# Patient Record
Sex: MALE | Race: WHITE | NOT HISPANIC OR LATINO | Employment: OTHER | URBAN - METROPOLITAN AREA
[De-identification: names, ages, dates, MRNs, and addresses within clinical notes are randomized per-mention and may not be internally consistent; named-entity substitution may affect disease eponyms.]

---

## 2018-06-15 ENCOUNTER — HISTORICAL (OUTPATIENT)
Dept: ADMINISTRATIVE | Facility: HOSPITAL | Age: 58
End: 2018-06-15

## 2018-06-15 LAB
ABS NEUT (OLG): 4.68 X10(3)/MCL (ref 2.1–9.2)
APPEARANCE, UA: CLEAR
BASOPHILS # BLD AUTO: 0.1 X10(3)/MCL (ref 0–0.2)
BASOPHILS NFR BLD AUTO: 1 %
BILIRUB UR QL STRIP: NEGATIVE
CHOLEST SERPL-MCNC: 142 MG/DL (ref 0–200)
CHOLEST/HDLC SERPL: 2.6 {RATIO} (ref 0–5)
COLOR UR: YELLOW
CREAT UR-MCNC: 140 MG/DL
DEPRECATED CALCIDIOL+CALCIFEROL SERPL-MC: 10 NG/ML (ref 30–80)
EOSINOPHIL # BLD AUTO: 0.4 X10(3)/MCL (ref 0–0.9)
EOSINOPHIL NFR BLD AUTO: 4 %
ERYTHROCYTE [DISTWIDTH] IN BLOOD BY AUTOMATED COUNT: 12.5 % (ref 11.5–17)
EST. AVERAGE GLUCOSE BLD GHB EST-MCNC: 192 MG/DL
GLUCOSE (UA): ABNORMAL
HAV IGM SERPL QL IA: NEGATIVE
HBA1C MFR BLD: 8.3 % (ref 4.2–6.3)
HBV CORE IGM SERPL QL IA: NEGATIVE
HBV SURFACE AG SERPL QL IA: NEGATIVE
HCT VFR BLD AUTO: 43.8 % (ref 42–52)
HCV AB SERPL QL IA: NEGATIVE
HDLC SERPL-MCNC: 54 MG/DL (ref 35–60)
HEPATITIS PANEL INTERP: NORMAL
HGB BLD-MCNC: 14.8 GM/DL (ref 14–18)
HGB UR QL STRIP: NEGATIVE
KETONES UR QL STRIP: NEGATIVE
LDLC SERPL CALC-MCNC: 70 MG/DL (ref 0–129)
LEUKOCYTE ESTERASE UR QL STRIP: NEGATIVE
LYMPHOCYTES # BLD AUTO: 3 X10(3)/MCL (ref 0.6–4.6)
LYMPHOCYTES NFR BLD AUTO: 34 %
MCH RBC QN AUTO: 31.8 PG (ref 27–31)
MCHC RBC AUTO-ENTMCNC: 33.8 GM/DL (ref 33–36)
MCV RBC AUTO: 94.2 FL (ref 80–94)
MICROALBUMIN UR-MCNC: 0.8 MG/DL
MICROALBUMIN/CREAT RATIO PNL UR: 5.7 MG/GM CR (ref 0–30)
MONOCYTES # BLD AUTO: 0.8 X10(3)/MCL (ref 0.1–1.3)
MONOCYTES NFR BLD AUTO: 9 %
NEUTROPHILS # BLD AUTO: 4.68 X10(3)/MCL (ref 1.4–7.9)
NEUTROPHILS NFR BLD AUTO: 52 %
NITRITE UR QL STRIP: NEGATIVE
PH UR STRIP: 6 [PH] (ref 5–9)
PLATELET # BLD AUTO: 165 X10(3)/MCL (ref 130–400)
PMV BLD AUTO: 10.5 FL (ref 9.4–12.4)
PROT UR QL STRIP: NEGATIVE
RBC # BLD AUTO: 4.65 X10(6)/MCL (ref 4.7–6.1)
SP GR UR STRIP: 1.01 (ref 1–1.03)
TRIGL SERPL-MCNC: 89 MG/DL (ref 30–150)
TSH SERPL-ACNC: 1.88 MIU/L (ref 0.36–3.74)
UROBILINOGEN UR STRIP-ACNC: 0.2
VLDLC SERPL CALC-MCNC: 18 MG/DL
WBC # SPEC AUTO: 9 X10(3)/MCL (ref 4.5–11.5)

## 2018-06-18 ENCOUNTER — HISTORICAL (OUTPATIENT)
Dept: INTERNAL MEDICINE | Facility: CLINIC | Age: 58
End: 2018-06-18

## 2018-07-19 ENCOUNTER — HISTORICAL (OUTPATIENT)
Dept: RADIOLOGY | Facility: HOSPITAL | Age: 58
End: 2018-07-19

## 2018-08-30 ENCOUNTER — HISTORICAL (OUTPATIENT)
Dept: ADMINISTRATIVE | Facility: HOSPITAL | Age: 58
End: 2018-08-30

## 2018-08-31 ENCOUNTER — HISTORICAL (OUTPATIENT)
Dept: RADIOLOGY | Facility: HOSPITAL | Age: 58
End: 2018-08-31

## 2018-09-06 ENCOUNTER — HISTORICAL (OUTPATIENT)
Dept: RADIOLOGY | Facility: HOSPITAL | Age: 58
End: 2018-09-06

## 2018-10-01 ENCOUNTER — HISTORICAL (OUTPATIENT)
Dept: RADIOLOGY | Facility: HOSPITAL | Age: 58
End: 2018-10-01

## 2018-10-01 LAB
BUN SERPL-MCNC: 15 MG/DL (ref 7–18)
CALCIUM SERPL-MCNC: 8.8 MG/DL (ref 8.5–10.1)
CHLORIDE SERPL-SCNC: 104 MMOL/L (ref 98–107)
CHOLEST SERPL-MCNC: 150 MG/DL
CHOLEST/HDLC SERPL: 3.4 {RATIO} (ref 0–5)
CO2 SERPL-SCNC: 35 MMOL/L (ref 21–32)
CREAT SERPL-MCNC: 0.9 MG/DL (ref 0.6–1.3)
CREAT/UREA NIT SERPL: 17
EST. AVERAGE GLUCOSE BLD GHB EST-MCNC: 206 MG/DL
GLUCOSE SERPL-MCNC: 170 MG/DL (ref 74–106)
HBA1C MFR BLD: 8.8 % (ref 4.2–6.3)
HDLC SERPL-MCNC: 44 MG/DL
LDLC SERPL CALC-MCNC: 74 MG/DL (ref 0–130)
POTASSIUM SERPL-SCNC: 4.1 MMOL/L (ref 3.5–5.1)
SODIUM SERPL-SCNC: 140 MMOL/L (ref 136–145)
TRIGL SERPL-MCNC: 160 MG/DL
VLDLC SERPL CALC-MCNC: 32 MG/DL

## 2018-10-23 ENCOUNTER — HISTORICAL (OUTPATIENT)
Dept: ADMINISTRATIVE | Facility: HOSPITAL | Age: 58
End: 2018-10-23

## 2018-10-23 LAB
BUN SERPL-MCNC: 15 MG/DL (ref 7–18)
CALCIUM SERPL-MCNC: 9.8 MG/DL (ref 8.5–10.1)
CHLORIDE SERPL-SCNC: 106 MMOL/L (ref 98–107)
CO2 SERPL-SCNC: 31 MMOL/L (ref 21–32)
CREAT SERPL-MCNC: 0.96 MG/DL (ref 0.7–1.3)
CREAT/UREA NIT SERPL: 15.6
GLUCOSE SERPL-MCNC: 178 MG/DL (ref 74–106)
POTASSIUM SERPL-SCNC: 4.7 MMOL/L (ref 3.5–5.1)
SODIUM SERPL-SCNC: 145 MMOL/L (ref 136–145)

## 2019-01-08 ENCOUNTER — HISTORICAL (OUTPATIENT)
Dept: ADMINISTRATIVE | Facility: HOSPITAL | Age: 59
End: 2019-01-08

## 2019-01-08 LAB
ALBUMIN SERPL-MCNC: 3.9 GM/DL (ref 3.4–5)
ALBUMIN/GLOB SERPL: 1.3 RATIO (ref 1.1–2)
ALP SERPL-CCNC: 113 UNIT/L (ref 50–136)
ALT SERPL-CCNC: 29 UNIT/L (ref 12–78)
AST SERPL-CCNC: 18 UNIT/L (ref 15–37)
BILIRUB SERPL-MCNC: 0.9 MG/DL (ref 0.2–1)
BILIRUBIN DIRECT+TOT PNL SERPL-MCNC: 0.2 MG/DL (ref 0–0.5)
BILIRUBIN DIRECT+TOT PNL SERPL-MCNC: 0.7 MG/DL (ref 0–0.8)
BUN SERPL-MCNC: 15 MG/DL (ref 7–18)
CALCIUM SERPL-MCNC: 8.9 MG/DL (ref 8.5–10.1)
CHLORIDE SERPL-SCNC: 104 MMOL/L (ref 98–107)
CHOLEST SERPL-MCNC: 165 MG/DL (ref 0–200)
CHOLEST/HDLC SERPL: 3.6 {RATIO} (ref 0–5)
CO2 SERPL-SCNC: 32 MMOL/L (ref 21–32)
CREAT SERPL-MCNC: 0.82 MG/DL (ref 0.7–1.3)
GLOBULIN SER-MCNC: 3.1 GM/DL (ref 2.4–3.5)
GLUCOSE SERPL-MCNC: 50 MG/DL (ref 74–106)
HDLC SERPL-MCNC: 46 MG/DL (ref 35–60)
LDLC SERPL CALC-MCNC: 86 MG/DL (ref 0–129)
POTASSIUM SERPL-SCNC: 3.8 MMOL/L (ref 3.5–5.1)
PROT SERPL-MCNC: 7 GM/DL (ref 6.4–8.2)
SODIUM SERPL-SCNC: 143 MMOL/L (ref 136–145)
TRIGL SERPL-MCNC: 164 MG/DL (ref 30–150)
VLDLC SERPL CALC-MCNC: 33 MG/DL

## 2019-04-15 ENCOUNTER — HISTORICAL (OUTPATIENT)
Dept: ADMINISTRATIVE | Facility: HOSPITAL | Age: 59
End: 2019-04-15

## 2019-04-15 LAB
BUN SERPL-MCNC: 13 MG/DL (ref 7–18)
CALCIUM SERPL-MCNC: 9.2 MG/DL (ref 8.5–10.1)
CHLORIDE SERPL-SCNC: 104 MMOL/L (ref 98–107)
CHOLEST SERPL-MCNC: 224 MG/DL (ref 0–200)
CHOLEST/HDLC SERPL: 4.5 {RATIO} (ref 0–5)
CO2 SERPL-SCNC: 32 MMOL/L (ref 21–32)
CREAT SERPL-MCNC: 0.78 MG/DL (ref 0.7–1.3)
CREAT/UREA NIT SERPL: 16.7
EST. AVERAGE GLUCOSE BLD GHB EST-MCNC: 220 MG/DL
GLUCOSE SERPL-MCNC: 72 MG/DL (ref 74–106)
HBA1C MFR BLD: 9.3 % (ref 4.2–6.3)
HDLC SERPL-MCNC: 50 MG/DL (ref 35–60)
LDLC SERPL CALC-MCNC: 138 MG/DL (ref 0–129)
POTASSIUM SERPL-SCNC: 4.4 MMOL/L (ref 3.5–5.1)
SODIUM SERPL-SCNC: 141 MMOL/L (ref 136–145)
TRIGL SERPL-MCNC: 179 MG/DL (ref 30–150)
VLDLC SERPL CALC-MCNC: 36 MG/DL

## 2019-08-20 ENCOUNTER — HISTORICAL (OUTPATIENT)
Dept: ADMINISTRATIVE | Facility: HOSPITAL | Age: 59
End: 2019-08-20

## 2019-08-20 LAB
ABS NEUT (OLG): 8.18 X10(3)/MCL (ref 2.1–9.2)
APPEARANCE, UA: CLEAR
BASOPHILS # BLD AUTO: 0.1 X10(3)/MCL (ref 0–0.2)
BASOPHILS NFR BLD AUTO: 1 %
BILIRUB UR QL STRIP: NEGATIVE
BUN SERPL-MCNC: 16 MG/DL (ref 7–18)
CALCIUM SERPL-MCNC: 9.6 MG/DL (ref 8.5–10.1)
CHLORIDE SERPL-SCNC: 108 MMOL/L (ref 98–107)
CHOLEST SERPL-MCNC: 129 MG/DL (ref 0–200)
CHOLEST/HDLC SERPL: 2.5 {RATIO} (ref 0–5)
CO2 SERPL-SCNC: 28 MMOL/L (ref 21–32)
COLOR UR: YELLOW
CREAT SERPL-MCNC: 0.84 MG/DL (ref 0.7–1.3)
CREAT UR-MCNC: 88 MG/DL
CREAT/UREA NIT SERPL: 19
EOSINOPHIL # BLD AUTO: 0.3 X10(3)/MCL (ref 0–0.9)
EOSINOPHIL NFR BLD AUTO: 3 %
ERYTHROCYTE [DISTWIDTH] IN BLOOD BY AUTOMATED COUNT: 12.1 % (ref 11.5–17)
EST. AVERAGE GLUCOSE BLD GHB EST-MCNC: 212 MG/DL
GLUCOSE (UA): ABNORMAL
GLUCOSE SERPL-MCNC: 67 MG/DL (ref 74–106)
HBA1C MFR BLD: 9 % (ref 4.2–6.3)
HCT VFR BLD AUTO: 46.5 % (ref 42–52)
HDLC SERPL-MCNC: 52 MG/DL (ref 35–60)
HGB BLD-MCNC: 15.5 GM/DL (ref 14–18)
HGB UR QL STRIP: NEGATIVE
KETONES UR QL STRIP: NEGATIVE
LDLC SERPL CALC-MCNC: 49 MG/DL (ref 0–129)
LEUKOCYTE ESTERASE UR QL STRIP: NEGATIVE
LYMPHOCYTES # BLD AUTO: 2.8 X10(3)/MCL (ref 0.6–4.6)
LYMPHOCYTES NFR BLD AUTO: 23 %
MCH RBC QN AUTO: 30.6 PG (ref 27–31)
MCHC RBC AUTO-ENTMCNC: 33.3 GM/DL (ref 33–36)
MCV RBC AUTO: 91.7 FL (ref 80–94)
MICROALBUMIN UR-MCNC: 2.5 MG/DL
MICROALBUMIN/CREAT RATIO PNL UR: 28.4 MG/GM CR (ref 0–30)
MONOCYTES # BLD AUTO: 0.9 X10(3)/MCL (ref 0.1–1.3)
MONOCYTES NFR BLD AUTO: 7 %
NEUTROPHILS # BLD AUTO: 8.18 X10(3)/MCL (ref 2.1–9.2)
NEUTROPHILS NFR BLD AUTO: 66 %
NITRITE UR QL STRIP: NEGATIVE
PH UR STRIP: 7 [PH] (ref 5–9)
PLATELET # BLD AUTO: 202 X10(3)/MCL (ref 130–400)
PMV BLD AUTO: 10.2 FL (ref 9.4–12.4)
POTASSIUM SERPL-SCNC: 4.2 MMOL/L (ref 3.5–5.1)
PROT UR QL STRIP: NEGATIVE
PSA SERPL-MCNC: 0.67 NG/ML (ref 0–4)
RBC # BLD AUTO: 5.07 X10(6)/MCL (ref 4.7–6.1)
SODIUM SERPL-SCNC: 143 MMOL/L (ref 136–145)
SP GR UR STRIP: 1.02 (ref 1–1.03)
TRIGL SERPL-MCNC: 142 MG/DL (ref 30–150)
UROBILINOGEN UR STRIP-ACNC: 0.2
VLDLC SERPL CALC-MCNC: 28 MG/DL
WBC # SPEC AUTO: 12.4 X10(3)/MCL (ref 4.5–11.5)

## 2019-12-18 ENCOUNTER — HISTORICAL (OUTPATIENT)
Dept: INTERNAL MEDICINE | Facility: CLINIC | Age: 59
End: 2019-12-18

## 2019-12-18 LAB
BUN SERPL-MCNC: 12 MG/DL (ref 7–18)
CALCIUM SERPL-MCNC: 9.6 MG/DL (ref 8.5–10.1)
CHLORIDE SERPL-SCNC: 107 MMOL/L (ref 98–107)
CO2 SERPL-SCNC: 32 MMOL/L (ref 21–32)
CREAT SERPL-MCNC: 0.8 MG/DL (ref 0.6–1.3)
CREAT/UREA NIT SERPL: 15
EST. AVERAGE GLUCOSE BLD GHB EST-MCNC: 226 MG/DL
GLUCOSE SERPL-MCNC: 64 MG/DL (ref 74–106)
HBA1C MFR BLD: 9.5 % (ref 4.2–6.3)
POTASSIUM SERPL-SCNC: 3.7 MMOL/L (ref 3.5–5.1)
SODIUM SERPL-SCNC: 144 MMOL/L (ref 136–145)

## 2020-06-22 ENCOUNTER — HISTORICAL (OUTPATIENT)
Dept: ADMINISTRATIVE | Facility: HOSPITAL | Age: 60
End: 2020-06-22

## 2020-06-22 LAB
ABS NEUT (OLG): 2.9 X10(3)/MCL (ref 2.1–9.2)
BASOPHILS # BLD AUTO: 0.1 X10(3)/MCL (ref 0–0.2)
BASOPHILS NFR BLD AUTO: 1 %
BUN SERPL-MCNC: 15.2 MG/DL (ref 8.4–25.7)
CALCIUM SERPL-MCNC: 9.2 MG/DL (ref 8.8–10)
CHLORIDE SERPL-SCNC: 103 MMOL/L (ref 98–107)
CHOLEST SERPL-MCNC: 128 MG/DL
CHOLEST/HDLC SERPL: 3 {RATIO} (ref 0–5)
CO2 SERPL-SCNC: 32 MMOL/L (ref 23–31)
CREAT SERPL-MCNC: 0.83 MG/DL (ref 0.73–1.18)
CREAT UR-MCNC: 46.1 MG/DL (ref 58–161)
CREAT/UREA NIT SERPL: 18
EOSINOPHIL # BLD AUTO: 0.4 X10(3)/MCL (ref 0–0.9)
EOSINOPHIL NFR BLD AUTO: 6 %
ERYTHROCYTE [DISTWIDTH] IN BLOOD BY AUTOMATED COUNT: 11.7 % (ref 11.5–17)
EST. AVERAGE GLUCOSE BLD GHB EST-MCNC: 254.6 MG/DL
GLUCOSE SERPL-MCNC: 161 MG/DL (ref 82–115)
HBA1C MFR BLD: 10.5 %
HCT VFR BLD AUTO: 43.7 % (ref 42–52)
HDLC SERPL-MCNC: 41 MG/DL (ref 35–60)
HGB BLD-MCNC: 14.7 GM/DL (ref 14–18)
LDLC SERPL CALC-MCNC: 62 MG/DL (ref 50–140)
LYMPHOCYTES # BLD AUTO: 3 X10(3)/MCL (ref 0.6–4.6)
LYMPHOCYTES NFR BLD AUTO: 43 %
MCH RBC QN AUTO: 31.3 PG (ref 27–31)
MCHC RBC AUTO-ENTMCNC: 33.6 GM/DL (ref 33–36)
MCV RBC AUTO: 93 FL (ref 80–94)
MICROALBUMIN UR-MCNC: 7.4 UG/ML
MICROALBUMIN/CREAT RATIO PNL UR: 16.1 MG/GM CR (ref 0–30)
MONOCYTES # BLD AUTO: 0.6 X10(3)/MCL (ref 0.1–1.3)
MONOCYTES NFR BLD AUTO: 9 %
NEUTROPHILS # BLD AUTO: 2.9 X10(3)/MCL (ref 2.1–9.2)
NEUTROPHILS NFR BLD AUTO: 41 %
PLATELET # BLD AUTO: 170 X10(3)/MCL (ref 130–400)
PMV BLD AUTO: 10.4 FL (ref 9.4–12.4)
POTASSIUM SERPL-SCNC: 4.8 MMOL/L (ref 3.5–5.1)
RBC # BLD AUTO: 4.7 X10(6)/MCL (ref 4.7–6.1)
SODIUM SERPL-SCNC: 143 MMOL/L (ref 136–145)
TRIGL SERPL-MCNC: 124 MG/DL (ref 34–140)
VLDLC SERPL CALC-MCNC: 25 MG/DL
WBC # SPEC AUTO: 7 X10(3)/MCL (ref 4.5–11.5)

## 2020-08-31 ENCOUNTER — HISTORICAL (OUTPATIENT)
Dept: ADMINISTRATIVE | Facility: HOSPITAL | Age: 60
End: 2020-08-31

## 2020-08-31 LAB
ALBUMIN SERPL-MCNC: 4 GM/DL (ref 3.4–4.8)
ALBUMIN/GLOB SERPL: 1.7 RATIO (ref 1.1–2)
ALP SERPL-CCNC: 116 UNIT/L (ref 40–150)
ALT SERPL-CCNC: 53 UNIT/L (ref 0–55)
APPEARANCE, UA: CLEAR
AST SERPL-CCNC: 40 UNIT/L (ref 5–34)
BACTERIA SPEC CULT: ABNORMAL /HPF
BILIRUB SERPL-MCNC: 1.1 MG/DL
BILIRUB UR QL STRIP: NEGATIVE
BILIRUBIN DIRECT+TOT PNL SERPL-MCNC: 0.4 MG/DL (ref 0–0.5)
BILIRUBIN DIRECT+TOT PNL SERPL-MCNC: 0.7 MG/DL (ref 0–0.8)
BUN SERPL-MCNC: 11.4 MG/DL (ref 8.4–25.7)
CALCIUM SERPL-MCNC: 8.8 MG/DL (ref 8.8–10)
CHLORIDE SERPL-SCNC: 102 MMOL/L (ref 98–107)
CO2 SERPL-SCNC: 34 MMOL/L (ref 23–31)
COLOR UR: YELLOW
CREAT SERPL-MCNC: 0.78 MG/DL (ref 0.73–1.18)
EST. AVERAGE GLUCOSE BLD GHB EST-MCNC: 257.5 MG/DL
GLOBULIN SER-MCNC: 2.4 GM/DL (ref 2.4–3.5)
GLUCOSE (UA): ABNORMAL
GLUCOSE SERPL-MCNC: 65 MG/DL (ref 82–115)
HBA1C MFR BLD: 10.6 %
HGB UR QL STRIP: NEGATIVE
KETONES UR QL STRIP: NEGATIVE
LEUKOCYTE ESTERASE UR QL STRIP: NEGATIVE
NITRITE UR QL STRIP: NEGATIVE
PH UR STRIP: 6 [PH] (ref 5–9)
POTASSIUM SERPL-SCNC: 4.4 MMOL/L (ref 3.5–5.1)
PROT SERPL-MCNC: 6.4 GM/DL (ref 5.8–7.6)
PROT UR QL STRIP: NEGATIVE
PSA SERPL-MCNC: 0.5 NG/ML
RBC #/AREA URNS HPF: ABNORMAL /[HPF]
SODIUM SERPL-SCNC: 142 MMOL/L (ref 136–145)
SP GR UR STRIP: 1.02 (ref 1–1.03)
SQUAMOUS EPITHELIAL, UA: ABNORMAL
UROBILINOGEN UR STRIP-ACNC: 0.2
WBC #/AREA URNS HPF: ABNORMAL /[HPF]

## 2020-11-25 ENCOUNTER — HISTORICAL (OUTPATIENT)
Dept: ADMINISTRATIVE | Facility: HOSPITAL | Age: 60
End: 2020-11-25

## 2020-11-25 LAB
ALBUMIN SERPL-MCNC: 3.9 GM/DL (ref 3.4–4.8)
ALBUMIN/GLOB SERPL: 1.4 RATIO (ref 1.1–2)
ALP SERPL-CCNC: 112 UNIT/L (ref 40–150)
ALT SERPL-CCNC: 40 UNIT/L (ref 0–55)
AST SERPL-CCNC: 25 UNIT/L (ref 5–34)
BILIRUB SERPL-MCNC: 0.9 MG/DL
BILIRUBIN DIRECT+TOT PNL SERPL-MCNC: 0.3 MG/DL (ref 0–0.5)
BILIRUBIN DIRECT+TOT PNL SERPL-MCNC: 0.6 MG/DL (ref 0–0.8)
BUN SERPL-MCNC: 15 MG/DL (ref 8.4–25.7)
CALCIUM SERPL-MCNC: 9.1 MG/DL (ref 8.8–10)
CHLORIDE SERPL-SCNC: 103 MMOL/L (ref 98–107)
CO2 SERPL-SCNC: 30 MMOL/L (ref 23–31)
CREAT SERPL-MCNC: 0.76 MG/DL (ref 0.73–1.18)
EST. AVERAGE GLUCOSE BLD GHB EST-MCNC: 243.2 MG/DL
GLOBULIN SER-MCNC: 2.7 GM/DL (ref 2.4–3.5)
GLUCOSE SERPL-MCNC: 91 MG/DL (ref 82–115)
HBA1C MFR BLD: 10.1 %
POTASSIUM SERPL-SCNC: 4.4 MMOL/L (ref 3.5–5.1)
PROT SERPL-MCNC: 6.6 GM/DL (ref 5.8–7.6)
SODIUM SERPL-SCNC: 142 MMOL/L (ref 136–145)
TSH SERPL-ACNC: 1.6 UIU/ML (ref 0.35–4.94)

## 2021-01-21 ENCOUNTER — HISTORICAL (OUTPATIENT)
Dept: ADMINISTRATIVE | Facility: HOSPITAL | Age: 61
End: 2021-01-21

## 2021-01-21 LAB
CHOLEST SERPL-MCNC: 154 MG/DL
CHOLEST/HDLC SERPL: 4 {RATIO} (ref 0–5)
HDLC SERPL-MCNC: 40 MG/DL (ref 35–60)
LDLC SERPL CALC-MCNC: 75 MG/DL (ref 50–140)
TRIGL SERPL-MCNC: 196 MG/DL (ref 34–140)
VLDLC SERPL CALC-MCNC: 39 MG/DL

## 2021-01-25 ENCOUNTER — HISTORICAL (OUTPATIENT)
Dept: RADIOLOGY | Facility: HOSPITAL | Age: 61
End: 2021-01-25

## 2021-02-26 ENCOUNTER — HISTORICAL (OUTPATIENT)
Dept: ADMINISTRATIVE | Facility: HOSPITAL | Age: 61
End: 2021-02-26

## 2021-02-26 LAB
BUN SERPL-MCNC: 16.3 MG/DL (ref 8.4–25.7)
CALCIUM SERPL-MCNC: 9 MG/DL (ref 8.8–10)
CHLORIDE SERPL-SCNC: 99 MMOL/L (ref 98–107)
CO2 SERPL-SCNC: 28 MMOL/L (ref 23–31)
CREAT SERPL-MCNC: 0.97 MG/DL (ref 0.73–1.18)
CREAT UR-MCNC: 42.4 MG/DL (ref 58–161)
CREAT/UREA NIT SERPL: 17
EST. AVERAGE GLUCOSE BLD GHB EST-MCNC: 246 MG/DL
GLUCOSE SERPL-MCNC: 412 MG/DL (ref 82–115)
HBA1C MFR BLD: 10.2 %
MICROALBUMIN UR-MCNC: 5.2 UG/ML
MICROALBUMIN/CREAT RATIO PNL UR: 12.3 MG/GM CR (ref 0–30)
POTASSIUM SERPL-SCNC: 4.8 MMOL/L (ref 3.5–5.1)
SODIUM SERPL-SCNC: 136 MMOL/L (ref 136–145)

## 2021-03-07 LAB
LEFT EYE DM RETINOPATHY: NORMAL
RIGHT EYE DM RETINOPATHY: POSITIVE

## 2021-03-10 ENCOUNTER — HISTORICAL (OUTPATIENT)
Dept: ADMINISTRATIVE | Facility: HOSPITAL | Age: 61
End: 2021-03-10

## 2021-03-10 LAB
BUN SERPL-MCNC: 13.5 MG/DL (ref 8.4–25.7)
CALCIUM SERPL-MCNC: 9.4 MG/DL (ref 8.8–10)
CHLORIDE SERPL-SCNC: 101 MMOL/L (ref 98–107)
CO2 SERPL-SCNC: 31 MMOL/L (ref 23–31)
CREAT SERPL-MCNC: 0.82 MG/DL (ref 0.73–1.18)
CREAT/UREA NIT SERPL: 16
GLUCOSE SERPL-MCNC: 96 MG/DL (ref 82–115)
POTASSIUM SERPL-SCNC: 4.4 MMOL/L (ref 3.5–5.1)
SODIUM SERPL-SCNC: 140 MMOL/L (ref 136–145)
TSH SERPL-ACNC: 1.84 UIU/ML (ref 0.35–4.94)

## 2021-05-25 ENCOUNTER — HISTORICAL (OUTPATIENT)
Dept: ADMINISTRATIVE | Facility: HOSPITAL | Age: 61
End: 2021-05-25

## 2021-05-25 LAB
ABS NEUT (OLG): 3.26 X10(3)/MCL (ref 2.1–9.2)
ALBUMIN SERPL-MCNC: 4.3 GM/DL (ref 3.4–4.8)
ALBUMIN/GLOB SERPL: 1.7 RATIO (ref 1.1–2)
ALP SERPL-CCNC: 98 UNIT/L (ref 40–150)
ALT SERPL-CCNC: 99 UNIT/L (ref 0–55)
APPEARANCE, UA: CLEAR
AST SERPL-CCNC: 54 UNIT/L (ref 5–34)
BACTERIA SPEC CULT: ABNORMAL /HPF
BASOPHILS # BLD AUTO: 0.1 X10(3)/MCL (ref 0–0.2)
BASOPHILS NFR BLD AUTO: 1 %
BILIRUB SERPL-MCNC: 1.1 MG/DL
BILIRUB UR QL STRIP: NEGATIVE
BILIRUBIN DIRECT+TOT PNL SERPL-MCNC: 0.4 MG/DL (ref 0–0.5)
BILIRUBIN DIRECT+TOT PNL SERPL-MCNC: 0.7 MG/DL (ref 0–0.8)
BUN SERPL-MCNC: 9.6 MG/DL (ref 8.4–25.7)
CALCIUM SERPL-MCNC: 9.7 MG/DL (ref 8.8–10)
CHLORIDE SERPL-SCNC: 103 MMOL/L (ref 98–107)
CO2 SERPL-SCNC: 31 MMOL/L (ref 23–31)
COLOR UR: YELLOW
CREAT SERPL-MCNC: 0.76 MG/DL (ref 0.73–1.18)
DEPRECATED CALCIDIOL+CALCIFEROL SERPL-MC: 37.8 NG/ML (ref 30–80)
EOSINOPHIL # BLD AUTO: 0.6 X10(3)/MCL (ref 0–0.9)
EOSINOPHIL NFR BLD AUTO: 8 %
ERYTHROCYTE [DISTWIDTH] IN BLOOD BY AUTOMATED COUNT: 12.4 % (ref 11.5–17)
EST. AVERAGE GLUCOSE BLD GHB EST-MCNC: 220.2 MG/DL
GLOBULIN SER-MCNC: 2.5 GM/DL (ref 2.4–3.5)
GLUCOSE (UA): ABNORMAL
GLUCOSE SERPL-MCNC: 89 MG/DL (ref 82–115)
HBA1C MFR BLD: 9.3 %
HCT VFR BLD AUTO: 45.3 % (ref 42–52)
HGB BLD-MCNC: 15.1 GM/DL (ref 14–18)
HGB UR QL STRIP: NEGATIVE
IMM GRANULOCYTES # BLD AUTO: 0.04 10*3/UL
IMM GRANULOCYTES NFR BLD AUTO: 0 %
KETONES UR QL STRIP: NEGATIVE
LEUKOCYTE ESTERASE UR QL STRIP: NEGATIVE
LYMPHOCYTES # BLD AUTO: 3.1 X10(3)/MCL (ref 0.6–4.6)
LYMPHOCYTES NFR BLD AUTO: 40 %
MCH RBC QN AUTO: 31.6 PG (ref 27–31)
MCHC RBC AUTO-ENTMCNC: 33.3 GM/DL (ref 33–36)
MCV RBC AUTO: 94.8 FL (ref 80–94)
MONOCYTES # BLD AUTO: 0.6 X10(3)/MCL (ref 0.1–1.3)
MONOCYTES NFR BLD AUTO: 8 %
NEUTROPHILS # BLD AUTO: 3.26 X10(3)/MCL (ref 2.1–9.2)
NEUTROPHILS NFR BLD AUTO: 42 %
NITRITE UR QL STRIP: NEGATIVE
PH UR STRIP: 7 [PH] (ref 5–9)
PLATELET # BLD AUTO: 173 X10(3)/MCL (ref 130–400)
PMV BLD AUTO: 10.6 FL (ref 9.4–12.4)
POTASSIUM SERPL-SCNC: 4.8 MMOL/L (ref 3.5–5.1)
PROT SERPL-MCNC: 6.8 GM/DL (ref 5.8–7.6)
PROT UR QL STRIP: NEGATIVE
RBC # BLD AUTO: 4.78 X10(6)/MCL (ref 4.7–6.1)
RBC #/AREA URNS HPF: ABNORMAL /[HPF]
SODIUM SERPL-SCNC: 147 MMOL/L (ref 136–145)
SP GR UR STRIP: 1.01 (ref 1–1.03)
SQUAMOUS EPITHELIAL, UA: ABNORMAL /HPF (ref 0–4)
UROBILINOGEN UR STRIP-ACNC: 0.2
WBC # SPEC AUTO: 7.7 X10(3)/MCL (ref 4.5–11.5)
WBC #/AREA URNS HPF: ABNORMAL /HPF

## 2021-06-03 ENCOUNTER — HISTORICAL (OUTPATIENT)
Dept: ADMINISTRATIVE | Facility: HOSPITAL | Age: 61
End: 2021-06-03

## 2021-06-03 LAB
ALBUMIN SERPL-MCNC: 4.4 GM/DL (ref 3.4–4.8)
ALBUMIN/GLOB SERPL: 1.4 RATIO (ref 1.1–2)
ALP SERPL-CCNC: 109 UNIT/L (ref 40–150)
ALT SERPL-CCNC: 64 UNIT/L (ref 0–55)
AST SERPL-CCNC: 42 UNIT/L (ref 5–34)
BILIRUB SERPL-MCNC: 1 MG/DL
BILIRUBIN DIRECT+TOT PNL SERPL-MCNC: 0.2 MG/DL (ref 0–0.5)
BILIRUBIN DIRECT+TOT PNL SERPL-MCNC: 0.8 MG/DL (ref 0–0.8)
BUN SERPL-MCNC: 15.3 MG/DL (ref 8.4–25.7)
CALCIUM SERPL-MCNC: 9.9 MG/DL (ref 8.8–10)
CHLORIDE SERPL-SCNC: 102 MMOL/L (ref 98–107)
CO2 SERPL-SCNC: 31 MMOL/L (ref 23–31)
CREAT SERPL-MCNC: 0.8 MG/DL (ref 0.73–1.18)
GLOBULIN SER-MCNC: 3.2 GM/DL (ref 2.4–3.5)
GLUCOSE SERPL-MCNC: 112 MG/DL (ref 82–115)
POTASSIUM SERPL-SCNC: 4.3 MMOL/L (ref 3.5–5.1)
PROT SERPL-MCNC: 7.6 GM/DL (ref 5.8–7.6)
SODIUM SERPL-SCNC: 142 MMOL/L (ref 136–145)

## 2021-09-16 ENCOUNTER — HISTORICAL (OUTPATIENT)
Dept: ADMINISTRATIVE | Facility: HOSPITAL | Age: 61
End: 2021-09-16

## 2021-09-16 LAB
ALBUMIN SERPL-MCNC: 3.9 GM/DL (ref 3.4–4.8)
ALBUMIN/GLOB SERPL: 1.5 RATIO (ref 1.1–2)
ALP SERPL-CCNC: 105 UNIT/L (ref 40–150)
ALT SERPL-CCNC: 59 UNIT/L (ref 0–55)
AST SERPL-CCNC: 40 UNIT/L (ref 5–34)
BILIRUB SERPL-MCNC: 0.8 MG/DL
BILIRUBIN DIRECT+TOT PNL SERPL-MCNC: 0.3 MG/DL (ref 0–0.5)
BILIRUBIN DIRECT+TOT PNL SERPL-MCNC: 0.5 MG/DL (ref 0–0.8)
BUN SERPL-MCNC: 13.2 MG/DL (ref 8.4–25.7)
CALCIUM SERPL-MCNC: 9.7 MG/DL (ref 8.8–10)
CHLORIDE SERPL-SCNC: 104 MMOL/L (ref 98–107)
CHOLEST SERPL-MCNC: 140 MG/DL
CHOLEST/HDLC SERPL: 4 {RATIO} (ref 0–5)
CO2 SERPL-SCNC: 30 MMOL/L (ref 23–31)
CREAT SERPL-MCNC: 0.8 MG/DL (ref 0.73–1.18)
EST. AVERAGE GLUCOSE BLD GHB EST-MCNC: 217.3 MG/DL
GLOBULIN SER-MCNC: 2.6 GM/DL (ref 2.4–3.5)
GLUCOSE SERPL-MCNC: 79 MG/DL (ref 82–115)
HBA1C MFR BLD: 9.2 %
HDLC SERPL-MCNC: 39 MG/DL (ref 35–60)
LDLC SERPL CALC-MCNC: 76 MG/DL (ref 50–140)
POTASSIUM SERPL-SCNC: 4.8 MMOL/L (ref 3.5–5.1)
PROT SERPL-MCNC: 6.5 GM/DL (ref 5.8–7.6)
PSA SERPL-MCNC: 0.52 NG/ML
SODIUM SERPL-SCNC: 143 MMOL/L (ref 136–145)
TRIGL SERPL-MCNC: 124 MG/DL (ref 34–140)
VLDLC SERPL CALC-MCNC: 25 MG/DL

## 2021-12-16 ENCOUNTER — HISTORICAL (OUTPATIENT)
Dept: ADMINISTRATIVE | Facility: HOSPITAL | Age: 61
End: 2021-12-16

## 2021-12-16 LAB
ABS NEUT (OLG): 2.95 X10(3)/MCL (ref 2.1–9.2)
ALBUMIN SERPL-MCNC: 4.2 GM/DL (ref 3.4–4.8)
ALBUMIN/GLOB SERPL: 1.4 RATIO (ref 1.1–2)
ALP SERPL-CCNC: 114 UNIT/L (ref 40–150)
ALT SERPL-CCNC: 68 UNIT/L (ref 0–55)
APPEARANCE, UA: CLEAR
AST SERPL-CCNC: 58 UNIT/L (ref 5–34)
BACTERIA SPEC CULT: ABNORMAL /HPF
BASOPHILS # BLD AUTO: 0.1 X10(3)/MCL (ref 0–0.2)
BASOPHILS NFR BLD AUTO: 1 %
BILIRUB SERPL-MCNC: 1 MG/DL
BILIRUB UR QL STRIP: NEGATIVE
BILIRUBIN DIRECT+TOT PNL SERPL-MCNC: 0.3 MG/DL (ref 0–0.5)
BILIRUBIN DIRECT+TOT PNL SERPL-MCNC: 0.7 MG/DL (ref 0–0.8)
BUN SERPL-MCNC: 12 MG/DL (ref 8.4–25.7)
CALCIUM SERPL-MCNC: 9.5 MG/DL (ref 8.7–10.5)
CHLORIDE SERPL-SCNC: 101 MMOL/L (ref 98–107)
CO2 SERPL-SCNC: 31 MMOL/L (ref 23–31)
COLOR UR: YELLOW
CREAT SERPL-MCNC: 0.92 MG/DL (ref 0.73–1.18)
CREAT UR-MCNC: 25.9 MG/DL (ref 58–161)
EOSINOPHIL # BLD AUTO: 0.3 X10(3)/MCL (ref 0–0.9)
EOSINOPHIL NFR BLD AUTO: 5 %
ERYTHROCYTE [DISTWIDTH] IN BLOOD BY AUTOMATED COUNT: 11.7 % (ref 11.5–17)
EST. AVERAGE GLUCOSE BLD GHB EST-MCNC: 211.6 MG/DL
GLOBULIN SER-MCNC: 3.1 GM/DL (ref 2.4–3.5)
GLUCOSE (UA): ABNORMAL
GLUCOSE SERPL-MCNC: 261 MG/DL (ref 82–115)
HBA1C MFR BLD: 9 %
HCT VFR BLD AUTO: 45.4 % (ref 42–52)
HGB BLD-MCNC: 15.3 GM/DL (ref 14–18)
HGB UR QL STRIP: NEGATIVE
KETONES UR QL STRIP: NEGATIVE
LEUKOCYTE ESTERASE UR QL STRIP: NEGATIVE
LYMPHOCYTES # BLD AUTO: 2.8 X10(3)/MCL (ref 0.6–4.6)
LYMPHOCYTES NFR BLD AUTO: 42 %
MCH RBC QN AUTO: 31.9 PG (ref 27–31)
MCHC RBC AUTO-ENTMCNC: 33.7 GM/DL (ref 33–36)
MCV RBC AUTO: 94.6 FL (ref 80–94)
MICROALBUMIN UR-MCNC: <5 UG/ML
MICROALBUMIN/CREAT RATIO PNL UR: <19.3 MG/GM CR (ref 0–30)
MONOCYTES # BLD AUTO: 0.6 X10(3)/MCL (ref 0.1–1.3)
MONOCYTES NFR BLD AUTO: 9 %
NEUTROPHILS # BLD AUTO: 2.95 X10(3)/MCL (ref 2.1–9.2)
NEUTROPHILS NFR BLD AUTO: 43 %
NITRITE UR QL STRIP: NEGATIVE
PH UR STRIP: 7 [PH] (ref 5–9)
PLATELET # BLD AUTO: 191 X10(3)/MCL (ref 130–400)
PMV BLD AUTO: 10.3 FL (ref 9.4–12.4)
POTASSIUM SERPL-SCNC: 5 MMOL/L (ref 3.5–5.1)
PROT SERPL-MCNC: 7.3 GM/DL (ref 5.8–7.6)
PROT UR QL STRIP: NEGATIVE
RBC # BLD AUTO: 4.8 X10(6)/MCL (ref 4.7–6.1)
RBC #/AREA URNS HPF: ABNORMAL /[HPF]
SODIUM SERPL-SCNC: 141 MMOL/L (ref 136–145)
SP GR UR STRIP: 1.01 (ref 1–1.03)
SQUAMOUS EPITHELIAL, UA: ABNORMAL /HPF (ref 0–4)
UROBILINOGEN UR STRIP-ACNC: 0.2
WBC # SPEC AUTO: 6.8 X10(3)/MCL (ref 4.5–11.5)
WBC #/AREA URNS AUTO: ABNORMAL /HPF (ref 0–3)
WBC #/AREA URNS HPF: ABNORMAL /HPF

## 2021-12-30 ENCOUNTER — HISTORICAL (OUTPATIENT)
Dept: RADIOLOGY | Facility: HOSPITAL | Age: 61
End: 2021-12-30

## 2021-12-30 LAB — HEMOCCULT STL QL IA: NEGATIVE

## 2022-03-15 ENCOUNTER — HISTORICAL (OUTPATIENT)
Dept: ADMINISTRATIVE | Facility: HOSPITAL | Age: 62
End: 2022-03-15

## 2022-03-15 LAB
ABS NEUT (OLG): 4.1 (ref 2.1–9.2)
ALBUMIN SERPL-MCNC: 4 G/DL (ref 3.4–4.8)
ALBUMIN/GLOB SERPL: 1.5 {RATIO} (ref 1.1–2)
ALP SERPL-CCNC: 119 U/L (ref 40–150)
ALT SERPL-CCNC: 49 U/L (ref 0–55)
AST SERPL-CCNC: 34 U/L (ref 5–34)
BASOPHILS # BLD AUTO: 0.1 10*3/UL (ref 0–0.2)
BASOPHILS NFR BLD AUTO: 1 %
BILIRUB SERPL-MCNC: 0.9 MG/DL
BILIRUBIN DIRECT+TOT PNL SERPL-MCNC: 0.3 (ref 0–0.5)
BILIRUBIN DIRECT+TOT PNL SERPL-MCNC: 0.6 (ref 0–0.8)
BUN SERPL-MCNC: 12.7 MG/DL (ref 8.4–25.7)
CALCIUM SERPL-MCNC: 9.4 MG/DL (ref 8.7–10.5)
CHLORIDE SERPL-SCNC: 103 MMOL/L (ref 98–107)
CHOLEST SERPL-MCNC: 143 MG/DL
CHOLEST/HDLC SERPL: 3 {RATIO} (ref 0–5)
CO2 SERPL-SCNC: 27 MMOL/L (ref 23–31)
CREAT SERPL-MCNC: 0.84 MG/DL (ref 0.73–1.18)
CREAT UR-MCNC: 64.1 MG/DL (ref 58–161)
EOSINOPHIL # BLD AUTO: 0.4 10*3/UL (ref 0–0.9)
EOSINOPHIL NFR BLD AUTO: 4 %
ERYTHROCYTE [DISTWIDTH] IN BLOOD BY AUTOMATED COUNT: 11.9 % (ref 11.5–17)
EST. AVERAGE GLUCOSE BLD GHB EST-MCNC: 226 MG/DL
GLOBULIN SER-MCNC: 2.6 G/DL (ref 2.4–3.5)
GLUCOSE SERPL-MCNC: 256 MG/DL (ref 82–115)
HBA1C MFR BLD: 9.5 %
HCT VFR BLD AUTO: 43.9 % (ref 42–52)
HDLC SERPL-MCNC: 43 MG/DL (ref 35–60)
HEMOLYSIS INTERF INDEX SERPL-ACNC: 2
HGB BLD-MCNC: 14.9 G/DL (ref 14–18)
ICTERIC INTERF INDEX SERPL-ACNC: 1
LDLC SERPL CALC-MCNC: 77 MG/DL (ref 50–140)
LIPEMIC INTERF INDEX SERPL-ACNC: 2
LYMPHOCYTES # BLD AUTO: 2.8 10*3/UL (ref 0.6–4.6)
LYMPHOCYTES NFR BLD AUTO: 35 %
MANUAL DIFF? (OHS): NO
MCH RBC QN AUTO: 31.8 PG (ref 27–31)
MCHC RBC AUTO-ENTMCNC: 33.9 G/DL (ref 33–36)
MCV RBC AUTO: 93.8 FL (ref 80–94)
MICROALBUMIN UR-MCNC: 20.4
MICROALBUMIN/CREAT RATIO PNL UR: 31.8 (ref 0–30)
MONOCYTES # BLD AUTO: 0.6 10*3/UL (ref 0.1–1.3)
MONOCYTES NFR BLD AUTO: 8 %
NEUTROPHILS # BLD AUTO: 4.1 10*3/UL (ref 2.1–9.2)
NEUTROPHILS NFR BLD AUTO: 52 %
PLATELET # BLD AUTO: 167 10*3/UL (ref 130–400)
PMV BLD AUTO: 10.3 FL (ref 9.4–12.4)
POTASSIUM SERPL-SCNC: 4.6 MMOL/L (ref 3.5–5.1)
PROT SERPL-MCNC: 6.6 G/DL (ref 5.8–7.6)
RBC # BLD AUTO: 4.68 10*6/UL (ref 4.7–6.1)
SODIUM SERPL-SCNC: 142 MMOL/L (ref 136–145)
TRIGL SERPL-MCNC: 115 MG/DL (ref 34–140)
VLDLC SERPL CALC-MCNC: 23 MG/DL
WBC # SPEC AUTO: 7.9 10*3/UL (ref 4.5–11.5)

## 2022-04-07 ENCOUNTER — HISTORICAL (OUTPATIENT)
Dept: ADMINISTRATIVE | Facility: HOSPITAL | Age: 62
End: 2022-04-07
Payer: MEDICARE

## 2022-04-24 VITALS
SYSTOLIC BLOOD PRESSURE: 133 MMHG | WEIGHT: 132.5 LBS | OXYGEN SATURATION: 97 % | DIASTOLIC BLOOD PRESSURE: 70 MMHG | HEIGHT: 64 IN | BODY MASS INDEX: 22.62 KG/M2

## 2022-07-18 ENCOUNTER — LAB VISIT (OUTPATIENT)
Dept: LAB | Facility: HOSPITAL | Age: 62
End: 2022-07-18
Attending: NURSE PRACTITIONER
Payer: MEDICARE

## 2022-07-18 DIAGNOSIS — E10.9 DIABETES MELLITUS TYPE I: Primary | ICD-10-CM

## 2022-07-18 LAB
ALBUMIN SERPL-MCNC: 4.1 GM/DL (ref 3.4–4.8)
ALBUMIN/GLOB SERPL: 1.8 RATIO (ref 1.1–2)
ALP SERPL-CCNC: 106 UNIT/L (ref 40–150)
ALT SERPL-CCNC: 56 UNIT/L (ref 0–55)
AST SERPL-CCNC: 29 UNIT/L (ref 5–34)
BILIRUBIN DIRECT+TOT PNL SERPL-MCNC: 1.1 MG/DL
BUN SERPL-MCNC: 14.6 MG/DL (ref 8.4–25.7)
CALCIUM SERPL-MCNC: 9.7 MG/DL (ref 8.8–10)
CHLORIDE SERPL-SCNC: 105 MMOL/L (ref 98–107)
CO2 SERPL-SCNC: 28 MMOL/L (ref 23–31)
CREAT SERPL-MCNC: 0.79 MG/DL (ref 0.73–1.18)
EST. AVERAGE GLUCOSE BLD GHB EST-MCNC: 234.6 MG/DL
GLOBULIN SER-MCNC: 2.3 GM/DL (ref 2.4–3.5)
GLUCOSE SERPL-MCNC: 86 MG/DL (ref 82–115)
HBA1C MFR BLD: 9.8 %
POTASSIUM SERPL-SCNC: 4.4 MMOL/L (ref 3.5–5.1)
PROT SERPL-MCNC: 6.4 GM/DL (ref 5.8–7.6)
SODIUM SERPL-SCNC: 142 MMOL/L (ref 136–145)
TSH SERPL-ACNC: 1.73 UIU/ML (ref 0.35–4.94)

## 2022-07-18 PROCEDURE — 84443 ASSAY THYROID STIM HORMONE: CPT

## 2022-07-18 PROCEDURE — 80053 COMPREHEN METABOLIC PANEL: CPT

## 2022-07-18 PROCEDURE — 83036 HEMOGLOBIN GLYCOSYLATED A1C: CPT

## 2022-07-18 PROCEDURE — 36415 COLL VENOUS BLD VENIPUNCTURE: CPT

## 2022-07-22 ENCOUNTER — OFFICE VISIT (OUTPATIENT)
Dept: INTERNAL MEDICINE | Facility: CLINIC | Age: 62
End: 2022-07-22
Payer: MEDICARE

## 2022-07-22 VITALS
SYSTOLIC BLOOD PRESSURE: 118 MMHG | RESPIRATION RATE: 20 BRPM | BODY MASS INDEX: 21.28 KG/M2 | WEIGHT: 124.63 LBS | DIASTOLIC BLOOD PRESSURE: 68 MMHG | TEMPERATURE: 98 F | HEART RATE: 91 BPM | HEIGHT: 64 IN

## 2022-07-22 DIAGNOSIS — I10 HYPERTENSION, UNSPECIFIED TYPE: ICD-10-CM

## 2022-07-22 DIAGNOSIS — H26.9 CATARACT OF BOTH EYES, UNSPECIFIED CATARACT TYPE: ICD-10-CM

## 2022-07-22 DIAGNOSIS — E10.65 TYPE 1 DIABETES MELLITUS WITH HYPERGLYCEMIA: Primary | ICD-10-CM

## 2022-07-22 DIAGNOSIS — E78.5 HYPERLIPIDEMIA, UNSPECIFIED HYPERLIPIDEMIA TYPE: ICD-10-CM

## 2022-07-22 DIAGNOSIS — I73.9 PAD (PERIPHERAL ARTERY DISEASE): ICD-10-CM

## 2022-07-22 DIAGNOSIS — Z12.5 SCREENING FOR PROSTATE CANCER: ICD-10-CM

## 2022-07-22 DIAGNOSIS — H35.9 RETINAL DISORDER: ICD-10-CM

## 2022-07-22 DIAGNOSIS — Z00.00 WELLNESS EXAMINATION: ICD-10-CM

## 2022-07-22 DIAGNOSIS — I25.10 CORONARY ARTERY DISEASE INVOLVING NATIVE CORONARY ARTERY OF NATIVE HEART WITHOUT ANGINA PECTORIS: ICD-10-CM

## 2022-07-22 PROBLEM — E10.9 TYPE 1 DIABETES MELLITUS: Status: ACTIVE | Noted: 2022-07-22

## 2022-07-22 PROBLEM — H91.90 HEARING LOSS: Status: ACTIVE | Noted: 2022-07-22

## 2022-07-22 PROBLEM — E55.9 VITAMIN D DEFICIENCY: Status: ACTIVE | Noted: 2022-07-22

## 2022-07-22 PROBLEM — Z72.0 TOBACCO USER: Status: ACTIVE | Noted: 2022-07-22

## 2022-07-22 PROCEDURE — 99214 OFFICE O/P EST MOD 30 MIN: CPT | Mod: S$PBB,,, | Performed by: NURSE PRACTITIONER

## 2022-07-22 PROCEDURE — 99214 PR OFFICE/OUTPT VISIT, EST, LEVL IV, 30-39 MIN: ICD-10-PCS | Mod: S$PBB,,, | Performed by: NURSE PRACTITIONER

## 2022-07-22 PROCEDURE — 99213 OFFICE O/P EST LOW 20 MIN: CPT | Mod: PBBFAC | Performed by: NURSE PRACTITIONER

## 2022-07-22 RX ORDER — ROSUVASTATIN CALCIUM 40 MG/1
40 TABLET, COATED ORAL
COMMUNITY
Start: 2021-12-20 | End: 2022-07-22 | Stop reason: SDUPTHER

## 2022-07-22 RX ORDER — INSULIN ASPART 100 [IU]/ML
3-5 INJECTION, SOLUTION INTRAVENOUS; SUBCUTANEOUS
Qty: 3 ML | Refills: 11 | Status: SHIPPED | OUTPATIENT
Start: 2022-07-22 | End: 2023-03-22 | Stop reason: SDUPTHER

## 2022-07-22 RX ORDER — ROSUVASTATIN CALCIUM 40 MG/1
40 TABLET, COATED ORAL NIGHTLY
Qty: 90 TABLET | Refills: 3 | Status: SHIPPED | OUTPATIENT
Start: 2022-07-22 | End: 2022-11-22 | Stop reason: SDUPTHER

## 2022-07-22 RX ORDER — INSULIN ASPART 100 [IU]/ML
INJECTION, SOLUTION INTRAVENOUS; SUBCUTANEOUS
COMMUNITY
Start: 2021-12-20 | End: 2022-07-22 | Stop reason: SDUPTHER

## 2022-07-22 RX ORDER — CARVEDILOL 6.25 MG/1
6.25 TABLET ORAL 2 TIMES DAILY
Qty: 180 TABLET | Refills: 3 | Status: SHIPPED | OUTPATIENT
Start: 2022-07-22 | End: 2022-11-22 | Stop reason: SDUPTHER

## 2022-07-22 NOTE — ASSESSMENT & PLAN NOTE
Bp and HR WNL  Continue medication  Educated on aerobic exercise (3-5 days/week) and a low-fat, low-sodium diet  Avoid excess ETOH consumption. Smoking cessation if applicable  ED precautions (s/s of CVA, etc)

## 2022-07-22 NOTE — PROGRESS NOTES
"  ARIN Malagon   [facility]  [address] [facility] [address]      PATIENT NAME: Federico Villarreal  : 1960  DATE: 22  MRN: [id number]      Billing Provider: ARIN Malagon  Level of Service:   Patient PCP Information     Provider PCP Type    ARIN Malagon General          Reason for Visit / Chief Complaint: Follow-up (Lab review)       History of Present Illness / Problem Focused Workflow     Federico Villarreal presents to the clinic with Follow-up (Lab review)     Initial Visit: (1/10/19): 58 y.o.  male presenting to the clinic to re-establish primary care. Previous PCP VANCE Pike NP. PMHx significant for T1DM, HTN, HLD, CAD, Tobacco Use, and Hearing loss. Following Cardio. Last OV 10/2/18. Following ENT. Last OV 18. HgA1c 8.8 (10/2018). Pt is on a Novolog SS. He does not have the SS with him. States that he goes by "what he eats, how he feels, and experience." Overall, he reports that he rarely takes Novolog. He was previously prescribed Levemir 15 units BID. States that he only takes 12-13 units of Levemir per dose d/t hypoglycemic episodes. Reports that blood glucose was dropping to 40s-50s when he was taking full 15 units (especially at night). Pt states that he dealt with severe hypoglycemia ~4 years ago while living in [place]. Blood sugars were dropping in the 20s. Since then, he has not had CBGs that low that he's aware of. CBGs do fluctuate. Reports having readings as high as the 500s over the past 6 months. However, pt reports that this is much better than past control. Denies neuropathy. Was following local endocrinologist (Dr. Elizalde) in the past, however, reports that the doctor does not accept Medicaid. Also reports that he follows Dr. Felix Farmer for eye disease. States that he had a dilated eye exam ~ 1 month ago. Needs HgA1c. Bp at goal. Currently taking Carvedilol 3.125 mg po BID. No " "longer taking ASA s/t bloody otorrhea (managed per ENT). LDL 86 (previously 74). Pt taking Crestor 20 mg po daily. Tolerating well. Reports being placed on Ambien around 2012 after cardiac stent placement. States Ambien "really helped" him rest. He took himself off in the past. Today, he is c/o restless legs and extreme insomnia. Reports that he usually falls asleep around midnight. He then awakens at 2 am, restless. He may fall asleep again and is up by 5 am once his wife is up and getting ready for work. Reports daytime fatigue. He would like to try Ambien again. He smokes ~ 1 cigar a day. States, "I know it's not good for me." However, not ready to quit. Pt states that he is feeling "good." Denies fever, chills, HA, CP, SOB, Abd pain, Swelling, or any other concerns.    4/17/19: Pt presenting for f/u. HgA1c now 9.3% (previously 8.8%). As previously noted, pt self-manages condition. He states that he seldom uses the Novolog and again only takes anywhere from 12-15 units of the Levemir per dose. States that he doses Levemir based on blood sugar readings. He states that he will only take a full 15 units of Levemir at pm dose only if he eats a "large" meal. States, "and it has to be large." Otherwise, he states that he'll wake up hypoglycemic. Denies any recent hypoglycemia. When asked about glucose control while following Dr. Elizalde, he reports that his HgA1c stayed in the 7s and low 8s. He admits that he was more active and eating better at the time. Admits that he hasn't been as active because he filed for disability. He hasn't been eating well s/t finances. He states that he will start "doing better." Oph is Dr. Farmer. Recent eye exam reveals proliferative DR HILTON. Pt reports that he will f/u with Dr. Farmer in June.  (previously 86). Prescribed Crestor 20 mg po daily . However, pt admits that he's been out of the Crestor for a while. Reports losing insurance for a short time and was unable to afford, thus, " "he's not been taking. Bp at goal. Currently taking Carvedilol 3.125 mg po BID. No longer taking Ambien. States that he didn't want to become dependent on it. Instead, he uses OTC Melatonin as needed for insomnia. Reports Melatonin effective. Denies fever, chills, HA, CP, SOB, Abd pain, edema, or any other concerns.    8/22/19: Pt presenting for T1DM f/u. HgA1c now 9.0%, previously 9.3%. As previously noted, pt self-manages condition. He states that he seldom uses the Novolog and again only takes anywhere from 12-15 units of the Levemir per dose.     **Upon entering exam room today, pt immediately stated "I don't feel good." He appeared flushed with cloudy mentation. He states that he felt as thought his blood sugar was "Hi." He has been states that he did eat lunch and took his Levemir prior to OV today. He admits that over the past several days his blood sugars have been up and down. VSS. All symptoms started ~10 minutes after pt was settled in exam room.     Report called to ED for transfer. Report given to William.    (9/18/19): Pt presenting for f/u T1DM/ED f/u (see above). Bp at goal today. Taking Coreg 3.125 mg po BID.     HgA1c now 9.0%, previously 9.3%. As previously noted, pt self-manages condition. He states that he seldom uses the Novolog and takes anywhere from 13-15 units of the Levemir per dose. However, since ED visit, he's noticed that his lunchtime CBGs have been in the 200s, while fasting CBGs are in the 70s on most occasions. He did start taking Novolog before lunch at times, but plans to take at least 3 units before lunch moving forward. Pt is skeptical about taking too much insulin due to past issues with hypoglycemia. He denies any hypoglycemia since last visit. He did state CBG dash to 340s last week. He took 7 units of Novolog and "it came down." He also endorses that he has not been entirely compliant with a diabetic diet nor is he exercising as he previously was.    Denies fever, chills, " "weakness, dizziness, HA, CP, SOB, Abd pain, edema, or any other concerns.    (12/18/19): Pt presenting for f/u T1DM. Seen in Cardio 12/17/19. Arterial stress US to be ordered per Cardio to r/o PAD after pt c/o neuropathy to LE. He states that h has "electric-like" pains to toes on both feet on occasion. He states that it does not happen every day but is painful when it does. States, "I don't really want to be on any medications at this point." He reports that he's been using compression stockings and it helps to relieve pain. Denies any lesions to feet and states, "I stay on top of that."     Bp at goal today. Taking Coreg 3.125 mg po BID.     HgA1c now 9.5%, previously 9.3%. As previously noted, pt self-manages condition. He states that he seldom uses the Novolog and has been taking ~15 units of the Levemir BID. He admits only checking CBGs in the am. States fasting CBGs 100s-140s. Not checking glucose at any other times. States, "I need to start taking about 3-4 units of Novolog before meals." CBG 64 mg/dL this am. State that he had not eaten prior to having labs drawn but ate a banana afterward and is feeling well.    Denies fever, chills, weakness, dizziness, HA, blurred vision, CP, SOB, cough, abd pain, edema, or any other concerns. States, "I'm feeling good."    Telephone Visit: (4/20/2020): Federico is a 59 y.o.  male with a PMHx significant for T1DM, HTN, HLD, CAD, Tobacco Use, and Hearing loss, who was contacted for a telephone visit due to COVID-19 precautions. Patient has consented to telephone visit and was able to verify specific patient identification. I have verified that only myself and pt are on the telephone call and call is taking place in the [facility] btw both parties.     Federico reports taking Coreg 3.125 mg po daily instead of BID 2/2 trying to save medication because "I don't know when I'll be able to go back to [facility]." He also admits splitting his Crestor in half because " ""you said my level was under 50 last time." LDL 49 (8/2019).     HgA1c now 9.5%. As previously noted, pt self-manages condition. He reports that CBGs were running 300s-500s about 2 weeks ago after he'd missed some doses "of my long-acting insulin" due to altered sleep schedule as he was waking up at about 3 am to help his significant other with some tasks. States, "But I was able to get in under control." He has been taking Novolog 5 units before meals (if low carb meal, he will only take 4 units) and 15 units of the Levemir BID. Fasting CBGs 120s over the past few days. Had one hypoglycemic episode ~ 4 days ago where he had a fasting CBG in the 50s. Admitted that he had not had a snack at bedtime as he typically does.     He's scheduled to f/u in [facility] in June 2020. States that he's been staying home and denies any known exposure to COVID-19.    Denies fever, chills, weakness, dizziness, HA, blurred vision, CP, SOB, cough, abd pain, edema, or any other concerns.    Telephone Visit: (6/22/2020): Federico is a 59 y.o.  male with a PMHx significant for T1DM, HTN, HLD, CAD, Tobacco Use, and Hearing loss, who was contacted for a telephone visit for DM f/u due to COVID-19 precautions. Patient has consented to telephone visit and was able to verify specific patient identification. I have verified that only myself and pt are on the telephone call and call is taking place in the [facility] btw both parties.    HgA1c now 10.5%; increased from previous (9.5% in 12/2019). As previously noted, pt self-manages condition. He reports that CBGs were running 300s-500s in April 2020 after he'd missed some doses "of my long-acting insulin" due to altered sleep schedule as he was waking up at about 3 am to help his significant other with some tasks. He previously expressed, "But I was able to get in under control" by taking Novolog 5 units before meals and 15 units of the Levemir BID. Since last telephone visit, " "he states that he's been taking Novolog 3-4 units BID before meals and taking 13-15 units of Levemir at night. He's had one hypoglycemic episode several weeks ago where he had a fasting CBG in the 50s. States, "But I know how to prevent that from happening." Denies any CBGs > the 300s. He's requesting refills on his Levemir. Reports that he stopped smoking several weeks ago because "the puffing made my ears hurt!" No other concerns.    Telephone Visit: (9/1/2020): Federico is a 59 y.o.  male with a PMHx significant for T1DM, HTN, HLD, CAD, Tobacco Use, and Hearing loss, who was contacted for a telephone visit for DM f/u due to COVID-19 precautions. Patient has consented to telephone visit and was able to verify specific patient identification. I have verified that only myself and pt are on the telephone call and call is taking place in the [facility] btw both parties.    HgA1c now 10.6%; mild increase from previous. As previously noted, pt self-manages condition. He reports that CBGs were running 300s-500s in April 2020 after he'd missed some doses "of my long-acting insulin" due to altered sleep schedule as he was waking up at about 3 am to help his significant other with some tasks. He previously expressed, "But I was able to get in under control" by taking NovoLog 5 units before meals and 15 units of the Levemir BID. Since last telephone visit, he states that he's been taking NovoLog 3 units before dinner on some days, but not all days, of the week depending on what he eats. For instance, if he eats rice or noodles, he will take a dose of NovoLog. Taking Levemir 15 units BID. Denies any overt hypoglycemia or hyperglycemia. States, "I'll be honest, I haven't been checking it. I only check it when I feel it's too high or too low." He admits that he was drinking up to 1 liter of diet soda a day but stopped ~2 mths ago. He's requesting refills on his medications. Previously reported that he stopped " "smoking several weeks ago because "the puffing made my ears hurt!" He is smoking again but states, it's only ~ "2 puffs a day." He is not interested in lung cancer screening. Denies cough, wheeze, chest pain, or SOB. Has not returned FIT but plans to soon. PSA 0.50. AST minimally elevated. Cardio appt cancelled in June and never re-scheduled. No acute concerns.    Telephone Visit: (12/1/2020): Federico is a 59 y.o.  male with a PMHx significant for T1DM, HTN, HLD, CAD, Tobacco Use, and Hearing loss, who was contacted for a telephone visit for DM f/u due to COVID-19 precautions. Patient has consented to telephone visit and was able to verify specific patient identification. I have verified that only myself and pt are on the telephone call and call is taking place in the [facility] btw both parties. Pt unable to connect for video conference and requested to proceed via audio means only. He is located at home in [place], currently on his treadmill per report.    HgA1c now 10.1%; mild decrease from previous. As previously noted, pt self-manages condition. He now attributes his small decrease in HgA1c to avoiding "aspartame" which is found in a lot of the beverages that he had been consuming, including diet Dr. Duarte per his report. He reports taking NovoLog 3-4 units before meals and 15-17 units of the Levemir BID "depending on how big my apple is with my peanut butter in the morning." States HgA1c hasn't been < 7% since his 20s. He denies any hypoglycemic episodes.     Pt with a h/o insomnia that was previously managed with Peyton ALEXANDER. States no longer taking d/t "weird dreams." He admits feeling a bit anxious over the last few nights so he took some of his wife's buspirone 10 mg tabs. States medication "helped me relax and go to sleep." He is requesting a prescription for himself.     FIT negative 11/27/2020. CMP and TSH WNL. He has no acute concerns.    (3/1/2021): Federico is a 59 y.o.  " "male with a PMHx significant for T1DM, HTN, HLD, CAD, Tobacco Use, and Hearing loss, presenting for routine f/u.    HgA1c now 10.2% and rising from previous. FBG was >400 on recent labs. Staff attempted to reach him on Friday to advise to visit the ED but he did not answer. Now states that he received the message regarding abnl labs. States took insulin at home and CBG improved. As previously noted, pt self-manages condition. He is prescribed Levemir 15 units BID but only takes 10-11 units because he "subtracts NovoLog dose" from the total Levemir dose. States HgA1c hasn't been < 7% since his 20s. He denies any hypoglycemic episodes. He does not check his CBGs.      He was seen by [facility] 1/2021 for routine f/u CAD. At the visit, he c/o lower ext pain. He underwent arterial US of BLE and was found to have some PAD on right side. He's been referred to Dr. Vargas.      LDL 75, but trig elevated. He's prescribed Crestor 40 mg po daily.      He continues to smoke but states she he is planning on quitting. In fact, he states today is his last day smoking.     He is amenable to receiving the influenza and Prevnar 13 vaccines today. No other problems stated.    (6/3/2021): Federico is a 60 y.o.  male with a PMHx significant for T1DM, HTN, HLD, CAD, Tobacco Use, and Hearing loss, presenting for routine f/u. Previously referred to Endo but cancelled appt due to "cost of diabetic supplies." Pt thought he was referred for a CGM. After being informed of reason for referral (as discussed during last visit), he reported that he would call to schedule Endo appt but never did. Today, he reports that his diet has improved. He's taking NovoLog regularly, at least BID before meals. HgA1c now 9.3%. As previously noted, pt self-manages condition. He is prescribed Levemir 15 units BID but only takes 10-12 units because he "subtracts NovoLog dose" from the total Levemir dose. Taking ~3-5 units of NovoLog before meals.   LFTs and " "sodium elevated on recent labs. States may have had a few alcoholic beverages the night before, though this is rare that he drinks.   Following Ophthalmology. Interested in nicotine patches to help with smoking cessation. Used patches in the past when living [place] and [facility] [place], "They worked well!" Now smoking < 6 cigarettes per day. No CP or SOB. No other concerns.    (9/17/2021): Federico is a 60 y.o.  male with a PMHx significant for T1DM, HTN, HLD, CAD, Tobacco Use, and Hearing loss, presenting for routine f/u. HgA1c now 9.2%. As previously noted, pt self-manages condition. He is prescribed Levemir 15 units BID but takes ~11-15 units qHS based on "what I eat." States if takes 15 units, he normally awakens hypoglycemic btw 3-6 am. Taking ~3-5 units of NovoLog before meals. PSA 0.52. LDL 70s. Compliant with Crestor. Hasn't been seen in [facility] since 1/2021. Requesting refill on Coreg. LFTs consistently trending down. No chest pain, SOB, cough, wheezing, B/B concerns, abd complaints, falls, or any acute concerns.    (12/20/2021): Federico is a 61 y.o.  male with a PMHx significant for T1DM, HTN, HLD, CAD, Tobacco Use, and Hearing loss, presenting for routine f/u. HgA1c now 9.0%. As previously noted, pt self-manages condition. He is prescribed Levemir 15 units BID but takes ~11-15 units qHS based on "what I eat." States if takes 15 units, he normally awakens hypoglycemic btw 3-6 am. Taking ~3-5 units of NovoLog before meals. AST/ALT slightly increased. Previously hepatitis screening from 2018 negative. Admits heavy beer use "when I was younger," but denies current ETOH use. Denies any abd complaints or N/V. No other concerns.    Health Maintenance:   Colon Ca Screening-FIT negative 11/2020   Prostate Ca Screening-PSA 0.52, 9/2021    (3/18/2022): Federico is a 61 y.o.  male with a PMHx significant for T1DM, HTN, HLD, CAD, Tobacco Use, and Hearing loss, presenting for routine f/u. HgA1c now " "9.5%. As previously noted, pt self-manages condition. He is prescribed Levemir 15 units BID but takes ~11-15 units qHS based on "what I eat." States if takes 15 units, he normally awakens hypoglycemic btw 3-6 am. He is prescribed NovoLog. Takes anywhere from ~3-5 units of NovoLog before meals. However, he states he hasn't used in 1-2 weeks due to a hypoglycemic episode; CBG 70s. Admits felt shaky and confused. He ate and symptoms improved. States hasn't been using since that time. He has been referred to Endo for mgmt of chronically uncontrolled T1DM, but patient declines appt. LDL at goal. Compliant with Crestor 40 mg po daily. He also verbalizes hearing loss has worsened. He doesn't wear hearing aides. He is not interested in "doing anything with my ears." He will f/u with [facility] in April. No other concerns.    Colon Ca Screening-FIT negative 12/2021    Today's Visit (7/22/2022): Federico is a 61 y.o.  male with a PMHx significant for T1DM, HTN, HLD, CAD, Tobacco Use, and Hearing loss, presenting for routine f/u. HgA1c now 9.8%, increased from 9.5% at last visit. FBG was 86 on 7/18/22 compared to 200s at last visit. As previously noted, pt self-manages condition and refuses adjustments or Endo appts. He is prescribed Levemir 15 units nightly (was BID but pt was only taking daily), but takes ~11-16 units qHS based on "what I eat." States if takes 15 units, he normally awakens hypoglycemic btw 3-6 am. He is prescribed NovoLog. Takes anywhere from ~3-5 units of NovoLog before meals, "based on what I eat." Will usually take Novolog if glucose > 200. ALT 1 point above a normal high, otherwise, labs unremarkable. States scheduled for an eye exam in October. States one hypoglycemic episode 3 months ago due to "took my insulin wrong;" states glucose was 50s-60s. He was able to improve glucose on his own at home. Fell btw steps about one month ago and scraped left shin. This is now healed. No B/B incontinence. No " other concerns.      Review of Systems     Review of Systems   All other systems reviewed and are negative.      Medical / Social / Family History   History reviewed. No pertinent past medical history.    Past Surgical History:   Procedure Laterality Date    ADENOIDECTOMY      CORONARY STENT PLACEMENT  08/20/2012    INNER EAR SURGERY      REPAIR OF RETINAL DETACHMENT WITH VITRECTOMY      TONSILLECTOMY      VITRECTOMY         Social History    reports that he has been smoking cigarettes. He has been smoking about 0.25 packs per day. He has never used smokeless tobacco. He reports previous alcohol use. He reports previous drug use.    Family History  's family history includes Diabetes in his father and mother.    Medications and Allergies     Medications  Medication List with Changes/Refills   Current Medications    CYANOCOBALAMIN, VITAMIN B-12, (VITAMIN B-12 ORAL)      OTC, 0 Refill(s)    MV-MN/IRON/FOLIC ACID/HERB 190 (VITAMIN D3 COMPLETE ORAL)    Take 50 mcg by mouth.   Changed and/or Refilled Medications    Modified Medication Previous Medication    CARVEDILOL (COREG) 6.25 MG TABLET carvediloL (COREG) 6.25 MG tablet       Take 1 tablet (6.25 mg total) by mouth 2 (two) times daily.    TAKE 1 TABLET BY MOUTH TWICE DAILY    INSULIN ASPART U-100 (NOVOLOG) 100 UNIT/ML (3 ML) INPN PEN insulin aspart U-100 (NOVOLOG) 100 unit/mL (3 mL) InPn pen       Inject 3-5 Units into the skin 3 (three) times daily with meals.      See Instructions, INJECT 3-5 UNITS UNDER THE SKIN TWICE DAILY BEFORE MEALS ** ROTATE INJECTION SITES **, # 15 mL, 3 Refill(s), Pharmacy: [facility], 162, cm, Height/Length Dosing, 03/18/22 10:36:00 CDT, 58.1, kg,...    INSULIN DETEMIR U-100 (LEVEMIR) 100 UNIT/ML INJECTION insulin detemir U-100 (LEVEMIR) 100 unit/mL injection       Inject 15 Units into the skin every evening.      See Instructions, INJECT 15 UNITS UNDER THE SKIN TWICE DAILY WITH MEALS ROTATE  INJECTION SITES, # 15 mL, 3 Refill(s), Pharmacy: [facility], 162, cm, Height/Length Dosing, 03/18/22 10:36:00 CDT, 58.1, kg, Weight Do...    ROSUVASTATIN (CRESTOR) 40 MG TAB rosuvastatin (CRESTOR) 40 MG Tab       Take 1 tablet (40 mg total) by mouth every evening.    Take 40 mg by mouth.       Allergies  Review of patient's allergies indicates:  No Known Allergies    Physical Examination     Vitals:    07/22/22 0907   BP: 118/68   Pulse: 91   Resp: 20   Temp: 98 °F (36.7 °C)     Physical Exam  Constitutional:       Appearance: Normal appearance.   HENT:      Head: Normocephalic and atraumatic.      Right Ear: External ear normal.      Left Ear: External ear normal.      Nose: Nose normal.      Mouth/Throat:      Mouth: Mucous membranes are moist.   Eyes:      Extraocular Movements: Extraocular movements intact.      Conjunctiva/sclera: Conjunctivae normal.   Cardiovascular:      Rate and Rhythm: Normal rate and regular rhythm.      Pulses: Normal pulses.      Heart sounds: Normal heart sounds.   Pulmonary:      Effort: Pulmonary effort is normal.      Breath sounds: Normal breath sounds.   Abdominal:      General: Bowel sounds are normal.      Palpations: Abdomen is soft.   Musculoskeletal:         General: Normal range of motion.      Cervical back: Normal range of motion.   Feet:      Right foot:      Toenail Condition: Right toenails are long.      Left foot:      Toenail Condition: Left toenails are long.   Skin:     General: Skin is warm and dry.   Neurological:      General: No focal deficit present.      Mental Status: He is alert and oriented to person, place, and time.   Psychiatric:         Mood and Affect: Mood normal.         Behavior: Behavior normal.         Thought Content: Thought content normal.         Judgment: Judgment normal.           Results     Lab Results   Component Value Date    WBC 7.9 03/15/2022    RBC 4.68 03/15/2022    HGB 14.9 03/15/2022    HCT  43.9 03/15/2022    MCV 93.8 03/15/2022    MCH 31.8 03/15/2022    MCHC 33.9 03/15/2022    RDW 11.9 03/15/2022     03/15/2022    MPV 10.3 03/15/2022     CMP  Sodium Level   Date Value Ref Range Status   07/18/2022 142 136 - 145 mmol/L Final     Potassium Level   Date Value Ref Range Status   07/18/2022 4.4 3.5 - 5.1 mmol/L Final     Carbon Dioxide   Date Value Ref Range Status   07/18/2022 28 23 - 31 mmol/L Final     Blood Urea Nitrogen   Date Value Ref Range Status   07/18/2022 14.6 8.4 - 25.7 mg/dL Final     Creatinine   Date Value Ref Range Status   07/18/2022 0.79 0.73 - 1.18 mg/dL Final     Calcium Level Total   Date Value Ref Range Status   07/18/2022 9.7 8.8 - 10.0 mg/dL Final     Albumin Level   Date Value Ref Range Status   07/18/2022 4.1 3.4 - 4.8 gm/dL Final     Bilirubin Total   Date Value Ref Range Status   07/18/2022 1.1 <=1.5 mg/dL Final     Alkaline Phosphatase   Date Value Ref Range Status   07/18/2022 106 40 - 150 unit/L Final     Aspartate Aminotransferase   Date Value Ref Range Status   07/18/2022 29 5 - 34 unit/L Final     Alanine Aminotransferase   Date Value Ref Range Status   07/18/2022 56 (H) 0 - 55 unit/L Final     Estimated GFR-Non    Date Value Ref Range Status   07/18/2022 >60 mls/min/1.73/m2 Final     Lab Results   Component Value Date    CHOL 143 03/15/2022     Lab Results   Component Value Date    HDL 43 03/15/2022     No results found for: LDLCALC  Lab Results   Component Value Date    TRIG 115 03/15/2022     No results found for: CHOLHDL  Lab Results   Component Value Date    TSH 1.7313 07/18/2022     Lab Results   Component Value Date    PHUR 7.0 12/16/2021    PROTEINUA Negative 12/16/2021    GLUCUA 2+ (A) 12/16/2021    KETONESU Negative 12/16/2021    OCCULTUA Negative 12/16/2021    NITRITE Negative 12/16/2021    LEUKOCYTESUR Negative 12/16/2021           Assessment and Plan (including Health Maintenance)     Plan:         Health Maintenance Due   Topic Date  Due    Shingles Vaccine (1 of 2) Never done    COVID-19 Vaccine (3 - Booster for Pfizer series) 03/08/2022       Problem List Items Addressed This Visit        Ophtho    Cataract of both eyes    Current Assessment & Plan     Following Dr. Farmer             Retinal disorder    Overview     Following Dr. Farmer                Cardiac/Vascular    Hyperlipidemia    Overview     Crestor             Current Assessment & Plan     LDL 77 3/2022  Continue regimen  Educated on a low-fat, low-cholesterol diet and aerobic exercise (20-30 mins/day x 5 days a week). Encouraged healthy fruits and veggie intake. Increase water intake. Avoid excess ETOH intake and smoking             Relevant Medications    rosuvastatin (CRESTOR) 40 MG Tab    Hypertension    Overview     Coreg 6.25 mg po BID             Current Assessment & Plan     Bp and HR WNL  Continue medication  Educated on aerobic exercise (3-5 days/week) and a low-fat, low-sodium diet  Avoid excess ETOH consumption. Smoking cessation if applicable  ED precautions (s/s of CVA, etc)               Relevant Medications    carvediloL (COREG) 6.25 MG tablet    Coronary artery disease involving native coronary artery of native heart without angina pectoris    Overview     Follows Cards, last visit 4/26/22           PAD (peripheral artery disease)    Current Assessment & Plan     Previously referred to Dr. Vargas per [facility] at [facility]              Endocrine    Type 1 diabetes mellitus - Primary    Overview     Current medications: See HPI  A1c level: 9.8% Goal 8-8.9% d/t hypoglycemic concerns  CBG trends: Checking infrequently. See HPI  Educated on diabetic diet: Low-fat, Low-carb, Low-cholesterol. Avoid sugary/dark drinks/sodas and white foods (i.e., bread, pasta, potatoes, rice)  Aerobic exercise: 20-30 min/day x 5 days/week  Diabetic Eye Exam: 3/1/21, follows [facility] has an exam at [facility] 10/2022  Diabetic Foot Exam: 3/2022, Decreased sensation to feet bilaterally. Skin  "normal color for ethnicity. No swelling or lesions.  Microalbumin-U: WNL (3/2022)  Kidney Protection: N/A; declines new meds  Statin Therapy: Crestor   Educated on Hypoglycemic s/s and interventions. Call office with any questions or concerns  Strict glucose monitoring. Bring blood glucose logs/meter to each OV             Current Assessment & Plan     Medication Adjustments: Pt has been self managing his DM and is resistant to increases in insulin 2/2 past issues with hypoglycemia. Previously offered to refer to Endo d/t HgA1c persistently above acceptable range but he declined mx times in the past. Eventually agreed to referral but has since declined to be seen. Reports last time HgA1c < 7% was in his 20s when he was "playing a bunch of sports."  Continues to decline Endo referral, insulin pump etc           Relevant Medications    insulin detemir U-100 (LEVEMIR) 100 unit/mL injection    insulin aspart U-100 (NOVOLOG) 100 unit/mL (3 mL) InPn pen    Other Relevant Orders    CBC Auto Differential    Comprehensive Metabolic Panel    Hemoglobin A1C    Urinalysis, Reflex to Urine Culture Urine, Clean Catch       Other    Wellness examination    Overview     Health Maintenance:  Prostate Ca Screening-PSA 0.52, 9/2021  Colon Ca Screening-FIT negative 12/2021             Other Visit Diagnoses     Screening for prostate cancer        Relevant Orders    PSA, Screening          Health Maintenance Topics with due status: Not Due       Topic Last Completion Date    TETANUS VACCINE 06/01/2018    Pneumococcal Vaccines (Age 0-64) 03/01/2021    Eye Exam 10/01/2021    Diabetes Urine Screening 03/15/2022    Lipid Panel 03/15/2022    Influenza Vaccine 03/18/2022    Foot Exam 03/18/2022    Hemoglobin A1c 07/18/2022    High Dose Statin 07/22/2022    Colorectal Cancer Screening Not Due       No future appointments.         Signature:  ARIN Malagon  [facility]  [id number] [facility] [id number]    Date of encounter: 7/22/22

## 2022-08-30 ENCOUNTER — DOCUMENTATION ONLY (OUTPATIENT)
Dept: ADMINISTRATIVE | Facility: HOSPITAL | Age: 62
End: 2022-08-30
Payer: MEDICARE

## 2022-10-24 PROBLEM — Z00.00 WELLNESS EXAMINATION: Status: RESOLVED | Noted: 2022-07-22 | Resolved: 2022-10-24

## 2022-11-04 ENCOUNTER — DOCUMENTATION ONLY (OUTPATIENT)
Dept: INTERNAL MEDICINE | Facility: CLINIC | Age: 62
End: 2022-11-04
Payer: MEDICARE

## 2022-11-14 ENCOUNTER — LAB VISIT (OUTPATIENT)
Dept: LAB | Facility: HOSPITAL | Age: 62
End: 2022-11-14
Attending: NURSE PRACTITIONER
Payer: MEDICARE

## 2022-11-14 DIAGNOSIS — Z12.5 SCREENING FOR PROSTATE CANCER: ICD-10-CM

## 2022-11-14 DIAGNOSIS — E10.65 TYPE 1 DIABETES MELLITUS WITH HYPERGLYCEMIA: ICD-10-CM

## 2022-11-14 LAB
ALBUMIN SERPL-MCNC: 4.1 GM/DL (ref 3.4–4.8)
ALBUMIN/GLOB SERPL: 1.6 RATIO (ref 1.1–2)
ALP SERPL-CCNC: 103 UNIT/L (ref 40–150)
ALT SERPL-CCNC: 21 UNIT/L (ref 0–55)
APPEARANCE UR: CLEAR
AST SERPL-CCNC: 19 UNIT/L (ref 5–34)
BACTERIA #/AREA URNS AUTO: NORMAL /HPF
BASOPHILS # BLD AUTO: 0.09 X10(3)/MCL (ref 0–0.2)
BASOPHILS NFR BLD AUTO: 1.3 %
BILIRUB UR QL STRIP.AUTO: NEGATIVE MG/DL
BILIRUBIN DIRECT+TOT PNL SERPL-MCNC: 1 MG/DL
BUN SERPL-MCNC: 11.6 MG/DL (ref 8.4–25.7)
CALCIUM SERPL-MCNC: 9.7 MG/DL (ref 8.8–10)
CHLORIDE SERPL-SCNC: 103 MMOL/L (ref 98–107)
CO2 SERPL-SCNC: 32 MMOL/L (ref 23–31)
COLOR UR AUTO: YELLOW
CREAT SERPL-MCNC: 0.74 MG/DL (ref 0.73–1.18)
EOSINOPHIL # BLD AUTO: 0.28 X10(3)/MCL (ref 0–0.9)
EOSINOPHIL NFR BLD AUTO: 4 %
ERYTHROCYTE [DISTWIDTH] IN BLOOD BY AUTOMATED COUNT: 12.3 % (ref 11.5–17)
EST. AVERAGE GLUCOSE BLD GHB EST-MCNC: 231.7 MG/DL
GFR SERPLBLD CREATININE-BSD FMLA CKD-EPI: >60 MLS/MIN/1.73/M2
GLOBULIN SER-MCNC: 2.5 GM/DL (ref 2.4–3.5)
GLUCOSE SERPL-MCNC: 89 MG/DL (ref 82–115)
GLUCOSE UR QL STRIP.AUTO: ABNORMAL MG/DL
HBA1C MFR BLD: 9.7 %
HCT VFR BLD AUTO: 46.1 % (ref 42–52)
HGB BLD-MCNC: 15.8 GM/DL (ref 14–18)
IMM GRANULOCYTES # BLD AUTO: 0.03 X10(3)/MCL (ref 0–0.04)
IMM GRANULOCYTES NFR BLD AUTO: 0.4 %
KETONES UR QL STRIP.AUTO: NEGATIVE MG/DL
LEUKOCYTE ESTERASE UR QL STRIP.AUTO: NEGATIVE UNIT/L
LYMPHOCYTES # BLD AUTO: 2.63 X10(3)/MCL (ref 0.6–4.6)
LYMPHOCYTES NFR BLD AUTO: 37.6 %
MCH RBC QN AUTO: 32.3 PG (ref 27–31)
MCHC RBC AUTO-ENTMCNC: 34.3 MG/DL (ref 33–36)
MCV RBC AUTO: 94.3 FL (ref 80–94)
MONOCYTES # BLD AUTO: 0.68 X10(3)/MCL (ref 0.1–1.3)
MONOCYTES NFR BLD AUTO: 9.7 %
NEUTROPHILS # BLD AUTO: 3.3 X10(3)/MCL (ref 2.1–9.2)
NEUTROPHILS NFR BLD AUTO: 47 %
NITRITE UR QL STRIP.AUTO: NEGATIVE
NRBC BLD AUTO-RTO: 0 %
PH UR STRIP.AUTO: 7 [PH]
PLATELET # BLD AUTO: 174 X10(3)/MCL (ref 130–400)
PMV BLD AUTO: 10.9 FL (ref 7.4–10.4)
POTASSIUM SERPL-SCNC: 4.3 MMOL/L (ref 3.5–5.1)
PROT SERPL-MCNC: 6.6 GM/DL (ref 5.8–7.6)
PROT UR QL STRIP.AUTO: ABNORMAL MG/DL
PSA SERPL-MCNC: 0.79 NG/ML
RBC # BLD AUTO: 4.89 X10(6)/MCL (ref 4.7–6.1)
RBC #/AREA URNS AUTO: <5 /HPF
RBC UR QL AUTO: NEGATIVE UNIT/L
SODIUM SERPL-SCNC: 142 MMOL/L (ref 136–145)
SP GR UR STRIP.AUTO: 1.02 (ref 1–1.03)
SQUAMOUS #/AREA URNS AUTO: <5 /HPF
UROBILINOGEN UR STRIP-ACNC: 0.2 MG/DL
WBC # SPEC AUTO: 7 X10(3)/MCL (ref 4.5–11.5)
WBC #/AREA URNS AUTO: <5 /HPF

## 2022-11-14 PROCEDURE — 83036 HEMOGLOBIN GLYCOSYLATED A1C: CPT

## 2022-11-14 PROCEDURE — 84153 ASSAY OF PSA TOTAL: CPT

## 2022-11-14 PROCEDURE — 80053 COMPREHEN METABOLIC PANEL: CPT

## 2022-11-14 PROCEDURE — 36415 COLL VENOUS BLD VENIPUNCTURE: CPT

## 2022-11-14 PROCEDURE — 81001 URINALYSIS AUTO W/SCOPE: CPT

## 2022-11-14 PROCEDURE — 85025 COMPLETE CBC W/AUTO DIFF WBC: CPT

## 2022-11-22 ENCOUNTER — OFFICE VISIT (OUTPATIENT)
Dept: INTERNAL MEDICINE | Facility: CLINIC | Age: 62
End: 2022-11-22
Payer: MEDICARE

## 2022-11-22 DIAGNOSIS — E10.9 TYPE 1 DIABETES MELLITUS WITHOUT COMPLICATIONS: ICD-10-CM

## 2022-11-22 DIAGNOSIS — Z72.0 TOBACCO USER: ICD-10-CM

## 2022-11-22 DIAGNOSIS — E78.5 HYPERLIPIDEMIA, UNSPECIFIED HYPERLIPIDEMIA TYPE: ICD-10-CM

## 2022-11-22 DIAGNOSIS — I10 HYPERTENSION, UNSPECIFIED TYPE: ICD-10-CM

## 2022-11-22 DIAGNOSIS — Z12.11 SCREENING FOR COLON CANCER: Primary | ICD-10-CM

## 2022-11-22 DIAGNOSIS — Z00.00 WELLNESS EXAMINATION: ICD-10-CM

## 2022-11-22 DIAGNOSIS — E10.65 TYPE 1 DIABETES MELLITUS WITH HYPERGLYCEMIA: ICD-10-CM

## 2022-11-22 PROCEDURE — 99441 PR PHYSICIAN TELEPHONE EVALUATION 5-10 MIN: CPT | Mod: 95,,, | Performed by: NURSE PRACTITIONER

## 2022-11-22 PROCEDURE — 99441 PR PHYSICIAN TELEPHONE EVALUATION 5-10 MIN: ICD-10-PCS | Mod: 95,,, | Performed by: NURSE PRACTITIONER

## 2022-11-22 RX ORDER — CARVEDILOL 6.25 MG/1
6.25 TABLET ORAL 2 TIMES DAILY
Qty: 180 TABLET | Refills: 3 | Status: SHIPPED | OUTPATIENT
Start: 2022-11-22 | End: 2023-02-13 | Stop reason: SDUPTHER

## 2022-11-22 RX ORDER — INSULIN DETEMIR 100 [IU]/ML
15 INJECTION, SOLUTION SUBCUTANEOUS 2 TIMES DAILY
Qty: 15 ML | Refills: 3 | Status: SHIPPED | OUTPATIENT
Start: 2022-11-22 | End: 2023-03-22 | Stop reason: SDUPTHER

## 2022-11-22 RX ORDER — ROSUVASTATIN CALCIUM 40 MG/1
40 TABLET, COATED ORAL NIGHTLY
Qty: 90 TABLET | Refills: 3 | Status: SHIPPED | OUTPATIENT
Start: 2022-11-22 | End: 2023-03-22 | Stop reason: SDUPTHER

## 2022-11-22 NOTE — ASSESSMENT & PLAN NOTE
Refilled Crestor  FLP prior to f/u  Educated on a low-fat, low-cholesterol diet and aerobic exercise (20-30 mins/day x 5 days a week). Encouraged healthy fruits and veggie intake. Increase water intake. Avoid excess ETOH intake and smoking

## 2022-11-22 NOTE — PROGRESS NOTES
"  ARIN Malagon   [facility]  [address] [facility] [id number]      PATIENT NAME: Federico Villarreal  : 1960  DATE: 22  MRN: [id number]      Billing Provider: ARIN Malagon  Level of Service:   Patient PCP Information       Provider PCP Type    ARIN Malagon General            Reason for Visit / Chief Complaint: Follow-up (Lab review) and Diabetes       History of Present Illness / Problem Focused Workflow     Federico Villarreal presents to the clinic with Follow-up (Lab review) and Diabetes     Initial Visit: (1/10/19): 58 y.o.  male presenting to the clinic to re-establish primary care. Previous PCP VANCE Pike NP. PMHx significant for T1DM, HTN, HLD, CAD, Tobacco Use, and Hearing loss. Following Cardio. Last OV 10/2/18. Following ENT. Last OV 18. HgA1c 8.8 (10/2018). Pt is on a Novolog SS. He does not have the SS with him. States that he goes by "what he eats, how he feels, and experience." Overall, he reports that he rarely takes Novolog. He was previously prescribed Levemir 15 units BID. States that he only takes 12-13 units of Levemir per dose d/t hypoglycemic episodes. Reports that blood glucose was dropping to 40s-50s when he was taking full 15 units (especially at night). Pt states that he dealt with severe hypoglycemia ~4 years ago while living in [place]. Blood sugars were dropping in the 20s. Since then, he has not had CBGs that low that he's aware of. CBGs do fluctuate. Reports having readings as high as the 500s over the past 6 months. However, pt reports that this is much better than past control. Denies neuropathy. Was following local endocrinologist (Dr. Elizalde) in the past, however, reports that the doctor does not accept Medicaid. Also reports that he follows Dr. Felix Farmer for eye disease. States that he had a dilated eye exam ~ 1 month ago. Needs HgA1c. Bp at goal. Currently taking " "Carvedilol 3.125 mg po BID. No longer taking ASA s/t bloody otorrhea (managed per ENT). LDL 86 (previously 74). Pt taking Crestor 20 mg po daily. Tolerating well. Reports being placed on Ambien around 2012 after cardiac stent placement. States Ambien "really helped" him rest. He took himself off in the past. Today, he is c/o restless legs and extreme insomnia. Reports that he usually falls asleep around midnight. He then awakens at 2 am, restless. He may fall asleep again and is up by 5 am once his wife is up and getting ready for work. Reports daytime fatigue. He would like to try Ambien again. He smokes ~ 1 cigar a day. States, "I know it's not good for me." However, not ready to quit. Pt states that he is feeling "good." Denies fever, chills, HA, CP, SOB, Abd pain, Swelling, or any other concerns.    4/17/19: Pt presenting for f/u. HgA1c now 9.3% (previously 8.8%). As previously noted, pt self-manages condition. He states that he seldom uses the Novolog and again only takes anywhere from 12-15 units of the Levemir per dose. States that he doses Levemir based on blood sugar readings. He states that he will only take a full 15 units of Levemir at pm dose only if he eats a "large" meal. States, "and it has to be large." Otherwise, he states that he'll wake up hypoglycemic. Denies any recent hypoglycemia. When asked about glucose control while following Dr. Elizalde, he reports that his HgA1c stayed in the 7s and low 8s. He admits that he was more active and eating better at the time. Admits that he hasn't been as active because he filed for disability. He hasn't been eating well s/t finances. He states that he will start "doing better." Oph is Dr. Farmer. Recent eye exam reveals proliferative DR HILTON. Pt reports that he will f/u with Dr. Farmer in June.  (previously 86). Prescribed Crestor 20 mg po daily . However, pt admits that he's been out of the Crestor for a while. Reports losing insurance for a short time " "and was unable to afford, thus, he's not been taking. Bp at goal. Currently taking Carvedilol 3.125 mg po BID. No longer taking Ambien. States that he didn't want to become dependent on it. Instead, he uses OTC Melatonin as needed for insomnia. Reports Melatonin effective. Denies fever, chills, HA, CP, SOB, Abd pain, edema, or any other concerns.    8/22/19: Pt presenting for T1DM f/u. HgA1c now 9.0%, previously 9.3%. As previously noted, pt self-manages condition. He states that he seldom uses the Novolog and again only takes anywhere from 12-15 units of the Levemir per dose.     **Upon entering exam room today, pt immediately stated "I don't feel good." He appeared flushed with cloudy mentation. He states that he felt as thought his blood sugar was "Hi." He has been states that he did eat lunch and took his Levemir prior to OV today. He admits that over the past several days his blood sugars have been up and down. VSS. All symptoms started ~10 minutes after pt was settled in exam room.     Report called to ED for transfer. Report given to William.    (9/18/19): Pt presenting for f/u T1DM/ED f/u (see above). Bp at goal today. Taking Coreg 3.125 mg po BID.     HgA1c now 9.0%, previously 9.3%. As previously noted, pt self-manages condition. He states that he seldom uses the Novolog and takes anywhere from 13-15 units of the Levemir per dose. However, since ED visit, he's noticed that his lunchtime CBGs have been in the 200s, while fasting CBGs are in the 70s on most occasions. He did start taking Novolog before lunch at times, but plans to take at least 3 units before lunch moving forward. Pt is skeptical about taking too much insulin due to past issues with hypoglycemia. He denies any hypoglycemia since last visit. He did state CBG dash to 340s last week. He took 7 units of Novolog and "it came down." He also endorses that he has not been entirely compliant with a diabetic diet nor is he exercising as he previously " "was.    Denies fever, chills, weakness, dizziness, HA, CP, SOB, Abd pain, edema, or any other concerns.    (12/18/19): Pt presenting for f/u T1DM. Seen in Cardio 12/17/19. Arterial stress US to be ordered per Cardio to r/o PAD after pt c/o neuropathy to LE. He states that h has "electric-like" pains to toes on both feet on occasion. He states that it does not happen every day but is painful when it does. States, "I don't really want to be on any medications at this point." He reports that he's been using compression stockings and it helps to relieve pain. Denies any lesions to feet and states, "I stay on top of that."     Bp at goal today. Taking Coreg 3.125 mg po BID.     HgA1c now 9.5%, previously 9.3%. As previously noted, pt self-manages condition. He states that he seldom uses the Novolog and has been taking ~15 units of the Levemir BID. He admits only checking CBGs in the am. States fasting CBGs 100s-140s. Not checking glucose at any other times. States, "I need to start taking about 3-4 units of Novolog before meals." CBG 64 mg/dL this am. State that he had not eaten prior to having labs drawn but ate a banana afterward and is feeling well.    Denies fever, chills, weakness, dizziness, HA, blurred vision, CP, SOB, cough, abd pain, edema, or any other concerns. States, "I'm feeling good."    Telephone Visit: (4/20/2020): Federico is a 59 y.o.  male with a PMHx significant for T1DM, HTN, HLD, CAD, Tobacco Use, and Hearing loss, who was contacted for a telephone visit due to COVID-19 precautions. Patient has consented to telephone visit and was able to verify specific patient identification. I have verified that only myself and pt are on the telephone call and call is taking place in the [facility] btw both parties.     Federico reports taking Coreg 3.125 mg po daily instead of BID 2/2 trying to save medication because "I don't know when I'll be able to go back to [facility]." He also admits splitting " "his Crestor in half because "you said my level was under 50 last time." LDL 49 (8/2019).     HgA1c now 9.5%. As previously noted, pt self-manages condition. He reports that CBGs were running 300s-500s about 2 weeks ago after he'd missed some doses "of my long-acting insulin" due to altered sleep schedule as he was waking up at about 3 am to help his significant other with some tasks. States, "But I was able to get in under control." He has been taking Novolog 5 units before meals (if low carb meal, he will only take 4 units) and 15 units of the Levemir BID. Fasting CBGs 120s over the past few days. Had one hypoglycemic episode ~ 4 days ago where he had a fasting CBG in the 50s. Admitted that he had not had a snack at bedtime as he typically does.     He's scheduled to f/u in [facility] in June 2020. States that he's been staying home and denies any known exposure to COVID-19.    Denies fever, chills, weakness, dizziness, HA, blurred vision, CP, SOB, cough, abd pain, edema, or any other concerns.    Telephone Visit: (6/22/2020): Federico is a 59 y.o.  male with a PMHx significant for T1DM, HTN, HLD, CAD, Tobacco Use, and Hearing loss, who was contacted for a telephone visit for DM f/u due to COVID-19 precautions. Patient has consented to telephone visit and was able to verify specific patient identification. I have verified that only myself and pt are on the telephone call and call is taking place in the [facility] btw both parties.    HgA1c now 10.5%; increased from previous (9.5% in 12/2019). As previously noted, pt self-manages condition. He reports that CBGs were running 300s-500s in April 2020 after he'd missed some doses "of my long-acting insulin" due to altered sleep schedule as he was waking up at about 3 am to help his significant other with some tasks. He previously expressed, "But I was able to get in under control" by taking Novolog 5 units before meals and 15 units of the Levemir BID. " "Since last telephone visit, he states that he's been taking Novolog 3-4 units BID before meals and taking 13-15 units of Levemir at night. He's had one hypoglycemic episode several weeks ago where he had a fasting CBG in the 50s. States, "But I know how to prevent that from happening." Denies any CBGs > the 300s. He's requesting refills on his Levemir. Reports that he stopped smoking several weeks ago because "the puffing made my ears hurt!" No other concerns.    Telephone Visit: (9/1/2020): Federico is a 59 y.o.  male with a PMHx significant for T1DM, HTN, HLD, CAD, Tobacco Use, and Hearing loss, who was contacted for a telephone visit for DM f/u due to COVID-19 precautions. Patient has consented to telephone visit and was able to verify specific patient identification. I have verified that only myself and pt are on the telephone call and call is taking place in the [facility] btw both parties.    HgA1c now 10.6%; mild increase from previous. As previously noted, pt self-manages condition. He reports that CBGs were running 300s-500s in April 2020 after he'd missed some doses "of my long-acting insulin" due to altered sleep schedule as he was waking up at about 3 am to help his significant other with some tasks. He previously expressed, "But I was able to get in under control" by taking NovoLog 5 units before meals and 15 units of the Levemir BID. Since last telephone visit, he states that he's been taking NovoLog 3 units before dinner on some days, but not all days, of the week depending on what he eats. For instance, if he eats rice or noodles, he will take a dose of NovoLog. Taking Levemir 15 units BID. Denies any overt hypoglycemia or hyperglycemia. States, "I'll be honest, I haven't been checking it. I only check it when I feel it's too high or too low." He admits that he was drinking up to 1 liter of diet soda a day but stopped ~2 mths ago. He's requesting refills on his medications. " "Previously reported that he stopped smoking several weeks ago because "the puffing made my ears hurt!" He is smoking again but states, it's only ~ "2 puffs a day." He is not interested in lung cancer screening. Denies cough, wheeze, chest pain, or SOB. Has not returned FIT but plans to soon. PSA 0.50. AST minimally elevated. Cardio appt cancelled in June and never re-scheduled. No acute concerns.    Telephone Visit: (12/1/2020): Federico is a 59 y.o.  male with a PMHx significant for T1DM, HTN, HLD, CAD, Tobacco Use, and Hearing loss, who was contacted for a telephone visit for DM f/u due to COVID-19 precautions. Patient has consented to telephone visit and was able to verify specific patient identification. I have verified that only myself and pt are on the telephone call and call is taking place in the [facility] btw both parties. Pt unable to connect for video conference and requested to proceed via audio means only. He is located at home in [place], currently on his treadmill per report.    HgA1c now 10.1%; mild decrease from previous. As previously noted, pt self-manages condition. He now attributes his small decrease in HgA1c to avoiding "aspartame" which is found in a lot of the beverages that he had been consuming, including diet Dr. Duarte per his report. He reports taking NovoLog 3-4 units before meals and 15-17 units of the Levemir BID "depending on how big my apple is with my peanut butter in the morning." States HgA1c hasn't been < 7% since his 20s. He denies any hypoglycemic episodes.     Pt with a h/o insomnia that was previously managed with Peyton ALEXANDER. States no longer taking d/t "weird dreams." He admits feeling a bit anxious over the last few nights so he took some of his wife's buspirone 10 mg tabs. States medication "helped me relax and go to sleep." He is requesting a prescription for himself.     FIT negative 11/27/2020. CMP and TSH WNL. He has no acute " "concerns.    (3/1/2021): Federico is a 59 y.o.  male with a PMHx significant for T1DM, HTN, HLD, CAD, Tobacco Use, and Hearing loss, presenting for routine f/u.    HgA1c now 10.2% and rising from previous. FBG was >400 on recent labs. Staff attempted to reach him on Friday to advise to visit the ED but he did not answer. Now states that he received the message regarding abnl labs. States took insulin at home and CBG improved. As previously noted, pt self-manages condition. He is prescribed Levemir 15 units BID but only takes 10-11 units because he "subtracts NovoLog dose" from the total Levemir dose. States HgA1c hasn't been < 7% since his 20s. He denies any hypoglycemic episodes. He does not check his CBGs.      He was seen by [facility] 1/2021 for routine f/u CAD. At the visit, he c/o lower ext pain. He underwent arterial US of BLE and was found to have some PAD on right side. He's been referred to Dr. Vargas.      LDL 75, but trig elevated. He's prescribed Crestor 40 mg po daily.      He continues to smoke but states she he is planning on quitting. In fact, he states today is his last day smoking.     He is amenable to receiving the influenza and Prevnar 13 vaccines today. No other problems stated.    (6/3/2021): Federico is a 60 y.o.  male with a PMHx significant for T1DM, HTN, HLD, CAD, Tobacco Use, and Hearing loss, presenting for routine f/u. Previously referred to Endo but cancelled appt due to "cost of diabetic supplies." Pt thought he was referred for a CGM. After being informed of reason for referral (as discussed during last visit), he reported that he would call to schedule Endo appt but never did. Today, he reports that his diet has improved. He's taking NovoLog regularly, at least BID before meals. HgA1c now 9.3%. As previously noted, pt self-manages condition. He is prescribed Levemir 15 units BID but only takes 10-12 units because he "subtracts NovoLog dose" from the total Levemir dose. " "Taking ~3-5 units of NovoLog before meals.   LFTs and sodium elevated on recent labs. States may have had a few alcoholic beverages the night before, though this is rare that he drinks.   Following Ophthalmology. Interested in nicotine patches to help with smoking cessation. Used patches in the past when living in [place] and [facility], "They worked well!" Now smoking < 6 cigarettes per day. No CP or SOB. No other concerns.    (9/17/2021): Federico is a 60 y.o.  male with a PMHx significant for T1DM, HTN, HLD, CAD, Tobacco Use, and Hearing loss, presenting for routine f/u. HgA1c now 9.2%. As previously noted, pt self-manages condition. He is prescribed Levemir 15 units BID but takes ~11-15 units qHS based on "what I eat." States if takes 15 units, he normally awakens hypoglycemic btw 3-6 am. Taking ~3-5 units of NovoLog before meals. PSA 0.52. LDL 70s. Compliant with Crestor. Hasn't been seen in [facility] since 1/2021. Requesting refill on Coreg. LFTs consistently trending down. No chest pain, SOB, cough, wheezing, B/B concerns, abd complaints, falls, or any acute concerns.    (12/20/2021): Federico is a 61 y.o.  male with a PMHx significant for T1DM, HTN, HLD, CAD, Tobacco Use, and Hearing loss, presenting for routine f/u. HgA1c now 9.0%. As previously noted, pt self-manages condition. He is prescribed Levemir 15 units BID but takes ~11-15 units qHS based on "what I eat." States if takes 15 units, he normally awakens hypoglycemic btw 3-6 am. Taking ~3-5 units of NovoLog before meals. AST/ALT slightly increased. Previously hepatitis screening from 2018 negative. Admits heavy beer use "when I was younger," but denies current ETOH use. Denies any abd complaints or N/V. No other concerns.    Health Maintenance:   Colon Ca Screening-FIT negative 11/2020   Prostate Ca Screening-PSA 0.52, 9/2021    (3/18/2022): Federico is a 61 y.o.  male with a PMHx significant for T1DM, HTN, HLD, CAD, Tobacco Use, " "and Hearing loss, presenting for routine f/u. HgA1c now 9.5%. As previously noted, pt self-manages condition. He is prescribed Levemir 15 units BID but takes ~11-15 units qHS based on "what I eat." States if takes 15 units, he normally awakens hypoglycemic btw 3-6 am. He is prescribed NovoLog. Takes anywhere from ~3-5 units of NovoLog before meals. However, he states he hasn't used in 1-2 weeks due to a hypoglycemic episode; CBG 70s. Admits felt shaky and confused. He ate and symptoms improved. States hasn't been using since that time. He has been referred to Endo for mgmt of chronically uncontrolled T1DM, but patient declines appt. LDL at goal. Compliant with Crestor 40 mg po daily. He also verbalizes hearing loss has worsened. He doesn't wear hearing aides. He is not interested in "doing anything with my ears." He will f/u with [facility] in April. No other concerns.    Colon Ca Screening-FIT negative 12/2021    (7/22/2022): Federico is a 61 y.o.  male with a PMHx significant for T1DM, HTN, HLD, CAD, Tobacco Use, and Hearing loss, presenting for routine f/u. HgA1c now 9.8%, increased from 9.5% at last visit. FBG was 86 on 7/18/22 compared to 200s at last visit. As previously noted, pt self-manages condition and refuses adjustments or Endo appts. He is prescribed Levemir 15 units nightly (was BID but pt was only taking daily), but takes ~11-16 units qHS based on "what I eat." States if takes 15 units, he normally awakens hypoglycemic btw 3-6 am. He is prescribed NovoLog. Takes anywhere from ~3-5 units of NovoLog before meals, "based on what I eat." Will usually take Novolog if glucose > 200. ALT 1 point above a normal high, otherwise, labs unremarkable. States scheduled for an eye exam in October. States one hypoglycemic episode 3 months ago due to "took my insulin wrong;" states glucose was 50s-60s. He was able to improve glucose on his own at home. Fell btw steps about one month ago and scraped left shin. " "This is now healed. No B/B incontinence. No other concerns.    Telephone Visit (11/22/2022): Federico is a 62 y.o.  male with a PMHx significant for T1DM, HTN, HLD, CAD, Tobacco Use, and Hearing loss, presenting for routine f/u. HgA1c now 9.7%, previously 9.8%. FBG was 80s. As previously noted, pt self-manages condition and refuses adjustments or Endo appts. He is prescribed Levemir 15 units BID. but takes ~11-16 units qHS based on "what I eat." States if takes 15 units, he normally awakens hypoglycemic btw 3-6 am. He is prescribed NovoLog. Takes anywhere from ~3-5 units of NovoLog before meals, "based on what I eat." However, he now tells me hasn't used Novolog in close to one year prior to recent dispense of medication. States since getting "back on it," his numbers have improved. He only had one hypoglycemic episode (glucose < 70) since last visit r/t "not eating much." Spouse states that patient's goal is not to each a lot so he "doesn't have to take a lot of insulin." Other labs reviewed and unremarkable. No other concerns.    Diabetes  He presents for his follow-up diabetic visit. He has type 1 diabetes mellitus. His disease course has been stable. There are no hypoglycemic associated symptoms. Associated symptoms include blurred vision and foot paresthesias. There are no hypoglycemic complications. Symptoms are stable. Diabetic complications include heart disease and peripheral neuropathy. Risk factors for coronary artery disease include diabetes mellitus, dyslipidemia, hypertension, male sex and tobacco exposure. Current diabetic treatment includes insulin injections. He is compliant with treatment most of the time. He is following a generally unhealthy diet. An ACE inhibitor/angiotensin II receptor blocker is not being taken. He does not see a podiatrist.Eye exam is not current.     Review of Systems     Review of Systems   Eyes:  Positive for blurred vision.   All other systems reviewed and are " negative.    Medical / Social / Family History   History reviewed. No pertinent past medical history.    Past Surgical History:   Procedure Laterality Date    ADENOIDECTOMY      CORONARY STENT PLACEMENT  08/20/2012    INNER EAR SURGERY      REPAIR OF RETINAL DETACHMENT WITH VITRECTOMY      TONSILLECTOMY      VITRECTOMY         Social History    reports that he has been smoking cigarettes. He has been smoking an average of .25 packs per day. He has never used smokeless tobacco. He reports that he does not currently use alcohol. He reports that he does not currently use drugs.    Family History  's family history includes Diabetes in his father and mother.    Medications and Allergies     Medications  Medication List with Changes/Refills   Current Medications    CYANOCOBALAMIN, VITAMIN B-12, (VITAMIN B-12 ORAL)      OTC, 0 Refill(s)    INSULIN ASPART U-100 (NOVOLOG) 100 UNIT/ML (3 ML) INPN PEN    Inject 3-5 Units into the skin 3 (three) times daily with meals.    MV-MN/IRON/FOLIC ACID/HERB 190 (VITAMIN D3 COMPLETE ORAL)    Take 50 mcg by mouth Daily.   Changed and/or Refilled Medications    Modified Medication Previous Medication    CARVEDILOL (COREG) 6.25 MG TABLET carvediloL (COREG) 6.25 MG tablet       Take 1 tablet (6.25 mg total) by mouth 2 (two) times daily.    Take 1 tablet (6.25 mg total) by mouth 2 (two) times daily.    INSULIN DETEMIR U-100 (LEVEMIR FLEXTOUCH U-100 INSULN) 100 UNIT/ML (3 ML) INPN PEN insulin detemir U-100 (LEVEMIR FLEXTOUCH U-100 INSULN) 100 unit/mL (3 mL) InPn pen       Inject 15 Units into the skin 2 (two) times daily.    Inject 15 Units into the skin 2 (two) times daily.    ROSUVASTATIN (CRESTOR) 40 MG TAB rosuvastatin (CRESTOR) 40 MG Tab       Take 1 tablet (40 mg total) by mouth every evening.    Take 1 tablet (40 mg total) by mouth every evening.       Allergies  Review of patient's allergies indicates:  No Known Allergies    Physical Examination     There were no vitals filed  for this visit.    Physical Exam  Neurological:      Mental Status: He is alert and oriented to person, place, and time.   Psychiatric:         Mood and Affect: Mood normal.         Behavior: Behavior normal.         Thought Content: Thought content normal.         Judgment: Judgment normal.         Results     Lab Results   Component Value Date    WBC 7.0 11/14/2022    RBC 4.89 11/14/2022    HGB 15.8 11/14/2022    HCT 46.1 11/14/2022    MCV 94.3 (H) 11/14/2022    MCH 32.3 (H) 11/14/2022    MCHC 34.3 11/14/2022    RDW 12.3 11/14/2022     11/14/2022    MPV 10.9 (H) 11/14/2022     CMP  Sodium Level   Date Value Ref Range Status   11/14/2022 142 136 - 145 mmol/L Final     Potassium Level   Date Value Ref Range Status   11/14/2022 4.3 3.5 - 5.1 mmol/L Final     Carbon Dioxide   Date Value Ref Range Status   11/14/2022 32 (H) 23 - 31 mmol/L Final     Blood Urea Nitrogen   Date Value Ref Range Status   11/14/2022 11.6 8.4 - 25.7 mg/dL Final     Creatinine   Date Value Ref Range Status   11/14/2022 0.74 0.73 - 1.18 mg/dL Final     Calcium Level Total   Date Value Ref Range Status   11/14/2022 9.7 8.8 - 10.0 mg/dL Final     Albumin Level   Date Value Ref Range Status   11/14/2022 4.1 3.4 - 4.8 gm/dL Final     Bilirubin Total   Date Value Ref Range Status   11/14/2022 1.0 <=1.5 mg/dL Final     Alkaline Phosphatase   Date Value Ref Range Status   11/14/2022 103 40 - 150 unit/L Final     Aspartate Aminotransferase   Date Value Ref Range Status   11/14/2022 19 5 - 34 unit/L Final     Alanine Aminotransferase   Date Value Ref Range Status   11/14/2022 21 0 - 55 unit/L Final     Estimated GFR-Non    Date Value Ref Range Status   07/18/2022 >60 mls/min/1.73/m2 Final     Lab Results   Component Value Date    CHOL 143 03/15/2022     Lab Results   Component Value Date    HDL 43 03/15/2022     No results found for: LDLCALC  Lab Results   Component Value Date    TRIG 115 03/15/2022     No results found for:  CHOLHDL  Lab Results   Component Value Date    TSH 1.7313 07/18/2022     Lab Results   Component Value Date    PHUR 7.0 12/16/2021    PROTEINUA Trace (A) 11/14/2022    GLUCUA 2+ (A) 12/16/2021    KETONESU Negative 12/16/2021    OCCULTUA Negative 12/16/2021    NITRITE Negative 12/16/2021    LEUKOCYTESUR Negative 11/14/2022           Assessment and Plan (including Health Maintenance)     Plan:         Health Maintenance Due   Topic Date Due    Shingles Vaccine (1 of 2) Never done    COVID-19 Vaccine (3 - Booster for Pfizer series) 12/03/2021    Eye Exam  10/01/2022       Problem List Items Addressed This Visit          Cardiac/Vascular    Hyperlipidemia    Overview     Crestor           Current Assessment & Plan     Refilled Crestor  FLP prior to f/u  Educated on a low-fat, low-cholesterol diet and aerobic exercise (20-30 mins/day x 5 days a week). Encouraged healthy fruits and veggie intake. Increase water intake. Avoid excess ETOH intake and smoking           Relevant Medications    rosuvastatin (CRESTOR) 40 MG Tab    Other Relevant Orders    Lipid Panel    Hypertension    Overview     Coreg 6.25 mg po BID           Current Assessment & Plan     Refilled Coreg   DASH         Relevant Medications    carvediloL (COREG) 6.25 MG tablet       Endocrine    Type 1 diabetes mellitus    Overview     Current medications: See HPI  A1c level: 9.7% Goal 8-8.9% d/t hypoglycemic concerns  CBG trends: Checking infrequently. See HPI  Educated on diabetic diet: Low-fat, Low-carb, Low-cholesterol. Avoid sugary/dark drinks/sodas and white foods (i.e., bread, pasta, potatoes, rice)  Aerobic exercise: 20-30 min/day x 5 days/week  Diabetic Eye Exam: 3/1/21, follows [facility] had an exam at [facility] scheduled 10/2022, but had to cancel. Planning to reschedule 1/2023  Diabetic Foot Exam: 3/2022, Decreased sensation to feet bilaterally. Skin normal color for ethnicity. No swelling or lesions.  Microalbumin-U: WNL (3/2022)  Kidney  "Protection: N/A; declines new meds  Statin Therapy: Crestor            Current Assessment & Plan     Medication Adjustments: Pt has been self managing his DM for years and is resistant to increases in insulin 2/2 past issues with hypoglycemia. Previously offered to refer to Endo d/t HgA1c persistently above acceptable range but he declined mx times in the past. Eventually agreed to referral but has since declined to be seen. Reports last time HgA1c < 7% was in his 20s when he was "playing a bunch of sports."  He does consent to being referred to diabetes education today  Continues to decline Endo referral, insulin pump etc  Educated on Hypoglycemic s/s and interventions. Call office with any questions or concerns  Strict glucose monitoring. Bring blood glucose logs/meter to each OV           Relevant Medications    insulin detemir U-100 (LEVEMIR FLEXTOUCH U-100 INSULN) 100 unit/mL (3 mL) InPn pen    Other Relevant Orders    Ambulatory referral/consult to Diabetes Education    CBC Auto Differential    Comprehensive Metabolic Panel    Hemoglobin A1C    Microalbumin/Creatinine Ratio, Urine    Lipid Panel    TSH       Other    Wellness examination    Overview     Health Maintenance:   Colon Ca Screening-FIT negative 12/30/2021   Prostate Ca Screening-PSA 0.79, 11/2022         Tobacco user    Overview                 Other Visit Diagnoses       Screening for colon cancer    -  Primary    Relevant Orders    Cologuard Screening (Multitarget Stool DNA)    Type 1 diabetes mellitus without complications        Relevant Medications    insulin detemir U-100 (LEVEMIR FLEXTOUCH U-100 INSULN) 100 unit/mL (3 mL) InPn pen            Health Maintenance Topics with due status: Not Due       Topic Last Completion Date    TETANUS VACCINE 06/01/2018    Pneumococcal Vaccines (Age 0-64) 03/01/2021    Colorectal Cancer Screening 12/30/2021    Diabetes Urine Screening 03/15/2022    Lipid Panel 03/15/2022    Foot Exam 03/18/2022    Hemoglobin " A1c 11/14/2022    High Dose Statin 11/22/2022       Future Appointments   Date Time Provider Department Center   3/22/2023  8:15 AM ARIN Malagon [facility]         I spent a total of 30 minutes on the day of the visit.  This includes face to face time and non-face to face time preparing to see the patient (eg, review of tests), obtaining and/or reviewing separately obtained history, documenting clinical information in the electronic or other health record, independently interpreting results and communicating results to the patient/family/caregiver, or care coordinator.      Signature:  ARIN Malagon  [facility]  [address] [id number]    Date of encounter: 11/22/22  Established Patient - Audio Only Telehealth Visit     The patient location is: home  The chief complaint leading to consultation is: DM f/u  Visit type: Virtual visit with audio only (telephone)  Total time spent with patient: 10 minutes       The reason for the audio only service rather than synchronous audio and video virtual visit was related to technical difficulties or patient preference/necessity.     Each patient to whom I provide medical services by telemedicine is:  (1) informed of the relationship between the physician and patient and the respective role of any other health care provider with respect to management of the patient; and (2) notified that they may decline to receive medical services by telemedicine and may withdraw from such care at any time. Patient verbally consented to receive this service via voice-only telephone call.      This service was not originating from a related E/M service provided within the previous 7 days nor will  to an E/M service or procedure within the next 24 hours or my soonest available appointment.  Prevailing standard of care was able to be met in this audio-only visit.

## 2022-11-22 NOTE — ASSESSMENT & PLAN NOTE
"Medication Adjustments: Pt has been self managing his DM for years and is resistant to increases in insulin 2/2 past issues with hypoglycemia. Previously offered to refer to Endo d/t HgA1c persistently above acceptable range but he declined mx times in the past. Eventually agreed to referral but has since declined to be seen. Reports last time HgA1c < 7% was in his 20s when he was "playing a bunch of sports."  He does consent to being referred to diabetes education today  Continues to decline Endo referral, insulin pump etc  Educated on Hypoglycemic s/s and interventions. Call office with any questions or concerns  Strict glucose monitoring. Bring blood glucose logs/meter to each OV    "

## 2022-11-30 ENCOUNTER — TELEPHONE (OUTPATIENT)
Dept: DIABETES | Facility: CLINIC | Age: 62
End: 2022-11-30
Payer: MEDICARE

## 2022-11-30 NOTE — TELEPHONE ENCOUNTER
Phyllis,    Thank you for the referral for diabetes education.  Unfortunately, Mr. Caballero is not able to make an appointment at this time.  He stated that he would not have a ride for an in person appointment since his wife works and he would not know how to do a virtual appointment without his wife's help.  I did provide him with my contact information and encouraged him to call with any questions or if his situation changes.    Thanks,    Francesca Carnes RD, LDN  Diabetes Care Specialist

## 2022-12-03 ENCOUNTER — DOCUMENTATION ONLY (OUTPATIENT)
Dept: INTERNAL MEDICINE | Facility: CLINIC | Age: 62
End: 2022-12-03
Payer: MEDICARE

## 2022-12-13 ENCOUNTER — PATIENT MESSAGE (OUTPATIENT)
Dept: RESEARCH | Facility: HOSPITAL | Age: 62
End: 2022-12-13
Payer: MEDICARE

## 2023-02-08 ENCOUNTER — PATIENT MESSAGE (OUTPATIENT)
Dept: ADMINISTRATIVE | Facility: HOSPITAL | Age: 63
End: 2023-02-08
Payer: MEDICARE

## 2023-02-10 ENCOUNTER — LAB VISIT (OUTPATIENT)
Dept: LAB | Facility: HOSPITAL | Age: 63
End: 2023-02-10
Attending: NURSE PRACTITIONER
Payer: MEDICARE

## 2023-02-10 DIAGNOSIS — E10.65 TYPE 1 DIABETES MELLITUS WITH HYPERGLYCEMIA: ICD-10-CM

## 2023-02-10 DIAGNOSIS — E78.5 HYPERLIPIDEMIA, UNSPECIFIED HYPERLIPIDEMIA TYPE: ICD-10-CM

## 2023-02-10 LAB
ALBUMIN SERPL-MCNC: 4 G/DL (ref 3.4–4.8)
ALBUMIN/GLOB SERPL: 1.6 RATIO (ref 1.1–2)
ALP SERPL-CCNC: 106 UNIT/L (ref 40–150)
ALT SERPL-CCNC: 26 UNIT/L (ref 0–55)
AST SERPL-CCNC: 16 UNIT/L (ref 5–34)
BASOPHILS # BLD AUTO: 0.08 X10(3)/MCL (ref 0–0.2)
BASOPHILS NFR BLD AUTO: 0.8 %
BILIRUBIN DIRECT+TOT PNL SERPL-MCNC: 1.2 MG/DL
BUN SERPL-MCNC: 13.2 MG/DL (ref 8.4–25.7)
CALCIUM SERPL-MCNC: 9.8 MG/DL (ref 8.8–10)
CHLORIDE SERPL-SCNC: 108 MMOL/L (ref 98–107)
CHOLEST SERPL-MCNC: 110 MG/DL
CHOLEST/HDLC SERPL: 3 {RATIO} (ref 0–5)
CO2 SERPL-SCNC: 29 MMOL/L (ref 23–31)
CREAT SERPL-MCNC: 0.85 MG/DL (ref 0.73–1.18)
CREAT UR-MCNC: 48.4 MG/DL (ref 63–166)
EOSINOPHIL # BLD AUTO: 0.5 X10(3)/MCL (ref 0–0.9)
EOSINOPHIL NFR BLD AUTO: 5.2 %
ERYTHROCYTE [DISTWIDTH] IN BLOOD BY AUTOMATED COUNT: 12 % (ref 11.5–17)
EST. AVERAGE GLUCOSE BLD GHB EST-MCNC: 226 MG/DL
GFR SERPLBLD CREATININE-BSD FMLA CKD-EPI: >60 MLS/MIN/1.73/M2
GLOBULIN SER-MCNC: 2.5 GM/DL (ref 2.4–3.5)
GLUCOSE SERPL-MCNC: 68 MG/DL (ref 82–115)
HBA1C MFR BLD: 9.5 %
HCT VFR BLD AUTO: 47.1 % (ref 42–52)
HDLC SERPL-MCNC: 34 MG/DL (ref 35–60)
HGB BLD-MCNC: 15.8 GM/DL (ref 14–18)
IMM GRANULOCYTES # BLD AUTO: 0.04 X10(3)/MCL (ref 0–0.04)
IMM GRANULOCYTES NFR BLD AUTO: 0.4 %
LDLC SERPL CALC-MCNC: 55 MG/DL (ref 50–140)
LYMPHOCYTES # BLD AUTO: 3.33 X10(3)/MCL (ref 0.6–4.6)
LYMPHOCYTES NFR BLD AUTO: 34.3 %
MCH RBC QN AUTO: 31.2 PG
MCHC RBC AUTO-ENTMCNC: 33.5 MG/DL (ref 33–36)
MCV RBC AUTO: 93.1 FL (ref 80–94)
MICROALBUMIN UR-MCNC: 102.6 UG/ML
MICROALBUMIN/CREAT RATIO PNL UR: 212 MG/GM CR (ref 0–30)
MONOCYTES # BLD AUTO: 0.87 X10(3)/MCL (ref 0.1–1.3)
MONOCYTES NFR BLD AUTO: 9 %
NEUTROPHILS # BLD AUTO: 4.88 X10(3)/MCL (ref 2.1–9.2)
NEUTROPHILS NFR BLD AUTO: 50.3 %
NRBC BLD AUTO-RTO: 0 %
PLATELET # BLD AUTO: 176 X10(3)/MCL (ref 130–400)
PMV BLD AUTO: 10.7 FL (ref 7.4–10.4)
POTASSIUM SERPL-SCNC: 4.2 MMOL/L (ref 3.5–5.1)
PROT SERPL-MCNC: 6.5 GM/DL (ref 5.8–7.6)
RBC # BLD AUTO: 5.06 X10(6)/MCL (ref 4.7–6.1)
SODIUM SERPL-SCNC: 146 MMOL/L (ref 136–145)
TRIGL SERPL-MCNC: 106 MG/DL (ref 34–140)
TSH SERPL-ACNC: 2.65 UIU/ML (ref 0.35–4.94)
VLDLC SERPL CALC-MCNC: 21 MG/DL
WBC # SPEC AUTO: 9.7 X10(3)/MCL (ref 4.5–11.5)

## 2023-02-10 PROCEDURE — 80053 COMPREHEN METABOLIC PANEL: CPT

## 2023-02-10 PROCEDURE — 84443 ASSAY THYROID STIM HORMONE: CPT

## 2023-02-10 PROCEDURE — 36415 COLL VENOUS BLD VENIPUNCTURE: CPT

## 2023-02-10 PROCEDURE — 82043 UR ALBUMIN QUANTITATIVE: CPT

## 2023-02-10 PROCEDURE — 80061 LIPID PANEL: CPT

## 2023-02-10 PROCEDURE — 83036 HEMOGLOBIN GLYCOSYLATED A1C: CPT

## 2023-02-10 PROCEDURE — 85025 COMPLETE CBC W/AUTO DIFF WBC: CPT

## 2023-02-27 PROBLEM — Z00.00 WELLNESS EXAMINATION: Status: RESOLVED | Noted: 2022-07-22 | Resolved: 2023-02-27

## 2023-03-22 ENCOUNTER — OFFICE VISIT (OUTPATIENT)
Dept: INTERNAL MEDICINE | Facility: CLINIC | Age: 63
End: 2023-03-22
Payer: MEDICARE

## 2023-03-22 VITALS
BODY MASS INDEX: 20.77 KG/M2 | DIASTOLIC BLOOD PRESSURE: 68 MMHG | WEIGHT: 117.19 LBS | HEIGHT: 63 IN | TEMPERATURE: 98 F | HEART RATE: 80 BPM | RESPIRATION RATE: 20 BRPM | SYSTOLIC BLOOD PRESSURE: 134 MMHG

## 2023-03-22 DIAGNOSIS — H35.9 RETINAL DISORDER: Primary | ICD-10-CM

## 2023-03-22 DIAGNOSIS — I10 HYPERTENSION, UNSPECIFIED TYPE: ICD-10-CM

## 2023-03-22 DIAGNOSIS — E10.65 TYPE 1 DIABETES MELLITUS WITH HYPERGLYCEMIA: ICD-10-CM

## 2023-03-22 DIAGNOSIS — Z00.00 WELLNESS EXAMINATION: ICD-10-CM

## 2023-03-22 DIAGNOSIS — H91.93 BILATERAL HEARING LOSS, UNSPECIFIED HEARING LOSS TYPE: ICD-10-CM

## 2023-03-22 DIAGNOSIS — E10.9 TYPE 1 DIABETES MELLITUS WITHOUT COMPLICATIONS: ICD-10-CM

## 2023-03-22 DIAGNOSIS — E78.5 HYPERLIPIDEMIA, UNSPECIFIED HYPERLIPIDEMIA TYPE: ICD-10-CM

## 2023-03-22 DIAGNOSIS — I10 PRIMARY HYPERTENSION: ICD-10-CM

## 2023-03-22 DIAGNOSIS — I25.10 CORONARY ARTERY DISEASE INVOLVING NATIVE CORONARY ARTERY OF NATIVE HEART WITHOUT ANGINA PECTORIS: ICD-10-CM

## 2023-03-22 PROCEDURE — 99214 OFFICE O/P EST MOD 30 MIN: CPT | Mod: S$PBB,,, | Performed by: NURSE PRACTITIONER

## 2023-03-22 PROCEDURE — 99214 PR OFFICE/OUTPT VISIT, EST, LEVL IV, 30-39 MIN: ICD-10-PCS | Mod: S$PBB,,, | Performed by: NURSE PRACTITIONER

## 2023-03-22 PROCEDURE — 99214 OFFICE O/P EST MOD 30 MIN: CPT | Mod: PBBFAC | Performed by: NURSE PRACTITIONER

## 2023-03-22 RX ORDER — INSULIN ASPART 100 [IU]/ML
3-5 INJECTION, SOLUTION INTRAVENOUS; SUBCUTANEOUS
Qty: 3 ML | Refills: 11 | Status: SHIPPED | OUTPATIENT
Start: 2023-03-22 | End: 2023-09-13 | Stop reason: SDUPTHER

## 2023-03-22 RX ORDER — INSULIN DETEMIR 100 [IU]/ML
15 INJECTION, SOLUTION SUBCUTANEOUS 2 TIMES DAILY
Qty: 15 ML | Refills: 3 | Status: SHIPPED | OUTPATIENT
Start: 2023-03-22 | End: 2023-09-13 | Stop reason: SDUPTHER

## 2023-03-22 RX ORDER — ROSUVASTATIN CALCIUM 40 MG/1
40 TABLET, COATED ORAL NIGHTLY
Qty: 90 TABLET | Refills: 3 | Status: SHIPPED | OUTPATIENT
Start: 2023-03-22 | End: 2024-03-21

## 2023-03-22 RX ORDER — ASPIRIN 81 MG/1
81 TABLET ORAL DAILY
Status: ON HOLD | COMMUNITY
End: 2024-01-15 | Stop reason: HOSPADM

## 2023-03-22 RX ORDER — CARVEDILOL 6.25 MG/1
6.25 TABLET ORAL 2 TIMES DAILY
Qty: 180 TABLET | Refills: 3 | Status: SHIPPED | OUTPATIENT
Start: 2023-03-22 | End: 2023-12-13 | Stop reason: SDUPTHER

## 2023-03-22 NOTE — ASSESSMENT & PLAN NOTE
LDL at goal  Continue Crestor. Rx refilled  Educated on a low-fat, low-cholesterol diet and aerobic exercise (20-30 mins/day x 5 days a week). Encouraged healthy fruits and veggie intake. Increase water intake. Avoid excess ETOH intake and smoking

## 2023-03-22 NOTE — ASSESSMENT & PLAN NOTE
"Medication Adjustments: Pt has been self managing his DM for years and is resistant to increases in insulin 2/2 past issues with hypoglycemia. Previously offered to refer to Endo d/t HgA1c persistently above acceptable range but he declined mx times in the past. Eventually agreed to referral but has since declined to be seen. Reports last time HgA1c < 7% was in his 20s when he was "playing a bunch of sports."  He consented to being referred to diabetes education at last visit, but told the educator he was not available when an appt was offered  Continues to decline Endo referral, insulin pump etc  Educated on Hypoglycemic s/s and interventions. Call office with any questions or concerns  Strict glucose monitoring. Bring blood glucose logs/meter to each OV  "

## 2023-03-22 NOTE — ASSESSMENT & PLAN NOTE
At goal  Continue current medications  Educated on aerobic exercise (3-5 days/week) and a low-fat, low-sodium diet  Avoid excess ETOH consumption. Smoking cessation if applicable  ED precautions (s/s of CVA, etc)

## 2023-03-22 NOTE — ASSESSMENT & PLAN NOTE
Encouraged to return Cologuard ASAP. He confirms receiving  Plans to schedule shingles vaccine with pharmacy ASAP

## 2023-03-22 NOTE — PROGRESS NOTES
"  ARIN Malagon   [facility]  [address] [facility] [id number]      PATIENT NAME: Federico Villarreal  : 1960  DATE: 3/22/23  MRN: [id number]      Billing Provider: ARIN Malagon  Level of Service:   Patient PCP Information       Provider PCP Type    ARIN Malagon General            Reason for Visit / Chief Complaint: Follow-up (Lab review)       History of Present Illness / Problem Focused Workflow     Federico Villarreal presents to the clinic with Follow-up (Lab review)       Initial Visit: (1/10/19): 58 y.o.  male presenting to the clinic to re-establish primary care. Previous PCP VANCE Pike NP. PMHx significant for T1DM, HTN, HLD, CAD, Tobacco Use, and Hearing loss. Following Cardio. Last OV 10/2/18. Following ENT. Last OV 18. HgA1c 8.8 (10/2018). Pt is on a Novolog SS. He does not have the SS with him. States that he goes by "what he eats, how he feels, and experience." Overall, he reports that he rarely takes Novolog. He was previously prescribed Levemir 15 units BID. States that he only takes 12-13 units of Levemir per dose d/t hypoglycemic episodes. Reports that blood glucose was dropping to 40s-50s when he was taking full 15 units (especially at night). Pt states that he dealt with severe hypoglycemia ~4 years ago while living in [place]. Blood sugars were dropping in the 20s. Since then, he has not had CBGs that low that he's aware of. CBGs do fluctuate. Reports having readings as high as the 500s over the past 6 months. However, pt reports that this is much better than past control. Denies neuropathy. Was following local endocrinologist (Dr. Elizalde) in the past, however, reports that the doctor does not accept Medicaid. Also reports that he follows Dr. Felix Farmer for eye disease. States that he had a dilated eye exam ~ 1 month ago. Needs HgA1c. Bp at goal. Currently taking Carvedilol 3.125 mg po BID. " "No longer taking ASA s/t bloody otorrhea (managed per ENT). LDL 86 (previously 74). Pt taking Crestor 20 mg po daily. Tolerating well. Reports being placed on Ambien around 2012 after cardiac stent placement. States Ambien "really helped" him rest. He took himself off in the past. Today, he is c/o restless legs and extreme insomnia. Reports that he usually falls asleep around midnight. He then awakens at 2 am, restless. He may fall asleep again and is up by 5 am once his wife is up and getting ready for work. Reports daytime fatigue. He would like to try Ambien again. He smokes ~ 1 cigar a day. States, "I know it's not good for me." However, not ready to quit. Pt states that he is feeling "good." Denies fever, chills, HA, CP, SOB, Abd pain, Swelling, or any other concerns.    4/17/19: Pt presenting for f/u. HgA1c now 9.3% (previously 8.8%). As previously noted, pt self-manages condition. He states that he seldom uses the Novolog and again only takes anywhere from 12-15 units of the Levemir per dose. States that he doses Levemir based on blood sugar readings. He states that he will only take a full 15 units of Levemir at pm dose only if he eats a "large" meal. States, "and it has to be large." Otherwise, he states that he'll wake up hypoglycemic. Denies any recent hypoglycemia. When asked about glucose control while following Dr. Elizalde, he reports that his HgA1c stayed in the 7s and low 8s. He admits that he was more active and eating better at the time. Admits that he hasn't been as active because he filed for disability. He hasn't been eating well s/t finances. He states that he will start "doing better." Oph is Dr. Farmer. Recent eye exam reveals proliferative DR HILTON. Pt reports that he will f/u with Dr. Farmer in June.  (previously 86). Prescribed Crestor 20 mg po daily . However, pt admits that he's been out of the Crestor for a while. Reports losing insurance for a short time and was unable to afford, " "thus, he's not been taking. Bp at goal. Currently taking Carvedilol 3.125 mg po BID. No longer taking Ambien. States that he didn't want to become dependent on it. Instead, he uses OTC Melatonin as needed for insomnia. Reports Melatonin effective. Denies fever, chills, HA, CP, SOB, Abd pain, edema, or any other concerns.    8/22/19: Pt presenting for T1DM f/u. HgA1c now 9.0%, previously 9.3%. As previously noted, pt self-manages condition. He states that he seldom uses the Novolog and again only takes anywhere from 12-15 units of the Levemir per dose.     **Upon entering exam room today, pt immediately stated "I don't feel good." He appeared flushed with cloudy mentation. He states that he felt as thought his blood sugar was "Hi." He has been states that he did eat lunch and took his Levemir prior to OV today. He admits that over the past several days his blood sugars have been up and down. VSS. All symptoms started ~10 minutes after pt was settled in exam room.     Report called to ED for transfer. Report given to William.    (9/18/19): Pt presenting for f/u T1DM/ED f/u (see above). Bp at goal today. Taking Coreg 3.125 mg po BID.     HgA1c now 9.0%, previously 9.3%. As previously noted, pt self-manages condition. He states that he seldom uses the Novolog and takes anywhere from 13-15 units of the Levemir per dose. However, since ED visit, he's noticed that his lunchtime CBGs have been in the 200s, while fasting CBGs are in the 70s on most occasions. He did start taking Novolog before lunch at times, but plans to take at least 3 units before lunch moving forward. Pt is skeptical about taking too much insulin due to past issues with hypoglycemia. He denies any hypoglycemia since last visit. He did state CBG dash to 340s last week. He took 7 units of Novolog and "it came down." He also endorses that he has not been entirely compliant with a diabetic diet nor is he exercising as he previously was.    Denies fever, " "chills, weakness, dizziness, HA, CP, SOB, Abd pain, edema, or any other concerns.    (12/18/19): Pt presenting for f/u T1DM. Seen in Cardio 12/17/19. Arterial stress US to be ordered per Cardio to r/o PAD after pt c/o neuropathy to LE. He states that h has "electric-like" pains to toes on both feet on occasion. He states that it does not happen every day but is painful when it does. States, "I don't really want to be on any medications at this point." He reports that he's been using compression stockings and it helps to relieve pain. Denies any lesions to feet and states, "I stay on top of that."     Bp at goal today. Taking Coreg 3.125 mg po BID.     HgA1c now 9.5%, previously 9.3%. As previously noted, pt self-manages condition. He states that he seldom uses the Novolog and has been taking ~15 units of the Levemir BID. He admits only checking CBGs in the am. States fasting CBGs 100s-140s. Not checking glucose at any other times. States, "I need to start taking about 3-4 units of Novolog before meals." CBG 64 mg/dL this am. State that he had not eaten prior to having labs drawn but ate a banana afterward and is feeling well.    Denies fever, chills, weakness, dizziness, HA, blurred vision, CP, SOB, cough, abd pain, edema, or any other concerns. States, "I'm feeling good."    Telephone Visit: (4/20/2020): Federico is a 59 y.o.  male with a PMHx significant for T1DM, HTN, HLD, CAD, Tobacco Use, and Hearing loss, who was contacted for a telephone visit due to COVID-19 precautions. Patient has consented to telephone visit and was able to verify specific patient identification. I have verified that only myself and pt are on the telephone call and call is taking place in the [facility] btw both parties.     Federico reports taking Coreg 3.125 mg po daily instead of BID 2/2 trying to save medication because "I don't know when I'll be able to go back to [facility]." He also admits splitting his Crestor in half " "because "you said my level was under 50 last time." LDL 49 (8/2019).     HgA1c now 9.5%. As previously noted, pt self-manages condition. He reports that CBGs were running 300s-500s about 2 weeks ago after he'd missed some doses "of my long-acting insulin" due to altered sleep schedule as he was waking up at about 3 am to help his significant other with some tasks. States, "But I was able to get in under control." He has been taking Novolog 5 units before meals (if low carb meal, he will only take 4 units) and 15 units of the Levemir BID. Fasting CBGs 120s over the past few days. Had one hypoglycemic episode ~ 4 days ago where he had a fasting CBG in the 50s. Admitted that he had not had a snack at bedtime as he typically does.     He's scheduled to f/u in [facility] in June 2020. States that he's been staying home and denies any known exposure to COVID-19.    Denies fever, chills, weakness, dizziness, HA, blurred vision, CP, SOB, cough, abd pain, edema, or any other concerns.    Telephone Visit: (6/22/2020): Federico is a 59 y.o.  male with a PMHx significant for T1DM, HTN, HLD, CAD, Tobacco Use, and Hearing loss, who was contacted for a telephone visit for DM f/u due to COVID-19 precautions. Patient has consented to telephone visit and was able to verify specific patient identification. I have verified that only myself and pt are on the telephone call and call is taking place in the [facility] btw both parties.    HgA1c now 10.5%; increased from previous (9.5% in 12/2019). As previously noted, pt self-manages condition. He reports that CBGs were running 300s-500s in April 2020 after he'd missed some doses "of my long-acting insulin" due to altered sleep schedule as he was waking up at about 3 am to help his significant other with some tasks. He previously expressed, "But I was able to get in under control" by taking Novolog 5 units before meals and 15 units of the Levemir BID. Since last " "telephone visit, he states that he's been taking Novolog 3-4 units BID before meals and taking 13-15 units of Levemir at night. He's had one hypoglycemic episode several weeks ago where he had a fasting CBG in the 50s. States, "But I know how to prevent that from happening." Denies any CBGs > the 300s. He's requesting refills on his Levemir. Reports that he stopped smoking several weeks ago because "the puffing made my ears hurt!" No other concerns.    Telephone Visit: (9/1/2020): Federico is a 59 y.o.  male with a PMHx significant for T1DM, HTN, HLD, CAD, Tobacco Use, and Hearing loss, who was contacted for a telephone visit for DM f/u due to COVID-19 precautions. Patient has consented to telephone visit and was able to verify specific patient identification. I have verified that only myself and pt are on the telephone call and call is taking place in the [facility] btw both parties.    HgA1c now 10.6%; mild increase from previous. As previously noted, pt self-manages condition. He reports that CBGs were running 300s-500s in April 2020 after he'd missed some doses "of my long-acting insulin" due to altered sleep schedule as he was waking up at about 3 am to help his significant other with some tasks. He previously expressed, "But I was able to get in under control" by taking NovoLog 5 units before meals and 15 units of the Levemir BID. Since last telephone visit, he states that he's been taking NovoLog 3 units before dinner on some days, but not all days, of the week depending on what he eats. For instance, if he eats rice or noodles, he will take a dose of NovoLog. Taking Levemir 15 units BID. Denies any overt hypoglycemia or hyperglycemia. States, "I'll be honest, I haven't been checking it. I only check it when I feel it's too high or too low." He admits that he was drinking up to 1 liter of diet soda a day but stopped ~2 mths ago. He's requesting refills on his medications. Previously reported " "that he stopped smoking several weeks ago because "the puffing made my ears hurt!" He is smoking again but states, it's only ~ "2 puffs a day." He is not interested in lung cancer screening. Denies cough, wheeze, chest pain, or SOB. Has not returned FIT but plans to soon. PSA 0.50. AST minimally elevated. Cardio appt cancelled in June and never re-scheduled. No acute concerns.    Telephone Visit: (12/1/2020): Federico is a 59 y.o.  male with a PMHx significant for T1DM, HTN, HLD, CAD, Tobacco Use, and Hearing loss, who was contacted for a telephone visit for DM f/u due to COVID-19 precautions. Patient has consented to telephone visit and was able to verify specific patient identification. I have verified that only myself and pt are on the telephone call and call is taking place in the [facility] btw both parties. Pt unable to connect for video conference and requested to proceed via audio means only. He is located at home in [place], currently on his treadmill per report.    HgA1c now 10.1%; mild decrease from previous. As previously noted, pt self-manages condition. He now attributes his small decrease in HgA1c to avoiding "aspartame" which is found in a lot of the beverages that he had been consuming, including diet Dr. Duarte per his report. He reports taking NovoLog 3-4 units before meals and 15-17 units of the Levemir BID "depending on how big my apple is with my peanut butter in the morning." States HgA1c hasn't been < 7% since his 20s. He denies any hypoglycemic episodes.     Pt with a h/o insomnia that was previously managed with Bettyien PRN. States no longer taking d/t "weird dreams." He admits feeling a bit anxious over the last few nights so he took some of his wife's buspirone 10 mg tabs. States medication "helped me relax and go to sleep." He is requesting a prescription for himself.     FIT negative 11/27/2020. CMP and TSH WNL. He has no acute concerns.    (3/1/2021): Federico is a 59 " "y.o.  male with a PMHx significant for T1DM, HTN, HLD, CAD, Tobacco Use, and Hearing loss, presenting for routine f/u.    HgA1c now 10.2% and rising from previous. FBG was >400 on recent labs. Staff attempted to reach him on Friday to advise to visit the ED but he did not answer. Now states that he received the message regarding abnl labs. States took insulin at home and CBG improved. As previously noted, pt self-manages condition. He is prescribed Levemir 15 units BID but only takes 10-11 units because he "subtracts NovoLog dose" from the total Levemir dose. States HgA1c hasn't been < 7% since his 20s. He denies any hypoglycemic episodes. He does not check his CBGs.      He was seen by [facility] 1/2021 for routine f/u CAD. At the visit, he c/o lower ext pain. He underwent arterial US of BLE and was found to have some PAD on right side. He's been referred to Dr. Vargas.      LDL 75, but trig elevated. He's prescribed Crestor 40 mg po daily.      He continues to smoke but states she he is planning on quitting. In fact, he states today is his last day smoking.     He is amenable to receiving the influenza and Prevnar 13 vaccines today. No other problems stated.    (6/3/2021): Federico is a 60 y.o.  male with a PMHx significant for T1DM, HTN, HLD, CAD, Tobacco Use, and Hearing loss, presenting for routine f/u. Previously referred to Endo but cancelled appt due to "cost of diabetic supplies." Pt thought he was referred for a CGM. After being informed of reason for referral (as discussed during last visit), he reported that he would call to schedule Endo appt but never did. Today, he reports that his diet has improved. He's taking NovoLog regularly, at least BID before meals. HgA1c now 9.3%. As previously noted, pt self-manages condition. He is prescribed Levemir 15 units BID but only takes 10-12 units because he "subtracts NovoLog dose" from the total Levemir dose. Taking ~3-5 units of NovoLog before " "meals.   LFTs and sodium elevated on recent labs. States may have had a few alcoholic beverages the night before, though this is rare that he drinks.   Following Ophthalmology. Interested in nicotine patches to help with smoking cessation. Used patches in the past when living [place] and [facility] [place], "They worked well!" Now smoking < 6 cigarettes per day. No CP or SOB. No other concerns.    (9/17/2021): Federico is a 60 y.o.  male with a PMHx significant for T1DM, HTN, HLD, CAD, Tobacco Use, and Hearing loss, presenting for routine f/u. HgA1c now 9.2%. As previously noted, pt self-manages condition. He is prescribed Levemir 15 units BID but takes ~11-15 units qHS based on "what I eat." States if takes 15 units, he normally awakens hypoglycemic btw 3-6 am. Taking ~3-5 units of NovoLog before meals. PSA 0.52. LDL 70s. Compliant with Crestor. Hasn't been seen in [facility] since 1/2021. Requesting refill on Coreg. LFTs consistently trending down. No chest pain, SOB, cough, wheezing, B/B concerns, abd complaints, falls, or any acute concerns.    (12/20/2021): Federico is a 61 y.o.  male with a PMHx significant for T1DM, HTN, HLD, CAD, Tobacco Use, and Hearing loss, presenting for routine f/u. HgA1c now 9.0%. As previously noted, pt self-manages condition. He is prescribed Levemir 15 units BID but takes ~11-15 units qHS based on "what I eat." States if takes 15 units, he normally awakens hypoglycemic btw 3-6 am. Taking ~3-5 units of NovoLog before meals. AST/ALT slightly increased. Previously hepatitis screening from 2018 negative. Admits heavy beer use "when I was younger," but denies current ETOH use. Denies any abd complaints or N/V. No other concerns.    Health Maintenance:   Colon Ca Screening-FIT negative 11/2020   Prostate Ca Screening-PSA 0.52, 9/2021    (3/18/2022): Federico is a 61 y.o.  male with a PMHx significant for T1DM, HTN, HLD, CAD, Tobacco Use, and Hearing loss, presenting for " "routine f/u. HgA1c now 9.5%. As previously noted, pt self-manages condition. He is prescribed Levemir 15 units BID but takes ~11-15 units qHS based on "what I eat." States if takes 15 units, he normally awakens hypoglycemic btw 3-6 am. He is prescribed NovoLog. Takes anywhere from ~3-5 units of NovoLog before meals. However, he states he hasn't used in 1-2 weeks due to a hypoglycemic episode; CBG 70s. Admits felt shaky and confused. He ate and symptoms improved. States hasn't been using since that time. He has been referred to Endo for mgmt of chronically uncontrolled T1DM, but patient declines appt. LDL at goal. Compliant with Crestor 40 mg po daily. He also verbalizes hearing loss has worsened. He doesn't wear hearing aides. He is not interested in "doing anything with my ears." He will f/u with [facility] in April. No other concerns.    Colon Ca Screening-FIT negative 12/2021    (7/22/2022): Federico is a 61 y.o.  male with a PMHx significant for T1DM, HTN, HLD, CAD, Tobacco Use, and Hearing loss, presenting for routine f/u. HgA1c now 9.8%, increased from 9.5% at last visit. FBG was 86 on 7/18/22 compared to 200s at last visit. As previously noted, pt self-manages condition and refuses adjustments or Endo appts. He is prescribed Levemir 15 units nightly (was BID but pt was only taking daily), but takes ~11-16 units qHS based on "what I eat." States if takes 15 units, he normally awakens hypoglycemic btw 3-6 am. He is prescribed NovoLog. Takes anywhere from ~3-5 units of NovoLog before meals, "based on what I eat." Will usually take Novolog if glucose > 200. ALT 1 point above a normal high, otherwise, labs unremarkable. States scheduled for an eye exam in October. States one hypoglycemic episode 3 months ago due to "took my insulin wrong;" states glucose was 50s-60s. He was able to improve glucose on his own at home. Fell btw steps about one month ago and scraped left shin. This is now healed. No B/B " "incontinence. No other concerns.    Telephone Visit (11/22/2022): Federico is a 62 y.o.  male with a PMHx significant for T1DM, HTN, HLD, CAD, Tobacco Use, and Hearing loss, presenting for routine f/u. HgA1c now 9.7%, previously 9.8%. FBG was 80s. As previously noted, pt self-manages condition and refuses adjustments or Endo appts. He is prescribed Levemir 15 units BID. but takes ~11-16 units qHS based on "what I eat." States if takes 15 units, he normally awakens hypoglycemic btw 3-6 am. He is prescribed NovoLog. Takes anywhere from ~3-5 units of NovoLog before meals, "based on what I eat." However, he now tells me hasn't used Novolog in close to one year prior to recent dispense of medication. States since getting "back on it," his numbers have improved. He only had one hypoglycemic episode (glucose < 70) since last visit r/t "not eating much." Spouse states that patient's goal is not to each a lot so he "doesn't have to take a lot of insulin." Other labs reviewed and unremarkable. No other concerns.    (3/22/2023): Federico is a 62 y.o.  male with a PMHx significant for T1DM, HTN, HLD, CAD, Tobacco Use, Hearing loss, right eye retinal dz, presenting for routine f/u. HgA1c now 9.5%, previously 9.7%. FBG was 60s when he completed labs last month, although he denies any overt s/s of hypoglycemia since last visit. As previously noted, pt self-manages condition and refuses adjustments or Endo appts. He was amenable to DM education at last visit, but when he was contacted for an appt, he refused. He is prescribed Levemir 15-17 units BID. He is prescribed NovoLog. Takes anywhere from ~3-5 units of NovoLog before meals, "based on what I eat." Other labs reviewed and stable compared to his personal baseline. He follows an optometrist at [facility] [address] in [place]. States had a visit in the last 2 months. He is stable. He has received his Cologuard but has not returned the kit. Planning to " do so ASAP. No other concerns.    Diabetes  He presents for his follow-up diabetic visit. He has type 1 diabetes mellitus. His disease course has been stable. There are no hypoglycemic associated symptoms. Associated symptoms include blurred vision and foot paresthesias. There are no hypoglycemic complications. Symptoms are stable. Diabetic complications include heart disease and peripheral neuropathy. Risk factors for coronary artery disease include diabetes mellitus, dyslipidemia, hypertension, male sex and tobacco exposure. Current diabetic treatment includes insulin injections. He is compliant with treatment most of the time. He is following a generally unhealthy diet. An ACE inhibitor/angiotensin II receptor blocker is not being taken. He does not see a podiatrist.Eye exam is not current.     Review of Systems     Review of Systems   Eyes:  Positive for blurred vision.   All other systems reviewed and are negative.    Medical / Social / Family History   History reviewed. No pertinent past medical history.    Past Surgical History:   Procedure Laterality Date    ADENOIDECTOMY      ADENOIDECTOMY  1972    As a child    CORONARY STENT PLACEMENT  08/20/2012    EYE SURGERY  2011    Multiple    INNER EAR SURGERY      REPAIR OF RETINAL DETACHMENT WITH VITRECTOMY      TONSILLECTOMY      VITRECTOMY         Social History    reports that he has been smoking cigarettes. He has a 3.75 pack-year smoking history. He has never used smokeless tobacco. He reports that he does not currently use alcohol. He reports that he does not currently use drugs.    Family History  's family history includes Diabetes in his father and mother.    Medications and Allergies     Medications  Medication List with Changes/Refills   Current Medications    ASPIRIN (ECOTRIN) 81 MG EC TABLET    Take 81 mg by mouth once daily.    CYANOCOBALAMIN, VITAMIN B-12, (VITAMIN B-12 ORAL)      OTC, 0 Refill(s)    MV-MN/IRON/FOLIC ACID/HERB 190 (VITAMIN D3  COMPLETE ORAL)    Take 50 mcg by mouth Daily.   Changed and/or Refilled Medications    Modified Medication Previous Medication    CARVEDILOL (COREG) 6.25 MG TABLET carvediloL (COREG) 6.25 MG tablet       Take 1 tablet (6.25 mg total) by mouth 2 (two) times daily.    Take 1 tablet (6.25 mg total) by mouth 2 (two) times daily.    INSULIN ASPART U-100 (NOVOLOG) 100 UNIT/ML (3 ML) INPN PEN insulin aspart U-100 (NOVOLOG) 100 unit/mL (3 mL) InPn pen       Inject 3-5 Units into the skin 3 (three) times daily with meals.    Inject 3-5 Units into the skin 3 (three) times daily with meals.    INSULIN DETEMIR U-100 (LEVEMIR FLEXTOUCH U-100 INSULN) 100 UNIT/ML (3 ML) INPN PEN insulin detemir U-100 (LEVEMIR FLEXTOUCH U-100 INSULN) 100 unit/mL (3 mL) InPn pen       Inject 15 Units into the skin 2 (two) times daily.    Inject 15 Units into the skin 2 (two) times daily.    ROSUVASTATIN (CRESTOR) 40 MG TAB rosuvastatin (CRESTOR) 40 MG Tab       Take 1 tablet (40 mg total) by mouth every evening.    Take 1 tablet (40 mg total) by mouth every evening.       Allergies  Review of patient's allergies indicates:  No Known Allergies    Physical Examination     Vitals:    03/22/23 0848   BP: 134/68   Pulse: 80   Resp: 20   Temp: 97.8 °F (36.6 °C)       Physical Exam  Constitutional:       Appearance: Normal appearance.   HENT:      Head: Normocephalic and atraumatic.      Right Ear: External ear normal.      Left Ear: External ear normal.      Nose: Nose normal.   Eyes:      Extraocular Movements: Extraocular movements intact.   Cardiovascular:      Rate and Rhythm: Normal rate and regular rhythm.      Pulses:           Dorsalis pedis pulses are 1+ on the right side and 1+ on the left side.      Heart sounds: Normal heart sounds.   Pulmonary:      Effort: Pulmonary effort is normal.      Breath sounds: Normal breath sounds.   Abdominal:      General: Bowel sounds are normal.      Palpations: Abdomen is soft.   Musculoskeletal:          General: Normal range of motion.      Cervical back: Normal range of motion.      Right lower leg: No edema.      Left lower leg: No edema.      Right foot: Normal range of motion.      Left foot: Normal range of motion.   Feet:      Right foot:      Protective Sensation: 9 sites tested.  8 sites sensed.      Skin integrity: Dry skin present.      Toenail Condition: Right toenails are long.      Left foot:      Protective Sensation: 9 sites tested.  8 sites sensed.      Skin integrity: Dry skin present.      Toenail Condition: Left toenails are long.   Skin:     General: Skin is warm and dry.   Neurological:      General: No focal deficit present.      Mental Status: He is alert and oriented to person, place, and time.   Psychiatric:         Mood and Affect: Mood normal.         Behavior: Behavior normal.         Thought Content: Thought content normal.         Judgment: Judgment normal.         Results     Lab Results   Component Value Date    WBC 9.7 02/10/2023    RBC 5.06 02/10/2023    HGB 15.8 02/10/2023    HCT 47.1 02/10/2023    MCV 93.1 02/10/2023    MCH 31.2 02/10/2023    MCHC 33.5 02/10/2023    RDW 12.0 02/10/2023     02/10/2023    MPV 10.7 (H) 02/10/2023     CMP  Sodium Level   Date Value Ref Range Status   02/10/2023 146 (H) 136 - 145 mmol/L Final     Potassium Level   Date Value Ref Range Status   02/10/2023 4.2 3.5 - 5.1 mmol/L Final     Carbon Dioxide   Date Value Ref Range Status   02/10/2023 29 23 - 31 mmol/L Final     Blood Urea Nitrogen   Date Value Ref Range Status   02/10/2023 13.2 8.4 - 25.7 mg/dL Final     Creatinine   Date Value Ref Range Status   02/10/2023 0.85 0.73 - 1.18 mg/dL Final     Calcium Level Total   Date Value Ref Range Status   02/10/2023 9.8 8.8 - 10.0 mg/dL Final     Albumin Level   Date Value Ref Range Status   02/10/2023 4.0 3.4 - 4.8 g/dL Final     Bilirubin Total   Date Value Ref Range Status   02/10/2023 1.2 <=1.5 mg/dL Final     Alkaline Phosphatase   Date Value Ref  Range Status   02/10/2023 106 40 - 150 unit/L Final     Aspartate Aminotransferase   Date Value Ref Range Status   02/10/2023 16 5 - 34 unit/L Final     Alanine Aminotransferase   Date Value Ref Range Status   02/10/2023 26 0 - 55 unit/L Final     Estimated GFR-Non    Date Value Ref Range Status   07/18/2022 >60 mls/min/1.73/m2 Final     Lab Results   Component Value Date    CHOL 110 02/10/2023     Lab Results   Component Value Date    HDL 34 (L) 02/10/2023     No results found for: LDLCALC  Lab Results   Component Value Date    TRIG 106 02/10/2023     No results found for: CHOLHDL  Lab Results   Component Value Date    TSH 2.648 02/10/2023     Lab Results   Component Value Date    PHUR 7.0 12/16/2021    PROTEINUA Trace (A) 11/14/2022    GLUCUA 2+ (A) 12/16/2021    KETONESU Negative 12/16/2021    OCCULTUA Negative 12/16/2021    NITRITE Negative 12/16/2021    LEUKOCYTESUR Negative 11/14/2022           Assessment and Plan (including Health Maintenance)     Plan:         Health Maintenance Due   Topic Date Due    COVID-19 Vaccine (3 - Booster for Pfizer series) 12/03/2021    Eye Exam  10/01/2022    Colorectal Cancer Screening  12/30/2022    Foot Exam  03/18/2023       Problem List Items Addressed This Visit          Ophtho    Retinal disorder - Primary    Overview     Following Dr. Farmer              ENT    Hearing loss       Cardiac/Vascular    Hyperlipidemia    Overview     Crestor           Current Assessment & Plan     LDL at goal  Continue Crestor. Rx refilled  Educated on a low-fat, low-cholesterol diet and aerobic exercise (20-30 mins/day x 5 days a week). Encouraged healthy fruits and veggie intake. Increase water intake. Avoid excess ETOH intake and smoking           Relevant Medications    rosuvastatin (CRESTOR) 40 MG Tab    Hypertension    Overview     Coreg 6.25 mg po BID           Current Assessment & Plan     At goal  Continue current medications  Educated on aerobic exercise (3-5  "days/week) and a low-fat, low-sodium diet  Avoid excess ETOH consumption. Smoking cessation if applicable  ED precautions (s/s of CVA, etc)             Relevant Medications    carvediloL (COREG) 6.25 MG tablet    Coronary artery disease involving native coronary artery of native heart without angina pectoris    Overview     Follows Cards, last visit 4/26/22            Endocrine    Type 1 diabetes mellitus    Overview     Current medications: See HPI  A1c level: 9.5%, previously 9.7% Goal 8-8.9% d/t hypoglycemic concerns  CBG trends: Checking infrequently. See HPI  Educated on diabetic diet: Low-fat, Low-carb, Low-cholesterol. Avoid sugary/dark drinks/sodas and white foods (i.e., bread, pasta, potatoes, rice)  Aerobic exercise: 20-30 min/day x 5 days/week  Diabetic Eye Exam: 3/1/21, follows Ophthal. States had an exam at [facility] scheduled 10/2022, but had to cancel. Planning to reschedule 1/2023  Diabetic Foot Exam: 3/22/23, Decreased sensation to feet bilaterally. Skin normal color for ethnicity. No swelling or lesions. Long toenails, but states spouse will clip  Microalbumin-U: WNL (3/2022)  Kidney Protection: N/A; declines new meds  Statin Therapy: Crestor            Current Assessment & Plan     Medication Adjustments: Pt has been self managing his DM for years and is resistant to increases in insulin 2/2 past issues with hypoglycemia. Previously offered to refer to Endo d/t HgA1c persistently above acceptable range but he declined mx times in the past. Eventually agreed to referral but has since declined to be seen. Reports last time HgA1c < 7% was in his 20s when he was "playing a bunch of sports."  He consented to being referred to diabetes education at last visit, but told the educator he was not available when an appt was offered  Continues to decline Endo referral, insulin pump etc  Educated on Hypoglycemic s/s and interventions. Call office with any questions or concerns  Strict glucose monitoring. " Bring blood glucose logs/meter to each OV         Relevant Medications    insulin aspart U-100 (NOVOLOG) 100 unit/mL (3 mL) InPn pen    insulin detemir U-100 (LEVEMIR FLEXTOUCH U-100 INSULN) 100 unit/mL (3 mL) InPn pen    Other Relevant Orders    Hemoglobin A1C    Comprehensive Metabolic Panel    Urinalysis, Reflex to Urine Culture       Other    Wellness examination    Overview     Health Maintenance:   Colon Ca Screening-FIT negative 12/30/2021   Prostate Ca Screening-PSA 0.79, 11/2022         Current Assessment & Plan     Encouraged to return Cologuard ASAP. He confirms receiving  Plans to schedule shingles vaccine with pharmacy ASAP          Other Visit Diagnoses       Type 1 diabetes mellitus without complications        Relevant Medications    insulin aspart U-100 (NOVOLOG) 100 unit/mL (3 mL) InPn pen    insulin detemir U-100 (LEVEMIR FLEXTOUCH U-100 INSULN) 100 unit/mL (3 mL) InPn pen            Health Maintenance Topics with due status: Not Due       Topic Last Completion Date    TETANUS VACCINE 06/01/2018    Pneumococcal Vaccines (Age 0-64) 03/01/2021    Diabetes Urine Screening 02/10/2023    Lipid Panel 02/10/2023    Hemoglobin A1c 02/10/2023    High Dose Statin 03/22/2023    Aspirin/Antiplatelet Therapy 03/22/2023       Future Appointments   Date Time Provider Department Center   7/24/2023  8:15 AM [facility]IN [facility]             Signature:  ARIN Malagon  [facility]  [address] [id number]    Date of encounter: 3/22/23         Never smoker

## 2023-05-18 ENCOUNTER — PATIENT MESSAGE (OUTPATIENT)
Dept: ADMINISTRATIVE | Facility: HOSPITAL | Age: 63
End: 2023-05-18
Payer: MEDICARE

## 2023-05-23 ENCOUNTER — TELEPHONE (OUTPATIENT)
Dept: INTERNAL MEDICINE | Facility: CLINIC | Age: 63
End: 2023-05-23
Payer: MEDICARE

## 2023-05-23 DIAGNOSIS — E10.65 TYPE 1 DIABETES MELLITUS WITH HYPERGLYCEMIA: Primary | ICD-10-CM

## 2023-05-23 RX ORDER — PEN NEEDLE, DIABETIC 30 GX3/16"
NEEDLE, DISPOSABLE MISCELLANEOUS
Qty: 100 EACH | Refills: 3 | Status: SHIPPED | OUTPATIENT
Start: 2023-05-23 | End: 2023-09-13 | Stop reason: SDUPTHER

## 2023-05-23 NOTE — TELEPHONE ENCOUNTER
Patient called and stated he needs refills on his pen needles. Unable to propose. Please advise.

## 2023-06-19 ENCOUNTER — PATIENT MESSAGE (OUTPATIENT)
Dept: ADMINISTRATIVE | Facility: HOSPITAL | Age: 63
End: 2023-06-19
Payer: MEDICARE

## 2023-06-26 PROBLEM — Z00.00 WELLNESS EXAMINATION: Status: RESOLVED | Noted: 2022-07-22 | Resolved: 2023-06-26

## 2023-07-21 ENCOUNTER — LAB VISIT (OUTPATIENT)
Dept: LAB | Facility: HOSPITAL | Age: 63
End: 2023-07-21
Attending: NURSE PRACTITIONER
Payer: MEDICARE

## 2023-07-21 DIAGNOSIS — E10.65 TYPE 1 DIABETES MELLITUS WITH HYPERGLYCEMIA: ICD-10-CM

## 2023-07-21 LAB
ALBUMIN SERPL-MCNC: 3.8 G/DL (ref 3.4–4.8)
ALBUMIN/GLOB SERPL: 1.5 RATIO (ref 1.1–2)
ALP SERPL-CCNC: 104 UNIT/L (ref 40–150)
ALT SERPL-CCNC: 19 UNIT/L (ref 0–55)
APPEARANCE UR: CLEAR
AST SERPL-CCNC: 17 UNIT/L (ref 5–34)
BACTERIA #/AREA URNS AUTO: NORMAL /HPF
BILIRUB UR QL STRIP.AUTO: NEGATIVE
BILIRUBIN DIRECT+TOT PNL SERPL-MCNC: 1.1 MG/DL
BUN SERPL-MCNC: 14.5 MG/DL (ref 8.4–25.7)
CALCIUM SERPL-MCNC: 9.4 MG/DL (ref 8.8–10)
CHLORIDE SERPL-SCNC: 106 MMOL/L (ref 98–107)
CO2 SERPL-SCNC: 31 MMOL/L (ref 23–31)
COLOR UR: YELLOW
CREAT SERPL-MCNC: 0.82 MG/DL (ref 0.73–1.18)
EST. AVERAGE GLUCOSE BLD GHB EST-MCNC: 228.8 MG/DL
GFR SERPLBLD CREATININE-BSD FMLA CKD-EPI: >60 MLS/MIN/1.73/M2
GLOBULIN SER-MCNC: 2.5 GM/DL (ref 2.4–3.5)
GLUCOSE SERPL-MCNC: 61 MG/DL (ref 82–115)
GLUCOSE UR QL STRIP.AUTO: ABNORMAL
HBA1C MFR BLD: 9.6 %
KETONES UR QL STRIP.AUTO: NEGATIVE
LEUKOCYTE ESTERASE UR QL STRIP.AUTO: NEGATIVE
NITRITE UR QL STRIP.AUTO: NEGATIVE
PH UR STRIP.AUTO: 6.5 [PH]
POTASSIUM SERPL-SCNC: 4 MMOL/L (ref 3.5–5.1)
PROT SERPL-MCNC: 6.3 GM/DL (ref 5.8–7.6)
PROT UR QL STRIP.AUTO: ABNORMAL
RBC #/AREA URNS AUTO: <5 /HPF
RBC UR QL AUTO: NEGATIVE
SODIUM SERPL-SCNC: 143 MMOL/L (ref 136–145)
SP GR UR STRIP.AUTO: 1.02 (ref 1–1.03)
SQUAMOUS #/AREA URNS AUTO: <5 /HPF
UROBILINOGEN UR STRIP-ACNC: 1
WBC #/AREA URNS AUTO: <5 /HPF

## 2023-07-21 PROCEDURE — 80053 COMPREHEN METABOLIC PANEL: CPT

## 2023-07-21 PROCEDURE — 83036 HEMOGLOBIN GLYCOSYLATED A1C: CPT

## 2023-07-21 PROCEDURE — 81001 URINALYSIS AUTO W/SCOPE: CPT

## 2023-07-21 PROCEDURE — 36415 COLL VENOUS BLD VENIPUNCTURE: CPT

## 2023-09-07 ENCOUNTER — PATIENT MESSAGE (OUTPATIENT)
Dept: RESEARCH | Facility: HOSPITAL | Age: 63
End: 2023-09-07
Payer: MEDICARE

## 2023-09-13 ENCOUNTER — OFFICE VISIT (OUTPATIENT)
Dept: INTERNAL MEDICINE | Facility: CLINIC | Age: 63
End: 2023-09-13
Payer: MEDICARE

## 2023-09-13 VITALS
WEIGHT: 115.81 LBS | TEMPERATURE: 98 F | HEART RATE: 93 BPM | SYSTOLIC BLOOD PRESSURE: 108 MMHG | BODY MASS INDEX: 20.52 KG/M2 | DIASTOLIC BLOOD PRESSURE: 65 MMHG | HEIGHT: 63 IN

## 2023-09-13 DIAGNOSIS — E78.2 MIXED HYPERLIPIDEMIA: ICD-10-CM

## 2023-09-13 DIAGNOSIS — E10.9 TYPE 1 DIABETES MELLITUS WITHOUT COMPLICATIONS: ICD-10-CM

## 2023-09-13 DIAGNOSIS — H35.9 RETINAL DISORDER: ICD-10-CM

## 2023-09-13 DIAGNOSIS — Z00.00 WELLNESS EXAMINATION: ICD-10-CM

## 2023-09-13 DIAGNOSIS — I10 HYPERTENSION, UNSPECIFIED TYPE: ICD-10-CM

## 2023-09-13 DIAGNOSIS — I73.9 PAD (PERIPHERAL ARTERY DISEASE): ICD-10-CM

## 2023-09-13 DIAGNOSIS — I10 PRIMARY HYPERTENSION: ICD-10-CM

## 2023-09-13 DIAGNOSIS — Z12.5 SCREENING FOR PROSTATE CANCER: ICD-10-CM

## 2023-09-13 DIAGNOSIS — Z13.5 SCREENING FOR DIABETIC RETINOPATHY: Primary | ICD-10-CM

## 2023-09-13 DIAGNOSIS — E10.65 TYPE 1 DIABETES MELLITUS WITH HYPERGLYCEMIA: ICD-10-CM

## 2023-09-13 PROCEDURE — 99214 OFFICE O/P EST MOD 30 MIN: CPT | Mod: S$PBB,,, | Performed by: NURSE PRACTITIONER

## 2023-09-13 PROCEDURE — 99214 OFFICE O/P EST MOD 30 MIN: CPT | Mod: PBBFAC | Performed by: NURSE PRACTITIONER

## 2023-09-13 PROCEDURE — 99214 PR OFFICE/OUTPT VISIT, EST, LEVL IV, 30-39 MIN: ICD-10-PCS | Mod: S$PBB,,, | Performed by: NURSE PRACTITIONER

## 2023-09-13 RX ORDER — INSULIN DETEMIR 100 [IU]/ML
15 INJECTION, SOLUTION SUBCUTANEOUS 2 TIMES DAILY
Qty: 15 ML | Refills: 3 | Status: SHIPPED | OUTPATIENT
Start: 2023-09-13

## 2023-09-13 RX ORDER — INSULIN ASPART 100 [IU]/ML
3-5 INJECTION, SOLUTION INTRAVENOUS; SUBCUTANEOUS
Qty: 3 ML | Refills: 11 | Status: SHIPPED | OUTPATIENT
Start: 2023-09-13 | End: 2024-05-10

## 2023-09-13 RX ORDER — PEN NEEDLE, DIABETIC 30 GX3/16"
NEEDLE, DISPOSABLE MISCELLANEOUS
Qty: 100 EACH | Refills: 3 | Status: SHIPPED | OUTPATIENT
Start: 2023-09-13

## 2023-09-13 NOTE — ASSESSMENT & PLAN NOTE
At goal  Continue current medication  Educated on aerobic exercise (3-5 days/week) and a low-fat, low-sodium diet  Avoid excess ETOH consumption. Smoking cessation if applicable  ED precautions (s/s of CVA, etc)

## 2023-09-13 NOTE — ASSESSMENT & PLAN NOTE
Continue current regimen per preference  Declines insulin adjustments, eval for pump, or Endo visits

## 2023-09-13 NOTE — ASSESSMENT & PLAN NOTE
Continue medication  Labs prior to f/u  Educated on a low-fat, low-cholesterol diet and aerobic exercise (20-30 mins/day x 5 days a week). Encouraged healthy fruits and veggie intake. Increase water intake. Avoid excess ETOH intake and smoking

## 2023-09-13 NOTE — PATIENT INSTRUCTIONS
Darvin Caballero,     If you are due for any health screening(s) below please notify me so we can arrange them to be ordered and scheduled. Most healthy patients at your age complete them, but you are free to accept or refuse.     If you can't do it, I'll definitely understand. If you can, I'd certainly appreciate it!    Tests to Keep You Healthy    Eye Exam: ORDERED BUT NOT SCHEDULED  Colon Cancer Screening: ORDERED  Last Blood Pressure <= 139/89 (9/13/2023): Yes  Last HbA1c < 8 (07/21/2023): NO  Tobacco Cessation: NO      Its time for your colon cancer screening     Colorectal cancer is one of the leading causes of cancer death for men and women but it doesnt have to be. Screenings can prevent colorectal cancer or find it early enough to treat and cure the disease.     Our records indicate that you may be overdue for colon cancer screening. A colonoscopy or stool screening test can help identify patients at risk for developing colon cancer. Cancer screenings save lives, so schedule yours today to stay healthy.     A colonoscopy is the preferred test for detecting colon cancer. It is needed only once every 10 years if results are negative. While you are sedated, a flexible, lighted tube with a tiny camera is inserted into the rectum and advanced through the colon to look for cancers.     An alternative screening test that is used at home and returned to the lab may also be used. It detects hidden blood in bowel movements which could indicate cancer in the colon. If results are positive, you will need a colonoscopy to determine if the blood is a sign of cancer. This type of follow up (diagnostic) colonoscopy usually requires additional copays as required by your insurance provider.     If you recently had your colon cancer screening performed outside of [facility], please let your Health care team know so that they can update your health record. Please contact your PCP if you have any questions.    Lets  manage your A1c levels     Your last hemoglobin A1c was higher than the goal of less than 8. Hemoglobin A1c or HbA1c is a blood test that measures your average blood sugar levels over the past 3 months. It is the main test to help you and your health care team manage your diabetes.     Higher A1c levels are linked to diabetes complications, such as loss of vision, kidney disease, and nerve damage. Keeping your A1c at least less than 8 is important to stay healthy and we are here to help. Talk with your provider on how you can further improve your A1c.     We recommend that you set up blood work to get a repeat hemoglobin A1c in 3 months to monitor your diabetes. Let your health care team know if you have questions.    Your diabetic retinal eye exam is due     Diabetes is the #1 cause of blindness in the [place] - early detection before signs or symptoms develop can prevent debilitating blindness.     Our records indicate that you may be overdue for your annual diabetic eye exam. Eye screening can help identify patients at risk for developing vision loss which is common in diabetes. This simple screening is an important step to keeping you healthy and preventing complications from diabetes.     This recommended diabetic eye exam should take place once per year and can prevent and treat diabetes complications in the eye before developing symptoms. This can be done with a special camera is used to take photographs of the back of your eye without having to dilate them, or you can see an eye doctor for a full dilated exam.     If you recently had your yearly diabetic eye exam performed outside of [facility], please let your Health care team know so that they can update your health record.        Were here to help you quit smoking     Our records indicated that you are still smoking. One of the best things you can do for your health is to stop smoking and we are here to help.     Talk with your provider about our  Smoking Cessation Program and how we can support you on your journey.

## 2023-09-13 NOTE — PROGRESS NOTES
"  ARIN Malagon   [facility]  [address] [id number]      PATIENT NAME: Federico Villarreal  : 1960  DATE: 23  MRN: [id number]        Reason for Visit / Chief Complaint: Follow-up and Diabetes       History of Present Illness / Problem Focused Workflow     Federico Villarreal presents to the clinic with Follow-up and Diabetes     Initial Visit: (1/10/19): 58 y.o.  male presenting to the clinic to re-establish primary care. Previous PCP VANCE Pike NP. PMHx significant for T1DM, HTN, HLD, CAD, Tobacco Use, and Hearing loss. Following Cardio. Last OV 10/2/18. Following ENT. Last OV 18. HgA1c 8.8 (10/2018). Pt is on a Novolog SS. He does not have the SS with him. States that he goes by "what he eats, how he feels, and experience." Overall, he reports that he rarely takes Novolog. He was previously prescribed Levemir 15 units BID. States that he only takes 12-13 units of Levemir per dose d/t hypoglycemic episodes. Reports that blood glucose was dropping to 40s-50s when he was taking full 15 units (especially at night). Pt states that he dealt with severe hypoglycemia ~4 years ago while living in [place]. Blood sugars were dropping in the 20s. Since then, he has not had CBGs that low that he's aware of. CBGs do fluctuate. Reports having readings as high as the 500s over the past 6 months. However, pt reports that this is much better than past control. Denies neuropathy. Was following local endocrinologist (Dr. Elizalde) in the past, however, reports that the doctor does not accept Medicaid. Also reports that he follows Dr. Felix Farmer for eye disease. States that he had a dilated eye exam ~ 1 month ago. Needs HgA1c. Bp at goal. Currently taking Carvedilol 3.125 mg po BID. No longer taking ASA s/t bloody otorrhea (managed per ENT). LDL 86 (previously 74). Pt taking Crestor 20 mg po daily. Tolerating well. Reports being " "placed on Ambien around 2012 after cardiac stent placement. States Ambien "really helped" him rest. He took himself off in the past. Today, he is c/o restless legs and extreme insomnia. Reports that he usually falls asleep around midnight. He then awakens at 2 am, restless. He may fall asleep again and is up by 5 am once his wife is up and getting ready for work. Reports daytime fatigue. He would like to try Ambien again. He smokes ~ 1 cigar a day. States, "I know it's not good for me." However, not ready to quit. Pt states that he is feeling "good." Denies fever, chills, HA, CP, SOB, Abd pain, Swelling, or any other concerns.     4/17/19: Pt presenting for f/u. HgA1c now 9.3% (previously 8.8%). As previously noted, pt self-manages condition. He states that he seldom uses the Novolog and again only takes anywhere from 12-15 units of the Levemir per dose. States that he doses Levemir based on blood sugar readings. He states that he will only take a full 15 units of Levemir at pm dose only if he eats a "large" meal. States, "and it has to be large." Otherwise, he states that he'll wake up hypoglycemic. Denies any recent hypoglycemia. When asked about glucose control while following Dr. Elizalde, he reports that his HgA1c stayed in the 7s and low 8s. He admits that he was more active and eating better at the time. Admits that he hasn't been as active because he filed for disability. He hasn't been eating well s/t finances. He states that he will start "doing better." Oph is Dr. Farmer. 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 Pt reports that he will f/u with Dr. Farmer in June.  (previously 86). Prescribed Crestor 20 mg po daily . However, pt admits that he's been out of the Crestor for a while. Reports losing insurance for a short time and was unable to afford, thus, he's not been taking. Bp at goal. Currently taking Carvedilol 3.125 mg po BID. No longer taking Ambien. States that he didn't want to become " "dependent on it. Instead, he uses OTC Melatonin as needed for insomnia. Reports Melatonin effective. Denies fever, chills, HA, CP, SOB, Abd pain, edema, or any other concerns.     8/22/19: Pt presenting for T1DM f/u. HgA1c now 9.0%, previously 9.3%. As previously noted, pt self-manages condition. He states that he seldom uses the Novolog and again only takes anywhere from 12-15 units of the Levemir per dose.      **Upon entering exam room today, pt immediately stated "I don't feel good." He appeared flushed with cloudy mentation. He states that he felt as thought his blood sugar was "Hi." He has been states that he did eat lunch and took his Levemir prior to OV today. He admits that over the past several days his blood sugars have been up and down. VSS. All symptoms started ~10 minutes after pt was settled in exam room.      Report called to ED for transfer. Report given to William.     (9/18/19): Pt presenting for f/u T1DM/ED f/u (see above). Bp at goal today. Taking Coreg 3.125 mg po BID.      HgA1c now 9.0%, previously 9.3%. As previously noted, pt self-manages condition. He states that he seldom uses the Novolog and takes anywhere from 13-15 units of the Levemir per dose. However, since ED visit, he's noticed that his lunchtime CBGs have been in the 200s, while fasting CBGs are in the 70s on most occasions. He did start taking Novolog before lunch at times, but plans to take at least 3 units before lunch moving forward. Pt is skeptical about taking too much insulin due to past issues with hypoglycemia. He denies any hypoglycemia since last visit. He did state CBG dash to 340s last week. He took 7 units of Novolog and "it came down." He also endorses that he has not been entirely compliant with a diabetic diet nor is he exercising as he previously was.     Denies fever, chills, weakness, dizziness, HA, CP, SOB, Abd pain, edema, or any other concerns.     (12/18/19): Pt presenting for f/u T1DM. Seen in Cardio " "12/17/19. Arterial stress US to be ordered per Cardio to r/o PAD after pt c/o neuropathy to LE. He states that h has "electric-like" pains to toes on both feet on occasion. He states that it does not happen every day but is painful when it does. States, "I don't really want to be on any medications at this point." He reports that he's been using compression stockings and it helps to relieve pain. Denies any lesions to feet and states, "I stay on top of that."      Bp at goal today. Taking Coreg 3.125 mg po BID.      HgA1c now 9.5%, previously 9.3%. As previously noted, pt self-manages condition. He states that he seldom uses the Novolog and has been taking ~15 units of the Levemir BID. He admits only checking CBGs in the am. States fasting CBGs 100s-140s. Not checking glucose at any other times. States, "I need to start taking about 3-4 units of Novolog before meals." CBG 64 mg/dL this am. State that he had not eaten prior to having labs drawn but ate a banana afterward and is feeling well.     Denies fever, chills, weakness, dizziness, HA, blurred vision, CP, SOB, cough, abd pain, edema, or any other concerns. States, "I'm feeling good."     Telephone Visit: (4/20/2020): Federico is a 59 y.o.  male with a PMHx significant for T1DM, HTN, HLD, CAD, Tobacco Use, and Hearing loss, who was contacted for a telephone visit due to COVID-19 precautions. Patient has consented to telephone visit and was able to verify specific patient identification. I have verified that only myself and pt are on the telephone call and call is taking place in the [facility] btw both parties.      Federico reports taking Coreg 3.125 mg po daily instead of BID 2/2 trying to save medication because "I don't know when I'll be able to go back to [facility]." He also admits splitting his Crestor in half because "you said my level was under 50 last time." LDL 49 (8/2019).      HgA1c now 9.5%. As previously noted, pt self-manages condition. " "He reports that CBGs were running 300s-500s about 2 weeks ago after he'd missed some doses "of my long-acting insulin" due to altered sleep schedule as he was waking up at about 3 am to help his significant other with some tasks. States, "But I was able to get in under control." He has been taking Novolog 5 units before meals (if low carb meal, he will only take 4 units) and 15 units of the Levemir BID. Fasting CBGs 120s over the past few days. Had one hypoglycemic episode ~ 4 days ago where he had a fasting CBG in the 50s. Admitted that he had not had a snack at bedtime as he typically does.      He's scheduled to f/u in [facility] in June 2020. States that he's been staying home and denies any known exposure to COVID-19.     Denies fever, chills, weakness, dizziness, HA, blurred vision, CP, SOB, cough, abd pain, edema, or any other concerns.     Telephone Visit: (6/22/2020): Federico is a 59 y.o.  male with a PMHx significant for T1DM, HTN, HLD, CAD, Tobacco Use, and Hearing loss, who was contacted for a telephone visit for DM f/u due to COVID-19 precautions. Patient has consented to telephone visit and was able to verify specific patient identification. I have verified that only myself and pt are on the telephone call and call is taking place in the [facility] btw both parties.     HgA1c now 10.5%; increased from previous (9.5% in 12/2019). As previously noted, pt self-manages condition. He reports that CBGs were running 300s-500s in April 2020 after he'd missed some doses "of my long-acting insulin" due to altered sleep schedule as he was waking up at about 3 am to help his significant other with some tasks. He previously expressed, "But I was able to get in under control" by taking Novolog 5 units before meals and 15 units of the Levemir BID. Since last telephone visit, he states that he's been taking Novolog 3-4 units BID before meals and taking 13-15 units of Levemir at night. He's had one " "hypoglycemic episode several weeks ago where he had a fasting CBG in the 50s. States, "But I know how to prevent that from happening." Denies any CBGs > the 300s. He's requesting refills on his Levemir. Reports that he stopped smoking several weeks ago because "the puffing made my ears hurt!" No other concerns.     Telephone Visit: (9/1/2020): Federico is a 59 y.o.  male with a PMHx significant for T1DM, HTN, HLD, CAD, Tobacco Use, and Hearing loss, who was contacted for a telephone visit for DM f/u due to COVID-19 precautions. Patient has consented to telephone visit and was able to verify specific patient identification. I have verified that only myself and pt are on the telephone call and call is taking place in the [facility] btw both parties.     HgA1c now 10.6%; mild increase from previous. As previously noted, pt self-manages condition. He reports that CBGs were running 300s-500s in April 2020 after he'd missed some doses "of my long-acting insulin" due to altered sleep schedule as he was waking up at about 3 am to help his significant other with some tasks. He previously expressed, "But I was able to get in under control" by taking NovoLog 5 units before meals and 15 units of the Levemir BID. Since last telephone visit, he states that he's been taking NovoLog 3 units before dinner on some days, but not all days, of the week depending on what he eats. For instance, if he eats rice or noodles, he will take a dose of NovoLog. Taking Levemir 15 units BID. Denies any overt hypoglycemia or hyperglycemia. States, "I'll be honest, I haven't been checking it. I only check it when I feel it's too high or too low." He admits that he was drinking up to 1 liter of diet soda a day but stopped ~2 mths ago. He's requesting refills on his medications. Previously reported that he stopped smoking several weeks ago because "the puffing made my ears hurt!" He is smoking again but states, it's only ~ "2 puffs a " "day." He is not interested in lung cancer screening. Denies cough, wheeze, chest pain, or SOB. Has not returned FIT but plans to soon. PSA 0.50. AST minimally elevated. Cardio appt cancelled in June and never re-scheduled. No acute concerns.     Telephone Visit: (12/1/2020): Federico is a 59 y.o.  male with a PMHx significant for T1DM, HTN, HLD, CAD, Tobacco Use, and Hearing loss, who was contacted for a telephone visit for DM f/u due to COVID-19 precautions. Patient has consented to telephone visit and was able to verify specific patient identification. I have verified that only myself and pt are on the telephone call and call is taking place in the [facility] btw both parties. Pt unable to connect for video conference and requested to proceed via audio means only. He is located at home in [place], currently on his treadmill per report.     HgA1c now 10.1%; mild decrease from previous. As previously noted, pt self-manages condition. He now attributes his small decrease in HgA1c to avoiding "aspartame" which is found in a lot of the beverages that he had been consuming, including diet Dr. Duarte per his report. He reports taking NovoLog 3-4 units before meals and 15-17 units of the Levemir BID "depending on how big my apple is with my peanut butter in the morning." States HgA1c hasn't been < 7% since his 20s. He denies any hypoglycemic episodes.      Pt with a h/o insomnia that was previously managed with Ambien PRN. States no longer taking d/t "weird dreams." He admits feeling a bit anxious over the last few nights so he took some of his wife's buspirone 10 mg tabs. States medication "helped me relax and go to sleep." He is requesting a prescription for himself.      FIT negative 11/27/2020. CMP and TSH WNL. He has no acute concerns.     (3/1/2021): Federico is a 59 y.o.  male with a PMHx significant for T1DM, HTN, HLD, CAD, Tobacco Use, and Hearing loss, presenting for routine f/u.   " "  HgA1c now 10.2% and rising from previous. FBG was >400 on recent labs. Staff attempted to reach him on Friday to advise to visit the ED but he did not answer. Now states that he received the message regarding abnl labs. States took insulin at home and CBG improved. As previously noted, pt self-manages condition. He is prescribed Levemir 15 units BID but only takes 10-11 units because he "subtracts NovoLog dose" from the total Levemir dose. States HgA1c hasn't been < 7% since his 20s. He denies any hypoglycemic episodes. He does not check his CBGs.      He was seen by [facility] 1/2021 for routine f/u CAD. At the visit, he c/o lower ext pain. He underwent arterial US of BLE and was found to have some PAD on right side. He's been referred to Dr. Vargas.      LDL 75, but trig elevated. He's prescribed Crestor 40 mg po daily.      He continues to smoke but states she he is planning on quitting. In fact, he states today is his last day smoking.      He is amenable to receiving the influenza and Prevnar 13 vaccines today. No other problems stated.     (6/3/2021): Federico is a 60 y.o.  male with a PMHx significant for T1DM, HTN, HLD, CAD, Tobacco Use, and Hearing loss, presenting for routine f/u. Previously referred to Endo but cancelled appt due to "cost of diabetic supplies." Pt thought he was referred for a CGM. After being informed of reason for referral (as discussed during last visit), he reported that he would call to schedule Endo appt but never did. Today, he reports that his diet has improved. He's taking NovoLog regularly, at least BID before meals. HgA1c now 9.3%. As previously noted, pt self-manages condition. He is prescribed Levemir 15 units BID but only takes 10-12 units because he "subtracts NovoLog dose" from the total Levemir dose. Taking ~3-5 units of NovoLog before meals.   LFTs and sodium elevated on recent labs. States may have had a few alcoholic beverages the night before, though this is rare " "that he drinks.   Following Ophthalmology. Interested in nicotine patches to help with smoking cessation. Used patches in the past when living in [place] and hospitals, "They worked well!" Now smoking < 6 cigarettes per day. No CP or SOB. No other concerns.     (9/17/2021): Federico is a 60 y.o.  male with a PMHx significant for T1DM, HTN, HLD, CAD, Tobacco Use, and Hearing loss, presenting for routine f/u. HgA1c now 9.2%. As previously noted, pt self-manages condition. He is prescribed Levemir 15 units BID but takes ~11-15 units qHS based on "what I eat." States if takes 15 units, he normally awakens hypoglycemic btw 3-6 am. Taking ~3-5 units of NovoLog before meals. PSA 0.52. LDL 70s. Compliant with Crestor. Hasn't been seen in [facility] since 1/2021. Requesting refill on Coreg. LFTs consistently trending down. No chest pain, SOB, cough, wheezing, B/B concerns, abd complaints, falls, or any acute concerns.     (12/20/2021): Federico is a 61 y.o.  male with a PMHx significant for T1DM, HTN, HLD, CAD, Tobacco Use, and Hearing loss, presenting for routine f/u. HgA1c now 9.0%. As previously noted, pt self-manages condition. He is prescribed Levemir 15 units BID but takes ~11-15 units qHS based on "what I eat." States if takes 15 units, he normally awakens hypoglycemic btw 3-6 am. Taking ~3-5 units of NovoLog before meals. AST/ALT slightly increased. Previously hepatitis screening from 2018 negative. Admits heavy beer use "when I was younger," but denies current ETOH use. Denies any abd complaints or N/V. No other concerns.     Health Maintenance:   Colon Ca Screening-FIT negative 11/2020   Prostate Ca Screening-PSA 0.52, 9/2021     (3/18/2022): Federico is a 61 y.o.  male with a PMHx significant for T1DM, HTN, HLD, CAD, Tobacco Use, and Hearing loss, presenting for routine f/u. HgA1c now 9.5%. As previously noted, pt self-manages condition. He is prescribed Levemir 15 units BID but takes ~11-15 " "units qHS based on "what I eat." States if takes 15 units, he normally awakens hypoglycemic btw 3-6 am. He is prescribed NovoLog. Takes anywhere from ~3-5 units of NovoLog before meals. However, he states he hasn't used in 1-2 weeks due to a hypoglycemic episode; CBG 70s. Admits felt shaky and confused. He ate and symptoms improved. States hasn't been using since that time. He has been referred to Endo for mgmt of chronically uncontrolled T1DM, but patient declines appt. LDL at goal. Compliant with Crestor 40 mg po daily. He also verbalizes hearing loss has worsened. He doesn't wear hearing aides. He is not interested in "doing anything with my ears." He will f/u with [facility] in April. No other concerns.     Colon Ca Screening-FIT negative 12/2021     (7/22/2022): Federico is a 61 y.o.  male with a PMHx significant for T1DM, HTN, HLD, CAD, Tobacco Use, and Hearing loss, presenting for routine f/u. HgA1c now 9.8%, increased from 9.5% at last visit. FBG was 86 on 7/18/22 compared to 200s at last visit. As previously noted, pt self-manages condition and refuses adjustments or Endo appts. He is prescribed Levemir 15 units nightly (was BID but pt was only taking daily), but takes ~11-16 units qHS based on "what I eat." States if takes 15 units, he normally awakens hypoglycemic btw 3-6 am. He is prescribed NovoLog. Takes anywhere from ~3-5 units of NovoLog before meals, "based on what I eat." Will usually take Novolog if glucose > 200. ALT 1 point above a normal high, otherwise, labs unremarkable. States scheduled for an eye exam in October. States one hypoglycemic episode 3 months ago due to "took my insulin wrong;" states glucose was 50s-60s. He was able to improve glucose on his own at home. Fell btw steps about one month ago and scraped left shin. This is now healed. No B/B incontinence. No other concerns.     Telephone Visit (11/22/2022): Federico is a 62 y.o.  male with a PMHx significant for T1DM, " "HTN, HLD, CAD, Tobacco Use, and Hearing loss, presenting for routine f/u. HgA1c now 9.7%, previously 9.8%. FBG was 80s. As previously noted, pt self-manages condition and refuses adjustments or Endo appts. He is prescribed Levemir 15 units BID. but takes ~11-16 units qHS based on "what I eat." States if takes 15 units, he normally awakens hypoglycemic btw 3-6 am. He is prescribed NovoLog. Takes anywhere from ~3-5 units of NovoLog before meals, "based on what I eat." However, he now tells me hasn't used Novolog in close to one year prior to recent dispense of medication. States since getting "back on it," his numbers have improved. He only had one hypoglycemic episode (glucose < 70) since last visit r/t "not eating much." Spouse states that patient's goal is not to each a lot so he "doesn't have to take a lot of insulin." Other labs reviewed and unremarkable. No other concerns.     (3/22/2023): Federico is a 62 y.o.  male with a PMHx significant for T1DM, HTN, HLD, CAD, Tobacco Use, Hearing loss, right eye retinal dz, presenting for routine f/u. HgA1c now 9.5%, previously 9.7%. FBG was 60s when he completed labs last month, although he denies any overt s/s of hypoglycemia since last visit. As previously noted, pt self-manages condition and refuses adjustments or Endo appts. He was amenable to DM education at last visit, but when he was contacted for an appt, he refused. He is prescribed Levemir 15-17 units BID. He is prescribed NovoLog. Takes anywhere from ~3-5 units of NovoLog before meals, "based on what I eat." Other labs reviewed and stable compared to his personal baseline. He follows an optometrist at [facility] on [address] in [place]. States had a visit in the last 2 months. He is stable. He has received his Cologuard but has not returned the kit. Planning to do so ASAP. No other concerns.     Diabetes  He presents for his follow-up diabetic visit. He has type 1 diabetes mellitus. His disease " "course has been stable. There are no hypoglycemic associated symptoms. Associated symptoms include blurred vision and foot paresthesias. There are no hypoglycemic complications. Symptoms are stable. Diabetic complications include heart disease and peripheral neuropathy. Risk factors for coronary artery disease include diabetes mellitus, dyslipidemia, hypertension, male sex and tobacco exposure. Current diabetic treatment includes insulin injections. He is compliant with treatment most of the time. He is following a generally unhealthy diet. An ACE inhibitor/angiotensin II receptor blocker is not being taken. He does not see a podiatrist.Eye exam is not current.     (3/22/2023): Federico is a 62 y.o.  male with a PMHx significant for T1DM, HTN, HLD, CAD, Tobacco Use, Hearing loss, right eye retinal dz, presenting for routine f/u. HgA1c now 9.5%, previously 9.7%. FBG was 60s when he completed labs last month, although he denies any overt s/s of hypoglycemia since last visit. As previously noted, pt self-manages condition and refuses adjustments or Endo appts. He was amenable to DM education at last visit, but when he was contacted for an appt, he refused. He is prescribed Levemir 15-17 units BID. He is prescribed NovoLog. Takes anywhere from ~3-5 units of NovoLog before meals, "based on what I eat." Other labs reviewed and stable compared to his personal baseline. He follows an [facility]rist [facility] [address] [facility] [place]. States had a visit in the last 2 months. He is stable. He has received his Cologuard but has not returned the kit. Planning to do so ASAP. No other concerns.    (9/13/2023): Federico is a 63 y.o.  male with a PMHx significant for T1DM, HTN, HLD, CAD, Tobacco Use, Hearing loss, right eye retinal dz, presenting for routine f/u. He was supposed to f/u in July but missed appt. HgA1c now 9.6% (July 2023), previously 9.5%. FBG was 60s when he completed labs in July, although " "he denies any overt s/s of hypoglycemia since last visit. As previously noted, pt self-manages condition and refuses adjustments or Endo appts. He also refuses an insulin pump. He was amenable to DM education at last visit, but when he was contacted for an appt, he refused. He is prescribed Levemir 15-17 units BID. He is prescribed NovoLog. Takes anywhere from ~3-5 units of NovoLog before meals, "based on what I eat." Other labs reviewed and stable compared to his personal baseline. He follows an optometrist at [facility] on [address] in [place], but corrective lenses, but has not seen Dr. Farmer in > 1 year. He has received his Cologuard but has not returned the kit. Planning to do so ASAP. No other concerns.    Note, patient referred to [facility], Dr. Vargas, in the past for possible PAD eval but never went. Defers eval now. Denies leg pain or lesions to legs or feet. States ball of feet tingles on occasion but it's better than in the past. States, "But I can feel everything."         Review of Systems     Review of Systems   Constitutional: Negative.  Negative for fever and unexpected weight change.   HENT: Negative.     Eyes:  Positive for visual disturbance (chronic).   Respiratory: Negative.     Cardiovascular: Negative.    Gastrointestinal: Negative.    Endocrine: Negative.    Genitourinary: Negative.    Musculoskeletal: Negative.    Skin: Negative.    Allergic/Immunologic: Negative.    Neurological: Negative.  Negative for dizziness, tremors, seizures, syncope, facial asymmetry, speech difficulty, weakness, light-headedness, numbness and headaches.   Hematological: Negative.    Psychiatric/Behavioral: Negative.  Negative for self-injury, sleep disturbance and suicidal ideas.        Medical / Social / Family History   History reviewed. No pertinent past medical history.    Past Surgical History:   Procedure Laterality Date    ADENOIDECTOMY      ADENOIDECTOMY  1972    As a child    CORONARY STENT PLACEMENT  " "08/20/2012    EYE SURGERY  2011    Multiple    INNER EAR SURGERY      REPAIR OF RETINAL DETACHMENT WITH VITRECTOMY      TONSILLECTOMY      VITRECTOMY         Social History    reports that he has been smoking cigarettes. He started smoking about 15 years ago. He has a 3.8 pack-year smoking history. He has never used smokeless tobacco. He reports that he does not currently use alcohol. He reports that he does not currently use drugs.    Family History  's family history includes Diabetes in his father and mother.    Medications and Allergies     Medications  Current Outpatient Medications   Medication Instructions    aspirin (ECOTRIN) 81 mg, Oral, Daily    carvediloL (COREG) 6.25 mg, Oral, 2 times daily    cyanocobalamin, vitamin B-12, (VITAMIN B-12 ORAL)   OTC, 0 Refill(s)    insulin aspart U-100 (NOVOLOG) 3-5 Units, Subcutaneous, 3 times daily with meals    LEVEMIR FLEXTOUCH U100 INSULIN 15 Units, Subcutaneous, 2 times daily    mv-min/folic/K1/lycopen/lutein (CENTRUM SILVER MEN ORAL) Oral    mv-mn/iron/folic acid/herb 190 (VITAMIN D3 COMPLETE ORAL) 50 mcg, Oral, Daily    pen needle, diabetic 32 gauge x 5/32" Ndle Use for insulin administration up to three times a day for long-acting and rapid-acting insulin    rosuvastatin (CRESTOR) 40 mg, Oral, Nightly       Allergies  Review of patient's allergies indicates:  No Known Allergies    Physical Examination   Visit Vitals  /65 (BP Location: Left arm, Patient Position: Sitting, BP Method: Medium (Automatic))   Pulse 93   Temp 97.9 °F (36.6 °C) (Oral)   Ht 5' 3" (1.6 m)   Wt 52.5 kg (115 lb 12.8 oz)   BMI 20.51 kg/m²     Physical Exam  Constitutional:       Appearance: Normal appearance.   HENT:      Head: Normocephalic and atraumatic.      Right Ear: External ear normal.      Left Ear: External ear normal.      Nose: Nose normal.      Mouth/Throat:      Mouth: Mucous membranes are moist.      Pharynx: Oropharynx is clear.   Eyes:      Extraocular Movements: " Extraocular movements intact.   Cardiovascular:      Rate and Rhythm: Normal rate and regular rhythm.      Pulses:           Dorsalis pedis pulses are 1+ on the right side and 1+ on the left side.      Heart sounds: Normal heart sounds.   Pulmonary:      Effort: Pulmonary effort is normal.      Breath sounds: Normal breath sounds.   Abdominal:      General: Bowel sounds are normal.      Palpations: Abdomen is soft.   Musculoskeletal:         General: Normal range of motion.      Right lower leg: No edema.      Left lower leg: No edema.   Feet:      Right foot:      Skin integrity: Skin integrity normal.      Toenail Condition: Right toenails are normal.      Left foot:      Skin integrity: Skin integrity normal.      Toenail Condition: Left toenails are normal.   Skin:     General: Skin is warm and dry.   Neurological:      General: No focal deficit present.      Mental Status: He is alert and oriented to person, place, and time.   Psychiatric:         Mood and Affect: Mood normal.         Behavior: Behavior normal.         Thought Content: Thought content normal.         Judgment: Judgment normal.           Results     Chemistry:  Lab Results   Component Value Date     07/21/2023    K 4.0 07/21/2023    CHLORIDE 106 07/21/2023    BUN 14.5 07/21/2023    CREATININE 0.82 07/21/2023    EGFRNORACEVR >60 07/21/2023    GLUCOSE 61 (L) 07/21/2023    CALCIUM 9.4 07/21/2023    ALKPHOS 104 07/21/2023    LABPROT 6.3 07/21/2023    ALBUMIN 3.8 07/21/2023    BILIDIR 0.3 03/15/2022    IBILI 0.60 03/15/2022    AST 17 07/21/2023    ALT 19 07/21/2023    MG 2.6 08/22/2019    HAKAEVDH88XT 37.8 05/25/2021        Lab Results   Component Value Date    HGBA1C 9.6 (H) 07/21/2023        Hematology:  Lab Results   Component Value Date    WBC 9.7 02/10/2023    HGB 15.8 02/10/2023    HCT 47.1 02/10/2023     02/10/2023       Lipid Panel:  Lab Results   Component Value Date    CHOL 110 02/10/2023    HDL 34 (L) 02/10/2023    LDL 55.00  02/10/2023    TRIG 106 02/10/2023    TOTALCHOLEST 3 02/10/2023        Urine:  Lab Results   Component Value Date    COLORUA Yellow 07/21/2023    APPEARANCEUA Clear 07/21/2023    SGUA 1.016 07/21/2023    PHUA 6.5 07/21/2023    PROTEINUA Trace (A) 07/21/2023    GLUCOSEUA 3+ (A) 07/21/2023    KETONESUA Negative 07/21/2023    BLOODUA Negative 07/21/2023    NITRITESUA Negative 07/21/2023    LEUKOCYTESUR Negative 07/21/2023    RBCUA <5 07/21/2023    WBCUA <5 07/21/2023    BACTERIA None Seen 07/21/2023    SQEPUA 2-20 08/22/2019    HYALINECASTS 0-2 (A) 08/22/2019    CREATRANDUR 48.4 (L) 02/10/2023          Assessment        ICD-10-CM ICD-9-CM   1. Screening for diabetic retinopathy  Z13.5 V80.2   2. Type 1 diabetes mellitus with hyperglycemia  E10.65 250.01   3. Type 1 diabetes mellitus without complications  E10.9 250.01   4. Hypertension, unspecified type  I10 401.9   5. Retinal disorder  H35.9 362.9   6. PAD (peripheral artery disease)  I73.9 443.9   7. Primary hypertension  I10 401.9   8. Mixed hyperlipidemia  E78.2 272.2   9. Screening for prostate cancer  Z12.5 V76.44   10. Wellness examination  Z00.00 V70.0        Plan (including Health Maintenance)     Problem List Items Addressed This Visit          Ophtho    Retinal disorder    Overview     Following Dr. Farmer              Cardiac/Vascular    Hyperlipidemia    Overview     Crestor           Current Assessment & Plan     Continue medication  Labs prior to f/u  Educated on a low-fat, low-cholesterol diet and aerobic exercise (20-30 mins/day x 5 days a week). Encouraged healthy fruits and veggie intake. Increase water intake. Avoid excess ETOH intake and smoking           Relevant Orders    Lipid Panel    Hypertension    Overview     Coreg 6.25 mg po BID           Current Assessment & Plan     At goal  Continue current medication  Educated on aerobic exercise (3-5 days/week) and a low-fat, low-sodium diet  Avoid excess ETOH consumption. Smoking cessation if  "applicable  ED precautions (s/s of CVA, etc)            PAD (peripheral artery disease)    Current Assessment & Plan     Defers further eval  Asymptomatic at present            Endocrine    Type 1 diabetes mellitus    Overview     Current medications: See HPI  A1c level: 9.5%, previously 9.7% Goal 8-8.9% d/t hypoglycemic concerns  CBG trends: Checking infrequently. See HPI  Educated on diabetic diet: Low-fat, Low-carb, Low-cholesterol. Avoid sugary/dark drinks/sodas and white foods (i.e., bread, pasta, potatoes, rice)  Aerobic exercise: 20-30 min/day x 5 days/week  Diabetic Eye Exam: 3/1/21, follows Ophthal. States had an exam at [facility] scheduled 10/2022, but had to cancel. Planning to reschedule 1/2023  Diabetic Foot Exam: 3/22/23, Decreased sensation to feet bilaterally. Skin normal color for ethnicity. No swelling or lesions. Long toenails, but states spouse will clip  Microalbumin-U: WNL (3/2022)  Kidney Protection: N/A; declines new meds  Statin Therapy: Crestor            Current Assessment & Plan     Continue current regimen per preference  Declines insulin adjustments, eval for pump, or Endo visits         Relevant Medications    pen needle, diabetic 32 gauge x 5/32" Ndle    insulin detemir U-100, Levemir, (LEVEMIR FLEXTOUCH U100 INSULIN) 100 unit/mL (3 mL) InPn pen    insulin aspart U-100 (NOVOLOG) 100 unit/mL (3 mL) InPn pen    Other Relevant Orders    Hemoglobin A1C    Comprehensive Metabolic Panel    CBC Auto Differential    Lipid Panel    Microalbumin/Creatinine Ratio, Urine       Other    Wellness examination    Overview     Health Maintenance:   Colon Ca Screening-FIT negative 12/30/2021   Prostate Ca Screening-PSA 0.79, 11/2022         Current Assessment & Plan     Strongly urged to return FIT ASAP          Other Visit Diagnoses       Screening for diabetic retinopathy    -  Primary    Relevant Orders    Ambulatory referral/consult to Ophthalmology    Type 1 diabetes mellitus without " complications        Relevant Medications    insulin detemir U-100, Levemir, (LEVEMIR FLEXTOUCH U100 INSULIN) 100 unit/mL (3 mL) InPn pen    insulin aspart U-100 (NOVOLOG) 100 unit/mL (3 mL) InPn pen    Screening for prostate cancer        Relevant Orders    PSA, Screening              Health Maintenance Due   Topic Date Due    COVID-19 Vaccine (3 - Pfizer series) 12/03/2021    Eye Exam  10/01/2022    Colorectal Cancer Screening  12/30/2022       Future Appointments   Date Time Provider Department Center   12/13/2023  8:15 AM Phyllis Hart FNP [facility]      For any new or worsening symptoms that are urgent, or for any s/s of MI, CVA, or any other emergent concerns, please visit the ER for further eval. Otherwise, call clinic with questions or concerns.    Follow up in about 3 months (around 12/13/2023).    Signature:  ARIN Malagon  [facility]  [address] [id number]    Date of encounter: 9/13/23

## 2023-12-13 ENCOUNTER — OFFICE VISIT (OUTPATIENT)
Dept: INTERNAL MEDICINE | Facility: CLINIC | Age: 63
End: 2023-12-13
Payer: MEDICARE

## 2023-12-13 VITALS
HEART RATE: 98 BPM | HEIGHT: 63 IN | WEIGHT: 121.81 LBS | SYSTOLIC BLOOD PRESSURE: 113 MMHG | DIASTOLIC BLOOD PRESSURE: 68 MMHG | TEMPERATURE: 98 F | BODY MASS INDEX: 21.58 KG/M2 | RESPIRATION RATE: 20 BRPM

## 2023-12-13 DIAGNOSIS — I25.10 CORONARY ARTERY DISEASE INVOLVING NATIVE CORONARY ARTERY OF NATIVE HEART WITHOUT ANGINA PECTORIS: ICD-10-CM

## 2023-12-13 DIAGNOSIS — I10 HYPERTENSION, UNSPECIFIED TYPE: ICD-10-CM

## 2023-12-13 DIAGNOSIS — Z12.11 SCREENING FOR COLON CANCER: ICD-10-CM

## 2023-12-13 DIAGNOSIS — E10.65 TYPE 1 DIABETES MELLITUS WITH HYPERGLYCEMIA: Primary | ICD-10-CM

## 2023-12-13 PROCEDURE — 99215 OFFICE O/P EST HI 40 MIN: CPT | Mod: PBBFAC | Performed by: NURSE PRACTITIONER

## 2023-12-13 PROCEDURE — 99214 OFFICE O/P EST MOD 30 MIN: CPT | Mod: S$PBB,,, | Performed by: NURSE PRACTITIONER

## 2023-12-13 PROCEDURE — 99214 PR OFFICE/OUTPT VISIT, EST, LEVL IV, 30-39 MIN: ICD-10-PCS | Mod: S$PBB,,, | Performed by: NURSE PRACTITIONER

## 2023-12-13 RX ORDER — NITROGLYCERIN 0.4 MG/1
0.4 TABLET SUBLINGUAL EVERY 5 MIN PRN
Qty: 30 TABLET | Refills: 1 | Status: SHIPPED | OUTPATIENT
Start: 2023-12-13 | End: 2024-12-12

## 2023-12-13 RX ORDER — CARVEDILOL 6.25 MG/1
6.25 TABLET ORAL 2 TIMES DAILY
Qty: 180 TABLET | Refills: 3 | Status: SHIPPED | OUTPATIENT
Start: 2023-12-13 | End: 2024-12-12

## 2023-12-13 NOTE — PROGRESS NOTES
"  ARIN Malagon   [facility]  [address] [facility] [id number]      PATIENT NAME: Federico Villarreal  : 1960  DATE: 23  MRN: [id number]        Reason for Visit / Chief Complaint: Medication Refill       History of Present Illness / Problem Focused Workflow     Federico Villarreal presents to the clinic with Medication Refill     Initial Visit: (1/10/19): 58 y.o.  male presenting to the clinic to re-establish primary care. Previous PCP VANCE Pike NP. PMHx significant for T1DM, HTN, HLD, CAD, Tobacco Use, and Hearing loss. Following Cardio. Last OV 10/2/18. Following ENT. Last OV 18. HgA1c 8.8 (10/2018). Pt is on a Novolog SS. He does not have the SS with him. States that he goes by "what he eats, how he feels, and experience." Overall, he reports that he rarely takes Novolog. He was previously prescribed Levemir 15 units BID. States that he only takes 12-13 units of Levemir per dose d/t hypoglycemic episodes. Reports that blood glucose was dropping to 40s-50s when he was taking full 15 units (especially at night). Pt states that he dealt with severe hypoglycemia ~4 years ago while living in [place]. Blood sugars were dropping in the 20s. Since then, he has not had CBGs that low that he's aware of. CBGs do fluctuate. Reports having readings as high as the 500s over the past 6 months. However, pt reports that this is much better than past control. Denies neuropathy. Was following local endocrinologist (Dr. Elizalde) in the past, however, reports that the doctor does not accept Medicaid. Also reports that he follows Dr. Felix Farmer for eye disease. States that he had a dilated eye exam ~ 1 month ago. Needs HgA1c. Bp at goal. Currently taking Carvedilol 3.125 mg po BID. No longer taking ASA s/t bloody otorrhea (managed per ENT). LDL 86 (previously 74). Pt taking Crestor 20 mg po daily. Tolerating well. Reports being placed on " "Ambien around 2012 after cardiac stent placement. States Ambien "really helped" him rest. He took himself off in the past. Today, he is c/o restless legs and extreme insomnia. Reports that he usually falls asleep around midnight. He then awakens at 2 am, restless. He may fall asleep again and is up by 5 am once his wife is up and getting ready for work. Reports daytime fatigue. He would like to try Ambien again. He smokes ~ 1 cigar a day. States, "I know it's not good for me." However, not ready to quit. Pt states that he is feeling "good." Denies fever, chills, HA, CP, SOB, Abd pain, Swelling, or any other concerns.     4/17/19: Pt presenting for f/u. HgA1c now 9.3% (previously 8.8%). As previously noted, pt self-manages condition. He states that he seldom uses the Novolog and again only takes anywhere from 12-15 units of the Levemir per dose. States that he doses Levemir based on blood sugar readings. He states that he will only take a full 15 units of Levemir at pm dose only if he eats a "large" meal. States, "and it has to be large." Otherwise, he states that he'll wake up hypoglycemic. Denies any recent hypoglycemia. When asked about glucose control while following Dr. Elizalde, he reports that his HgA1c stayed in the 7s and low 8s. He admits that he was more active and eating better at the time. Admits that he hasn't been as active because he filed for disability. He hasn't been eating well s/t finances. He states that he will start "doing better." Oph is Dr. Farmer. Recent eye exam reveals proliferative DR HILTON. Pt reports that he will f/u with Dr. Farmer in June.  (previously 86). Prescribed Crestor 20 mg po daily . However, pt admits that he's been out of the Crestor for a while. Reports losing insurance for a short time and was unable to afford, thus, he's not been taking. Bp at goal. Currently taking Carvedilol 3.125 mg po BID. No longer taking Ambien. States that he didn't want to become dependent on " "it. Instead, he uses OTC Melatonin as needed for insomnia. Reports Melatonin effective. Denies fever, chills, HA, CP, SOB, Abd pain, edema, or any other concerns.     8/22/19: Pt presenting for T1DM f/u. HgA1c now 9.0%, previously 9.3%. As previously noted, pt self-manages condition. He states that he seldom uses the Novolog and again only takes anywhere from 12-15 units of the Levemir per dose.      **Upon entering exam room today, pt immediately stated "I don't feel good." He appeared flushed with cloudy mentation. He states that he felt as thought his blood sugar was "Hi." He has been states that he did eat lunch and took his Levemir prior to OV today. He admits that over the past several days his blood sugars have been up and down. VSS. All symptoms started ~10 minutes after pt was settled in exam room.      Report called to ED for transfer. Report given to William.     (9/18/19): Pt presenting for f/u T1DM/ED f/u (see above). Bp at goal today. Taking Coreg 3.125 mg po BID.      HgA1c now 9.0%, previously 9.3%. As previously noted, pt self-manages condition. He states that he seldom uses the Novolog and takes anywhere from 13-15 units of the Levemir per dose. However, since ED visit, he's noticed that his lunchtime CBGs have been in the 200s, while fasting CBGs are in the 70s on most occasions. He did start taking Novolog before lunch at times, but plans to take at least 3 units before lunch moving forward. Pt is skeptical about taking too much insulin due to past issues with hypoglycemia. He denies any hypoglycemia since last visit. He did state CBG dash to 340s last week. He took 7 units of Novolog and "it came down." He also endorses that he has not been entirely compliant with a diabetic diet nor is he exercising as he previously was.     Denies fever, chills, weakness, dizziness, HA, CP, SOB, Abd pain, edema, or any other concerns.     (12/18/19): Pt presenting for f/u T1DM. Seen in Cardio 12/17/19. Arterial " "stress US to be ordered per Cardio to r/o PAD after pt c/o neuropathy to LE. He states that h has "electric-like" pains to toes on both feet on occasion. He states that it does not happen every day but is painful when it does. States, "I don't really want to be on any medications at this point." He reports that he's been using compression stockings and it helps to relieve pain. Denies any lesions to feet and states, "I stay on top of that."      Bp at goal today. Taking Coreg 3.125 mg po BID.      HgA1c now 9.5%, previously 9.3%. As previously noted, pt self-manages condition. He states that he seldom uses the Novolog and has been taking ~15 units of the Levemir BID. He admits only checking CBGs in the am. States fasting CBGs 100s-140s. Not checking glucose at any other times. States, "I need to start taking about 3-4 units of Novolog before meals." CBG 64 mg/dL this am. State that he had not eaten prior to having labs drawn but ate a banana afterward and is feeling well.     Denies fever, chills, weakness, dizziness, HA, blurred vision, CP, SOB, cough, abd pain, edema, or any other concerns. States, "I'm feeling good."     Telephone Visit: (4/20/2020): Federico is a 59 y.o.  male with a PMHx significant for T1DM, HTN, HLD, CAD, Tobacco Use, and Hearing loss, who was contacted for a telephone visit due to COVID-19 precautions. Patient has consented to telephone visit and was able to verify specific patient identification. I have verified that only myself and pt are on the telephone call and call is taking place in the [facility] btw both parties.      Federico reports taking Coreg 3.125 mg po daily instead of BID 2/2 trying to save medication because "I don't know when I'll be able to go back to [facility]." He also admits splitting his Crestor in half because "you said my level was under 50 last time." LDL 49 (8/2019).      HgA1c now 9.5%. As previously noted, pt self-manages condition. He reports that " "CBGs were running 300s-500s about 2 weeks ago after he'd missed some doses "of my long-acting insulin" due to altered sleep schedule as he was waking up at about 3 am to help his significant other with some tasks. States, "But I was able to get in under control." He has been taking Novolog 5 units before meals (if low carb meal, he will only take 4 units) and 15 units of the Levemir BID. Fasting CBGs 120s over the past few days. Had one hypoglycemic episode ~ 4 days ago where he had a fasting CBG in the 50s. Admitted that he had not had a snack at bedtime as he typically does.      He's scheduled to f/u in [facility] in June 2020. States that he's been staying home and denies any known exposure to COVID-19.     Denies fever, chills, weakness, dizziness, HA, blurred vision, CP, SOB, cough, abd pain, edema, or any other concerns.     Telephone Visit: (6/22/2020): Federico is a 59 y.o.  male with a PMHx significant for T1DM, HTN, HLD, CAD, Tobacco Use, and Hearing loss, who was contacted for a telephone visit for DM f/u due to COVID-19 precautions. Patient has consented to telephone visit and was able to verify specific patient identification. I have verified that only myself and pt are on the telephone call and call is taking place in the [facility] btw both parties.     HgA1c now 10.5%; increased from previous (9.5% in 12/2019). As previously noted, pt self-manages condition. He reports that CBGs were running 300s-500s in April 2020 after he'd missed some doses "of my long-acting insulin" due to altered sleep schedule as he was waking up at about 3 am to help his significant other with some tasks. He previously expressed, "But I was able to get in under control" by taking Novolog 5 units before meals and 15 units of the Levemir BID. Since last telephone visit, he states that he's been taking Novolog 3-4 units BID before meals and taking 13-15 units of Levemir at night. He's had one hypoglycemic " "episode several weeks ago where he had a fasting CBG in the 50s. States, "But I know how to prevent that from happening." Denies any CBGs > the 300s. He's requesting refills on his Levemir. Reports that he stopped smoking several weeks ago because "the puffing made my ears hurt!" No other concerns.     Telephone Visit: (9/1/2020): Federico is a 59 y.o.  male with a PMHx significant for T1DM, HTN, HLD, CAD, Tobacco Use, and Hearing loss, who was contacted for a telephone visit for DM f/u due to COVID-19 precautions. Patient has consented to telephone visit and was able to verify specific patient identification. I have verified that only myself and pt are on the telephone call and call is taking place in the [facility] btw both parties.     HgA1c now 10.6%; mild increase from previous. As previously noted, pt self-manages condition. He reports that CBGs were running 300s-500s in April 2020 after he'd missed some doses "of my long-acting insulin" due to altered sleep schedule as he was waking up at about 3 am to help his significant other with some tasks. He previously expressed, "But I was able to get in under control" by taking NovoLog 5 units before meals and 15 units of the Levemir BID. Since last telephone visit, he states that he's been taking NovoLog 3 units before dinner on some days, but not all days, of the week depending on what he eats. For instance, if he eats rice or noodles, he will take a dose of NovoLog. Taking Levemir 15 units BID. Denies any overt hypoglycemia or hyperglycemia. States, "I'll be honest, I haven't been checking it. I only check it when I feel it's too high or too low." He admits that he was drinking up to 1 liter of diet soda a day but stopped ~2 mths ago. He's requesting refills on his medications. Previously reported that he stopped smoking several weeks ago because "the puffing made my ears hurt!" He is smoking again but states, it's only ~ "2 puffs a day." He is " "not interested in lung cancer screening. Denies cough, wheeze, chest pain, or SOB. Has not returned FIT but plans to soon. PSA 0.50. AST minimally elevated. Cardio appt cancelled in June and never re-scheduled. No acute concerns.     Telephone Visit: (12/1/2020): Federico is a 59 y.o.  male with a PMHx significant for T1DM, HTN, HLD, CAD, Tobacco Use, and Hearing loss, who was contacted for a telephone visit for DM f/u due to COVID-19 precautions. Patient has consented to telephone visit and was able to verify specific patient identification. I have verified that only myself and pt are on the telephone call and call is taking place in the state [facility] btw both parties. Pt unable to connect for video conference and requested to proceed via audio means only. He is located at home in [place], currently on his treadmill per report.     HgA1c now 10.1%; mild decrease from previous. As previously noted, pt self-manages condition. He now attributes his small decrease in HgA1c to avoiding "aspartame" which is found in a lot of the beverages that he had been consuming, including diet Dr. Duarte per his report. He reports taking NovoLog 3-4 units before meals and 15-17 units of the Levemir BID "depending on how big my apple is with my peanut butter in the morning." States HgA1c hasn't been < 7% since his 20s. He denies any hypoglycemic episodes.      Pt with a h/o insomnia that was previously managed with Ambien PRN. States no longer taking d/t "weird dreams." He admits feeling a bit anxious over the last few nights so he took some of his wife's buspirone 10 mg tabs. States medication "helped me relax and go to sleep." He is requesting a prescription for himself.      FIT negative 11/27/2020. CMP and TSH WNL. He has no acute concerns.     (3/1/2021): Federico is a 59 y.o.  male with a PMHx significant for T1DM, HTN, HLD, CAD, Tobacco Use, and Hearing loss, presenting for routine f/u.     HgA1c now " "10.2% and rising from previous. FBG was >400 on recent labs. Staff attempted to reach him on Friday to advise to visit the ED but he did not answer. Now states that he received the message regarding abnl labs. States took insulin at home and CBG improved. As previously noted, pt self-manages condition. He is prescribed Levemir 15 units BID but only takes 10-11 units because he "subtracts NovoLog dose" from the total Levemir dose. States HgA1c hasn't been < 7% since his 20s. He denies any hypoglycemic episodes. He does not check his CBGs.      He was seen by [facility] 1/2021 for routine f/u CAD. At the visit, he c/o lower ext pain. He underwent arterial US of BLE and was found to have some PAD on right side. He's been referred to Dr. Vargas.      LDL 75, but trig elevated. He's prescribed Crestor 40 mg po daily.      He continues to smoke but states she he is planning on quitting. In fact, he states today is his last day smoking.      He is amenable to receiving the influenza and Prevnar 13 vaccines today. No other problems stated.     (6/3/2021): Federico is a 60 y.o.  male with a PMHx significant for T1DM, HTN, HLD, CAD, Tobacco Use, and Hearing loss, presenting for routine f/u. Previously referred to Endo but cancelled appt due to "cost of diabetic supplies." Pt thought he was referred for a CGM. After being informed of reason for referral (as discussed during last visit), he reported that he would call to schedule Endo appt but never did. Today, he reports that his diet has improved. He's taking NovoLog regularly, at least BID before meals. HgA1c now 9.3%. As previously noted, pt self-manages condition. He is prescribed Levemir 15 units BID but only takes 10-12 units because he "subtracts NovoLog dose" from the total Levemir dose. Taking ~3-5 units of NovoLog before meals.   LFTs and sodium elevated on recent labs. States may have had a few alcoholic beverages the night before, though this is rare that he " "drinks.   Following Ophthalmology. Interested in nicotine patches to help with smoking cessation. Used patches in the past when living in [place] and [facility], "They worked well!" Now smoking < 6 cigarettes per day. No CP or SOB. No other concerns.     (9/17/2021): Federico is a 60 y.o.  male with a PMHx significant for T1DM, HTN, HLD, CAD, Tobacco Use, and Hearing loss, presenting for routine f/u. HgA1c now 9.2%. As previously noted, pt self-manages condition. He is prescribed Levemir 15 units BID but takes ~11-15 units qHS based on "what I eat." States if takes 15 units, he normally awakens hypoglycemic btw 3-6 am. Taking ~3-5 units of NovoLog before meals. PSA 0.52. LDL 70s. Compliant with Crestor. Hasn't been seen in [facility] since 1/2021. Requesting refill on Coreg. LFTs consistently trending down. No chest pain, SOB, cough, wheezing, B/B concerns, abd complaints, falls, or any acute concerns.     (12/20/2021): Federico is a 61 y.o.  male with a PMHx significant for T1DM, HTN, HLD, CAD, Tobacco Use, and Hearing loss, presenting for routine f/u. HgA1c now 9.0%. As previously noted, pt self-manages condition. He is prescribed Levemir 15 units BID but takes ~11-15 units qHS based on "what I eat." States if takes 15 units, he normally awakens hypoglycemic btw 3-6 am. Taking ~3-5 units of NovoLog before meals. AST/ALT slightly increased. Previously hepatitis screening from 2018 negative. Admits heavy beer use "when I was younger," but denies current ETOH use. Denies any abd complaints or N/V. No other concerns.     Health Maintenance:   Colon Ca Screening-FIT negative 11/2020   Prostate Ca Screening-PSA 0.52, 9/2021     (3/18/2022): Federico is a 61 y.o.  male with a PMHx significant for T1DM, HTN, HLD, CAD, Tobacco Use, and Hearing loss, presenting for routine f/u. HgA1c now 9.5%. As previously noted, pt self-manages condition. He is prescribed Levemir 15 units BID but takes ~11-15 units qHS " "based on "what I eat." States if takes 15 units, he normally awakens hypoglycemic btw 3-6 am. He is prescribed NovoLog. Takes anywhere from ~3-5 units of NovoLog before meals. However, he states he hasn't used in 1-2 weeks due to a hypoglycemic episode; CBG 70s. Admits felt shaky and confused. He ate and symptoms improved. States hasn't been using since that time. He has been referred to Endo for mgmt of chronically uncontrolled T1DM, but patient declines appt. LDL at goal. Compliant with Crestor 40 mg po daily. He also verbalizes hearing loss has worsened. He doesn't wear hearing aides. He is not interested in "doing anything with my ears." He will f/u with [facility] in April. No other concerns.     Colon Ca Screening-FIT negative 12/2021     (7/22/2022): Federico is a 61 y.o.  male with a PMHx significant for T1DM, HTN, HLD, CAD, Tobacco Use, and Hearing loss, presenting for routine f/u. HgA1c now 9.8%, increased from 9.5% at last visit. FBG was 86 on 7/18/22 compared to 200s at last visit. As previously noted, pt self-manages condition and refuses adjustments or Endo appts. He is prescribed Levemir 15 units nightly (was BID but pt was only taking daily), but takes ~11-16 units qHS based on "what I eat." States if takes 15 units, he normally awakens hypoglycemic btw 3-6 am. He is prescribed NovoLog. Takes anywhere from ~3-5 units of NovoLog before meals, "based on what I eat." Will usually take Novolog if glucose > 200. ALT 1 point above a normal high, otherwise, labs unremarkable. States scheduled for an eye exam in October. States one hypoglycemic episode 3 months ago due to "took my insulin wrong;" states glucose was 50s-60s. He was able to improve glucose on his own at home. Fell btw steps about one month ago and scraped left shin. This is now healed. No B/B incontinence. No other concerns.     Telephone Visit (11/22/2022): Federico is a 62 y.o.  male with a PMHx significant for T1DM, HTN, HLD, " "CAD, Tobacco Use, and Hearing loss, presenting for routine f/u. HgA1c now 9.7%, previously 9.8%. FBG was 80s. As previously noted, pt self-manages condition and refuses adjustments or Endo appts. He is prescribed Levemir 15 units BID. but takes ~11-16 units qHS based on "what I eat." States if takes 15 units, he normally awakens hypoglycemic btw 3-6 am. He is prescribed NovoLog. Takes anywhere from ~3-5 units of NovoLog before meals, "based on what I eat." However, he now tells me hasn't used Novolog in close to one year prior to recent dispense of medication. States since getting "back on it," his numbers have improved. He only had one hypoglycemic episode (glucose < 70) since last visit r/t "not eating much." Spouse states that patient's goal is not to each a lot so he "doesn't have to take a lot of insulin." Other labs reviewed and unremarkable. No other concerns.     (3/22/2023): Federico is a 62 y.o.  male with a PMHx significant for T1DM, HTN, HLD, CAD, Tobacco Use, Hearing loss, right eye retinal dz, presenting for routine f/u. HgA1c now 9.5%, previously 9.7%. FBG was 60s when he completed labs last month, although he denies any overt s/s of hypoglycemia since last visit. As previously noted, pt self-manages condition and refuses adjustments or Endo appts. He was amenable to DM education at last visit, but when he was contacted for an appt, he refused. He is prescribed Levemir 15-17 units BID. He is prescribed NovoLog. Takes anywhere from ~3-5 units of NovoLog before meals, "based on what I eat." Other labs reviewed and stable compared to his personal baseline. He follows an opt[facility]rist at [facility] [address] in [place]. [facility] had a visit in the last 2 months. He is stable. He has received his Cologuard but has not returned the kit. Planning to do so ASAP. No other concerns.     Diabetes  He presents for his follow-up diabetic visit. He has type 1 diabetes mellitus. His disease course has " "been stable. There are no hypoglycemic associated symptoms. Associated symptoms include blurred vision and foot paresthesias. There are no hypoglycemic complications. Symptoms are stable. Diabetic complications include heart disease and peripheral neuropathy. Risk factors for coronary artery disease include diabetes mellitus, dyslipidemia, hypertension, male sex and tobacco exposure. Current diabetic treatment includes insulin injections. He is compliant with treatment most of the time. He is following a generally unhealthy diet. An ACE inhibitor/angiotensin II receptor blocker is not being taken. He does not see a podiatrist.Eye exam is not current.     (3/22/2023): Federico is a 62 y.o.  male with a PMHx significant for T1DM, HTN, HLD, CAD, Tobacco Use, Hearing loss, right eye retinal dz, presenting for routine f/u. HgA1c now 9.5%, previously 9.7%. FBG was 60s when he completed labs last month, although he denies any overt s/s of hypoglycemia since last visit. As previously noted, pt self-manages condition and refuses adjustments or Endo appts. He was amenable to DM education at last visit, but when he was contacted for an appt, he refused. He is prescribed Levemir 15-17 units BID. He is prescribed NovoLog. Takes anywhere from ~3-5 units of NovoLog before meals, "based on what I eat." Other labs reviewed and stable compared to his personal baseline. He follows an optometrist at [facility] on [address] in [place]. States had a visit in the last 2 months. He is stable. He has received his Cologuard but has not returned the kit. Planning to do so ASAP. No other concerns.    (9/13/2023): Federico is a 63 y.o.  male with a PMHx significant for T1DM, HTN, HLD, CAD, Tobacco Use, Hearing loss, right eye retinal dz, presenting for routine f/u. He was supposed to f/u in July but missed appt. HgA1c now 9.6% (July 2023), previously 9.5%. FBG was 60s when he completed labs in July, although he denies " "any overt s/s of hypoglycemia since last visit. As previously noted, pt self-manages condition and refuses adjustments or Endo appts. He also refuses an insulin pump. He was amenable to DM education at last visit, but when he was contacted for an appt, he refused. He is prescribed Levemir 15-17 units BID. He is prescribed NovoLog. Takes anywhere from ~3-5 units of NovoLog before meals, "based on what I eat." Other labs reviewed and stable compared to his personal baseline. He follows an optometrist at [facility] [address] in [place], but corrective lenses, but has not seen Dr. Farmer in > 1 year. He has received his Cologuard but has not returned the kit. Planning to do so ASAP. No other concerns.    Note, patient referred to [facility], Dr. Vargas, in the past for possible PAD eval but never went. Defers eval now. Denies leg pain or lesions to legs or feet. States ball of feet tingles on occasion but it's better than in the past. States, "But I can feel everything."     (12/13/2023): Juan is a 63 y.o.  male with a PMHx significant for T1DM, HTN, HLD, CAD, Tobacco Use, Hearing loss, right eye retinal dz, presenting for routine f/u. He did not complete labs for scheduled appt. As previously noted, pt self-manages his T1DM and refuses adjustments or Endo appts. He has refused an insulin pump in the past. He was amenable to DM education at last visit, but when he was contacted for an appt, he refused. He is prescribed Levemir 15-17 units BID and NovoLog. Takes anywhere from ~3-5 units of NovoLog before meals, "based on what I eat." States recently his CBG went as high as 700s and is staying around the 300s. He cannot tell exactly how much insulin he's been taking due to being forgetful. Also admits that he's likely skipped dosing of insulin and other medications such as carvedilol.     He has not returned his stool kit, followed up with ophthalmology, or had any recent visits with cards (previously following " for CAD. Has admitted intermittent mid-chest pain, but none in recent weeks).    No other concerns.        Review of Systems     Review of Systems   Constitutional: Negative.  Negative for unexpected weight change.   HENT: Negative.     Eyes:  Positive for visual disturbance (chronic).   Respiratory: Negative.     Cardiovascular:  Positive for chest pain (has admitted intermittent discomfort; denies recent pain or radiation of pain).   Gastrointestinal: Negative.    Endocrine: Negative.    Genitourinary: Negative.    Musculoskeletal: Negative.    Skin: Negative.    Allergic/Immunologic: Negative.    Neurological: Negative.  Negative for dizziness, tremors, seizures, syncope, facial asymmetry, speech difficulty and light-headedness.   Hematological: Negative.    Psychiatric/Behavioral: Negative.  Negative for self-injury, sleep disturbance and suicidal ideas.        Medical / Social / Family History   History reviewed. No pertinent past medical history.    Past Surgical History:   Procedure Laterality Date    ADENOIDECTOMY      ADENOIDECTOMY  1972    As a child    CORONARY STENT PLACEMENT  08/20/2012    EYE SURGERY  2011    Multiple    INNER EAR SURGERY      REPAIR OF RETINAL DETACHMENT WITH VITRECTOMY      TONSILLECTOMY      VITRECTOMY         Social History    reports that he has been smoking cigarettes. He started smoking about 15 years ago. He has a 3.8 pack-year smoking history. He has never used smokeless tobacco. He reports that he does not currently use alcohol. He reports that he does not currently use drugs.    Family History  's family history includes Diabetes in his father and mother.    Medications and Allergies     Medications  Current Outpatient Medications   Medication Instructions    aspirin (ECOTRIN) 81 mg, Oral, Daily    carvediloL (COREG) 6.25 mg, Oral, 2 times daily    cyanocobalamin, vitamin B-12, (VITAMIN B-12 ORAL)   OTC, 0 Refill(s)    insulin aspart U-100 (NOVOLOG) 3-5 Units,  "Subcutaneous, 3 times daily with meals    LEVEMIR FLEXTOUCH U100 INSULIN 15 Units, Subcutaneous, 2 times daily    mv-min/folic/K1/lycopen/lutein (CENTRUM SILVER MEN ORAL) Oral    mv-mn/iron/folic acid/herb 190 (VITAMIN D3 COMPLETE ORAL) 50 mcg, Oral, Daily    nitroGLYCERIN (NITROSTAT) 0.4 mg, Sublingual, Every 5 min PRN, Up to 3 doses, then call 911    pen needle, diabetic 32 gauge x 5/32" Ndle Use for insulin administration up to three times a day for long-acting and rapid-acting insulin    rosuvastatin (CRESTOR) 40 mg, Oral, Nightly       Allergies  Review of patient's allergies indicates:  No Known Allergies    Physical Examination   Visit Vitals  /68 (BP Location: Left arm, Patient Position: Sitting, BP Method: Medium (Automatic))   Pulse 98   Temp 97.6 °F (36.4 °C) (Oral)   Resp 20   Ht 5' 3" (1.6 m)   Wt 55.2 kg (121 lb 12.8 oz)   BMI 21.58 kg/m²     Physical Exam  Constitutional:       Appearance: Normal appearance.   HENT:      Head: Normocephalic and atraumatic.      Right Ear: External ear normal.      Left Ear: External ear normal.      Ears:      Comments: Hard of hearing     Nose: Nose normal.      Mouth/Throat:      Mouth: Mucous membranes are moist.   Eyes:      Extraocular Movements: Extraocular movements intact.   Cardiovascular:      Rate and Rhythm: Normal rate and regular rhythm.      Heart sounds: Normal heart sounds.   Pulmonary:      Effort: Pulmonary effort is normal.      Breath sounds: Normal breath sounds.   Abdominal:      General: Bowel sounds are normal.      Palpations: Abdomen is soft.   Musculoskeletal:         General: Normal range of motion.      Right lower leg: No edema.      Left lower leg: No edema.   Skin:     General: Skin is warm and dry.   Neurological:      General: No focal deficit present.      Mental Status: He is alert and oriented to person, place, and time.   Psychiatric:         Mood and Affect: Mood normal.         Behavior: Behavior normal.         Thought " Content: Thought content normal.         Judgment: Judgment normal.           Results     Chemistry:  Lab Results   Component Value Date     07/21/2023    K 4.0 07/21/2023    CHLORIDE 106 07/21/2023    BUN 14.5 07/21/2023    CREATININE 0.82 07/21/2023    EGFRNORACEVR >60 07/21/2023    GLUCOSE 61 (L) 07/21/2023    CALCIUM 9.4 07/21/2023    ALKPHOS 104 07/21/2023    LABPROT 6.3 07/21/2023    ALBUMIN 3.8 07/21/2023    BILIDIR 0.3 03/15/2022    IBILI 0.60 03/15/2022    AST 17 07/21/2023    ALT 19 07/21/2023    MG 2.6 08/22/2019    RAURRWYP99BB 37.8 05/25/2021        Lab Results   Component Value Date    HGBA1C 9.6 (H) 07/21/2023        Hematology:  Lab Results   Component Value Date    WBC 9.7 02/10/2023    HGB 15.8 02/10/2023    HCT 47.1 02/10/2023     02/10/2023       Lipid Panel:  Lab Results   Component Value Date    CHOL 110 02/10/2023    HDL 34 (L) 02/10/2023    LDL 55.00 02/10/2023    TRIG 106 02/10/2023    TOTALCHOLEST 3 02/10/2023        Urine:  Lab Results   Component Value Date    COLORUA Yellow 07/21/2023    APPEARANCEUA Clear 07/21/2023    SGUA 1.016 07/21/2023    PHUA 6.5 07/21/2023    PROTEINUA Trace (A) 07/21/2023    GLUCOSEUA 3+ (A) 07/21/2023    KETONESUA Negative 07/21/2023    BLOODUA Negative 07/21/2023    NITRITESUA Negative 07/21/2023    LEUKOCYTESUR Negative 07/21/2023    RBCUA <5 07/21/2023    WBCUA <5 07/21/2023    BACTERIA None Seen 07/21/2023    SQEPUA 2-20 08/22/2019    HYALINECASTS 0-2 (A) 08/22/2019    CREATRANDUR 48.4 (L) 02/10/2023          Assessment        ICD-10-CM ICD-9-CM   1. Type 1 diabetes mellitus with hyperglycemia  E10.65 250.01   2. Hypertension, unspecified type  I10 401.9   3. Screening for colon cancer  Z12.11 V76.51   4. Coronary artery disease involving native coronary artery of native heart without angina pectoris  I25.10 414.01          Plan (including Health Maintenance)     Problem List Items Addressed This Visit          Cardiac/Vascular    Hypertension     Overview     Coreg 6.25 mg po BID           Relevant Medications    carvediloL (COREG) 6.25 MG tablet    Coronary artery disease involving native coronary artery of native heart without angina pectoris    Overview     Follows Cards, last visit 4/26/22         Current Assessment & Plan     Coronary artery disease is a type of heart disease that occurs when a substance called plaque builds up in the arteries that supply blood to the heart.     Heart disease is the leading cause of death in the [place]. The term heart disease refers to several types of heart conditions, with the most common being coronary artery disease. Other kinds of heart disease may involve the valves in the heart, or the heart may not pump well and cause heart failure. Some people are born with heart disease.      Are you at risk?   Anyone, including children, can develop heart disease.  It occurs when a substance called plaque builds up in your arteries. When this happens, your arteries can narrow over time, reducing blood flow to the heart.  Smoking, eating an unhealthy diet, and not getting enough exercise all increase your risk for having heart disease.   Having high cholesterol, high blood pressure, or diabetes also can increase your risk for heart disease     What are the signs and symptoms?      For many people, chest discomfort or a heart attack is the first sign.     Someone having a heart attack may experience several symptoms, including:      Chest pain or discomfort that doesnt go away after a few minutes.   Pain or discomfort in the jaw, neck, or back.  Weakness, light-headedness, nausea, or a cold sweat.  Pain or discomfort in the arms or shoulder.  Shortness of breath.     If you think that you or someone you know is having a heart attack, call 9-1-1 immediately.    Rx for PRN nitro sent  Re-referring to Cards         Relevant Medications    nitroGLYCERIN (NITROSTAT) 0.4 MG SL tablet    Other Relevant Orders     Ambulatory referral/consult to Cardiology       Endocrine    Type 1 diabetes mellitus - Primary    Overview     Current medications: See HPI  A1c level: 9.5%, previously 9.7% Goal 8-8.9% d/t hypoglycemic concerns  CBG trends: Checking infrequently. See HPI  Educated on diabetic diet: Low-fat, Low-carb, Low-cholesterol. Avoid sugary/dark drinks/sodas and white foods (i.e., bread, pasta, potatoes, rice)  Aerobic exercise: 20-30 min/day x 5 days/week  Diabetic Eye Exam: 3/1/21, follows Ophthal. States had an exam at [facility] scheduled 10/2022, but had to cancel. Planning to reschedule 1/2023  Diabetic Foot Exam: 3/22/23, Decreased sensation to feet bilaterally. Skin normal color for ethnicity. No swelling or lesions. Long toenails, but states spouse will clip  Microalbumin-U: UTD  Kidney Protection: N/A; declines new meds  Statin Therapy: Crestor            Current Assessment & Plan     No recent labs, but admits elevated glucose  After much discussion, he is now agreeable to seeing Endo to consider getting an insulin pump. Advised to resume insulin as prescribed  I also discussed CGM again; he is now amenable. Will fax order to [employer] company   Re-refer to ophthal         Relevant Orders    Ambulatory referral/consult to Endocrinology    Ambulatory referral/consult to Ophthalmology    Continuous Glucose Monitoring for Home Use (HME vendor/non-pharmacy)     Other Visit Diagnoses       Screening for colon cancer        Relevant Orders    Cologuard Screening (Multitarget Stool DNA)                Health Maintenance Due   Topic Date Due    RSV Vaccine (Age 60+ and Pregnant patients) (1 - 1-dose 60+ series) Never done    Eye Exam  10/01/2022    Colorectal Cancer Screening  12/30/2022    COVID-19 Vaccine (3 - 2023-24 season) 09/01/2023    Hemoglobin A1c  10/21/2023       Future Appointments   Date Time Provider Department Center   3/13/2024  8:30 AM [facility]         For any new or  worsening symptoms that are urgent, or for any s/s of MI, CVA, or any other emergent concerns, please visit the ER for further eval. Otherwise, call clinic with questions or concerns.    Follow up in about 3 months (around 3/13/2024) for DM F/u.    Signature:  ARIN Malagon  [facility]  [address] [facility] [id number]    Date of encounter: 12/13/23

## 2023-12-13 NOTE — ASSESSMENT & PLAN NOTE
Coronary artery disease is a type of heart disease that occurs when a substance called plaque builds up in the arteries that supply blood to the heart.     Heart disease is the leading cause of death in the [place]. The term heart disease refers to several types of heart conditions, with the most common being coronary artery disease. Other kinds of heart disease may involve the valves in the heart, or the heart may not pump well and cause heart failure. Some people are born with heart disease.      Are you at risk?   Anyone, including children, can develop heart disease.  It occurs when a substance called plaque builds up in your arteries. When this happens, your arteries can narrow over time, reducing blood flow to the heart.  Smoking, eating an unhealthy diet, and not getting enough exercise all increase your risk for having heart disease.   Having high cholesterol, high blood pressure, or diabetes also can increase your risk for heart disease     What are the signs and symptoms?      For many people, chest discomfort or a heart attack is the first sign.     Someone having a heart attack may experience several symptoms, including:      Chest pain or discomfort that doesnt go away after a few minutes.   Pain or discomfort in the jaw, neck, or back.  Weakness, light-headedness, nausea, or a cold sweat.  Pain or discomfort in the arms or shoulder.  Shortness of breath.     If you think that you or someone you know is having a heart attack, call 9-1-1 immediately.    Rx for PRN nitro sent  Re-referring to Cards

## 2023-12-13 NOTE — ASSESSMENT & PLAN NOTE
No recent labs, but admits elevated glucose  After much discussion, he is now agreeable to seeing Endo to consider getting an insulin pump. Advised to resume insulin as prescribed  I also discussed CGM again; he is now amenable. Will fax order to DME company   Re-refer to ophthal

## 2023-12-18 PROBLEM — Z00.00 WELLNESS EXAMINATION: Status: RESOLVED | Noted: 2022-07-22 | Resolved: 2023-12-18

## 2024-01-11 ENCOUNTER — HOSPITAL ENCOUNTER (INPATIENT)
Facility: HOSPITAL | Age: 64
LOS: 4 days | Discharge: HOME | DRG: 069 | End: 2024-01-15
Attending: EMERGENCY MEDICINE | Admitting: INTERNAL MEDICINE
Payer: MEDICARE

## 2024-01-11 DIAGNOSIS — R47.01 EXPRESSIVE APHASIA: ICD-10-CM

## 2024-01-11 DIAGNOSIS — R29.818 ACUTE FOCAL NEUROLOGICAL DEFICIT: ICD-10-CM

## 2024-01-11 DIAGNOSIS — G45.9 TIA (TRANSIENT ISCHEMIC ATTACK): Primary | ICD-10-CM

## 2024-01-11 LAB
ALBUMIN SERPL-MCNC: 3.9 G/DL (ref 3.4–4.8)
ALBUMIN/GLOB SERPL: 1.3 RATIO (ref 1.1–2)
ALP SERPL-CCNC: 79 UNIT/L (ref 40–150)
ALT SERPL-CCNC: 16 UNIT/L (ref 0–55)
AST SERPL-CCNC: 16 UNIT/L (ref 5–34)
BASOPHILS # BLD AUTO: 0.07 X10(3)/MCL
BASOPHILS NFR BLD AUTO: 1 %
BILIRUB SERPL-MCNC: 1 MG/DL
BUN SERPL-MCNC: 12.9 MG/DL (ref 8.4–25.7)
CALCIUM SERPL-MCNC: 9.7 MG/DL (ref 8.8–10)
CHLORIDE SERPL-SCNC: 103 MMOL/L (ref 98–107)
CHOLEST SERPL-MCNC: 243 MG/DL
CHOLEST/HDLC SERPL: 4 {RATIO} (ref 0–5)
CO2 SERPL-SCNC: 28 MMOL/L (ref 23–31)
CREAT SERPL-MCNC: 0.94 MG/DL (ref 0.73–1.18)
EOSINOPHIL # BLD AUTO: 0.14 X10(3)/MCL (ref 0–0.9)
EOSINOPHIL NFR BLD AUTO: 2 %
ERYTHROCYTE [DISTWIDTH] IN BLOOD BY AUTOMATED COUNT: 12.6 % (ref 11.5–17)
EST. AVERAGE GLUCOSE BLD GHB EST-MCNC: 217.3 MG/DL
GFR SERPLBLD CREATININE-BSD FMLA CKD-EPI: >60 MLS/MIN/1.73/M2
GLOBULIN SER-MCNC: 3 GM/DL (ref 2.4–3.5)
GLUCOSE SERPL-MCNC: 199 MG/DL (ref 82–115)
HBA1C MFR BLD: 9.2 %
HCT VFR BLD AUTO: 41.6 % (ref 42–52)
HDLC SERPL-MCNC: 64 MG/DL (ref 35–60)
HGB BLD-MCNC: 14.3 G/DL (ref 14–18)
IMM GRANULOCYTES # BLD AUTO: 0.03 X10(3)/MCL (ref 0–0.04)
IMM GRANULOCYTES NFR BLD AUTO: 0.4 %
INR PPP: 1
LDLC SERPL CALC-MCNC: 138 MG/DL (ref 50–140)
LYMPHOCYTES # BLD AUTO: 2.48 X10(3)/MCL (ref 0.6–4.6)
LYMPHOCYTES NFR BLD AUTO: 34.7 %
MCH RBC QN AUTO: 32 PG (ref 27–31)
MCHC RBC AUTO-ENTMCNC: 34.4 G/DL (ref 33–36)
MCV RBC AUTO: 93.1 FL (ref 80–94)
MONOCYTES # BLD AUTO: 0.58 X10(3)/MCL (ref 0.1–1.3)
MONOCYTES NFR BLD AUTO: 8.1 %
NEUTROPHILS # BLD AUTO: 3.84 X10(3)/MCL (ref 2.1–9.2)
NEUTROPHILS NFR BLD AUTO: 53.8 %
NRBC BLD AUTO-RTO: 0 %
PLATELET # BLD AUTO: 204 X10(3)/MCL (ref 130–400)
PMV BLD AUTO: 10.4 FL (ref 7.4–10.4)
POCT GLUCOSE: 255 MG/DL (ref 70–110)
POTASSIUM SERPL-SCNC: 4.4 MMOL/L (ref 3.5–5.1)
PROT SERPL-MCNC: 6.9 GM/DL (ref 5.8–7.6)
PROTHROMBIN TIME: 12.7 SECONDS (ref 12.5–14.5)
RBC # BLD AUTO: 4.47 X10(6)/MCL (ref 4.7–6.1)
SODIUM SERPL-SCNC: 140 MMOL/L (ref 136–145)
TRIGL SERPL-MCNC: 206 MG/DL (ref 34–140)
TSH SERPL-ACNC: 1.64 UIU/ML (ref 0.35–4.94)
VLDLC SERPL CALC-MCNC: 41 MG/DL
WBC # SPEC AUTO: 7.14 X10(3)/MCL (ref 4.5–11.5)

## 2024-01-11 PROCEDURE — 85025 COMPLETE CBC W/AUTO DIFF WBC: CPT | Performed by: EMERGENCY MEDICINE

## 2024-01-11 PROCEDURE — 80061 LIPID PANEL: CPT | Performed by: EMERGENCY MEDICINE

## 2024-01-11 PROCEDURE — 25000003 PHARM REV CODE 250: Performed by: INTERNAL MEDICINE

## 2024-01-11 PROCEDURE — 83036 HEMOGLOBIN GLYCOSYLATED A1C: CPT | Performed by: INTERNAL MEDICINE

## 2024-01-11 PROCEDURE — 25000003 PHARM REV CODE 250: Performed by: EMERGENCY MEDICINE

## 2024-01-11 PROCEDURE — 11000001 HC ACUTE MED/SURG PRIVATE ROOM

## 2024-01-11 PROCEDURE — 80053 COMPREHEN METABOLIC PANEL: CPT | Performed by: EMERGENCY MEDICINE

## 2024-01-11 PROCEDURE — 82962 GLUCOSE BLOOD TEST: CPT

## 2024-01-11 PROCEDURE — 99285 EMERGENCY DEPT VISIT HI MDM: CPT | Mod: 25

## 2024-01-11 PROCEDURE — 84443 ASSAY THYROID STIM HORMONE: CPT | Performed by: EMERGENCY MEDICINE

## 2024-01-11 PROCEDURE — 63600175 PHARM REV CODE 636 W HCPCS: Performed by: INTERNAL MEDICINE

## 2024-01-11 PROCEDURE — 85610 PROTHROMBIN TIME: CPT | Performed by: EMERGENCY MEDICINE

## 2024-01-11 RX ORDER — ATORVASTATIN CALCIUM 40 MG/1
80 TABLET, FILM COATED ORAL DAILY
Status: DISCONTINUED | OUTPATIENT
Start: 2024-01-12 | End: 2024-01-15 | Stop reason: HOSPADM

## 2024-01-11 RX ORDER — SODIUM CHLORIDE 9 MG/ML
INJECTION, SOLUTION INTRAVENOUS CONTINUOUS
Status: DISCONTINUED | OUTPATIENT
Start: 2024-01-11 | End: 2024-01-12

## 2024-01-11 RX ORDER — SODIUM CHLORIDE 0.9 % (FLUSH) 0.9 %
10 SYRINGE (ML) INJECTION
Status: DISCONTINUED | OUTPATIENT
Start: 2024-01-11 | End: 2024-01-15 | Stop reason: HOSPADM

## 2024-01-11 RX ORDER — INSULIN ASPART 100 [IU]/ML
0-5 INJECTION, SOLUTION INTRAVENOUS; SUBCUTANEOUS EVERY 6 HOURS PRN
Status: DISCONTINUED | OUTPATIENT
Start: 2024-01-12 | End: 2024-01-12

## 2024-01-11 RX ORDER — LABETALOL HYDROCHLORIDE 5 MG/ML
10 INJECTION, SOLUTION INTRAVENOUS
Status: DISCONTINUED | OUTPATIENT
Start: 2024-01-11 | End: 2024-01-15 | Stop reason: HOSPADM

## 2024-01-11 RX ORDER — ASPIRIN 325 MG
325 TABLET, DELAYED RELEASE (ENTERIC COATED) ORAL DAILY
Status: DISCONTINUED | OUTPATIENT
Start: 2024-01-12 | End: 2024-01-15 | Stop reason: HOSPADM

## 2024-01-11 RX ORDER — ENOXAPARIN SODIUM 100 MG/ML
40 INJECTION SUBCUTANEOUS EVERY 24 HOURS
Status: DISCONTINUED | OUTPATIENT
Start: 2024-01-11 | End: 2024-01-15 | Stop reason: HOSPADM

## 2024-01-11 RX ORDER — NAPROXEN SODIUM 220 MG/1
324 TABLET, FILM COATED ORAL
Status: COMPLETED | OUTPATIENT
Start: 2024-01-11 | End: 2024-01-11

## 2024-01-11 RX ORDER — GLUCAGON 1 MG
1 KIT INJECTION
Status: DISCONTINUED | OUTPATIENT
Start: 2024-01-12 | End: 2024-01-12

## 2024-01-11 RX ADMIN — ENOXAPARIN SODIUM 40 MG: 100 INJECTION SUBCUTANEOUS at 11:01

## 2024-01-11 RX ADMIN — SODIUM CHLORIDE: 9 INJECTION, SOLUTION INTRAVENOUS at 11:01

## 2024-01-11 RX ADMIN — INSULIN DETEMIR 10 UNITS: 100 INJECTION, SOLUTION SUBCUTANEOUS at 11:01

## 2024-01-11 RX ADMIN — ASPIRIN 81 MG CHEWABLE TABLET 324 MG: 81 TABLET CHEWABLE at 10:01

## 2024-01-12 LAB
ALBUMIN SERPL-MCNC: 3.7 G/DL (ref 3.4–4.8)
ALBUMIN/GLOB SERPL: 1.5 RATIO (ref 1.1–2)
ALP SERPL-CCNC: 78 UNIT/L (ref 40–150)
ALT SERPL-CCNC: 12 UNIT/L (ref 0–55)
ANION GAP SERPL CALC-SCNC: 16 MMOL/L (ref 8–16)
APPEARANCE UR: CLEAR
APTT PPP: 34.8 SECONDS (ref 23.2–33.7)
AST SERPL-CCNC: 14 UNIT/L (ref 5–34)
AV INDEX (PROSTH): 0.7
AV MEAN GRADIENT: 3 MMHG
AV PEAK GRADIENT: 6 MMHG
AV VELOCITY RATIO: 0.69
BACTERIA #/AREA URNS AUTO: ABNORMAL /HPF
BASOPHILS # BLD AUTO: 0.07 X10(3)/MCL
BASOPHILS NFR BLD AUTO: 0.8 %
BILIRUB SERPL-MCNC: 0.9 MG/DL
BILIRUB UR QL STRIP.AUTO: NEGATIVE
BSA FOR ECHO PROCEDURE: 1.59 M2
BUN SERPL-MCNC: 13 MG/DL (ref 6–30)
BUN SERPL-MCNC: 14.7 MG/DL (ref 8.4–25.7)
CALCIUM SERPL-MCNC: 9.1 MG/DL (ref 8.8–10)
CHLORIDE SERPL-SCNC: 104 MMOL/L (ref 98–107)
CHLORIDE SERPL-SCNC: 99 MMOL/L (ref 95–110)
CK MB SERPL-MCNC: 1.1 NG/ML
CO2 SERPL-SCNC: 27 MMOL/L (ref 23–31)
COLOR UR AUTO: COLORLESS
CREAT SERPL-MCNC: 0.81 MG/DL (ref 0.73–1.18)
CREAT SERPL-MCNC: 0.9 MG/DL (ref 0.5–1.4)
CRP SERPL-MCNC: <1 MG/L
CV ECHO LV RWT: 0.57 CM
DOP CALC AO PEAK VEL: 1.24 M/S
DOP CALC AO VTI: 26.5 CM
DOP CALC LVOT PEAK VEL: 0.86 M/S
DOP CALCLVOT PEAK VEL VTI: 18.6 CM
E WAVE DECELERATION TIME: 209 MSEC
E/A RATIO: 1.1
E/E' RATIO: 10.32 M/S
ECHO LV POSTERIOR WALL: 1 CM (ref 0.6–1.1)
EOSINOPHIL # BLD AUTO: 0.14 X10(3)/MCL (ref 0–0.9)
EOSINOPHIL NFR BLD AUTO: 1.5 %
ERYTHROCYTE [DISTWIDTH] IN BLOOD BY AUTOMATED COUNT: 12.5 % (ref 11.5–17)
ERYTHROCYTE [SEDIMENTATION RATE] IN BLOOD: 9 MM/HR (ref 0–15)
FRACTIONAL SHORTENING: 37 % (ref 28–44)
GFR SERPLBLD CREATININE-BSD FMLA CKD-EPI: >60 MLS/MIN/1.73/M2
GLOBULIN SER-MCNC: 2.4 GM/DL (ref 2.4–3.5)
GLUCOSE SERPL-MCNC: 197 MG/DL (ref 82–115)
GLUCOSE SERPL-MCNC: 200 MG/DL (ref 70–110)
GLUCOSE UR QL STRIP.AUTO: ABNORMAL
HCT VFR BLD AUTO: 40.6 % (ref 42–52)
HCT VFR BLD CALC: 41 %PCV (ref 36–54)
HGB BLD-MCNC: 13.2 G/DL (ref 14–18)
HGB BLD-MCNC: 14 G/DL
IMM GRANULOCYTES # BLD AUTO: 0.04 X10(3)/MCL (ref 0–0.04)
IMM GRANULOCYTES NFR BLD AUTO: 0.4 %
INR PPP: 1
INTERVENTRICULAR SEPTUM: 1 CM (ref 0.6–1.1)
KETONES UR QL STRIP.AUTO: ABNORMAL
LEFT ATRIUM SIZE: 3.4 CM
LEFT ATRIUM VOLUME INDEX MOD: 21 ML/M2
LEFT ATRIUM VOLUME MOD: 33.2 CM3
LEFT CCA DIST DIAS: 11 CM/S
LEFT CCA DIST SYS: 111 CM/S
LEFT CCA PROX DIAS: 9 CM/S
LEFT CCA PROX SYS: 116 CM/S
LEFT ECA DIAS: 0 CM/S
LEFT ECA SYS: 165 CM/S
LEFT ICA DIST DIAS: 12 CM/S
LEFT ICA DIST SYS: 80 CM/S
LEFT ICA MID DIAS: 13 CM/S
LEFT ICA MID SYS: 115 CM/S
LEFT ICA PROX DIAS: 20 CM/S
LEFT ICA PROX SYS: 149 CM/S
LEFT INTERNAL DIMENSION IN SYSTOLE: 2.2 CM (ref 2.1–4)
LEFT VENTRICLE DIASTOLIC VOLUME INDEX: 32.22 ML/M2
LEFT VENTRICLE DIASTOLIC VOLUME: 50.9 ML
LEFT VENTRICLE MASS INDEX: 65 G/M2
LEFT VENTRICLE SYSTOLIC VOLUME INDEX: 10.3 ML/M2
LEFT VENTRICLE SYSTOLIC VOLUME: 16.2 ML
LEFT VENTRICULAR INTERNAL DIMENSION IN DIASTOLE: 3.5 CM (ref 3.5–6)
LEFT VENTRICULAR MASS: 103.35 G
LEFT VERTEBRAL DIAS: 5 CM/S
LEFT VERTEBRAL SYS: 119 CM/S
LEUKOCYTE ESTERASE UR QL STRIP.AUTO: NEGATIVE
LV LATERAL E/E' RATIO: 9.8 M/S
LV SEPTAL E/E' RATIO: 10.89 M/S
LVOT MG: 1 MMHG
LVOT MV: 0.55 CM/S
LYMPHOCYTES # BLD AUTO: 3.34 X10(3)/MCL (ref 0.6–4.6)
LYMPHOCYTES NFR BLD AUTO: 36.1 %
MAGNESIUM SERPL-MCNC: 2 MG/DL (ref 1.6–2.6)
MCH RBC QN AUTO: 31 PG (ref 27–31)
MCHC RBC AUTO-ENTMCNC: 32.5 G/DL (ref 33–36)
MCV RBC AUTO: 95.3 FL (ref 80–94)
MONOCYTES # BLD AUTO: 0.79 X10(3)/MCL (ref 0.1–1.3)
MONOCYTES NFR BLD AUTO: 8.5 %
MV PEAK A VEL: 0.89 M/S
MV PEAK E VEL: 0.98 M/S
NEUTROPHILS # BLD AUTO: 4.88 X10(3)/MCL (ref 2.1–9.2)
NEUTROPHILS NFR BLD AUTO: 52.7 %
NITRITE UR QL STRIP.AUTO: NEGATIVE
NRBC BLD AUTO-RTO: 0 %
OHS CV CAROTID RIGHT ICA EDV HIGHEST: 10
OHS CV CAROTID ULTRASOUND LEFT ICA/CCA RATIO: 1.34
OHS CV CAROTID ULTRASOUND RIGHT ICA/CCA RATIO: 1.12
OHS CV PV CAROTID LEFT HIGHEST CCA: 116
OHS CV PV CAROTID LEFT HIGHEST ICA: 149
OHS CV PV CAROTID RIGHT HIGHEST CCA: 99
OHS CV PV CAROTID RIGHT HIGHEST ICA: 110
OHS CV US CAROTID LEFT HIGHEST EDV: 20
OHS LV EJECTION FRACTION SIMPSONS BIPLANE MOD: 55 %
PH UR STRIP.AUTO: 6.5 [PH]
PHOSPHATE SERPL-MCNC: 3.2 MG/DL (ref 2.3–4.7)
PLATELET # BLD AUTO: 189 X10(3)/MCL (ref 130–400)
PMV BLD AUTO: 9.7 FL (ref 7.4–10.4)
POC IONIZED CALCIUM: 1.18 MMOL/L (ref 1.06–1.42)
POC TCO2 (MEASURED): 28 MMOL/L (ref 23–29)
POCT GLUCOSE: 210 MG/DL (ref 70–110)
POCT GLUCOSE: 244 MG/DL (ref 70–110)
POCT GLUCOSE: 317 MG/DL (ref 70–110)
POCT GLUCOSE: 68 MG/DL (ref 70–110)
POTASSIUM BLD-SCNC: 4.4 MMOL/L (ref 3.5–5.1)
POTASSIUM SERPL-SCNC: 4 MMOL/L (ref 3.5–5.1)
PROT SERPL-MCNC: 6.1 GM/DL (ref 5.8–7.6)
PROT UR QL STRIP.AUTO: NEGATIVE
PROTHROMBIN TIME: 13.3 SECONDS (ref 12.5–14.5)
PV PEAK GRADIENT: 4 MMHG
PV PEAK VELOCITY: 0.96 M/S
RA PRESSURE ESTIMATED: 3 MMHG
RBC # BLD AUTO: 4.26 X10(6)/MCL (ref 4.7–6.1)
RBC #/AREA URNS AUTO: ABNORMAL /HPF
RBC UR QL AUTO: NEGATIVE
RIGHT CCA DIST DIAS: 4 CM/S
RIGHT CCA DIST SYS: 98 CM/S
RIGHT CCA PROX DIAS: 7 CM/S
RIGHT CCA PROX SYS: 99 CM/S
RIGHT ECA DIAS: 0 CM/S
RIGHT ECA SYS: 130 CM/S
RIGHT ICA DIST DIAS: 10 CM/S
RIGHT ICA DIST SYS: 80 CM/S
RIGHT ICA MID DIAS: 9 CM/S
RIGHT ICA MID SYS: 69 CM/S
RIGHT ICA PROX DIAS: 8 CM/S
RIGHT ICA PROX SYS: 110 CM/S
RIGHT VERTEBRAL DIAS: 7 CM/S
RIGHT VERTEBRAL SYS: 85 CM/S
SAMPLE: ABNORMAL
SODIUM BLD-SCNC: 138 MMOL/L (ref 136–145)
SODIUM SERPL-SCNC: 139 MMOL/L (ref 136–145)
SP GR UR STRIP.AUTO: 1.02 (ref 1–1.03)
SQUAMOUS #/AREA URNS LPF: ABNORMAL /HPF
TDI LATERAL: 0.1 M/S
TDI SEPTAL: 0.09 M/S
TDI: 0.1 M/S
TRICUSPID ANNULAR PLANE SYSTOLIC EXCURSION: 2.14 CM
TROPONIN I SERPL-MCNC: <0.01 NG/ML (ref 0–0.04)
UROBILINOGEN UR STRIP-ACNC: NORMAL
VIT B12 SERPL-MCNC: 837 PG/ML (ref 213–816)
WBC # SPEC AUTO: 9.26 X10(3)/MCL (ref 4.5–11.5)
WBC #/AREA URNS AUTO: ABNORMAL /HPF
Z-SCORE OF LEFT VENTRICULAR DIMENSION IN END DIASTOLE: -2.57
Z-SCORE OF LEFT VENTRICULAR DIMENSION IN END SYSTOLE: -1.93

## 2024-01-12 PROCEDURE — 25000003 PHARM REV CODE 250: Performed by: INTERNAL MEDICINE

## 2024-01-12 PROCEDURE — 99223 1ST HOSP IP/OBS HIGH 75: CPT | Mod: ,,, | Performed by: PSYCHIATRY & NEUROLOGY

## 2024-01-12 PROCEDURE — 82553 CREATINE MB FRACTION: CPT | Performed by: INTERNAL MEDICINE

## 2024-01-12 PROCEDURE — 63600175 PHARM REV CODE 636 W HCPCS

## 2024-01-12 PROCEDURE — 81001 URINALYSIS AUTO W/SCOPE: CPT | Performed by: INTERNAL MEDICINE

## 2024-01-12 PROCEDURE — 92523 SPEECH SOUND LANG COMPREHEN: CPT

## 2024-01-12 PROCEDURE — 21400001 HC TELEMETRY ROOM

## 2024-01-12 PROCEDURE — 63600175 PHARM REV CODE 636 W HCPCS: Performed by: INTERNAL MEDICINE

## 2024-01-12 PROCEDURE — 93010 ELECTROCARDIOGRAM REPORT: CPT | Mod: ,,, | Performed by: INTERNAL MEDICINE

## 2024-01-12 PROCEDURE — 85730 THROMBOPLASTIN TIME PARTIAL: CPT | Performed by: INTERNAL MEDICINE

## 2024-01-12 PROCEDURE — 85025 COMPLETE CBC W/AUTO DIFF WBC: CPT | Performed by: INTERNAL MEDICINE

## 2024-01-12 PROCEDURE — 82607 VITAMIN B-12: CPT | Performed by: INTERNAL MEDICINE

## 2024-01-12 PROCEDURE — 85652 RBC SED RATE AUTOMATED: CPT

## 2024-01-12 PROCEDURE — 80053 COMPREHEN METABOLIC PANEL: CPT | Performed by: INTERNAL MEDICINE

## 2024-01-12 PROCEDURE — 97161 PT EVAL LOW COMPLEX 20 MIN: CPT

## 2024-01-12 PROCEDURE — 11000001 HC ACUTE MED/SURG PRIVATE ROOM

## 2024-01-12 PROCEDURE — 93005 ELECTROCARDIOGRAM TRACING: CPT

## 2024-01-12 PROCEDURE — 84100 ASSAY OF PHOSPHORUS: CPT | Performed by: INTERNAL MEDICINE

## 2024-01-12 PROCEDURE — 83735 ASSAY OF MAGNESIUM: CPT | Performed by: INTERNAL MEDICINE

## 2024-01-12 PROCEDURE — 86140 C-REACTIVE PROTEIN: CPT

## 2024-01-12 PROCEDURE — 25000003 PHARM REV CODE 250

## 2024-01-12 PROCEDURE — 84484 ASSAY OF TROPONIN QUANT: CPT | Performed by: INTERNAL MEDICINE

## 2024-01-12 PROCEDURE — 85610 PROTHROMBIN TIME: CPT | Performed by: INTERNAL MEDICINE

## 2024-01-12 RX ORDER — IBUPROFEN 200 MG
16 TABLET ORAL
Status: DISCONTINUED | OUTPATIENT
Start: 2024-01-12 | End: 2024-01-15 | Stop reason: HOSPADM

## 2024-01-12 RX ORDER — CARVEDILOL 3.12 MG/1
6.25 TABLET ORAL 2 TIMES DAILY
Status: DISCONTINUED | OUTPATIENT
Start: 2024-01-12 | End: 2024-01-15 | Stop reason: HOSPADM

## 2024-01-12 RX ORDER — IBUPROFEN 200 MG
24 TABLET ORAL
Status: DISCONTINUED | OUTPATIENT
Start: 2024-01-12 | End: 2024-01-15 | Stop reason: HOSPADM

## 2024-01-12 RX ORDER — INSULIN ASPART 100 [IU]/ML
0-10 INJECTION, SOLUTION INTRAVENOUS; SUBCUTANEOUS
Status: DISCONTINUED | OUTPATIENT
Start: 2024-01-12 | End: 2024-01-15 | Stop reason: HOSPADM

## 2024-01-12 RX ORDER — SODIUM CHLORIDE 9 MG/ML
INJECTION, SOLUTION INTRAVENOUS CONTINUOUS
Status: DISCONTINUED | OUTPATIENT
Start: 2024-01-12 | End: 2024-01-12

## 2024-01-12 RX ORDER — SODIUM CHLORIDE 9 MG/ML
INJECTION, SOLUTION INTRAVENOUS CONTINUOUS
Status: ACTIVE | OUTPATIENT
Start: 2024-01-12 | End: 2024-01-13

## 2024-01-12 RX ORDER — GLUCAGON 1 MG
1 KIT INJECTION
Status: DISCONTINUED | OUTPATIENT
Start: 2024-01-12 | End: 2024-01-15 | Stop reason: HOSPADM

## 2024-01-12 RX ADMIN — INSULIN DETEMIR 10 UNITS: 100 INJECTION, SOLUTION SUBCUTANEOUS at 09:01

## 2024-01-12 RX ADMIN — INSULIN ASPART 8 UNITS: 100 INJECTION, SOLUTION INTRAVENOUS; SUBCUTANEOUS at 05:01

## 2024-01-12 RX ADMIN — SODIUM CHLORIDE: 9 INJECTION, SOLUTION INTRAVENOUS at 01:01

## 2024-01-12 RX ADMIN — SODIUM CHLORIDE: 9 INJECTION, SOLUTION INTRAVENOUS at 10:01

## 2024-01-12 RX ADMIN — CARVEDILOL 6.25 MG: 3.12 TABLET, FILM COATED ORAL at 10:01

## 2024-01-12 RX ADMIN — ENOXAPARIN SODIUM 40 MG: 100 INJECTION SUBCUTANEOUS at 05:01

## 2024-01-12 RX ADMIN — CARVEDILOL 6.25 MG: 3.12 TABLET, FILM COATED ORAL at 09:01

## 2024-01-12 RX ADMIN — ATORVASTATIN CALCIUM 80 MG: 40 TABLET, FILM COATED ORAL at 09:01

## 2024-01-12 RX ADMIN — INSULIN ASPART 2 UNITS: 100 INJECTION, SOLUTION INTRAVENOUS; SUBCUTANEOUS at 11:01

## 2024-01-12 RX ADMIN — ASPIRIN 325 MG: 325 TABLET, COATED ORAL at 09:01

## 2024-01-12 NOTE — PT/OT/SLP PROGRESS
Attempted OT eval at 1325. While obtaining pt history, pt unable to state current month and year and unable to recall wife's name. Wife present and stating this is similar to confusion that began last night, although pt never had trouble recalling her name before and he knew the year earlier today. RN made aware and notified neurology. Stat CT ordered and OT evaluation terminated. Will follow up as appropriate.

## 2024-01-12 NOTE — H&P
[facility] Hospital Medicine History & Physical Examination       Patient Name: Federico Villarreal  MRN: [id number]  Patient Class: IP- Inpatient   Admission Date: 1/11/2024  9:16 PM  Length of Stay: 0  Admitting Service: Hospital Medicine   Attending Physician: Shaw Holt MD   Primary Care Provider: Phyllis Hart FNP  History source: EMR, patient and/or patient's family    CHIEF COMPLAINT   Aphasia    HISTORY OF PRESENT ILLNESS:   Patient is a 63-year-old male with a history of type 1 diabetes mellitus, CAD, HTN, HLD who was brought to the ER when he became aphasic and ataxic abruptly getting outpatient laboratory work done.  He was urgently brought to the ER where shortly thereafter all of his symptoms did resolve.  He arrived afebrile hemodynamically stable maintaining normal sats on room air.  Laboratory work was unremarkable.  CTA of the head and neck showed no evidence of high-grade stenosis or occlusion, proximal left ICA was 60% and both carotid siphons with 34%.  CT head showed no acute process.  Neurology was consulted in the ER and patient is being admitted under stroke protocol.    PAST MEDICAL HISTORY:   Type 1 diabetes mellitus   Coronary artery disease, stents 2012  Essential hypertension  Hyperlipidemia  Hard of hearing  Tobacco use    PAST SURGICAL HISTORY:     Past Surgical History:   Procedure Laterality Date    ADENOIDECTOMY      ADENOIDECTOMY  1972    As a child    CORONARY STENT PLACEMENT  08/20/2012    EYE SURGERY  2011    Multiple    INNER EAR SURGERY      REPAIR OF RETINAL DETACHMENT WITH VITRECTOMY      TONSILLECTOMY      VITRECTOMY         ALLERGIES:   Patient has no known allergies.    FAMILY HISTORY:   Reviewed and non-contributory     SOCIAL HISTORY:     Social History     Tobacco Use    Smoking status: Every Day     Average packs/day: 0.2 packs/day for 16.0 years (3.8 ttl pk-yrs)     Types: Cigarettes     Start date: 2008    Smokeless tobacco:  "Never   Substance Use Topics    Alcohol use: Not Currently        HOME MEDICATIONS:     Prior to Admission medications    Medication Sig Start Date End Date Taking? Authorizing Provider   aspirin (ECOTRIN) 81 MG EC tablet Take 81 mg by mouth once daily.    Provider, Historical   carvediloL (COREG) 6.25 MG tablet Take 1 tablet (6.25 mg total) by mouth 2 (two) times daily. 12/13/23 12/12/24  Phyllis Hart FNP   cyanocobalamin, vitamin B-12, (VITAMIN B-12 ORAL)   OTC, 0 Refill(s) 9/17/21   Provider, Historical   insulin aspart U-100 (NOVOLOG) 100 unit/mL (3 mL) InPn pen Inject 3-5 Units into the skin 3 (three) times daily with meals. 9/13/23 5/10/24  Phyllis Hart FNP   insulin detemir U-100, Levemir, (LEVEMIR FLEXTOUCH U100 INSULIN) 100 unit/mL (3 mL) InPn pen Inject 15 Units into the skin 2 (two) times daily. 9/13/23   Phyllis Hart FNP   mv-min/folic/K1/lycopen/lutein (CENTRUM SILVER MEN ORAL) Take by mouth.    Provider, Historical   mv-mn/iron/folic acid/herb 190 (VITAMIN D3 COMPLETE ORAL) Take 50 mcg by mouth Daily. 4/26/22   Provider, Historical   nitroGLYCERIN (NITROSTAT) 0.4 MG SL tablet Place 1 tablet (0.4 mg total) under the tongue every 5 (five) minutes as needed for Chest pain. Up to 3 doses, then call 911 12/13/23 12/12/24  Phyllis Hart FNP   pen needle, diabetic 32 gauge x 5/32" Ndle Use for insulin administration up to three times a day for long-acting and rapid-acting insulin 9/13/23   Phyllis Hart FNP   rosuvastatin (CRESTOR) 40 MG Tab Take 1 tablet (40 mg total) by mouth every evening. 3/22/23 3/21/24  Phyllis Hart FNP       REVIEW OF SYSTEMS:   Except as documented, all other systems reviewed and negative     PHYSICAL EXAM:   T 98.4 °F (36.9 °C)   BP (!) 164/81   P 91   RR 14   O2 97 %  GENERAL: awake, alert, oriented and in no acute distress, non-toxic appearing   HEENT: normocephalic atraumatic   NECK: supple   LUNGS: Clear bilaterally, no wheezing or rales, no accessory muscle " "use   CVS: Regular rate and rhythm, normal peripheral perfusion  ABD: Soft, non-tender, non-distended, bowel sounds present  EXTREMITIES: no clubbing or cyanosis  SKIN: Warm, dry.   NEURO: alert and oriented, grossly without focal deficits, occasional word-finding difficulty  PSYCHIATRIC: Cooperative    LABS AND IMAGING:     Recent Labs     01/11/24 2155   WBC 7.14   RBC 4.47*   HGB 14.3   HCT 41.6*   MCV 93.1   MCH 32.0*   MCHC 34.4   RDW 12.6        No results for input(s): "LACTIC" in the last 72 hours.  Recent Labs     01/11/24  2225   INR 1.0     Recent Labs     01/11/24 2155   CHOL 243*   TRIG 206*   .00   VLDL 41   HDL 64*      Recent Labs     01/11/24 2155      K 4.4   CHLORIDE 103   CO2 28   BUN 12.9   CREATININE 0.94   GLUCOSE 199*   CALCIUM 9.7   ALBUMIN 3.9   GLOBULIN 3.0   ALKPHOS 79   ALT 16   AST 16   BILITOT 1.0   TSH 1.637     No results for input(s): "BNP", "CPK", "TROPONINI" in the last 72 hours.       CTA Head and Neck (xpd)  Narrative: EXAMINATION:  CTA HEAD AND NECK (XPD)    CTA of the head and neck    CLINICAL HISTORY:  Neuro deficit, acute, stroke suspected;    TECHNIQUE:  Noncontrast CT images of the head were obtained.  CT angiogram was performed from the level of the reg to the top of the head following the IV administration of contrast  .   Sagittal and coronal reconstructions and maximum intensity projection reconstructions were performed. Arterial stenosis percentages are based on NASCET measurement criteria.  3D MIP images are obtained    Total DLP: 1383 mGy.cm    Automatic exposure control was utilized to reduce the patient's dose    COMPARISON:  10/01/2018    FINDINGS:  CT of the head report dictated separately no abnormal.  Enhanced tendon of the brain parenchyma after contrast administration.    Three-vessel aortic arch is identified.  The origin of the great vessels of the neck appear normal.  The origin of the vertebral arteries demonstrate normal " appearance.  Both vertebral arteries demonstrate normal cephalad course.  Codominant vertebral arteries identified.  There are moderate atherosclerotic calcifications of the intracranial vertebral arteries resulting in 50% stenosis of the left vertebral artery and 20% stenosis of the right vertebral artery.  Both vertebral arteries form a patent basilar artery.  The basilar artery demonstrates minimal stable calcifications without stenosis.  Posterior circulation is widely patent.    Both common carotid arteries demonstrate normal cephalad course.  There are moderate severe atherosclerotic calcifications at the left carotid bifurcation resulting in 60% luminal stenosis of the left proximal ICA.  The right carotid bifurcation appears unremarkable.  Both ICAs demonstrate normal cephalad course.  There are moderate atherosclerotic calcifications of both carotid siphons resulting in areas of 30-40% stenosis.  The anterior circulation is patent.  There is no high-grade stenosis or occlusion evident.  There is no aneurysm or vascular malformation seen.  Impression: Approximately 60% stenosis of the proximal left ICA    34% stenosis of both carotid siphons.    No high-grade stenosis or occlusion.    Electronically signed by: Harris Pemberton MD  Date:    01/11/2024  Time:    21:41  CT Head Without Contrast  Narrative: EXAMINATION:  CT of the head without contrast    CLINICAL HISTORY:  Slurred speech, altered mental status    COMPARISON:  10/01/2018    TECHNIQUE:  Routine CT of the head was performed without contrast    Total DLP: 1007 mGy.cm    Automatic exposure control was utilized to reduce the patient's dose    FINDINGS:  No acute intra-cranial hemorrhage, midline shift, mass effect or extra-axial collection.    The ventricles and sulci are globally and proportionately prominent, compatible with moderate involutional changes of the brain.    There are mild  areas of decreased attenuation in the periventricular and  subcortical white matter, while nonspecific, most commonly sequelae of chronic microvascular ischemia.    Remote bilateral basal ganglia and thalamic lacunar infarcts noted.    No sulcal effacement.  Normal grey-white matter differentiation.    There is hyperdense material noted within left globe possibly related to; material in treatment of retinal detachment.    Visualized osseous structures are unremarkable.  Visualized paranasal sinuses and mastoid air cells are clear.  there is evidence for prior right cataract surgery.  Impression: No acute intracranial abnormality.  Chronic changes as above.    Electronically signed by: Harris Pemberton MD  Date:    01/11/2024  Time:    21:34      ASSESSMENT & PLAN:   Aphasia/ataxia, resolved, likely TIA    HX:  DM1, CAD, HTN, HLD, tobacco use     - stroke protocol, q.4 hours neuro checks and telemetry monitoring  - MRI brain, carotid ultrasound an echocardiogram  - permissive hypertension with parameters  - aspirin, statin at discharge  - neurology consultation    DVT prophylaxis: lovenox  Code status: full     If patient was admitted under observational status it is with my approval/permission.     At least 55 min was spent on this history and physical.  Time seen: 11PM 1/11/24  Shaw Holt MD

## 2024-01-12 NOTE — ASSESSMENT & PLAN NOTE
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  Will review imaging with Dr. Rodriguez during rounds.  Further recommendations to follow.

## 2024-01-12 NOTE — NURSING
Nurses Note -- 4 Eyes      1/12/2024   2:46 PM      Skin assessed during: Admit      [x] No Altered Skin Integrity Present    []Prevention Measures Documented      [] Yes- Altered Skin Integrity Present or Discovered   [] LDA Added if Not in Epic (Describe Wound)   [] New Altered Skin Integrity was Present on Admit and Documented in LDA   [] Wound Image Taken    Wound Care Consulted? No    Attending Nurse:  Anthony Tobias RN/Staff Member:  Anayeli

## 2024-01-12 NOTE — ED PROVIDER NOTES
Encounter Date: 1/11/2024    SCRIBE #1 NOTE: I, Richard Parker, am scribing for, and in the presence of,  Clarke Portillo MD. I have scribed the entire note.       History     Chief Complaint   Patient presents with    Aphasia     Pt arrives via [facility], EMS reports last known well at 1920, family told EMS the pt was aphasic, unsteady, altered. On arrival, pt presents with mild aphasia, no unilateral weakness, no facial asymmetry, pt is able to answer most questions appropriately. Left sided Headache, EMS reports no blood thinners, however pt does have cardiac stents.      63 year old male with a hx of CAD, DM, HTN, HLD, and tobacco use presents to the ED via EMS for aphasia. Pt was last seen normal at 1920. Family told EMS that the pt had slurred speech, an unsteady gait, and aphasia. Upon EMS arrival, pt was no longer experiencing slurred speech. Pt typically ambulates with a steady gait at baseline. Pt is having difficulty responding to questions upon arrival.     The history is provided by the EMS personnel. No  was used.     Review of patient's allergies indicates:  No Known Allergies  No past medical history on file.  Past Surgical History:   Procedure Laterality Date    ADENOIDECTOMY      ADENOIDECTOMY  1972    As a child    CORONARY STENT PLACEMENT  08/20/2012    EYE SURGERY  2011    Multiple    INNER EAR SURGERY      REPAIR OF RETINAL DETACHMENT WITH VITRECTOMY      TONSILLECTOMY      VITRECTOMY       Family History   Problem Relation Age of Onset    Diabetes Mother     Diabetes Father      Social History     Tobacco Use    Smoking status: Every Day     Average packs/day: 0.2 packs/day for 16.0 years (3.8 ttl pk-yrs)     Types: Cigarettes     Start date: 2008    Smokeless tobacco: Never   Substance Use Topics    Alcohol use: Not Currently    Drug use: Not Currently     Review of Systems   Constitutional:  Negative for chills and fever.   Respiratory:  Negative for cough and shortness of  breath.    Cardiovascular:  Negative for chest pain.   Gastrointestinal:  Negative for abdominal pain, nausea and vomiting.   Musculoskeletal:  Negative for myalgias.   Neurological:  Positive for speech difficulty. Negative for syncope.        Unsteady gait. aphasia   All other systems reviewed and are negative.      Physical Exam     Initial Vitals [01/11/24 2100]   BP Pulse Resp Temp SpO2   (!) 186/90 93 18 98.4 °F (36.9 °C) 96 %      MAP       --         Physical Exam    Constitutional: He appears well-developed and well-nourished. No distress.   HENT:   Head: Normocephalic and atraumatic.   Cardiovascular:  Normal rate.           Pulmonary/Chest: No respiratory distress. He has no wheezes. He has no rhonchi. He exhibits no tenderness.   Abdominal: Abdomen is soft. He exhibits no distension. There is no abdominal tenderness. There is no rebound and no guarding.   Musculoskeletal:         General: Normal range of motion.     Neurological: He is alert. He has normal strength.   Expressive aphasia. No pronator drift. Normal finger to nose ataxia.    Skin: Skin is warm and dry.   Psychiatric: He has a normal mood and affect.         ED Course   Procedures  Labs Reviewed   COMPREHENSIVE METABOLIC PANEL - Abnormal; Notable for the following components:       Result Value    Glucose Level 199 (*)     All other components within normal limits   LIPID PANEL - Abnormal; Notable for the following components:    Cholesterol Total 243 (*)     HDL Cholesterol 64 (*)     Triglyceride 206 (*)     All other components within normal limits   CBC WITH DIFFERENTIAL - Abnormal; Notable for the following components:    RBC 4.47 (*)     Hct 41.6 (*)     MCH 32.0 (*)     All other components within normal limits   CBC W/ AUTO DIFFERENTIAL    Narrative:     The following orders were created for panel order CBC W/ AUTO DIFFERENTIAL.  Procedure                               Abnormality         Status                     ---------                                -----------         ------                     CBC with Differential[[id number]]       Abnormal            Final result                 Please view results for these tests on the individual orders.   PROTIME-INR   TSH   POCT GLUCOSE, HAND-HELD DEVICE          Imaging Results              CTA Head and Neck (xpd) (Final result)  Result time 01/11/24 21:41:11   Procedure changed from CTA STROKE MULTI-PHASE     Final result by Harris Pemberton MD (01/11/24 21:41:11)                   Impression:      Approximately 60% stenosis of the proximal left ICA    34% stenosis of both carotid siphons.    No high-grade stenosis or occlusion.      Electronically signed by: Harris Pemberton MD  Date:    01/11/2024  Time:    21:41               Narrative:    EXAMINATION:  CTA HEAD AND NECK (XPD)    CTA of the head and neck    CLINICAL HISTORY:  Neuro deficit, acute, stroke suspected;    TECHNIQUE:  Noncontrast CT images of the head were obtained.  CT angiogram was performed from the level of the rge to the top of the head following the IV administration of contrast  .   Sagittal and coronal reconstructions and maximum intensity projection reconstructions were performed. Arterial stenosis percentages are based on NASCET measurement criteria.  3D MIP images are obtained    Total DLP: 1383 mGy.cm    Automatic exposure control was utilized to reduce the patient's dose    COMPARISON:  10/01/2018    FINDINGS:  CT of the head report dictated separately no abnormal.  Enhanced tendon of the brain parenchyma after contrast administration.    Three-vessel aortic arch is identified.  The origin of the great vessels of the neck appear normal.  The origin of the vertebral arteries demonstrate normal appearance.  Both vertebral arteries demonstrate normal cephalad course.  Codominant vertebral arteries identified.  There are moderate atherosclerotic calcifications of the intracranial vertebral arteries resulting in 50%  stenosis of the left vertebral artery and 20% stenosis of the right vertebral artery.  Both vertebral arteries form a patent basilar artery.  The basilar artery demonstrates minimal stable calcifications without stenosis.  Posterior circulation is widely patent.    Both common carotid arteries demonstrate normal cephalad course.  There are moderate severe atherosclerotic calcifications at the left carotid bifurcation resulting in 60% luminal stenosis of the left proximal ICA.  The right carotid bifurcation appears unremarkable.  Both ICAs demonstrate normal cephalad course.  There are moderate atherosclerotic calcifications of both carotid siphons resulting in areas of 30-40% stenosis.  The anterior circulation is patent.  There is no high-grade stenosis or occlusion evident.  There is no aneurysm or vascular malformation seen.                                       CT Head Without Contrast (Final result)  Result time 01/11/24 21:34:13   Procedure changed from CT HEAD FOR STROKE     Final result by Harris Pemberton MD (01/11/24 21:34:13)                   Impression:      No acute intracranial abnormality.  Chronic changes as above.      Electronically signed by: Harris Pemberton MD  Date:    01/11/2024  Time:    21:34               Narrative:    EXAMINATION:  CT of the head without contrast    CLINICAL HISTORY:  Slurred speech, altered mental status    COMPARISON:  10/01/2018    TECHNIQUE:  Routine CT of the head was performed without contrast    Total DLP: 1007 mGy.cm    Automatic exposure control was utilized to reduce the patient's dose    FINDINGS:  No acute intra-cranial hemorrhage, midline shift, mass effect or extra-axial collection.    The ventricles and sulci are globally and proportionately prominent, compatible with moderate involutional changes of the brain.    There are mild  areas of decreased attenuation in the periventricular and subcortical white matter, while nonspecific, most commonly sequelae of  chronic microvascular ischemia.    Remote bilateral basal ganglia and thalamic lacunar infarcts noted.    No sulcal effacement.  Normal grey-white matter differentiation.    There is hyperdense material noted within left globe possibly related to; material in treatment of retinal detachment.    Visualized osseous structures are unremarkable.  Visualized paranasal sinuses and mastoid air cells are clear.  there is evidence for prior right cataract surgery.                                       Medications   aspirin chewable tablet 324 mg (has no administration in time range)     Medical Decision Making  The differential diagnosis includes, but is not limited to, CVA, TIA, seizure, or metabolic disturbance.       Amount and/or Complexity of Data Reviewed  Labs: ordered.  Radiology: ordered.    Risk  OTC drugs.  Decision regarding hospitalization.            Scribe Attestation:   Scribe #1: I performed the above scribed service and the documentation accurately describes the services I performed. I attest to the accuracy of the note.    Attending Attestation:           Physician Attestation for Scribe:  Physician Attestation Statement for Scribe #1: I, Clarke Portillo MD, reviewed documentation, as scribed by Richard Parker in my presence, and it is both accurate and complete.             ED Course as of 01/11/24 2235   Thu Jan 11, 2024 2105 I was asked to see the patient EMS [place].  EMS reports he had ataxia expressive aphasia but that the symptoms were improving.  When I initially saw him he was able to tell me his name and where we were but did have some word-finding difficulties no arm drift no leg drift no ataxia on finger-to-nose exam.  I re-evaluated him just a few minutes later he is now speaking in full sentences and states he feels better. [LF]   2107 At this point symptoms have resolved they are consistent with a TIA. [LF]   2200 Consulted Dr. Rodriguez  [RT]   2200 Consulted hospitalist  [RT]   2221 I  discussed with Dr. Rodriguez he is aware the patient.  Agrees with plan for admission and further workup [LF]      ED Course User Index  [LF] Clarke Portillo MD  [RT] Richard Parker                             Clinical Impression:  Final diagnoses:  [R29.818] Acute focal neurological deficit  [G45.9] TIA (transient ischemic attack) (Primary)  [R47.01] Expressive aphasia          ED Disposition Condition    Admit Stable                Clarke Portillo MD  01/11/24 4401

## 2024-01-12 NOTE — SUBJECTIVE & OBJECTIVE
"No past medical history on file.    Past Surgical History:   Procedure Laterality Date    ADENOIDECTOMY      ADENOIDECTOMY  1972    As a child    CORONARY STENT PLACEMENT  08/20/2012    EYE SURGERY  2011    Multiple    INNER EAR SURGERY      REPAIR OF RETINAL DETACHMENT WITH VITRECTOMY      TONSILLECTOMY      VITRECTOMY         Review of patient's allergies indicates:  No Known Allergies    Current Neurological Medications:     No current facility-administered medications on file prior to encounter.     Current Outpatient Medications on File Prior to Encounter   Medication Sig    aspirin (ECOTRIN) 81 MG EC tablet Take 81 mg by mouth once daily.    carvediloL (COREG) 6.25 MG tablet Take 1 tablet (6.25 mg total) by mouth 2 (two) times daily.    insulin aspart U-100 (NOVOLOG) 100 unit/mL (3 mL) InPn pen Inject 3-5 Units into the skin 3 (three) times daily with meals.    insulin detemir U-100, Levemir, (LEVEMIR FLEXTOUCH U100 INSULIN) 100 unit/mL (3 mL) InPn pen Inject 15 Units into the skin 2 (two) times daily.    mv-min/folic/K1/lycopen/lutein (CENTRUM SILVER MEN ORAL) Take by mouth.    pen needle, diabetic 32 gauge x 5/32" Ndle Use for insulin administration up to three times a day for long-acting and rapid-acting insulin    rosuvastatin (CRESTOR) 40 MG Tab Take 1 tablet (40 mg total) by mouth every evening.    nitroGLYCERIN (NITROSTAT) 0.4 MG SL tablet Place 1 tablet (0.4 mg total) under the tongue every 5 (five) minutes as needed for Chest pain. Up to 3 doses, then call 911    [DISCONTINUED] cyanocobalamin, vitamin B-12, (VITAMIN B-12 ORAL)   OTC, 0 Refill(s)    [DISCONTINUED] mv-mn/iron/folic acid/herb 190 (VITAMIN D3 COMPLETE ORAL) Take 50 mcg by mouth Daily.     Family History       Problem Relation (Age of Onset)    Diabetes Mother, Father          Tobacco Use    Smoking status: Every Day     Average packs/day: 0.2 packs/day for 16.0 years (3.8 ttl pk-yrs)     Types: Cigarettes     Start date: 2008    Smokeless " tobacco: Never   Substance and Sexual Activity    Alcohol use: Not Currently    Drug use: Not Currently    Sexual activity: Yes     Partners: Female     Birth control/protection: None     Review of Systems   All other systems reviewed and are negative.    Objective:     Vital Signs (Most Recent):  Temp: 98.4 °F (36.9 °C) (01/11/24 2100)  Pulse: 78 (01/12/24 0700)  Resp: 12 (01/12/24 0700)  BP: (!) 142/88 (01/12/24 0700)  SpO2: 96 % (01/12/24 0700) Vital Signs (24h Range):  Temp:  [98.4 °F (36.9 °C)] 98.4 °F (36.9 °C)  Pulse:  [] 78  Resp:  [11-20] 12  SpO2:  [95 %-98 %] 96 %  BP: (110-186)/() 142/88     Weight: 56.7 kg (125 lb)  Body mass index is 22.14 kg/m².     Physical Exam  Vitals reviewed.   Constitutional:       Appearance: Normal appearance.   HENT:      Head: Normocephalic and atraumatic.      Ears:      Comments: Severe hearing loss       Nose: Nose normal.      Mouth/Throat:      Mouth: Mucous membranes are moist.   Eyes:      Pupils: Pupils are equal, round, and reactive to light.      Comments: Complete blindness of left eye (chronic)   Pulmonary:      Effort: Pulmonary effort is normal.   Abdominal:      Palpations: Abdomen is soft.   Musculoskeletal:         General: Normal range of motion.      Cervical back: Normal range of motion and neck supple.   Skin:     General: Skin is warm and dry.      Capillary Refill: Capillary refill takes less than 2 seconds.   Neurological:      General: No focal deficit present.      Mental Status: He is alert and oriented to person, place, and time.   Psychiatric:         Mood and Affect: Mood normal.         Behavior: Behavior normal.         Thought Content: Thought content normal.          NEUROLOGICAL EXAMINATION:     MENTAL STATUS   Oriented to person, place, and time.     CRANIAL NERVES     CN III, IV, VI   Pupils are equal, round, and reactive to light.      Significant Labs: CBC:   Recent Labs   Lab 01/11/24  2155 01/12/24  0346   WBC 7.14 9.26    HGB 14.3 13.2*   HCT 41.6* 40.6*    189     CMP:   Recent Labs   Lab 01/11/24  2155 01/12/24  0346    139   K 4.4 4.0   CO2 28 27   BUN 12.9 14.7   CREATININE 0.94 0.81   CALCIUM 9.7 9.1   MG  --  2.00   ALBUMIN 3.9 3.7   BILITOT 1.0 0.9   ALKPHOS 79 78   AST 16 14   ALT 16 12       Significant Imaging: I have reviewed all pertinent imaging results/findings within the past 24 hours.

## 2024-01-12 NOTE — CONSULTS
[facility] - Emergency Dept  Neurology  Consult Note    Patient Name: Federico Villarreal  MRN: [id number]  Admission Date: 1/11/2024  Hospital Length of Stay: 1 days  Code Status: Full Code   Attending Provider: Cecily Pereira MD   Consulting Provider: Joanie Coles NP  Primary Care Physician: Phyllis Hart FNP  Principal Problem:TIA (transient ischemic attack)    Inpatient consult to Vascular (Stroke) Neurology  Consult performed by: Joanie Coles NP  Consult ordered by: Clarke Portillo MD      Inpatient consult to Vascular (Stroke) Neurology  Consult performed by: Joanie Coles NP  Consult ordered by: Shaw Holt MD         Subjective:     Chief Complaint:    Chief Complaint   Patient presents with    Aphasia     Pt arrives via [facility], EMS reports last known well at 1920, family told EMS the pt was aphasic, unsteady, altered. On arrival, pt presents with mild aphasia, no unilateral weakness, no facial asymmetry, pt is able to answer most questions appropriately. Left sided Headache, EMS reports no blood thinners, however pt does have cardiac stents.           HPI:   Federico Villarreal is a 63 year-old male with past medical history of DM type 1, CAD, HTN, HLD who presented to [facility] on 1/11/2024 with complaints of expressive aphasia and left sided weakness after dinner last night. Symptoms resolved shortly after arrival. CT negative, CTA head and neck showed no evidence of high-grade stenosis or occlusion, proximal left ICA was 60 % stenosis and both carotid siphons with 34%. Labs unremarkable and hemodynamically stable. Patient admitted to Hospitalist's and Neurology consulted for further evaluation.    Upon assessment, patient is lying in bed. He is very hard of hearing. Wife present at bedside. He tells me that he is back to his baseline. His wife states that they do not check his blood pressure at home but does regularly follow with PCP.  He admits that he does forget  "to take his medications at times but is compliant a majority of the time. Aspirin noted on his home medication list, but he says he is not taking.     MRI unrevealing for acute intracranial hemorrhage but did show multiple tiny foci of blooming are seen scattered in the cerebrum, which may reflect chronic petechial hemorrhage, and a  single tiny focus of blooming in the right cerebellum, which may reflect chronic petechial hemorrhage            No past medical history on file.    Past Surgical History:   Procedure Laterality Date    ADENOIDECTOMY      ADENOIDECTOMY  1972    As a child    CORONARY STENT PLACEMENT  08/20/2012    EYE SURGERY  2011    Multiple    INNER EAR SURGERY      REPAIR OF RETINAL DETACHMENT WITH VITRECTOMY      TONSILLECTOMY      VITRECTOMY         Review of patient's allergies indicates:  No Known Allergies    Current Neurological Medications:     No current facility-administered medications on file prior to encounter.     Current Outpatient Medications on File Prior to Encounter   Medication Sig    aspirin (ECOTRIN) 81 MG EC tablet Take 81 mg by mouth once daily.    carvediloL (COREG) 6.25 MG tablet Take 1 tablet (6.25 mg total) by mouth 2 (two) times daily.    insulin aspart U-100 (NOVOLOG) 100 unit/mL (3 mL) InPn pen Inject 3-5 Units into the skin 3 (three) times daily with meals.    insulin detemir U-100, Levemir, (LEVEMIR FLEXTOUCH U100 INSULIN) 100 unit/mL (3 mL) InPn pen Inject 15 Units into the skin 2 (two) times daily.    mv-min/folic/K1/lycopen/lutein (CENTRUM SILVER MEN ORAL) Take by mouth.    pen needle, diabetic 32 gauge x 5/32" Ndle Use for insulin administration up to three times a day for long-acting and rapid-acting insulin    rosuvastatin (CRESTOR) 40 MG Tab Take 1 tablet (40 mg total) by mouth every evening.    nitroGLYCERIN (NITROSTAT) 0.4 MG SL tablet Place 1 tablet (0.4 mg total) under the tongue every 5 (five) minutes as needed for Chest pain. Up to 3 doses, then call 911 "    [DISCONTINUED] cyanocobalamin, vitamin B-12, (VITAMIN B-12 ORAL)   OTC, 0 Refill(s)    [DISCONTINUED] mv-mn/iron/folic acid/herb 190 (VITAMIN D3 COMPLETE ORAL) Take 50 mcg by mouth Daily.     Family History       Problem Relation (Age of Onset)    Diabetes Mother, Father          Tobacco Use    Smoking status: Every Day     Average packs/day: 0.2 packs/day for 16.0 years (3.8 ttl pk-yrs)     Types: Cigarettes     Start date: 2008    Smokeless tobacco: Never   Substance and Sexual Activity    Alcohol use: Not Currently    Drug use: Not Currently    Sexual activity: Yes     Partners: Female     Birth control/protection: None     Review of Systems   All other systems reviewed and are negative.    Objective:     Vital Signs (Most Recent):  Temp: 98.4 °F (36.9 °C) (01/11/24 2100)  Pulse: 78 (01/12/24 0700)  Resp: 12 (01/12/24 0700)  BP: (!) 142/88 (01/12/24 0700)  SpO2: 96 % (01/12/24 0700) Vital Signs (24h Range):  Temp:  [98.4 °F (36.9 °C)] 98.4 °F (36.9 °C)  Pulse:  [] 78  Resp:  [11-20] 12  SpO2:  [95 %-98 %] 96 %  BP: (110-186)/() 142/88     Weight: 56.7 kg (125 lb)  Body mass index is 22.14 kg/m².     Physical Exam  Vitals reviewed.   Constitutional:       Appearance: Normal appearance.   HENT:      Head: Normocephalic and atraumatic.      Ears:      Comments: Severe hearing loss       Nose: Nose normal.      Mouth/Throat:      Mouth: Mucous membranes are moist.   Eyes:      Pupils: Pupils are equal, round, and reactive to light.      Comments: Complete blindness of left eye (chronic)   Pulmonary:      Effort: Pulmonary effort is normal.   Abdominal:      Palpations: Abdomen is soft.   Musculoskeletal:         General: Normal range of motion.      Cervical back: Normal range of motion and neck supple.   Skin:     General: Skin is warm and dry.      Capillary Refill: Capillary refill takes less than 2 seconds.   Neurological:      General: No focal deficit present.      Mental Status: He is alert and  oriented to person, place, and time.   Psychiatric:         Mood and Affect: Mood normal.         Behavior: Behavior normal.         Thought Content: Thought content normal.          NEUROLOGICAL EXAMINATION:     MENTAL STATUS   Oriented to person, place, and time.     CRANIAL NERVES     CN III, IV, VI   Pupils are equal, round, and reactive to light.      Significant Labs: CBC:   Recent Labs   Lab 01/11/24 2155 01/12/24  0346   WBC 7.14 9.26   HGB 14.3 13.2*   HCT 41.6* 40.6*    189     CMP:   Recent Labs   Lab 01/11/24 2155 01/12/24  0346    139   K 4.4 4.0   CO2 28 27   BUN 12.9 14.7   CREATININE 0.94 0.81   CALCIUM 9.7 9.1   MG  --  2.00   ALBUMIN 3.9 3.7   BILITOT 1.0 0.9   ALKPHOS 79 78   AST 16 14   ALT 16 12       Significant Imaging: I have reviewed all pertinent imaging results/findings within the past 24 hours.  Assessment and Plan:     * TIA (transient ischemic attack)  -presented with expressive aphasia and left sided weakness with resolution of symp[toms  RF: Hypertension, CAD  Etiology: TIA 2/2 large vessel atherosclerosis    Plan:    MRI negative for acute infarct but did reveal cerebral and cerebellar micro hemorrhages, concerning for hypertensive bleed verus CAA.  Will review imaging with Dr. Rodriguez during rounds.  Further recommendations to follow.     POC update:    Imaging reviewed. Plan for DSA with Dr. Rodriguez on Monday.   NPO after midnight on Sunday 1/14.    RN called an reported some confusion and inability to recall wife's name. Repeat CT head obtained, which was negative.  Orders to give a liter bolus. Have patient lay flat and avoid hypotension.              VTE Risk Mitigation (From admission, onward)           Ordered     enoxaparin injection 40 mg  Daily         01/11/24 2242     IP VTE LOW RISK PATIENT  Once         01/11/24 2242     Place sequential compression device  Until discontinued         01/11/24 2242     Place sequential compression device  Until  discontinued         01/11/24 2003                    Thank you for your consult.  Will continue to follow.    Joanie Coles NP  Neurology  [facility] - Emergency Dept

## 2024-01-12 NOTE — ASSESSMENT & PLAN NOTE
- presented with expressive aphasia and left sided weakness with resolution of symptoms  - stroke RF: DM type I, CAD, HTN, HLD, smoker  - etiology:  TIA 2/2 large vessel atherosclerosis    Stroke workup:  -CTh:  No acute intracranial abnormality.  Chronic changes as above.  -CTA h/n:  1.  Approximately 60% stenosis of the proximal left ICA  2.  34% stenosis of both carotid siphons.  3.  No high-grade stenosis or occlusion.  -MRI brain:  1. No acute intracranial hemorrhage is identified. Multiple tiny foci of blooming are seen scattered in the cerebrum, which may reflect chronic petechial hemorrhage. There is also a single tiny focus of blooming in the right cerebellum, which may reflect chronic petechial hemorrhage.  2. No abnormal signal is identified on the diffusion images to suggest acute infarct.  3. Details and findings as above.  -repeat CTh (1/12/24 1453):  No acute intracranial findings or significant interval change compared to yesterday.  -ECHO: EF 55-60%, bubble study negative, normal LA  -CUS: right ICA no stenosis, left ICA 50-69% stenosis  -LDL: 138  -A1c: 9.2  -TSH: 1.637  -home medication list include ASA 81mg daily (not taking) and Rosuvastatin 40mg daily      Plan:  -diagnostic cerebral angiogram on Monday (1/15/24)  -NPO after MN (1/15/24)  -continue ASA 325mg daily  -continue Atorvastatin 80mg daily    Further recommendations to follow by MD.

## 2024-01-12 NOTE — HPI
Federico iVllarreal is a 63 year-old male with past medical history of DM type 1, CAD, HTN, HLD who presented to [facility] on 1/11/2024 with complaints of expressive aphasia and left sided weakness after dinner last night. Symptoms resolved shortly after arrival. CT negative, CTA head and neck showed no evidence of high-grade stenosis or occlusion, proximal left ICA was 60 % stenosis and both carotid siphons with 34%. Labs unremarkable and hemodynamically stable. Patient admitted to Hospitalist's and Neurology consulted for further evaluation.    Upon assessment, patient is lying in bed. He is very hard of hearing. Wife present at bedside. He tells me that he is back to his baseline. His wife states that they do not check his blood pressure at home but does regularly follow with PCP.  He admits that he does forget to take his medications at times but is compliant a majority of the time. Aspirin noted on his home medication list, but he says he is not taking.     MRI unrevealing for acute intracranial hemorrhage but did show multiple tiny foci of blooming are seen scattered in the cerebrum, which may reflect chronic petechial hemorrhage, and a  single tiny focus of blooming in the right cerebellum, which may reflect chronic petechial hemorrhage

## 2024-01-12 NOTE — PT/OT/SLP EVAL
Physical Therapy Evaluation and Discharge Note    Patient Name:  Federico Villarreal   MRN:  [id number]    Recommendations:     Discharge therapy intensity: No Therapy Indicated   Discharge Equipment Recommendations: none   Barriers to discharge: None    Assessment:     Federico Villarreal is a 63 y.o. male admitted with a medical diagnosis of TIA (transient ischemic attack), MRI brain negative for acute changes. MRI with possible chronic petechial hemorrhage.  At this time, patient is functioning at their prior level of function and does not require further acute PT services. Pt reports that sometimes he has trouble speaking when his blood sugar is too low  as well. Pt with hx of DM I. Pt given orange juice prior to PT eval and snack tray was provided by RN. Pt was independent with mobility with no focal weaknesses identified. No PT need identified.     Recent Surgery: * No surgery found *      Plan:     During this hospitalization, patient does not require further acute PT services.  Please re-consult if situation changes.      Subjective     Chief Complaint: blood sugar feeling low.   Patient/Family Comments/goals: to feel better.   Pain/Comfort:  Pain Rating 1: 0/10    Patients cultural, spiritual, Faith conflicts given the current situation: no    Living Environment:  Lives with wife who works at [employer].   Prior to admission, patients level of function was independent.  Equipment used at home: none.  DME owned (not currently used): none.  Upon discharge, patient will have assistance from wife.    Objective:     Communicated with nurse prior to session.  Patient found ambulatory in room/harrison with telemetry, pulse ox (continuous), peripheral IV upon PT entry to room.    General Precautions: Standard, hearing impaired    Orthopedic Precautions:    Braces: N/A  Respiratory Status: Room air  Blood Pressure:     Exams:  Sensation:    -       Impaired  light/touch chronic B foot numbness  RLE ROM: WNL  RLE Strength:  WNL  LLE ROM: WNL  LLE Strength: WNL    Functional Mobility:  Bed Mobility:     Sit to Supine: independence  Transfers:     Sit to Stand:  independence with no AD  Gait: 120ft with no AD, independently, slight wavering but no LOB.   Balance: Pt able to stand statically and dynamically with no LOB.    AM-PAC 6 CLICK MOBILITY  Total Score:24       Treatment and Education:    Patient provided with verbal education education regarding PT role/goals/POC.  Understanding was verbalized.     Patient left supine with all lines intact and call button in reach.    GOALS:   Multidisciplinary Problems       Physical Therapy Goals          Problem: Physical Therapy    Goal Priority Disciplines Outcome Goal Variances Interventions   Physical Therapy Goal     PT, PT/OT                          History:     No past medical history on file.    Past Surgical History:   Procedure Laterality Date    ADENOIDECTOMY      ADENOIDECTOMY  1972    As a child    CORONARY STENT PLACEMENT  08/20/2012    EYE SURGERY  2011    Multiple    INNER EAR SURGERY      REPAIR OF RETINAL DETACHMENT WITH VITRECTOMY      TONSILLECTOMY      VITRECTOMY         Time Tracking:     PT Received On: 01/12/24  PT Start Time: 1008     PT Stop Time: 1021  PT Total Time (min): 13 min     Billable Minutes: Evaluation 13      01/12/2024

## 2024-01-12 NOTE — PT/OT/SLP EVAL
[facility]  Speech Language Pathology Department  Cognitive-Communication Evaluation    Patient Name:  Federico Villarreal   MRN:  [id number]    Recommendations:     General recommendations:  SLP intervention not indicated  Communication strategies:  go to room if call light pushed and speak loudly (pt hard of hearing, no hearing aids present)    Discharge therapy intensity: No Therapy Indicated  Barriers to safe discharge: acuity of illness    History:     Federico Villarreal is a/n 63 y.o. male admitted for CVA workup.  Passed Lange swallow screen.  Pt endorses difficulty speaking however reports symptoms have since resolved.    No past medical history on file.  Past Surgical History:   Procedure Laterality Date    ADENOIDECTOMY      ADENOIDECTOMY  1972    As a child    CORONARY STENT PLACEMENT  08/20/2012    EYE SURGERY  2011    Multiple    INNER EAR SURGERY      REPAIR OF RETINAL DETACHMENT WITH VITRECTOMY      TONSILLECTOMY      VITRECTOMY         Previous level of Function  Education:high school  Occupation: unemployed  Lives: with spouse  Hearing Aids: no  Home Responsibilities:  wife manages finances    Imaging   Results for orders placed during the hospital encounter of 01/11/24    MRI Brain Without Contrast    Narrative  Technique:Multiplanar, multisequence magnetic resonance imaging of the brain was performed without intravenous contrast.    Comparison:Correlation is with CT study dated 2024-01-11 21:10:40.    Clinical history:Ams,cva.    Findings:    Hemorrhage:No acute intracranial hemorrhage is identified. Multiple tiny foci of blooming are seen scattered in the cerebrum, which may reflect chronic petechial hemorrhage. There is also a single tiny focus of blooming in the right cerebellum, which may reflect chronic petechial hemorrhage.    Stroke:No abnormal signal is identified on the diffusion images to suggest acute infarct.    Extra axial spaces:The ventricles and sulci and  basal cisterns all appear mildly prominent consistent with global cerebral atrophy.    Cerebral, cerebellar, and brainstem parenchyma:Mild periventricular white matter signal abnormalities are seen. The main consideration is small vessel ischemic changes in a patient of this age.  Old lacunar infarcts involve the basal ganglia and bilateral thalami.    There is left globe chronic altered signal.    Impression  Impression:    1. No acute intracranial hemorrhage is identified. Multiple tiny foci of blooming are seen scattered in the cerebrum, which may reflect chronic petechial hemorrhage. There is also a single tiny focus of blooming in the right cerebellum, which may reflect chronic petechial hemorrhage.    2. No abnormal signal is identified on the diffusion images to suggest acute infarct.    3. Details and findings as above.    No significant discrepancy with overnight report.      Electronically signed by: Devyn Trinidad  Date:    01/12/2024  Time:    07:56    Subjective     Patient awake, alert, calm, and cooperative.    Patient goals: to keep getting better     Spiritual/Cultural/Alevism Beliefs/Practices that affect care: no  Pain/Comfort: Pain Rating 1: 0/10    Objective:     ORAL MUSCULATURE  Facial Movement: WFL  Buccal Strength & Mobility: WFL  Mandibular Strength & Mobility: WFL  Oral Labial Strength & Mobility: WFL  Lingual Strength & Mobility: WFL    SPEECH PRODUCTION  Phoneme Production: adequate  Voice Quality: adequate  Voice Production: adequate  Speech Rate: appropriate  Loudness: acceptable  Respiration: WFL for speech  Resonance: adequate  Prosody: adequate  Speech Intelligibility  Known Context: Greater that 90%  Unknown Context: Greater that 90%    AUDITORY COMPREHENSION  Identification:  Objects: Within Functional Limits  Following Directions:  1-Step: Within Functional Limits  2-Step: Within Functional Limits  Yes/No Questions:  Biographical: Within Functional Limits  Environmental: Within  Functional Limits  Simple: Within Functional Limits  Complex: Within Functional Limits    VERBAL EXPRESSION  Automatic Speech:  Days of the week: Within Functional Limits  Counting: Within Functional Limits  Confrontation Naming  Objects: Within Functional Limits  Wh- Questions:  Object name: Within Functional Limits  Object function: Within Functional Limits    COGNITION  Orientation:  Person: yes  Place: yes  Time: yes  Situation: yes   Attention:  Focused: Within Functional Limits  Sustained: Within Functional Limits  Pragmatics:  Eye contact: Within Functional Limits  Facial expression: Within Functional Limits  Communicative Intent: Within Functional Limits  Memory:  Immediate: Within Functional Limits  Short Term: Impaired (mild, 7/12 on Four Word Memory task)  Long Term: Within Functional Limits  Problem Solving  Functional simple: Within Functional Limits  Functional complex: Within Functional Limits  Organization:  Convergent thinking: Within Functional Limits  Divergent thinking: Within Functional Limits  Executive Function:  Attention to detail: Within Functional Limits  Awareness: Within Functional Limits  Cognitive endurance: Within Functional Limits  Information processing: Within Functional Limits    Assessment:     Pt presents with speech, language, and cognitive abilities WFL.  Skilled SLP services not warranted, please reconsult as needed.    Goals:     Multidisciplinary Problems       SLP Goals       Not on file                  Patient Education:     Patient provided with verbal education regarding results.  Understanding was verbalized.    Plan:     SLP Follow-Up:  No   Patient to be seen:      Plan of Care expires:     Plan of Care reviewed with:         Time Tracking:     SLP Treatment Date:   01/12/24  Speech Start Time:  1110  Speech Stop Time:  1125     Speech Total Time (min):  15 min    Billable minutes:  Evaluation of Speech Sound Production with Comprehension and Expression, 15 minutes      01/12/2024

## 2024-01-13 LAB
ALBUMIN SERPL-MCNC: 3.5 G/DL (ref 3.4–4.8)
ALBUMIN/GLOB SERPL: 1.5 RATIO (ref 1.1–2)
ALP SERPL-CCNC: 70 UNIT/L (ref 40–150)
ALT SERPL-CCNC: 11 UNIT/L (ref 0–55)
AST SERPL-CCNC: 15 UNIT/L (ref 5–34)
BASOPHILS # BLD AUTO: 0.08 X10(3)/MCL
BASOPHILS NFR BLD AUTO: 1.3 %
BILIRUB SERPL-MCNC: 1.2 MG/DL
BUN SERPL-MCNC: 12.2 MG/DL (ref 8.4–25.7)
CALCIUM SERPL-MCNC: 9.1 MG/DL (ref 8.8–10)
CHLORIDE SERPL-SCNC: 106 MMOL/L (ref 98–107)
CO2 SERPL-SCNC: 28 MMOL/L (ref 23–31)
CREAT SERPL-MCNC: 0.78 MG/DL (ref 0.73–1.18)
EOSINOPHIL # BLD AUTO: 0.13 X10(3)/MCL (ref 0–0.9)
EOSINOPHIL NFR BLD AUTO: 2.1 %
ERYTHROCYTE [DISTWIDTH] IN BLOOD BY AUTOMATED COUNT: 12.5 % (ref 11.5–17)
FOLATE SERPL-MCNC: 8.3 NG/ML (ref 7–31.4)
GFR SERPLBLD CREATININE-BSD FMLA CKD-EPI: >60 MLS/MIN/1.73/M2
GLOBULIN SER-MCNC: 2.3 GM/DL (ref 2.4–3.5)
GLUCOSE SERPL-MCNC: 183 MG/DL (ref 82–115)
HCT VFR BLD AUTO: 40.1 % (ref 42–52)
HGB BLD-MCNC: 13.4 G/DL (ref 14–18)
IMM GRANULOCYTES # BLD AUTO: 0.02 X10(3)/MCL (ref 0–0.04)
IMM GRANULOCYTES NFR BLD AUTO: 0.3 %
LYMPHOCYTES # BLD AUTO: 3.06 X10(3)/MCL (ref 0.6–4.6)
LYMPHOCYTES NFR BLD AUTO: 48.6 %
MAGNESIUM SERPL-MCNC: 2 MG/DL (ref 1.6–2.6)
MCH RBC QN AUTO: 31.2 PG (ref 27–31)
MCHC RBC AUTO-ENTMCNC: 33.4 G/DL (ref 33–36)
MCV RBC AUTO: 93.5 FL (ref 80–94)
MONOCYTES # BLD AUTO: 0.56 X10(3)/MCL (ref 0.1–1.3)
MONOCYTES NFR BLD AUTO: 8.9 %
NEUTROPHILS # BLD AUTO: 2.45 X10(3)/MCL (ref 2.1–9.2)
NEUTROPHILS NFR BLD AUTO: 38.8 %
NRBC BLD AUTO-RTO: 0 %
PHOSPHATE SERPL-MCNC: 3.6 MG/DL (ref 2.3–4.7)
PLATELET # BLD AUTO: 163 X10(3)/MCL (ref 130–400)
PMV BLD AUTO: 10.1 FL (ref 7.4–10.4)
POCT GLUCOSE: 110 MG/DL (ref 70–110)
POCT GLUCOSE: 161 MG/DL (ref 70–110)
POCT GLUCOSE: 345 MG/DL (ref 70–110)
POCT GLUCOSE: >500 MG/DL (ref 70–110)
POTASSIUM SERPL-SCNC: 4.6 MMOL/L (ref 3.5–5.1)
PROT SERPL-MCNC: 5.8 GM/DL (ref 5.8–7.6)
RBC # BLD AUTO: 4.29 X10(6)/MCL (ref 4.7–6.1)
SODIUM SERPL-SCNC: 142 MMOL/L (ref 136–145)
WBC # SPEC AUTO: 6.3 X10(3)/MCL (ref 4.5–11.5)

## 2024-01-13 PROCEDURE — 97530 THERAPEUTIC ACTIVITIES: CPT

## 2024-01-13 PROCEDURE — 84100 ASSAY OF PHOSPHORUS: CPT | Performed by: INTERNAL MEDICINE

## 2024-01-13 PROCEDURE — 63600175 PHARM REV CODE 636 W HCPCS: Performed by: INTERNAL MEDICINE

## 2024-01-13 PROCEDURE — 94761 N-INVAS EAR/PLS OXIMETRY MLT: CPT

## 2024-01-13 PROCEDURE — 25000003 PHARM REV CODE 250: Performed by: INTERNAL MEDICINE

## 2024-01-13 PROCEDURE — 97165 OT EVAL LOW COMPLEX 30 MIN: CPT

## 2024-01-13 PROCEDURE — 63600175 PHARM REV CODE 636 W HCPCS

## 2024-01-13 PROCEDURE — 85025 COMPLETE CBC W/AUTO DIFF WBC: CPT | Performed by: INTERNAL MEDICINE

## 2024-01-13 PROCEDURE — 21400001 HC TELEMETRY ROOM

## 2024-01-13 PROCEDURE — 83735 ASSAY OF MAGNESIUM: CPT | Performed by: INTERNAL MEDICINE

## 2024-01-13 PROCEDURE — 80053 COMPREHEN METABOLIC PANEL: CPT | Performed by: INTERNAL MEDICINE

## 2024-01-13 PROCEDURE — 82746 ASSAY OF FOLIC ACID SERUM: CPT | Performed by: INTERNAL MEDICINE

## 2024-01-13 RX ADMIN — INSULIN ASPART 8 UNITS: 100 INJECTION, SOLUTION INTRAVENOUS; SUBCUTANEOUS at 05:01

## 2024-01-13 RX ADMIN — INSULIN ASPART 10 UNITS: 100 INJECTION, SOLUTION INTRAVENOUS; SUBCUTANEOUS at 02:01

## 2024-01-13 RX ADMIN — INSULIN DETEMIR 15 UNITS: 100 INJECTION, SOLUTION SUBCUTANEOUS at 08:01

## 2024-01-13 RX ADMIN — INSULIN DETEMIR 15 UNITS: 100 INJECTION, SOLUTION SUBCUTANEOUS at 09:01

## 2024-01-13 RX ADMIN — CARVEDILOL 6.25 MG: 3.12 TABLET, FILM COATED ORAL at 08:01

## 2024-01-13 RX ADMIN — CARVEDILOL 6.25 MG: 3.12 TABLET, FILM COATED ORAL at 09:01

## 2024-01-13 RX ADMIN — ASPIRIN 325 MG: 325 TABLET, COATED ORAL at 09:01

## 2024-01-13 RX ADMIN — ENOXAPARIN SODIUM 40 MG: 100 INJECTION SUBCUTANEOUS at 05:01

## 2024-01-13 RX ADMIN — ATORVASTATIN CALCIUM 80 MG: 40 TABLET, FILM COATED ORAL at 09:01

## 2024-01-13 NOTE — PT/OT/SLP EVAL
"Occupational Therapy   Evaluation and Discharge Note    Name: Federico Villarreal  MRN: [id number]  Admitting Diagnosis: TIA  Recent Surgery: * No surgery found *      Recommendations:     Discharge therapy intensity: No Therapy Indicated   Discharge Equipment Recommendations: none  Barriers to discharge:  None    Assessment:     Federico Villarreal is a 63 y.o. male with a medical diagnosis of TIA. On eval, patient presents with excellent effort and motivation, able to ambulated independently on unit x > 255 ft without AD, able to perform ADL's independently. Skilled OT services are not warranted at this time.    Plan:     OT to sign off as acute OT services are not warranted at this time.  Please re-consult if situation changes during this hospitalization.    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      Patient was found supine with telemetry upon OT entry to room.    General Precautions: Standard, hearing impaired  Orthopedic Precautions:    Braces:      Vital Signs:     Bed Mobility:    independent    Functional Mobility/Transfers:    Functional Mobility: ambulated without AD on unit x 255 ft independently    Activities of Daily Living:  Ambulating to BR independent  Donned shoes independentlyh    AMPAC 6 Click ADL:  AMPAC Total Score:      Functional Cognition:  intact    Visual Perceptual Skills:  intact    Upper Extremity Function:  Right Upper " Extremity:   wfl    Left Upper Extremity:  wfl    Balance:   Good sitting and standing    TAdditional Treatment:  As above    Patient Education:  Patient and spouse were provided with verbal education education regarding OT role/goals/POC, fall prevention, safety awareness, and Discharge/DME recommendations.  Understanding was verbalized.     Patient left HOB elevated with all lines intact, call button in reach, nsg notified, and wife present    History:     No past medical history on file.      Past Surgical History:   Procedure Laterality Date    ADENOIDECTOMY      ADENOIDECTOMY  1972    As a child    CORONARY STENT PLACEMENT  08/20/2012    EYE SURGERY  2011    Multiple    INNER EAR SURGERY      REPAIR OF RETINAL DETACHMENT WITH VITRECTOMY      TONSILLECTOMY      VITRECTOMY         Time Tracking:     OT Date of Treatment: 01/13/24  OT Start Time: 1028  OT Stop Time: 1048  OT Total Time (min): 20 min    Billable Minutes:Evaluation 12min  Therapeutic Activity 8 min    1/13/2024

## 2024-01-13 NOTE — HOSPITAL COURSE
1/11/24:  Presented with expressive aphasia and left sided weakness.  Symptoms resolved shortly after arriving to ED.  Admitted for stroke workup.  1/12/24:  MRI brain showed no acute infarct; multiple tiny foci of blooming seen scattered in the cerebrum (may reflect chronic petechial hemorrhage).

## 2024-01-13 NOTE — PROGRESS NOTES
[facility] Hospital Medicine Progress Note        Chief Complaint: Inpatient Follow-up for aphasia     HPI:   The pt  is a 63-year-old male with a history of type 1 diabetes mellitus, CAD s/p coronary stent, HTN, HLD, Impaired hearing  who was brought to the ER when he suddenly  developed expressive aphasia , confusion and unable to ambulate with left sided weakness after dinner last night. Symptoms resolved shortly after arrival to the ED.  He arrived afebrile hemodynamically stable,  maintaining normal sats on room air. Laboratory work was unremarkable.   CT head showed no acute process.  CTA of the head and neck showed no evidence of high-grade stenosis or occlusion, proximal left ICA was 60% and both carotid siphons with 34% stenosis. Neurology was consulted in the ER and patient is being admitted under  service for stroke workup. Pt underwent MRI brain WO contrast showed no acute intracranial process, however noted multiple tiny foci scattered in the cerebrum reflect chronic petechial hemorrhage. Echo revealed LVEF 55 to 60% and bubble study negative for shunt. Pt started on  mg and Statin 80 mg daily.       Interval Hx:   Pt had an  episode of confusion where he was unable to recall his wife's name shortly after transferred to the Stroke unit from the  ED. Neurology was notified and CT head was repeated showed no acute process.     Wife reports lately she had noticed  cognitive decline and impaired short term memory. Symptoms waxes  and wanes .     Vitals are stable , afebrile , on RA  BP intermittently mod elevated . Will monitor BP trend.     Labs showed   with HgA1c 9.2, Troponin < 0.01, TSH normal, CRP < 1.0, LDL chol 138      Case was discussed with patient's nurse and  on the floor.    Objective/physical exam:  General: In no acute distress, afebrile  Chest: Clear to auscultation bilaterally  Heart: RRR, +S1, S2, no appreciable murmur  Abdomen:  Soft, nontender, BS +  MSK: Warm, no lower extremity edema, no clubbing or cyanosis  Neurologic: Alert and oriented to self and person, Cranial nerve II-XII intact, Strength 5/5 in all 4 extremities    VITAL SIGNS: 24 HRS MIN & MAX LAST   Temp  Min: 97.5 °F (36.4 °C)  Max: 98.4 °F (36.9 °C) 97.5 °F (36.4 °C)   BP  Min: 110/48  Max: 179/72 (!) 157/75   Pulse  Min: 68  Max: 101  76   Resp  Min: 11  Max: 20 17   SpO2  Min: 95 %  Max: 98 % 95 %     I have reviewed the following labs:  Recent Labs   Lab 01/11/24 2112 01/11/24 2155 01/12/24  0346   WBC  --  7.14 9.26   RBC  --  4.47* 4.26*   HGB  --  14.3 13.2*   HCT 41 41.6* 40.6*   MCV  --  93.1 95.3*   MCH  --  32.0* 31.0   MCHC  --  34.4 32.5*   RDW  --  12.6 12.5   PLT  --  204 189   MPV  --  10.4 9.7     Recent Labs   Lab 01/11/24 2155 01/12/24  0346    139   K 4.4 4.0   CO2 28 27   BUN 12.9 14.7   CREATININE 0.94 0.81   CALCIUM 9.7 9.1   MG  --  2.00   ALBUMIN 3.9 3.7   ALKPHOS 79 78   ALT 16 12   AST 16 14   BILITOT 1.0 0.9     Microbiology Results (last 7 days)       ** No results found for the last 168 hours. **             See below for Radiology    Scheduled Med:   aspirin  325 mg Oral Daily    atorvastatin  80 mg Oral Daily    carvediloL  6.25 mg Oral BID    enoxparin  40 mg Subcutaneous Daily    insulin detemir U-100  10 Units Subcutaneous BID      Continuous Infusions:   sodium chloride 0.9% 500 mL/hr at 01/12/24 1345      PRN Meds:  dextrose 10%, dextrose 10%, dextrose 10%, dextrose 10%, glucagon (human recombinant), glucose, glucose, insulin aspart U-100, labetalol, sodium chloride 0.9%     Assessment/Plan:  Transient Ischemic attack   Cognitive impairment / Impaired short term memory   Abnormal MRI brain   Diabetes Mellitis , type 1- uncontrolled due to hyperglycemia , HgA1c 9.2  Essential HTN - poor control   Hyperlipidemia     Plan-   Continue neuro check q4h.  SLP cleared pt for regular consistency diet   Neurology recommended to avoid  hypotension. Start gentle hydration   PT/OT consulted and no therapy suggested   Cerebral angiogram with DSA planned for Monday per Dr. Rodriguez  Monitor course   Blood glucose is currently uncontrolled. Levemir increased to 15 units bid . Follow ISS.     VTE prophylaxis: Lovenox    Patient condition:  Fair    Anticipated discharge and Disposition:     Home with family     All diagnosis and differential diagnosis have been reviewed; assessment and plan has been documented; I have personally reviewed the labs and test results that are presently available; I have reviewed the patients medication list; I have reviewed the consulting providers response and recommendations. I have reviewed or attempted to review medical records based upon their availability    All of the patient's questions have been  addressed and answered. Patient's is agreeable to the above stated plan. I will continue to monitor closely and make adjustments to medical management as needed.    Cecily Pereira MD   01/12/2024

## 2024-01-14 LAB
POCT GLUCOSE: 127 MG/DL (ref 70–110)
POCT GLUCOSE: 149 MG/DL (ref 70–110)
POCT GLUCOSE: 167 MG/DL (ref 70–110)
POCT GLUCOSE: 189 MG/DL (ref 70–110)
POCT GLUCOSE: 402 MG/DL (ref 70–110)
POCT GLUCOSE: 41 MG/DL (ref 70–110)
POCT GLUCOSE: 53 MG/DL (ref 70–110)

## 2024-01-14 PROCEDURE — 63600175 PHARM REV CODE 636 W HCPCS: Performed by: INTERNAL MEDICINE

## 2024-01-14 PROCEDURE — 25000003 PHARM REV CODE 250: Performed by: INTERNAL MEDICINE

## 2024-01-14 PROCEDURE — 21400001 HC TELEMETRY ROOM

## 2024-01-14 PROCEDURE — 63600175 PHARM REV CODE 636 W HCPCS

## 2024-01-14 PROCEDURE — 94761 N-INVAS EAR/PLS OXIMETRY MLT: CPT

## 2024-01-14 RX ADMIN — CARVEDILOL 6.25 MG: 3.12 TABLET, FILM COATED ORAL at 09:01

## 2024-01-14 RX ADMIN — INSULIN ASPART 10 UNITS: 100 INJECTION, SOLUTION INTRAVENOUS; SUBCUTANEOUS at 05:01

## 2024-01-14 RX ADMIN — ENOXAPARIN SODIUM 40 MG: 100 INJECTION SUBCUTANEOUS at 04:01

## 2024-01-14 RX ADMIN — INSULIN ASPART 2 UNITS: 100 INJECTION, SOLUTION INTRAVENOUS; SUBCUTANEOUS at 09:01

## 2024-01-14 RX ADMIN — ATORVASTATIN CALCIUM 80 MG: 40 TABLET, FILM COATED ORAL at 09:01

## 2024-01-14 RX ADMIN — ASPIRIN 325 MG: 325 TABLET, COATED ORAL at 09:01

## 2024-01-14 RX ADMIN — INSULIN ASPART 3 UNITS: 100 INJECTION, SOLUTION INTRAVENOUS; SUBCUTANEOUS at 10:01

## 2024-01-14 NOTE — PROGRESS NOTES
[facility] Hospital Medicine Progress Note        Chief Complaint: Inpatient Follow-up for TIA    HPI:   The pt  is a 63-year-old male with a history of type 1 diabetes mellitus, CAD s/p coronary stent, HTN, HLD, Impaired hearing  who was brought to the ER when he suddenly  developed expressive aphasia , confusion and unable to ambulate with left sided weakness after dinner last night. Symptoms resolved shortly after arrival to the ED.  He arrived afebrile hemodynamically stable,  maintaining normal sats on room air. Laboratory work was unremarkable.   CT head showed no acute process.  CTA of the head and neck showed no evidence of high-grade stenosis or occlusion, proximal left ICA was 60% and both carotid siphons with 34% stenosis. Neurology was consulted in the ER and patient is being admitted under  service for stroke workup. Pt underwent MRI brain WO contrast showed no acute intracranial process, however noted multiple tiny foci scattered in the cerebrum reflect chronic petechial hemorrhage. Echo revealed LVEF 55 to 60% and bubble study negative for shunt. Pt started on  mg and Statin 80 mg daily.      Patient was seen by neuro team and recommended a cerebral angiogram.   Interval Hx:   Patient today awake and comfortable. Denies any weakness or extremities. Headache, blurry vision, fever or chills. Eating well and his speech is clear.     Case was discussed with patient's nurse and  on the floor.    Objective/physical exam:  General: In no acute distress  Chest: Clear to auscultation bilaterally  Heart: RRR, +S1, S2, no appreciable murmur  Abdomen: Soft, nontender, BS +  MSK: Warm, no lower extremity edema, no clubbing or cyanosis  Neurologic: Alert and oriented x4, Cranial nerve II-XII intact, Strength 5/5 in all 4 extremities    VITAL SIGNS: 24 HRS MIN & MAX LAST   Temp  Min: 97.5 °F (36.4 °C)  Max: 98.6 °F (37 °C) 98.6 °F (37 °C)   BP  Min: 126/75  Max:  168/83 (!) 168/83   Pulse  Min: 63  Max: 90  90   Resp  Min: 16  Max: 20 16   SpO2  Min: 96 %  Max: 98 % 96 %     I have reviewed the following labs:  Recent Labs   Lab 01/11/24 2155 01/12/24 0346 01/13/24 0432   WBC 7.14 9.26 6.30   RBC 4.47* 4.26* 4.29*   HGB 14.3 13.2* 13.4*   HCT 41.6* 40.6* 40.1*   MCV 93.1 95.3* 93.5   MCH 32.0* 31.0 31.2*   MCHC 34.4 32.5* 33.4   RDW 12.6 12.5 12.5    189 163   MPV 10.4 9.7 10.1     Recent Labs   Lab 01/11/24 2155 01/12/24 0346 01/13/24 0432    139 142   K 4.4 4.0 4.6   CO2 28 27 28   BUN 12.9 14.7 12.2   CREATININE 0.94 0.81 0.78   CALCIUM 9.7 9.1 9.1   MG  --  2.00 2.00   ALBUMIN 3.9 3.7 3.5   ALKPHOS 79 78 70   ALT 16 12 11   AST 16 14 15   BILITOT 1.0 0.9 1.2     Microbiology Results (last 7 days)       ** No results found for the last 168 hours. **             See below for Radiology    Scheduled Med:   aspirin  325 mg Oral Daily    atorvastatin  80 mg Oral Daily    carvediloL  6.25 mg Oral BID    enoxparin  40 mg Subcutaneous Daily    insulin detemir U-100  15 Units Subcutaneous BID      Continuous Infusions:     PRN Meds:  dextrose 10%, dextrose 10%, dextrose 10%, dextrose 10%, glucagon (human recombinant), glucose, glucose, insulin aspart U-100, labetalol, sodium chloride 0.9%     Assessment/Plan:  Transient Ischemic attack   Cognitive impairment / Impaired short term memory   Abnormal MRI brain   Diabetes Mellitis , type 1- uncontrolled due to hyperglycemia , HgA1c 9.2  Essential HTN - poor control   Hyperlipidemia      Plan:  Patient now doing well and has no new issues  No neurological deficits seen   Will cont ASA and statin   Keep on tele   Continue strict aspiration, fall and decubitus precautions      Cerebral angiogram per neuro team     VTE prophylaxis: Lovenox     Patient condition:  Fair    Anticipated discharge and Disposition:   Home       All diagnosis and differential diagnosis have been reviewed; assessment and plan has been  documented; I have personally reviewed the labs and test results that are presently available; I have reviewed the patients medication list; I have reviewed the consulting providers response and recommendations. I have reviewed or attempted to review medical records based upon their availability    All of the patient's questions have been  addressed and answered. Patient's is agreeable to the above stated plan. I will continue to monitor closely and make adjustments to medical management as needed.  _____________________________________________________________________    Nutrition Status:    Radiology:  I have personally reviewed the following imaging and agree with the radiologist.     CV Ultrasound Bilateral Doppler Carotid  The right internal carotid artery demonstrated no hemodynamically   significant stenosis.  The left internal carotid artery demonstrated 50-69% stenosis.    Bilateral vertebral arteries were patent with antegrade flow.   Echo Saline Bubble? Yes  Addendum:   Left Ventricle: The left ventricle is normal in size. Normal wall  thickness. Normal wall motion. There is normal systolic function with a  visually estimated ejection fraction of 55 - 60%. There is normal  diastolic function.     Right Ventricle: Normal right ventricular cavity size. Systolic  function is normal. TAPSE is 2.14 cm.     Mitral Valve: There is trace regurgitation.     Tricuspid Valve: There is trace regurgitation.     IVC/SVC: Normal venous pressure at 3 mmHg.     There was 6 mL of intravenous agitated saline (bubble) used. Study is  negative for shunt.  Narrative:   Left Ventricle: The left ventricle is normal in size. Normal wall   thickness. Normal wall motion. There is normal systolic function with a   visually estimated ejection fraction of 55 - 60%. There is normal   diastolic function.    Right Ventricle: Normal right ventricular cavity size. Systolic   function is normal. TAPSE is 2.14 cm.    Mitral Valve: There is  trace regurgitation.    Tricuspid Valve: There is trace regurgitation.    IVC/SVC: Normal venous pressure at 3 mmHg.  CT Head Without Contrast  Narrative: EXAMINATION:  CT HEAD WITHOUT CONTRAST    CLINICAL HISTORY:  Neuro deficit, acute, stroke suspected;stroke alert;;    TECHNIQUE:  CT imaging of the head performed from the skull base to the vertex without intravenous contrast. DLP 1112 mGycm. Automatic exposure control, adjustment of mA/kV or iterative reconstruction technique was used to reduce radiation.    COMPARISON:  11 January 2024    FINDINGS:  There is no acute cortical infarct, hemorrhage or mass lesion.  No new parenchymal attenuation abnormality.  Ventricular size is stable.  There are vascular calcifications.    Visualized paranasal sinuses are clear.  There is bilateral mastoid air cell fluid.  Stable hyperdense appearance of the left globe.  Impression: No acute intracranial findings or significant interval change compared to yesterday.    Electronically signed by: Wilbert Monroy  Date:    01/12/2024  Time:    15:00  MRI Brain Without Contrast  Narrative: Technique:Multiplanar, multisequence magnetic resonance imaging of the brain was performed without intravenous contrast.    Comparison:Correlation is with CT study dated 2024-01-11 21:10:40.    Clinical history:Ams,cva.    Findings:    Hemorrhage:No acute intracranial hemorrhage is identified. Multiple tiny foci of blooming are seen scattered in the cerebrum, which may reflect chronic petechial hemorrhage. There is also a single tiny focus of blooming in the right cerebellum, which may reflect chronic petechial hemorrhage.    Stroke:No abnormal signal is identified on the diffusion images to suggest acute infarct.    Extra axial spaces:The ventricles and sulci and basal cisterns all appear mildly prominent consistent with global cerebral atrophy.    Cerebral, cerebellar, and brainstem parenchyma:Mild periventricular white matter signal abnormalities  are seen. The main consideration is small vessel ischemic changes in a patient of this age.  Old lacunar infarcts involve the basal ganglia and bilateral thalami.    There is left globe chronic altered signal.  Impression: Impression:    1. No acute intracranial hemorrhage is identified. Multiple tiny foci of blooming are seen scattered in the cerebrum, which may reflect chronic petechial hemorrhage. There is also a single tiny focus of blooming in the right cerebellum, which may reflect chronic petechial hemorrhage.    2. No abnormal signal is identified on the diffusion images to suggest acute infarct.    3. Details and findings as above.    No significant discrepancy with overnight report.    Electronically signed by: Devyn Trinidad  Date:    01/12/2024  Time:    07:56      Cj Diaz MD   01/13/2024

## 2024-01-14 NOTE — PROGRESS NOTES
[facility] Hospital Medicine Progress Note        Chief Complaint: Inpatient Follow-up for TIA    HPI:   The pt  is a 63-year-old male with a history of type 1 diabetes mellitus, CAD s/p coronary stent, HTN, HLD, Impaired hearing  who was brought to the ER when he suddenly  developed expressive aphasia , confusion and unable to ambulate with left sided weakness after dinner last night. Symptoms resolved shortly after arrival to the ED.  He arrived afebrile hemodynamically stable,  maintaining normal sats on room air. Laboratory work was unremarkable.   CT head showed no acute process.  CTA of the head and neck showed no evidence of high-grade stenosis or occlusion, proximal left ICA was 60% and both carotid siphons with 34% stenosis. Neurology was consulted in the ER and patient is being admitted under  service for stroke workup. Pt underwent MRI brain WO contrast showed no acute intracranial process, however noted multiple tiny foci scattered in the cerebrum reflect chronic petechial hemorrhage. Echo revealed LVEF 55 to 60% and bubble study negative for shunt. Pt started on  mg and Statin 80 mg daily.      Patient was seen by neuro team and recommended a cerebral angiogram.   Interval Hx:   Patient today awake and comfortable. Ambulating well and has no new issues. Eating well No weakness of extremities. Speech clear.    Case was discussed with patient's nurse and  on the floor.    Objective/physical exam:  General: In no acute distress  Chest: Clear to auscultation bilaterally  Heart: RRR, +S1, S2, no appreciable murmur  Abdomen: Soft, nontender, BS +  MSK: Warm, no lower extremity edema, no clubbing or cyanosis  Neurologic: Alert and oriented x4, Cranial nerve II-XII intact, Strength 5/5 in all 4 extremities    VITAL SIGNS: 24 HRS MIN & MAX LAST   Temp  Min: 97.4 °F (36.3 °C)  Max: 98.1 °F (36.7 °C) 97.8 °F (36.6 °C)   BP  Min: 138/70  Max: 155/82 (!) 155/82    Pulse  Min: 67  Max: 81  81   Resp  Min: 16  Max: 18 18   SpO2  Min: 97 %  Max: 100 % 97 %     I have reviewed the following labs:  Recent Labs   Lab 01/11/24 2155 01/12/24 0346 01/13/24 0432   WBC 7.14 9.26 6.30   RBC 4.47* 4.26* 4.29*   HGB 14.3 13.2* 13.4*   HCT 41.6* 40.6* 40.1*   MCV 93.1 95.3* 93.5   MCH 32.0* 31.0 31.2*   MCHC 34.4 32.5* 33.4   RDW 12.6 12.5 12.5    189 163   MPV 10.4 9.7 10.1     Recent Labs   Lab 01/11/24 2155 01/12/24 0346 01/13/24 0432    139 142   K 4.4 4.0 4.6   CO2 28 27 28   BUN 12.9 14.7 12.2   CREATININE 0.94 0.81 0.78   CALCIUM 9.7 9.1 9.1   MG  --  2.00 2.00   ALBUMIN 3.9 3.7 3.5   ALKPHOS 79 78 70   ALT 16 12 11   AST 16 14 15   BILITOT 1.0 0.9 1.2     Microbiology Results (last 7 days)       ** No results found for the last 168 hours. **             See below for Radiology    Scheduled Med:   aspirin  325 mg Oral Daily    atorvastatin  80 mg Oral Daily    carvediloL  6.25 mg Oral BID    enoxparin  40 mg Subcutaneous Daily    insulin detemir U-100  15 Units Subcutaneous BID      Continuous Infusions:     PRN Meds:  dextrose 10%, dextrose 10%, dextrose 10%, dextrose 10%, glucagon (human recombinant), glucose, glucose, insulin aspart U-100, labetalol, sodium chloride 0.9%     Assessment/Plan:  Transient Ischemic attack   Cognitive impairment / Impaired short term memory   Abnormal MRI brain   Diabetes Mellitis , type 1- uncontrolled due to hyperglycemia , HgA1c 9.2  Essential HTN - poor control   Hyperlipidemia      Plan:  Patient looks comfortable Ambulating well.   No neurological deficits seen   Will cont ASA and statin   Keep on tele   Continue strict aspiration, fall and decubitus precautions      Cerebral angiogram per neuro team     VTE prophylaxis: Lovenox     Patient condition:  Fair    Anticipated discharge and Disposition:   Home when cl;eared by neuro       All diagnosis and differential diagnosis have been reviewed; assessment and plan has been  documented; I have personally reviewed the labs and test results that are presently available; I have reviewed the patients medication list; I have reviewed the consulting providers response and recommendations. I have reviewed or attempted to review medical records based upon their availability    All of the patient's questions have been  addressed and answered. Patient's is agreeable to the above stated plan. I will continue to monitor closely and make adjustments to medical management as needed.  _____________________________________________________________________    Nutrition Status:    Radiology:  I have personally reviewed the following imaging and agree with the radiologist.     CV Ultrasound Bilateral Doppler Carotid  The right internal carotid artery demonstrated no hemodynamically   significant stenosis.  The left internal carotid artery demonstrated 50-69% stenosis.    Bilateral vertebral arteries were patent with antegrade flow.   Echo Saline Bubble? Yes  Addendum:   Left Ventricle: The left ventricle is normal in size. Normal wall  thickness. Normal wall motion. There is normal systolic function with a  visually estimated ejection fraction of 55 - 60%. There is normal  diastolic function.     Right Ventricle: Normal right ventricular cavity size. Systolic  function is normal. TAPSE is 2.14 cm.     Mitral Valve: There is trace regurgitation.     Tricuspid Valve: There is trace regurgitation.     IVC/SVC: Normal venous pressure at 3 mmHg.     There was 6 mL of intravenous agitated saline (bubble) used. Study is  negative for shunt.  Narrative:   Left Ventricle: The left ventricle is normal in size. Normal wall   thickness. Normal wall motion. There is normal systolic function with a   visually estimated ejection fraction of 55 - 60%. There is normal   diastolic function.    Right Ventricle: Normal right ventricular cavity size. Systolic   function is normal. TAPSE is 2.14 cm.    Mitral Valve: There is  trace regurgitation.    Tricuspid Valve: There is trace regurgitation.    IVC/SVC: Normal venous pressure at 3 mmHg.  CT Head Without Contrast  Narrative: EXAMINATION:  CT HEAD WITHOUT CONTRAST    CLINICAL HISTORY:  Neuro deficit, acute, stroke suspected;stroke alert;;    TECHNIQUE:  CT imaging of the head performed from the skull base to the vertex without intravenous contrast. DLP 1112 mGycm. Automatic exposure control, adjustment of mA/kV or iterative reconstruction technique was used to reduce radiation.    COMPARISON:  11 January 2024    FINDINGS:  There is no acute cortical infarct, hemorrhage or mass lesion.  No new parenchymal attenuation abnormality.  Ventricular size is stable.  There are vascular calcifications.    Visualized paranasal sinuses are clear.  There is bilateral mastoid air cell fluid.  Stable hyperdense appearance of the left globe.  Impression: No acute intracranial findings or significant interval change compared to yesterday.    Electronically signed by: Wilbert Monroy  Date:    01/12/2024  Time:    15:00  MRI Brain Without Contrast  Narrative: Technique:Multiplanar, multisequence magnetic resonance imaging of the brain was performed without intravenous contrast.    Comparison:Correlation is with CT study dated 2024-01-11 21:10:40.    Clinical history:Ams,cva.    Findings:    Hemorrhage:No acute intracranial hemorrhage is identified. Multiple tiny foci of blooming are seen scattered in the cerebrum, which may reflect chronic petechial hemorrhage. There is also a single tiny focus of blooming in the right cerebellum, which may reflect chronic petechial hemorrhage.    Stroke:No abnormal signal is identified on the diffusion images to suggest acute infarct.    Extra axial spaces:The ventricles and sulci and basal cisterns all appear mildly prominent consistent with global cerebral atrophy.    Cerebral, cerebellar, and brainstem parenchyma:Mild periventricular white matter signal abnormalities  are seen. The main consideration is small vessel ischemic changes in a patient of this age.  Old lacunar infarcts involve the basal ganglia and bilateral thalami.    There is left globe chronic altered signal.  Impression: Impression:    1. No acute intracranial hemorrhage is identified. Multiple tiny foci of blooming are seen scattered in the cerebrum, which may reflect chronic petechial hemorrhage. There is also a single tiny focus of blooming in the right cerebellum, which may reflect chronic petechial hemorrhage.    2. No abnormal signal is identified on the diffusion images to suggest acute infarct.    3. Details and findings as above.    No significant discrepancy with overnight report.    Electronically signed by: Devyn Trinidad  Date:    01/12/2024  Time:    07:56      Cj Diaz MD   01/14/2024

## 2024-01-15 VITALS
TEMPERATURE: 98 F | WEIGHT: 125 LBS | DIASTOLIC BLOOD PRESSURE: 80 MMHG | BODY MASS INDEX: 22.15 KG/M2 | OXYGEN SATURATION: 96 % | HEIGHT: 63 IN | HEART RATE: 81 BPM | RESPIRATION RATE: 18 BRPM | SYSTOLIC BLOOD PRESSURE: 134 MMHG

## 2024-01-15 LAB — POCT GLUCOSE: 353 MG/DL (ref 70–110)

## 2024-01-15 PROCEDURE — 25000003 PHARM REV CODE 250: Performed by: INTERNAL MEDICINE

## 2024-01-15 PROCEDURE — 94761 N-INVAS EAR/PLS OXIMETRY MLT: CPT

## 2024-01-15 PROCEDURE — 63600175 PHARM REV CODE 636 W HCPCS: Performed by: INTERNAL MEDICINE

## 2024-01-15 RX ORDER — ASPIRIN 325 MG
325 TABLET, DELAYED RELEASE (ENTERIC COATED) ORAL DAILY
Refills: 0
Start: 2024-01-16 | End: 2025-01-15

## 2024-01-15 RX ADMIN — INSULIN DETEMIR 15 UNITS: 100 INJECTION, SOLUTION SUBCUTANEOUS at 09:01

## 2024-01-15 RX ADMIN — CARVEDILOL 6.25 MG: 3.12 TABLET, FILM COATED ORAL at 09:01

## 2024-01-15 RX ADMIN — ASPIRIN 325 MG: 325 TABLET, COATED ORAL at 09:01

## 2024-01-15 NOTE — PROGRESS NOTES
[facility] Hospital Medicine Progress Note        Chief Complaint: Inpatient Follow-up for TIA    HPI:   The pt  is a 63-year-old male with a history of type 1 diabetes mellitus, CAD s/p coronary stent, HTN, HLD, Impaired hearing  who was brought to the ER when he suddenly  developed expressive aphasia , confusion and unable to ambulate with left sided weakness after dinner last night. Symptoms resolved shortly after arrival to the ED.  He arrived afebrile hemodynamically stable,  maintaining normal sats on room air. Laboratory work was unremarkable.   CT head showed no acute process.  CTA of the head and neck showed no evidence of high-grade stenosis or occlusion, proximal left ICA was 60% and both carotid siphons with 34% stenosis. Neurology was consulted in the ER and patient is being admitted under  service for stroke workup. Pt underwent MRI brain WO contrast showed no acute intracranial process, however noted multiple tiny foci scattered in the cerebrum reflect chronic petechial hemorrhage. Echo revealed LVEF 55 to 60% and bubble study negative for shunt. Pt started on  mg and Statin 80 mg daily.      Patient was seen by neuro team and recommended a cerebral angiogram.  Patient has been stable and asymptomatic. Ambulating well with no acute issues.     Interval Hx:   Patient today awake and comfortable. No acute issues overnight. He is eating well.     Family at bedside, explained in detail about the patients condition, diagnosis, vitals, labs and treatment plan. They understand and agree with the plan. All their questions were answered.      Case was discussed with patient's nurse and  on the floor.    Objective/physical exam:  General: In no acute distress  Chest: Clear to auscultation bilaterally  Heart: RRR, +S1, S2, no appreciable murmur  Abdomen: Soft, nontender, BS +  MSK: Warm, no lower extremity edema, no clubbing or cyanosis  Neurologic: Alert and  oriented x4, Cranial nerve II-XII intact, Strength 5/5 in all 4 extremities    VITAL SIGNS: 24 HRS MIN & MAX LAST   Temp  Min: 97.3 °F (36.3 °C)  Max: 97.8 °F (36.6 °C) 97.7 °F (36.5 °C)   BP  Min: 113/59  Max: 155/82 134/80   Pulse  Min: 71  Max: 84  81   Resp  Min: 18  Max: 18 18   SpO2  Min: 96 %  Max: 99 % 96 %     I have reviewed the following labs:  Recent Labs   Lab 01/11/24 2155 01/12/24 0346 01/13/24 0432   WBC 7.14 9.26 6.30   RBC 4.47* 4.26* 4.29*   HGB 14.3 13.2* 13.4*   HCT 41.6* 40.6* 40.1*   MCV 93.1 95.3* 93.5   MCH 32.0* 31.0 31.2*   MCHC 34.4 32.5* 33.4   RDW 12.6 12.5 12.5    189 163   MPV 10.4 9.7 10.1     Recent Labs   Lab 01/11/24 2155 01/12/24 0346 01/13/24  0432    139 142   K 4.4 4.0 4.6   CO2 28 27 28   BUN 12.9 14.7 12.2   CREATININE 0.94 0.81 0.78   CALCIUM 9.7 9.1 9.1   MG  --  2.00 2.00   ALBUMIN 3.9 3.7 3.5   ALKPHOS 79 78 70   ALT 16 12 11   AST 16 14 15   BILITOT 1.0 0.9 1.2     Microbiology Results (last 7 days)       ** No results found for the last 168 hours. **             See below for Radiology    Scheduled Med:   aspirin  325 mg Oral Daily    atorvastatin  80 mg Oral Daily    carvediloL  6.25 mg Oral BID    enoxparin  40 mg Subcutaneous Daily    insulin detemir U-100  15 Units Subcutaneous BID      Continuous Infusions:     PRN Meds:  dextrose 10%, dextrose 10%, dextrose 10%, dextrose 10%, glucagon (human recombinant), glucose, glucose, insulin aspart U-100, labetalol, sodium chloride 0.9%     Assessment/Plan:  Transient Ischemic attack   Cognitive impairment / Impaired short term memory   Abnormal MRI brain   Diabetes Mellitis , type 1- uncontrolled due to hyperglycemia , HgA1c 9.2  Essential HTN - poor control   Hyperlipidemia      Plan:  Patient doing well with no acute issues   No neurological deficits seen   Will cont ASA and statin     Continue strict aspiration, fall and decubitus precautions      Cerebral angiogram per neuro team     VTE prophylaxis:  Lovenox     Patient condition:  Fair    Anticipated discharge and Disposition:   Home when cleared by neuro       All diagnosis and differential diagnosis have been reviewed; assessment and plan has been documented; I have personally reviewed the labs and test results that are presently available; I have reviewed the patients medication list; I have reviewed the consulting providers response and recommendations. I have reviewed or attempted to review medical records based upon their availability    All of the patient's questions have been  addressed and answered. Patient's is agreeable to the above stated plan. I will continue to monitor closely and make adjustments to medical management as needed.  _____________________________________________________________________    Nutrition Status:    Radiology:  I have personally reviewed the following imaging and agree with the radiologist.     CV Ultrasound Bilateral Doppler Carotid  The right internal carotid artery demonstrated no hemodynamically   significant stenosis.  The left internal carotid artery demonstrated 50-69% stenosis.    Bilateral vertebral arteries were patent with antegrade flow.   Echo Saline Bubble? Yes  Addendum:   Left Ventricle: The left ventricle is normal in size. Normal wall  thickness. Normal wall motion. There is normal systolic function with a  visually estimated ejection fraction of 55 - 60%. There is normal  diastolic function.     Right Ventricle: Normal right ventricular cavity size. Systolic  function is normal. TAPSE is 2.14 cm.     Mitral Valve: There is trace regurgitation.     Tricuspid Valve: There is trace regurgitation.     IVC/SVC: Normal venous pressure at 3 mmHg.     There was 6 mL of intravenous agitated saline (bubble) used. Study is  negative for shunt.  Narrative:   Left Ventricle: The left ventricle is normal in size. Normal wall   thickness. Normal wall motion. There is normal systolic function with a   visually estimated  ejection fraction of 55 - 60%. There is normal   diastolic function.    Right Ventricle: Normal right ventricular cavity size. Systolic   function is normal. TAPSE is 2.14 cm.    Mitral Valve: There is trace regurgitation.    Tricuspid Valve: There is trace regurgitation.    IVC/SVC: Normal venous pressure at 3 mmHg.  CT Head Without Contrast  Narrative: EXAMINATION:  CT HEAD WITHOUT CONTRAST    CLINICAL HISTORY:  Neuro deficit, acute, stroke suspected;stroke alert;;    TECHNIQUE:  CT imaging of the head performed from the skull base to the vertex without intravenous contrast. DLP 1112 mGycm. Automatic exposure control, adjustment of mA/kV or iterative reconstruction technique was used to reduce radiation.    COMPARISON:  11 January 2024    FINDINGS:  There is no acute cortical infarct, hemorrhage or mass lesion.  No new parenchymal attenuation abnormality.  Ventricular size is stable.  There are vascular calcifications.    Visualized paranasal sinuses are clear.  There is bilateral mastoid air cell fluid.  Stable hyperdense appearance of the left globe.  Impression: No acute intracranial findings or significant interval change compared to yesterday.    Electronically signed by: Wilbert Monroy  Date:    01/12/2024  Time:    15:00  MRI Brain Without Contrast  Narrative: Technique:Multiplanar, multisequence magnetic resonance imaging of the brain was performed without intravenous contrast.    Comparison:Correlation is with CT study dated 2024-01-11 21:10:40.    Clinical history:Ams,cva.    Findings:    Hemorrhage:No acute intracranial hemorrhage is identified. Multiple tiny foci of blooming are seen scattered in the cerebrum, which may reflect chronic petechial hemorrhage. There is also a single tiny focus of blooming in the right cerebellum, which may reflect chronic petechial hemorrhage.    Stroke:No abnormal signal is identified on the diffusion images to suggest acute infarct.    Extra axial spaces:The ventricles  and sulci and basal cisterns all appear mildly prominent consistent with global cerebral atrophy.    Cerebral, cerebellar, and brainstem parenchyma:Mild periventricular white matter signal abnormalities are seen. The main consideration is small vessel ischemic changes in a patient of this age.  Old lacunar infarcts involve the basal ganglia and bilateral thalami.    There is left globe chronic altered signal.  Impression: Impression:    1. No acute intracranial hemorrhage is identified. Multiple tiny foci of blooming are seen scattered in the cerebrum, which may reflect chronic petechial hemorrhage. There is also a single tiny focus of blooming in the right cerebellum, which may reflect chronic petechial hemorrhage.    2. No abnormal signal is identified on the diffusion images to suggest acute infarct.    3. Details and findings as above.    No significant discrepancy with overnight report.    Electronically signed by: Devyn Trinidad  Date:    01/12/2024  Time:    07:56      Cj Diaz MD   01/15/2024

## 2024-01-15 NOTE — DISCHARGE SUMMARY
HPI:   The pt  is a 63-year-old male with a history of type 1 diabetes mellitus, CAD s/p coronary stent, HTN, HLD, Impaired hearing  who was brought to the ER when he suddenly  developed expressive aphasia , confusion and unable to ambulate with left sided weakness after dinner last night. Symptoms resolved shortly after arrival to the ED.  He arrived afebrile hemodynamically stable,  maintaining normal sats on room air. Laboratory work was unremarkable.   CT head showed no acute process.  CTA of the head and neck showed no evidence of high-grade stenosis or occlusion, proximal left ICA was 60% and both carotid siphons with 34% stenosis. Neurology was consulted in the ER and patient is being admitted under  service for stroke workup. Pt underwent MRI brain WO contrast showed no acute intracranial process, however noted multiple tiny foci scattered in the cerebrum reflect chronic petechial hemorrhage. Echo revealed LVEF 55 to 60% and bubble study negative for shunt. Pt started on  mg and Statin 80 mg daily.       Patient was seen by neuro team and recommended a cerebral angiogram.  Patient has been stable and asymptomatic. Ambulating well with no acute issues.      Interval Hx:   Patient today awake and comfortable. No acute issues overnight. He is eating well.      Family at bedside, explained in detail about the patients condition, diagnosis, vitals, labs and treatment plan. They understand and agree with the plan. All their questions were answered.       Case was discussed with patient's nurse and  on the floor.     Objective/physical exam:  General: In no acute distress  Chest: Clear to auscultation bilaterally  Heart: RRR, +S1, S2, no appreciable murmur  Abdomen: Soft, nontender, BS +  MSK: Warm, no lower extremity edema, no clubbing or cyanosis  Neurologic: Alert and oriented x4, Cranial nerve II-XII intact, Strength 5/5 in all 4 extremities     VITAL SIGNS: 24 HRS MIN & MAX LAST   Temp   Min: 97.3 °F (36.3 °C)  Max: 97.8 °F (36.6 °C) 97.7 °F (36.5 °C)   BP  Min: 113/59  Max: 155/82 134/80   Pulse  Min: 71  Max: 84  81   Resp  Min: 18  Max: 18 18   SpO2  Min: 96 %  Max: 99 % 96 %      I have reviewed the following labs:        Recent Labs   Lab 01/11/24 2155 01/12/24 0346 01/13/24 0432   WBC 7.14 9.26 6.30   RBC 4.47* 4.26* 4.29*   HGB 14.3 13.2* 13.4*   HCT 41.6* 40.6* 40.1*   MCV 93.1 95.3* 93.5   MCH 32.0* 31.0 31.2*   MCHC 34.4 32.5* 33.4   RDW 12.6 12.5 12.5    189 163   MPV 10.4 9.7 10.1            Recent Labs   Lab 01/11/24 2155 01/12/24 0346 01/13/24 0432    139 142   K 4.4 4.0 4.6   CO2 28 27 28   BUN 12.9 14.7 12.2   CREATININE 0.94 0.81 0.78   CALCIUM 9.7 9.1 9.1   MG  --  2.00 2.00   ALBUMIN 3.9 3.7 3.5   ALKPHOS 79 78 70   ALT 16 12 11   AST 16 14 15   BILITOT 1.0 0.9 1.2      Microbiology Results (last 7 days)         ** No results found for the last 168 hours. **                See below for Radiology     Scheduled Med:   aspirin  325 mg Oral Daily    atorvastatin  80 mg Oral Daily    carvediloL  6.25 mg Oral BID    enoxparin  40 mg Subcutaneous Daily    insulin detemir U-100  15 Units Subcutaneous BID      Continuous Infusions:     PRN Meds:  dextrose 10%, dextrose 10%, dextrose 10%, dextrose 10%, glucagon (human recombinant), glucose, glucose, insulin aspart U-100, labetalol, sodium chloride 0.9%      Assessment/Plan:  Transient Ischemic attack   Cognitive impairment / Impaired short term memory   Abnormal MRI brain   Diabetes Mellitis , type 1- uncontrolled due to hyperglycemia , HgA1c 9.2  Essential HTN - poor control   Hyperlipidemia        Plan:  Patient doing well with no acute issues   No neurological deficits seen   Will cont ASA and statin      Continue strict aspiration, fall and decubitus precautions       Cerebral angiogram per neuro team      VTE prophylaxis: Lovenox      Patient condition:  Fair     Anticipated discharge and Disposition:   Home when  cleared by neuro         All diagnosis and differential diagnosis have been reviewed; assessment and plan has been documented; I have personally reviewed the labs and test results that are presently available; I have reviewed the patients medication list; I have reviewed the consulting providers response and recommendations. I have reviewed or attempted to review medical records based upon their availability     All of the patient's questions have been  addressed and answered. Patient's is agreeable to the above stated plan. I will continue to monitor closely and make adjustments to medical management as needed.  _____________________________________________________________________     Nutrition Status:     Radiology:  I have personally reviewed the following imaging and agree with the radiologist.      CV Ultrasound Bilateral Doppler Carotid  The right internal carotid artery demonstrated no hemodynamically   significant stenosis.  The left internal carotid artery demonstrated 50-69% stenosis.    Bilateral vertebral arteries were patent with antegrade flow.   Echo Saline Bubble? Yes  Addendum:   Left Ventricle: The left ventricle is normal in size. Normal wall  thickness. Normal wall motion. There is normal systolic function with a  visually estimated ejection fraction of 55 - 60%. There is normal  diastolic function.     Right Ventricle: Normal right ventricular cavity size. Systolic  function is normal. TAPSE is 2.14 cm.     Mitral Valve: There is trace regurgitation.     Tricuspid Valve: There is trace regurgitation.     IVC/SVC: Normal venous pressure at 3 mmHg.     There was 6 mL of intravenous agitated saline (bubble) used. Study is  negative for shunt.  Narrative:   Left Ventricle: The left ventricle is normal in size. Normal wall   thickness. Normal wall motion. There is normal systolic function with a   visually estimated ejection fraction of 55 - 60%. There is normal   diastolic function.    Right  Ventricle: Normal right ventricular cavity size. Systolic   function is normal. TAPSE is 2.14 cm.    Mitral Valve: There is trace regurgitation.    Tricuspid Valve: There is trace regurgitation.    IVC/SVC: Normal venous pressure at 3 mmHg.  CT Head Without Contrast  Narrative: EXAMINATION:  CT HEAD WITHOUT CONTRAST     CLINICAL HISTORY:  Neuro deficit, acute, stroke suspected;stroke alert;;     TECHNIQUE:  CT imaging of the head performed from the skull base to the vertex without intravenous contrast. DLP 1112 mGycm. Automatic exposure control, adjustment of mA/kV or iterative reconstruction technique was used to reduce radiation.     COMPARISON:  11 January 2024     FINDINGS:  There is no acute cortical infarct, hemorrhage or mass lesion.  No new parenchymal attenuation abnormality.  Ventricular size is stable.  There are vascular calcifications.     Visualized paranasal sinuses are clear.  There is bilateral mastoid air cell fluid.  Stable hyperdense appearance of the left globe.  Impression: No acute intracranial findings or significant interval change compared to yesterday.     Electronically signed by: Wilbert Monroy  Date:                                                  01/12/2024  Time:                                                  15:00  MRI Brain Without Contrast  Narrative: Technique:Multiplanar, multisequence magnetic resonance imaging of the brain was performed without intravenous contrast.     Comparison:Correlation is with CT study dated 2024-01-11 21:10:40.     Clinical history:Ams,cva.     Findings:     Hemorrhage:No acute intracranial hemorrhage is identified. Multiple tiny foci of blooming are seen scattered in the cerebrum, which may reflect chronic petechial hemorrhage. There is also a single tiny focus of blooming in the right cerebellum, which may reflect chronic petechial hemorrhage.     Stroke:No abnormal signal is identified on the diffusion images to suggest acute infarct.     Extra  axial spaces:The ventricles and sulci and basal cisterns all appear mildly prominent consistent with global cerebral atrophy.     Cerebral, cerebellar, and brainstem parenchyma:Mild periventricular white matter signal abnormalities are seen. The main consideration is small vessel ischemic changes in a patient of this age.  Old lacunar infarcts involve the basal ganglia and bilateral thalami.     There is left globe chronic altered signal.  Impression: Impression:     1. No acute intracranial hemorrhage is identified. Multiple tiny foci of blooming are seen scattered in the cerebrum, which may reflect chronic petechial hemorrhage. There is also a single tiny focus of blooming in the right cerebellum, which may reflect chronic petechial hemorrhage.     2. No abnormal signal is identified on the diffusion images to suggest acute infarct.     3. Details and findings as above.     No significant discrepancy with overnight report.     Electronically signed by: Devyn Trinidad  Date:                                                  01/12/2024  Time:                                                  07:56        Cj Diaz MD   01/15/2024                         Cleared by neuro team for discharge     Will have out patient f/u     Dc 31 minutes

## 2024-01-17 ENCOUNTER — TELEPHONE (OUTPATIENT)
Dept: ADMINISTRATIVE | Facility: CLINIC | Age: 64
End: 2024-01-17
Payer: MEDICARE

## 2024-01-17 ENCOUNTER — PATIENT MESSAGE (OUTPATIENT)
Dept: ADMINISTRATIVE | Facility: OTHER | Age: 64
End: 2024-01-17
Payer: MEDICARE

## 2024-01-17 NOTE — PROGRESS NOTES
C3 nurse spoke with Federico Villarreal and spouse by phone. Patient states he is out of atorvastatin, has reduced the levemir d/t CBG readings too low and taking (3) 81mg ASA instead of the 325mg that was prescribed. Advised patient to take the 325mg instead, keep log of CBG readings and report to PCP and contact pharmacy for refill of atorvastatin.  Spouse stated they were also told to take BP often but cannot afford cuff. Advised to contact PCP for prescription if insurance would cover. OOC number provided. Patient voiced understanding and had no further questions.

## 2024-01-18 ENCOUNTER — PATIENT MESSAGE (OUTPATIENT)
Dept: ADMINISTRATIVE | Facility: OTHER | Age: 64
End: 2024-01-18
Payer: MEDICARE

## 2024-01-20 ENCOUNTER — PATIENT MESSAGE (OUTPATIENT)
Dept: INTERNAL MEDICINE | Facility: CLINIC | Age: 64
End: 2024-01-20
Payer: MEDICARE

## 2024-01-22 ENCOUNTER — TELEPHONE (OUTPATIENT)
Dept: NEUROLOGY | Facility: CLINIC | Age: 64
End: 2024-01-22
Payer: MEDICARE

## 2024-01-22 NOTE — TELEPHONE ENCOUNTER
Advise patient's wife if there is a concern for patient's safety due to his memory issues she should stay with him until at least his appointment with us for further eval. So 2/6/24 patient's wife might feel more comfortable with returning to work.

## 2024-01-22 NOTE — TELEPHONE ENCOUNTER
Pt's spouse Juliana called to inquire on if we have any recommendations on how long she is to stay out of work to care for pt.  She reports she has been out of work since 1/11/2024, she reports she is unable to leave pt alone d/t him forgetting things since having a stroke.   She reports her employer is requesting a specific time frame on when she will be able to return.  Pt is scheduled a f/u in clinic on 2/5/24.    # [phone]

## 2024-01-22 NOTE — TELEPHONE ENCOUNTER
S/w pt's wife Juliana & advised her of NP's recommendation.  She is inquiring on if a letter can be composed with an estimated return date (2/6/24)   She reports her supervisor is needing documentation.  Mrs. Andrews is employed @ [employer].    # [phone]

## 2024-01-22 NOTE — TELEPHONE ENCOUNTER
S/w Mrs. Andrews to advise her that letter had been generated.  She requested that I fax letter to [employer] for leave of absence.  Advised her that letter has been faxed.  She verbalized understanding & appreciation.

## 2024-01-24 ENCOUNTER — PATIENT MESSAGE (OUTPATIENT)
Dept: ADMINISTRATIVE | Facility: OTHER | Age: 64
End: 2024-01-24
Payer: MEDICARE

## 2024-01-29 ENCOUNTER — OFFICE VISIT (OUTPATIENT)
Dept: NEUROLOGY | Facility: CLINIC | Age: 64
End: 2024-01-29
Payer: MEDICARE

## 2024-01-29 VITALS
HEIGHT: 63 IN | SYSTOLIC BLOOD PRESSURE: 99 MMHG | HEART RATE: 75 BPM | BODY MASS INDEX: 22.15 KG/M2 | DIASTOLIC BLOOD PRESSURE: 66 MMHG | WEIGHT: 125 LBS

## 2024-01-29 DIAGNOSIS — E10.65 TYPE 1 DIABETES MELLITUS WITH HYPERGLYCEMIA: ICD-10-CM

## 2024-01-29 DIAGNOSIS — R41.3 MEMORY LOSS: ICD-10-CM

## 2024-01-29 DIAGNOSIS — G45.9 TIA (TRANSIENT ISCHEMIC ATTACK): Primary | ICD-10-CM

## 2024-01-29 PROCEDURE — 99214 OFFICE O/P EST MOD 30 MIN: CPT | Mod: S$PBB,,, | Performed by: NURSE PRACTITIONER

## 2024-01-29 PROCEDURE — 99213 OFFICE O/P EST LOW 20 MIN: CPT | Mod: PBBFAC | Performed by: NURSE PRACTITIONER

## 2024-01-29 PROCEDURE — 99999 PR PBB SHADOW E&M-EST. PATIENT-LVL III: CPT | Mod: PBBFAC,,, | Performed by: NURSE PRACTITIONER

## 2024-01-29 NOTE — PROGRESS NOTES
Subjective:      Patient ID: Federico Villarreal is a 63 y.o. male.    Chief Complaint:  Hospital Follow Up (Dx: TIA. Patient wife states slurred speech, blurred vision and dizziness. Patient wife states concern for patients memory. )      History of Present Illness  Patient with past medical history of DM type 1, CAD, HTN, HLD who presented to [facility] on 1/11/2024 with complaints of expressive aphasia and left sided weakness. Symptoms resolved shortly after arrival. CT negative, CTA head and neck showed no evidence of high-grade stenosis or occlusion, proximal left ICA was 60 % stenosis and both carotid siphons with 34%. MRI unrevealing for acute intracranial hemorrhage but did show multiple tiny foci of blooming are seen scattered in the cerebrum, which may reflect chronic petechial hemorrhage, and a  single tiny focus of blooming in the right cerebellum, which may reflect chronic petechial hemorrhage. Blood sugar was greater than 500 on admit.  Today patient's reports he is still with slurred speech, dizziness. Has blurred vision due to diabetic retinopathy. Patient's wife also has concerns of patient's memory. His MMSE today was 25/30. Patient admitted he was an alcoholic for many years.         History reviewed. No pertinent past medical history.    Past Surgical History:   Procedure Laterality Date    ADENOIDECTOMY      ADENOIDECTOMY  1972    As a child    CORONARY STENT PLACEMENT  08/20/2012    EYE SURGERY  2011    Multiple    INNER EAR SURGERY      REPAIR OF RETINAL DETACHMENT WITH VITRECTOMY      TONSILLECTOMY      VITRECTOMY         Family History   Problem Relation Age of Onset    Diabetes Mother     Diabetes Father        Social History     Socioeconomic History    Marital status:    Tobacco Use    Smoking status: Every Day     Average packs/day: 0.2 packs/day for 16.1 years (3.8 ttl pk-yrs)     Types: Cigarettes     Start date: 2008    Smokeless tobacco: Never   Substance and Sexual Activity     Alcohol use: Not Currently    Drug use: Not Currently    Sexual activity: Yes     Partners: Female     Birth control/protection: None     Social Determinants of Health     Financial Resource Strain: Low Risk  (3/22/2023)    Overall Financial Resource Strain (CARDIA)     Difficulty of Paying Living Expenses: Not hard at all   Food Insecurity: No Food Insecurity (3/22/2023)    Hunger Vital Sign     Worried About Running Out of Food in the Last Year: Never true     Ran Out of Food in the Last Year: Never true   Transportation Needs: No Transportation Needs (3/22/2023)    PRAPARE - Transportation     Lack of Transportation (Medical): No     Lack of Transportation (Non-Medical): No   Physical Activity: Inactive (3/22/2023)    Exercise Vital Sign     Days of Exercise per Week: 0 days     Minutes of Exercise per Session: 0 min   Stress: No Stress Concern Present (3/22/2023)    [employer] - Occupational Stress Questionnaire     Feeling of Stress : Not at all   Social Connections: Moderately Isolated (3/22/2023)    Social Connection and Isolation Panel [NHANES]     Frequency of Communication with Friends and Family: More than three times a week     Frequency of Social Gatherings with Friends and Family: Never     Attends Gnosticism Services: Never     Active Member of Clubs or Organizations: No     Attends Club or Organization Meetings: Never     Marital Status:    Housing Stability: Low Risk  (3/22/2023)    Housing Stability Vital Sign     Unable to Pay for Housing in the Last Year: No     Number of Places Lived in the Last Year: 1     Unstable Housing in the Last Year: No       Current Outpatient Medications   Medication Sig Dispense Refill    aspirin (ECOTRIN) 325 MG EC tablet Take 1 tablet (325 mg total) by mouth once daily.  0    carvediloL (COREG) 6.25 MG tablet Take 1 tablet (6.25 mg total) by mouth 2 (two) times daily. 180 tablet 3    insulin aspart U-100 (NOVOLOG) 100 unit/mL (3  "mL) InPn pen Inject 3-5 Units into the skin 3 (three) times daily with meals. 3 mL 11    insulin detemir U-100, Levemir, (LEVEMIR FLEXTOUCH U100 INSULIN) 100 unit/mL (3 mL) InPn pen Inject 15 Units into the skin 2 (two) times daily. 15 mL 3    mv-min/folic/K1/lycopen/lutein (CENTRUM SILVER MEN ORAL) Take by mouth.      nitroGLYCERIN (NITROSTAT) 0.4 MG SL tablet Place 1 tablet (0.4 mg total) under the tongue every 5 (five) minutes as needed for Chest pain. Up to 3 doses, then call 911 30 tablet 1    pen needle, diabetic 32 gauge x 5/32" Ndle Use for insulin administration up to three times a day for long-acting and rapid-acting insulin 100 each 3    rosuvastatin (CRESTOR) 40 MG Tab Take 1 tablet (40 mg total) by mouth every evening. 90 tablet 3     No current facility-administered medications for this visit.       Review of patient's allergies indicates:  No Known Allergies     Vitals:    01/29/24 1148   BP: 99/66   BP Location: Left arm   Patient Position: Sitting   Pulse: 75   Weight: 56.7 kg (125 lb)   Height: 5' 3" (1.6 m)          Review of Systems  12 point ROS performed and negative unless otherwise documented in HPI  Objective:     Neurologic Exam     Mental Status   Oriented to person.   Disoriented to year.   Speech: speech is normal   Level of consciousness: alert    Cranial Nerves   Cranial nerves II through XII intact.     Motor Exam   Muscle bulk: normal  Right arm tone: normal  Left arm tone: normal  Right leg tone: normal  Left leg tone: normal    Strength   Strength 5/5 throughout.     Sensory Exam   Light touch normal.     Gait, Coordination, and Reflexes     Gait  Gait: normal      Physical Exam  Vitals reviewed.   Cardiovascular:      Rate and Rhythm: Normal rate and regular rhythm.   Pulmonary:      Effort: Pulmonary effort is normal.   Neurological:      Cranial Nerves: Cranial nerves 2-12 are intact.      Motor: Motor strength is normal.     Gait: Gait is intact.   Psychiatric:         Speech: " Speech normal.          Assessment:     1. TIA (transient ischemic attack)    2. Memory loss    3. Type 1 diabetes mellitus with hyperglycemia      TIA vs hyperglycemia  Plan:   Patient's MMSE 25/30  Continue  mg daily  Discussed proper management of DM; A1C consistently over 9.0 for last 2 years per chart. Last A1C 9.2. ? If this could be affecting patient's STM.  BP low today; advised to continue monitoring BP/encouraged adequate hydration  LDL was 138; continue statin. Follow up lab has been ordered by PCP  Discussed use of chronic use of alcohol on the brain/memory

## 2024-01-30 ENCOUNTER — PATIENT MESSAGE (OUTPATIENT)
Dept: ADMINISTRATIVE | Facility: OTHER | Age: 64
End: 2024-01-30
Payer: MEDICARE

## 2024-02-03 PROBLEM — R80.9 TYPE 1 DIABETES MELLITUS WITH MICROALBUMINURIA: Status: ACTIVE | Noted: 2024-01-29

## 2024-02-03 PROBLEM — E10.29 TYPE 1 DIABETES MELLITUS WITH MICROALBUMINURIA: Status: ACTIVE | Noted: 2024-01-29

## 2024-02-03 NOTE — PROGRESS NOTES
Family Medicine    Patient ID: [id number]   274}  Chief Complaint: Hospital Follow Up         HPI:     Federico Villarreal is a 63 y.o. male here today for a follow up. No other complaints today.     Here for hospital f/u.   Patient with past medical history of DM type 1, CAD, HTN, HLD who presented to [facility] on 1/11/2024 with complaints of expressive aphasia and left sided weakness. Symptoms resolved shortly after arrival. CT negative, CTA head and neck showed no evidence of high-grade stenosis or occlusion, proximal left ICA was 60 % stenosis and both carotid siphons with 34%. MRI unrevealing for acute intracranial hemorrhage but did show multiple tiny foci of blooming are seen scattered in the cerebrum, which may reflect chronic petechial hemorrhage, and a  single tiny focus of blooming in the right cerebellum, which may reflect chronic petechial hemorrhage. Blood sugar was greater than 500 on admit.  Today patient's reports he is still with slurred speech, dizziness. Has blurred vision due to diabetic retinopathy. Patient's wife also has concerns of patient's memory. His MMSE today was 25/30. Patient admitted he was an alcoholic for many years.   He has been followed by neurology since discharge. It is recommended that he continue his aspirin and statin and work to get his HbA1c better controlled.     He is here with his wife today. She has his sugar logs.   The morning sugars are mostly uncontrolled.   See log scanned into chart.   He is using an ISS for meals.   He wears a continuous monitor and reports occasional lows. This am, while in bed, was alerted of a low of 70. He was asymptomatic. Sugar came up to 109 after he got out of bed, before breakfast.     He states he feels well and has no complaints. He needs no refills.   His wife returns to work tomorrow and she is worried about him being home alone.       Hemoglobin A1c   Date Value Ref Range Status   01/11/2024 9.2 (H) <=7.0 % Final   07/21/2023 9.6 (H)  <=7.0 % Final   02/10/2023 9.5 (H) <=7.0 % Final       Screening or Prevention Patient's value Goal Complete/Controlled?   HgA1C Testing and Control   Lab Results   Component Value Date    HGBA1C 9.2 (H) 01/11/2024      Annually/Less than 8% No   Lipid profile : 01/11/2024 Annually    LDL control Lab Results   Component Value Date    .00 01/11/2024     Annually/Less than 100 mg/dl  No   Nephropathy screening Lab Results   Component Value Date    MICALBCREAT 212.0 (H) 02/10/2023       Lab Results   Component Value Date    PROTEINUA Negative 01/12/2024     Lab Results   Component Value Date    CREATRANDUR 48.4 (L) 02/10/2023       Annually No   Blood pressure BP Readings from Last 1 Encounters:   02/05/24 136/78    Less than 140/90 Yes   Dilated retinal exam : 10/01/2021 Annually No   Foot exam   : 03/22/2023 Annually Yes   Statin Therapy Med: Crestor 40mg Any dose statin Yes   Ace-inh/ARB tx Med: NONE Ace-inh or ARB No        274}  History reviewed. No pertinent past medical history.      Past Surgical History:   Procedure Laterality Date    ADENOIDECTOMY      ADENOIDECTOMY  1972    As a child    CORONARY STENT PLACEMENT  08/20/2012    EYE SURGERY  2011    Multiple    INNER EAR SURGERY      REPAIR OF RETINAL DETACHMENT WITH VITRECTOMY      TONSILLECTOMY      VITRECTOMY          Social History     Tobacco Use    Smoking status: Every Day     Average packs/day: 0.2 packs/day for 16.1 years (3.8 ttl pk-yrs)     Types: Cigarettes     Start date: 2008    Smokeless tobacco: Never   Substance and Sexual Activity    Alcohol use: Not Currently    Drug use: Not Currently    Sexual activity: Yes     Partners: Female     Birth control/protection: None        Current Outpatient Medications   Medication Instructions    aspirin (ECOTRIN) 325 mg, Oral, Daily    carvediloL (COREG) 6.25 mg, Oral, 2 times daily    insulin aspart U-100 (NOVOLOG) 3-5 Units, Subcutaneous, 3 times daily with meals    LEVEMIR FLEXTOUCH U100 INSULIN  "15 Units, Subcutaneous, 2 times daily    mv-min/folic/K1/lycopen/lutein (CENTRUM SILVER MEN ORAL) Oral    nitroGLYCERIN (NITROSTAT) 0.4 mg, Sublingual, Every 5 min PRN, Up to 3 doses, then call 911    pen needle, diabetic 32 gauge x 5/32" Ndle Use for insulin administration up to three times a day for long-acting and rapid-acting insulin    rosuvastatin (CRESTOR) 40 mg, Oral, Nightly     274}  Review of patient's allergies indicates:  No Known Allergies    Patient Care Team:  Phyllis Hart FNP as PCP - General (Family Medicine)     Subjective:     Review of Systems    12 point review of systems conducted, negative except as stated in the history of present illness. See HPI for details.    Objective:   274}  Visit Vitals  /78 (BP Location: Left arm, Patient Position: Sitting, BP Method: Large (Automatic))   Pulse 77   Temp 97.6 °F (36.4 °C) (Oral)   Resp 18   Ht 5' 3" (1.6 m)   Wt 55.8 kg (123 lb)   SpO2 98%   BMI 21.79 kg/m²         Physical Exam    Labs Reviewed:    274}  Chemistry:  Lab Results   Component Value Date     01/13/2024    K 4.6 01/13/2024    CHLORIDE 106 01/13/2024    BUN 12.2 01/13/2024    CREATININE 0.78 01/13/2024    EGFRNORACEVR >60 01/13/2024    GLUCOSE 183 (H) 01/13/2024    CALCIUM 9.1 01/13/2024    ALKPHOS 70 01/13/2024    LABPROT 5.8 01/13/2024    ALBUMIN 3.5 01/13/2024    BILIDIR 0.3 03/15/2022    IBILI 0.60 03/15/2022    AST 15 01/13/2024    ALT 11 01/13/2024    MG 2.00 01/13/2024    PHOS 3.6 01/13/2024    [id number] 37.8 05/25/2021    TSH 1.637 01/11/2024    PSA 0.79 11/14/2022        Lab Results   Component Value Date    HGBA1C 9.2 (H) 01/11/2024        Hematology:  Lab Results   Component Value Date    WBC 6.30 01/13/2024    HGB 13.4 (L) 01/13/2024    HCT 40.1 (L) 01/13/2024     01/13/2024       Lipid Panel:  Lab Results   Component Value Date    CHOL 243 (H) 01/11/2024    HDL 64 (H) 01/11/2024    .00 01/11/2024    TRIG 206 (H) 01/11/2024    TOTALCHOLEST 4 " 01/11/2024        Urine:  Lab Results   Component Value Date    COLORUA Colorless 01/12/2024    APPEARANCEUA Clear 01/12/2024    SGUA 1.016 01/12/2024    PHUA 6.5 01/12/2024    PROTEINUA Negative 01/12/2024    GLUCOSEUA 3+ (A) 01/12/2024    KETONESUA 1+ (A) 01/12/2024    BLOODUA Negative 01/12/2024    NITRITESUA Negative 01/12/2024    LEUKOCYTESUR Negative 01/12/2024    RBCUA 0-5 01/12/2024    WBCUA 0-5 01/12/2024    BACTERIA None Seen 01/12/2024    SQEPUA None Seen 01/12/2024    HYALINECASTS 0-2 (A) 08/22/2019    CREATRANDUR 48.4 (L) 02/10/2023        Assessment:    274}    ICD-10-CM ICD-9-CM   1. TIA (transient ischemic attack)  G45.9 435.9   2. PAD (peripheral artery disease)  I73.9 443.9   3. Type 1 diabetes mellitus with microalbuminuria  E10.29 250.41    R80.9 791.0   4. Tobacco user  Z72.0 305.1   5. Mixed hyperlipidemia  E78.2 272.2   6. Coronary artery disease involving native coronary artery of native heart without angina pectoris  I25.10 414.01   7. Primary hypertension  I10 401.9        Plan:     1. TIA (transient ischemic attack)  Assessment & Plan:  See neurology as planned.   Stop smoking.   Cont ASA and statin.     Orders:  -     Ambulatory referral/consult to Endocrinology; Future; Expected date: 02/12/2024    2. PAD (peripheral artery disease)    3. Type 1 diabetes mellitus with microalbuminuria  Assessment & Plan:  Increase morning levemir to 17 u for now. Start over weekend.   Keep track of sugars.   Cont ISS for now.   Refer to endocrinology for tighter diabetes control.     Consider econsult if not able to get into clinic here.   Pt ok with this plan.     Orders:  -     Ambulatory referral/consult to Endocrinology; Future; Expected date: 02/12/2024    4. Tobacco user  Overview:          5. Mixed hyperlipidemia  Overview:  Crestor        6. Coronary artery disease involving native coronary artery of native heart without angina pectoris  Overview:  Follows Cards, last visit 4/26/22    Orders:  -      Ambulatory referral/consult to Endocrinology; Future; Expected date: 02/12/2024    7. Primary hypertension  Overview:  Coreg 6.25 mg po BID             Follow up Keep next scheduled appt. In addition to their scheduled follow up, the patient has also been instructed to follow up on as needed basis.     Future Appointments   Date Time Provider Department Center   3/13/2024  8:30 AM [facility]    7/29/2024 10:30 AM Loretta Barrett FNP [facility] [address] [place] MD

## 2024-02-05 ENCOUNTER — PATIENT MESSAGE (OUTPATIENT)
Dept: INTERNAL MEDICINE | Facility: CLINIC | Age: 64
End: 2024-02-05

## 2024-02-05 ENCOUNTER — OFFICE VISIT (OUTPATIENT)
Dept: INTERNAL MEDICINE | Facility: CLINIC | Age: 64
End: 2024-02-05
Payer: MEDICARE

## 2024-02-05 VITALS
OXYGEN SATURATION: 98 % | HEART RATE: 77 BPM | WEIGHT: 123 LBS | SYSTOLIC BLOOD PRESSURE: 136 MMHG | DIASTOLIC BLOOD PRESSURE: 78 MMHG | RESPIRATION RATE: 18 BRPM | BODY MASS INDEX: 21.79 KG/M2 | HEIGHT: 63 IN | TEMPERATURE: 98 F

## 2024-02-05 DIAGNOSIS — E78.2 MIXED HYPERLIPIDEMIA: ICD-10-CM

## 2024-02-05 DIAGNOSIS — I10 PRIMARY HYPERTENSION: ICD-10-CM

## 2024-02-05 DIAGNOSIS — G45.9 TIA (TRANSIENT ISCHEMIC ATTACK): Primary | ICD-10-CM

## 2024-02-05 DIAGNOSIS — E10.29 TYPE 1 DIABETES MELLITUS WITH MICROALBUMINURIA: ICD-10-CM

## 2024-02-05 DIAGNOSIS — I73.9 PAD (PERIPHERAL ARTERY DISEASE): ICD-10-CM

## 2024-02-05 DIAGNOSIS — I25.10 CORONARY ARTERY DISEASE INVOLVING NATIVE CORONARY ARTERY OF NATIVE HEART WITHOUT ANGINA PECTORIS: ICD-10-CM

## 2024-02-05 DIAGNOSIS — R80.9 TYPE 1 DIABETES MELLITUS WITH MICROALBUMINURIA: ICD-10-CM

## 2024-02-05 DIAGNOSIS — Z72.0 TOBACCO USER: ICD-10-CM

## 2024-02-05 PROCEDURE — 99215 OFFICE O/P EST HI 40 MIN: CPT | Mod: PBBFAC | Performed by: FAMILY MEDICINE

## 2024-02-05 PROCEDURE — 99214 OFFICE O/P EST MOD 30 MIN: CPT | Mod: S$PBB,,, | Performed by: FAMILY MEDICINE

## 2024-02-05 NOTE — ASSESSMENT & PLAN NOTE
Increase morning levemir to 17 u for now. Start over weekend.   Keep track of sugars.   Cont ISS for now.   Refer to endocrinology for tighter diabetes control.     Consider econsult if not able to get into clinic here.   Pt ok with this plan.

## 2024-02-06 ENCOUNTER — PATIENT MESSAGE (OUTPATIENT)
Dept: ADMINISTRATIVE | Facility: OTHER | Age: 64
End: 2024-02-06
Payer: MEDICARE

## 2024-02-13 ENCOUNTER — PATIENT MESSAGE (OUTPATIENT)
Dept: ADMINISTRATIVE | Facility: OTHER | Age: 64
End: 2024-02-13
Payer: MEDICARE

## 2024-03-12 ENCOUNTER — PATIENT MESSAGE (OUTPATIENT)
Dept: INTERNAL MEDICINE | Facility: CLINIC | Age: 64
End: 2024-03-12
Payer: MEDICARE

## 2024-04-15 PROBLEM — G45.9 TIA (TRANSIENT ISCHEMIC ATTACK): Status: RESOLVED | Noted: 2024-01-11 | Resolved: 2024-04-15

## 2024-04-16 DIAGNOSIS — E78.5 HYPERLIPIDEMIA, UNSPECIFIED HYPERLIPIDEMIA TYPE: ICD-10-CM

## 2024-04-24 DIAGNOSIS — E78.5 HYPERLIPIDEMIA, UNSPECIFIED HYPERLIPIDEMIA TYPE: ICD-10-CM

## 2024-04-24 RX ORDER — ROSUVASTATIN CALCIUM 40 MG/1
40 TABLET, COATED ORAL NIGHTLY
Qty: 90 TABLET | Refills: 0 | Status: SHIPPED | OUTPATIENT
Start: 2024-04-24 | End: 2025-04-24

## 2024-04-24 RX ORDER — ROSUVASTATIN CALCIUM 40 MG/1
40 TABLET, COATED ORAL NIGHTLY
Qty: 90 TABLET | Refills: 3 | OUTPATIENT
Start: 2024-04-24

## 2024-05-28 ENCOUNTER — PATIENT MESSAGE (OUTPATIENT)
Dept: INTERNAL MEDICINE | Facility: CLINIC | Age: 64
End: 2024-05-28

## 2024-05-28 ENCOUNTER — OFFICE VISIT (OUTPATIENT)
Dept: INTERNAL MEDICINE | Facility: CLINIC | Age: 64
End: 2024-05-28
Payer: MEDICARE

## 2024-05-28 VITALS
RESPIRATION RATE: 18 BRPM | BODY MASS INDEX: 23.79 KG/M2 | HEART RATE: 88 BPM | WEIGHT: 134.25 LBS | HEIGHT: 63 IN | TEMPERATURE: 99 F | DIASTOLIC BLOOD PRESSURE: 65 MMHG | SYSTOLIC BLOOD PRESSURE: 111 MMHG

## 2024-05-28 DIAGNOSIS — I10 PRIMARY HYPERTENSION: ICD-10-CM

## 2024-05-28 DIAGNOSIS — E78.2 MIXED HYPERLIPIDEMIA: ICD-10-CM

## 2024-05-28 DIAGNOSIS — I77.9 CAROTID ARTERY DISEASE, UNSPECIFIED LATERALITY, UNSPECIFIED TYPE: ICD-10-CM

## 2024-05-28 DIAGNOSIS — I25.10 CORONARY ARTERY DISEASE INVOLVING NATIVE CORONARY ARTERY OF NATIVE HEART WITHOUT ANGINA PECTORIS: ICD-10-CM

## 2024-05-28 DIAGNOSIS — Z12.5 SCREENING FOR MALIGNANT NEOPLASM OF PROSTATE: ICD-10-CM

## 2024-05-28 DIAGNOSIS — E10.9 TYPE 1 DIABETES MELLITUS WITHOUT COMPLICATIONS: ICD-10-CM

## 2024-05-28 DIAGNOSIS — E10.65 TYPE 1 DIABETES MELLITUS WITH HYPERGLYCEMIA: Primary | ICD-10-CM

## 2024-05-28 PROCEDURE — 99215 OFFICE O/P EST HI 40 MIN: CPT | Mod: PBBFAC | Performed by: NURSE PRACTITIONER

## 2024-05-28 PROCEDURE — 99214 OFFICE O/P EST MOD 30 MIN: CPT | Mod: S$PBB,,, | Performed by: NURSE PRACTITIONER

## 2024-05-28 RX ORDER — INSULIN DETEMIR 100 [IU]/ML
15 INJECTION, SOLUTION SUBCUTANEOUS 2 TIMES DAILY
Qty: 15 ML | Refills: 3 | Status: SHIPPED | OUTPATIENT
Start: 2024-05-28

## 2024-05-28 RX ORDER — INSULIN ASPART 100 [IU]/ML
3-5 INJECTION, SOLUTION INTRAVENOUS; SUBCUTANEOUS
Qty: 3 ML | Refills: 11 | Status: SHIPPED | OUTPATIENT
Start: 2024-05-28 | End: 2025-01-23

## 2024-05-28 NOTE — ASSESSMENT & PLAN NOTE
He denies any recent chest pain or shortness a breath symptoms   Medically managed and follows with Cardiology

## 2024-05-28 NOTE — PROGRESS NOTES
[facility]  Scott Lai DNP     Patient ID: [id number]     Chief Complaint: Diabetes (Needs refills, refused vaccine, not fasting)      HPI:     Federico Villarreal is a 63 y.o. male here today for follow up with PCP.     PMH: DM type 1, CAD, HTN, HLD, CVA with left sided weakness (2024).     CTA head and neck showed no evidence of high-grade stenosis or occlusion, proximal left ICA was 60 % stenosis and both carotid siphons with 34%. MRI unrevealing for acute intracranial hemorrhage but did show multiple tiny foci of blooming are seen scattered in the cerebrum, which may reflect chronic petechial hemorrhage, and a  single tiny focus of blooming in the right cerebellum, which may reflect chronic petechial hemorrhage.      Health Maintenance         Date Due Completion Date    Eye Exam 10/01/2022 10/1/2021 (Done)    Override on 10/1/2021: Done    Colorectal Cancer Screening 12/30/2022 12/30/2021    PROSTATE-SPECIFIC ANTIGEN 11/14/2023 11/14/2022    Diabetes Urine Screening 02/10/2024 2/10/2023    Foot Exam 03/22/2024 3/22/2023    Override on 3/18/2022: Done    Hemoglobin A1c 04/11/2024 1/11/2024    Shingles Vaccine (1 of 2) 05/28/2025 (Originally 9/4/2009) ---    HIV Screening 07/22/2028 (Originally 9/4/1975) ---    Lipid Panel 01/11/2025 1/11/2024    High Dose Statin 05/28/2025 5/28/2024    TETANUS VACCINE 09/03/2033 9/3/2023            History reviewed. No pertinent past medical history.     Past Surgical History:   Procedure Laterality Date    ADENOIDECTOMY      ADENOIDECTOMY  1972    As a child    CORONARY STENT PLACEMENT  08/20/2012    EYE SURGERY  2011    Multiple    INNER EAR SURGERY      REPAIR OF RETINAL DETACHMENT WITH VITRECTOMY      TONSILLECTOMY      VITRECTOMY          Social History     Tobacco Use    Smoking status: Former     Current packs/day: 0.00     Average packs/day: 0.3 packs/day for 15.0 years (3.8 ttl pk-yrs)     Types: Cigarettes     Start date: 2008     Quit date: 2023      "Years since quittin.4    Smokeless tobacco: Never    Tobacco comments:     States quit 3 months ago   Substance and Sexual Activity    Alcohol use: Not Currently    Drug use: Not Currently    Sexual activity: Yes     Partners: Female     Birth control/protection: None        Current Outpatient Medications   Medication Instructions    aspirin (ECOTRIN) 325 mg, Oral, Daily    carvediloL (COREG) 6.25 mg, Oral, 2 times daily    insulin aspart U-100 (NOVOLOG) 3-5 Units, Subcutaneous, 3 times daily with meals    LEVEMIR FLEXTOUCH U100 INSULIN 15 Units, Subcutaneous, 2 times daily    mv-min/folic/K1/lycopen/lutein (CENTRUM SILVER MEN ORAL) Oral    nitroGLYCERIN (NITROSTAT) 0.4 mg, Sublingual, Every 5 min PRN, Up to 3 doses, then call 911    pen needle, diabetic 32 gauge x " Ndle Use for insulin administration up to three times a day for long-acting and rapid-acting insulin    rosuvastatin (CRESTOR) 40 mg, Oral, Nightly       Review of patient's allergies indicates:  No Known Allergies     Patient Care Team:  Scott Lai FNP as PCP - General (Family Medicine)     Subjective:     Review of Systems   Constitutional:  Positive for activity change. Negative for appetite change, chills, diaphoresis, fever and unexpected weight change.   HENT:  Positive for hearing loss. Negative for ear pain, rhinorrhea, sinus pain, sore throat and trouble swallowing.    Eyes:  Negative for pain, discharge and visual disturbance.   Respiratory:  Negative for cough, chest tightness, shortness of breath and wheezing.    Cardiovascular:  Negative for chest pain, palpitations and leg swelling.   Gastrointestinal:  Negative for abdominal pain, blood in stool, constipation, diarrhea, nausea and vomiting.   Endocrine: Negative for cold intolerance, polydipsia and polyuria.   Genitourinary:  Negative for difficulty urinating, dysuria, frequency, hematuria and urgency.   Musculoskeletal:  Negative for arthralgias, joint swelling, myalgias " "and neck pain.   Skin:  Negative for color change and rash.   Allergic/Immunologic: Negative.    Neurological:  Negative for dizziness, syncope, weakness, light-headedness, numbness and headaches.   Hematological: Negative.    Psychiatric/Behavioral:  Negative for confusion, dysphoric mood and suicidal ideas. The patient is not nervous/anxious.    All other systems reviewed and are negative.      12 point review of systems conducted, negative except as stated in the history of present illness. See HPI for details.    Objective:     Visit Vitals  /65 (BP Location: Right arm, Patient Position: Sitting, BP Method: Medium (Automatic))   Pulse 88   Temp 98.5 °F (36.9 °C) (Oral)   Resp 18   Ht 5' 2.99" (1.6 m)   Wt 60.9 kg (134 lb 4.2 oz)   BMI 23.79 kg/m²       Physical Exam  Vitals and nursing note reviewed.   Constitutional:       General: He is not in acute distress.     Appearance: He is not ill-appearing.   HENT:      Head: Normocephalic and atraumatic.      Mouth/Throat:      Mouth: Mucous membranes are moist.      Pharynx: Oropharynx is clear.   Eyes:      General: No scleral icterus.     Extraocular Movements: Extraocular movements intact.      Conjunctiva/sclera: Conjunctivae normal.      Pupils: Pupils are equal, round, and reactive to light.   Neck:      Vascular: No carotid bruit.   Cardiovascular:      Rate and Rhythm: Normal rate and regular rhythm.      Pulses:           Dorsalis pedis pulses are 2+ on the right side and 2+ on the left side.        Posterior tibial pulses are 2+ on the right side and 2+ on the left side.      Heart sounds: No murmur heard.     No friction rub. No gallop.   Pulmonary:      Effort: Pulmonary effort is normal. No respiratory distress.      Breath sounds: Normal breath sounds. No wheezing, rhonchi or rales.   Abdominal:      General: Abdomen is flat. Bowel sounds are normal. There is no distension.      Palpations: Abdomen is soft. There is no mass.      Tenderness: " There is no abdominal tenderness.   Musculoskeletal:         General: Normal range of motion.      Cervical back: Normal range of motion and neck supple.      Right foot: No deformity.      Left foot: No deformity.   Feet:      Right foot:      Protective Sensation: 5 sites tested.  5 sites sensed.      Skin integrity: Skin integrity normal. No ulcer, blister, skin breakdown, erythema, warmth, callus, dry skin or fissure.      Toenail Condition: Right toenails are normal.      Left foot:      Protective Sensation: 5 sites tested.  5 sites sensed.      Skin integrity: Skin integrity normal. No ulcer, blister, skin breakdown, erythema, warmth, callus, dry skin or fissure.      Toenail Condition: Left toenails are normal.   Skin:     General: Skin is warm and dry.   Neurological:      General: No focal deficit present.      Mental Status: He is alert.   Psychiatric:         Mood and Affect: Mood normal.         Labs Reviewed:     Chemistry:  Lab Results   Component Value Date     01/13/2024    K 4.6 01/13/2024    CHLORIDE 106 01/13/2024    BUN 12.2 01/13/2024    CREATININE 0.78 01/13/2024    EGFRNORACEVR >60 01/13/2024    GLUCOSE 183 (H) 01/13/2024    CALCIUM 9.1 01/13/2024    ALKPHOS 70 01/13/2024    LABPROT 5.8 01/13/2024    ALBUMIN 3.5 01/13/2024    BILIDIR 0.3 03/15/2022    IBILI 0.60 03/15/2022    AST 15 01/13/2024    ALT 11 01/13/2024    MG 2.00 01/13/2024    PHOS 3.6 01/13/2024    CSZTLZJA53QQ 37.8 05/25/2021    TSH 1.637 01/11/2024    PSA 0.79 11/14/2022        Lab Results   Component Value Date    HGBA1C 9.2 (H) 01/11/2024        Hematology:  Lab Results   Component Value Date    WBC 6.30 01/13/2024    HGB 13.4 (L) 01/13/2024    HCT 40.1 (L) 01/13/2024     01/13/2024       Lipid Panel:  Lab Results   Component Value Date    CHOL 243 (H) 01/11/2024    HDL 64 (H) 01/11/2024    .00 01/11/2024    TRIG 206 (H) 01/11/2024    TOTALCHOLEST 4 01/11/2024        Urine:  Lab Results   Component Value  Date    COLORUA Colorless 01/12/2024    APPEARANCEUA Clear 01/12/2024    SGUA 1.016 01/12/2024    PHUA 6.5 01/12/2024    PROTEINUA Negative 01/12/2024    GLUCOSEUA 3+ (A) 01/12/2024    KETONESUA 1+ (A) 01/12/2024    BLOODUA Negative 01/12/2024    NITRITESUA Negative 01/12/2024    LEUKOCYTESUR Negative 01/12/2024    RBCUA 0-5 01/12/2024    WBCUA 0-5 01/12/2024    BACTERIA None Seen 01/12/2024    SQEPUA None Seen 01/12/2024    HYALINECASTS 0-2 (A) 08/22/2019    CREATRANDUR 48.4 (L) 02/10/2023        Assessment:       ICD-10-CM ICD-9-CM   1. Type 1 diabetes mellitus with hyperglycemia  E10.65 250.01   2. Type 1 diabetes mellitus without complications  E10.9 250.01   3. Carotid artery disease, unspecified laterality, unspecified type  I77.9 447.9   4. Mixed hyperlipidemia  E78.2 272.2   5. Primary hypertension  I10 401.9   6. Coronary artery disease involving native coronary artery of native heart without angina pectoris  I25.10 414.01   7. Screening for malignant neoplasm of prostate  Z12.5 V76.44        Plan:     1. Type 1 diabetes mellitus with hyperglycemia  Assessment & Plan:  Lab Results   Component Value Date    HGBA1C 9.2 (H) 01/11/2024    HGBA1C 9.6 (H) 07/21/2023    .00 01/11/2024    CREATININE 0.78 01/13/2024      Diabetes Medications               insulin aspart U-100 (NOVOLOG) 100 unit/mL (3 mL) InPn pen Inject 3-5 Units into the skin 3 (three) times daily with meals.    insulin detemir U-100, Levemir, (LEVEMIR FLEXTOUCH U100 INSULIN) 100 unit/mL (3 mL) InPn pen Inject 15 Units into the skin 2 (two) times daily.          On Statin according to ADA guidelines.    Increase Levemir as follows:  Week 1  -take 17 units subQ in the morning, continue 15 units subQ in the evening  Week 2   Take 17 units subQ in the morning, take 17 units subQ in the evening   Week 3  -take 19 units subQ in the morning, continue 17 units subQ in the evening   Week for   Take 19 units subQ in the morning, take 19 units subQ in  the evening  Goal fasting sugar is less than 150   Hypoglycemic precautions advised  Follow ADA Diet. Avoid soda, simple sweets, and limit rice/pasta/breads/starches (no more than 45-50 grams per meal).  Maintain healthy weight with goal BMI <30.  Exercise 5 times per week for 30 minutes per day.  Stressed importance of daily foot exams and to wear approved DM shoes.   Stressed importance of annual dilated eye exam.          Orders:  -     insulin aspart U-100 (NOVOLOG) 100 unit/mL (3 mL) InPn pen; Inject 3-5 Units into the skin 3 (three) times daily with meals.  Dispense: 3 mL; Refill: 11    2. Type 1 diabetes mellitus without complications  -     insulin detemir U-100, Levemir, (LEVEMIR FLEXTOUCH U100 INSULIN) 100 unit/mL (3 mL) InPn pen; Inject 15 Units into the skin 2 (two) times daily.  Dispense: 15 mL; Refill: 3  -     CBC Auto Differential; Future; Expected date: 05/28/2024  -     Comprehensive Metabolic Panel; Future; Expected date: 05/28/2024  -     Lipid Panel; Future; Expected date: 05/28/2024  -     TSH; Future; Expected date: 05/28/2024  -     Hemoglobin A1C; Future; Expected date: 05/28/2024  -     Urinalysis; Future; Expected date: 05/28/2024  -     T4, Free; Future; Expected date: 05/28/2024  -     Microalbumin/Creatinine Ratio, Urine; Future; Expected date: 05/28/2024  -     Ambulatory referral/consult to Endocrinology; Future; Expected date: 06/04/2024    3. Carotid artery disease, unspecified laterality, unspecified type  -     Ambulatory referral/consult to Vascular Surgery; Future; Expected date: 06/04/2024    4. Mixed hyperlipidemia  Overview:  Rosuvastatin 40 mg once daily    Assessment & Plan:  Counseled for low-sodium, low carb, low-cholesterol, good fat, Dash diet.  Recommend increasing fiber in the diet to lower LDL, increasing fish oil and fish such as salmon may help increase HDL good cholesterol.  Increasing aerobic activity as tolerated may also help lower LDL.  Healthy weight  maintenance is advised, portion control, calorie counting, and discussed difference between complex carbs and simple carbs.    Continue current statin therapy.        5. Primary hypertension  Overview:  Coreg 6.25 mg po BID      Assessment & Plan:  Goal BP < 140/90, best goal is BP <130/80 consistently   Reduce the amount of salt in your diet, follow a 2 gm sodium, DASH diet daily            Lose weight if you are overweight or have obesity  Avoid drinking too much alcohol  Stop smoking  Exercise at least 30 minutes per day most days of the week        6. Coronary artery disease involving native coronary artery of native heart without angina pectoris  Overview:  Aspirin 325 mg once daily   Carvedilol 6.25 mg once daily   Rosuvastatin 40 mg once daily   Nitroglycerin 0.4 mg as needed has not taken in a few months    Assessment & Plan:  He denies any recent chest pain or shortness a breath symptoms   Medically managed and follows with Cardiology      7. Screening for malignant neoplasm of prostate  -     PSA, Screening; Future; Expected date: 05/28/2024         Follow up in about 5 weeks (around 7/2/2024) for follow up, with lab work prior to visit. In addition to their scheduled follow up, the patient has also been instructed to follow up on as needed basis.     Future Appointments   Date Time Provider Department Center   7/2/2024  9:20 AM Scott Lai FNP [facility] [place]    7/29/2024 10:30 AM Loretta Barrett FNP [facility] [address] [place]

## 2024-05-28 NOTE — ASSESSMENT & PLAN NOTE
Lab Results   Component Value Date    HGBA1C 9.2 (H) 01/11/2024    HGBA1C 9.6 (H) 07/21/2023    .00 01/11/2024    CREATININE 0.78 01/13/2024      Diabetes Medications               insulin aspart U-100 (NOVOLOG) 100 unit/mL (3 mL) InPn pen Inject 3-5 Units into the skin 3 (three) times daily with meals.    insulin detemir U-100, Levemir, (LEVEMIR FLEXTOUCH U100 INSULIN) 100 unit/mL (3 mL) InPn pen Inject 15 Units into the skin 2 (two) times daily.          On Statin according to ADA guidelines.    Increase Levemir as follows:  Week 1  -take 17 units subQ in the morning, continue 15 units subQ in the evening  Week 2   Take 17 units subQ in the morning, take 17 units subQ in the evening   Week 3  -take 19 units subQ in the morning, continue 17 units subQ in the evening   Week for   Take 19 units subQ in the morning, take 19 units subQ in the evening  Goal fasting sugar is less than 150   Hypoglycemic precautions advised  Follow ADA Diet. Avoid soda, simple sweets, and limit rice/pasta/breads/starches (no more than 45-50 grams per meal).  Maintain healthy weight with goal BMI <30.  Exercise 5 times per week for 30 minutes per day.  Stressed importance of daily foot exams and to wear approved DM shoes.   Stressed importance of annual dilated eye exam.

## 2024-07-01 ENCOUNTER — LAB VISIT (OUTPATIENT)
Dept: LAB | Facility: HOSPITAL | Age: 64
End: 2024-07-01
Attending: NURSE PRACTITIONER
Payer: MEDICARE

## 2024-07-01 DIAGNOSIS — Z12.5 SCREENING FOR MALIGNANT NEOPLASM OF PROSTATE: ICD-10-CM

## 2024-07-01 DIAGNOSIS — E10.9 TYPE 1 DIABETES MELLITUS WITHOUT COMPLICATIONS: ICD-10-CM

## 2024-07-01 LAB
ALBUMIN SERPL-MCNC: 3.7 G/DL (ref 3.4–4.8)
ALBUMIN/GLOB SERPL: 1.4 RATIO (ref 1.1–2)
ALP SERPL-CCNC: 105 UNIT/L (ref 40–150)
ALT SERPL-CCNC: 30 UNIT/L (ref 0–55)
ANION GAP SERPL CALC-SCNC: 8 MEQ/L
AST SERPL-CCNC: 21 UNIT/L (ref 5–34)
BASOPHILS # BLD AUTO: 0.07 X10(3)/MCL
BASOPHILS NFR BLD AUTO: 0.5 %
BILIRUB SERPL-MCNC: 1 MG/DL
BUN SERPL-MCNC: 12.2 MG/DL (ref 8.4–25.7)
CALCIUM SERPL-MCNC: 9.3 MG/DL (ref 8.8–10)
CHLORIDE SERPL-SCNC: 100 MMOL/L (ref 98–107)
CHOLEST SERPL-MCNC: 132 MG/DL
CHOLEST/HDLC SERPL: 3 {RATIO} (ref 0–5)
CO2 SERPL-SCNC: 29 MMOL/L (ref 23–31)
CREAT SERPL-MCNC: 0.97 MG/DL (ref 0.73–1.18)
CREAT UR-MCNC: 87.1 MG/DL (ref 63–166)
CREAT/UREA NIT SERPL: 13
EOSINOPHIL # BLD AUTO: 0.25 X10(3)/MCL (ref 0–0.9)
EOSINOPHIL NFR BLD AUTO: 1.9 %
ERYTHROCYTE [DISTWIDTH] IN BLOOD BY AUTOMATED COUNT: 12 % (ref 11.5–17)
EST. AVERAGE GLUCOSE BLD GHB EST-MCNC: 200.1 MG/DL
GFR SERPLBLD CREATININE-BSD FMLA CKD-EPI: >60 ML/MIN/1.73/M2
GLOBULIN SER-MCNC: 2.7 GM/DL (ref 2.4–3.5)
GLUCOSE SERPL-MCNC: 213 MG/DL (ref 82–115)
HBA1C MFR BLD: 8.6 %
HCT VFR BLD AUTO: 40.9 % (ref 42–52)
HDLC SERPL-MCNC: 46 MG/DL (ref 35–60)
HGB BLD-MCNC: 13.5 G/DL (ref 14–18)
IMM GRANULOCYTES # BLD AUTO: 0.04 X10(3)/MCL (ref 0–0.04)
IMM GRANULOCYTES NFR BLD AUTO: 0.3 %
LDLC SERPL CALC-MCNC: 61 MG/DL (ref 50–140)
LYMPHOCYTES # BLD AUTO: 2.62 X10(3)/MCL (ref 0.6–4.6)
LYMPHOCYTES NFR BLD AUTO: 19.8 %
MCH RBC QN AUTO: 31.2 PG (ref 27–31)
MCHC RBC AUTO-ENTMCNC: 33 G/DL (ref 33–36)
MCV RBC AUTO: 94.5 FL (ref 80–94)
MICROALBUMIN UR-MCNC: 5.4 UG/ML
MICROALBUMIN/CREAT RATIO PNL UR: 6.2 MG/GM CR (ref 0–30)
MONOCYTES # BLD AUTO: 1.57 X10(3)/MCL (ref 0.1–1.3)
MONOCYTES NFR BLD AUTO: 11.9 %
NEUTROPHILS # BLD AUTO: 8.67 X10(3)/MCL (ref 2.1–9.2)
NEUTROPHILS NFR BLD AUTO: 65.6 %
NRBC BLD AUTO-RTO: 0 %
PLATELET # BLD AUTO: 162 X10(3)/MCL (ref 130–400)
PMV BLD AUTO: 10.1 FL (ref 7.4–10.4)
POTASSIUM SERPL-SCNC: 4.8 MMOL/L (ref 3.5–5.1)
PROT SERPL-MCNC: 6.4 GM/DL (ref 5.8–7.6)
PSA SERPL-MCNC: 0.46 NG/ML
RBC # BLD AUTO: 4.33 X10(6)/MCL (ref 4.7–6.1)
SODIUM SERPL-SCNC: 137 MMOL/L (ref 136–145)
T4 FREE SERPL-MCNC: 0.95 NG/DL (ref 0.7–1.48)
TRIGL SERPL-MCNC: 125 MG/DL (ref 34–140)
TSH SERPL-ACNC: 2.08 UIU/ML (ref 0.35–4.94)
VLDLC SERPL CALC-MCNC: 25 MG/DL
WBC # BLD AUTO: 13.22 X10(3)/MCL (ref 4.5–11.5)

## 2024-07-01 PROCEDURE — 36415 COLL VENOUS BLD VENIPUNCTURE: CPT

## 2024-07-01 PROCEDURE — 85025 COMPLETE CBC W/AUTO DIFF WBC: CPT

## 2024-07-01 PROCEDURE — 80061 LIPID PANEL: CPT

## 2024-07-01 PROCEDURE — 83036 HEMOGLOBIN GLYCOSYLATED A1C: CPT

## 2024-07-01 PROCEDURE — 80053 COMPREHEN METABOLIC PANEL: CPT

## 2024-07-01 PROCEDURE — 84439 ASSAY OF FREE THYROXINE: CPT

## 2024-07-01 PROCEDURE — 84443 ASSAY THYROID STIM HORMONE: CPT

## 2024-07-01 PROCEDURE — 84153 ASSAY OF PSA TOTAL: CPT

## 2024-07-01 PROCEDURE — 82570 ASSAY OF URINE CREATININE: CPT

## 2024-07-02 ENCOUNTER — OFFICE VISIT (OUTPATIENT)
Dept: INTERNAL MEDICINE | Facility: CLINIC | Age: 64
End: 2024-07-02
Payer: MEDICARE

## 2024-07-02 VITALS
TEMPERATURE: 98 F | BODY MASS INDEX: 24.66 KG/M2 | WEIGHT: 134 LBS | HEART RATE: 90 BPM | SYSTOLIC BLOOD PRESSURE: 131 MMHG | RESPIRATION RATE: 16 BRPM | DIASTOLIC BLOOD PRESSURE: 78 MMHG | HEIGHT: 62 IN

## 2024-07-02 DIAGNOSIS — D72.829 LEUKOCYTOSIS, UNSPECIFIED TYPE: ICD-10-CM

## 2024-07-02 DIAGNOSIS — I10 PRIMARY HYPERTENSION: ICD-10-CM

## 2024-07-02 DIAGNOSIS — E10.42 DIABETIC POLYNEUROPATHY ASSOCIATED WITH TYPE 1 DIABETES MELLITUS: ICD-10-CM

## 2024-07-02 DIAGNOSIS — E78.2 MIXED HYPERLIPIDEMIA: ICD-10-CM

## 2024-07-02 DIAGNOSIS — R80.9 TYPE 1 DIABETES MELLITUS WITH MICROALBUMINURIA: Primary | ICD-10-CM

## 2024-07-02 DIAGNOSIS — E10.29 TYPE 1 DIABETES MELLITUS WITH MICROALBUMINURIA: Primary | ICD-10-CM

## 2024-07-02 PROCEDURE — 99215 OFFICE O/P EST HI 40 MIN: CPT | Mod: PBBFAC | Performed by: NURSE PRACTITIONER

## 2024-07-02 RX ORDER — CYANOCOBALAMIN (VITAMIN B-12) 1000MCG/15
LIQUID (ML) ORAL DAILY
COMMUNITY

## 2024-07-02 RX ORDER — PERPHENAZINE 16 MG
TABLET ORAL DAILY
COMMUNITY

## 2024-07-02 RX ORDER — GABAPENTIN 100 MG/1
100 CAPSULE ORAL NIGHTLY
Qty: 90 CAPSULE | Refills: 1 | Status: SHIPPED | OUTPATIENT
Start: 2024-07-02 | End: 2024-12-29

## 2024-07-02 RX ORDER — UBIQUINOL 100 MG
CAPSULE ORAL
COMMUNITY

## 2024-07-02 RX ORDER — CHOLECALCIFEROL (VITAMIN D3) 25 MCG
1000 TABLET ORAL DAILY
COMMUNITY

## 2024-07-02 NOTE — ASSESSMENT & PLAN NOTE
Lab Results   Component Value Date    HGBA1C 8.6 (H) 07/01/2024    HGBA1C 9.2 (H) 01/11/2024    LDL 61.00 07/01/2024    CREATININE 0.97 07/01/2024      Diabetes Medications               insulin aspart U-100 (NOVOLOG) 100 unit/mL (3 mL) InPn pen Inject 3-5 Units into the skin 3 (three) times daily with meals.    insulin detemir U-100, Levemir, (LEVEMIR FLEXTOUCH U100 INSULIN) 100 unit/mL (3 mL) InPn pen Inject 15 Units into the skin 2 (two) times daily.          Need to consider ACE inhibitor or Arb, we will discuss at next appointment.  He has been hesitant to starting new medications.    Last visit he was instructed to increase insulin by 2 units every week for goal fasting sugar less than 140.  He has not increase insulin and he is still taking Levemir 15 units subQ twice daily and NovoLog 3-5 units with meals 3 times daily.  He has been lowering his carbohydrate intake and his fastings have been improving, A1c is improved as well but still not at goal.      He reports he was diagnosed as a type 1 diabetic and his teenage years.  He has never had an insulin pump and he would like to be evaluated for 1, I agree we will refer to endocrinology.  I am still encouraging him to increase his Levemir by 2 units more every 2-3 days, if he is nervous then increase by 1 unit every 2-3 days for improved fasting blood sugar, goal less than 140 and hypoglycemic precautions are advised.    Follow ADA Diet. Avoid soda, simple sweets, and limit rice/pasta/breads/starches (no more than 45-50 grams per meal).  Maintain healthy weight with goal BMI <30.  Exercise 5 times per week for 30 minutes per day.  Stressed importance of daily foot exams and to wear approved DM shoes.   Stressed importance of annual dilated eye exam.

## 2024-07-02 NOTE — ASSESSMENT & PLAN NOTE
This has been improving since his blood sugar has been better controlled but he is still experiencing symptoms in his bilateral lower feet and more so at nighttime.  We will have a trial of gabapentin 100 mg nightly, precautions are advised.  Continue to improve A1c and better glycemic control.

## 2024-07-02 NOTE — ASSESSMENT & PLAN NOTE
Repeat labs in 3 months, no recent infections known, no recent known steroid use, no significant clinical findings.

## 2024-07-02 NOTE — PROGRESS NOTES
[facility]  Scott Lai DNP     Patient ID: [id number]     Chief Complaint: Follow-up      HPI:     Federico Villarreal is a 63 y.o. male here today for follow up with PCP.     Medical diagnosis: DM type 1, CAD, HTN, HLD, CVA with left sided weakness ().     He is here today for follow-up after increasing Levemir, schedule given to gradually increase for fasting goal blood sugar less than 140.      Health Maintenance         Date Due Completion Date    Eye Exam 10/01/2022 10/1/2021 (Done)    Override on 10/1/2021: Done    Colorectal Cancer Screening 2022    Shingles Vaccine (1 of 2) 2025 (Originally 2009) ---    HIV Screening 2028 (Originally 1974) ---    Influenza Vaccine (1) 2024 (Declined)    Override on 2023: Declined (Patient refused)    Hemoglobin A1c 10/01/2024 2024    High Dose Statin 2025    Foot Exam 2025    Override on 3/18/2022: Done    PROSTATE-SPECIFIC ANTIGEN 2025    Diabetes Urine Screening 2025    Lipid Panel 2025    TETANUS VACCINE 2033 9/3/2023            History reviewed. No pertinent past medical history.     Past Surgical History:   Procedure Laterality Date    ADENOIDECTOMY      ADENOIDECTOMY  1972    As a child    CORONARY STENT PLACEMENT  2012    EYE SURGERY      Multiple    INNER EAR SURGERY      REPAIR OF RETINAL DETACHMENT WITH VITRECTOMY      TONSILLECTOMY      VITRECTOMY          Social History     Tobacco Use    Smoking status: Former     Current packs/day: 0.00     Average packs/day: 0.3 packs/day for 15.0 years (3.8 ttl pk-yrs)     Types: Cigarettes     Start date:      Quit date:      Years since quittin.5    Smokeless tobacco: Never    Tobacco comments:     States quit 3 months ago   Substance and Sexual Activity    Alcohol use: Not Currently    Drug use: Not Currently    Sexual activity: Yes      "Partners: Female     Birth control/protection: None        Current Outpatient Medications   Medication Instructions    alpha lipoic acid 600 mg Cap Oral, Daily    aspirin (ECOTRIN) 325 mg, Oral, Daily    carvediloL (COREG) 6.25 mg, Oral, 2 times daily    coQ10, ubiquinol, 100 mg Cap Oral    cyanocobalamin, vitamin B-12, (LIQUID B-12) 1,000 mcg/15 mL Liqd Oral, Daily    gabapentin (NEURONTIN) 100 mg, Oral, Nightly    insulin aspart U-100 (NOVOLOG) 3-5 Units, Subcutaneous, 3 times daily with meals    LEVEMIR FLEXTOUCH U100 INSULIN 15 Units, Subcutaneous, 2 times daily    mv-min/folic/K1/lycopen/lutein (CENTRUM SILVER MEN ORAL) Oral    nitroGLYCERIN (NITROSTAT) 0.4 mg, Sublingual, Every 5 min PRN, Up to 3 doses, then call 911    pen needle, diabetic 32 gauge x 5/32" Ndle Use for insulin administration up to three times a day for long-acting and rapid-acting insulin    rosuvastatin (CRESTOR) 40 mg, Oral, Nightly    vitamin D (VITAMIN D3) 1,000 Units, Oral, Daily       Review of patient's allergies indicates:   Allergen Reactions    Hay fever and allergy relief Other (See Comments)        Patient Care Team:  Scott Lai FNP as PCP - General (Family Medicine)     Subjective:     Review of Systems   Constitutional:  Negative for appetite change, chills, diaphoresis and fever.   HENT:  Negative for ear pain, sinus pain and sore throat.    Eyes:  Negative for pain and visual disturbance.   Respiratory:  Negative for cough, shortness of breath and wheezing.    Cardiovascular:  Negative for chest pain, palpitations and leg swelling.   Gastrointestinal:  Negative for abdominal pain, blood in stool, diarrhea, nausea and vomiting.   Endocrine: Negative for cold intolerance.   Genitourinary:  Negative for difficulty urinating, dysuria, frequency and hematuria.   Musculoskeletal:  Negative for arthralgias, joint swelling and myalgias.   Skin:  Negative for color change and rash.   Allergic/Immunologic: Negative.  " "  Neurological: Negative.  Negative for dizziness, syncope, light-headedness and numbness.   Hematological: Negative.    Psychiatric/Behavioral: Negative.  Negative for dysphoric mood and suicidal ideas. The patient is not nervous/anxious.    All other systems reviewed and are negative.      12 point review of systems conducted, negative except as stated in the history of present illness. See HPI for details.    Objective:     Visit Vitals  /78 (BP Location: Left arm, Patient Position: Sitting, BP Method: Medium (Automatic))   Pulse 90   Temp 97.6 °F (36.4 °C) (Oral)   Resp 16   Ht 5' 2" (1.575 m)   Wt 60.8 kg (134 lb)   BMI 24.51 kg/m²       Physical Exam  Vitals and nursing note reviewed.   Constitutional:       General: He is not in acute distress.     Appearance: He is not ill-appearing.   HENT:      Head: Normocephalic and atraumatic.      Mouth/Throat:      Mouth: Mucous membranes are moist.      Pharynx: Oropharynx is clear.   Eyes:      General: No scleral icterus.     Extraocular Movements: Extraocular movements intact.      Conjunctiva/sclera: Conjunctivae normal.      Pupils: Pupils are equal, round, and reactive to light.   Neck:      Vascular: No carotid bruit.   Cardiovascular:      Rate and Rhythm: Normal rate and regular rhythm.      Heart sounds: No murmur heard.     No friction rub. No gallop.   Pulmonary:      Effort: Pulmonary effort is normal. No respiratory distress.      Breath sounds: Normal breath sounds. No wheezing, rhonchi or rales.   Abdominal:      General: Abdomen is flat. Bowel sounds are normal. There is no distension.      Palpations: Abdomen is soft. There is no mass.      Tenderness: There is no abdominal tenderness.   Musculoskeletal:         General: Normal range of motion.      Cervical back: Normal range of motion and neck supple.   Skin:     General: Skin is warm and dry.   Neurological:      General: No focal deficit present.      Mental Status: He is alert. "   Psychiatric:         Mood and Affect: Mood normal.         Labs Reviewed:     Chemistry:  Lab Results   Component Value Date     07/01/2024    K 4.8 07/01/2024    BUN 12.2 07/01/2024    CREATININE 0.97 07/01/2024    EGFRNORACEVR >60 07/01/2024    GLUCOSE 213 (H) 07/01/2024    CALCIUM 9.3 07/01/2024    ALKPHOS 105 07/01/2024    LABPROT 6.4 07/01/2024    ALBUMIN 3.7 07/01/2024    BILIDIR 0.3 03/15/2022    IBILI 0.60 03/15/2022    AST 21 07/01/2024    ALT 30 07/01/2024    MG 2.00 01/13/2024    PHOS 3.6 01/13/2024    MYBLVOUV11VF 37.8 05/25/2021    TSH 2.085 07/01/2024    DXTVJY4WSAB 0.95 07/01/2024    PSA 0.46 07/01/2024        Lab Results   Component Value Date    HGBA1C 8.6 (H) 07/01/2024        Hematology:  Lab Results   Component Value Date    WBC 13.22 (H) 07/01/2024    HGB 13.5 (L) 07/01/2024    HCT 40.9 (L) 07/01/2024     07/01/2024       Lipid Panel:  Lab Results   Component Value Date    CHOL 132 07/01/2024    HDL 46 07/01/2024    LDL 61.00 07/01/2024    TRIG 125 07/01/2024    TOTALCHOLEST 3 07/01/2024        Urine:  Lab Results   Component Value Date    APPEARANCEUA Clear 01/12/2024    SGUA 1.016 01/12/2024    PROTEINUA Negative 01/12/2024    KETONESUA 1+ (A) 01/12/2024    LEUKOCYTESUR Negative 01/12/2024    RBCUA 0-5 01/12/2024    WBCUA 0-5 01/12/2024    BACTERIA None Seen 01/12/2024    SQEPUA None Seen 01/12/2024    HYALINECASTS 0-2 (A) 08/22/2019    CREATRANDUR 87.1 07/01/2024        Assessment:       ICD-10-CM ICD-9-CM   1. Type 1 diabetes mellitus with microalbuminuria  E10.29 250.41    R80.9 791.0   2. Diabetic polyneuropathy associated with type 1 diabetes mellitus  E10.42 250.61     357.2   3. Leukocytosis, unspecified type  D72.829 288.60   4. Mixed hyperlipidemia  E78.2 272.2   5. Primary hypertension  I10 401.9        Plan:     1. Type 1 diabetes mellitus with microalbuminuria  Assessment & Plan:  Lab Results   Component Value Date    HGBA1C 8.6 (H) 07/01/2024    HGBA1C 9.2 (H)  01/11/2024    LDL 61.00 07/01/2024    CREATININE 0.97 07/01/2024      Diabetes Medications               insulin aspart U-100 (NOVOLOG) 100 unit/mL (3 mL) InPn pen Inject 3-5 Units into the skin 3 (three) times daily with meals.    insulin detemir U-100, Levemir, (LEVEMIR FLEXTOUCH U100 INSULIN) 100 unit/mL (3 mL) InPn pen Inject 15 Units into the skin 2 (two) times daily.          Need to consider ACE inhibitor or Arb, we will discuss at next appointment.  He has been hesitant to starting new medications.    Last visit he was instructed to increase insulin by 2 units every week for goal fasting sugar less than 140.  He has not increase insulin and he is still taking Levemir 15 units subQ twice daily and NovoLog 3-5 units with meals 3 times daily.  He has been lowering his carbohydrate intake and his fastings have been improving, A1c is improved as well but still not at goal.      He reports he was diagnosed as a type 1 diabetic and his teenage years.  He has never had an insulin pump and he would like to be evaluated for 1, I agree we will refer to endocrinology.  I am still encouraging him to increase his Levemir by 2 units more every 2-3 days, if he is nervous then increase by 1 unit every 2-3 days for improved fasting blood sugar, goal less than 140 and hypoglycemic precautions are advised.    Follow ADA Diet. Avoid soda, simple sweets, and limit rice/pasta/breads/starches (no more than 45-50 grams per meal).  Maintain healthy weight with goal BMI <30.  Exercise 5 times per week for 30 minutes per day.  Stressed importance of daily foot exams and to wear approved DM shoes.   Stressed importance of annual dilated eye exam.          Orders:  -     Ambulatory referral/consult to Endocrinology; Future; Expected date: 07/09/2024    2. Diabetic polyneuropathy associated with type 1 diabetes mellitus  Assessment & Plan:  This has been improving since his blood sugar has been better controlled but he is still  experiencing symptoms in his bilateral lower feet and more so at nighttime.  We will have a trial of gabapentin 100 mg nightly, precautions are advised.  Continue to improve A1c and better glycemic control.    Orders:  -     gabapentin (NEURONTIN) 100 MG capsule; Take 1 capsule (100 mg total) by mouth every evening.  Dispense: 90 capsule; Refill: 1  -     Hemoglobin A1C; Future; Expected date: 10/02/2024  -     Comprehensive Metabolic Panel; Future; Expected date: 10/02/2024  -     Microalbumin/Creatinine Ratio, Urine; Future; Expected date: 10/02/2024  -     CBC Auto Differential; Future; Expected date: 10/02/2024    3. Leukocytosis, unspecified type  Assessment & Plan:  Repeat labs in 3 months, no recent infections known, no recent known steroid use, no significant clinical findings.    Orders:  -     CBC Auto Differential; Future; Expected date: 10/02/2024    4. Mixed hyperlipidemia  Overview:  Rosuvastatin 40 mg once daily    Assessment & Plan:  Counseled for low-sodium, low carb, low-cholesterol, good fat, Dash diet.  Recommend increasing fiber in the diet to lower LDL, increasing fish oil and fish such as salmon may help increase HDL good cholesterol.  Increasing aerobic activity as tolerated may also help lower LDL.  Healthy weight maintenance is advised, portion control, calorie counting, and discussed difference between complex carbs and simple carbs.    Continue current statin therapy.        5. Primary hypertension  Overview:  Coreg 6.25 mg po BID      Assessment & Plan:  Goal BP < 140/90, best goal is BP <130/80 consistently   Reduce the amount of salt in your diet, follow a 2 gm sodium, DASH diet daily            Lose weight if you are overweight or have obesity  Avoid drinking too much alcohol  Stop smoking  Exercise at least 30 minutes per day most days of the week             Follow up in about 3 months (around 10/2/2024) for follow up, with lab work prior to visit. In addition to their scheduled  follow up, the patient has also been instructed to follow up on as needed basis.     Future Appointments   Date Time Provider Department Center   7/29/2024 10:30 AM Loretta Barrett FNP [facility] [address] [place] [address]   10/2/2024  8:20 AM Scott Lai FNP [facility] [place]        [address] [place]

## 2024-07-29 ENCOUNTER — OFFICE VISIT (OUTPATIENT)
Dept: NEUROLOGY | Facility: CLINIC | Age: 64
End: 2024-07-29
Payer: MEDICARE

## 2024-07-29 VITALS
DIASTOLIC BLOOD PRESSURE: 75 MMHG | BODY MASS INDEX: 24.67 KG/M2 | HEART RATE: 77 BPM | WEIGHT: 134.06 LBS | SYSTOLIC BLOOD PRESSURE: 132 MMHG | HEIGHT: 62 IN

## 2024-07-29 DIAGNOSIS — G45.9 TIA (TRANSIENT ISCHEMIC ATTACK): Primary | ICD-10-CM

## 2024-07-29 DIAGNOSIS — I10 PRIMARY HYPERTENSION: ICD-10-CM

## 2024-07-29 DIAGNOSIS — E78.2 MIXED HYPERLIPIDEMIA: ICD-10-CM

## 2024-07-29 DIAGNOSIS — E10.42 DIABETIC POLYNEUROPATHY ASSOCIATED WITH TYPE 1 DIABETES MELLITUS: ICD-10-CM

## 2024-07-29 PROCEDURE — 99999 PR PBB SHADOW E&M-EST. PATIENT-LVL IV: CPT | Mod: PBBFAC,,, | Performed by: NURSE PRACTITIONER

## 2024-07-29 PROCEDURE — 99214 OFFICE O/P EST MOD 30 MIN: CPT | Mod: PBBFAC | Performed by: NURSE PRACTITIONER

## 2024-07-29 PROCEDURE — 99214 OFFICE O/P EST MOD 30 MIN: CPT | Mod: S$PBB,,, | Performed by: NURSE PRACTITIONER

## 2024-07-29 NOTE — PROGRESS NOTES
Subjective:      Patient ID: Federico Villarreal is a 63 y.o. male.    Chief Complaint:  Transient Ischemic Attack (6 month follow up DX:TIA. Patient denies stroke like symptoms. No sense of time, but patient is aware of surroundings./)      History of Present Illness  Patient with past medical history of DM type 1, CAD, HTN, HLD who presented to [facility] on [facility]24 with complaints of expressive aphasia and left sided weakness. Symptoms resolved shortly after arrival. CT negative, CTA head and neck showed no evidence of high-grade stenosis or occlusion, proximal left ICA was 60 % stenosis and both carotid siphons with 34%. MRI unrevealing for acute intracranial hemorrhage but did show multiple tiny foci of blooming are seen scattered in the cerebrum, which may reflect chronic petechial hemorrhage, and a  single tiny focus of blooming in the right cerebellum, which may reflect chronic petechial hemorrhage. Blood sugar was greater than 500 on admit.  Today patient denies any new onset of numbness, tingling or weakness. Labs reviewed in patient's chart. Showed improvements in LDL, Triglycerides and A1C.             No past medical history on file.    Past Surgical History:   Procedure Laterality Date    ADENOIDECTOMY      ADENOIDECTOMY  1972    As a child    CORONARY STENT PLACEMENT  2012    EYE SURGERY      Multiple    INNER EAR SURGERY      REPAIR OF RETINAL DETACHMENT WITH VITRECTOMY      TONSILLECTOMY      VITRECTOMY         Family History   Problem Relation Name Age of Onset    Diabetes Mother Mamta Villarreal     Diabetes Father Mayito Villarreal        Social History     Socioeconomic History    Marital status:    Tobacco Use    Smoking status: Former     Current packs/day: 0.00     Average packs/day: 0.3 packs/day for 15.0 years (3.8 ttl pk-yrs)     Types: Cigarettes     Start date:      Quit date:      Years since quittin.5    Smokeless tobacco: Never    Tobacco comments:     States quit 3  months ago   Substance and Sexual Activity    Alcohol use: Not Currently    Drug use: Not Currently    Sexual activity: Yes     Partners: Female     Birth control/protection: None     Social Determinants of Health     Financial Resource Strain: Low Risk  (2/5/2024)    Overall Financial Resource Strain (CARDIA)     Difficulty of Paying Living Expenses: Not very hard   Food Insecurity: Food Insecurity Present (2/5/2024)    Hunger Vital Sign     Worried About Running Out of Food in the Last Year: Sometimes true     Ran Out of Food in the Last Year: Sometimes true   Transportation Needs: Unmet Transportation Needs (2/5/2024)    PRAPARE - Transportation     Lack of Transportation (Medical): Yes     Lack of Transportation (Non-Medical): No   Physical Activity: Inactive (2/5/2024)    Exercise Vital Sign     Days of Exercise per Week: 0 days     Minutes of Exercise per Session: 0 min   Stress: No Stress Concern Present (2/5/2024)    [employer] [place] [place] [employer] - Occupational Stress Questionnaire     Feeling of Stress : Not at all   Housing Stability: High Risk (2/5/2024)    Housing Stability Vital Sign     Unable to Pay for Housing in the Last Year: Yes     Number of Places Lived in the Last Year: 1     Unstable Housing in the Last Year: No       Current Outpatient Medications   Medication Sig Dispense Refill    alpha lipoic acid 600 mg Cap Take by mouth once daily.      aspirin (ECOTRIN) 325 MG EC tablet Take 1 tablet (325 mg total) by mouth once daily.  0    carvediloL (COREG) 6.25 MG tablet Take 1 tablet (6.25 mg total) by mouth 2 (two) times daily. 180 tablet 3    coQ10, ubiquinol, 100 mg Cap Take by mouth.      cyanocobalamin, vitamin B-12, (LIQUID B-12) 1,000 mcg/15 mL Liqd Take by mouth once daily.      gabapentin (NEURONTIN) 100 MG capsule Take 1 capsule (100 mg total) by mouth every evening. 90 capsule 1    insulin aspart U-100 (NOVOLOG) 100 unit/mL (3 mL) InPn pen Inject 3-5 Units into the skin 3  "(three) times daily with meals. 3 mL 11    insulin detemir U-100, Levemir, (LEVEMIR FLEXTOUCH U100 INSULIN) 100 unit/mL (3 mL) InPn pen Inject 15 Units into the skin 2 (two) times daily. 15 mL 3    mv-min/folic/K1/lycopen/lutein (CENTRUM SILVER MEN ORAL) Take by mouth.      nitroGLYCERIN (NITROSTAT) 0.4 MG SL tablet Place 1 tablet (0.4 mg total) under the tongue every 5 (five) minutes as needed for Chest pain. Up to 3 doses, then call 911 30 tablet 1    pen needle, diabetic 32 gauge x 5/32" Ndle Use for insulin administration up to three times a day for long-acting and rapid-acting insulin 100 each 3    rosuvastatin (CRESTOR) 40 MG Tab Take 1 tablet (40 mg total) by mouth every evening. 90 tablet 0    vitamin D (VITAMIN D3) 1000 units Tab Take 1,000 Units by mouth once daily.       No current facility-administered medications for this visit.       Review of patient's allergies indicates:   Allergen Reactions    Hay fever and allergy relief Other (See Comments)      Vitals:    07/29/24 1036 07/29/24 1039   BP: (!) 142/80 132/75   BP Location: Right arm Left arm   Patient Position: Sitting Sitting   Pulse: 77 77   Weight: 60.8 kg (134 lb 0.6 oz)    Height: 5' 2" (1.575 m)       Review of Systems  12 point ROS performed and negative unless otherwise documented in HPI  Objective:     Neurologic Exam      Mental Status   Oriented to person.   Disoriented to year.   Speech: speech is normal   Level of consciousness: alert     Cranial Nerves   Cranial nerves II through XII intact.      Motor Exam   Muscle bulk: normal  Right arm tone: normal  Left arm tone: normal  Right leg tone: normal  Left leg tone: normal     Strength   Strength 5/5 throughout.      Sensory Exam   Light touch normal.      Gait, Coordination, and Reflexes      Gait  Gait: normal        Physical Exam  Vitals reviewed.   Cardiovascular:      Rate and Rhythm: Normal rate and regular rhythm.   Pulmonary:      Effort: Pulmonary effort is normal. "   Neurological:      Cranial Nerves: Cranial nerves 2-12 are intact.      Motor: Motor strength is normal.     Gait: Gait is intact.   Psychiatric:         Speech: Speech normal.        Assessment:     1. TIA (transient ischemic attack)    2. Diabetic polyneuropathy associated with type 1 diabetes mellitus    3. Mixed hyperlipidemia    4. Primary hypertension        Plan:     Continue  mg daily  A1C slightly improved to 8.2; goal A1C <7.0  BP normotensive today  LDL has improved to 61, triglycerides have also improved  Secondary stroke prevention measures discussed. Education provided on signs and symptoms of stroke; advised to call 9-1-1 with any new onset of numbness, tingling or weakness, difficulty with speech or facial droop.    Encouraged continued exercise  Patient to follow up prn

## 2024-09-05 ENCOUNTER — OFFICE VISIT (OUTPATIENT)
Dept: CARDIOLOGY | Facility: CLINIC | Age: 64
End: 2024-09-05
Payer: MEDICARE

## 2024-09-05 VITALS
DIASTOLIC BLOOD PRESSURE: 73 MMHG | BODY MASS INDEX: 25.11 KG/M2 | WEIGHT: 136.44 LBS | TEMPERATURE: 98 F | RESPIRATION RATE: 20 BRPM | OXYGEN SATURATION: 97 % | HEART RATE: 79 BPM | SYSTOLIC BLOOD PRESSURE: 134 MMHG | HEIGHT: 62 IN

## 2024-09-05 DIAGNOSIS — I63.9 ISCHEMIC STROKE: ICD-10-CM

## 2024-09-05 DIAGNOSIS — I10 PRIMARY HYPERTENSION: ICD-10-CM

## 2024-09-05 DIAGNOSIS — I77.9 CAROTID ARTERY DISEASE, UNSPECIFIED LATERALITY, UNSPECIFIED TYPE: ICD-10-CM

## 2024-09-05 DIAGNOSIS — I25.10 CORONARY ARTERY DISEASE INVOLVING NATIVE CORONARY ARTERY OF NATIVE HEART WITHOUT ANGINA PECTORIS: Primary | ICD-10-CM

## 2024-09-05 DIAGNOSIS — E78.5 HYPERLIPIDEMIA, UNSPECIFIED HYPERLIPIDEMIA TYPE: ICD-10-CM

## 2024-09-05 LAB
OHS QRS DURATION: 122 MS
OHS QTC CALCULATION: 424 MS

## 2024-09-05 PROCEDURE — 99215 OFFICE O/P EST HI 40 MIN: CPT | Mod: PBBFAC | Performed by: INTERNAL MEDICINE

## 2024-09-05 PROCEDURE — 93005 ELECTROCARDIOGRAM TRACING: CPT

## 2024-09-05 RX ORDER — ROSUVASTATIN CALCIUM 40 MG/1
40 TABLET, COATED ORAL NIGHTLY
Qty: 90 TABLET | Refills: 2 | Status: SHIPPED | OUTPATIENT
Start: 2024-09-05 | End: 2025-09-05

## 2024-09-05 RX ORDER — MAGNESIUM GLUCONATE 27 MG(500)
1 TABLET ORAL DAILY
COMMUNITY

## 2024-09-05 NOTE — PATIENT INSTRUCTIONS
Reminder:  You will need to schedule and complete a cardiac stress test and wear a 48 hours heart monitor prior to your next visit with cardiology.  Contact sheet given with phone numbers to schedule if needed.

## 2024-09-05 NOTE — PROGRESS NOTES
Cardiology Attending    I evaluated Federico Villarreal and discussed the patient's symptoms, findings, and management plan with the resident.  Please see the Cardiology note for details.

## 2024-09-05 NOTE — PROGRESS NOTES
Cardiology Clinic Note    CHIEF COMPLAINT:   Chief Complaint   Patient presents with    initial visit denies chest pain or sob no questions                    Review of patient's allergies indicates:   Allergen Reactions    Hay fever and allergy relief Other (See Comments)                                          HPI:  Federico Villarreal 64 y.o. male with PMH of HTN, HLD, CAD s/p PCI 2013 to LAD, Type 1 DM, CVA came to the clinic to establish care. Patient was referred by his PCP to follow up with cardiology for history of Stroke and CAD. Patient reports that he has had PCI to LAD in 2013 and has not been following with cardiology since then. He had an episode of TIA in Jan 2024, was found to have multiple old ischemic changes on imaging. Patient also reports of a chest pain before 2 months, substernal which resolved on 2 NTG. Describes that pain as fluttering. He also endorses a fluttering sensation a month ago, where he felt woozy but resolved spontaneously. Today, He denies any shortness of breath, chest pain, palpitations, orthopnea, leg swelling, leg pain. He says that he works out at homes and denies any chest pain during exertion. He smokes occasionally, and stopped drinking since a couple of years.                                                                                                                                                                                                                                                                                                                                                                                                                                                                                      CARDIAC TESTING:      Echo Saline Bubble? Yes (1/12/24)    Interpretation Summary    Left Ventricle: The left ventricle is normal in size. Normal wall thickness. Normal wall motion. There is normal systolic function with a visually estimated ejection  fraction of 55 - 60%. There is normal diastolic function.    Right Ventricle: Normal right ventricular cavity size. Systolic function is normal. TAPSE is 2.14 cm.    Mitral Valve: There is trace regurgitation.    Tricuspid Valve: There is trace regurgitation.    IVC/SVC: Normal venous pressure at 3 mmHg.    There was 6 mL of intravenous agitated saline (bubble) used. Study is negative for shunt.           Patient Active Problem List   Diagnosis    Cataract of both eyes    Hearing loss    Hyperlipidemia    Hypertension    Retinal disorder    Tobacco user    Type 1 diabetes mellitus    Vitamin D deficiency    Coronary artery disease involving native coronary artery of native heart without angina pectoris    PAD (peripheral artery disease)    Type 1 diabetes mellitus with microalbuminuria    Carotid artery disease    Leukocytosis    Diabetic polyneuropathy associated with type 1 diabetes mellitus     Past Surgical History:   Procedure Laterality Date    ADENOIDECTOMY      ADENOIDECTOMY  1972    As a child    CORONARY STENT PLACEMENT  2012    EYE SURGERY      Multiple    INNER EAR SURGERY      REPAIR OF RETINAL DETACHMENT WITH VITRECTOMY      TONSILLECTOMY      VITRECTOMY       Social History     Socioeconomic History    Marital status:    Tobacco Use    Smoking status: Former     Current packs/day: 0.00     Average packs/day: 0.3 packs/day for 15.0 years (3.8 ttl pk-yrs)     Types: Cigarettes     Start date:      Quit date:      Years since quittin.6    Smokeless tobacco: Never    Tobacco comments:     States quit 3 months ago   Substance and Sexual Activity    Alcohol use: Not Currently    Drug use: Not Currently    Sexual activity: Yes     Partners: Female     Birth control/protection: None     Social Determinants of Health     Financial Resource Strain: Low Risk  (2024)    Overall Financial Resource Strain (CARDIA)     Difficulty of Paying Living Expenses: Not very hard   Food  Insecurity: Food Insecurity Present (2/5/2024)    Hunger Vital Sign     Worried About Running Out of Food in the Last Year: Sometimes true     Ran Out of Food in the Last Year: Sometimes true   Transportation Needs: Unmet Transportation Needs (2/5/2024)    PRAPARE - Transportation     Lack of Transportation (Medical): Yes     Lack of Transportation (Non-Medical): No   Physical Activity: Inactive (2/5/2024)    Exercise Vital Sign     Days of Exercise per Week: 0 days     Minutes of Exercise per Session: 0 min   Stress: No Stress Concern Present (2/5/2024)    [employer] - Occupational Stress Questionnaire     Feeling of Stress : Not at all   Housing Stability: High Risk (2/5/2024)    Housing Stability Vital Sign     Unable to Pay for Housing in the Last Year: Yes     Number of Places Lived in the Last Year: 1     Unstable Housing in the Last Year: No        Family History   Problem Relation Name Age of Onset    Diabetes Mother Mamta Villarreal     Diabetes Father Mayito Villarreal          Current Outpatient Medications:     alpha lipoic acid 600 mg Cap, Take by mouth once daily., Disp: , Rfl:     aspirin (ECOTRIN) 325 MG EC tablet, Take 1 tablet (325 mg total) by mouth once daily., Disp: , Rfl: 0    carvediloL (COREG) 6.25 MG tablet, Take 1 tablet (6.25 mg total) by mouth 2 (two) times daily., Disp: 180 tablet, Rfl: 3    coQ10, ubiquinol, 100 mg Cap, Take by mouth., Disp: , Rfl:     cyanocobalamin, vitamin B-12, (LIQUID B-12) 1,000 mcg/15 mL Liqd, Take by mouth once daily., Disp: , Rfl:     gabapentin (NEURONTIN) 100 MG capsule, Take 1 capsule (100 mg total) by mouth every evening., Disp: 90 capsule, Rfl: 1    insulin aspart U-100 (NOVOLOG) 100 unit/mL (3 mL) InPn pen, Inject 3-5 Units into the skin 3 (three) times daily with meals., Disp: 3 mL, Rfl: 11    insulin detemir U-100, Levemir, (LEVEMIR FLEXTOUCH U100 INSULIN) 100 unit/mL (3 mL) InPn pen, Inject 15 Units into the skin 2 (two) times  "daily., Disp: 15 mL, Rfl: 3    mv-min/folic/K1/lycopen/lutein (CENTRUM SILVER MEN ORAL), Take by mouth., Disp: , Rfl:     nitroGLYCERIN (NITROSTAT) 0.4 MG SL tablet, Place 1 tablet (0.4 mg total) under the tongue every 5 (five) minutes as needed for Chest pain. Up to 3 doses, then call 911, Disp: 30 tablet, Rfl: 1    potassium gluconate 600 mg (99 mg) Tab, Take 1 tablet by mouth Daily., Disp: , Rfl:     rosuvastatin (CRESTOR) 40 MG Tab, Take 1 tablet (40 mg total) by mouth every evening., Disp: 90 tablet, Rfl: 0    vitamin D (VITAMIN D3) 1000 units Tab, Take 1,000 Units by mouth once daily., Disp: , Rfl:     pen needle, diabetic 32 gauge x 5/32" Ndle, Use for insulin administration up to three times a day for long-acting and rapid-acting insulin, Disp: 100 each, Rfl: 3     ROS:                                                                                                                                                                             Review of Systems   Constitutional:  Negative for chills, fever, malaise/fatigue and weight loss.   HENT:  Positive for hearing loss. Negative for ear pain, sore throat and tinnitus.    Eyes:  Negative for blurred vision, double vision and photophobia.   Respiratory:  Negative for cough, hemoptysis, sputum production, shortness of breath, wheezing and stridor.    Cardiovascular:  Positive for chest pain and palpitations. Negative for orthopnea, claudication, leg swelling and PND.   Gastrointestinal:  Negative for abdominal pain, diarrhea, heartburn, nausea and vomiting.   Genitourinary:  Negative for dysuria, frequency and urgency.   Neurological:  Negative for dizziness, tingling, tremors, sensory change and headaches.        Blood pressure 134/73, pulse 79, temperature 98.1 °F (36.7 °C), temperature source Oral, resp. rate 20, height 5' 2" (1.575 m), weight 61.9 kg (136 lb 7.4 oz), SpO2 97%.     PE:  Physical Exam  Constitutional:       Appearance: Normal appearance. He " is normal weight.   HENT:      Head: Normocephalic and atraumatic.      Ears:      Comments: Hearing loss since childhood       Nose: Nose normal.      Mouth/Throat:      Mouth: Mucous membranes are moist.      Pharynx: Oropharynx is clear.   Eyes:      Extraocular Movements: Extraocular movements intact.      Conjunctiva/sclera: Conjunctivae normal.      Pupils: Pupils are equal, round, and reactive to light.   Cardiovascular:      Rate and Rhythm: Normal rate and regular rhythm.      Pulses: Normal pulses.      Heart sounds: Normal heart sounds. No murmur heard.     No friction rub. No gallop.   Pulmonary:      Effort: Pulmonary effort is normal. No respiratory distress.      Breath sounds: Normal breath sounds. No stridor. No wheezing or rales.   Abdominal:      General: Abdomen is flat. Bowel sounds are normal.      Palpations: Abdomen is soft.   Musculoskeletal:         General: No swelling, tenderness or deformity.   Skin:     General: Skin is warm and dry.   Neurological:      General: No focal deficit present.      Mental Status: He is alert. Mental status is at baseline.          ASSESSMENT/PLAN:    Coronary artery disease   s/p PCI in 2013 to LAD   - On aspirin 325mg, coreg 6.25 Bid, Rosuvastatin 40 mg   - Has sublingual NTG 0.4mg PRN  - EKG done today showed NSR with Right bundle branch block- no change from previous EKG in jan 2024  - Previous Echo showed no systolic or diastolic dysfunction with EF 55-60%  - Plan to exercise stress test     Multiple Ischemic Strokes  Carotid stenosis  - Recent TIA in Jan 2024  - Echo showed no PFO  - Ordered Holter monitor for 48 to rule out any arrhythmia.  - Plan for a 30day monitor incase of negative holter.    Primary Hypertension  - At goal today at 134/72  - Continue Coreg 6.25 BID    Hyperlipidemia   - LDL 61 (7/1/24), Total Chol 132, HDL 46  - On Rosuvastatin 40mg OD. Refilled today.  - CTM    Type 1 DM   - Follow with PCP      Nati Martins MD  Internal  Medicine - PGY-1

## 2024-10-02 ENCOUNTER — OFFICE VISIT (OUTPATIENT)
Dept: FAMILY MEDICINE | Facility: CLINIC | Age: 64
End: 2024-10-02
Payer: MEDICARE

## 2024-10-02 VITALS
WEIGHT: 136.63 LBS | HEIGHT: 62 IN | RESPIRATION RATE: 18 BRPM | SYSTOLIC BLOOD PRESSURE: 138 MMHG | TEMPERATURE: 98 F | HEART RATE: 77 BPM | DIASTOLIC BLOOD PRESSURE: 75 MMHG | BODY MASS INDEX: 25.14 KG/M2

## 2024-10-02 DIAGNOSIS — D72.829 LEUKOCYTOSIS, UNSPECIFIED TYPE: ICD-10-CM

## 2024-10-02 DIAGNOSIS — Z23 NEED FOR VACCINATION: ICD-10-CM

## 2024-10-02 DIAGNOSIS — E78.2 MIXED HYPERLIPIDEMIA: ICD-10-CM

## 2024-10-02 DIAGNOSIS — E10.65 TYPE 1 DIABETES MELLITUS WITH HYPERGLYCEMIA: Primary | ICD-10-CM

## 2024-10-02 DIAGNOSIS — I25.10 CORONARY ARTERY DISEASE INVOLVING NATIVE CORONARY ARTERY OF NATIVE HEART WITHOUT ANGINA PECTORIS: ICD-10-CM

## 2024-10-02 DIAGNOSIS — I10 PRIMARY HYPERTENSION: ICD-10-CM

## 2024-10-02 PROCEDURE — 90653 IIV ADJUVANT VACCINE IM: CPT | Mod: PBBFAC,PN

## 2024-10-02 PROCEDURE — 99214 OFFICE O/P EST MOD 30 MIN: CPT | Mod: PBBFAC,PN | Performed by: NURSE PRACTITIONER

## 2024-10-02 PROCEDURE — 99214 OFFICE O/P EST MOD 30 MIN: CPT | Mod: S$PBB,,, | Performed by: NURSE PRACTITIONER

## 2024-10-02 PROCEDURE — G0008 ADMIN INFLUENZA VIRUS VAC: HCPCS | Mod: PBBFAC,PN

## 2024-10-02 RX ORDER — LOSARTAN POTASSIUM 25 MG/1
25 TABLET ORAL DAILY
Qty: 90 TABLET | Refills: 3 | Status: SHIPPED | OUTPATIENT
Start: 2024-10-02 | End: 2025-10-02

## 2024-10-02 RX ADMIN — INFLUENZA A VIRUS A/VICTORIA/4897/2022 IVR-238 (H1N1) ANTIGEN (FORMALDEHYDE INACTIVATED), INFLUENZA A VIRUS A/THAILAND/8/2022 IVR-237 (H3N2) ANTIGEN (FORMALDEHYDE INACTIVATED), INFLUENZA B VIRUS B/AUSTRIA/1359417/2021 BVR-26 ANTIGEN (FORMALDEHYDE INACTIVATED) 0.5 ML: 15; 15; 15 INJECTION, SUSPENSION INTRAMUSCULAR at 09:10

## 2024-10-02 NOTE — PROGRESS NOTES
Internal Medicine Clinic  Scott Lai DNP     Patient ID: [id number]     Chief Complaint: Diabetes (Not fasting, labs not done, wants flu vaccine, wants insulin pump)      HPI:     Federico Villarreal is a 64 y.o. male here today for follow up with PCP.  He did not do his lab work as ordered prior to follow-up today.     Medical diagnosis: DM type 1, CAD, HTN, HLD, CVA with left sided weakness ().     Health Maintenance         Date Due Completion Date    Eye Exam 10/01/2022 10/1/2021 (Done)    Override on 10/1/2021: Done    Colorectal Cancer Screening 2022    Influenza Vaccine (1) 2024 (Declined)    Override on 2023: Declined (Patient refused)    Hemoglobin A1c 10/01/2024 2024    Shingles Vaccine (1 of 2) 2025 (Originally 2009) ---    HIV Screening 2028 (Originally 1975) ---    Pneumococcal Vaccines (Age 65+) (3 of 3 - PPSV23 or PCV20) 2024 3/1/2021    Foot Exam 2025    Override on 3/18/2022: Done    PROSTATE-SPECIFIC ANTIGEN 2025    Diabetes Urine Screening 2025    Lipid Panel 2025    High Dose Statin 10/02/2025 10/2/2024    TETANUS VACCINE 2033 9/3/2023            Past Medical History:   Diagnosis Date    Coronary artery disease     Diabetes mellitus     Hypertension     Stroke         Past Surgical History:   Procedure Laterality Date    ADENOIDECTOMY      ADENOIDECTOMY  1972    As a child    CORONARY STENT PLACEMENT  2012    EYE SURGERY      Multiple    INNER EAR SURGERY      REPAIR OF RETINAL DETACHMENT WITH VITRECTOMY      TONSILLECTOMY      VITRECTOMY          Social History     Tobacco Use    Smoking status: Every Day     Current packs/day: 0.00     Average packs/day: 0.3 packs/day for 15.0 years (3.8 ttl pk-yrs)     Types: Cigarettes, Cigars     Start date:      Last attempt to quit:      Years since quittin.7    Smokeless tobacco: Never     "Tobacco comments:     States quit 3 months ago, started smoking 4 cigarellas a day   Substance and Sexual Activity    Alcohol use: Not Currently    Drug use: Not Currently    Sexual activity: Yes     Partners: Female     Birth control/protection: None        Current Outpatient Medications   Medication Instructions    alpha lipoic acid 600 mg Cap Daily    aspirin (ECOTRIN) 325 mg, Oral, Daily    carvediloL (COREG) 6.25 mg, Oral, 2 times daily    coQ10, ubiquinol, 100 mg Cap Take by mouth.    cyanocobalamin, vitamin B-12, (LIQUID B-12) 1,000 mcg/15 mL Liqd Daily    gabapentin (NEURONTIN) 100 mg, Oral, Nightly    insulin aspart U-100 (NOVOLOG) 3-5 Units, Subcutaneous, 3 times daily with meals    LEVEMIR FLEXTOUCH U100 INSULIN 15 Units, Subcutaneous, 2 times daily    losartan (COZAAR) 25 mg, Oral, Daily    mv-min/folic/K1/lycopen/lutein (CENTRUM SILVER MEN ORAL) Take by mouth.    nitroGLYCERIN (NITROSTAT) 0.4 mg, Sublingual, Every 5 min PRN, Up to 3 doses, then call 911    pen needle, diabetic 32 gauge x 5/32" Ndle Use for insulin administration up to three times a day for long-acting and rapid-acting insulin    potassium gluconate 600 mg (99 mg) Tab 1 tablet, Daily    rosuvastatin (CRESTOR) 40 mg, Oral, Nightly    vitamin D (VITAMIN D3) 1,000 Units, Daily       Review of patient's allergies indicates:  No Active Allergies       Patient Care Team:  Scott Lai FNP as PCP - General (Family Medicine)     Subjective:     Review of Systems   Constitutional:  Negative for appetite change, chills, diaphoresis and fever.   HENT:  Negative for ear pain, sinus pain and sore throat.    Eyes:  Negative for pain and visual disturbance.   Respiratory:  Negative for cough, shortness of breath and wheezing.    Cardiovascular:  Negative for chest pain, palpitations and leg swelling.   Gastrointestinal:  Negative for abdominal pain, blood in stool, diarrhea, nausea and vomiting.   Endocrine: Negative for cold intolerance. " "  Genitourinary:  Negative for difficulty urinating, dysuria, frequency and hematuria.   Musculoskeletal:  Negative for arthralgias, joint swelling and myalgias.   Skin:  Negative for color change and rash.   Allergic/Immunologic: Negative.    Neurological: Negative.  Negative for dizziness, syncope, light-headedness and numbness.   Hematological: Negative.    Psychiatric/Behavioral: Negative.  Negative for dysphoric mood and suicidal ideas. The patient is not nervous/anxious.    All other systems reviewed and are negative.      12 point review of systems conducted, negative except as stated in the history of present illness. See HPI for details.    Objective:     Visit Vitals  /75 (BP Location: Right arm, Patient Position: Sitting)   Pulse 77   Temp 98.2 °F (36.8 °C) (Oral)   Resp 18   Ht 5' 2.01" (1.575 m)   Wt 62 kg (136 lb 9.6 oz)   BMI 24.98 kg/m²       Physical Exam  Vitals and nursing note reviewed.   Constitutional:       General: He is not in acute distress.     Appearance: He is not ill-appearing.   HENT:      Head: Normocephalic and atraumatic.      Mouth/Throat:      Mouth: Mucous membranes are moist.      Pharynx: Oropharynx is clear.   Eyes:      General: No scleral icterus.     Extraocular Movements: Extraocular movements intact.      Conjunctiva/sclera: Conjunctivae normal.      Pupils: Pupils are equal, round, and reactive to light.   Neck:      Vascular: No carotid bruit.   Cardiovascular:      Rate and Rhythm: Normal rate and regular rhythm.      Heart sounds: No murmur heard.     No friction rub. No gallop.   Pulmonary:      Effort: Pulmonary effort is normal. No respiratory distress.      Breath sounds: Normal breath sounds. No wheezing, rhonchi or rales.   Abdominal:      General: Abdomen is flat. Bowel sounds are normal. There is no distension.      Palpations: Abdomen is soft. There is no mass.      Tenderness: There is no abdominal tenderness.   Musculoskeletal:         General: Normal " range of motion.      Cervical back: Normal range of motion and neck supple.   Skin:     General: Skin is warm and dry.   Neurological:      General: No focal deficit present.      Mental Status: He is alert.   Psychiatric:         Mood and Affect: Mood normal.         Labs Reviewed:     Chemistry:  Lab Results   Component Value Date     07/01/2024    K 4.8 07/01/2024    BUN 12.2 07/01/2024    CREATININE 0.97 07/01/2024    EGFRNORACEVR >60 07/01/2024    GLUCOSE 213 (H) 07/01/2024    CALCIUM 9.3 07/01/2024    ALKPHOS 105 07/01/2024    LABPROT 6.4 07/01/2024    ALBUMIN 3.7 07/01/2024    BILIDIR 0.3 03/15/2022    IBILI 0.60 03/15/2022    AST 21 07/01/2024    ALT 30 07/01/2024    MG 2.00 01/13/2024    PHOS 3.6 01/13/2024    RBWENTRY36NL 37.8 05/25/2021    TSH 2.085 07/01/2024    MGZDLI8MCEI 0.95 07/01/2024    PSA 0.46 07/01/2024        Lab Results   Component Value Date    HGBA1C 8.6 (H) 07/01/2024        Hematology:  Lab Results   Component Value Date    WBC 13.22 (H) 07/01/2024    HGB 13.5 (L) 07/01/2024    HCT 40.9 (L) 07/01/2024     07/01/2024       Lipid Panel:  Lab Results   Component Value Date    CHOL 132 07/01/2024    HDL 46 07/01/2024    LDL 61.00 07/01/2024    TRIG 125 07/01/2024    TOTALCHOLEST 3 07/01/2024        Urine:  Lab Results   Component Value Date    APPEARANCEUA Clear 01/12/2024    SGUA 1.016 01/12/2024    PROTEINUA Negative 01/12/2024    KETONESUA 1+ (A) 01/12/2024    LEUKOCYTESUR Negative 01/12/2024    RBCUA 0-5 01/12/2024    WBCUA 0-5 01/12/2024    BACTERIA None Seen 01/12/2024    SQEPUA None Seen 01/12/2024    HYALINECASTS 0-2 (A) 08/22/2019    JACOB 87.1 07/01/2024        Assessment:       ICD-10-CM ICD-9-CM   1. Type 1 diabetes mellitus with hyperglycemia  E10.65 250.01   2. Need for vaccination  Z23 V05.9   3. Leukocytosis, unspecified type  D72.829 288.60   4. Primary hypertension  I10 401.9   5. Mixed hyperlipidemia  E78.2 272.2   6. Coronary artery disease involving  native coronary artery of native heart without angina pectoris  I25.10 414.01        Plan:     1. Type 1 diabetes mellitus with hyperglycemia  Assessment & Plan:      Lab Results   Component Value Date    HGBA1C 8.6 (H) 07/01/2024    HGBA1C 9.2 (H) 01/11/2024    LDL 61.00 07/01/2024    CREATININE 0.97 07/01/2024      Diabetes Medications               insulin aspart U-100 (NOVOLOG) 100 unit/mL (3 mL) InPn pen Inject 3-5 Units into the skin 3 (three) times daily with meals.    insulin detemir U-100, Levemir, (LEVEMIR FLEXTOUCH U100 INSULIN) 100 unit/mL (3 mL) InPn pen Inject 15 Units into the skin 2 (two) times daily.          He reports his home A1c is 7.9, improving   He is still awaiting appointment with endocrinology, referred recently as he would like to establish and be evaluated for an insulin pump.  Continue hypoglycemic and hyperglycemic precautions.  Start ARB  On Statin according to ADA guidelines.  Follow ADA Diet. Avoid soda, simple sweets, and limit rice/pasta/breads/starches (no more than 45-50 grams per meal).  Maintain healthy weight with goal BMI <30.  Exercise 5 times per week for 30 minutes per day.  Stressed importance of daily foot exams and to wear approved DM shoes.   Stressed importance of annual dilated eye exam.                Orders:  -     Comprehensive Metabolic Panel; Future; Expected date: 10/02/2024  -     Hemoglobin A1C; Future; Expected date: 10/02/2024  -     Hemoglobin A1C; Future; Expected date: 01/02/2025    2. Need for vaccination  -     influenza (adjuvanted) (Fluad) 45 mcg/0.5 mL IM vaccine (> or = 66 yo) 0.5 mL  -     losartan (COZAAR) 25 MG tablet; Take 1 tablet (25 mg total) by mouth once daily.  Dispense: 90 tablet; Refill: 3    3. Leukocytosis, unspecified type  -     CBC Auto Differential; Future; Expected date: 10/02/2024    4. Primary hypertension  Overview:  Coreg 6.25 mg po BID  Add losartan 25 mg once daily in the setting of diabetes.      Assessment & Plan:  Goal  BP < 140/90, best goal is BP <130/80 consistently   Reduce the amount of salt in your diet, follow a 2 gm sodium, DASH diet daily            Lose weight if you are overweight or have obesity  Avoid drinking too much alcohol  Stop smoking  Exercise at least 30 minutes per day most days of the week      Orders:  -     CBC Auto Differential; Future; Expected date: 01/02/2025  -     Comprehensive Metabolic Panel; Future; Expected date: 01/02/2025  -     Lipid Panel; Future; Expected date: 01/02/2025  -     TSH; Future; Expected date: 01/02/2025  -     Urinalysis; Future; Expected date: 01/02/2025  -     Microalbumin/Creatinine Ratio, Urine; Future; Expected date: 01/02/2025  -     T4, Free; Future; Expected date: 01/02/2025  -     losartan (COZAAR) 25 MG tablet; Take 1 tablet (25 mg total) by mouth once daily.  Dispense: 90 tablet; Refill: 3    5. Mixed hyperlipidemia  Overview:  Rosuvastatin 40 mg once daily    Assessment & Plan:  Counseled for low-sodium, low carb, low-cholesterol, good fat, Dash diet.  Recommend increasing fiber in the diet to lower LDL, increasing fish oil and fish such as salmon may help increase HDL good cholesterol.  Increasing aerobic activity as tolerated may also help lower LDL.  Healthy weight maintenance is advised, portion control, calorie counting, and discussed difference between complex carbs and simple carbs.    Continue current statin therapy.        6. Coronary artery disease involving native coronary artery of native heart without angina pectoris  Overview:  Aspirin 325 mg once daily   Carvedilol 6.25 mg once daily   Rosuvastatin 40 mg once daily   Nitroglycerin 0.4 mg as needed has not taken in a few months    Assessment & Plan:  He has an upcoming follow-up and stress test scheduled with Cardiology.           Follow up in about 3 months (around 1/2/2025) for follow up, with lab work prior to visit. In addition to their scheduled follow up, the patient has also been instructed to  follow up on as needed basis.     Future Appointments   Date Time Provider Department Center   10/28/2024  8:00 AM [facility]    10/28/2024 11:00 AM [facility] EXERCISE STRESS 01 [facility] JOSE Hendricks    11/5/2024  1:00 PM Aaron Arevalo MD [facility] CARD [place]         ARIN Ingram

## 2024-10-28 ENCOUNTER — HOSPITAL ENCOUNTER (OUTPATIENT)
Dept: CARDIOLOGY | Facility: HOSPITAL | Age: 64
Discharge: HOME | End: 2024-10-28
Payer: MEDICARE

## 2024-10-28 DIAGNOSIS — I25.10 CORONARY ARTERY DISEASE INVOLVING NATIVE CORONARY ARTERY OF NATIVE HEART WITHOUT ANGINA PECTORIS: ICD-10-CM

## 2024-10-28 DIAGNOSIS — I63.9 ISCHEMIC STROKE: ICD-10-CM

## 2024-10-28 PROCEDURE — 93225 XTRNL ECG REC<48 HRS REC: CPT

## 2024-10-30 LAB
OHS CV EVENT MONITOR DAY: 0
OHS CV HOLTER LENGTH DECIMAL HOURS: 48
OHS CV HOLTER LENGTH HOURS: 48
OHS CV HOLTER LENGTH MINUTES: 0
OHS CV HOLTER SINUS AVERAGE HR: 80
OHS CV HOLTER SINUS MAX HR: 89
OHS CV HOLTER SINUS MIN HR: 68

## 2024-11-01 DIAGNOSIS — E10.65 TYPE 1 DIABETES MELLITUS WITH HYPERGLYCEMIA: Primary | ICD-10-CM

## 2024-11-01 RX ORDER — INSULIN GLARGINE 100 [IU]/ML
15 INJECTION, SOLUTION SUBCUTANEOUS 2 TIMES DAILY
Qty: 3 ML | Refills: 11 | Status: SHIPPED | OUTPATIENT
Start: 2024-11-01 | End: 2025-11-01

## 2024-11-05 ENCOUNTER — HOSPITAL ENCOUNTER (OUTPATIENT)
Dept: CARDIOLOGY | Facility: HOSPITAL | Age: 64
Discharge: HOME | End: 2024-11-05
Payer: MEDICARE

## 2024-11-05 VITALS
RESPIRATION RATE: 18 BRPM | HEART RATE: 96 BPM | DIASTOLIC BLOOD PRESSURE: 64 MMHG | SYSTOLIC BLOOD PRESSURE: 149 MMHG | BODY MASS INDEX: 25.15 KG/M2 | HEIGHT: 62 IN | WEIGHT: 136.69 LBS

## 2024-11-05 LAB
OHS QRS DURATION: 120 MS
OHS QTC CALCULATION: 439 MS

## 2024-11-05 PROCEDURE — 93017 CV STRESS TEST TRACING ONLY: CPT

## 2024-12-03 ENCOUNTER — OFFICE VISIT (OUTPATIENT)
Dept: CARDIOLOGY | Facility: CLINIC | Age: 64
End: 2024-12-03
Payer: MEDICARE

## 2024-12-03 VITALS
DIASTOLIC BLOOD PRESSURE: 74 MMHG | TEMPERATURE: 98 F | BODY MASS INDEX: 26.25 KG/M2 | OXYGEN SATURATION: 99 % | SYSTOLIC BLOOD PRESSURE: 128 MMHG | WEIGHT: 142.63 LBS | RESPIRATION RATE: 20 BRPM | HEART RATE: 69 BPM | HEIGHT: 62 IN

## 2024-12-03 DIAGNOSIS — E10.69 TYPE 1 DIABETES MELLITUS WITH OTHER SPECIFIED COMPLICATION: Primary | ICD-10-CM

## 2024-12-03 PROCEDURE — 99215 OFFICE O/P EST HI 40 MIN: CPT | Mod: PBBFAC | Performed by: INTERNAL MEDICINE

## 2024-12-03 RX ORDER — DIPHENHYDRAMINE HCL/ZINC ACET 2 %-0.1 %
30 AEROSOL, SPRAY (GRAM) TOPICAL DAILY
COMMUNITY

## 2024-12-03 NOTE — PROGRESS NOTES
12/03/2024 4:26 PM    Subjective:     CHIEF COMPLAINT:   Chief Complaint   Patient presents with    f/u denies chest pain  or sob since LV no questions                            HPI:    Mr. Federico Villarreal is a 64 y.o. male with CAD s/p PCI to LAD in 2013, HTN, HLD, T1DM (A1c 8.6), and prior CVA who presents for follow up.     At the patient's prior office visit he reported chest pain.  This prompted the ordering of exercise stress test.  The patient Prkaash treadmill score was 3 Deeming it intermediate risk.  The patient had no ST changes or anginal symptoms while on the treadmill.  He exercised for 3 minutes and 41 seconds.  He reports he stopped exercising because the tech told him he had achieved his target heart rate and could discontinue the test but states that he could have continued exercising if not for that reason.  The patient has had 1 more episode of chest pain since we saw him in the last 3-4 months.  His pain was relieved with nitroglycerin.  He has not had recurrence of chest pain since that 1 isolated episode.  He exercises daily utilizing dumbbell weights and has a rowing machine.  With exercise he does not experience anginal equivalents.  Additionally at his previous evaluation he underwent a 48 hour Holter monitor which did not demonstrate significant findings.  He reports his blood pressure is well controlled in the 1 teens to 130s at home on his current regimen.  His A1c has improved control but is not at goal.    Past Medical History    Patient Active Problem List   Diagnosis    Cataract of both eyes    Hearing loss    Hyperlipidemia    Hypertension    Retinal disorder    Tobacco user    Type 1 diabetes mellitus    Vitamin D deficiency    Coronary artery disease involving native coronary artery of native heart without angina pectoris    PAD (peripheral artery disease)    Type 1 diabetes mellitus with microalbuminuria    Carotid artery disease    Leukocytosis    Diabetic polyneuropathy  associated with type 1 diabetes mellitus    Ischemic stroke    Need for vaccination       Surgical History    Past Surgical History:   Procedure Laterality Date    ADENOIDECTOMY      ADENOIDECTOMY  1972    As a child    CORONARY STENT PLACEMENT  2012    EYE SURGERY      Multiple    INNER EAR SURGERY      REPAIR OF RETINAL DETACHMENT WITH VITRECTOMY      TONSILLECTOMY      VITRECTOMY         Social History     Socioeconomic History    Marital status:    Tobacco Use    Smoking status: Every Day     Current packs/day: 0.00     Average packs/day: 0.3 packs/day for 15.0 years (3.8 ttl pk-yrs)     Types: Cigarettes, Cigars     Start date:      Last attempt to quit:      Years since quittin.9    Smokeless tobacco: Never    Tobacco comments:     States quit 3 months ago, started smoking 4 cigarellas a day   Substance and Sexual Activity    Alcohol use: Not Currently    Drug use: Not Currently    Sexual activity: Yes     Partners: Female     Birth control/protection: None     Social Drivers of Health     Financial Resource Strain: Low Risk  (2024)    Overall Financial Resource Strain (CARDIA)     Difficulty of Paying Living Expenses: Not very hard   Food Insecurity: Food Insecurity Present (2024)    Hunger Vital Sign     Worried About Running Out of Food in the Last Year: Sometimes true     Ran Out of Food in the Last Year: Sometimes true   Transportation Needs: Unmet Transportation Needs (2024)    PRAPARE - Transportation     Lack of Transportation (Medical): Yes     Lack of Transportation (Non-Medical): No   Physical Activity: Inactive (2024)    Exercise Vital Sign     Days of Exercise per Week: 0 days     Minutes of Exercise per Session: 0 min   Stress: No Stress Concern Present (2024)    [employer] - Occupational Stress Questionnaire     Feeling of Stress : Not at all   Housing Stability: High Risk (2024)    Housing Stability Vital Sign     " Unable to Pay for Housing in the Last Year: Yes     Number of Places Lived in the Last Year: 1     Unstable Housing in the Last Year: No       Family History   Problem Relation Name Age of Onset    Diabetes Mother Mamta Villarreal     Diabetes Father Mayito Villarreal      Review of patient's allergies indicates:  No Known Allergies    Current Medications    Current Outpatient Medications   Medication Instructions    alpha lipoic acid 600 mg Cap Daily    aspirin (ECOTRIN) 325 mg, Oral, Daily    carvediloL (COREG) 6.25 mg, Oral, 2 times daily    coQ10, ubiquinol, 100 mg Cap Take by mouth.    cyanocobalamin, vitamin B-12, (LIQUID B-12) 1,000 mcg/15 mL Liqd Daily    gabapentin (NEURONTIN) 100 mg, Oral, Nightly    insulin aspart U-100 (NOVOLOG) 3-5 Units, Subcutaneous, 3 times daily with meals    L.acidoph,rhamn-B.breve,longum (PROBIOTIC) 20 billion cell CpSP 30 Billion Cells, Daily    LANTUS SOLOSTAR U-100 INSULIN 15 Units, Subcutaneous, 2 times daily    losartan (COZAAR) 25 mg, Oral, Daily    mv-min/folic/K1/lycopen/lutein (CENTRUM SILVER MEN ORAL) Take by mouth.    nitroGLYCERIN (NITROSTAT) 0.4 mg, Sublingual, Every 5 min PRN, Up to 3 doses, then call 911    pen needle, diabetic 32 gauge x 5/32" Ndle Use for insulin administration up to three times a day for long-acting and rapid-acting insulin    potassium gluconate 600 mg (99 mg) Tab 1 tablet, Daily    rosuvastatin (CRESTOR) 40 mg, Oral, Nightly    vitamin D (VITAMIN D3) 1,000 Units, Daily         ROS:   The patient denies headaches, changes in vision, nausea, emesis, fevers, chills, chest pain, palpitations, dyspnea, abdominal pain, or changes in urinary or bowel habits.       Objective:     BP Readings from Last 3 Encounters:   12/03/24 128/74   11/05/24 (!) 149/64   10/02/24 138/75        Pulse Readings from Last 3 Encounters:   12/03/24 69   11/05/24 96   10/02/24 77        Temp Readings from Last 3 Encounters:   12/03/24 97.7 °F (36.5 °C) (Oral)   10/02/24 98.2 °F " "(36.8 °C) (Oral)   09/05/24 98.1 °F (36.7 °C) (Oral)       Wt Readings from Last 3 Encounters:   12/03/24 64.7 kg (142 lb 9.6 oz)   11/05/24 62 kg (136 lb 11 oz)   10/02/24 62 kg (136 lb 9.6 oz)         PE:  Blood pressure 128/74, pulse 69, temperature 97.7 °F (36.5 °C), temperature source Oral, resp. rate 20, height 5' 2" (1.575 m), weight 64.7 kg (142 lb 9.6 oz), SpO2 99%.   Physical Exam  Vitals reviewed.   Constitutional:       General: He is not in acute distress.     Appearance: Normal appearance. He is normal weight. He is not ill-appearing.   HENT:      Head: Normocephalic and atraumatic.      Right Ear: External ear normal.      Left Ear: External ear normal.      Nose: Nose normal.      Mouth/Throat:      Mouth: Mucous membranes are moist.   Eyes:      Conjunctiva/sclera: Conjunctivae normal.   Cardiovascular:      Rate and Rhythm: Normal rate and regular rhythm.      Pulses: Normal pulses.      Heart sounds: Normal heart sounds. No murmur heard.  Pulmonary:      Effort: Pulmonary effort is normal. No respiratory distress.      Breath sounds: Normal breath sounds. No stridor. No wheezing, rhonchi or rales.   Chest:      Chest wall: No tenderness.   Abdominal:      General: Abdomen is flat. Bowel sounds are normal. There is no distension.      Palpations: Abdomen is soft.   Musculoskeletal:      Cervical back: Neck supple.      Right lower leg: No edema.      Left lower leg: No edema.   Skin:     General: Skin is warm and dry.      Capillary Refill: Capillary refill takes less than 2 seconds.   Neurological:      Mental Status: He is alert and oriented to person, place, and time.   Psychiatric:         Mood and Affect: Mood normal.         Behavior: Behavior normal.                                                                                                                                                                                                                                                       "                                                                                                                                                                                                                          CARDIAC TESTING:  Echocardiogram  Results for orders placed during the hospital encounter of 01/11/24    Echo Saline Bubble? Yes    Interpretation Summary    Left Ventricle: The left ventricle is normal in size. Normal wall thickness. Normal wall motion. There is normal systolic function with a visually estimated ejection fraction of 55 - 60%. There is normal diastolic function.    Right Ventricle: Normal right ventricular cavity size. Systolic function is normal. TAPSE is 2.14 cm.    Mitral Valve: There is trace regurgitation.    Tricuspid Valve: There is trace regurgitation.    IVC/SVC: Normal venous pressure at 3 mmHg.    There was 6 mL of intravenous agitated saline (bubble) used. Study is negative for shunt.      Stress Test  Results for orders placed during the hospital encounter of 10/28/24    Exercise Stress - EKG    Interpretation Summary    The patient reported no chest pain during the stress test.    There were no arrhythmias during stress.    The patient exercised for 3 minutes 41 seconds on a Leonel protocol, corresponding to a functional capacity of 6METS, achieving a peak heart rate of 151 bpm, which is 97% of the age predicted maximum heart rate.    Patient reached target heart rate  Minutes exercised:  3 min 41 sec  Adjusted ST deviation:  0 mm  Angina:  no angina (0)  [facility] treadmill score (Min - ST - Index):  3.  Patient's Score:  less than 5 but greater than -10    The patient's risk for cardiovascular events  is INTERMEDIATE (based on inability to complete 5 min)    If clinically indicated consider further evaluation     Coronary Angiogram  No results found for this or any previous visit.     Holter Monitor  Holter monitor - 48 hour    Result Date: 10/30/2024    The predominant rhythm  "is sinus.    Please note, the physician interpreting the study is unable to modify the   computer generated report (which may be inaccurate due to artifacts).      PHYSICIAN INTERPRETATION   Underlying rhythm:   Sinus  Average heart rate is 80   Pauses:  No significant pause noted     Symptoms:  No symptoms reported.   Events:  No patient activated events   Premature beats:  No PAC's.  No PVC's.  Arrhythmia:  No significant arrhythmia noted          Last BMP BMP  Lab Results   Component Value Date     07/01/2024    K 4.8 07/01/2024     07/01/2024    CO2 29 07/01/2024    BUN 12.2 07/01/2024    CREATININE 0.97 07/01/2024    CALCIUM 9.3 07/01/2024    EGFRNORACEVR >60 07/01/2024      Creatinine    Lab Results   Component Value Date    CREATININE 0.97 07/01/2024    CREATININE 0.78 01/13/2024    CREATININE 0.81 01/12/2024     Magnesium No components found for: "MAG"  Last CBC     Lab Results   Component Value Date    WBC 13.22 (H) 07/01/2024    HGB 13.5 (L) 07/01/2024    HCT 40.9 (L) 07/01/2024    MCV 94.5 (H) 07/01/2024     07/01/2024       BNP  No results found for: "BNP"  Last lipids    Lab Results   Component Value Date    CHOL 132 07/01/2024    CHOL 243 (H) 01/11/2024    CHOL 110 02/10/2023    HDL 46 07/01/2024    HDL 64 (H) 01/11/2024    HDL 34 (L) 02/10/2023    LDL 61.00 07/01/2024    .00 01/11/2024    LDL 55.00 02/10/2023    TRIG 125 07/01/2024    TRIG 206 (H) 01/11/2024    TRIG 106 02/10/2023    TOTALCHOLEST 3 07/01/2024    TOTALCHOLEST 4 01/11/2024    TOTALCHOLEST 3 02/10/2023      LFT     Lab Results   Component Value Date    ALT 30 07/01/2024    ALT 11 01/13/2024    ALT 12 01/12/2024    AST 21 07/01/2024    AST 15 01/13/2024    AST 14 01/12/2024     Troponin    Lab Results   Component Value Date    TROPONINI <0.010 01/12/2024       Assessment:     Mr. Federico Villarreal is a 64 y.o. male with CAD s/p PCI to LAD in 2013, HTN, HLD, T1DM (A1c 8.6), and prior CVA who presents for follow " up.     Plan:     CAD s/p PCI to LAD in 2013  -Continue ASA. Can use 81 mg but is using 325 mg. Discussed with the patient.   -Continue Crestor 40 mg daily.   -Improve A1c control.   -Has not had recurrence of symptoms except one time. ETT is intermediate risk due to time of exercise which was premature stopped by tech as patient reported he could have continued to exercise and was not experiencing angina or equivalents.   -If chest pain recurs consider nuclear stress testing and optimize the patient's anti-anginal regimen further such as increasing BB or DHP-CCB or long acting nitrate as hemodynamics and HR allow.     HTN  -Continue Losartan 25 mg daily. Reports controlled blood pressures at home. Target BP < 130/80.     HLD  -Continue Crestor 40 mg daily. LDL at goal.     T1DM   -Per primary care. Referral to endocrinology for better A1c control.     History of CVA  -Antiplatelet therapy.   -Continue Crestor 40 mg daily.     Gerry Pace MD  Cardiology Fellow

## 2025-01-17 NOTE — ASSESSMENT & PLAN NOTE
"Medication Adjustments: Pt has been self managing his DM and is resistant to increases in insulin 2/2 past issues with hypoglycemia. Previously offered to refer to Endo d/t HgA1c persistently above acceptable range but he declined mx times in the past. Eventually agreed to referral but has since declined to be seen. Reports last time HgA1c < 7% was in his 20s when he was "playing a bunch of sports."  Continues to decline Endo referral, insulin pump etc  " OB ED History & Physical    Name: Autl Villanueva MRN: [id number]  SSN: [id number]    YOB: 2003  Age: 21 y.o.  Sex: female      Subjective:     Reason for Triage visit:  38w2d and contractions    History of Present Illness: Ms. Villanueva is a 21 y.o.  female with an estimated gestational age of 38w2d with Estimated Date of Delivery: 25. Patient states that active fetal movement, no vaginal bleeding, contractions, no los of fluid.  Pregnancy has been  complicated by hypothyroid. Patient denies fever and headache .    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  probable hemorrhage noted adjacent to cx 3.7 x 2.4 cm   Plan recheck US 2wk. Pelvic rest.      10/03/24 [facility]: ANTIONE measuring 5.0 x 2.0 x 2.0 cm. Denies vaginal bleeding since 14 weeks.           Past Surgical History:   Procedure Laterality Date    COLPOSCOPY      LAPAROSCOPY N/A 10/24/2023    LAPAROSCOPY DIAGNOSTIC, CHROMOTUBATION, RIGHT CYSTECTOMY,  LYSIS OF ADHESIONS performed by [place]phones [place]mezcua [place] [facility] [place] [facility] [place]    WISDOM TOOTH EXTRACTION       Social History     Occupational History    Not on file   Tobacco Use    Smoking status: Never    Smokeless tobacco: Never   Vaping Use    Vaping status: Former   Substance and Sexual Activity    Alcohol use: Not Currently     Comment: occ    Drug use: Never    Sexual activity: Yes     Partners: Male

## 2025-01-27 ENCOUNTER — OFFICE VISIT (OUTPATIENT)
Dept: INTERNAL MEDICINE | Facility: CLINIC | Age: 65
End: 2025-01-27
Payer: MEDICARE

## 2025-01-27 VITALS
DIASTOLIC BLOOD PRESSURE: 71 MMHG | WEIGHT: 139 LBS | HEART RATE: 78 BPM | HEIGHT: 62 IN | TEMPERATURE: 98 F | BODY MASS INDEX: 25.58 KG/M2 | RESPIRATION RATE: 18 BRPM | SYSTOLIC BLOOD PRESSURE: 136 MMHG

## 2025-01-27 DIAGNOSIS — I77.9 CAROTID ARTERY DISEASE, UNSPECIFIED LATERALITY, UNSPECIFIED TYPE: ICD-10-CM

## 2025-01-27 DIAGNOSIS — E78.2 MIXED HYPERLIPIDEMIA: ICD-10-CM

## 2025-01-27 DIAGNOSIS — I10 HYPERTENSION, UNSPECIFIED TYPE: ICD-10-CM

## 2025-01-27 DIAGNOSIS — E10.42 DIABETIC POLYNEUROPATHY ASSOCIATED WITH TYPE 1 DIABETES MELLITUS: Primary | ICD-10-CM

## 2025-01-27 DIAGNOSIS — I10 PRIMARY HYPERTENSION: ICD-10-CM

## 2025-01-27 DIAGNOSIS — E10.65 TYPE 1 DIABETES MELLITUS WITH HYPERGLYCEMIA: ICD-10-CM

## 2025-01-27 DIAGNOSIS — I63.9 ISCHEMIC STROKE: ICD-10-CM

## 2025-01-27 DIAGNOSIS — H91.93 BILATERAL HEARING LOSS, UNSPECIFIED HEARING LOSS TYPE: ICD-10-CM

## 2025-01-27 DIAGNOSIS — Z12.11 COLON CANCER SCREENING: ICD-10-CM

## 2025-01-27 LAB — HBA1C MFR BLD: 8.6 %

## 2025-01-27 PROCEDURE — 83036 HEMOGLOBIN GLYCOSYLATED A1C: CPT | Mod: PBBFAC | Performed by: NURSE PRACTITIONER

## 2025-01-27 PROCEDURE — 99215 OFFICE O/P EST HI 40 MIN: CPT | Mod: PBBFAC | Performed by: NURSE PRACTITIONER

## 2025-01-27 PROCEDURE — 99214 OFFICE O/P EST MOD 30 MIN: CPT | Mod: S$PBB,,, | Performed by: NURSE PRACTITIONER

## 2025-01-27 RX ORDER — PEN NEEDLE, DIABETIC 30 GX3/16"
NEEDLE, DISPOSABLE MISCELLANEOUS
Qty: 100 EACH | Refills: 3 | Status: SHIPPED | OUTPATIENT
Start: 2025-01-27

## 2025-01-27 RX ORDER — GABAPENTIN 100 MG/1
100 CAPSULE ORAL NIGHTLY
Qty: 90 CAPSULE | Refills: 1 | Status: SHIPPED | OUTPATIENT
Start: 2025-01-27 | End: 2025-07-26

## 2025-01-27 RX ORDER — CARVEDILOL 6.25 MG/1
6.25 TABLET ORAL 2 TIMES DAILY
Qty: 180 TABLET | Refills: 3 | Status: SHIPPED | OUTPATIENT
Start: 2025-01-27 | End: 2026-01-27

## 2025-01-27 NOTE — PROGRESS NOTES
"   Patient ID: [id number]     Chief Complaint: Establish Care (Request insulin pump)        HPI:     HPI      Federico Villarreal is a 64 y.o. male here today to establish care. Pt previously followed by Scott Kauffman NP. Pt has hx DM1, CAD, HTN, HLD, CVA with left sided weakness. Pt requesting insulin pump. Pt was referred to Endo cl 12-3-24- pending approval. Pt states he has had DM type 1 since his teenage years. Pt has bilat deafness and requesting referral to ENT for eval and mgmt. Pt states he has hearing loss due to chronic ear infections since age 2. Pt states Dr Foster is cardiologist that implanted coronary stents. Pt states he has a " maker" coronary artery issue.         Immunizations:   Immunization History   Administered Date(s) Administered    COVID-19, MRNA, LN-S, PF (Pfizer) (Purple Cap) 09/17/2021, 10/08/2021    Influenza 10/13/2022    Influenza - Quadrivalent 03/01/2021, 03/18/2022    Influenza - Quadrivalent - PF (6-35 months) 01/10/2019    Influenza - Quadrivalent - PF *Preferred* (6 months and older) 10/19/2019    Influenza - Trivalent - Fluad - Adjuvanted - PF (65 years and older 10/02/2024    Pneumococcal Conjugate - 13 Valent 03/01/2021    Pneumococcal Polysaccharide - 23 Valent 12/18/2019    Tdap 06/01/2018, 09/03/2023        -------------------------------------    Coronary artery disease    Diabetes mellitus    Hypertension    Stroke        Past Surgical History:   Procedure Laterality Date    ADENOIDECTOMY      ADENOIDECTOMY  1972    As a child    CORONARY STENT PLACEMENT  08/20/2012    EYE SURGERY  2011    Multiple    INNER EAR SURGERY      REPAIR OF RETINAL DETACHMENT WITH VITRECTOMY      TONSILLECTOMY      VITRECTOMY         Review of patient's allergies indicates:  No Known Allergies    Current Outpatient Medications   Medication Instructions    alpha lipoic acid 600 mg Cap Daily    aspirin (ECOTRIN) 325 mg, Oral, Daily    carvediloL (COREG) 6.25 mg, Oral, 2 times daily    coQ10, " "ubiquinol, 100 mg Cap Take by mouth.    cyanocobalamin, vitamin B-12, (LIQUID B-12) 1,000 mcg/15 mL Liqd Daily    gabapentin (NEURONTIN) 100 mg, Oral, Nightly    insulin aspart U-100 (NOVOLOG) 3-5 Units, Subcutaneous, 3 times daily with meals    L.acidoph,rhamn-B.breve,longum (PROBIOTIC) 20 billion cell CpSP 30 Billion Cells, Daily    LANTUS SOLOSTAR U-100 INSULIN 15 Units, Subcutaneous, 2 times daily    losartan (COZAAR) 25 mg, Oral, Daily    mv-min/folic/K1/lycopen/lutein (CENTRUM SILVER MEN ORAL) Take by mouth.    nitroGLYCERIN (NITROSTAT) 0.4 mg, Sublingual, Every 5 min PRN, Up to 3 doses, then call 911    pen needle, diabetic 32 gauge x " Ndle Use for insulin administration up to three times a day for long-acting and rapid-acting insulin    potassium gluconate 600 mg (99 mg) Tab 1 tablet, Daily    rosuvastatin (CRESTOR) 40 mg, Oral, Nightly    vitamin D (VITAMIN D3) 1,000 Units, Daily       Social History     Socioeconomic History    Marital status:    Tobacco Use    Smoking status: Former     Current packs/day: 0.00     Average packs/day: 0.3 packs/day for 15.0 years (3.8 ttl pk-yrs)     Types: Cigarettes, Cigars     Start date:      Quit date:      Years since quittin.0    Smokeless tobacco: Never    Tobacco comments:     States quit 3 months ago, started smoking 4 cigarellas a day   Substance and Sexual Activity    Alcohol use: Not Currently    Drug use: Not Currently    Sexual activity: Yes     Partners: Female     Birth control/protection: None     Social Drivers of Health     Financial Resource Strain: Low Risk  (2024)    Overall Financial Resource Strain (CARDIA)     Difficulty of Paying Living Expenses: Not very hard   Food Insecurity: Food Insecurity Present (2024)    Hunger Vital Sign     Worried About Running Out of Food in the Last Year: Sometimes true     Ran Out of Food in the Last Year: Sometimes true   Transportation Needs: Unmet Transportation Needs (2024)    " PRAPARE - Transportation     Lack of Transportation (Medical): Yes     Lack of Transportation (Non-Medical): No   Physical Activity: Inactive (2/5/2024)    Exercise Vital Sign     Days of Exercise per Week: 0 days     Minutes of Exercise per Session: 0 min   Stress: No Stress Concern Present (2/5/2024)    [employer] - Occupational Stress Questionnaire     Feeling of Stress : Not at all   Housing Stability: High Risk (2/5/2024)    Housing Stability Vital Sign     Unable to Pay for Housing in the Last Year: Yes     Number of Places Lived in the Last Year: 1     Unstable Housing in the Last Year: No        Family History   Problem Relation Name Age of Onset    Diabetes Mother Mamta Villarreal     Diabetes Father Mayito Villarreal     Dementia Father Mayito Villarreal         Patient Care Team:  Nicole Blanchard FNP as PCP - General (Family Medicine)     Subjective:     Review of Systems     See HPI for details    Constitutional: Denies Change in appetite. Denies Chills. Denies Fever. Denies Night sweats.  Eye: Denies Blurred vision. Denies Discharge. Denies Eye pain.  ENT: Denies Decreased hearing. Denies Sore throat. Denies Swollen glands.  Respiratory: Denies Cough. Denies Shortness of breath. Denies Shortness of breath with exertion. Denies Wheezing.  Cardiovascular: DeniesChest pain at rest. Denies Chest pain with exertion. Denies Irregular heartbeat. Denies Palpitations. Denies Edema.  Gastrointestinal: Denies Abdominal pain. DeniesDiarrhea. Denies Nausea. Denies Vomiting. Denies Hematemesis or Hematochezia.  Genitourinary: Denies Dysuria. Denies Urinary frequency. Denies Urinary urgency. Denies Blood in urine.  Endocrine: Denies Cold intolerance. Denies Excessive thirst. Denies Heat intolerance. Denies Weight loss. Denies Weight gain.  Musculoskeletal: Denies Painful joints. Denies Weakness.  Integumentary: Denies Rash. Denies Itching. Denies Dry skin.  Neurologic: Denies Dizziness. Denies  "Fainting. Denies Headache.  Psychiatric: Denies Depression. Denies Anxiety. Denies Suicidal/Homicidal ideations.    All Other ROS: Negative except as stated in HPI.       Objective:     Visit Vitals  /71 (BP Location: Right arm, Patient Position: Sitting)   Pulse 78   Temp 98.1 °F (36.7 °C) (Oral)   Resp 18   Ht 5' 2" (1.575 m)   Wt 63 kg (139 lb)   BMI 25.42 kg/m²       Physical Exam    General: Alert and oriented, No acute distress.  Head: Normocephalic, Atraumatic.  Eye: Pupils are equal, round and reactive to light, Extraocular movements are intact, Sclera non-icteric.  Ears/Nose/Throat: Normal, Mucosa moist,Clear.  Neck/Thyroid: Supple, Non-tender, No carotid bruit, No lymphadenopathy, No JVD, Full range of motion.  Respiratory: Clear to auscultation bilaterally; No wheezes, rales or rhonchi,Non-labored respirations, Symmetrical chest wall expansion.  Cardiovascular: Regular rate and rhythm, S1/S2 normal, No murmurs, rubs or gallops.  Gastrointestinal: Soft, Non-tender, Non-distended, Normal bowel sounds, No palpable organomegaly.  Musculoskeletal: Normal range of motion.  Integumentary: Warm, Dry, Intact, No suspicious lesions or rashes.  Extremities: No clubbing, cyanosis or edema  Neurologic: No focal deficits, Cranial Nerves II-XII are grossly intact, Motor strength normal upper and lower extremities, Sensory exam intact.  Psychiatric: Normal interaction, Coherent speech, Euthymic mood, Appropriate affect       Labs Reviewed:     Chemistry:  Lab Results   Component Value Date     07/01/2024    K 4.8 07/01/2024    BUN 12.2 07/01/2024    CREATININE 0.97 07/01/2024    EGFRNORACEVR >60 07/01/2024    GLUCOSE 213 (H) 07/01/2024    CALCIUM 9.3 07/01/2024    ALKPHOS 105 07/01/2024    LABPROT 6.4 07/01/2024    ALBUMIN 3.7 07/01/2024    BILIDIR 0.3 03/15/2022    IBILI 0.60 03/15/2022    AST 21 07/01/2024    ALT 30 07/01/2024    MG 2.00 01/13/2024    PHOS 3.6 01/13/2024    QXWKKEYE65QT 37.8 05/25/2021    "     Lab Results   Component Value Date    HGBA1C 8.6 (H) 07/01/2024        Hematology:  Lab Results   Component Value Date    WBC 13.22 (H) 07/01/2024    HGB 13.5 (L) 07/01/2024    HCT 40.9 (L) 07/01/2024     07/01/2024       Lipid Panel:  Lab Results   Component Value Date    CHOL 132 07/01/2024    HDL 46 07/01/2024    LDL 61.00 07/01/2024    TRIG 125 07/01/2024    TOTALCHOLEST 3 07/01/2024        Urine:  Lab Results   Component Value Date    APPEARANCEUA Clear 01/12/2024    SGUA 1.016 01/12/2024    PROTEINUA Negative 01/12/2024    KETONESUA 1+ (A) 01/12/2024    LEUKOCYTESUR Negative 01/12/2024    RBCUA 0-5 01/12/2024    WBCUA 0-5 01/12/2024    BACTERIA None Seen 01/12/2024    SQEPUA None Seen 01/12/2024    HYALINECASTS 0-2 (A) 08/22/2019    CREATRANDUR 87.1 07/01/2024        Assessment:       ICD-10-CM ICD-9-CM   1. Diabetic polyneuropathy associated with type 1 diabetes mellitus  E10.42 250.61     357.2   2. Type 1 diabetes mellitus with hyperglycemia  E10.65 250.01   3. Carotid artery disease, unspecified laterality, unspecified type  I77.9 447.9   4. Primary hypertension  I10 401.9   5. Mixed hyperlipidemia  E78.2 272.2   6. Ischemic stroke  I63.9 434.91   7. Bilateral hearing loss, unspecified hearing loss type  H91.93 389.9        Plan:     1. Diabetic polyneuropathy associated with type 1 diabetes mellitus (Primary)  ADA diet and exercise. Instructed on importance of blood sugar control to prevent worsening of symptoms. Cont Gabapentin as prescribed.     2. Type 1 diabetes mellitus with hyperglycemia  POC A1c today- 8.6. ADA diet and exercise. Pt self adjusts his lantus and novolog according to blood sugar levels. Pt currently has Dexcom showing . Pt has been referred to Endo cl numerous times but never received an appt. Will have staff call and check on status of referrals. Urine microalbumin 7-1-24. DM foot 5-28-24. DM eye  exam done 10-1-21 with Dr Farmer- pt requesting to be referred back to  Dr Felix Farmer Ophthalmologist.   - POCT HEMOGLOBIN A1C    3. Carotid artery disease, unspecified laterality, unspecified type  Encouraged low fat diet and exercise.  Pt followed in Cardio cl. Cont Crestor as prescribed per cardio cl.     4. Primary hypertension  Low fat diet and exercise. Cont Carvedilol, Losartan as prescribed.     5. Mixed hyperlipidemia  Low fat diet and exercise. Cont Crestor as prescribed per cardio cl. Keep appts.     6. Ischemic stroke  Instructed on importance of blood sugar and BP control to prevent recurrence of CVA. Pt followed in Cardio cl. Keep appts.      7. Bilat hearing loss  Pt request referral to ENT for hearing loss. Refer to audiology for hearing eval and follow up in ENT. Denies hearing aids.     8. Well adult  Labs in 3 months. UTD PSA. Cologuard in 3 months.     Follow up in about 3 months (around 4/27/2025) for with labs 1 week prior to appt. . In addition to their scheduled follow up, the patient has also been instructed to follow up on as needed basis.     Future Appointments   Date Time Provider Department Center   6/3/2025 10:45 AM Mariella Baig FNP [facility]

## 2025-02-26 LAB
LEFT EYE DM RETINOPATHY: POSITIVE
RIGHT EYE DM RETINOPATHY: POSITIVE

## 2025-02-27 ENCOUNTER — PATIENT OUTREACH (OUTPATIENT)
Facility: CLINIC | Age: 65
End: 2025-02-27
Payer: MEDICARE

## 2025-02-27 NOTE — PROGRESS NOTES
Health Maintenance Topic(s) Outreach Outcomes & Actions Taken:    Colorectal Cancer Screening - Outreach Outcomes & Actions Taken  : Reminded Patient to Complete Already Received Home Test and left VM. Portal message sent       Additional Notes:  Attempt made to contact patient. No answer. Message left with name and phone number for callback. Portal msg sent.    Next PCP F/U: 5/26/2025  SDOH Incomplete - update due- food, financial, transportation  Health Maintenance Topics Overdue:  VBHM Score: 2     Colon Cancer Screening- cologuard ordered per pcp 1/27/2025  Eye Exam- referral per pcp to Dr Felix Farmer 1/27/2025. Appt scheduled?       HTN: currently at goal   DM: uncontrolled. Med compliance per dispense history.    Statin Therapy for prevention of CVD: Crestor. NON- compliance per dispense history.           Care Management, Digital Medicine, and/or Education Referrals      Next Steps - Referral Actions: Digital Medicine Outcomes and Actions Taken: HTN Digital Medince - Outcomes & Actions Taken  : Needs review

## 2025-03-06 ENCOUNTER — CLINICAL SUPPORT (OUTPATIENT)
Dept: AUDIOLOGY | Facility: HOSPITAL | Age: 65
End: 2025-03-06
Payer: MEDICARE

## 2025-03-06 DIAGNOSIS — H91.90 HEARING DISORDER, UNSPECIFIED LATERALITY: Primary | ICD-10-CM

## 2025-03-06 DIAGNOSIS — H91.93 BILATERAL HEARING LOSS, UNSPECIFIED HEARING LOSS TYPE: ICD-10-CM

## 2025-03-06 NOTE — PROGRESS NOTES
Mr. Villarreal has cerumen impaction in the right ear that could not be removed in clinic today.  Instructed cerumenolytics and scheduled with ENT in May for removal. Gave information on LCD for binaural amplification but will obtain comprehensive audiological evaluation once both ears are cleared.

## 2025-04-04 NOTE — ASSESSMENT & PLAN NOTE
LDL 77 3/2022  Continue regimen  Educated on a low-fat, low-cholesterol diet and aerobic exercise (20-30 mins/day x 5 days a week). Encouraged healthy fruits and veggie intake. Increase water intake. Avoid excess ETOH intake and smoking     The patient is a 17y Female complaining of syncope.

## 2025-04-17 ENCOUNTER — PATIENT OUTREACH (OUTPATIENT)
Facility: CLINIC | Age: 65
End: 2025-04-17
Payer: MEDICARE

## 2025-04-17 NOTE — PROGRESS NOTES
Health Maintenance Topic(s) Outreach Outcomes & Actions Taken:    Eye Exam - Outreach Outcomes & Actions Taken  : External Records Requested & Care Team Updated if Applicable and Dr Felix Farmer    Colorectal Cancer Screening - Outreach Outcomes & Actions Taken  : Reminded Patient to Complete Already Received Home Test     Additional Notes:  Spoke with pt wife, Juliana.   Confirmed pt has seen Dr Farmer since referred. Record request sent.     Reminded to complete cologuard kit. States pt is forgetful due to stroke, but she will see that it is completed.        Next PCP F/U: 5/26/2025  Health Maintenance Topics Overdue:  VB Score: 2   Colon Cancer Screening  Eye Exam            Care Management, Digital Medicine, and/or Education Referrals      Next Steps - Referral Actions: Digital Medicine Outcomes and Actions Taken: needs review

## 2025-05-16 ENCOUNTER — OFFICE VISIT (OUTPATIENT)
Dept: OTOLARYNGOLOGY | Facility: CLINIC | Age: 65
End: 2025-05-16
Payer: MEDICARE

## 2025-05-16 VITALS — SYSTOLIC BLOOD PRESSURE: 134 MMHG | TEMPERATURE: 98 F | HEART RATE: 76 BPM | DIASTOLIC BLOOD PRESSURE: 77 MMHG

## 2025-05-16 DIAGNOSIS — H91.90 HEARING DISORDER, UNSPECIFIED LATERALITY: Primary | ICD-10-CM

## 2025-05-16 DIAGNOSIS — H61.23 BILATERAL IMPACTED CERUMEN: ICD-10-CM

## 2025-05-16 DIAGNOSIS — Z90.89 HISTORY OF RIGHT MASTOIDECTOMY: ICD-10-CM

## 2025-05-16 DIAGNOSIS — H71.93 CHOLESTEATOMA OF BOTH EARS: ICD-10-CM

## 2025-05-16 PROCEDURE — 99214 OFFICE O/P EST MOD 30 MIN: CPT | Mod: PBBFAC | Performed by: NURSE PRACTITIONER

## 2025-05-16 NOTE — PROGRESS NOTES
[facility]  Otolaryngology Clinic Note    Federico Villarreal  YOB: 1960    Chief Complaint:   Chief Complaint   Patient presents with    referral: Cerumen Impaction         HPI:      05/16/2025: 64 y.o. male returns with c/o HL. Lost to f/u. Reports difficulty hearing beginning at age 2 associated with recurrent ear infections. He does not recall ever having PE tubes or otologic surgery. States his parents did not have money to take him to the doctor. Needs cerumen removal prior to audiogram. Denies otalgia or otorrhea. Endorses infrequent tinnitus.      ROS:   10-point review of systems negative except per HPI      Review of patient's allergies indicates:  No Known Allergies    Past Medical History:   Diagnosis Date    Acne     Cataract     Coronary artery disease     Diabetes mellitus     Hearing loss     Hypertension     Seasonal allergies     Stroke        Past Surgical History:   Procedure Laterality Date    ADENOIDECTOMY      ADENOIDECTOMY  1972    As a child    CORONARY STENT PLACEMENT  08/20/2012    EYE SURGERY  2011    Multiple    INNER EAR SURGERY      REPAIR OF RETINAL DETACHMENT WITH VITRECTOMY      TONSILLECTOMY      VITRECTOMY         Social History[1]    Family History   Problem Relation Name Age of Onset    Diabetes Mother Mamta Villarreal     Diabetes Father Mayito Villarreal     Dementia Father Mayito Villarreal        Encounter Medications[2]    Physical Exam:  Vitals:    05/16/25 1021   BP: 134/77   BP Location: Right arm   Patient Position: Sitting   Pulse: 76   Temp: 98.1 °F (36.7 °C)   TempSrc: Oral       Physical Exam   General: NAD, voice normal  Neuro: AAO, CN II - XII grossly intact  Head/ Face: NCAT, symmetric, sensations intact bilaterally  Eyes: EOMI, PERRL  Ears: externally normal. Very hard of hearing   AD: EAC with cerumen impaction- atraumatic (partial) removal under microscopy with suction & curette- d/c 2/2 pt tolerance  AS: EAC with cerumen impaction-  atraumatic removal under microscopy with suction- TM intact and opaque, severely retracted  Nose: bilateral nares patent, midline septum, no rhinorrhea, no external deformity, no turbinate hypertrophy  OC/OP: MMM, no intraoral lesions, no trismus, edentulous, no uvular deviation, bilaterally symmetric soft palate elevation, palatoglossus and palatopharyngeal fold wnl; tonsils are symmetric and 1+  Indirect laryngoscopy: deferred due to patient intolerance  Neck: soft, supple, no LAD, normal ROM, no thyromegaly  Respiratory: nonlabored, no wheezing, bilateral chest rise  Cardiovascular: RRR  Gastrointestinal: S NT ND  Skin: warm, no lesions  Musculoskeletal:  strength  Psych: Appropriate affect/mood     Pertinent Data:  ? LABS:  ? AUDIO:           ? PATH:      Imaging:   I personally reviewed the following images:        Assessment/Plan:  64 y.o. male with HL and cerumen impaction, unable to fully remove on right today. Previous CT IAC 2018 with concern for b/l cholesteatoma with tegmen dehiscence & possible CSF effusion. Also with evidence of right canal wall up mastoidectomy.    - Debrox to right ear BID  - CT IAC  - RTC 2mo on Res day. Will plan to obtain audiogram after cerumen removal.    Helena Noguera NP             [1]   Social History  Socioeconomic History    Marital status:    Tobacco Use    Smoking status: Former     Current packs/day: 0.00     Average packs/day: 0.3 packs/day for 17.3 years (4.5 ttl pk-yrs)     Types: Cigarettes, Cigars     Start date: 2008     Quit date:      Years since quittin.3    Smokeless tobacco: Never   Substance and Sexual Activity    Alcohol use: Not Currently    Drug use: Not Currently    Sexual activity: Yes     Partners: Female     Birth control/protection: None     Social Drivers of Health     Financial Resource Strain: Low Risk  (2024)    Overall Financial Resource Strain (CARDIA)     Difficulty of Paying Living Expenses: Not very hard   Food  Insecurity: Food Insecurity Present (2/5/2024)    Hunger Vital Sign     Worried About Running Out of Food in the Last Year: Sometimes true     Ran Out of Food in the Last Year: Sometimes true   Transportation Needs: Unmet Transportation Needs (2/5/2024)    PRAPARE - Transportation     Lack of Transportation (Medical): Yes     Lack of Transportation (Non-Medical): No   Physical Activity: Inactive (2/5/2024)    Exercise Vital Sign     Days of Exercise per Week: 0 days     Minutes of Exercise per Session: 0 min   Stress: No Stress Concern Present (2/5/2024)    [employer] - Occupational Stress Questionnaire     Feeling of Stress : Not at all   Housing Stability: High Risk (2/5/2024)    Housing Stability Vital Sign     Unable to Pay for Housing in the Last Year: Yes     Number of Places Lived in the Last Year: 1     Unstable Housing in the Last Year: No   [2]   Outpatient Encounter Medications as of 5/16/2025   Medication Sig Dispense Refill    alpha lipoic acid 600 mg Cap Take by mouth once daily.      carvediloL (COREG) 6.25 MG tablet Take 1 tablet (6.25 mg total) by mouth 2 (two) times daily. 180 tablet 3    coQ10, ubiquinol, 100 mg Cap Take by mouth.      cyanocobalamin, vitamin B-12, (LIQUID B-12) 1,000 mcg/15 mL Liqd Take by mouth once daily.      gabapentin (NEURONTIN) 100 MG capsule Take 1 capsule (100 mg total) by mouth every evening. 90 capsule 1    insulin aspart U-100 (NOVOLOG) 100 unit/mL (3 mL) InPn pen Inject 3-5 Units into the skin 3 (three) times daily with meals. 3 mL 11    insulin glargine U-100, Lantus, (LANTUS SOLOSTAR U-100 INSULIN) 100 unit/mL (3 mL) InPn pen Inject 15 Units into the skin 2 (two) times a day. 3 mL 11    losartan (COZAAR) 25 MG tablet Take 1 tablet (25 mg total) by mouth once daily. 90 tablet 3    mv-min/folic/K1/lycopen/lutein (CENTRUM SILVER MEN ORAL) Take by mouth.      nitroGLYCERIN (NITROSTAT) 0.4 MG SL tablet Place 1 tablet (0.4 mg total) under  "the tongue every 5 (five) minutes as needed for Chest pain. Up to 3 doses, then call 911 30 tablet 1    pen needle, diabetic 32 gauge x 5/32" Ndle Use for insulin administration up to three times a day for long-acting and rapid-acting insulin 100 each 3    potassium gluconate 600 mg (99 mg) Tab Take 1 tablet by mouth Daily.      rosuvastatin (CRESTOR) 40 MG Tab Take 1 tablet (40 mg total) by mouth every evening. 90 tablet 2    vitamin D (VITAMIN D3) 1000 units Tab Take 1,000 Units by mouth once daily.      aspirin (ECOTRIN) 325 MG EC tablet Take 1 tablet (325 mg total) by mouth once daily. (Patient not taking: Reported on 5/16/2025)  0    L.acidoph,rhamn-B.breve,longum (PROBIOTIC) 20 billion cell CpSP Take 30 Billion Cells by mouth Daily. (Patient not taking: Reported on 5/16/2025)       No facility-administered encounter medications on file as of 5/16/2025.     "

## 2025-05-20 ENCOUNTER — TELEPHONE (OUTPATIENT)
Dept: INTERNAL MEDICINE | Facility: CLINIC | Age: 65
End: 2025-05-20
Payer: MEDICARE

## 2025-05-20 NOTE — TELEPHONE ENCOUNTER
Copied from CRM #[id number]. Topic: Medications - Medication Refill  >> May 20, 2025  1:34 PM Kulwinder Bush wrote:  Who Called: Juliana    Refill or New Rx:Refill  RX Name and Strength: dexcom  How is the patient currently taking it? (ex. 1XDay): 1 last 10days  Is this a 30 day or 90 day RX:  Local or Mail Order:  List of preferred pharmacies on file (remove unneeded): [unfilled]  If different Pharmacy is requested, enter Pharmacy information here including location and phone number:    Ordering Provider:      Preferred Method of Contact: Phone Call  Patient's Preferred Phone Number on File: [phone]   Best Call Back Number, if different:  Additional Information: pts wife stated it comes in the mail from [facility]. Pt needs plenty refills if possible. Please advise

## 2025-05-26 ENCOUNTER — LAB VISIT (OUTPATIENT)
Dept: LAB | Facility: HOSPITAL | Age: 65
End: 2025-05-26
Attending: NURSE PRACTITIONER
Payer: MEDICARE

## 2025-05-26 DIAGNOSIS — I10 PRIMARY HYPERTENSION: ICD-10-CM

## 2025-05-26 LAB
ALBUMIN SERPL-MCNC: 4 G/DL (ref 3.4–4.8)
ALBUMIN/GLOB SERPL: 1.4 RATIO (ref 1.1–2)
ALP SERPL-CCNC: 100 UNIT/L (ref 40–150)
ALT SERPL-CCNC: 22 UNIT/L (ref 0–55)
ANION GAP SERPL CALC-SCNC: 5 MEQ/L
AST SERPL-CCNC: 24 UNIT/L (ref 11–45)
BILIRUB SERPL-MCNC: 1 MG/DL
BUN SERPL-MCNC: 11.3 MG/DL (ref 8.4–25.7)
CALCIUM SERPL-MCNC: 9.5 MG/DL (ref 8.8–10)
CHLORIDE SERPL-SCNC: 105 MMOL/L (ref 98–107)
CO2 SERPL-SCNC: 31 MMOL/L (ref 23–31)
CREAT SERPL-MCNC: 0.88 MG/DL (ref 0.72–1.25)
CREAT/UREA NIT SERPL: 13
GFR SERPLBLD CREATININE-BSD FMLA CKD-EPI: >60 ML/MIN/1.73/M2
GLOBULIN SER-MCNC: 2.9 GM/DL (ref 2.4–3.5)
GLUCOSE SERPL-MCNC: 149 MG/DL (ref 82–115)
POTASSIUM SERPL-SCNC: 4.6 MMOL/L (ref 3.5–5.1)
PROT SERPL-MCNC: 6.9 GM/DL (ref 5.8–7.6)
SODIUM SERPL-SCNC: 141 MMOL/L (ref 136–145)

## 2025-05-26 PROCEDURE — 80053 COMPREHEN METABOLIC PANEL: CPT

## 2025-05-26 PROCEDURE — 36415 COLL VENOUS BLD VENIPUNCTURE: CPT

## 2025-05-29 DIAGNOSIS — E10.65 TYPE 1 DIABETES MELLITUS WITH HYPERGLYCEMIA: ICD-10-CM

## 2025-05-29 NOTE — TELEPHONE ENCOUNTER
Refill request for insulin aspart U-100 (NOVOLOG) 100 unit/mL (3 mL) InPn pen in chart. Next appt 06/13/2025.

## 2025-05-30 RX ORDER — INSULIN ASPART 100 [IU]/ML
3-5 INJECTION, SOLUTION INTRAVENOUS; SUBCUTANEOUS
Qty: 3 ML | Refills: 11 | Status: SHIPPED | OUTPATIENT
Start: 2025-05-30 | End: 2026-01-25

## 2025-05-30 NOTE — TELEPHONE ENCOUNTER
Copied from CRM #[id number]. Topic: Medications - Medication Refill  >> May 29, 2025  1:37 PM Josie wrote:  Who Called: Federico Villarreal    Refill or New Rx:Refill  RX Name and Strength: insulin aspart U-100 (NOVOLOG) 100 unit/mL (3 mL) InPn pen  How is the patient currently taking it? (ex. 1XDay):   Is this a 30 day or 90 day RX:   Local or Mail Order:   List of preferred pharmacies on file (remove unneeded): [unfilled]  If different Pharmacy is requested, enter Pharmacy information here including location and phone number:   [address]  Ordering Provider:       Preferred Method of Contact: Phone Call  Patient's Preferred Phone Number on File: [phone]   Best Call Back Number, if different:  Additional Information:  refill

## 2025-06-09 ENCOUNTER — HOSPITAL ENCOUNTER (INPATIENT)
Facility: HOSPITAL | Age: 65
LOS: 37 days | Discharge: NURSING, REHAB OR LONG-TERM CARE | DRG: 064 | End: 2025-07-16
Attending: STUDENT IN AN ORGANIZED HEALTH CARE EDUCATION/TRAINING PROGRAM | Admitting: INTERNAL MEDICINE
Payer: MEDICARE

## 2025-06-09 DIAGNOSIS — I61.9 INTRAPARENCHYMAL HEMORRHAGE OF BRAIN: Primary | ICD-10-CM

## 2025-06-09 DIAGNOSIS — I49.9 ABNORMAL HEART RHYTHM: ICD-10-CM

## 2025-06-09 DIAGNOSIS — R41.82 AMS (ALTERED MENTAL STATUS): ICD-10-CM

## 2025-06-09 DIAGNOSIS — I63.9 ISCHEMIC STROKE: ICD-10-CM

## 2025-06-09 DIAGNOSIS — I10 HYPERTENSION, UNSPECIFIED TYPE: ICD-10-CM

## 2025-06-09 DIAGNOSIS — M79.89 SWELLING OF RIGHT UPPER EXTREMITY: ICD-10-CM

## 2025-06-09 DIAGNOSIS — R06.02 SOB (SHORTNESS OF BREATH): ICD-10-CM

## 2025-06-09 LAB
ALBUMIN SERPL-MCNC: 3.8 G/DL (ref 3.4–4.8)
ALBUMIN/GLOB SERPL: 1.1 RATIO (ref 1.1–2)
ALP SERPL-CCNC: 102 UNIT/L (ref 40–150)
ALT SERPL-CCNC: 30 UNIT/L (ref 0–55)
ANION GAP SERPL CALC-SCNC: 11 MEQ/L
APTT PPP: 28.9 SECONDS (ref 23.2–33.7)
AST SERPL-CCNC: 31 UNIT/L (ref 11–45)
BACTERIA #/AREA URNS AUTO: ABNORMAL /HPF
BASOPHILS # BLD AUTO: 0.08 X10(3)/MCL
BASOPHILS NFR BLD AUTO: 0.7 %
BILIRUB SERPL-MCNC: 1.1 MG/DL
BILIRUB UR QL STRIP.AUTO: NEGATIVE
BUN SERPL-MCNC: 12.4 MG/DL (ref 8.4–25.7)
CALCIUM SERPL-MCNC: 9.3 MG/DL (ref 8.8–10)
CHLORIDE SERPL-SCNC: 101 MMOL/L (ref 98–107)
CHOLEST SERPL-MCNC: 138 MG/DL
CHOLEST/HDLC SERPL: 3 {RATIO} (ref 0–5)
CLARITY UR: CLEAR
CO2 SERPL-SCNC: 27 MMOL/L (ref 23–31)
COLOR UR AUTO: COLORLESS
CREAT SERPL-MCNC: 1.02 MG/DL (ref 0.72–1.25)
CREAT/UREA NIT SERPL: 12
EOSINOPHIL # BLD AUTO: 0.35 X10(3)/MCL (ref 0–0.9)
EOSINOPHIL NFR BLD AUTO: 3.1 %
ERYTHROCYTE [DISTWIDTH] IN BLOOD BY AUTOMATED COUNT: 12 % (ref 11.5–17)
EST. AVERAGE GLUCOSE BLD GHB EST-MCNC: 157.1 MG/DL
GFR SERPLBLD CREATININE-BSD FMLA CKD-EPI: >60 ML/MIN/1.73/M2
GLOBULIN SER-MCNC: 3.5 GM/DL (ref 2.4–3.5)
GLUCOSE SERPL-MCNC: 228 MG/DL (ref 82–115)
GLUCOSE UR QL STRIP: ABNORMAL
HBA1C MFR BLD: 7.1 %
HCT VFR BLD AUTO: 41.7 % (ref 42–52)
HDLC SERPL-MCNC: 49 MG/DL (ref 35–60)
HGB BLD-MCNC: 14 G/DL (ref 14–18)
HGB UR QL STRIP: NEGATIVE
IMM GRANULOCYTES # BLD AUTO: 0.04 X10(3)/MCL (ref 0–0.04)
IMM GRANULOCYTES NFR BLD AUTO: 0.4 %
INR PPP: 1
KETONES UR QL STRIP: NEGATIVE
LDLC SERPL CALC-MCNC: 70 MG/DL (ref 50–140)
LEUKOCYTE ESTERASE UR QL STRIP: NEGATIVE
LYMPHOCYTES # BLD AUTO: 2.64 X10(3)/MCL (ref 0.6–4.6)
LYMPHOCYTES NFR BLD AUTO: 23.7 %
MAGNESIUM SERPL-MCNC: 1.9 MG/DL (ref 1.6–2.6)
MCH RBC QN AUTO: 31.2 PG (ref 27–31)
MCHC RBC AUTO-ENTMCNC: 33.6 G/DL (ref 33–36)
MCV RBC AUTO: 92.9 FL (ref 80–94)
MONOCYTES # BLD AUTO: 1.09 X10(3)/MCL (ref 0.1–1.3)
MONOCYTES NFR BLD AUTO: 9.8 %
NEUTROPHILS # BLD AUTO: 6.94 X10(3)/MCL (ref 2.1–9.2)
NEUTROPHILS NFR BLD AUTO: 62.3 %
NITRITE UR QL STRIP: NEGATIVE
NRBC BLD AUTO-RTO: 0 %
OHS QRS DURATION: 122 MS
OHS QTC CALCULATION: 432 MS
PH UR STRIP: 8 [PH]
PLATELET # BLD AUTO: 167 X10(3)/MCL (ref 130–400)
PMV BLD AUTO: 10.3 FL (ref 7.4–10.4)
POCT GLUCOSE: 335 MG/DL (ref 70–110)
POCT GLUCOSE: 417 MG/DL (ref 70–110)
POTASSIUM SERPL-SCNC: 4.9 MMOL/L (ref 3.5–5.1)
PROT SERPL-MCNC: 7.3 GM/DL (ref 5.8–7.6)
PROT UR QL STRIP: NEGATIVE
PROTHROMBIN TIME: 13 SECONDS (ref 12.5–14.5)
RBC # BLD AUTO: 4.49 X10(6)/MCL (ref 4.7–6.1)
RBC #/AREA URNS AUTO: ABNORMAL /HPF
SODIUM SERPL-SCNC: 139 MMOL/L (ref 136–145)
SP GR UR STRIP.AUTO: 1.03 (ref 1–1.03)
SQUAMOUS #/AREA URNS LPF: ABNORMAL /HPF
TRIGL SERPL-MCNC: 97 MG/DL (ref 34–140)
TROPONIN I SERPL-MCNC: <0.01 NG/ML (ref 0–0.04)
TSH SERPL-ACNC: 1.88 UIU/ML (ref 0.35–4.94)
UROBILINOGEN UR STRIP-ACNC: NORMAL
VLDLC SERPL CALC-MCNC: 19 MG/DL
WBC # BLD AUTO: 11.14 X10(3)/MCL (ref 4.5–11.5)
WBC #/AREA URNS AUTO: ABNORMAL /HPF

## 2025-06-09 PROCEDURE — 51798 US URINE CAPACITY MEASURE: CPT

## 2025-06-09 PROCEDURE — 80053 COMPREHEN METABOLIC PANEL: CPT | Performed by: STUDENT IN AN ORGANIZED HEALTH CARE EDUCATION/TRAINING PROGRAM

## 2025-06-09 PROCEDURE — 99223 1ST HOSP IP/OBS HIGH 75: CPT | Mod: ,,, | Performed by: SPECIALIST

## 2025-06-09 PROCEDURE — 99291 CRITICAL CARE FIRST HOUR: CPT

## 2025-06-09 PROCEDURE — 20000000 HC ICU ROOM

## 2025-06-09 PROCEDURE — 93005 ELECTROCARDIOGRAM TRACING: CPT

## 2025-06-09 PROCEDURE — 25000003 PHARM REV CODE 250: Performed by: INTERNAL MEDICINE

## 2025-06-09 PROCEDURE — 25000003 PHARM REV CODE 250

## 2025-06-09 PROCEDURE — 84484 ASSAY OF TROPONIN QUANT: CPT | Performed by: STUDENT IN AN ORGANIZED HEALTH CARE EDUCATION/TRAINING PROGRAM

## 2025-06-09 PROCEDURE — 25500020 PHARM REV CODE 255: Performed by: STUDENT IN AN ORGANIZED HEALTH CARE EDUCATION/TRAINING PROGRAM

## 2025-06-09 PROCEDURE — 25500020 PHARM REV CODE 255: Performed by: INTERNAL MEDICINE

## 2025-06-09 PROCEDURE — 63600175 PHARM REV CODE 636 W HCPCS: Performed by: STUDENT IN AN ORGANIZED HEALTH CARE EDUCATION/TRAINING PROGRAM

## 2025-06-09 PROCEDURE — 99223 1ST HOSP IP/OBS HIGH 75: CPT | Mod: FS,,, | Performed by: STUDENT IN AN ORGANIZED HEALTH CARE EDUCATION/TRAINING PROGRAM

## 2025-06-09 PROCEDURE — 83036 HEMOGLOBIN GLYCOSYLATED A1C: CPT

## 2025-06-09 PROCEDURE — A9577 INJ MULTIHANCE: HCPCS | Performed by: INTERNAL MEDICINE

## 2025-06-09 PROCEDURE — 63600175 PHARM REV CODE 636 W HCPCS

## 2025-06-09 PROCEDURE — 80061 LIPID PANEL: CPT

## 2025-06-09 PROCEDURE — 85610 PROTHROMBIN TIME: CPT | Performed by: STUDENT IN AN ORGANIZED HEALTH CARE EDUCATION/TRAINING PROGRAM

## 2025-06-09 PROCEDURE — 81001 URINALYSIS AUTO W/SCOPE: CPT | Performed by: STUDENT IN AN ORGANIZED HEALTH CARE EDUCATION/TRAINING PROGRAM

## 2025-06-09 PROCEDURE — 93010 ELECTROCARDIOGRAM REPORT: CPT | Mod: ,,, | Performed by: STUDENT IN AN ORGANIZED HEALTH CARE EDUCATION/TRAINING PROGRAM

## 2025-06-09 PROCEDURE — 85025 COMPLETE CBC W/AUTO DIFF WBC: CPT | Performed by: STUDENT IN AN ORGANIZED HEALTH CARE EDUCATION/TRAINING PROGRAM

## 2025-06-09 PROCEDURE — 96365 THER/PROPH/DIAG IV INF INIT: CPT

## 2025-06-09 PROCEDURE — 83735 ASSAY OF MAGNESIUM: CPT | Performed by: STUDENT IN AN ORGANIZED HEALTH CARE EDUCATION/TRAINING PROGRAM

## 2025-06-09 PROCEDURE — 85730 THROMBOPLASTIN TIME PARTIAL: CPT | Performed by: STUDENT IN AN ORGANIZED HEALTH CARE EDUCATION/TRAINING PROGRAM

## 2025-06-09 PROCEDURE — 84443 ASSAY THYROID STIM HORMONE: CPT | Performed by: STUDENT IN AN ORGANIZED HEALTH CARE EDUCATION/TRAINING PROGRAM

## 2025-06-09 RX ORDER — BUTALBITAL, ACETAMINOPHEN AND CAFFEINE 50; 325; 40 MG/1; MG/1; MG/1
1 TABLET ORAL EVERY 4 HOURS PRN
Status: DISCONTINUED | OUTPATIENT
Start: 2025-06-09 | End: 2025-06-10

## 2025-06-09 RX ORDER — ONDANSETRON HYDROCHLORIDE 2 MG/ML
4 INJECTION, SOLUTION INTRAVENOUS EVERY 8 HOURS PRN
Status: DISCONTINUED | OUTPATIENT
Start: 2025-06-09 | End: 2025-07-16 | Stop reason: HOSPADM

## 2025-06-09 RX ORDER — LEVETIRACETAM 10 MG/ML
1000 INJECTION INTRAVASCULAR EVERY 12 HOURS
Status: DISCONTINUED | OUTPATIENT
Start: 2025-06-09 | End: 2025-06-14

## 2025-06-09 RX ORDER — NICARDIPINE HYDROCHLORIDE 0.2 MG/ML
0-15 INJECTION INTRAVENOUS CONTINUOUS
Status: DISCONTINUED | OUTPATIENT
Start: 2025-06-09 | End: 2025-06-12 | Stop reason: HOSPADM

## 2025-06-09 RX ORDER — NICARDIPINE HYDROCHLORIDE 0.2 MG/ML
INJECTION INTRAVENOUS
Status: COMPLETED
Start: 2025-06-09 | End: 2025-06-09

## 2025-06-09 RX ORDER — MUPIROCIN 20 MG/G
OINTMENT TOPICAL 2 TIMES DAILY
Status: COMPLETED | OUTPATIENT
Start: 2025-06-09 | End: 2025-06-13

## 2025-06-09 RX ORDER — INSULIN ASPART 100 [IU]/ML
0-5 INJECTION, SOLUTION INTRAVENOUS; SUBCUTANEOUS EVERY 6 HOURS PRN
Status: DISCONTINUED | OUTPATIENT
Start: 2025-06-09 | End: 2025-06-15

## 2025-06-09 RX ORDER — LIDOCAINE HYDROCHLORIDE 20 MG/ML
JELLY TOPICAL ONCE
Status: DISCONTINUED | OUTPATIENT
Start: 2025-06-09 | End: 2025-06-25

## 2025-06-09 RX ORDER — DEXMEDETOMIDINE HYDROCHLORIDE 4 UG/ML
INJECTION, SOLUTION INTRAVENOUS
Status: COMPLETED
Start: 2025-06-09 | End: 2025-06-09

## 2025-06-09 RX ORDER — ACETAMINOPHEN 325 MG/1
650 TABLET ORAL EVERY 6 HOURS PRN
Status: DISCONTINUED | OUTPATIENT
Start: 2025-06-09 | End: 2025-06-10

## 2025-06-09 RX ORDER — NICARDIPINE HYDROCHLORIDE 0.2 MG/ML
0-15 INJECTION INTRAVENOUS CONTINUOUS
Status: DISCONTINUED | OUTPATIENT
Start: 2025-06-09 | End: 2025-06-09

## 2025-06-09 RX ORDER — NICARDIPINE HYDROCHLORIDE 0.2 MG/ML
0-15 INJECTION INTRAVENOUS CONTINUOUS
Status: CANCELLED | OUTPATIENT
Start: 2025-06-09

## 2025-06-09 RX ORDER — GLUCAGON 1 MG
1 KIT INJECTION
Status: DISCONTINUED | OUTPATIENT
Start: 2025-06-09 | End: 2025-06-15

## 2025-06-09 RX ORDER — LABETALOL HYDROCHLORIDE 5 MG/ML
10 INJECTION, SOLUTION INTRAVENOUS
Status: DISCONTINUED | OUTPATIENT
Start: 2025-06-09 | End: 2025-07-16 | Stop reason: HOSPADM

## 2025-06-09 RX ORDER — ONDANSETRON HYDROCHLORIDE 2 MG/ML
4 INJECTION, SOLUTION INTRAVENOUS
Status: COMPLETED | OUTPATIENT
Start: 2025-06-09 | End: 2025-06-09

## 2025-06-09 RX ORDER — BISACODYL 10 MG/1
10 SUPPOSITORY RECTAL DAILY PRN
Status: DISCONTINUED | OUTPATIENT
Start: 2025-06-09 | End: 2025-07-16 | Stop reason: HOSPADM

## 2025-06-09 RX ORDER — DEXMEDETOMIDINE HYDROCHLORIDE 4 UG/ML
0-1.4 INJECTION, SOLUTION INTRAVENOUS CONTINUOUS
Status: DISCONTINUED | OUTPATIENT
Start: 2025-06-09 | End: 2025-06-12 | Stop reason: HOSPADM

## 2025-06-09 RX ORDER — INSULIN GLARGINE 100 [IU]/ML
10 INJECTION, SOLUTION SUBCUTANEOUS 2 TIMES DAILY
Status: DISCONTINUED | OUTPATIENT
Start: 2025-06-09 | End: 2025-06-14

## 2025-06-09 RX ORDER — METOPROLOL TARTRATE 1 MG/ML
5 INJECTION, SOLUTION INTRAVENOUS ONCE
Status: COMPLETED | OUTPATIENT
Start: 2025-06-09 | End: 2025-06-09

## 2025-06-09 RX ORDER — SODIUM CHLORIDE 0.9 % (FLUSH) 0.9 %
10 SYRINGE (ML) INJECTION
Status: DISCONTINUED | OUTPATIENT
Start: 2025-06-09 | End: 2025-07-16 | Stop reason: HOSPADM

## 2025-06-09 RX ADMIN — LEVETIRACETAM INJECTION 1000 MG: 10 INJECTION INTRAVENOUS at 01:06

## 2025-06-09 RX ADMIN — DEXMEDETOMIDINE HYDROCHLORIDE 0.2 MCG/KG/HR: 400 INJECTION INTRAVENOUS at 05:06

## 2025-06-09 RX ADMIN — INSULIN GLARGINE 10 UNITS: 100 INJECTION, SOLUTION SUBCUTANEOUS at 01:06

## 2025-06-09 RX ADMIN — ACETAMINOPHEN 650 MG: 325 TABLET ORAL at 02:06

## 2025-06-09 RX ADMIN — METOPROLOL TARTRATE 5 MG: 1 INJECTION, SOLUTION INTRAVENOUS at 04:06

## 2025-06-09 RX ADMIN — INSULIN ASPART 5 UNITS: 100 INJECTION, SOLUTION INTRAVENOUS; SUBCUTANEOUS at 06:06

## 2025-06-09 RX ADMIN — MUPIROCIN: 20 OINTMENT TOPICAL at 08:06

## 2025-06-09 RX ADMIN — MUPIROCIN: 20 OINTMENT TOPICAL at 01:06

## 2025-06-09 RX ADMIN — LEVETIRACETAM INJECTION 1000 MG: 10 INJECTION INTRAVENOUS at 08:06

## 2025-06-09 RX ADMIN — ONDANSETRON 4 MG: 2 INJECTION INTRAMUSCULAR; INTRAVENOUS at 12:06

## 2025-06-09 RX ADMIN — GADOBENATE DIMEGLUMINE 10 ML: 529 INJECTION, SOLUTION INTRAVENOUS at 12:06

## 2025-06-09 RX ADMIN — ONDANSETRON 4 MG: 2 INJECTION INTRAMUSCULAR; INTRAVENOUS at 10:06

## 2025-06-09 RX ADMIN — NICARDIPINE HYDROCHLORIDE 5 MG: 0.2 INJECTION INTRAVENOUS at 09:06

## 2025-06-09 RX ADMIN — IOHEXOL 100 ML: 350 INJECTION, SOLUTION INTRAVENOUS at 09:06

## 2025-06-09 RX ADMIN — INSULIN GLARGINE 10 UNITS: 100 INJECTION, SOLUTION SUBCUTANEOUS at 08:06

## 2025-06-09 RX ADMIN — NICARDIPINE HYDROCHLORIDE 2.5 MG/HR: 0.2 INJECTION, SOLUTION INTRAVENOUS at 12:06

## 2025-06-09 RX ADMIN — NICARDIPINE HYDROCHLORIDE 5 MG: 0.2 INJECTION, SOLUTION INTRAVENOUS at 09:06

## 2025-06-09 RX ADMIN — NICARDIPINE HYDROCHLORIDE 7.5 MG/HR: 0.2 INJECTION, SOLUTION INTRAVENOUS at 09:06

## 2025-06-09 NOTE — NURSING
Attempted to enter 18 FR coude catheter per Verónica, NP request. Unable to insert catheter. Verónica made aware.

## 2025-06-09 NOTE — CONSULTS
[facility] - 7th Floor ICU  Neurology  Consult Note    Patient Name: Federico Villarreal  MRN: [id number]  Admission Date: 6/9/2025  Hospital Length of Stay: 0 days  Code Status: Full Code   Attending Provider: Malinda Hogue MD   Consulting Provider: ARIN Lloyd  Primary Care Physician: Nicole Blanchard FNP  Principal Problem:<principal problem not specified>    Inpatient consult to Neurology Services (Vascular Neurology)  Consult performed by: Meera Connolly FNP  Consult ordered by: Malinda Hogue MD         Subjective:     Chief Complaint:    Chief Complaint   Patient presents with    Headache     Hx of CVA. Wife reports pt woke up @ 0115 this morning c/o headache. This morning around 0545, wife noticed pt confused and having slurred speech. LSN 0115. . On arrival, pt confused. No slurred speech, facial droop, or unilateral weakness noted. GCS 14 on arrival.      HPI:   Federico Villarreal is a 64 y.o. male with past medical history of T1DM, HTN, HLD, CAD, CVA, hearing loss and left eye vision loss who presented to ED this morning with reports of headache, confusion, and altered mental status. Spouse reported in ED that the patient woke up during the night, took Tylenol for HA relief, and went back to sleep. Woke up around 5:45 this morning and was increasingly confused with some slurred speech as well. Patient was previously on aspirin 325 mg daily at home but was stopped by MD. Denies use of any current blood thinners. /95 in ED    CT head revealed left occipital lobe parenchymal hemorrhage with trace adjacent subarachnoid hemorrhage.   CTA head/neck was also completed in ED, which revealed no large vessel occlusion, flow-limiting stenosis, aneurysm or evidence of a vascular malformation   MRI brain report:  1. Left occipital lobe hematoma with adjacent subarachnoid hemorrhage.  2. Innumerable chronic microhemorrhages with a subcortical location, may indicate underlying cerebral  amyloid angiopathy.  3. Mild chronic microvascular ischemic changes.    Stroke neurology consulted per [facility] protocol.      Past Medical History:   Diagnosis Date    Acne     Cataract     Coronary artery disease     Diabetes mellitus     Hearing loss     Hypertension     Seasonal allergies     Stroke        Past Surgical History:   Procedure Laterality Date    ADENOIDECTOMY      ADENOIDECTOMY  1972    As a child    CORONARY STENT PLACEMENT  08/20/2012    EYE SURGERY  2011    Multiple    INNER EAR SURGERY      REPAIR OF RETINAL DETACHMENT WITH VITRECTOMY      TONSILLECTOMY      VITRECTOMY         Review of patient's allergies indicates:  No Known Allergies    Current Neurological Medications:      No current facility-administered medications on file prior to encounter.     Current Outpatient Medications on File Prior to Encounter   Medication Sig    alpha lipoic acid 600 mg Cap Take 250 mg by mouth once daily.    carvediloL (COREG) 6.25 MG tablet Take 1 tablet (6.25 mg total) by mouth 2 (two) times daily.    gabapentin (NEURONTIN) 100 MG capsule Take 1 capsule (100 mg total) by mouth every evening.    insulin aspart U-100 (NOVOLOG) 100 unit/mL (3 mL) InPn pen Inject 3-5 Units into the skin 3 (three) times daily with meals.    insulin glargine U-100, Lantus, (LANTUS SOLOSTAR U-100 INSULIN) 100 unit/mL (3 mL) InPn pen Inject 15 Units into the skin 2 (two) times a day. (Patient taking differently: Inject 15 Units into the skin 2 (two) times a day. Pt takes 12-14 units at night due to hypoglycemia over night)    losartan (COZAAR) 25 MG tablet Take 1 tablet (25 mg total) by mouth once daily.    mv-min/folic/K1/lycopen/lutein (CENTRUM SILVER MEN ORAL) Take by mouth.    potassium gluconate 600 mg (99 mg) Tab Take 1 tablet by mouth Daily.    rosuvastatin (CRESTOR) 40 MG Tab Take 1 tablet (40 mg total) by mouth every evening.    vitamin D (VITAMIN D3) 1000 units Tab Take 1,000 Units by mouth once daily.    aspirin (ECOTRIN) 325  "MG EC tablet Take 1 tablet (325 mg total) by mouth once daily. (Patient not taking: Reported on 2025)    carbamide peroxide (DEBROX) 6.5 % otic solution Place 5 drops into the right ear 2 (two) times daily.    coQ10, ubiquinol, 100 mg Cap Take by mouth.    cyanocobalamin, vitamin B-12, (LIQUID B-12) 1,000 mcg/15 mL Liqd Take by mouth once daily.    L.acidoph,rhamn-B.breve,longum (PROBIOTIC) 20 billion cell CpSP Take 30 Billion Cells by mouth Daily. (Patient not taking: Reported on 2025)    nitroGLYCERIN (NITROSTAT) 0.4 MG SL tablet Place 1 tablet (0.4 mg total) under the tongue every 5 (five) minutes as needed for Chest pain. Up to 3 doses, then call 911    pen needle, diabetic 32 gauge x 5/32" Ndle Use for insulin administration up to three times a day for long-acting and rapid-acting insulin     Family History       Problem Relation (Age of Onset)    Dementia Father    Diabetes Mother, Father          Tobacco Use    Smoking status: Former     Current packs/day: 0.00     Average packs/day: 0.3 packs/day for 17.3 years (4.5 ttl pk-yrs)     Types: Cigarettes, Cigars     Start date: 2008     Quit date:      Years since quittin.4    Smokeless tobacco: Never   Substance and Sexual Activity    Alcohol use: Not Currently    Drug use: Not Currently    Sexual activity: Yes     Partners: Female     Birth control/protection: None     Review of Systems   Unable to perform ROS: Acuity of condition     Objective:     Vital Signs (Most Recent):  Temp: 98.5 °F (36.9 °C) (25 1300)  Pulse: (!) 111 (25 1400)  Resp: 17 (25 1400)  BP: 139/62 (25 1400)  SpO2: 96 % (25 1400) Vital Signs (24h Range):  Temp:  [98.4 °F (36.9 °C)-98.5 °F (36.9 °C)] 98.5 °F (36.9 °C)  Pulse:  [] 111  Resp:  [13-23] 17  SpO2:  [93 %-98 %] 96 %  BP: (105-187)/(47-99) 139/62     Weight: 61.2 kg (135 lb)  Body mass index is 24.69 kg/m².     Physical Exam  Vitals and nursing note reviewed.   Constitutional: "       Appearance: He is ill-appearing.   HENT:      Head: Atraumatic.      Mouth/Throat:      Mouth: Mucous membranes are moist.   Eyes:      General: Visual field deficit present.      Pupils: Pupils are equal, round, and reactive to light.   Pulmonary:      Effort: Pulmonary effort is normal. No respiratory distress.   Musculoskeletal:         General: Normal range of motion.      Right lower leg: No edema.      Left lower leg: No edema.   Skin:     General: Skin is warm and dry.      Capillary Refill: Capillary refill takes less than 2 seconds.      Findings: No lesion.   Neurological:      Mental Status: He is lethargic.      Cranial Nerves: Dysarthria present.      Motor: No tremor, abnormal muscle tone or seizure activity.      Comments:     Very difficult exam 2/2 patient condition and baseline [place]  Opens eyes to voice  PERRL 3-2  Cannot tell me his name - garbled speech  Seems aphasic (expressive and receptive?)  Will not follow commands for me but SOLO antigravity/anti-resistance     Apparently patient has reported chronic left eye vision loss  Appears to also have new onset right visual loss/neglect  Very difficult to determine acute vs chronic deficits           NEUROLOGICAL EXAMINATION:     CRANIAL NERVES     CN III, IV, VI   Pupils are equal, round, and reactive to light.      Significant Labs: CBC:   Recent Labs   Lab 06/09/25  0820   WBC 11.14   HGB 14.0   HCT 41.7*        CMP:   Recent Labs   Lab 06/09/25  0820   *      K 4.9      CO2 27   BUN 12.4   CREATININE 1.02   CALCIUM 9.3   MG 1.90   PROT 7.3   ALBUMIN 3.8   BILITOT 1.1   ALKPHOS 102   AST 31   ALT 30     All pertinent lab results from the past 24 hours have been reviewed.    Significant Imaging: I have reviewed all pertinent imaging results/findings within the past 24 hours.  Assessment and Plan:     Hemorrhagic stroke  - presented with HA, AMS/confusion  - Etiology: CAA  -home medications include: no AP/AC      Stroke workup:  -CTh: Left occipital lobe parenchymal hemorrhage with trace adjacent subarachnoid hemorrhage.    -CTA h/n: No large vessel occlusion, flow-limiting stenosis, aneurysm or evidence of a vascular malformation    -MRI brain: 1. Left occipital lobe hematoma with adjacent subarachnoid hemorrhage.  2. Innumerable chronic microhemorrhages with a subcortical location, may indicate underlying cerebral amyloid angiopathy.  3. Mild chronic microvascular ischemic changes.     NIHSS:  1a  Level of consciousness: 1=not alert but arousable by minor stimulation to obey, answer or respond   1b. LOC questions:  2=Answers neither task correctly   1c. LOC commands: 2=Answers neither task correctly   2.  Best Gaze: 1=partial gaze palsy   3.  Visual: 2=Complete hemianopia   4. Facial Palsy: 0=Normal symmetric movement   5a.  Motor left arm: 0=No drift, limb holds 90 (or 45) degrees for full 10 seconds   5b.  Motor right arm: 0=No drift, limb holds 90 (or 45) degrees for full 10 seconds   6a. motor left le=No drift, limb holds 90 (or 45) degrees for full 10 seconds   6b  Motor right le=No drift, limb holds 90 (or 45) degrees for full 10 seconds   7. Limb Ataxia: 0=Absent   8.  Sensory: 0=Normal; no sensory loss   9. Best Language:  2=Severe aphasia; all communication is through fragmentary expression; great need for inference, questioning, and guessing by the listener. Range of information that can be exchanged is limited; listener carries burden of communication. Examiner cannot identify materials provided from patient response.    10. Dysarthria: 1=Mild to moderate, patient slurs at least some words and at worst, can be understood with some difficulty   11. Extinction and Inattention: 0=No abnormality    NIH total: 11     Plan:  IPH - left occipital lobe     -ICU admission  -repeat CTH in the morning   -hourly neuro checks ... notify neurology of any neuro change  -no plans for surgical intervention per  NSGY  -strict blood pressure control ... -150 for the first 24 hours, then SBP less than 140  -Cardene infusion as needed for BP control   -avoid antiplatelet or anticoagulation at this time  -seizure precautions ... notify neurology of any seizure-like activity  -therapy evaluations   -speech consult for diet clearance/ recommendations  -SCDs for DVT prophylaxis  -HOB 30 degrees     Further recommendations to follow from MD            VTE Risk Mitigation (From admission, onward)           Ordered     Reason for No Pharmacological VTE Prophylaxis  Once        Question:  Reasons:  Answer:  Risk of Bleeding    06/09/25 1055     IP VTE HIGH RISK PATIENT  Once         06/09/25 1055     Place sequential compression device  Until discontinued         06/09/25 1055                      ARIN Lloyd  Neurology  [facility] - 7th Floor ICU

## 2025-06-09 NOTE — ASSESSMENT & PLAN NOTE
- presented with HA, AMS/confusion  - Etiology: CAA  -home medications include: no AP/AC     Stroke workup:  -CTh: Left occipital lobe parenchymal hemorrhage with trace adjacent subarachnoid hemorrhage.    -CTA h/n: No large vessel occlusion, flow-limiting stenosis, aneurysm or evidence of a vascular malformation    -MRI brain: 1. Left occipital lobe hematoma with adjacent subarachnoid hemorrhage.  2. Innumerable chronic microhemorrhages with a subcortical location, may indicate underlying cerebral amyloid angiopathy.  3. Mild chronic microvascular ischemic changes.     NIHSS:  1a  Level of consciousness: 1=not alert but arousable by minor stimulation to obey, answer or respond   1b. LOC questions:  2=Answers neither task correctly   1c. LOC commands: 2=Answers neither task correctly   2.  Best Gaze: 1=partial gaze palsy   3.  Visual: 2=Complete hemianopia   4. Facial Palsy: 0=Normal symmetric movement   5a.  Motor left arm: 0=No drift, limb holds 90 (or 45) degrees for full 10 seconds   5b.  Motor right arm: 0=No drift, limb holds 90 (or 45) degrees for full 10 seconds   6a. motor left le=No drift, limb holds 90 (or 45) degrees for full 10 seconds   6b  Motor right le=No drift, limb holds 90 (or 45) degrees for full 10 seconds   7. Limb Ataxia: 0=Absent   8.  Sensory: 0=Normal; no sensory loss   9. Best Language:  2=Severe aphasia; all communication is through fragmentary expression; great need for inference, questioning, and guessing by the listener. Range of information that can be exchanged is limited; listener carries burden of communication. Examiner cannot identify materials provided from patient response.    10. Dysarthria: 1=Mild to moderate, patient slurs at least some words and at worst, can be understood with some difficulty   11. Extinction and Inattention: 0=No abnormality    NIH total: 11     Plan:  IPH - left occipital lobe     -hourly neuro checks ... notify neurology of any neuro  change  -no plans for surgical intervention per NSGY  -SBP less than 140  -Cardene infusion as needed for BP control   -avoid antiplatelet or anticoagulation at this time  -seizure precautions ... notify neurology of any seizure-like activity  -therapy evaluations   -speech consult for diet clearance/ recommendations  -SCDs for DVT prophylaxis  -HOB 30 degrees     Further recommendations to follow from MD

## 2025-06-09 NOTE — SUBJECTIVE & OBJECTIVE
Past Medical History:   Diagnosis Date    Acne     Cataract     Coronary artery disease     Diabetes mellitus     Hearing loss     Hypertension     Seasonal allergies     Stroke        Past Surgical History:   Procedure Laterality Date    ADENOIDECTOMY      ADENOIDECTOMY  1972    As a child    CORONARY STENT PLACEMENT  08/20/2012    EYE SURGERY  2011    Multiple    INNER EAR SURGERY      REPAIR OF RETINAL DETACHMENT WITH VITRECTOMY      TONSILLECTOMY      VITRECTOMY         Review of patient's allergies indicates:  No Known Allergies    Current Neurological Medications:      No current facility-administered medications on file prior to encounter.     Current Outpatient Medications on File Prior to Encounter   Medication Sig    alpha lipoic acid 600 mg Cap Take 250 mg by mouth once daily.    carvediloL (COREG) 6.25 MG tablet Take 1 tablet (6.25 mg total) by mouth 2 (two) times daily.    gabapentin (NEURONTIN) 100 MG capsule Take 1 capsule (100 mg total) by mouth every evening.    insulin aspart U-100 (NOVOLOG) 100 unit/mL (3 mL) InPn pen Inject 3-5 Units into the skin 3 (three) times daily with meals.    insulin glargine U-100, Lantus, (LANTUS SOLOSTAR U-100 INSULIN) 100 unit/mL (3 mL) InPn pen Inject 15 Units into the skin 2 (two) times a day. (Patient taking differently: Inject 15 Units into the skin 2 (two) times a day. Pt takes 12-14 units at night due to hypoglycemia over night)    losartan (COZAAR) 25 MG tablet Take 1 tablet (25 mg total) by mouth once daily.    mv-min/folic/K1/lycopen/lutein (CENTRUM SILVER MEN ORAL) Take by mouth.    potassium gluconate 600 mg (99 mg) Tab Take 1 tablet by mouth Daily.    rosuvastatin (CRESTOR) 40 MG Tab Take 1 tablet (40 mg total) by mouth every evening.    vitamin D (VITAMIN D3) 1000 units Tab Take 1,000 Units by mouth once daily.    aspirin (ECOTRIN) 325 MG EC tablet Take 1 tablet (325 mg total) by mouth once daily. (Patient not taking: Reported on 5/16/2025)    carbamide  "peroxide (DEBROX) 6.5 % otic solution Place 5 drops into the right ear 2 (two) times daily.    coQ10, ubiquinol, 100 mg Cap Take by mouth.    cyanocobalamin, vitamin B-12, (LIQUID B-12) 1,000 mcg/15 mL Liqd Take by mouth once daily.    L.acidoph,rhamn-B.breve,longum (PROBIOTIC) 20 billion cell CpSP Take 30 Billion Cells by mouth Daily. (Patient not taking: Reported on 2025)    nitroGLYCERIN (NITROSTAT) 0.4 MG SL tablet Place 1 tablet (0.4 mg total) under the tongue every 5 (five) minutes as needed for Chest pain. Up to 3 doses, then call 911    pen needle, diabetic 32 gauge x " Ndle Use for insulin administration up to three times a day for long-acting and rapid-acting insulin     Family History       Problem Relation (Age of Onset)    Dementia Father    Diabetes Mother, Father          Tobacco Use    Smoking status: Former     Current packs/day: 0.00     Average packs/day: 0.3 packs/day for 17.3 years (4.5 ttl pk-yrs)     Types: Cigarettes, Cigars     Start date: 2008     Quit date:      Years since quittin.4    Smokeless tobacco: Never   Substance and Sexual Activity    Alcohol use: Not Currently    Drug use: Not Currently    Sexual activity: Yes     Partners: Female     Birth control/protection: None     Review of Systems   Unable to perform ROS: Acuity of condition     Objective:     Vital Signs (Most Recent):  Temp: 98.5 °F (36.9 °C) (25 1300)  Pulse: (!) 111 (25 1400)  Resp: 17 (25 1400)  BP: 139/62 (25 1400)  SpO2: 96 % (25 1400) Vital Signs (24h Range):  Temp:  [98.4 °F (36.9 °C)-98.5 °F (36.9 °C)] 98.5 °F (36.9 °C)  Pulse:  [] 111  Resp:  [13-23] 17  SpO2:  [93 %-98 %] 96 %  BP: (105-187)/(47-99) 139/62     Weight: 61.2 kg (135 lb)  Body mass index is 24.69 kg/m².     Physical Exam  Vitals and nursing note reviewed.   Constitutional:       Appearance: He is ill-appearing.   HENT:      Head: Atraumatic.      Mouth/Throat:      Mouth: Mucous membranes " are moist.   Eyes:      General: Visual field deficit present.      Pupils: Pupils are equal, round, and reactive to light.   Pulmonary:      Effort: Pulmonary effort is normal. No respiratory distress.   Musculoskeletal:         General: Normal range of motion.      Right lower leg: No edema.      Left lower leg: No edema.   Skin:     General: Skin is warm and dry.      Capillary Refill: Capillary refill takes less than 2 seconds.      Findings: No lesion.   Neurological:      Mental Status: He is lethargic.      Cranial Nerves: Dysarthria present.      Motor: No tremor, abnormal muscle tone or seizure activity.      Comments:     Very difficult exam 2/2 patient condition and baseline [place]  Opens eyes to voice  PERRL 3-2  Cannot tell me his name - garbled speech  Seems aphasic (expressive and receptive?)  Will not follow commands for me but SOLO antigravity/anti-resistance     Apparently patient has reported chronic left eye vision loss  Appears to also have new onset right visual loss/neglect  Very difficult to determine acute vs chronic deficits           NEUROLOGICAL EXAMINATION:     CRANIAL NERVES     CN III, IV, VI   Pupils are equal, round, and reactive to light.      Significant Labs: CBC:   Recent Labs   Lab 06/09/25  0820   WBC 11.14   HGB 14.0   HCT 41.7*        CMP:   Recent Labs   Lab 06/09/25  0820   *      K 4.9      CO2 27   BUN 12.4   CREATININE 1.02   CALCIUM 9.3   MG 1.90   PROT 7.3   ALBUMIN 3.8   BILITOT 1.1   ALKPHOS 102   AST 31   ALT 30     All pertinent lab results from the past 24 hours have been reviewed.    Significant Imaging: I have reviewed all pertinent imaging results/findings within the past 24 hours.

## 2025-06-09 NOTE — ASSESSMENT & PLAN NOTE
- presented with HA, AMS/confusion  - Stroke RF: HTN (/95 in ED)  - Etiology: likely hypertensive bleed vs baseline CAA  -home medications include: no AP/AC     Stroke workup:  -CTh: Left occipital lobe parenchymal hemorrhage with trace adjacent subarachnoid hemorrhage.    -CTA h/n: No large vessel occlusion, flow-limiting stenosis, aneurysm or evidence of a vascular malformation    -MRI brain: 1. Left occipital lobe hematoma with adjacent subarachnoid hemorrhage.  2. Innumerable chronic microhemorrhages with a subcortical location, may indicate underlying cerebral amyloid angiopathy.  3. Mild chronic microvascular ischemic changes.     NIHSS:  1a  Level of consciousness: 1=not alert but arousable by minor stimulation to obey, answer or respond   1b. LOC questions:  2=Answers neither task correctly   1c. LOC commands: 2=Answers neither task correctly   2.  Best Gaze: 1=partial gaze palsy   3.  Visual: 2=Complete hemianopia   4. Facial Palsy: 0=Normal symmetric movement   5a.  Motor left arm: 0=No drift, limb holds 90 (or 45) degrees for full 10 seconds   5b.  Motor right arm: 0=No drift, limb holds 90 (or 45) degrees for full 10 seconds   6a. motor left le=No drift, limb holds 90 (or 45) degrees for full 10 seconds   6b  Motor right le=No drift, limb holds 90 (or 45) degrees for full 10 seconds   7. Limb Ataxia: 0=Absent   8.  Sensory: 0=Normal; no sensory loss   9. Best Language:  2=Severe aphasia; all communication is through fragmentary expression; great need for inference, questioning, and guessing by the listener. Range of information that can be exchanged is limited; listener carries burden of communication. Examiner cannot identify materials provided from patient response.    10. Dysarthria: 1=Mild to moderate, patient slurs at least some words and at worst, can be understood with some difficulty   11. Extinction and Inattention: 0=No abnormality    NIH total: 11     Plan:  IPH - left occipital  lobe     -ICU admission  -repeat CTH in the morning   -hourly neuro checks ... notify neurology of any neuro change  -no plans for surgical intervention per NSGY  -strict blood pressure control ... -150 for the first 24 hours, then SBP less than 140  -Cardene infusion as needed for BP control   -avoid antiplatelet or anticoagulation at this time  -seizure precautions ... notify neurology of any seizure-like activity  -therapy evaluations   -speech consult for diet clearance/ recommendations  -SCDs for DVT prophylaxis  -HOB 30 degrees     Further recommendations to follow from MD

## 2025-06-09 NOTE — PROGRESS NOTES
Attempted to meet with patient for initial cm dc planning assessment but pt nauseated and asked that I come back later.

## 2025-06-09 NOTE — ED PROVIDER NOTES
Encounter Date: 6/9/2025    SCRIBE #1 NOTE: I, Josi Jones, am scribing for, and in the presence of,  Jakob Claros MD. I have scribed the following portions of the note - Other sections scribed: HPI, ROS, PE.       History     Chief Complaint   Patient presents with    Headache     Hx of CVA. Wife reports pt woke up @ 0115 this morning c/o headache. This morning around 0545, wife noticed pt confused and having slurred speech. LSN 0115. . On arrival, pt confused. No slurred speech, facial droop, or unilateral weakness noted. GCS 14 on arrival.      Patient is a 64 year old male with a history of CAD, DM, HTN, hearing loss, and stroke presenting to the ED with a slight headache. Patient's wife reports the patient woke up at 1:15 am with a headache and took some tylenol and went back to sleep. Patient's wife states the patient then woke up at 5:45 confused with a headache and slurred speech. Patient denies vision changes.     The history is provided by medical records, the spouse and the patient.     Review of patient's allergies indicates:  No Known Allergies  Past Medical History:   Diagnosis Date    Acne     Cataract     Coronary artery disease     Diabetes mellitus     Hearing loss     Hypertension     Seasonal allergies     Stroke      Past Surgical History:   Procedure Laterality Date    ADENOIDECTOMY      ADENOIDECTOMY  1972    As a child    CORONARY STENT PLACEMENT  08/20/2012    EYE SURGERY  2011    Multiple    INNER EAR SURGERY      REPAIR OF RETINAL DETACHMENT WITH VITRECTOMY      TONSILLECTOMY      VITRECTOMY       Family History   Problem Relation Name Age of Onset    Diabetes Mother Mamta Villarreal     Diabetes Father Mayito Villarreal     Dementia Father Mayito Villarreal      Social History[1]  Review of Systems   Eyes:  Negative for visual disturbance.   Neurological:  Positive for headaches.       Physical Exam     Initial Vitals [06/09/25 0805]   BP Pulse Resp Temp SpO2   (!) 171/77 79 20 98.4 °F  (36.9 °C) 96 %      MAP       --         Physical Exam    Constitutional: He appears well-developed and well-nourished. He is not diaphoretic. No distress.   Pleasant.    HENT:   Head: Normocephalic and atraumatic.   Right Ear: External ear normal.   Left Ear: External ear normal.   Nose: Nose normal.   Eyes: EOM are normal. Pupils are equal, round, and reactive to light. Right eye exhibits no discharge. Left eye exhibits no discharge.   Cardiovascular:  Normal rate, regular rhythm and normal heart sounds.     Exam reveals no gallop and no friction rub.       No murmur heard.  Pulmonary/Chest: Effort normal and breath sounds normal. No respiratory distress. He has no wheezes. He has no rhonchi. He has no rales. He exhibits no tenderness.   Abdominal: Abdomen is soft. Bowel sounds are normal. He exhibits no distension and no mass. There is no abdominal tenderness. There is no rebound and no guarding.   Musculoskeletal:         General: No edema. Normal range of motion.     Neurological: He is alert. No cranial nerve deficit or sensory deficit.   Not oriented to name, place, time, and .  No focal nerve deficit.   No slurred speech.    Skin: Skin is warm and dry. Capillary refill takes less than 2 seconds.         ED Course   Critical Care    Date/Time: 2025 9:47 AM    Performed by: Jakob Claros MD  Authorized by: Jakob Claros MD  Direct patient critical care time: 11 minutes  Additional history critical care time: 9 minutes  Ordering / reviewing critical care time: 10 minutes  Documentation critical care time: 8 minutes  Consulting other physicians critical care time: 7 minutes  Total critical care time (exclusive of procedural time) : 45 minutes  Critical care time was exclusive of separately billable procedures and treating other patients.  Critical care was time spent personally by me on the following activities: development of treatment plan with patient or surrogate, discussions with  consultants, discussions with primary provider, evaluation of patient's response to treatment, interpretation of cardiac output measurements, examination of patient, obtaining history from patient or surrogate, pulse oximetry, re-evaluation of patient's condition, ordering and review of laboratory studies, ordering and performing treatments and interventions and ordering and review of radiographic studies.  Comments: Intraparenchymal hemorrhage, hypertension        Labs Reviewed   COMPREHENSIVE METABOLIC PANEL - Abnormal       Result Value    Sodium 139      Potassium 4.9      Chloride 101      CO2 27      Glucose 228 (*)     Blood Urea Nitrogen 12.4      Creatinine 1.02      Calcium 9.3      Protein Total 7.3      Albumin 3.8      Globulin 3.5      Albumin/Globulin Ratio 1.1      Bilirubin Total 1.1            ALT 30      AST 31      eGFR >60      Anion Gap 11.0      BUN/Creatinine Ratio 12     URINALYSIS, REFLEX TO URINE CULTURE - Abnormal    Color, UA Colorless      Appearance, UA Clear      Specific Gravity, UA 1.035 (*)     pH, UA 8.0      Protein, UA Negative      Glucose, UA 1+ (*)     Ketones, UA Negative      Blood, UA Negative      Bilirubin, UA Negative      Urobilinogen, UA Normal      Nitrites, UA Negative      Leukocyte Esterase, UA Negative      RBC, UA 0-5      WBC, UA 0-5      Bacteria, UA None Seen      Squamous Epithelial Cells, UA None Seen     CBC WITH DIFFERENTIAL - Abnormal    WBC 11.14      RBC 4.49 (*)     Hgb 14.0      Hct 41.7 (*)     MCV 92.9      MCH 31.2 (*)     MCHC 33.6      RDW 12.0      Platelet 167      MPV 10.3      Neut % 62.3      Lymph % 23.7      Mono % 9.8      Eos % 3.1      Basophil % 0.7      Imm Grans % 0.4      Neut # 6.94      Lymph # 2.64      Mono # 1.09      Eos # 0.35      Baso # 0.08      Imm Gran # 0.04      NRBC% 0.0     APTT - Normal    PTT 28.9     PROTIME-INR - Normal    PT 13.0      INR 1.0      Narrative:     Protimes are used to monitor anticoagulant  agents such as warfarin. PT INR values are based on the current patient normal mean and the MANISH value for the specific instrument reagent used.  **Routine theraputic target values for the INR are 2.0-3.0**   MAGNESIUM - Normal    Magnesium Level 1.90     TROPONIN I - Normal    Troponin-I <0.010     TSH - Normal    TSH 1.878     CBC W/ AUTO DIFFERENTIAL    Narrative:     The following orders were created for panel order CBC auto differential.  Procedure                               Abnormality         Status                     ---------                               -----------         ------                     CBC with Differential[[id number]]       Abnormal            Final result                 Please view results for these tests on the individual orders.        ECG Results              EKG 12-lead (Final result)        Collection Time Result Time QRS Duration OHS QTC Calculation    06/09/25 08:41:10 06/09/25 12:21:12 122 432                     Final result by Interface, Lab [facility] (06/09/25 12:21:21)                   Narrative:    Test Reason : R41.82,    Vent. Rate :  82 BPM     Atrial Rate :  82 BPM     P-R Int : 134 ms          QRS Dur : 122 ms      QT Int : 370 ms       P-R-T Axes :  80  95  63 degrees    QTcB Int : 432 ms    Sinus rhythm with Premature supraventricular complexes  Right bundle branch block  Abnormal ECG  Confirmed by Aaron Pino ([id number]) on 6/9/2025 12:21:10 PM    Referred By:            Confirmed By: Aaron Pino                                  Imaging Results              MRI Brain W WO Contrast (Final result)  Result time 06/09/25 13:09:59      Final result by Leela Mercado MD (06/09/25 13:09:59)                   Impression:      1. Left occipital lobe hematoma with adjacent subarachnoid hemorrhage.  2. Innumerable chronic microhemorrhages with a subcortical location, may indicate underlying cerebral amyloid angiopathy.  3. Mild chronic microvascular ischemic  changes.      Electronically signed by: Leela Mercado  Date:    06/09/2025  Time:    13:09               Narrative:    EXAMINATION:  MRI BRAIN W WO CONTRAST    CLINICAL HISTORY:  Intraparenchymal hemorrhage;    TECHNIQUE:  Multiplanar, multisequence MR images of the brain were obtained with and without administration of intravenous contrast.    COMPARISON:  CT head from the same day    FINDINGS:  There is no acute infarct identified.  Redemonstrated is a left occipital lobe parenchymal hemorrhage measuring approximately 3 x 3.2 cm in size with surrounding edema.  There is a small amount of adjacent subarachnoid hemorrhage.  There are innumerable chronic microhemorrhages predominantly in the right cerebral hemisphere, with a subcortical location.  Mild patchy T2/FLAIR hyperintensities in the subcortical and periventricular white matter, basal ganglia, thalami and cerebellum likely represent chronic microvascular ischemic changes.  There is no significant abnormal parenchymal or leptomeningeal enhancement.    There is local mass effect with partial effacement the left lateral ventricle.  There is no midline shift or herniation.  The basal cisterns are patent.  There is diffuse parenchymal volume loss.  The major intracranial flow voids are patent.  The paranasal sinuses are clear.                                       CTA Head and Neck (xpd) (Final result)  Result time 06/09/25 09:39:47      Final result by Leela Mercado MD (06/09/25 09:39:47)                   Impression:      No large vessel occlusion, flow-limiting stenosis, aneurysm or evidence of a vascular malformation      Electronically signed by: Leela Mercado  Date:    06/09/2025  Time:    09:39               Narrative:    EXAMINATION:  CTA HEAD AND NECK (XPD)    CLINICAL HISTORY:  Stroke, hemorrhagic;    TECHNIQUE:  Axial images obtained through the cervical region and [place] before and after the administration of intravenous  contrast.    Coronal, sagittal, MIP and 3D reconstructions were obtained from the axial data.    Automatic exposure control was utilized to limit radiation dose.    Radiation Dose:    Total DLP: 1674 mGy*cm    COMPARISON:  CT head from the same day    FINDINGS:  Head CT with contrast:    No interval changes when compared to the previous CT.    No enhancing abnormalities.    If present, stenosis of the carotid bulbs is measured based on NASCET criteria,    i.e. area of maximal stenosis compared to the cervical ICA distal to the bulb.    Cervical CTA:    The origins of the great vessels are patent with mild scattered calcifications.    The common carotid arteries are patent.  There are calcifications at the left carotid bulb with 20-30% stenosis.  The right carotid bulb and bilateral internal carotid arteries are patent.    The vertebral arteries are patent.    Intracranial CTA:    There are calcifications in the course carotid siphons with mild narrowing bilaterally.  The middle cerebral arteries and anterior cerebral arteries are patent    The vertebral arteries are patent with mild narrowing bilaterally.  The basilar artery and posterior cerebral arteries are patent.    The dural venous sinuses are patent.                                       CT Head Without Contrast (Final result)  Result time 06/09/25 09:34:11      Final result by Leela Mercado MD (06/09/25 09:34:11)                   Impression:      Left occipital lobe parenchymal hemorrhage with trace adjacent subarachnoid hemorrhage.    Findings given to Dr. Claros at the time of dictation.      Electronically signed by: Leela Mercado  Date:    06/09/2025  Time:    09:34               Narrative:    EXAMINATION:  CT HEAD WITHOUT CONTRAST    CLINICAL HISTORY:  Mental status change, unknown cause;    TECHNIQUE:  Axial scans were obtained from skull base to the vertex.    Coronal and sagittal reconstructions obtained from the axial data.    Automatic  exposure control was utilized to limit radiation dose.    Contrast: None    Radiation Dose:    Total DLP: 957 mGy*cm    COMPARISON:  None    FINDINGS:  Left occipital lobe parenchymal hemorrhage measures 2.9 x 3.5 cm in size with surrounding edema.  There is trace adjacent subarachnoid hemorrhage. Patchy hypodensities in the subcortical and periventricular white matter, basal ganglia and thalami likely represent chronic microvascular ischemic changes.    There is local mass effect with partial effacement of the left lateral ventricle.  There is no midline shift or herniation.  The basal cisterns are patent. the calvarium and skull base are intact.  There are bilateral mastoid effusions.                                       Medications   niCARdipine 40 mg/200 mL (0.2 mg/mL) infusion (2.5 mg/hr Intravenous New Bag 6/9/25 1249)   sodium chloride 0.9% flush 10 mL (has no administration in time range)   labetaloL injection 10 mg (has no administration in time range)   bisacodyL suppository 10 mg (has no administration in time range)   ondansetron injection 4 mg (4 mg Intravenous Given 6/9/25 1212)   levETIRAcetam in NaCl (iso-os) IVPB 1,000 mg (1,000 mg Intravenous New Bag 6/9/25 1312)   dextrose 50% injection 12.5 g (has no administration in time range)   glucagon (human recombinant) injection 1 mg (has no administration in time range)   insulin aspart U-100 injection 0-5 Units (has no administration in time range)   insulin glargine U-100 (Lantus) injection 10 Units (10 Units Subcutaneous Given 6/9/25 1306)   mupirocin 2 % ointment ( Nasal Given 6/9/25 1307)   acetaminophen tablet 650 mg (650 mg Oral Given 6/9/25 1431)   iohexoL (OMNIPAQUE 350) injection 100 mL (100 mLs Intravenous Given 6/9/25 0918)   ondansetron injection 4 mg (4 mg Intravenous Given 6/9/25 1052)   gadobenate dimeglumine (MULTIHANCE) injection 10 mL (10 mLs Intravenous Given 6/9/25 1249)     Medical Decision Making  The differential diagnosis  includes, but is not limited to,Metabolic abnormality, intoxication, toxic ingestion, CVA, infection, structural (SAH, ICH, trauma, neoplastic), seizure/postictal, polypharmacy     See ED course for MDM      Amount and/or Complexity of Data Reviewed  External Data Reviewed: notes.     Details: See ED course  Labs: ordered. Decision-making details documented in ED Course.  Radiology: ordered. Decision-making details documented in ED Course.    Risk  Prescription drug management.  Decision regarding hospitalization.            Scribe Attestation:   Scribe #1: I performed the above scribed service and the documentation accurately describes the services I performed. I attest to the accuracy of the note.    Attending Attestation:           Physician Attestation for Scribe:  Physician Attestation Statement for Scribe #1: I, Jakob Claros MD, reviewed documentation, as scribed by Josi Jones in my presence, and it is both accurate and complete.             ED Course as of 06/09/25 1452   Mon Jun 09, 2025 0819 Chart review reveals history of diabetes, hypertension, CAD, CVA with residual left-sided weakness. [MM]   0835 Patient is very pleasant, no acute distress however he is extremely confused not oriented to person place nor time. [MM]   0835 WBC: 11.14 [MM]   0835 Hemoglobin: 14.0 [MM]   0847 EKG done at 8:41 a.m. shows sinus rhythm rate of 82 .  Right bundle-branch block morphology.  Normal axis.  No obvious ST elevation or depression. [MM]   0912 TSH: 1.878 [MM]   0912 INR: 1.0 [MM]   0912 PTT: 28.9 [MM]   0912 Troponin I: <0.010 [MM]   0912 Magnesium : 1.90 [MM]   0914 CT Head Without Contrast  Appears to be a fairly sizable bleed left occiput [MM]   0920 Paged neuro surgery [AF]   0941 Discussed with Dr. Lindsey, on-call for neurosurgery recommends MRI with and without contrast, systolic blood pressure less than 140, ICU admit, acute 1 neuro checks. [MM]   0949 Discussed with medical ICU resident we  will see and evaluate patient plan for admission [MM]   1024 Spoke to patient's wife, at bedside, reports that he woke up this morning at proximally 12 30 or 1 with a headache.  That has given medicine, acetaminophen did not improve that has then given ibuprofen had improvement of his headache.  Went back to bed.  Woke up with a proximally 530 she noted to slurred speech and that patient was not oriented to person place or time she became concerned and had him brought in the emergency department further evaluation per reports he is closer that has baseline now that has still remains confused.  I did discuss CT findings, elevated blood pressure plan for [facility] ICU admission MRIs.  She is comfortable with the plan.  Patient remains in no distress but profoundly confused.  Will admit. [MM]      ED Course User Index  [AF] Josi Jones  [MM] Jakob Claros MD                           Clinical Impression:  Final diagnoses:  [R41.82] AMS (altered mental status)  [I61.9] Intraparenchymal hemorrhage of brain (Primary)  [I10] Hypertension, unspecified type          ED Disposition Condition    Admit                       [1]   Social History  Tobacco Use    Smoking status: Former     Current packs/day: 0.00     Average packs/day: 0.3 packs/day for 17.3 years (4.5 ttl pk-yrs)     Types: Cigarettes, Cigars     Start date: 2008     Quit date:      Years since quittin.4    Smokeless tobacco: Never   Substance Use Topics    Alcohol use: Not Currently    Drug use: Not Currently        Jakob Claros MD  [phone]

## 2025-06-09 NOTE — HPI
Federico Villarreal is a 64 y.o. male with past medical history of T1DM, HTN, HLD, CAD, CVA, hearing loss and left eye vision loss who presented to ED this morning with reports of headache, confusion, and altered mental status. Spouse reported in ED that the patient woke up during the night, took Tylenol for HA relief, and went back to sleep. Woke up around 5:45 this morning and was increasingly confused with some slurred speech as well. Patient was previously on aspirin 325 mg daily at home but was stopped by MD. Denies use of any current blood thinners. /95 in ED    CT head revealed left occipital lobe parenchymal hemorrhage with trace adjacent subarachnoid hemorrhage.   CTA head/neck was also completed in ED, which revealed no large vessel occlusion, flow-limiting stenosis, aneurysm or evidence of a vascular malformation   MRI brain report:  1. Left occipital lobe hematoma with adjacent subarachnoid hemorrhage.  2. Innumerable chronic microhemorrhages with a subcortical location, may indicate underlying cerebral amyloid angiopathy.  3. Mild chronic microvascular ischemic changes.    Stroke neurology consulted per ICH protocol.

## 2025-06-09 NOTE — H&P
[facility] - ICU  Pulmonary Critical Care Note    Patient Name: Federico Villarreal  MRN: [id number]  Admission Date: 6/9/2025  Hospital Length of Stay: 0 days  Code Status: Full Code  Attending Provider: Malinda Hogue MD  Primary Care Provider: Nicole Blanchard FNP     Subjective:     HPI:  This is a 64-year-old male who has a history of T1 DM (8.6% A1c), coronary artery disease status post stenting x2, hypertension, hyperlipidemia, history of ischemic stroke with previous left-sided weakness, who presented to [facility] ED with aphasia and confusion.    History obtained from wife, Juliana at bedside.  Patient was noted to have headache last night at around 1 a.m. and was given to aspirin with no relief.  Headache persisted and to ibuprofen liquid gel was given with relief.  Patient was able to sleep comfortably however upon awaking in the morning was having confusion and aphasia, and prompted his wife to call EMS.    In the ED patient's initial vital signs were remarkable for hypertension with SBP highest at 187, DBP 99.  A CT scan was done demonstrating left occipital lobe parenchymal hemorrhage and patient was subsequently started on Cardene drip.  Neurosurgery was consulted recommending neuro checks with BP parameters as well as MRI brain with and without contrast.  ICU was consulted for admission for hemorrhagic stroke.      Patient is not on any anticoagulation or antiplatelet.    Hospital Course/Significant events:  6/9/2025: Admitted to ICU on Cardene    24 Hour Interval History:      Past Medical History:   Diagnosis Date    Acne     Cataract     Coronary artery disease     Diabetes mellitus     Hearing loss     Hypertension     Seasonal allergies     Stroke        Past Surgical History:   Procedure Laterality Date    ADENOIDECTOMY      ADENOIDECTOMY  1972    As a child    CORONARY STENT PLACEMENT  08/20/2012    EYE SURGERY  2011    Multiple    INNER EAR SURGERY      REPAIR OF RETINAL DETACHMENT WITH  "VITRECTOMY      TONSILLECTOMY      VITRECTOMY         Social History[1]        Objective:     Current Outpatient Medications   Medication Instructions    alpha lipoic acid 250 mg, Daily    aspirin (ECOTRIN) 325 mg, Oral, Daily    carbamide peroxide (DEBROX) 6.5 % otic solution 5 drops, Right Ear, 2 times daily    carvediloL (COREG) 6.25 mg, Oral, 2 times daily    coQ10, ubiquinol, 100 mg Cap Take by mouth.    cyanocobalamin, vitamin B-12, (LIQUID B-12) 1,000 mcg/15 mL Liqd Daily    gabapentin (NEURONTIN) 100 mg, Oral, Nightly    insulin aspart U-100 (NOVOLOG) 3-5 Units, Subcutaneous, 3 times daily with meals    L.acidoph,rhamn-B.breve,longum (PROBIOTIC) 20 billion cell CpSP 30 Billion Cells, Daily    LANTUS SOLOSTAR U-100 INSULIN 15 Units, Subcutaneous, 2 times daily    losartan (COZAAR) 25 mg, Oral, Daily    mv-min/folic/K1/lycopen/lutein (CENTRUM SILVER MEN ORAL) Take by mouth.    nitroGLYCERIN (NITROSTAT) 0.4 mg, Sublingual, Every 5 min PRN, Up to 3 doses, then call 911    pen needle, diabetic 32 gauge x 5/32" Ndle Use for insulin administration up to three times a day for long-acting and rapid-acting insulin    potassium gluconate 600 mg (99 mg) Tab 1 tablet, Daily    rosuvastatin (CRESTOR) 40 mg, Oral, Nightly    vitamin D (VITAMIN D3) 1,000 Units, Daily       Current Inpatient Medications   levETIRAcetam (Keppra) IV (PEDS and ADULTS)  1,000 mg Intravenous Q12H         No intake or output data in the 24 hours ending 06/09/25 1057    Review of Systems   Gastrointestinal:  Positive for nausea and vomiting.   Neurological:  Positive for speech change.        Vital Signs (Most Recent):  Temp: 98.4 °F (36.9 °C) (06/09/25 0805)  Pulse: (!) 116 (06/09/25 1026)  Resp: 18 (06/09/25 1026)  BP: 134/65 (06/09/25 1026)  SpO2: 96 % (06/09/25 1026)  Body mass index is 24.69 kg/m².  Weight: 61.2 kg (135 lb) Vital Signs (24h Range):  Temp:  [98.4 °F (36.9 °C)] 98.4 °F (36.9 °C)  Pulse:  [] 116  Resp:  [13-23] 18  SpO2:  " "[96 %-98 %] 96 %  BP: (134-187)/(65-99) 134/65     Physical Exam  Constitutional:       General: He is not in acute distress.     Comments: Patient awake, alert, somewhat disoriented with scanning speech, word finding difficulties   Eyes:      Pupils: Pupils are equal, round, and reactive to light.   Cardiovascular:      Rate and Rhythm: Regular rhythm. Tachycardia present.   Pulmonary:      Effort: Pulmonary effort is normal.   Abdominal:      General: Abdomen is flat.   Musculoskeletal:         General: Normal range of motion.   Skin:     General: Skin is warm.      Capillary Refill: Capillary refill takes less than 2 seconds.   Neurological:      Mental Status: He is alert. He is disoriented.      Comments: Patient is aphasic, follows commands appropriately with prompting though overall confused  Oriented to self  5/5 on all 4 extremities  Difficult to assess sensory deficits due to confusion           Mechanical ventilation support:       Lines/Drains/Airways       Peripheral Intravenous Line  Duration                  Peripheral IV - Single Lumen 06/09/25 0900 20 G Left;Posterior Forearm <1 day         Peripheral IV - Single Lumen 06/09/25 0930 18 G Anterior;Right Forearm <1 day                    Significant Labs:    Lab Results   Component Value Date    WBC 11.14 06/09/2025    HGB 14.0 06/09/2025    HCT 41.7 (L) 06/09/2025    MCV 92.9 06/09/2025     06/09/2025         BMP  Lab Results   Component Value Date     06/09/2025    K 4.9 06/09/2025     06/09/2025    CO2 27 06/09/2025    BUN 12.4 06/09/2025    CREATININE 1.02 06/09/2025    CALCIUM 9.3 06/09/2025    EGFRNONAA >60 07/18/2022       ABG  No results for input(s): "PH", "PO2", "PCO2", "HCO3", "BE" in the last 168 hours.        Significant Imaging:  I have reviewed all pertinent imaging within the past 24 hours.    CT Head Without Contrast 06/09/2025    Narrative  EXAMINATION:  CT HEAD WITHOUT CONTRAST    CLINICAL HISTORY:  Mental status " change, unknown cause;    TECHNIQUE:  Axial scans were obtained from skull base to the vertex.    Coronal and sagittal reconstructions obtained from the axial data.    Automatic exposure control was utilized to limit radiation dose.    Contrast: None    Radiation Dose:    Total DLP: 957 mGy*cm    COMPARISON:  None    FINDINGS:  Left occipital lobe parenchymal hemorrhage measures 2.9 x 3.5 cm in size with surrounding edema.  There is trace adjacent subarachnoid hemorrhage. Patchy hypodensities in the subcortical and periventricular white matter, basal ganglia and thalami likely represent chronic microvascular ischemic changes.    There is local mass effect with partial effacement of the left lateral ventricle.  There is no midline shift or herniation.  The basal cisterns are patent. the calvarium and skull base are intact.  There are bilateral mastoid effusions.    Impression  Left occipital lobe parenchymal hemorrhage with trace adjacent subarachnoid hemorrhage.    Findings given to Dr. Claros at the time of dictation.      Electronically signed by: Leela Mercado  Date:    06/09/2025  Time:    09:34      Assessment/Plan:     Assessment  Left occipital hemorrhagic stroke  Previous history of ischemic stroke  History of CAD status post stenting x2  Type 1 DM (8.6% A1c)  Hypertension   Hyperlipidemia     Plan  - admitted to ICU  - neuro checks Q 1  - elevate head of bed  - wean Cardene drip to an SBP goal of 140  - neurosurgery was consulted, recommending MRI brain with and without contrast (pending), seizure prophylaxis with Keppra  - NPO at this time pending speech eval and further Neurosurgery recommendations   - p.r.n. Zofran  - a review of his previous MRI brain in early January 2024 demonstrates distal cortical lesions as well as leukoaraiosis, long with findings of low voltage may consider an amyloid process    DVT Prophylaxis: SCDs  GI Prophylaxis: None          Bharat Dominguez MD  Pulmonary Critical  [facility]  [facility] - ICU         [1]   Social History  Socioeconomic History    Marital status:    Tobacco Use    Smoking status: Former     Current packs/day: 0.00     Average packs/day: 0.3 packs/day for 17.3 years (4.5 ttl pk-yrs)     Types: Cigarettes, Cigars     Start date: 2008     Quit date:      Years since quittin.4    Smokeless tobacco: Never   Substance and Sexual Activity    Alcohol use: Not Currently    Drug use: Not Currently    Sexual activity: Yes     Partners: Female     Birth control/protection: None     Social Drivers of Health     Financial Resource Strain: Low Risk  (2024)    Overall Financial Resource Strain (CARDIA)     Difficulty of Paying Living Expenses: Not very hard   Food Insecurity: Food Insecurity Present (2024)    Hunger Vital Sign     Worried About Running Out of Food in the Last Year: Sometimes true     Ran Out of Food in the Last Year: Sometimes true   Transportation Needs: Unmet Transportation Needs (2024)    PRAPARE - Transportation     Lack of Transportation (Medical): Yes     Lack of Transportation (Non-Medical): No   Physical Activity: Inactive (2024)    Exercise Vital Sign     Days of Exercise per Week: 0 days     Minutes of Exercise per Session: 0 min   Stress: No Stress Concern Present (2024)    [employer] - Occupational Stress Questionnaire     Feeling of Stress : Not at all   Housing Stability: High Risk (2024)    Housing Stability Vital Sign     Unable to Pay for Housing in the Last Year: Yes     Number of Places Lived in the Last Year: 1     Unstable Housing in the Last Year: No

## 2025-06-09 NOTE — CONSULTS
[facility] - Emergency Dept  Neurosurgery  Consult Note    Inpatient consult to Neurosurgery  Consult performed by: Jeana Cordero FNP  Consult ordered by: Jakob Claros MD        Subjective:     Chief Complaint/Reason for Admission: Left occipital IPH    History of Present Illness:   Mr. Villarreal is a pleasant 64 year old male who presents to the [facility] ED on 2025 with complaints of headaches leading to confusion and altered mental status. Spouse at the bedside reports that he woke up with a headache in the middle of the night took some tylenol and went back to sleep. When they awoke again at 5:45, the patient was increasingly confused and with slurred speech. Past medical history significant for T1DM, HTN, HLD, CAD, CVA and hearing loss. Previously on  daily which he has stopped since follow up with cardiology at [facility]. Not on any blood thinners. Requiring Cardene for BP control.     CT head without contrast revealed left occipital lobe parenchymal hemorrhage with trace adjacent subarachnoid hemorrhage. Dr. Lindsey with neurosurgery was consulted for evaluation and recommendations.     On exam he is laying in bed, wife at bedside. Patient is hard of hearing and blind in left eye. Reduced vision from right eye. He moves all extremities well with full strength. Endorses headache. Denies nausea, vomiting, dizziness or changes in vision. Slurred speech has resolved. Able to state name. Per nurse able to state  when asked earlier. Not oriented to place or situation. Spouse reports he has had intermittent headaches over the last 4-5 days. This morning is the first time he had any kind of confusion.     Labs within normal limits with elevated blood glucose.     (Not in a hospital admission)      Review of patient's allergies indicates:  No Known Allergies    Past Medical History:   Diagnosis Date    Acne     Cataract     Coronary artery disease     Diabetes mellitus     Hearing loss      Hypertension     Seasonal allergies     Stroke      Past Surgical History:   Procedure Laterality Date    ADENOIDECTOMY      ADENOIDECTOMY  1972    As a child    CORONARY STENT PLACEMENT  2012    EYE SURGERY      Multiple    INNER EAR SURGERY      REPAIR OF RETINAL DETACHMENT WITH VITRECTOMY      TONSILLECTOMY      VITRECTOMY       Family History       Problem Relation (Age of Onset)    Dementia Father    Diabetes Mother, Father          Tobacco Use    Smoking status: Former     Current packs/day: 0.00     Average packs/day: 0.3 packs/day for 17.3 years (4.5 ttl pk-yrs)     Types: Cigarettes, Cigars     Start date: 2008     Quit date:      Years since quittin.4    Smokeless tobacco: Never   Substance and Sexual Activity    Alcohol use: Not Currently    Drug use: Not Currently    Sexual activity: Yes     Partners: Female     Birth control/protection: None     Review of Systems   HENT:  Positive for hearing loss (Bilaterally).    Eyes:         Blind left eye, reduced vision right eye   Neurological:  Positive for headaches.   Psychiatric/Behavioral:  Positive for confusion.    All other systems reviewed and are negative.    Objective:     Weight: 61.2 kg (135 lb)  Body mass index is 24.69 kg/m².  Vital Signs (Most Recent):  Temp: 98.4 °F (36.9 °C) (25 0805)  Pulse: (!) 123 (25 1017)  Resp: 17 (25 1017)  BP: (!) 142/69 (25 1017)  SpO2: 97 % (25 1017) Vital Signs (24h Range):  Temp:  [98.4 °F (36.9 °C)] 98.4 °F (36.9 °C)  Pulse:  [] 123  Resp:  [13-23] 17  SpO2:  [96 %-98 %] 97 %  BP: (142-187)/(69-99) 142/69         Physical Exam:  Nursing note and vitals reviewed.    Constitutional: He appears well-developed and well-nourished.     Eyes: Conjunctivae and EOM are normal.   Blind left eye, reduced vision right eye     Psych/Behavior: He is alert.     Musculoskeletal:        Right Upper Extremities: Range of motion is full. Muscle strength is 5/5.        Left  Upper Extremities: Range of motion is full. Muscle strength is 5/5.       Right Lower Extremities: Range of motion is full. Muscle strength is 5/5.        Left Lower Extremities: Range of motion is full. Muscle strength is 5/5.     Neurological:        Coordination: Impaired finger to nose: Unable to follow.        DTRs: He displays no Babinski's sign on the right side. He displays no Babinski's sign on the left side.        Cranial nerves: Cranial nerve(s) II, III, IV, V, VI, VII, VIII, IX, X, XI and XII are intact.       Limited neuro exam   Patient confused  Unable to follow full commands  GCS 14  Oriented to person  Follows some commands   No facial droop, clear speech, delayed answers  Moves all extremities with no lateralizing weakness.   Sensation intact throughout.    No gross visual issues  Cranial nerves grossly intact       Significant Labs:  Recent Labs   Lab 06/09/25  0820   *      K 4.9      CO2 27   BUN 12.4   CREATININE 1.02   CALCIUM 9.3   MG 1.90     Recent Labs   Lab 06/09/25  0820   WBC 11.14   HGB 14.0   HCT 41.7*        Recent Labs   Lab 06/09/25  0820   INR 1.0   APTT 28.9     Microbiology Results (last 7 days)       ** No results found for the last 168 hours. **          All pertinent labs from the last 24 hours have been reviewed.    Significant Diagnostics:    CT: CT Head Without Contrast  Result Date: 6/9/2025  Left occipital lobe parenchymal hemorrhage with trace adjacent subarachnoid hemorrhage. Findings given to Dr. Claros at the time of dictation. Electronically signed by: Leela Mercado Date:    06/09/2025 Time:    09:34    Assessment/Plan:    Altered mental status  Confusion  Left occipital IP      Neuro checks Q1  HOB 30   MM pain control  Keppra BID  BP parameters 140/90  MRI brain w without pending  Hold AP/AC for now  SCD for DVT  Further recommendation to follow per MD           There are no hospital problems to display for this patient.      Thank  you for your consult. I will follow-up with patient. Please contact us if you have any additional questions.    ARIN George  Neurosurgery  [facility] - Emergency Dept
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
## 2025-06-10 LAB
ALBUMIN SERPL-MCNC: 3.5 G/DL (ref 3.4–4.8)
ALBUMIN/GLOB SERPL: 1 RATIO (ref 1.1–2)
ALP SERPL-CCNC: 91 UNIT/L (ref 40–150)
ALT SERPL-CCNC: 22 UNIT/L (ref 0–55)
ANION GAP SERPL CALC-SCNC: 12 MEQ/L
APTT PPP: 29.5 SECONDS (ref 23.2–33.7)
AST SERPL-CCNC: 20 UNIT/L (ref 11–45)
BASOPHILS # BLD AUTO: 0.06 X10(3)/MCL
BASOPHILS NFR BLD AUTO: 0.4 %
BILIRUB SERPL-MCNC: 1.4 MG/DL
BUN SERPL-MCNC: 21.3 MG/DL (ref 8.4–25.7)
CALCIUM SERPL-MCNC: 8.4 MG/DL (ref 8.8–10)
CHLORIDE SERPL-SCNC: 105 MMOL/L (ref 98–107)
CO2 SERPL-SCNC: 24 MMOL/L (ref 23–31)
CREAT SERPL-MCNC: 1.08 MG/DL (ref 0.72–1.25)
CREAT/UREA NIT SERPL: 20
EOSINOPHIL # BLD AUTO: 0.01 X10(3)/MCL (ref 0–0.9)
EOSINOPHIL NFR BLD AUTO: 0.1 %
ERYTHROCYTE [DISTWIDTH] IN BLOOD BY AUTOMATED COUNT: 12.3 % (ref 11.5–17)
GFR SERPLBLD CREATININE-BSD FMLA CKD-EPI: >60 ML/MIN/1.73/M2
GLOBULIN SER-MCNC: 3.4 GM/DL (ref 2.4–3.5)
GLUCOSE SERPL-MCNC: 258 MG/DL (ref 82–115)
HCT VFR BLD AUTO: 39.1 % (ref 42–52)
HGB BLD-MCNC: 13.1 G/DL (ref 14–18)
IMM GRANULOCYTES # BLD AUTO: 0.09 X10(3)/MCL (ref 0–0.04)
IMM GRANULOCYTES NFR BLD AUTO: 0.6 %
INR PPP: 1.1
LYMPHOCYTES # BLD AUTO: 2.95 X10(3)/MCL (ref 0.6–4.6)
LYMPHOCYTES NFR BLD AUTO: 19.1 %
MAGNESIUM SERPL-MCNC: 2.1 MG/DL (ref 1.6–2.6)
MCH RBC QN AUTO: 31.1 PG (ref 27–31)
MCHC RBC AUTO-ENTMCNC: 33.5 G/DL (ref 33–36)
MCV RBC AUTO: 92.9 FL (ref 80–94)
MONOCYTES # BLD AUTO: 1.99 X10(3)/MCL (ref 0.1–1.3)
MONOCYTES NFR BLD AUTO: 12.9 %
NEUTROPHILS # BLD AUTO: 10.34 X10(3)/MCL (ref 2.1–9.2)
NEUTROPHILS NFR BLD AUTO: 66.9 %
NRBC BLD AUTO-RTO: 0 %
PHOSPHATE SERPL-MCNC: 3.6 MG/DL (ref 2.3–4.7)
PLATELET # BLD AUTO: 157 X10(3)/MCL (ref 130–400)
PMV BLD AUTO: 9.7 FL (ref 7.4–10.4)
POCT GLUCOSE: 278 MG/DL (ref 70–110)
POCT GLUCOSE: 292 MG/DL (ref 70–110)
POTASSIUM SERPL-SCNC: 4.7 MMOL/L (ref 3.5–5.1)
PROT SERPL-MCNC: 6.9 GM/DL (ref 5.8–7.6)
PROTHROMBIN TIME: 14.5 SECONDS (ref 12.5–14.5)
RBC # BLD AUTO: 4.21 X10(6)/MCL (ref 4.7–6.1)
SODIUM SERPL-SCNC: 141 MMOL/L (ref 136–145)
TROPONIN I SERPL-MCNC: 0.04 NG/ML (ref 0–0.04)
WBC # BLD AUTO: 15.44 X10(3)/MCL (ref 4.5–11.5)

## 2025-06-10 PROCEDURE — 20000000 HC ICU ROOM

## 2025-06-10 PROCEDURE — 99223 1ST HOSP IP/OBS HIGH 75: CPT | Mod: ,,,

## 2025-06-10 PROCEDURE — 63600175 PHARM REV CODE 636 W HCPCS

## 2025-06-10 PROCEDURE — 85025 COMPLETE CBC W/AUTO DIFF WBC: CPT

## 2025-06-10 PROCEDURE — 85730 THROMBOPLASTIN TIME PARTIAL: CPT

## 2025-06-10 PROCEDURE — 25000003 PHARM REV CODE 250

## 2025-06-10 PROCEDURE — 36415 COLL VENOUS BLD VENIPUNCTURE: CPT

## 2025-06-10 PROCEDURE — 84484 ASSAY OF TROPONIN QUANT: CPT

## 2025-06-10 PROCEDURE — 80053 COMPREHEN METABOLIC PANEL: CPT

## 2025-06-10 PROCEDURE — 83735 ASSAY OF MAGNESIUM: CPT

## 2025-06-10 PROCEDURE — 99231 SBSQ HOSP IP/OBS SF/LOW 25: CPT | Mod: ,,, | Performed by: SPECIALIST

## 2025-06-10 PROCEDURE — 84100 ASSAY OF PHOSPHORUS: CPT

## 2025-06-10 PROCEDURE — 25000003 PHARM REV CODE 250: Performed by: INTERNAL MEDICINE

## 2025-06-10 PROCEDURE — 85610 PROTHROMBIN TIME: CPT

## 2025-06-10 RX ORDER — LOSARTAN POTASSIUM 25 MG/1
25 TABLET ORAL DAILY
Status: DISCONTINUED | OUTPATIENT
Start: 2025-06-10 | End: 2025-06-10

## 2025-06-10 RX ORDER — QUETIAPINE FUMARATE 25 MG/1
50 TABLET, FILM COATED ORAL DAILY
Status: DISCONTINUED | OUTPATIENT
Start: 2025-06-10 | End: 2025-06-10

## 2025-06-10 RX ORDER — ACETAMINOPHEN 325 MG/1
650 TABLET ORAL EVERY 6 HOURS PRN
Status: DISCONTINUED | OUTPATIENT
Start: 2025-06-10 | End: 2025-06-13

## 2025-06-10 RX ORDER — OLANZAPINE 5 MG/1
10 TABLET, FILM COATED ORAL DAILY
Status: DISCONTINUED | OUTPATIENT
Start: 2025-06-10 | End: 2025-06-17

## 2025-06-10 RX ORDER — CARVEDILOL 3.12 MG/1
6.25 TABLET ORAL 2 TIMES DAILY
Status: DISCONTINUED | OUTPATIENT
Start: 2025-06-10 | End: 2025-06-10

## 2025-06-10 RX ORDER — QUETIAPINE FUMARATE 100 MG/1
100 TABLET, FILM COATED ORAL 2 TIMES DAILY
Status: DISCONTINUED | OUTPATIENT
Start: 2025-06-10 | End: 2025-06-14

## 2025-06-10 RX ORDER — BUTALBITAL, ACETAMINOPHEN AND CAFFEINE 50; 325; 40 MG/1; MG/1; MG/1
1 TABLET ORAL EVERY 4 HOURS PRN
Status: DISCONTINUED | OUTPATIENT
Start: 2025-06-10 | End: 2025-06-17

## 2025-06-10 RX ORDER — CARVEDILOL 3.12 MG/1
6.25 TABLET ORAL 2 TIMES DAILY
Status: DISCONTINUED | OUTPATIENT
Start: 2025-06-10 | End: 2025-06-12

## 2025-06-10 RX ORDER — HALOPERIDOL LACTATE 5 MG/ML
INJECTION, SOLUTION INTRAMUSCULAR
Status: COMPLETED
Start: 2025-06-10 | End: 2025-06-10

## 2025-06-10 RX ORDER — OLANZAPINE 5 MG/1
10 TABLET, FILM COATED ORAL DAILY
Status: DISCONTINUED | OUTPATIENT
Start: 2025-06-10 | End: 2025-06-10

## 2025-06-10 RX ORDER — LOSARTAN POTASSIUM 25 MG/1
25 TABLET ORAL DAILY
Status: DISCONTINUED | OUTPATIENT
Start: 2025-06-11 | End: 2025-06-12

## 2025-06-10 RX ORDER — DIPHENHYDRAMINE HYDROCHLORIDE 50 MG/ML
50 INJECTION, SOLUTION INTRAMUSCULAR; INTRAVENOUS ONCE
Status: COMPLETED | OUTPATIENT
Start: 2025-06-10 | End: 2025-06-10

## 2025-06-10 RX ORDER — LORAZEPAM 2 MG/ML
1 INJECTION INTRAMUSCULAR EVERY 4 HOURS PRN
Status: DISCONTINUED | OUTPATIENT
Start: 2025-06-10 | End: 2025-06-18

## 2025-06-10 RX ORDER — HALOPERIDOL LACTATE 5 MG/ML
5 INJECTION, SOLUTION INTRAMUSCULAR EVERY 6 HOURS PRN
Status: DISCONTINUED | OUTPATIENT
Start: 2025-06-10 | End: 2025-06-14

## 2025-06-10 RX ORDER — HALOPERIDOL LACTATE 5 MG/ML
10 INJECTION, SOLUTION INTRAMUSCULAR ONCE
Status: COMPLETED | OUTPATIENT
Start: 2025-06-10 | End: 2025-06-10

## 2025-06-10 RX ORDER — QUETIAPINE FUMARATE 25 MG/1
50 TABLET, FILM COATED ORAL 2 TIMES DAILY
Status: DISCONTINUED | OUTPATIENT
Start: 2025-06-10 | End: 2025-06-10

## 2025-06-10 RX ORDER — DIPHENHYDRAMINE HYDROCHLORIDE 50 MG/ML
INJECTION, SOLUTION INTRAMUSCULAR; INTRAVENOUS
Status: COMPLETED
Start: 2025-06-10 | End: 2025-06-10

## 2025-06-10 RX ORDER — QUETIAPINE FUMARATE 100 MG/1
100 TABLET, FILM COATED ORAL 2 TIMES DAILY
Status: DISCONTINUED | OUTPATIENT
Start: 2025-06-10 | End: 2025-06-10

## 2025-06-10 RX ADMIN — QUETIAPINE FUMARATE 50 MG: 25 TABLET ORAL at 08:06

## 2025-06-10 RX ADMIN — MUPIROCIN: 20 OINTMENT TOPICAL at 07:06

## 2025-06-10 RX ADMIN — LOSARTAN POTASSIUM 25 MG: 25 TABLET, FILM COATED ORAL at 08:06

## 2025-06-10 RX ADMIN — MUPIROCIN: 20 OINTMENT TOPICAL at 08:06

## 2025-06-10 RX ADMIN — CARVEDILOL 6.25 MG: 3.12 TABLET, FILM COATED ORAL at 07:06

## 2025-06-10 RX ADMIN — HALOPERIDOL LACTATE 5 MG: 5 INJECTION, SOLUTION INTRAMUSCULAR at 03:06

## 2025-06-10 RX ADMIN — INSULIN ASPART 3 UNITS: 100 INJECTION, SOLUTION INTRAVENOUS; SUBCUTANEOUS at 12:06

## 2025-06-10 RX ADMIN — DIPHENHYDRAMINE HYDROCHLORIDE 50 MG: 50 INJECTION INTRAMUSCULAR; INTRAVENOUS at 09:06

## 2025-06-10 RX ADMIN — OLANZAPINE 10 MG: 5 TABLET, FILM COATED ORAL at 04:06

## 2025-06-10 RX ADMIN — INSULIN GLARGINE 10 UNITS: 100 INJECTION, SOLUTION SUBCUTANEOUS at 08:06

## 2025-06-10 RX ADMIN — LEVETIRACETAM INJECTION 1000 MG: 10 INJECTION INTRAVENOUS at 07:06

## 2025-06-10 RX ADMIN — HALOPERIDOL LACTATE 10 MG: 5 INJECTION, SOLUTION INTRAMUSCULAR at 09:06

## 2025-06-10 RX ADMIN — LABETALOL HYDROCHLORIDE 10 MG: 5 INJECTION, SOLUTION INTRAVENOUS at 07:06

## 2025-06-10 RX ADMIN — QUETIAPINE FUMARATE 100 MG: 100 TABLET ORAL at 07:06

## 2025-06-10 RX ADMIN — DEXMEDETOMIDINE HYDROCHLORIDE 1.4 MCG/KG/HR: 400 INJECTION INTRAVENOUS at 10:06

## 2025-06-10 RX ADMIN — BUTALBITAL, ACETAMINOPHEN, AND CAFFEINE 1 TABLET: 325; 50; 40 TABLET ORAL at 08:06

## 2025-06-10 RX ADMIN — DEXMEDETOMIDINE HYDROCHLORIDE 0.4 MCG/KG/HR: 400 INJECTION INTRAVENOUS at 09:06

## 2025-06-10 RX ADMIN — INSULIN ASPART 2 UNITS: 100 INJECTION, SOLUTION INTRAVENOUS; SUBCUTANEOUS at 12:06

## 2025-06-10 RX ADMIN — LEVETIRACETAM INJECTION 1000 MG: 10 INJECTION INTRAVENOUS at 08:06

## 2025-06-10 RX ADMIN — DEXMEDETOMIDINE HYDROCHLORIDE 0.4 MCG/KG/HR: 400 INJECTION INTRAVENOUS at 01:06

## 2025-06-10 RX ADMIN — DIPHENHYDRAMINE HYDROCHLORIDE 50 MG: 50 INJECTION, SOLUTION INTRAMUSCULAR; INTRAVENOUS at 09:06

## 2025-06-10 RX ADMIN — DEXMEDETOMIDINE HYDROCHLORIDE 1.4 MCG/KG/HR: 400 INJECTION INTRAVENOUS at 05:06

## 2025-06-10 RX ADMIN — CARVEDILOL 6.25 MG: 3.12 TABLET, FILM COATED ORAL at 08:06

## 2025-06-10 RX ADMIN — LORAZEPAM 1 MG: 2 INJECTION INTRAMUSCULAR; INTRAVENOUS at 02:06
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
## 2025-06-10 NOTE — CONSULTS
Federico Villarreal 1960  [id number]  6/10/2025    CONSULTING PHYSICIAN: Dr. Hogue    REASON FOR CONSULTATION: nascimento placement    HPI:  The patient is a 64-year-old male with a history of diabetes, CAD status post stenting x2, hypertension, hyperlipidemia, history of ischemic CVA with left-sided deficits admitted with left occipital hemorrhagic stroke.  He was admitted to the ICU.  He had some difficulty urinating, bladder scan with 900 mL last night.  Multiple unsuccessful attempts at Nascimento placement by nursing.  Urology was consulted for nascimento placement which was performed by Dr. Anthony Puentes last night. UA without evidence of infection     He is currently resting in bed, not interactive during exam. Wife at bedside, history obtained from her. She reports for the last week or so he has been experiencing a weak stream and taking a long time to empty his bladder. He has never seen a urologist, denies any known prostate issues or prior urologic surgeries.     Past Medical History:   Diagnosis Date    Acne     Cataract     Coronary artery disease     Diabetes mellitus     Hearing loss     Hypertension     Seasonal allergies     Stroke      Past Surgical History:   Procedure Laterality Date    ADENOIDECTOMY      ADENOIDECTOMY  1972    As a child    CORONARY STENT PLACEMENT  08/20/2012    EYE SURGERY  2011    Multiple    INNER EAR SURGERY      REPAIR OF RETINAL DETACHMENT WITH VITRECTOMY      TONSILLECTOMY      VITRECTOMY       Family History   Problem Relation Name Age of Onset    Diabetes Mother Mamta Villarreal     Diabetes Father Mayito Villarreal     Dementia Father Mayito Villarreal      Social History[1]  Current Medications[2]  Review of patient's allergies indicates:  No Known Allergies    ROS: 12 point review of systems negative other than the HPI    PHYSICAL EXAM:  Vitals:    06/10/25 0915 06/10/25 0930 06/10/25 0945 06/10/25 1000   BP: 136/60 127/64 (!) 130/55 (!) 114/52   Patient Position:       Pulse: (!) 120 (!) 117  (!) 118 97   Resp: 16 (!) 21 17 13   Temp:       TempSrc:       SpO2: 98% 97% 98% 96%   Weight:       Height:           Intake/Output Summary (Last 24 hours) at 6/10/2025 1129  Last data filed at 6/10/2025 0911  Gross per 24 hour   Intake 850.92 ml   Output 1725 ml   Net -874.08 ml       GEN: NAD  HEENT: normocephalic, atraumatic  CV: RRR  RESP: Even and unlabored  ABD: soft, NTND  : yellow urine draining to  bag  NEURO:  not interactive during exam    LABS:  Recent Results (from the past 24 hours)   POCT glucose    Collection Time: 06/09/25  1:06 PM   Result Value Ref Range    POCT Glucose 335 (H) 70 - 110 mg/dL   POCT glucose    Collection Time: 06/09/25  6:13 PM   Result Value Ref Range    POCT Glucose 417 (H) 70 - 110 mg/dL   APTT    Collection Time: 06/10/25  4:42 AM   Result Value Ref Range    PTT 29.5 23.2 - 33.7 seconds   Protime-INR    Collection Time: 06/10/25  4:42 AM   Result Value Ref Range    PT 14.5 12.5 - 14.5 seconds    INR 1.1 <=1.3   Comprehensive metabolic panel    Collection Time: 06/10/25  4:43 AM   Result Value Ref Range    Sodium 141 136 - 145 mmol/L    Potassium 4.7 3.5 - 5.1 mmol/L    Chloride 105 98 - 107 mmol/L    CO2 24 23 - 31 mmol/L    Glucose 258 (H) 82 - 115 mg/dL    Blood Urea Nitrogen 21.3 8.4 - 25.7 mg/dL    Creatinine 1.08 0.72 - 1.25 mg/dL    Calcium 8.4 (L) 8.8 - 10.0 mg/dL    Protein Total 6.9 5.8 - 7.6 gm/dL    Albumin 3.5 3.4 - 4.8 g/dL    Globulin 3.4 2.4 - 3.5 gm/dL    Albumin/Globulin Ratio 1.0 (L) 1.1 - 2.0 ratio    Bilirubin Total 1.4 <=1.5 mg/dL    ALP 91 40 - 150 unit/L    ALT 22 0 - 55 unit/L    AST 20 11 - 45 unit/L    eGFR >60 mL/min/1.73/m2    Anion Gap 12.0 mEq/L    BUN/Creatinine Ratio 20    Magnesium    Collection Time: 06/10/25  4:43 AM   Result Value Ref Range    Magnesium Level 2.10 1.60 - 2.60 mg/dL   Phosphorus    Collection Time: 06/10/25  4:43 AM   Result Value Ref Range    Phosphorus Level 3.6 2.3 - 4.7 mg/dL   Troponin I    Collection Time:  06/10/25  4:43 AM   Result Value Ref Range    Troponin-I 0.043 0.000 - 0.045 ng/mL   CBC with Differential    Collection Time: 06/10/25  4:43 AM   Result Value Ref Range    WBC 15.44 (H) 4.50 - 11.50 x10(3)/mcL    RBC 4.21 (L) 4.70 - 6.10 x10(6)/mcL    Hgb 13.1 (L) 14.0 - 18.0 g/dL    Hct 39.1 (L) 42.0 - 52.0 %    MCV 92.9 80.0 - 94.0 fL    MCH 31.1 (H) 27.0 - 31.0 pg    MCHC 33.5 33.0 - 36.0 g/dL    RDW 12.3 11.5 - 17.0 %    Platelet 157 130 - 400 x10(3)/mcL    MPV 9.7 7.4 - 10.4 fL    Neut % 66.9 %    Lymph % 19.1 %    Mono % 12.9 %    Eos % 0.1 %    Basophil % 0.4 %    Imm Grans % 0.6 %    Neut # 10.34 (H) 2.1 - 9.2 x10(3)/mcL    Lymph # 2.95 0.6 - 4.6 x10(3)/mcL    Mono # 1.99 (H) 0.1 - 1.3 x10(3)/mcL    Eos # 0.01 0 - 0.9 x10(3)/mcL    Baso # 0.06 <=0.2 x10(3)/mcL    Imm Gran # 0.09 (H) 0.00 - 0.04 x10(3)/mcL    NRBC% 0.0 %       IMAGING:  Imaging Results              MRI Brain W WO Contrast (Final result)  Result time 06/09/25 13:09:59      Final result by Leela Mercado MD (06/09/25 13:09:59)                   Impression:      1. Left occipital lobe hematoma with adjacent subarachnoid hemorrhage.  2. Innumerable chronic microhemorrhages with a subcortical location, may indicate underlying cerebral amyloid angiopathy.  3. Mild chronic microvascular ischemic changes.      Electronically signed by: Leela Mercado  Date:    06/09/2025  Time:    13:09               Narrative:    EXAMINATION:  MRI BRAIN W WO CONTRAST    CLINICAL HISTORY:  Intraparenchymal hemorrhage;    TECHNIQUE:  Multiplanar, multisequence MR images of the brain were obtained with and without administration of intravenous contrast.    COMPARISON:  CT head from the same day    FINDINGS:  There is no acute infarct identified.  Redemonstrated is a left occipital lobe parenchymal hemorrhage measuring approximately 3 x 3.2 cm in size with surrounding edema.  There is a small amount of adjacent subarachnoid hemorrhage.  There are innumerable  chronic microhemorrhages predominantly in the right cerebral hemisphere, with a subcortical location.  Mild patchy T2/FLAIR hyperintensities in the subcortical and periventricular white matter, basal ganglia, thalami and cerebellum likely represent chronic microvascular ischemic changes.  There is no significant abnormal parenchymal or leptomeningeal enhancement.    There is local mass effect with partial effacement the left lateral ventricle.  There is no midline shift or herniation.  The basal cisterns are patent.  There is diffuse parenchymal volume loss.  The major intracranial flow voids are patent.  The paranasal sinuses are clear.                                       CTA Head and Neck (xpd) (Final result)  Result time 06/09/25 09:39:47      Final result by Leela Mercado MD (06/09/25 09:39:47)                   Impression:      No large vessel occlusion, flow-limiting stenosis, aneurysm or evidence of a vascular malformation      Electronically signed by: Leela Mercado  Date:    06/09/2025  Time:    09:39               Narrative:    EXAMINATION:  CTA HEAD AND NECK (XPD)    CLINICAL HISTORY:  Stroke, hemorrhagic;    TECHNIQUE:  Axial images obtained through the cervical region and [place] before and after the administration of intravenous contrast.    Coronal, sagittal, MIP and 3D reconstructions were obtained from the axial data.    Automatic exposure control was utilized to limit radiation dose.    Radiation Dose:    Total DLP: 1674 mGy*cm    COMPARISON:  CT head from the same day    FINDINGS:  Head CT with contrast:    No interval changes when compared to the previous CT.    No enhancing abnormalities.    If present, stenosis of the carotid bulbs is measured based on NASCET criteria,    i.e. area of maximal stenosis compared to the cervical ICA distal to the bulb.    Cervical CTA:    The origins of the great vessels are patent with mild scattered calcifications.    The common carotid  arteries are patent.  There are calcifications at the left carotid bulb with 20-30% stenosis.  The right carotid bulb and bilateral internal carotid arteries are patent.    The vertebral arteries are patent.    Intracranial CTA:    There are calcifications in the course carotid siphons with mild narrowing bilaterally.  The middle cerebral arteries and anterior cerebral arteries are patent    The vertebral arteries are patent with mild narrowing bilaterally.  The basilar artery and posterior cerebral arteries are patent.    The dural venous sinuses are patent.                                       CT Head Without Contrast (Final result)  Result time 06/09/25 09:34:11      Final result by Leela Mercado MD (06/09/25 09:34:11)                   Impression:      Left occipital lobe parenchymal hemorrhage with trace adjacent subarachnoid hemorrhage.    Findings given to Dr. Claros at the time of dictation.      Electronically signed by: Leela Mercado  Date:    06/09/2025  Time:    09:34               Narrative:    EXAMINATION:  CT HEAD WITHOUT CONTRAST    CLINICAL HISTORY:  Mental status change, unknown cause;    TECHNIQUE:  Axial scans were obtained from skull base to the vertex.    Coronal and sagittal reconstructions obtained from the axial data.    Automatic exposure control was utilized to limit radiation dose.    Contrast: None    Radiation Dose:    Total DLP: 957 mGy*cm    COMPARISON:  None    FINDINGS:  Left occipital lobe parenchymal hemorrhage measures 2.9 x 3.5 cm in size with surrounding edema.  There is trace adjacent subarachnoid hemorrhage. Patchy hypodensities in the subcortical and periventricular white matter, basal ganglia and thalami likely represent chronic microvascular ischemic changes.    There is local mass effect with partial effacement of the left lateral ventricle.  There is no midline shift or herniation.  The basal cisterns are patent. the calvarium and skull base are intact.   There are bilateral mastoid effusions.                                      ASSESSMENT:  64 year old male with h/o ischemic stroke with left deficits, multiple other co-morbidities, admitted with left occipital hemorrhagic stroke.  Patient with retention, nursing unable to place nascimento - flexible cystoscopy, nascimento placement by Dr. Puentes last night.     PLAN:  -Ok for void trial once felt appropriate by primary team  -Please call as needed with any urologic issues.       Verónica Rob NP         [1]   Social History  Tobacco Use    Smoking status: Former     Current packs/day: 0.00     Average packs/day: 0.3 packs/day for 17.3 years (4.5 ttl pk-yrs)     Types: Cigarettes, Cigars     Start date: 2008     Quit date:      Years since quittin.4    Smokeless tobacco: Never   Substance Use Topics    Alcohol use: Not Currently    Drug use: Not Currently   [2]   Current Facility-Administered Medications   Medication Dose Route Frequency Provider Last Rate Last Admin    acetaminophen tablet 650 mg  650 mg Oral Q6H PRN Bharat Dominguez MD   650 mg at 25 1431    bisacodyL suppository 10 mg  10 mg Rectal Daily PRN Bharat Dominguez MD        butalbital-acetaminophen-caffeine -40 mg per tablet 1 tablet  1 tablet Oral Q4H PRN Bharat Dominguez MD   1 tablet at 06/10/25 0807    carvediloL tablet 6.25 mg  6.25 mg Oral BID Malinda Hogue MD   6.25 mg at 06/10/25 0826    dexmedetomidine (PRECEDEX) 400mcg/100mL 0.9% NaCL infusion  0-1.4 mcg/kg/hr Intravenous Continuous Malinda Hogue MD 6.12 mL/hr at 06/10/25 0911 0.4 mcg/kg/hr at 06/10/25 0911    dextrose 50% injection 12.5 g  12.5 g Intravenous PRN Bharat Dominguez MD        glucagon (human recombinant) injection 1 mg  1 mg Intramuscular PRN Bharat Dominguez MD        insulin aspart U-100 injection 0-5 Units  0-5 Units Subcutaneous Q6H PRN Bharat Dominguez MD   2 Units at 06/10/25 0000    insulin glargine U-100 (Lantus) injection 10 Units   10 Units Subcutaneous BID Bharat Dominguez MD   10 Units at 06/10/25 0829    labetaloL injection 10 mg  10 mg Intravenous Q15 Min PRN Bharat Dominguez MD   10 mg at 06/10/25 0716    levETIRAcetam in NaCl (iso-os) IVPB 1,000 mg  1,000 mg Intravenous Q12H Bharat Dominguez MD   Stopped at 06/10/25 0850    LIDOcaine HCl 2% urojet   Urethral Once Verónica Rob, AGACNP-BC        losartan tablet 25 mg  25 mg Oral Daily Malinda Hogue MD   25 mg at 06/10/25 0826    mupirocin 2 % ointment   Nasal BID Malinda Hogue MD   Given at 06/10/25 0807    niCARdipine 40 mg/200 mL (0.2 mg/mL) infusion  0-15 mg/hr Intravenous Continuous Bharat Dominguez MD 25 mL/hr at 06/10/25 0911 5 mg/hr at 06/10/25 0911    ondansetron injection 4 mg  4 mg Intravenous Q8H PRN Bharat Dominguez MD   4 mg at 06/09/25 1212    QUEtiapine tablet 50 mg  50 mg Oral BID Malinda Hogue MD        sodium chloride 0.9% flush 10 mL  10 mL Intravenous PRN Bharta Dominguez MD

## 2025-06-10 NOTE — SUBJECTIVE & OBJECTIVE
Subjective:     Interval History: RN reports no acute events overnight. No longer requiring Cardene infusion. Precedex infusion paused since 2:00 am. Still requiring bilateral soft mittens. Repeat CT had shows grossly stable IPH in the left parieto-occipital region, with surrounding hypodensity. Stable trace adjacent SAH, stable local mass effect wit partial effacement of the left lateral ventricl, without midline shift or herniation.     Patient was calm this morning, but not able to follow commands for full physical assessment. RN reported that he said he had a HA.     Current Neurological Medications: Keppra 1000 mg bid    Current Medications[1]    Review of Systems  Objective:     Vital Signs (Most Recent):  Temp: 98.4 °F (36.9 °C) (06/10/25 0400)  Pulse: 89 (06/10/25 0545)  Resp: 15 (06/10/25 0545)  BP: (!) 152/53 (06/10/25 0545)  SpO2: 97 % (06/10/25 0545) Vital Signs (24h Range):  Temp:  [98.2 °F (36.8 °C)-98.5 °F (36.9 °C)] 98.4 °F (36.9 °C)  Pulse:  [] 89  Resp:  [12-23] 15  SpO2:  [89 %-98 %] 97 %  BP: ()/(47-99) 152/53     Weight: 61.2 kg (135 lb)  Body mass index is 24.69 kg/m².     Physical Exam  Vitals and nursing note reviewed.   Constitutional:       Appearance: He is ill-appearing.   HENT:      Head: Atraumatic.      Mouth/Throat:      Mouth: Mucous membranes are moist.   Eyes:      General: Visual field deficit present.      Pupils: Pupils are equal, round, and reactive to light.   Pulmonary:      Effort: Pulmonary effort is normal. No respiratory distress.   Musculoskeletal:         General: Normal range of motion.      Right lower leg: No edema.      Left lower leg: No edema.   Skin:     General: Skin is warm and dry.      Capillary Refill: Capillary refill takes less than 2 seconds.      Findings: No lesion.   Neurological:      Mental Status: He is lethargic.      Cranial Nerves: Dysarthria present.      Motor: No tremor, abnormal muscle tone or seizure activity.      Comments:      Very difficult exam 2/2 patient condition and baseline [place]  Opens eyes to voice  PERRL 3-2  Cannot tell me his name - garbled speech  Seems aphasic (expressive and receptive?)  Will not follow commands for me but SOLO antigravity/anti-resistance     Apparently patient has reported chronic left eye vision loss  Appears to also have new onset right visual loss/neglect  Very difficult to determine acute vs chronic deficits           NEUROLOGICAL EXAMINATION:     CRANIAL NERVES     CN III, IV, VI   Pupils are equal, round, and reactive to light.      Significant Labs: All pertinent lab results from the past 24 hours have been reviewed.    Significant Imaging: I have reviewed all pertinent imaging results/findings within the past 24 hours.       [1]   Current Facility-Administered Medications   Medication Dose Route Frequency Provider Last Rate Last Admin    acetaminophen tablet 650 mg  650 mg Oral Q6H PRN Bharat Dominguez MD   650 mg at 06/09/25 1431    bisacodyL suppository 10 mg  10 mg Rectal Daily PRN Bharat Dominguez MD        butalbital-acetaminophen-caffeine -40 mg per tablet 1 tablet  1 tablet Oral Q4H PRN Bharat Dominguez MD        dexmedetomidine (PRECEDEX) 400mcg/100mL 0.9% NaCL infusion  0-1.4 mcg/kg/hr Intravenous Continuous Malinda Hogue MD   Stopped at 06/10/25 0322    dextrose 50% injection 12.5 g  12.5 g Intravenous PRN Bharat Dominguez MD        glucagon (human recombinant) injection 1 mg  1 mg Intramuscular PRN Bharat Dominguez MD        insulin aspart U-100 injection 0-5 Units  0-5 Units Subcutaneous Q6H PRN Bharat Dominguez MD   2 Units at 06/10/25 0000    insulin glargine U-100 (Lantus) injection 10 Units  10 Units Subcutaneous BID Bharat Dominguez MD   10 Units at 06/09/25 2022    labetaloL injection 10 mg  10 mg Intravenous Q15 Min PRN Bharat Dominguez MD        levETIRAcetam in NaCl (iso-os) IVPB 1,000 mg  1,000 mg Intravenous Q12H Bharat Dominguez MD    Stopped at 06/09/25 2056    LIDOcaine HCl 2% urojet   Urethral Once Verónica Rob, AGACNP-BC        mupirocin 2 % ointment   Nasal BID Malinda Hogue MD   Given at 06/09/25 2022    niCARdipine 40 mg/200 mL (0.2 mg/mL) infusion  0-15 mg/hr Intravenous Continuous Bharat Dominguez MD   Stopped at 06/09/25 2025    ondansetron injection 4 mg  4 mg Intravenous Q8H PRN Bharat Dominguez MD   4 mg at 06/09/25 1212    sodium chloride 0.9% flush 10 mL  10 mL Intravenous PRN Bharat Dominguez MD

## 2025-06-10 NOTE — PLAN OF CARE
06/10/25 1017   Discharge Assessment   Assessment Type Discharge Planning Assessment   Confirmed/corrected address, phone number and insurance Yes   Confirmed Demographics Correct on Facesheet   Source of Information family   Communicated ISAIAS with patient/caregiver Date not available/Unable to determine   People in Home spouse   Name(s) of People in Home Juliana strauss   Facility Arrived From: none   Do you expect to return to your current living situation? Yes   Do you have help at home or someone to help you manage your care at home? Yes   Who are your caregiver(s) and their phone number(s)? Juliana strauss   Prior to hospitilization cognitive status: Alert/Oriented   Current cognitive status: Unable to Assess   Walking or Climbing Stairs Difficulty no   Dressing/Bathing Difficulty no   Home Accessibility stairs to enter home   Number of Stairs, Main Entrance six   Stair Railings, Main Entrance railings safe and in good condition   Equipment Currently Used at Home other (see comments)  (dexcom)   Patient currently being followed by outpatient case management? No   Do you currently have service(s) that help you manage your care at home? No   Do you take prescription medications? Yes   Who is going to help you get home at discharge? wife   How do you get to doctors appointments? family or friend will provide   Are you on dialysis? No   Do you take coumadin? No   Discharge Plan A Rehab   Discharge Plan B Other  (TBD)   DME Needed Upon Discharge  other (see comments)  (TBD)   Discharge Plan discussed with: Spouse/sig other   Name(s) and Number(s) Juliana strauss   Transition of Care Barriers None     Patient's wife Juliana at bedside.  Patients wife works for [employer] outpatient [employer].  Patient is disabled. Blind in one eye, [place] and diabetic. Uses dexcom  Does not drive  No assistive devices for ambulation

## 2025-06-10 NOTE — PROGRESS NOTES
[facility] - 7th Floor ICU  Neurology  Progress Note    Patient Name: Federico Villarreal  MRN: [id number]  Admission Date: 6/9/2025  Hospital Length of Stay: 1 days  Code Status: Full Code   Attending Provider: Malinda Hogue MD  Primary Care Physician: Nicole Blanchard FNP   Principal Problem:<principal problem not specified>    HPI:   Federico Villarreal is a 64 y.o. male with past medical history of T1DM, HTN, HLD, CAD, CVA, hearing loss and left eye vision loss who presented to ED this morning with reports of headache, confusion, and altered mental status. Spouse reported in ED that the patient woke up during the night, took Tylenol for HA relief, and went back to sleep. Woke up around 5:45 this morning and was increasingly confused with some slurred speech as well. Patient was previously on aspirin 325 mg daily at home but was stopped by MD. Denies use of any current blood thinners. /95 in ED    CT head revealed left occipital lobe parenchymal hemorrhage with trace adjacent subarachnoid hemorrhage.   CTA head/neck was also completed in ED, which revealed no large vessel occlusion, flow-limiting stenosis, aneurysm or evidence of a vascular malformation   MRI brain report:  1. Left occipital lobe hematoma with adjacent subarachnoid hemorrhage.  2. Innumerable chronic microhemorrhages with a subcortical location, may indicate underlying cerebral amyloid angiopathy.  3. Mild chronic microvascular ischemic changes.    Stroke neurology consulted per [facility] protocol.     Overview/Hospital Course:  No notes on file        Subjective:     Interval History: RN reports no acute events overnight. No longer requiring Cardene infusion. Precedex infusion paused since 2:00 am. Still requiring bilateral soft mittens. Repeat CT had shows grossly stable IPH in the left parieto-occipital region, with surrounding hypodensity. Stable trace adjacent SAH, stable local mass effect wit partial effacement of the left lateral  ventricle, without midline shift or herniation.     Patient was calm this morning, but not able to follow commands for full physical assessment. RN reported that he said he had a HA.     Current Neurological Medications: Keppra 1000 mg bid    [Current Medications]    [Current Medications]  Current Facility-Administered Medications   Medication Dose Route Frequency Provider Last Rate Last Admin    acetaminophen tablet 650 mg  650 mg Oral Q6H PRN Bharat Dominguez MD   650 mg at 06/09/25 1431    bisacodyL suppository 10 mg  10 mg Rectal Daily PRN Bharat Dominguez MD        butalbital-acetaminophen-caffeine -40 mg per tablet 1 tablet  1 tablet Oral Q4H PRN Bharat Dominguez MD        dexmedetomidine (PRECEDEX) 400mcg/100mL 0.9% NaCL infusion  0-1.4 mcg/kg/hr Intravenous Continuous Malinda Hogue MD   Stopped at 06/10/25 0322    dextrose 50% injection 12.5 g  12.5 g Intravenous PRN Bharat Dominguez MD        glucagon (human recombinant) injection 1 mg  1 mg Intramuscular PRN Bharat Dominguez MD        insulin aspart U-100 injection 0-5 Units  0-5 Units Subcutaneous Q6H PRN Bharat Dominguez MD   2 Units at 06/10/25 0000    insulin glargine U-100 (Lantus) injection 10 Units  10 Units Subcutaneous BID Bharat Dominguez MD   10 Units at 06/09/25 2022    labetaloL injection 10 mg  10 mg Intravenous Q15 Min PRN Bharat Dominguez MD        levETIRAcetam in NaCl (iso-os) IVPB 1,000 mg  1,000 mg Intravenous Q12H Bharat Dominguez MD   Stopped at 06/09/25 2056    LIDOcaine HCl 2% urojet   Urethral Once Verónica Rob R, AGACNP-BC        mupirocin 2 % ointment   Nasal BID Malinda Hogue MD   Given at 06/09/25 2022    niCARdipine 40 mg/200 mL (0.2 mg/mL) infusion  0-15 mg/hr Intravenous Continuous Bharat Dominguez MD   Stopped at 06/09/25 2025    ondansetron injection 4 mg  4 mg Intravenous Q8H PRN Bharat Dominguez MD   4 mg at 06/09/25 1212    sodium chloride 0.9% flush 10 mL  10 mL  Intravenous PRN Bharat Dominguez MD           Review of Systems  Objective:     Vital Signs (Most Recent):  Temp: 98.4 °F (36.9 °C) (06/10/25 0400)  Pulse: 89 (06/10/25 0545)  Resp: 15 (06/10/25 0545)  BP: (!) 152/53 (06/10/25 0545)  SpO2: 97 % (06/10/25 0545) Vital Signs (24h Range):  Temp:  [98.2 °F (36.8 °C)-98.5 °F (36.9 °C)] 98.4 °F (36.9 °C)  Pulse:  [] 89  Resp:  [12-23] 15  SpO2:  [89 %-98 %] 97 %  BP: ()/(47-99) 152/53     Weight: 61.2 kg (135 lb)  Body mass index is 24.69 kg/m².     Physical Exam  Vitals and nursing note reviewed.   Constitutional:       Appearance: He is ill-appearing.   HENT:      Head: Atraumatic.      Mouth/Throat:      Mouth: Mucous membranes are moist.   Eyes:      General: Visual field deficit present.      Pupils: Pupils are equal, round, and reactive to light.   Pulmonary:      Effort: Pulmonary effort is normal. No respiratory distress.   Musculoskeletal:         General: Normal range of motion.      Right lower leg: No edema.      Left lower leg: No edema.   Skin:     General: Skin is warm and dry.      Capillary Refill: Capillary refill takes less than 2 seconds.      Findings: No lesion.   Neurological:      Mental Status: He is lethargic.      Cranial Nerves: Dysarthria present.      Motor: No tremor, abnormal muscle tone or seizure activity.      Comments:     Very difficult exam 2/2 patient condition and baseline [facility]  Opens eyes to voice  PERRL 3-2  Cannot tell me his name - garbled speech  Seems aphasic (expressive and receptive?)  Will not follow commands for me but SOLO antigravity/anti-resistance     Apparently patient has reported chronic left eye vision loss  Appears to also have new onset right visual loss/neglect  Very difficult to determine acute vs chronic deficits           NEUROLOGICAL EXAMINATION:     CRANIAL NERVES     CN III, IV, VI   Pupils are equal, round, and reactive to light.      Significant Labs: All pertinent lab results from the past  24 hours have been reviewed.    Significant Imaging: I have reviewed all pertinent imaging results/findings within the past 24 hours.    Assessment and Plan:     Hemorrhagic stroke  - presented with HA, AMS/confusion  - Etiology: CAA  -home medications include: no AP/AC     Stroke workup:  -CTh: Left occipital lobe parenchymal hemorrhage with trace adjacent subarachnoid hemorrhage.    -CTA h/n: No large vessel occlusion, flow-limiting stenosis, aneurysm or evidence of a vascular malformation    -MRI brain: 1. Left occipital lobe hematoma with adjacent subarachnoid hemorrhage.  2. Innumerable chronic microhemorrhages with a subcortical location, may indicate underlying cerebral amyloid angiopathy.  3. Mild chronic microvascular ischemic changes.     NIHSS:  1a  Level of consciousness: 1=not alert but arousable by minor stimulation to obey, answer or respond   1b. LOC questions:  2=Answers neither task correctly   1c. LOC commands: 2=Answers neither task correctly   2.  Best Gaze: 1=partial gaze palsy   3.  Visual: 2=Complete hemianopia   4. Facial Palsy: 0=Normal symmetric movement   5a.  Motor left arm: 0=No drift, limb holds 90 (or 45) degrees for full 10 seconds   5b.  Motor right arm: 0=No drift, limb holds 90 (or 45) degrees for full 10 seconds   6a. motor left le=No drift, limb holds 90 (or 45) degrees for full 10 seconds   6b  Motor right le=No drift, limb holds 90 (or 45) degrees for full 10 seconds   7. Limb Ataxia: 0=Absent   8.  Sensory: 0=Normal; no sensory loss   9. Best Language:  2=Severe aphasia; all communication is through fragmentary expression; great need for inference, questioning, and guessing by the listener. Range of information that can be exchanged is limited; listener carries burden of communication. Examiner cannot identify materials provided from patient response.    10. Dysarthria: 1=Mild to moderate, patient slurs at least some words and at worst, can be understood with some  difficulty   11. Extinction and Inattention: 0=No abnormality    NIH total: 11     Plan:  IPH - left occipital lobe     -hourly neuro checks ... notify neurology of any neuro change  -no plans for surgical intervention per NSGY  -SBP less than 140  -Cardene infusion as needed for BP control   -avoid antiplatelet or anticoagulation at this time  -seizure precautions ... notify neurology of any seizure-like activity  -therapy evaluations   -speech consult for diet clearance/ recommendations  -SCDs for DVT prophylaxis  -HOB 30 degrees     Further recommendations to follow from MD            VTE Risk Mitigation (From admission, onward)           Ordered     Reason for No Pharmacological VTE Prophylaxis  Once        Question:  Reasons:  Answer:  Risk of Bleeding    06/09/25 1055     IP VTE HIGH RISK PATIENT  Once         06/09/25 1055     Place sequential compression device  Until discontinued         06/09/25 1055                    Joanie Coles NP  Neurology  [facility] - 7th Floor ICU

## 2025-06-10 NOTE — PLAN OF CARE
Problem: Adult Inpatient Plan of Care  Goal: Plan of Care Review  Outcome: Progressing  Goal: Patient-Specific Goal (Individualized)  Outcome: Progressing  Goal: Absence of Hospital-Acquired Illness or Injury  Outcome: Progressing  Goal: Optimal Comfort and Wellbeing  Outcome: Progressing  Goal: Readiness for Transition of Care  Outcome: Progressing     Problem: Diabetes Comorbidity  Goal: Blood Glucose Level Within Targeted Range  Outcome: Progressing     Problem: Stroke, Intracerebral Hemorrhage  Goal: Optimal Coping  Outcome: Progressing  Goal: Effective Bowel Elimination  Outcome: Progressing  Goal: Optimal Cerebral Tissue Perfusion  Outcome: Progressing  Goal: Optimal Cognitive Function  Outcome: Progressing  Goal: Effective Communication Skills  Outcome: Progressing  Goal: Optimal Functional Ability  Outcome: Progressing  Goal: Optimal Nutrition Intake  Outcome: Progressing  Goal: Optimal Pain Control and Function  Outcome: Progressing  Goal: Effective Oxygenation and Ventilation  Outcome: Progressing  Goal: Improved Sensorimotor Function  Outcome: Progressing  Goal: Safe and Effective Swallow  Outcome: Progressing  Goal: Effective Urinary Elimination  Outcome: Progressing     Problem: Skin Injury Risk Increased  Goal: Skin Health and Integrity  Outcome: Progressing     Problem: Fall Injury Risk  Goal: Absence of Fall and Fall-Related Injury  Outcome: Progressing     Problem: Infection  Goal: Absence of Infection Signs and Symptoms  Outcome: Progressing
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
## 2025-06-11 LAB
ALBUMIN SERPL-MCNC: 3.5 G/DL (ref 3.4–4.8)
ALBUMIN/GLOB SERPL: 0.9 RATIO (ref 1.1–2)
ALP SERPL-CCNC: 87 UNIT/L (ref 40–150)
ALT SERPL-CCNC: 22 UNIT/L (ref 0–55)
ANION GAP SERPL CALC-SCNC: 11 MEQ/L
AST SERPL-CCNC: 35 UNIT/L (ref 11–45)
BASOPHILS # BLD AUTO: 0.06 X10(3)/MCL
BASOPHILS NFR BLD AUTO: 0.4 %
BILIRUB SERPL-MCNC: 1.4 MG/DL
BUN SERPL-MCNC: 20.5 MG/DL (ref 8.4–25.7)
CALCIUM SERPL-MCNC: 9 MG/DL (ref 8.8–10)
CHLORIDE SERPL-SCNC: 105 MMOL/L (ref 98–107)
CO2 SERPL-SCNC: 26 MMOL/L (ref 23–31)
CREAT SERPL-MCNC: 0.87 MG/DL (ref 0.72–1.25)
CREAT/UREA NIT SERPL: 24
EOSINOPHIL # BLD AUTO: 0.08 X10(3)/MCL (ref 0–0.9)
EOSINOPHIL NFR BLD AUTO: 0.6 %
ERYTHROCYTE [DISTWIDTH] IN BLOOD BY AUTOMATED COUNT: 11.9 % (ref 11.5–17)
GFR SERPLBLD CREATININE-BSD FMLA CKD-EPI: >60 ML/MIN/1.73/M2
GLOBULIN SER-MCNC: 3.7 GM/DL (ref 2.4–3.5)
GLUCOSE SERPL-MCNC: 147 MG/DL (ref 82–115)
HCT VFR BLD AUTO: 41.1 % (ref 42–52)
HGB BLD-MCNC: 13.5 G/DL (ref 14–18)
IMM GRANULOCYTES # BLD AUTO: 0.05 X10(3)/MCL (ref 0–0.04)
IMM GRANULOCYTES NFR BLD AUTO: 0.4 %
LYMPHOCYTES # BLD AUTO: 2.72 X10(3)/MCL (ref 0.6–4.6)
LYMPHOCYTES NFR BLD AUTO: 19.7 %
MCH RBC QN AUTO: 31 PG (ref 27–31)
MCHC RBC AUTO-ENTMCNC: 32.8 G/DL (ref 33–36)
MCV RBC AUTO: 94.5 FL (ref 80–94)
MONOCYTES # BLD AUTO: 1.39 X10(3)/MCL (ref 0.1–1.3)
MONOCYTES NFR BLD AUTO: 10.1 %
NEUTROPHILS # BLD AUTO: 9.5 X10(3)/MCL (ref 2.1–9.2)
NEUTROPHILS NFR BLD AUTO: 68.8 %
NRBC BLD AUTO-RTO: 0 %
PLATELET # BLD AUTO: 150 X10(3)/MCL (ref 130–400)
PMV BLD AUTO: 9.8 FL (ref 7.4–10.4)
POCT GLUCOSE: 131 MG/DL (ref 70–110)
POCT GLUCOSE: 210 MG/DL (ref 70–110)
POTASSIUM SERPL-SCNC: 4 MMOL/L (ref 3.5–5.1)
PROT SERPL-MCNC: 7.2 GM/DL (ref 5.8–7.6)
RBC # BLD AUTO: 4.35 X10(6)/MCL (ref 4.7–6.1)
SODIUM SERPL-SCNC: 142 MMOL/L (ref 136–145)
WBC # BLD AUTO: 13.8 X10(3)/MCL (ref 4.5–11.5)

## 2025-06-11 PROCEDURE — 63600175 PHARM REV CODE 636 W HCPCS

## 2025-06-11 PROCEDURE — 36415 COLL VENOUS BLD VENIPUNCTURE: CPT

## 2025-06-11 PROCEDURE — 20000000 HC ICU ROOM

## 2025-06-11 PROCEDURE — 85025 COMPLETE CBC W/AUTO DIFF WBC: CPT

## 2025-06-11 PROCEDURE — 80053 COMPREHEN METABOLIC PANEL: CPT

## 2025-06-11 PROCEDURE — 25000003 PHARM REV CODE 250: Performed by: INTERNAL MEDICINE

## 2025-06-11 RX ADMIN — MUPIROCIN: 20 OINTMENT TOPICAL at 08:06

## 2025-06-11 RX ADMIN — LABETALOL HYDROCHLORIDE 10 MG: 5 INJECTION, SOLUTION INTRAVENOUS at 03:06

## 2025-06-11 RX ADMIN — BUTALBITAL, ACETAMINOPHEN, AND CAFFEINE 1 TABLET: 325; 50; 40 TABLET ORAL at 11:06

## 2025-06-11 RX ADMIN — NICARDIPINE HYDROCHLORIDE 7.5 MG/HR: 0.2 INJECTION, SOLUTION INTRAVENOUS at 02:06

## 2025-06-11 RX ADMIN — OLANZAPINE 10 MG: 5 TABLET, FILM COATED ORAL at 04:06

## 2025-06-11 RX ADMIN — QUETIAPINE FUMARATE 100 MG: 100 TABLET ORAL at 09:06

## 2025-06-11 RX ADMIN — CARVEDILOL 6.25 MG: 3.12 TABLET, FILM COATED ORAL at 09:06

## 2025-06-11 RX ADMIN — LEVETIRACETAM INJECTION 1000 MG: 10 INJECTION INTRAVENOUS at 08:06

## 2025-06-11 RX ADMIN — LABETALOL HYDROCHLORIDE 10 MG: 5 INJECTION, SOLUTION INTRAVENOUS at 01:06

## 2025-06-11 RX ADMIN — MUPIROCIN: 20 OINTMENT TOPICAL at 09:06

## 2025-06-11 RX ADMIN — DEXMEDETOMIDINE HYDROCHLORIDE 1.4 MCG/KG/HR: 400 INJECTION INTRAVENOUS at 10:06

## 2025-06-11 RX ADMIN — DEXMEDETOMIDINE HYDROCHLORIDE 1.4 MCG/KG/HR: 400 INJECTION INTRAVENOUS at 06:06

## 2025-06-11 RX ADMIN — INSULIN ASPART 2 UNITS: 100 INJECTION, SOLUTION INTRAVENOUS; SUBCUTANEOUS at 09:06

## 2025-06-11 RX ADMIN — INSULIN GLARGINE 10 UNITS: 100 INJECTION, SOLUTION SUBCUTANEOUS at 09:06

## 2025-06-11 RX ADMIN — LEVETIRACETAM INJECTION 1000 MG: 10 INJECTION INTRAVENOUS at 09:06

## 2025-06-11 RX ADMIN — QUETIAPINE FUMARATE 100 MG: 100 TABLET ORAL at 08:06

## 2025-06-11 RX ADMIN — DEXMEDETOMIDINE HYDROCHLORIDE 1.4 MCG/KG/HR: 400 INJECTION INTRAVENOUS at 01:06

## 2025-06-11 RX ADMIN — CARVEDILOL 6.25 MG: 3.12 TABLET, FILM COATED ORAL at 08:06

## 2025-06-11 RX ADMIN — LOSARTAN POTASSIUM 25 MG: 25 TABLET, FILM COATED ORAL at 09:06

## 2025-06-11 NOTE — PROGRESS NOTES
Pulmonary & Critical Care Medicine   Progress Note      Presenting History/HPI:  This is a 64-year-old male who has a history of T1 DM (8.6% A1c), coronary artery disease status post stenting x2, hypertension, hyperlipidemia, history of ischemic stroke with previous left-sided weakness, who presented to [facility] ED with aphasia and confusion.     History obtained from wife, Juliana at bedside.  Patient was noted to have headache last night at around 1 a.m. and was given to aspirin with no relief.  Headache persisted and to ibuprofen liquid gel was given with relief.  Patient was able to sleep comfortably however upon awaking in the morning was having confusion and aphasia, and prompted his wife to call EMS.      Interval History:  Patient confused,  on Precedex episodes of agitation      Scheduled Medications:    carvediloL  6.25 mg Per NG tube BID    insulin glargine U-100  10 Units Subcutaneous BID    levETIRAcetam (Keppra) IV (PEDS and ADULTS)  1,000 mg Intravenous Q12H    LIDOcaine HCl 2%   Urethral Once    losartan  25 mg Per NG tube Daily    mupirocin   Nasal BID    OLANZapine  10 mg Per NG tube Daily    QUEtiapine  100 mg Per NG tube BID       PRN Medications:     Current Facility-Administered Medications:     acetaminophen, 650 mg, Per NG tube, Q6H PRN    bisacodyL, 10 mg, Rectal, Daily PRN    butalbital-acetaminophen-caffeine -40 mg, 1 tablet, Per NG tube, Q4H PRN    dextrose 50%, 12.5 g, Intravenous, PRN    glucagon (human recombinant), 1 mg, Intramuscular, PRN    haloperidol lactate, 5 mg, Intravenous, Q6H PRN    insulin aspart U-100, 0-5 Units, Subcutaneous, Q6H PRN    labetalol, 10 mg, Intravenous, Q15 Min PRN    lorazepam, 1 mg, Intravenous, Q4H PRN    ondansetron, 4 mg, Intravenous, Q8H PRN    sodium chloride 0.9%, 10 mL, Intravenous, PRN      Infusions:     dexmedeTOMIDine (Precedex) infusion (titrating)  0-1.4 mcg/kg/hr Intravenous Continuous 15.3 mL/hr at 06/11/25 0730 1 mcg/kg/hr at  "06/11/25 0730    nicardipine  0-15 mg/hr Intravenous Continuous   Stopped at 06/11/25 0728         Fluid Balance:     Intake/Output Summary (Last 24 hours) at 6/11/2025 0901  Last data filed at 6/11/2025 0730  Gross per 24 hour   Intake 741.41 ml   Output 1175 ml   Net -433.59 ml         Vital Signs:   Vitals:    06/11/25 0800   BP: (!) 118/56   Pulse: 70   Resp: 11   Temp:          Physical Exam    Constitutional:       General: He is not in acute distress.  Confused       Eyes:      Pupils: Pupils are equal, round, and reactive to light.   Cardiovascular:      Rate and Rhythm: Regular rhythm. Tachycardia present.   Pulmonary:      Effort: Pulmonary effort is normal.   Abdominal:      General: Abdomen is flat.   Musculoskeletal:         General: Normal range of motion.   Skin:     General: Skin is warm.      Capillary Refill: Capillary refill takes less than 2 seconds.   Neurological:      Mental Status:  Confused, agitated, does move all 4 extremities     Comments:     Ventilator Settings         Laboratory Studies:   No results for input(s): "PH", "PCO2", "PO2", "HCO3", "POCSATURATED", "BE" in the last 24 hours.  Recent Labs   Lab 06/11/25  0316   WBC 13.80*   RBC 4.35*   HGB 13.5*   HCT 41.1*      MCV 94.5*   MCH 31.0   MCHC 32.8*     Recent Labs   Lab 06/11/25  0316      K 4.0      CO2 26   BUN 20.5   CREATININE 0.87   CALCIUM 9.0         Microbiology Data:   Microbiology Results (last 7 days)       Procedure Component Value Units Date/Time    Respiratory Culture [[id number]]     Order Status: Sent Specimen: Sputum               Imaging:   XR Gastric tube check, non-radiologist performed  Narrative: EXAMINATION:  XR GASTRIC TUBE CHECK, NON-RADIOLOGIST PERFORMED    CLINICAL HISTORY:  check placement;    COMPARISON:  None.    FINDINGS:  The nasogastric tube has its tip over the stomach.  Impression: Nasogastric tube with tip over the stomach    Electronically signed by: Leela " Jess  Date:    06/10/2025  Time:    16:30  CT Head Without Contrast  Narrative: Technique: CT of the head was performed without intravenous contrast with axial as well as coronal and sagittal images.    Comparison: Comparison is with study dated June 9, 2025.    Dosage Information: Automated Exposure Control was utilized 1069 mGy.cm.    Clinical history: STROKE FU.    Findings:    Hemorrhage: A grossly stable 3.6 x 2.7 x 2.7 cm sized intraparenchymal haemorrhage is seen in the left occipital region, surrounding hypodensity is seen. There is stable trace adjacent subarachnoid haemorrhage. There is stable local mass-effect with partial effacement of the left lateral ventricle. There is no midline shift or herniation. The basal cisterns are patent.    CSF spaces: The ventricles sulci and basal cisterns are within normal limits.    Brain parenchyma: Chronic scattered microvascular change is seen in portions of the periventricular and deep white matter tracts.    Infarct: Chronic infarct of the left corona radiata is noted.    Calvarium: No acute linear or depressed skull fracture is seen.    Maxillofacial Structures:    Paranasal sinuses: The visualized paranasal sinuses appear clear with no mucoperiosteal thickening or air fluid levels identified.    Orbits: Left lobe appears hyperdense which could represent a ocular prostheses.    Bilateral loss of pneumatization of mastoid air cells with opacification without interval difference.  Impression: Impression:    A grossly stable 3.6 x 2.7 x 2.7 cm sized intraparenchymal haemorrhage is seen in the left parieto-occipital region, surrounding hypodensity is seen. There is stable trace adjacent subarachnoid haemorrhage. There is stable local mass-effect with partial effacement of the left lateral ventricle. There is no midline shift or herniation. The basal cisterns are patent. Recommend continued serial interval follow-up to resolution as indicated.    No significant  discrepancy with overnight report.    Electronically signed by: Devyn Trinidad  Date:    06/10/2025  Time:    06:20          Assessment and Plan    Assessment:  Left occipital hemorrhagic stroke  Previous history of ischemic stroke  History of CAD status post stenting x2  Type 1 DM (8.6% A1c)  Hypertension   Hyperlipidemia           Plan:  -IPH-left occipital lobe, no neurosurgical interventions, optimize oral antihypertensives, currently on Cardene drip  -Altered mental status with agitation/ encephalopathy /hyperactive delirium  on Seroquel, Precedex restarted  -protecting airway good oxygenation ventilation  -keep blood glucose less than 180 history of type 1 diabetes  -blood pressure management, echo from 2024 with preserved EF  -worsening leukocytosis, H&H stable  -monitor and replace electrolytes  -ongoing discussions of goals of care with palliative Care            DVT ppx/tx with scd  GI ppx with protonix  Keep HOB elevated > 30*          The patient remains at high risk of decompensation and death and will remain in ICU level care     32 min of critical care time was spent reviewing the patient's chart including medications, radiographs, labs, pertinent cultures and pathology data, other consultant notes/recomendations as well as titration of vasopressors, adjustment of mechanical ventilatory or NIPPV support, as well as discussion of goals of care with nursing staff, respiratory therapy at the bedside and with family at the bedside/via phone.        Malinda Hogue MD  6/11/2025  Pulmonology/Critical Care

## 2025-06-11 NOTE — PROGRESS NOTES
Inpatient Nutrition Assessment    Admit Date: 6/9/2025   Total duration of encounter: 2 days   Patient Age: 64 y.o.    Nutrition Recommendation/Prescription     Start tube feeding at 25 ml/hr and advance by 10 ml/hr every 4 hours as tolerated:  Diabetisource AC goal rate 75 ml/hr to provide  1800 kcal 100% needs  90 g protein 100% needs  162 g CHO 92% needs  1230 ml water 77% needs, recommend 45 ml water flush every 2 hours to meet estimated fluid requirements    Communication of Recommendations: reviewed with nurse    Nutrition Assessment     Malnutrition Assessment/Nutrition-Focused Physical Exam       Malnutrition Level: other (see comments) (Does not meet criteria) (06/11/25 1352)  Energy Intake (Malnutrition): other (see comments) (Unable to assess) (06/11/25 [facility]2)  Weight Loss (Malnutrition): other (see comments) (Unable to assess) (06/11/25 1352)              Muscle Mass (Malnutrition): mild depletion (06/11/25 1352)  [place] (Muscle Loss): mild depletion                                A minimum of two characteristics is recommended for diagnosis of either severe or non-severe malnutrition.    Chart Review    Reason Seen: physician consult for nurse requesting tube feeding    Malnutrition Screening Tool Results   Have you recently lost weight without trying?: No  Have you been eating poorly because of a decreased appetite?: No   MST Score: 0   Diagnosis: left occipital hemorrhagic stroke     Relevant Medical History: T1DM, coronary artery disease status post stenting, hypertension, hyperlipidemia, history of ischemic stroke with previous left-sided weakness    Scheduled Medications:  carvediloL, 6.25 mg, BID  insulin glargine U-100, 10 Units, BID  levETIRAcetam (Keppra) IV (PEDS and ADULTS), 1,000 mg, Q12H  LIDOcaine HCl 2%, , Once  losartan, 25 mg, Daily  mupirocin, , BID  OLANZapine, 10 mg, Daily  QUEtiapine, 100 mg, BID    Continuous Infusions:  dexmedeTOMIDine (Precedex) infusion (titrating), Last  "Rate: 1.4 mcg/kg/hr (06/11/25 1339)  nicardipine, Last Rate: Stopped (06/11/25 0728)    PRN Medications:   acetaminophen, 650 mg, Q6H PRN  bisacodyL, 10 mg, Daily PRN  butalbital-acetaminophen-caffeine -40 mg, 1 tablet, Q4H PRN  dextrose 50%, 12.5 g, PRN  glucagon (human recombinant), 1 mg, PRN  haloperidol lactate, 5 mg, Q6H PRN  insulin aspart U-100, 0-5 Units, Q6H PRN  labetalol, 10 mg, Q15 Min PRN  lorazepam, 1 mg, Q4H PRN  ondansetron, 4 mg, Q8H PRN  sodium chloride 0.9%, 10 mL, PRN    Calorie Containing IV Medications: no significant kcals from medications at this time    Recent Labs   Lab 06/09/25  0820 06/09/25  1100 06/10/25  0443 06/11/25  0316     --  141 142   K 4.9  --  4.7 4.0   CALCIUM 9.3  --  8.4* 9.0   PHOS  --   --  3.6  --    MG 1.90  --  2.10  --      --  105 105   CO2 27  --  24 26   BUN 12.4  --  21.3 20.5   CREATININE 1.02  --  1.08 0.87   EGFRNORACEVR >60  --  >60 >60   *  --  258* 147*   BILITOT 1.1  --  1.4 1.4   ALKPHOS 102  --  91 87   ALT 30  --  22 22   AST 31  --  20 35   ALBUMIN 3.8  --  3.5 3.5   TRIG  --  97  --   --    HGBA1C  --  7.1*  --   --    WBC 11.14  --  15.44* 13.80*   HGB 14.0  --  13.1* 13.5*   HCT 41.7*  --  39.1* 41.1*     Nutrition Orders:  Diet NPO      Appetite/Oral Intake: NPO/not applicable  Factors Affecting Nutritional Intake: impaired cognitive status/motor control and NPO  Social Needs Impacting Access to Food: unable to assess at this time; will attempt on follow-up  Food/Cheondoism/Cultural Preferences: unable to obtain  Food Allergies: none reported  Last Bowel Movement: 06/08/25  Wound(s): no pressure injuries documented at this time     Comments    6/11/25 Patient confused with episodes of agitation, sleeping during rounds. Nurse reports plans to start nasogastric tube feeding, orders provided.    Anthropometrics    Height: 5' 2" (157.5 cm), Height Method: Stated  Last Weight: 65 kg (143 lb 4.8 oz) (06/11/25 1348), Weight Method: " Bed Scale  BMI (Calculated): 26.2  BMI Classification: overweight (BMI 25-29.9)        Ideal Body Weight (IBW), Male: 118 lb     % Ideal Body Weight, Male (lb): 114.41 %                          Usual Weight Provided By: unable to obtain usual weight    Wt Readings from Last 5 Encounters:   06/11/25 65 kg (143 lb 4.8 oz)   01/27/25 63 kg (139 lb)   12/03/24 64.7 kg (142 lb 9.6 oz)   11/05/24 62 kg (136 lb 11 oz)   10/02/24 62 kg (136 lb 9.6 oz)     Weight Change(s) Since Admission:   6/11/25 initial weight 61.2 kg stated, bed weight 65 kg taken during rounds  Wt Readings from Last 1 Encounters:   06/11/25 1348 65 kg (143 lb 4.8 oz)   06/09/25 0805 61.2 kg (135 lb)   Admit Weight: 61.2 kg (135 lb) (06/09/25 0805), Weight Method: Stated    Estimated Needs    Weight Used For Calorie Calculations: 65 kg (143 lb 4.8 oz)  Energy Calorie Requirements (kcal): 2614-9269, 1.2-1.4 stress factor  Energy Need Method: [place]-St Jeor  Weight Used For Protein Calculations: 65 kg (143 lb 4.8 oz)  Protein Requirements: 78-91 g, 1.2-1.4 g/kg  Fluid Requirements (mL): 4217-5359, 1 ml/kcal  CHO Requirement: 177-207 g, 45% of kcal     Enteral Nutrition Patient not receiving enteral nutrition at this time.    Parenteral Nutrition Patient not receiving parenteral nutrition support at this time.    Evaluation of Received Nutrient Intake    Calories: not meeting estimated needs  Protein: not meeting estimated needs    Patient Education Not applicable.    Nutrition Diagnosis     PES: Inadequate energy intake related to inability to consume sufficient nutrients as evidenced by less than 80% needs met. (active)  PES: N/A             Nutrition Interventions     Interventions: modified composition of enteral nutrition, modified rate of enteral nutrition, and collaboration with other providers       Goal: Meet greater than 80% of nutritional needs by follow-up. (new)  Goal: Tolerate enteral feeding at goal rate by follow-up. (new)    Nutrition  Goals & Monitoring     Dietitian will monitor: food and beverage intake, energy intake, enteral nutrition intake, weight, weight change, electrolyte/renal panel, beliefs/attitudes, glucose/endocrine profile, and gastrointestinal profile  Discharge planning: too early to determine; pending clinical course  Nutrition Risk/Follow-Up: patient at increased nutrition risk; dietitian will follow-up twice weekly   Please consult if re-assessment needed sooner.

## 2025-06-11 NOTE — PT/OT/SLP PROGRESS
SLP attempting speech, language, cognitive evaluation, however pt confused/screaming and unable to be redirected. SLP to reattempt as appropriate.

## 2025-06-11 NOTE — PLAN OF CARE
Problem: Adult Inpatient Plan of Care  Goal: Plan of Care Review  Outcome: Progressing  Goal: Patient-Specific Goal (Individualized)  Outcome: Progressing  Goal: Absence of Hospital-Acquired Illness or Injury  Outcome: Progressing  Goal: Optimal Comfort and Wellbeing  Outcome: Progressing  Goal: Readiness for Transition of Care  Outcome: Progressing     Problem: Diabetes Comorbidity  Goal: Blood Glucose Level Within Targeted Range  Outcome: Progressing     Problem: Stroke, Intracerebral Hemorrhage  Goal: Optimal Coping  Outcome: Progressing  Goal: Effective Bowel Elimination  Outcome: Progressing  Goal: Optimal Cerebral Tissue Perfusion  Outcome: Progressing  Goal: Optimal Cognitive Function  Outcome: Progressing  Goal: Effective Communication Skills  Outcome: Progressing  Goal: Optimal Functional Ability  Outcome: Progressing  Goal: Optimal Nutrition Intake  Outcome: Progressing  Goal: Optimal Pain Control and Function  Outcome: Progressing  Goal: Effective Oxygenation and Ventilation  Outcome: Progressing  Goal: Improved Sensorimotor Function  Outcome: Progressing  Goal: Safe and Effective Swallow  Outcome: Progressing  Goal: Effective Urinary Elimination  Outcome: Progressing     Problem: Skin Injury Risk Increased  Goal: Skin Health and Integrity  Outcome: Progressing     Problem: Fall Injury Risk  Goal: Absence of Fall and Fall-Related Injury  Outcome: Progressing     Problem: Infection  Goal: Absence of Infection Signs and Symptoms  Outcome: Progressing     Problem: Coping Ineffective  Goal: Effective Coping  Outcome: Progressing

## 2025-06-12 LAB
POCT GLUCOSE: 208 MG/DL (ref 70–110)
POCT GLUCOSE: 271 MG/DL (ref 70–110)
POCT GLUCOSE: 332 MG/DL (ref 70–110)

## 2025-06-12 PROCEDURE — 87185 SC STD ENZYME DETCJ PER NZM: CPT | Performed by: INTERNAL MEDICINE

## 2025-06-12 PROCEDURE — 25000242 PHARM REV CODE 250 ALT 637 W/ HCPCS: Performed by: NURSE PRACTITIONER

## 2025-06-12 PROCEDURE — 21400001 HC TELEMETRY ROOM

## 2025-06-12 PROCEDURE — 99900031 HC PATIENT EDUCATION (STAT)

## 2025-06-12 PROCEDURE — 99900035 HC TECH TIME PER 15 MIN (STAT)

## 2025-06-12 PROCEDURE — 94640 AIRWAY INHALATION TREATMENT: CPT

## 2025-06-12 PROCEDURE — 87040 BLOOD CULTURE FOR BACTERIA: CPT | Performed by: INTERNAL MEDICINE

## 2025-06-12 PROCEDURE — 36415 COLL VENOUS BLD VENIPUNCTURE: CPT | Performed by: INTERNAL MEDICINE

## 2025-06-12 PROCEDURE — 63600175 PHARM REV CODE 636 W HCPCS

## 2025-06-12 PROCEDURE — 25000003 PHARM REV CODE 250: Performed by: INTERNAL MEDICINE

## 2025-06-12 PROCEDURE — 93005 ELECTROCARDIOGRAM TRACING: CPT

## 2025-06-12 PROCEDURE — 25000003 PHARM REV CODE 250: Performed by: NURSE PRACTITIONER

## 2025-06-12 PROCEDURE — 93010 ELECTROCARDIOGRAM REPORT: CPT | Mod: ,,, | Performed by: INTERNAL MEDICINE

## 2025-06-12 PROCEDURE — 27000221 HC OXYGEN, UP TO 24 HOURS

## 2025-06-12 PROCEDURE — 99233 SBSQ HOSP IP/OBS HIGH 50: CPT | Mod: ,,, | Performed by: NURSE PRACTITIONER

## 2025-06-12 PROCEDURE — 11000001 HC ACUTE MED/SURG PRIVATE ROOM

## 2025-06-12 RX ORDER — SCOPOLAMINE 1 MG/3D
1 PATCH, EXTENDED RELEASE TRANSDERMAL ONCE
Status: COMPLETED | OUTPATIENT
Start: 2025-06-12 | End: 2025-06-15

## 2025-06-12 RX ORDER — CARVEDILOL 12.5 MG/1
12.5 TABLET ORAL 2 TIMES DAILY
Status: DISCONTINUED | OUTPATIENT
Start: 2025-06-12 | End: 2025-06-16

## 2025-06-12 RX ORDER — ACETAMINOPHEN 650 MG/1
650 SUPPOSITORY RECTAL ONCE
Status: COMPLETED | OUTPATIENT
Start: 2025-06-12 | End: 2025-06-12

## 2025-06-12 RX ORDER — LOSARTAN POTASSIUM 50 MG/1
50 TABLET ORAL DAILY
Status: DISCONTINUED | OUTPATIENT
Start: 2025-06-13 | End: 2025-06-16

## 2025-06-12 RX ORDER — LEVALBUTEROL INHALATION SOLUTION 0.63 MG/3ML
0.63 SOLUTION RESPIRATORY (INHALATION) EVERY 4 HOURS PRN
Status: DISCONTINUED | OUTPATIENT
Start: 2025-06-12 | End: 2025-07-16 | Stop reason: HOSPADM

## 2025-06-12 RX ADMIN — HALOPERIDOL LACTATE 5 MG: 5 INJECTION, SOLUTION INTRAMUSCULAR at 05:06

## 2025-06-12 RX ADMIN — ACETAMINOPHEN 650 MG: 650 SUPPOSITORY RECTAL at 10:06

## 2025-06-12 RX ADMIN — LOSARTAN POTASSIUM 25 MG: 25 TABLET, FILM COATED ORAL at 09:06

## 2025-06-12 RX ADMIN — LEVETIRACETAM INJECTION 1000 MG: 10 INJECTION INTRAVENOUS at 09:06

## 2025-06-12 RX ADMIN — DEXMEDETOMIDINE HYDROCHLORIDE 0.5 MCG/KG/HR: 400 INJECTION INTRAVENOUS at 09:06

## 2025-06-12 RX ADMIN — INSULIN ASPART 4 UNITS: 100 INJECTION, SOLUTION INTRAVENOUS; SUBCUTANEOUS at 09:06

## 2025-06-12 RX ADMIN — DEXMEDETOMIDINE HYDROCHLORIDE 1.4 MCG/KG/HR: 400 INJECTION INTRAVENOUS at 03:06

## 2025-06-12 RX ADMIN — QUETIAPINE FUMARATE 100 MG: 100 TABLET ORAL at 09:06

## 2025-06-12 RX ADMIN — ACETAMINOPHEN 650 MG: 325 TABLET ORAL at 11:06

## 2025-06-12 RX ADMIN — CARVEDILOL 6.25 MG: 3.12 TABLET, FILM COATED ORAL at 09:06

## 2025-06-12 RX ADMIN — MUPIROCIN: 20 OINTMENT TOPICAL at 09:06

## 2025-06-12 RX ADMIN — MUPIROCIN: 20 OINTMENT TOPICAL at 10:06

## 2025-06-12 RX ADMIN — INSULIN ASPART 3 UNITS: 100 INJECTION, SOLUTION INTRAVENOUS; SUBCUTANEOUS at 02:06

## 2025-06-12 RX ADMIN — INSULIN GLARGINE 10 UNITS: 100 INJECTION, SOLUTION SUBCUTANEOUS at 09:06

## 2025-06-12 RX ADMIN — ACETAMINOPHEN 650 MG: 325 TABLET ORAL at 05:06

## 2025-06-12 RX ADMIN — LABETALOL HYDROCHLORIDE 10 MG: 5 INJECTION, SOLUTION INTRAVENOUS at 06:06

## 2025-06-12 RX ADMIN — OLANZAPINE 10 MG: 5 TABLET, FILM COATED ORAL at 04:06

## 2025-06-12 RX ADMIN — INSULIN GLARGINE 10 UNITS: 100 INJECTION, SOLUTION SUBCUTANEOUS at 10:06

## 2025-06-12 RX ADMIN — LEVALBUTEROL HYDROCHLORIDE 0.63 MG: 0.63 SOLUTION RESPIRATORY (INHALATION) at 10:06

## 2025-06-12 RX ADMIN — LORAZEPAM 1 MG: 2 INJECTION INTRAMUSCULAR; INTRAVENOUS at 03:06

## 2025-06-12 RX ADMIN — SCOPOLAMINE 1 PATCH: 1 PATCH TRANSDERMAL at 10:06

## 2025-06-12 NOTE — H&P
[facility] Medicine History & Physical Examination       Patient Name: Federico Villarreal  MRN: [id number]  Patient Class: IP- Inpatient   Admission Date: 6/9/2025   Admitting Physician: LEIGH Service   Length of Stay: 3  Attending Physician: VALENTIN Mccoy   Primary Care Provider: Nicole Blanchard FNP  Face-to-Face encounter date: 06/12/2025  Code Status:DNR   Chief Complaint: Headache (Hx of CVA. Wife reports pt woke up @ 0115 this morning c/o headache. This morning around 0545, wife noticed pt confused and having slurred speech. LSN 0115. . On arrival, pt confused. No slurred speech, facial droop, or unilateral weakness noted. GCS 14 on arrival. )        Patient information was obtained from patient, patient's family, past medical records and ER records.     HISTORY OF PRESENT ILLNESS:   Federico Villarreal is a 64 y.o. male who  has a past medical history of Acne, Cataract, Coronary artery disease, Diabetes mellitus, Hearing loss, Hypertension, Seasonal allergies, and Stroke.. The patient presented to [facility] on 6/9/2025 with a primary complaint of with aphasia and confusion.  Patient had history of ischemic stroke with previous left-sided weakness.    PAST MEDICAL HISTORY:     Past Medical History:   Diagnosis Date    Acne     Cataract     Coronary artery disease     Diabetes mellitus     Hearing loss     Hypertension     Seasonal allergies     Stroke        PAST SURGICAL HISTORY:     Past Surgical History:   Procedure Laterality Date    ADENOIDECTOMY      ADENOIDECTOMY  1972    As a child    CORONARY STENT PLACEMENT  08/20/2012    EYE SURGERY  2011    Multiple    INNER EAR SURGERY      REPAIR OF RETINAL DETACHMENT WITH VITRECTOMY      TONSILLECTOMY      VITRECTOMY         ALLERGIES:   Patient has no known allergies.    FAMILY HISTORY:   Reviewed and negative    SOCIAL HISTORY:     Social History     Tobacco Use    Smoking status: Former     Current packs/day:  0.00     Average packs/day: 0.3 packs/day for 17.3 years (4.5 ttl pk-yrs)     Types: Cigarettes, Cigars     Start date: 2008     Quit date:      Years since quittin.4    Smokeless tobacco: Never   Substance Use Topics    Alcohol use: Not Currently        HOME MEDICATIONS:     Prior to Admission medications    Medication Sig Start Date End Date Taking? Authorizing Provider   alpha lipoic acid 600 mg Cap Take 250 mg by mouth once daily.   Yes Provider, Historical   carvediloL (COREG) 6.25 MG tablet Take 1 tablet (6.25 mg total) by mouth 2 (two) times daily. 25 Yes Nicole Blanchard FNP   gabapentin (NEURONTIN) 100 MG capsule Take 1 capsule (100 mg total) by mouth every evening. 25 Yes Nicole Blanchard FNP   insulin aspart U-100 (NOVOLOG) 100 unit/mL (3 mL) InPn pen Inject 3-5 Units into the skin 3 (three) times daily with meals. 25 Yes Nicole Blanchard FNP   insulin glargine U-100, Lantus, (LANTUS SOLOSTAR U-100 INSULIN) 100 unit/mL (3 mL) InPn pen Inject 15 Units into the skin 2 (two) times a day.  Patient taking differently: Inject 15 Units into the skin 2 (two) times a day. Pt takes 12-14 units at night due to hypoglycemia over night 24 Yes Scott Lai FNP-C   losartan (COZAAR) 25 MG tablet Take 1 tablet (25 mg total) by mouth once daily. 10/2/24 10/2/25 Yes Scott Lai FNP-C   mv-min/folic/K1/lycopen/lutein (CENTRUM SILVER MEN ORAL) Take by mouth.   Yes Provider, Historical   potassium gluconate 600 mg (99 mg) Tab Take 1 tablet by mouth Daily.   Yes Provider, Historical   rosuvastatin (CRESTOR) 40 MG Tab Take 1 tablet (40 mg total) by mouth every evening. 24 Yes Nati Martins MD   vitamin D (VITAMIN D3) 1000 units Tab Take 1,000 Units by mouth once daily.   Yes Provider, Historical   aspirin (ECOTRIN) 325 MG EC tablet Take 1 tablet (325 mg total) by mouth once daily.  Patient not taking: Reported on 2025  "1/16/24 1/15/25  Cj Diaz MD   carbamide peroxide (DEBROX) 6.5 % otic solution Place 5 drops into the right ear 2 (two) times daily. 5/16/25 6/15/25  Helena Noguera FNP   coQ10, ubiquinol, 100 mg Cap Take by mouth.    Provider, Historical   cyanocobalamin, vitamin B-12, (LIQUID B-12) 1,000 mcg/15 mL Liqd Take by mouth once daily.    Provider, Historical   L.acidoph,rhamn-B.breve,longum (PROBIOTIC) 20 billion cell CpSP Take 30 Billion Cells by mouth Daily.  Patient not taking: Reported on 5/16/2025    Provider, Historical   nitroGLYCERIN (NITROSTAT) 0.4 MG SL tablet Place 1 tablet (0.4 mg total) under the tongue every 5 (five) minutes as needed for Chest pain. Up to 3 doses, then call 911 12/13/23 5/16/25  Phyllis Hart FNP   pen needle, diabetic 32 gauge x 5/32" Ndle Use for insulin administration up to three times a day for long-acting and rapid-acting insulin 1/27/25   Nicole Blanchard FNP       REVIEW OF SYSTEMS:   Except as documented, all other systems reviewed and negative     PHYSICAL EXAM:     VITAL SIGNS: 24 HRS MIN & MAX LAST   Temp  Min: 97.8 °F (36.6 °C)  Max: 101 °F (38.3 °C) (!) 101 °F (38.3 °C)   BP  Min: 91/76  Max: 166/79 113/86   Pulse  Min: 66  Max: 137  (!) 137   Resp  Min: 10  Max: 28 20   SpO2  Min: 76 %  Max: 100 % 97 %       General appearance: Well-developed, well-nourished male in no apparent distress.  HENT: Atraumatic head. Moist mucous membranes of oral cavity.  Eyes: Normal extraocular movements.   Neck: Supple.   Lungs: Clear to auscultation bilaterally. No wheezing present.   Heart: Regular rate and rhythm. S1 and S2 present with no murmurs/gallop/rub. No pedal edema. No JVD present.   Abdomen: Soft, non-distended, non-tender. No rebound tenderness/guarding. Bowel sounds are normal.   Extremities: No cyanosis, clubbing, or edema.  Skin: No Rash.   Neuro: Motor and sensory exams grossly intact. Good tone. Muscle strength 5/5 in all 4 extremities  Psych/mental status: " Appropriate mood and affect. Responds appropriately to questions.     LABS AND IMAGING:     Recent Labs   Lab 06/09/25  0820 06/10/25  0443 06/11/25 0316   WBC 11.14 15.44* 13.80*   RBC 4.49* 4.21* 4.35*   HGB 14.0 13.1* 13.5*   HCT 41.7* 39.1* 41.1*   MCV 92.9 92.9 94.5*   MCH 31.2* 31.1* 31.0   MCHC 33.6 33.5 32.8*   RDW 12.0 12.3 11.9    157 150   MPV 10.3 9.7 9.8       Recent Labs   Lab 06/09/25  0820 06/10/25  0443 06/11/25  0316    141 142   K 4.9 4.7 4.0    105 105   CO2 27 24 26   BUN 12.4 21.3 20.5   CREATININE 1.02 1.08 0.87   * 258* 147*   CALCIUM 9.3 8.4* 9.0   MG 1.90 2.10  --    ALBUMIN 3.8 3.5 3.5   PROT 7.3 6.9 7.2   ALKPHOS 102 91 87   ALT 30 22 22   AST 31 20 35   BILITOT 1.1 1.4 1.4       Microbiology Results (last 7 days)       Procedure Component Value Units Date/Time    Respiratory Culture [[id number]]     Order Status: Sent Specimen: Sputum              XR Gastric tube check, non-radiologist performed  Narrative: EXAMINATION:  XR GASTRIC TUBE CHECK, NON-RADIOLOGIST PERFORMED    CLINICAL HISTORY:  check placement;    COMPARISON:  None.    FINDINGS:  The nasogastric tube has its tip over the stomach.  Impression: Nasogastric tube with tip over the stomach    Electronically signed by: Leela Mercado  Date:    06/10/2025  Time:    16:30  CT Head Without Contrast  Narrative: Technique: CT of the head was performed without intravenous contrast with axial as well as coronal and sagittal images.    Comparison: Comparison is with study dated June 9, 2025.    Dosage Information: Automated Exposure Control was utilized 1069 mGy.cm.    Clinical history: STROKE FU.    Findings:    Hemorrhage: A grossly stable 3.6 x 2.7 x 2.7 cm sized intraparenchymal haemorrhage is seen in the left occipital region, surrounding hypodensity is seen. There is stable trace adjacent subarachnoid haemorrhage. There is stable local mass-effect with partial effacement of the left lateral ventricle.  There is no midline shift or herniation. The basal cisterns are patent.    CSF spaces: The ventricles sulci and basal cisterns are within normal limits.    Brain parenchyma: Chronic scattered microvascular change is seen in portions of the periventricular and deep white matter tracts.    Infarct: Chronic infarct of the left corona radiata is noted.    Calvarium: No acute linear or depressed skull fracture is seen.    Maxillofacial Structures:    Paranasal sinuses: The visualized paranasal sinuses appear clear with no mucoperiosteal thickening or air fluid levels identified.    Orbits: Left lobe appears hyperdense which could represent a ocular prostheses.    Bilateral loss of pneumatization of mastoid air cells with opacification without interval difference.  Impression: Impression:    A grossly stable 3.6 x 2.7 x 2.7 cm sized intraparenchymal haemorrhage is seen in the left parieto-occipital region, surrounding hypodensity is seen. There is stable trace adjacent subarachnoid haemorrhage. There is stable local mass-effect with partial effacement of the left lateral ventricle. There is no midline shift or herniation. The basal cisterns are patent. Recommend continued serial interval follow-up to resolution as indicated.    No significant discrepancy with overnight report.    Electronically signed by: Devyn Trinidad  Date:    06/10/2025  Time:    06:20        ASSESSMENT & PLAN:     Altered mental status   Left occipital IPH  --hemorrhagic CVA  DM   CAD with history of PCI  Hypertension   HLD  History of ischemic CVA with left-sided weakness      Plan:   Neurology following   Appreciate neurosurgery eval   NG tube with tube feeding free water flush   Sliding scale insulin  Lantus 10 units b.i.d.   Keppra 1000 mg b.i.d.   Symptomatic management for agitation   Coreg 6.25 b.i.d. for hypertension   Labs in a.m.  Palliative care following    Disposition:  With a discussion with patient's spouse at bedside along with other  family members spouse agree with PEG tube placement.  We will get GI consult for PEG tube placement.  Continue SLP/PT/OT eval and treat.  And case management consult for discharge planning.        VTE Prophylaxis: will be placed on SCD for DVT prophylaxis and will be advised to be as mobile as possible and sit in a chair as tolerated    Patient condition:   Serious    __________________________________________________________________________  INPATIENT LIST OF MEDICATIONS     Scheduled Meds:   carvediloL  6.25 mg Per NG tube BID    insulin glargine U-100  10 Units Subcutaneous BID    levETIRAcetam (Keppra) IV (PEDS and ADULTS)  1,000 mg Intravenous Q12H    LIDOcaine HCl 2%   Urethral Once    losartan  25 mg Per NG tube Daily    mupirocin   Nasal BID    OLANZapine  10 mg Per NG tube Daily    QUEtiapine  100 mg Per NG tube BID     Continuous Infusions:   dexmedeTOMIDine (Precedex) infusion (titrating)  0-1.4 mcg/kg/hr Intravenous Continuous 7.65 mL/hr at 06/12/25 0953 0.5 mcg/kg/hr at 06/12/25 0953    nicardipine  0-15 mg/hr Intravenous Continuous   Stopped at 06/11/25 0728     PRN Meds:.  Current Facility-Administered Medications:     acetaminophen, 650 mg, Per NG tube, Q6H PRN    bisacodyL, 10 mg, Rectal, Daily PRN    butalbital-acetaminophen-caffeine -40 mg, 1 tablet, Per NG tube, Q4H PRN    dextrose 50%, 12.5 g, Intravenous, PRN    glucagon (human recombinant), 1 mg, Intramuscular, PRN    haloperidol lactate, 5 mg, Intravenous, Q6H PRN    insulin aspart U-100, 0-5 Units, Subcutaneous, Q6H PRN    labetalol, 10 mg, Intravenous, Q15 Min PRN    lorazepam, 1 mg, Intravenous, Q4H PRN    ondansetron, 4 mg, Intravenous, Q8H PRN    sodium chloride 0.9%, 10 mL, Intravenous, PRN      I, Sussy Melton NP have reviewed and discussed the case with VALENTIN Mccoy   Please see the following addendum for further assessment and plan from there attending  MD.    06/12/2025    ________________________________________________________________________________    MD Addendum:  I, Dr.Praneet Michelle MD assumed care of this patient today   For the patient encounter, I performed the substantive portion of the visit, I reviewed the NP/PA documentation, treatment plan, and medical decision making.  I had face to face time with this patient     64-year-old male with type 1 diabetes mellitus, CAD status post stenting, hypertension, hyperlipidemia, prior CVA  presented on 06/09/2025 for evaluation of sudden onset headache, confusion, slurred speech.  Workup revealed  CT head revealed left occipital lobe parenchymal hemorrhage with trace adjacent subarachnoid hemorrhage.   CTA head/neck was also completed in ED, which revealed no large vessel occlusion, flow-limiting stenosis, aneurysm or evidence of a vascular malformation   MRI brain report:  1. Left occipital lobe hematoma with adjacent subarachnoid hemorrhage.  2. Innumerable chronic microhemorrhages with a subcortical location, may indicate underlying cerebral amyloid angiopathy.  3. Mild chronic microvascular ischemic changes    Neurology team evaluated the patient, initiated on stroke protocol, admitted to ICU services.  Neurosurgery team evaluated the patient, MRI likely suggestive of amyloid angiopathy, repeat CT head 6/10/25 unchanged, suggested holding antiplatelets for at least 2 weeks.  Patient is started on Keppra.  Neurosurgical team signed off.    Patient required Chao placement for retention, neurology team evaluated.    Patient now cleared to downgrade to floors 06/12/2025 PM by ICU team.    Seen at bedside, patient's brother present during my interview.    Exam  BP in normotensive range, noted tachycardia sinus  Neuro:  patient awake alert confused remains nonverbal , spontaneous movement in all extremities  : Chao  Abdomen:  Nontender, Mildly distended    MDM   Acute Hemorrhagic CVA, left occipital lobe  hematoma with adjacent subarachnoid hemorrhage  Possible cerebral amyloid angiopathy  Fever  Sinus tachycardia?2/2 agitation vs other   Type 1 Diabetes Mellitus recent A1c 7.1%    Monitor on tele   Neurology, Neurosurgery team signed off   No antiplatelets for at least 2 weeks, continue Keppra per NSGY  Monitor BP, goal less than 140, continue Coreg, losartan, titrate , use IV p.r.n.  Check blood cultures, CXR given tachycardia, fever  Speech therapy evaluations, NG tube feeds,,?  Peg placement  Palliative care team following, case discussed follow recommendations  Seroquel rod  Monitor blood sugars, cover with ISS, titrate long-acting insulin  Maintain aspiration precautions, delirium precautions    Code status: DNR      Simon Martinez MD  06/12/2025

## 2025-06-12 NOTE — PROGRESS NOTES
Pulmonary & Critical Care Medicine   Progress Note      Presenting History/HPI:  This is a 64-year-old male who has a history of T1 DM (8.6% A1c), coronary artery disease status post stenting x2, hypertension, hyperlipidemia, history of ischemic stroke with previous left-sided weakness, who presented to [facility] ED with aphasia and confusion.     History obtained from wife, Juliana at bedside.  Patient was noted to have headache last night at around 1 a.m. and was given to aspirin with no relief.  Headache persisted and to ibuprofen liquid gel was given with relief.  Patient was able to sleep comfortably however upon awaking in the morning was having confusion and aphasia, and prompted his wife to call EMS.      Interval History:  Patient confused,  on Precedex episodes of agitation      Scheduled Medications:    carvediloL  6.25 mg Per NG tube BID    insulin glargine U-100  10 Units Subcutaneous BID    levETIRAcetam (Keppra) IV (PEDS and ADULTS)  1,000 mg Intravenous Q12H    LIDOcaine HCl 2%   Urethral Once    losartan  25 mg Per NG tube Daily    mupirocin   Nasal BID    OLANZapine  10 mg Per NG tube Daily    QUEtiapine  100 mg Per NG tube BID       PRN Medications:     Current Facility-Administered Medications:     acetaminophen, 650 mg, Per NG tube, Q6H PRN    bisacodyL, 10 mg, Rectal, Daily PRN    butalbital-acetaminophen-caffeine -40 mg, 1 tablet, Per NG tube, Q4H PRN    dextrose 50%, 12.5 g, Intravenous, PRN    glucagon (human recombinant), 1 mg, Intramuscular, PRN    haloperidol lactate, 5 mg, Intravenous, Q6H PRN    insulin aspart U-100, 0-5 Units, Subcutaneous, Q6H PRN    labetalol, 10 mg, Intravenous, Q15 Min PRN    lorazepam, 1 mg, Intravenous, Q4H PRN    ondansetron, 4 mg, Intravenous, Q8H PRN    sodium chloride 0.9%, 10 mL, Intravenous, PRN      Infusions:     dexmedeTOMIDine (Precedex) infusion (titrating)  0-1.4 mcg/kg/hr Intravenous Continuous 7.65 mL/hr at 06/12/25 0953 0.5 mcg/kg/hr at  "06/12/25 0953    nicardipine  0-15 mg/hr Intravenous Continuous   Stopped at 06/11/25 0712         Fluid Balance:     Intake/Output Summary (Last 24 hours) at 6/12/2025 1115  Last data filed at 6/12/2025 0700  Gross per 24 hour   Intake 536.17 ml   Output 1140 ml   Net -603.83 ml         Vital Signs:   Vitals:    06/12/25 0905   BP: 138/62   Pulse:    Resp:    Temp:          Physical Exam    Constitutional:       General: He is not in acute distress.  Confused       Eyes:      Pupils: Pupils are equal, round, and reactive to light.   Cardiovascular:      Rate and Rhythm: Regular rhythm. Tachycardia present.   Pulmonary:      Effort: Pulmonary effort is normal.   Abdominal:      General: Abdomen is flat.   Musculoskeletal:         General: Normal range of motion.   Skin:     General: Skin is warm.      Capillary Refill: Capillary refill takes less than 2 seconds.   Neurological:      Mental Status:  Confused, agitated, does move all 4 extremities     Comments:     Ventilator Settings         Laboratory Studies:   No results for input(s): "PH", "PCO2", "PO2", "HCO3", "POCSATURATED", "BE" in the last 24 hours.  No results for input(s): "WBC", "RBC", "HGB", "HCT", "PLT", "MCV", "MCH", "MCHC" in the last 24 hours.    No results for input(s): "GLUCOSE", "NA", "K", "CL", "CO2", "BUN", "CREATININE", "CALCIUM", "MG" in the last 24 hours.        Microbiology Data:   Microbiology Results (last 7 days)       Procedure Component Value Units Date/Time    Respiratory Culture [[id number]]     Order Status: Sent Specimen: Sputum               Imaging:   XR Gastric tube check, non-radiologist performed  Narrative: EXAMINATION:  XR GASTRIC TUBE CHECK, NON-RADIOLOGIST PERFORMED    CLINICAL HISTORY:  check placement;    COMPARISON:  None.    FINDINGS:  The nasogastric tube has its tip over the stomach.  Impression: Nasogastric tube with tip over the stomach    Electronically signed by: Leela " Jess  Date:    06/10/2025  Time:    16:30  CT Head Without Contrast  Narrative: Technique: CT of the head was performed without intravenous contrast with axial as well as coronal and sagittal images.    Comparison: Comparison is with study dated June 9, 2025.    Dosage Information: Automated Exposure Control was utilized 1069 mGy.cm.    Clinical history: STROKE FU.    Findings:    Hemorrhage: A grossly stable 3.6 x 2.7 x 2.7 cm sized intraparenchymal haemorrhage is seen in the left occipital region, surrounding hypodensity is seen. There is stable trace adjacent subarachnoid haemorrhage. There is stable local mass-effect with partial effacement of the left lateral ventricle. There is no midline shift or herniation. The basal cisterns are patent.    CSF spaces: The ventricles sulci and basal cisterns are within normal limits.    Brain parenchyma: Chronic scattered microvascular change is seen in portions of the periventricular and deep white matter tracts.    Infarct: Chronic infarct of the left corona radiata is noted.    Calvarium: No acute linear or depressed skull fracture is seen.    Maxillofacial Structures:    Paranasal sinuses: The visualized paranasal sinuses appear clear with no mucoperiosteal thickening or air fluid levels identified.    Orbits: Left lobe appears hyperdense which could represent a ocular prostheses.    Bilateral loss of pneumatization of mastoid air cells with opacification without interval difference.  Impression: Impression:    A grossly stable 3.6 x 2.7 x 2.7 cm sized intraparenchymal haemorrhage is seen in the left parieto-occipital region, surrounding hypodensity is seen. There is stable trace adjacent subarachnoid haemorrhage. There is stable local mass-effect with partial effacement of the left lateral ventricle. There is no midline shift or herniation. The basal cisterns are patent. Recommend continued serial interval follow-up to resolution as indicated.    No significant  discrepancy with overnight report.    Electronically signed by: Devyn Trinidad  Date:    06/10/2025  Time:    06:20          Assessment and Plan    Assessment:  Left occipital hemorrhagic stroke  Previous history of ischemic stroke  History of CAD status post stenting x2  Type 1 DM (8.6% A1c)  Hypertension   Hyperlipidemia           Plan:  -IPH-left occipital lobe, no neurosurgical interventions,- oral antihypertensives,   -Altered mental status with agitation/ encephalopathy /hyperactive delirium  on Seroquel,  wean off precedex   -protecting airway good oxygenation ventilation  -keep blood glucose less than 180 history of type 1 diabetes  -blood pressure management, echo from 2024 with preserved EF  -worsening leukocytosis, H&H stable  -monitor and replace electrolytes  -ongoing discussions of goals of care with palliative Care            DVT ppx/tx with scd  GI ppx with protonix  Keep HOB elevated > 30*          The patient remains at high risk of decompensation and death and will remain in ICU level care     32 min of critical care time was spent reviewing the patient's chart including medications, radiographs, labs, pertinent cultures and pathology data, other consultant notes/recomendations as well as titration of vasopressors, adjustment of mechanical ventilatory or NIPPV support, as well as discussion of goals of care with nursing staff, respiratory therapy at the bedside and with family at the bedside/via phone.        Malinda Hogue MD  6/12/2025  Pulmonology/Critical Care

## 2025-06-12 NOTE — PT/OT/SLP PROGRESS
SLP attempting clinical swallowing evaluation, however pt confused and unable to consistently follow commands. SLP to continue to follow and treat as appropriate.

## 2025-06-13 LAB
ALBUMIN SERPL-MCNC: 3.1 G/DL (ref 3.4–4.8)
ALBUMIN/GLOB SERPL: 0.8 RATIO (ref 1.1–2)
ALP SERPL-CCNC: 91 UNIT/L (ref 40–150)
ALT SERPL-CCNC: 26 UNIT/L (ref 0–55)
ANION GAP SERPL CALC-SCNC: 10 MEQ/L
APICAL FOUR CHAMBER EJECTION FRACTION: 57 %
APICAL TWO CHAMBER EJECTION FRACTION: 58 %
AST SERPL-CCNC: 31 UNIT/L (ref 11–45)
AV INDEX (PROSTH): 0.75
AV MEAN GRADIENT: 5 MMHG
AV PEAK GRADIENT: 10 MMHG
AV VALVE AREA BY VELOCITY RATIO: 2.1 CM²
AV VALVE AREA: 1.9 CM²
AV VELOCITY RATIO: 0.81
BASOPHILS # BLD AUTO: 0.06 X10(3)/MCL
BASOPHILS NFR BLD AUTO: 0.4 %
BILIRUB SERPL-MCNC: 1.3 MG/DL
BSA FOR ECHO PROCEDURE: 1.64 M2
BUN SERPL-MCNC: 19 MG/DL (ref 8.4–25.7)
CALCIUM SERPL-MCNC: 9.2 MG/DL (ref 8.8–10)
CHLORIDE SERPL-SCNC: 105 MMOL/L (ref 98–107)
CO2 SERPL-SCNC: 28 MMOL/L (ref 23–31)
CREAT SERPL-MCNC: 0.93 MG/DL (ref 0.72–1.25)
CREAT/UREA NIT SERPL: 20
CV ECHO LV RWT: 0.47 CM
DOP CALC AO PEAK VEL: 1.6 M/S
DOP CALC AO VTI: 20.9 CM
DOP CALC LVOT AREA: 2.5 CM2
DOP CALC LVOT DIAMETER: 1.8 CM
DOP CALC LVOT PEAK VEL: 1.3 M/S
DOP CALC LVOT STROKE VOLUME: 39.9 CM3
DOP CALC MV VTI: 15 CM
DOP CALCLVOT PEAK VEL VTI: 15.7 CM
E WAVE DECELERATION TIME: 129 MSEC
E/A RATIO: 0.62
E/E' RATIO: 12 M/S
ECHO LV POSTERIOR WALL: 0.8 CM (ref 0.6–1.1)
EOSINOPHIL # BLD AUTO: 0.01 X10(3)/MCL (ref 0–0.9)
EOSINOPHIL NFR BLD AUTO: 0.1 %
ERYTHROCYTE [DISTWIDTH] IN BLOOD BY AUTOMATED COUNT: 11.9 % (ref 11.5–17)
FRACTIONAL SHORTENING: 32.4 % (ref 28–44)
GFR SERPLBLD CREATININE-BSD FMLA CKD-EPI: >60 ML/MIN/1.73/M2
GLOBULIN SER-MCNC: 4.1 GM/DL (ref 2.4–3.5)
GLUCOSE SERPL-MCNC: 165 MG/DL (ref 82–115)
HCT VFR BLD AUTO: 42.6 % (ref 42–52)
HGB BLD-MCNC: 14.3 G/DL (ref 14–18)
HR MV ECHO: 118 BPM
IMM GRANULOCYTES # BLD AUTO: 0.08 X10(3)/MCL (ref 0–0.04)
IMM GRANULOCYTES NFR BLD AUTO: 0.6 %
INTERVENTRICULAR SEPTUM: 1.1 CM (ref 0.6–1.1)
LEFT ATRIUM AREA SYSTOLIC (APICAL 4 CHAMBER): 6.42 CM2
LEFT ATRIUM SIZE: 2.6 CM
LEFT INTERNAL DIMENSION IN SYSTOLE: 2.3 CM (ref 2.1–4)
LEFT VENTRICLE DIASTOLIC VOLUME INDEX: 27.71 ML/M2
LEFT VENTRICLE DIASTOLIC VOLUME: 46 ML
LEFT VENTRICLE END DIASTOLIC VOLUME APICAL 2 CHAMBER: 33 ML
LEFT VENTRICLE END DIASTOLIC VOLUME APICAL 4 CHAMBER: 78.1 ML
LEFT VENTRICLE END SYSTOLIC VOLUME APICAL 4 CHAMBER: 11.6 ML
LEFT VENTRICLE MASS INDEX: 55.3 G/M2
LEFT VENTRICLE SYSTOLIC VOLUME INDEX: 11.4 ML/M2
LEFT VENTRICLE SYSTOLIC VOLUME: 19 ML
LEFT VENTRICULAR INTERNAL DIMENSION IN DIASTOLE: 3.4 CM (ref 3.5–6)
LEFT VENTRICULAR MASS: 91.8 G
LV LATERAL E/E' RATIO: 11 M/S
LV SEPTAL E/E' RATIO: 12.8 M/S
LVED V (TEICH): 46.4 ML
LVES V (TEICH): 18.9 ML
LVOT MG: 4 MMHG
LVOT MV: 0.84 CM/S
LYMPHOCYTES # BLD AUTO: 1.12 X10(3)/MCL (ref 0.6–4.6)
LYMPHOCYTES NFR BLD AUTO: 8 %
MCH RBC QN AUTO: 30.8 PG (ref 27–31)
MCHC RBC AUTO-ENTMCNC: 33.6 G/DL (ref 33–36)
MCV RBC AUTO: 91.6 FL (ref 80–94)
MONOCYTES # BLD AUTO: 1.55 X10(3)/MCL (ref 0.1–1.3)
MONOCYTES NFR BLD AUTO: 11 %
MV MEAN GRADIENT: 3 MMHG
MV PEAK A VEL: 1.24 M/S
MV PEAK E VEL: 0.77 M/S
MV PEAK GRADIENT: 6 MMHG
MV STENOSIS PRESSURE HALF TIME: 56 MS
MV VALVE AREA BY CONTINUITY EQUATION: 2.66 CM2
MV VALVE AREA P 1/2 METHOD: 3.93 CM2
NEUTROPHILS # BLD AUTO: 11.21 X10(3)/MCL (ref 2.1–9.2)
NEUTROPHILS NFR BLD AUTO: 79.9 %
NRBC BLD AUTO-RTO: 0 %
OHS QRS DURATION: 114 MS
OHS QTC CALCULATION: 462 MS
PISA TR MAX VEL: 1.9 M/S
PLATELET # BLD AUTO: 177 X10(3)/MCL (ref 130–400)
PMV BLD AUTO: 10 FL (ref 7.4–10.4)
POCT GLUCOSE: 151 MG/DL (ref 70–110)
POCT GLUCOSE: 155 MG/DL (ref 70–110)
POCT GLUCOSE: 238 MG/DL (ref 70–110)
POCT GLUCOSE: 242 MG/DL (ref 70–110)
POTASSIUM SERPL-SCNC: 3.7 MMOL/L (ref 3.5–5.1)
PROT SERPL-MCNC: 7.2 GM/DL (ref 5.8–7.6)
PV PEAK GRADIENT: 10 MMHG
PV PEAK VELOCITY: 1.57 M/S
RA PRESSURE ESTIMATED: 3 MMHG
RBC # BLD AUTO: 4.65 X10(6)/MCL (ref 4.7–6.1)
RV TB RVSP: 5 MMHG
SODIUM SERPL-SCNC: 143 MMOL/L (ref 136–145)
TDI LATERAL: 0.07 M/S
TDI SEPTAL: 0.06 M/S
TDI: 0.07 M/S
TR MAX PG: 15 MMHG
TRICUSPID ANNULAR PLANE SYSTOLIC EXCURSION: 2.2 CM
TV REST PULMONARY ARTERY PRESSURE: 17 MMHG
WBC # BLD AUTO: 14.03 X10(3)/MCL (ref 4.5–11.5)
Z-SCORE OF LEFT VENTRICULAR DIMENSION IN END DIASTOLE: -3.13
Z-SCORE OF LEFT VENTRICULAR DIMENSION IN END SYSTOLE: -1.78

## 2025-06-13 PROCEDURE — 27000221 HC OXYGEN, UP TO 24 HOURS

## 2025-06-13 PROCEDURE — 25000242 PHARM REV CODE 250 ALT 637 W/ HCPCS: Performed by: NURSE PRACTITIONER

## 2025-06-13 PROCEDURE — 21400001 HC TELEMETRY ROOM

## 2025-06-13 PROCEDURE — 25000003 PHARM REV CODE 250: Performed by: INTERNAL MEDICINE

## 2025-06-13 PROCEDURE — 94761 N-INVAS EAR/PLS OXIMETRY MLT: CPT

## 2025-06-13 PROCEDURE — 63600175 PHARM REV CODE 636 W HCPCS: Performed by: INTERNAL MEDICINE

## 2025-06-13 PROCEDURE — 97167 OT EVAL HIGH COMPLEX 60 MIN: CPT

## 2025-06-13 PROCEDURE — 36415 COLL VENOUS BLD VENIPUNCTURE: CPT | Performed by: CLINICAL NURSE SPECIALIST

## 2025-06-13 PROCEDURE — 94760 N-INVAS EAR/PLS OXIMETRY 1: CPT

## 2025-06-13 PROCEDURE — 99900031 HC PATIENT EDUCATION (STAT)

## 2025-06-13 PROCEDURE — 94640 AIRWAY INHALATION TREATMENT: CPT

## 2025-06-13 PROCEDURE — 97163 PT EVAL HIGH COMPLEX 45 MIN: CPT

## 2025-06-13 PROCEDURE — 99900035 HC TECH TIME PER 15 MIN (STAT)

## 2025-06-13 PROCEDURE — 63600175 PHARM REV CODE 636 W HCPCS

## 2025-06-13 PROCEDURE — 51798 US URINE CAPACITY MEASURE: CPT

## 2025-06-13 PROCEDURE — 80053 COMPREHEN METABOLIC PANEL: CPT | Performed by: CLINICAL NURSE SPECIALIST

## 2025-06-13 PROCEDURE — 25000003 PHARM REV CODE 250: Performed by: NURSE PRACTITIONER

## 2025-06-13 PROCEDURE — 85025 COMPLETE CBC W/AUTO DIFF WBC: CPT | Performed by: CLINICAL NURSE SPECIALIST

## 2025-06-13 RX ORDER — ACETAMINOPHEN 325 MG/1
650 TABLET ORAL EVERY 6 HOURS PRN
Status: DISCONTINUED | OUTPATIENT
Start: 2025-06-13 | End: 2025-06-17

## 2025-06-13 RX ADMIN — INSULIN ASPART 1 UNITS: 100 INJECTION, SOLUTION INTRAVENOUS; SUBCUTANEOUS at 10:06

## 2025-06-13 RX ADMIN — CARVEDILOL 12.5 MG: 12.5 TABLET, FILM COATED ORAL at 09:06

## 2025-06-13 RX ADMIN — LEVALBUTEROL HYDROCHLORIDE 0.63 MG: 0.63 SOLUTION RESPIRATORY (INHALATION) at 03:06

## 2025-06-13 RX ADMIN — INSULIN ASPART 2 UNITS: 100 INJECTION, SOLUTION INTRAVENOUS; SUBCUTANEOUS at 06:06

## 2025-06-13 RX ADMIN — OLANZAPINE 10 MG: 5 TABLET, FILM COATED ORAL at 04:06

## 2025-06-13 RX ADMIN — LABETALOL HYDROCHLORIDE 10 MG: 5 INJECTION, SOLUTION INTRAVENOUS at 03:06

## 2025-06-13 RX ADMIN — MUPIROCIN: 20 OINTMENT TOPICAL at 09:06

## 2025-06-13 RX ADMIN — CARVEDILOL 12.5 MG: 12.5 TABLET, FILM COATED ORAL at 10:06

## 2025-06-13 RX ADMIN — ACETAMINOPHEN 650 MG: 325 TABLET ORAL at 12:06

## 2025-06-13 RX ADMIN — LOSARTAN POTASSIUM 50 MG: 50 TABLET, FILM COATED ORAL at 09:06

## 2025-06-13 RX ADMIN — MUPIROCIN: 20 OINTMENT TOPICAL at 10:06

## 2025-06-13 RX ADMIN — LEVETIRACETAM INJECTION 1000 MG: 10 INJECTION INTRAVENOUS at 09:06

## 2025-06-13 RX ADMIN — LABETALOL HYDROCHLORIDE 10 MG: 5 INJECTION, SOLUTION INTRAVENOUS at 05:06

## 2025-06-13 RX ADMIN — QUETIAPINE FUMARATE 100 MG: 100 TABLET ORAL at 09:06

## 2025-06-13 RX ADMIN — INSULIN GLARGINE 10 UNITS: 100 INJECTION, SOLUTION SUBCUTANEOUS at 10:06

## 2025-06-13 RX ADMIN — QUETIAPINE FUMARATE 100 MG: 100 TABLET ORAL at 10:06

## 2025-06-13 RX ADMIN — ACETAMINOPHEN 650 MG: 325 TABLET ORAL at 10:06

## 2025-06-13 RX ADMIN — PIPERACILLIN SODIUM AND TAZOBACTAM SODIUM 4.5 G: 4; .5 INJECTION, POWDER, LYOPHILIZED, FOR SOLUTION INTRAVENOUS at 02:06

## 2025-06-13 RX ADMIN — LEVALBUTEROL HYDROCHLORIDE 0.63 MG: 0.63 SOLUTION RESPIRATORY (INHALATION) at 09:06

## 2025-06-13 RX ADMIN — LEVETIRACETAM INJECTION 1000 MG: 10 INJECTION INTRAVENOUS at 10:06

## 2025-06-13 RX ADMIN — LEVETIRACETAM INJECTION 1000 MG: 10 INJECTION INTRAVENOUS at 12:06

## 2025-06-13 RX ADMIN — PIPERACILLIN SODIUM AND TAZOBACTAM SODIUM 4.5 G: 4; .5 INJECTION, POWDER, LYOPHILIZED, FOR SOLUTION INTRAVENOUS at 10:06

## 2025-06-13 NOTE — PT/OT/SLP PROGRESS
[facility] Speech Language Pathology Department    Patient Name:  Federico Villarreal   MRN:  [id number]    POC discussed with nursing who reports pt remains lethargic.  Attempts at PO intake unsafe.  GI has been consulted for PEG placement.  Will continue to follow and tx as appropriate.

## 2025-06-13 NOTE — PT/OT/SLP EVAL
"Occupational Therapy  Evaluation    Name: Federico Villarreal  MRN: [id number]  Recent Surgery: * No surgery found *      Recommendations:     Discharge therapy intensity: Moderate Intensity Therapy   Discharge Equipment Recommendations:   (tbd)  Barriers to discharge:       Assessment:     Federico Villarreal is a 64 y.o. male with a medical diagnosis of L occipital hemorrhagic CVA, presented with HA, confusion, slurred speech. Pt was previously independent at baseline with L eye blindness and [place] IRA. At eval, pt is able to initiate some simple purposeful and functional commands/activity with BUe's with max cues/repetition but inconsistent, does appear to attend some to R and L with increased reps and cues, will con't to assess this as well as vision issues with improving cognition and alertness. Pt is able to sit EOB with brief SBA to mod assist. At this time, recommend mod intensity therapy, and will con't to update as warranted.  He presents with the following performance deficits affecting function: weakness, visual deficits, impaired cognition, decreased safety awareness, impaired balance, impaired functional mobility, impaired self care skills.     Rehab Prognosis: good; patient would benefit from acute skilled OT services to address these deficits and reach maximum level of function.       Plan:     Patient to be seen 6 x/week to address the above listed problems via self-care/home management, therapeutic activities, therapeutic exercises, neuromuscular re-education, cognitive retraining  Plan of Care Expires: 07/11/25  Plan of Care Reviewed with: patient, spouse, family    Subjective     Chief Complaint: none stated  Patient/Family Comments/goals: "he's trying to do some purposeful things"    Occupational Profile:  Living Environment: lives with wife in  home with 6 steps  Previous level of function: independent  Roles and Routines: , family member, works out  Equipment Used at Home: none  Assistance upon " "Discharge: has good family support but wife works full time    Pain/Comfort:  Pain Rating 1:  (none indicated)    Patients cultural, spiritual, Hoahaoism conflicts given the current situation:      Objective:     OT communicated with nsg and PT prior to session.      Patient was found supine with restraints, SCD upon OT entry to room.    General Precautions: Standard, fall, vision impaired (L eye blind; <140/90)  Orthopedic Precautions:    Braces:      Vital Signs: Blood Pressure: 137/63      Functional Mobility/Transfers:    Functional Mobility: sup to sit with total assist    Activities of Daily Living:  Total assist BADL's, donning socks    [facility] 6 Click ADL:  [facility] Total Score:      Functional Cognition:  Attempts to attend to R and L with max verbal and tactile cues- will con't to assist  Attempting to follow some simple command with max cues and reps <50%  When told that therapy would be back to see him pt stated "ok"  Able to minimally demo initiation of some simple LE AROM with max cues     Visual Perceptual Skills:  Appears to attend at least some to R and L with tactile and verbal cues- will con't to assess    Upper Extremity Function:  Right/Left Upper Extremity:   Can initiate movement, u/a to follow commands for full assessment but apparently has had strength to warrant mittens and restraints          Balance:   Brief SBA sitting EOB, to max assist sitting    Co-Treatment: Yes, due to High complexity requires 2 sets of skilled hands    Patient Education:  Patient, spouse were, and family were provided with verbal education and demonstrations education regarding OT role/goals/POC, Discharge/DME recommendations, and cognitive stim activities.  Understanding was verbalized.     Patient left HOB elevated with all lines intact, call button in reach, restraints reapplied at end of session, and family present.    GOALS:   Multidisciplinary Problems       Occupational Therapy Goals          Problem: " Occupational Therapy    Goal Priority Disciplines Outcome Interventions   Occupational Therapy Goal     OT, PT/OT Progressing    Description: Goals to be met by: 7/11/25     Patient will increase functional independence with ADLs by performing:    LTG: Pt will perform basic ADLs and ADL transfers with SPV using LRAD by discharge.    STG: to be met by 7/11/25  Pt will follow commands at least 75% consistently throughout session  Pt will complete grooming seated, progressing to standing as appropriate with LRAD with min A.  Pt will complete UB dressing with min A.  Pt will complete LB dressing with min A using LRAD.  Pt will complete toileting with min A using LRAD.  Pt will complete functional mobility to/from toilet and toilet transfer with min A using LRAD.   Pt will demo visual attention to R side >80% of time with min verbal cues.  Pt will demo 4/5 strength in  BUE  for increased functional use during ADL tasks.                          History:     Past Medical History:   Diagnosis Date    Acne     Cataract     Coronary artery disease     Diabetes mellitus     Hearing loss     Hypertension     Seasonal allergies     Stroke          Past Surgical History:   Procedure Laterality Date    ADENOIDECTOMY      ADENOIDECTOMY  1972    As a child    CORONARY STENT PLACEMENT  08/20/2012    EYE SURGERY  2011    Multiple    INNER EAR SURGERY      REPAIR OF RETINAL DETACHMENT WITH VITRECTOMY      TONSILLECTOMY      VITRECTOMY         Time Tracking:     OT Date of Treatment: 06/13/25  OT Start Time: 1400  OT Stop Time: 1437  OT Total Time (min): 37 min    Billable Minutes:Evaluation high complexity    6/13/2025

## 2025-06-13 NOTE — PLAN OF CARE
Problem: Physical Therapy  Goal: Physical Therapy Goal  Description: Goals to be met by: 25     Patient will increase functional independence with mobility by performin. Supine to sit with MInimal Assistance  2. Sit to stand transfer with Minimal Assistance  3. Bed to chair transfer with Minimal Assistance using Rolling Walker  4. Pt will follow 5/5 motor commands for BLE.  5. Gait TBD as pt progresses    Outcome: Progressing

## 2025-06-13 NOTE — PT/OT/SLP EVAL
Physical Therapy Evaluation    Patient Name:  Federico Villarreal   MRN:  [id number]    Recommendations:     Discharge therapy intensity: Moderate Intensity Therapy   Discharge Equipment Recommendations: to be determined by next level of care   Barriers to discharge: Decreased caregiver support, Impaired mobility, and Ongoing medical needs    Assessment:     Federico Villarreal is a 64 y.o. male admitted with a medical diagnosis of L occipital hemorrhagic CVA, presented with HA, confusion, slurred speech. Hx of L eye blindness. He presents with the following impairments/functional limitations: weakness, impaired endurance, impaired self care skills, impaired functional mobility, impaired balance, impaired cognition, decreased upper extremity function, decreased lower extremity function, decreased safety awareness . Pt lethargic with difficulty maintaining arousal despite max stimulation. Nsg staff reporting occasional agitation however during eval this PM pt calm and drowsy. Required max-totalA for mobility to EOB with poor postural control and impaired command following. Per pt's wife, pt was previously independent and is home alone during the day while she is at work. Recommend mod intensity therapy at this time.    Rehab Prognosis: Good; patient would benefit from acute skilled PT services to address these deficits and reach maximum level of function.    Recent Surgery: * No surgery found *      Plan:     During this hospitalization, patient would benefit from acute PT services 6 x/week to address the identified rehab impairments via gait training, therapeutic activities, therapeutic exercises, neuromuscular re-education and progress toward the following goals:    Plan of Care Expires:  07/13/25    Subjective     Chief Complaint: unable to state  Patient/Family Comments/goals: PLOF  Pain/Comfort:  Pain Rating 1: 0/10    Patients cultural, spiritual, Anglican conflicts given the current situation: no    Living  "Environment:  Pt lives with his wife in a [facility] with 6 ROMELIA with L handrail.   Prior to admission, patients level of function was independent.  Equipment used at home: none.  DME owned (not currently used): none.  Upon discharge, patient will have assistance from spouse.    Objective:     Communicated with RN prior to session.  Patient found HOB elevated with restraints, SCD, telemetry, pulse ox (continuous), PureWick  upon PT entry to room.    General Precautions: Standard, fall, vision impaired (BP<140/90)  Orthopedic Precautions:N/A   Braces: N/A  Respiratory Status: oxymask 4L  Blood Pressure: 137/63      Exams:  Cognition: opens eyes briefly to max verbal/tactile stim. Turns head appropriately to attend to therapist in either visual field though inconsistent. Responds "okay" appropriately x1 occasion.  Sensation:    -       Intact  withdraws to painful stim BLE  RLE ROM: normal LE tone  RLE Strength: ROYAL 2/2 cognition  LLE ROM: normal LE tone  LLE Strength: ROYAL 2/2 cognition  Skin integrity: Visible skin intact      Functional Mobility:  Bed Mobility:     Supine to Sit: total assistance and of 2 persons  Sit to Supine: total assistance and of 2 persons  Balance: mod-maxA 2/2 posterior and L lateral lean, no righting reactions, no protective balance reactions      AM-PAC 6 CLICK MOBILITY  Total Score:8     Co-Treatment: Yes, due to High complexity requires 2 sets of skilled hands    Treatment & Education:  With max tactile/verbal cues, pt able to follow commands to perform 2 LAQ through limited range on BLE after therapist performed several reps with AAROM. Educated family on techniques to prevent hospital-acquired delirium and improve arousal/orientation.    Patient, spouse were, and family were provided with verbal education education regarding PT role/goals/POC, fall prevention, safety awareness, discharge/DME recommendations, and pressure ulcer prevention.  Additional teaching is warranted.     Patient left " HOB elevated with all lines intact, call button in reach, restraints reapplied at end of session, RN notified, and family present.    GOALS:   Multidisciplinary Problems       Physical Therapy Goals          Problem: Physical Therapy    Goal Priority Disciplines Outcome Interventions   Physical Therapy Goal     PT, PT/OT Progressing    Description: Goals to be met by: 25     Patient will increase functional independence with mobility by performin. Supine to sit with MInimal Assistance  2. Sit to stand transfer with Minimal Assistance  3. Bed to chair transfer with Minimal Assistance using Rolling Walker  4. Pt will follow 5/5 motor commands for BLE.  5. Gait TBD as pt progresses                         History:     Past Medical History:   Diagnosis Date    Acne     Cataract     Coronary artery disease     Diabetes mellitus     Hearing loss     Hypertension     Seasonal allergies     Stroke        Past Surgical History:   Procedure Laterality Date    ADENOIDECTOMY      ADENOIDECTOMY  1972    As a child    CORONARY STENT PLACEMENT  2012    EYE SURGERY      Multiple    INNER EAR SURGERY      REPAIR OF RETINAL DETACHMENT WITH VITRECTOMY      TONSILLECTOMY      VITRECTOMY         Time Tracking:     PT Received On: 25  PT Start Time: 1413     PT Stop Time: 1437  PT Total Time (min): 24 min     Billable Minutes: Evaluation high complexity      2025

## 2025-06-13 NOTE — PROGRESS NOTES
Inpatient Nutrition Assessment    Admit Date: 6/9/2025   Total duration of encounter: 4 days   Patient Age: 64 y.o.    Nutrition Recommendation/Prescription     Diabetisource AC goal rate 75 ml/hr to provide:  1800 kcal 100% needs  90 g protein 100% needs  162 g CHO 92% needs  1230 ml water 77% needs, recommend 45 ml water flush every 2 hours to meet estimated fluid requirements    Communication of Recommendations: reviewed with nurse    Nutrition Assessment     Malnutrition Assessment/Nutrition-Focused Physical Exam       Malnutrition Level: other (see comments) (Does not meet criteria) (06/11/25 [facility])  Energy Intake (Malnutrition): other (see comments) (Unable to assess) (06/11/25 [facility])  Weight Loss (Malnutrition): other (see comments) (Unable to assess) (06/11/25 1352)              Muscle Mass (Malnutrition): mild depletion (06/11/25 1352)  [place] (Muscle Loss): mild depletion                                A minimum of two characteristics is recommended for diagnosis of either severe or non-severe malnutrition.    Chart Review    Reason Seen: physician consult for nurse requesting tube feeding and follow-up    Malnutrition Screening Tool Results   Have you recently lost weight without trying?: No  Have you been eating poorly because of a decreased appetite?: No   MST Score: 0   Diagnosis: left occipital hemorrhagic stroke     Relevant Medical History: T1DM, coronary artery disease status post stenting, hypertension, hyperlipidemia, history of ischemic stroke with previous left-sided weakness    Scheduled Medications:  carvediloL, 12.5 mg, BID  insulin glargine U-100, 10 Units, BID  levETIRAcetam (Keppra) IV (PEDS and ADULTS), 1,000 mg, Q12H  LIDOcaine HCl 2%, , Once  losartan, 50 mg, Daily  mupirocin, , BID  OLANZapine, 10 mg, Daily  piperacillin-tazobactam (Zosyn) IV (PEDS and ADULTS) (extended infusion is not appropriate), 4.5 g, Q8H  QUEtiapine, 100 mg, BID  scopolamine, 1 patch, Once  sodium chloride  0.9%, 500 mL, Once    Continuous Infusions:     PRN Medications:   acetaminophen, 650 mg, Q6H PRN  bisacodyL, 10 mg, Daily PRN  butalbital-acetaminophen-caffeine -40 mg, 1 tablet, Q4H PRN  dextrose 50%, 12.5 g, PRN  glucagon (human recombinant), 1 mg, PRN  haloperidol lactate, 5 mg, Q6H PRN  insulin aspart U-100, 0-5 Units, Q6H PRN  labetalol, 10 mg, Q15 Min PRN  levalbuterol, 0.63 mg, Q4H PRN  lorazepam, 1 mg, Q4H PRN  ondansetron, 4 mg, Q8H PRN  sodium chloride 0.9%, 10 mL, PRN    Calorie Containing IV Medications: no significant kcals from medications at this time    Recent Labs   Lab 06/09/25  0820 06/09/25  1100 06/10/25  0443 06/11/25  0316 06/13/25  0353     --  141 142 143   K 4.9  --  4.7 4.0 3.7   CALCIUM 9.3  --  8.4* 9.0 9.2   PHOS  --   --  3.6  --   --    MG 1.90  --  2.10  --   --      --  105 105 105   CO2 27  --  24 26 28   BUN 12.4  --  21.3 20.5 19.0   CREATININE 1.02  --  1.08 0.87 0.93   EGFRNORACEVR >60  --  >60 >60 >60   *  --  258* 147* 165*   BILITOT 1.1  --  1.4 1.4 1.3   ALKPHOS 102  --  91 87 91   ALT 30  --  22 22 26   AST 31  --  20 35 31   ALBUMIN 3.8  --  3.5 3.5 3.1*   TRIG  --  97  --   --   --    HGBA1C  --  7.1*  --   --   --    WBC 11.14  --  15.44* 13.80* 14.03*   HGB 14.0  --  13.1* 13.5* 14.3   HCT 41.7*  --  39.1* 41.1* 42.6     Nutrition Orders:  Diet NPO  Tube Feedings/Formulas 75; 1,500; Diabetisource AC; NG; 45; Every 2 hours    Appetite/Oral Intake: NPO/not applicable  Factors Affecting Nutritional Intake: impaired cognitive status/motor control and NPO  Social Needs Impacting Access to Food: unable to assess at this time; will attempt on follow-up  Food/Holiness/Cultural Preferences: unable to obtain  Food Allergies: none reported  Last Bowel Movement: 06/08/25  Wound(s): no pressure injuries documented at this time     Comments    6/11/25 Patient confused with episodes of agitation, sleeping during rounds. Nurse reports plans to start  "nasogastric tube feeding, orders provided.    6/13/25 PO intake remains unsafe per SLP. GI consulted for PEG placement possibly on Monday. RN reports patient tolerating tube feeding at 15 mL/hr. Last BM noted on 6/8/25, consider bowel regimen.      Anthropometrics    Height: 5' 2" (157.5 cm), Height Method: Stated  Last Weight: 65 kg (143 lb 4.8 oz) (06/11/25 1348), Weight Method: Bed Scale  BMI (Calculated): 26.2  BMI Classification: overweight (BMI 25-29.9)        Ideal Body Weight (IBW), Male: 118 lb     % Ideal Body Weight, Male (lb): 114.41 %                          Usual Weight Provided By: unable to obtain usual weight    Wt Readings from Last 5 Encounters:   06/11/25 65 kg (143 lb 4.8 oz)   01/27/25 63 kg (139 lb)   12/03/24 64.7 kg (142 lb 9.6 oz)   11/05/24 62 kg (136 lb 11 oz)   10/02/24 62 kg (136 lb 9.6 oz)     Weight Change(s) Since Admission:   6/11/25 initial weight 61.2 kg stated, bed weight 65 kg taken during rounds  Wt Readings from Last 1 Encounters:   06/11/25 1348 65 kg (143 lb 4.8 oz)   06/09/25 0805 61.2 kg (135 lb)   Admit Weight: 61.2 kg (135 lb) (06/09/25 0805), Weight Method: Stated    Estimated Needs    Weight Used For Calorie Calculations: 65 kg (143 lb 4.8 oz)  Energy Calorie Requirements (kcal): 0612-7104, 1.2-1.4 stress factor  Energy Need Method: [facility]  Weight Used For Protein Calculations: 65 kg (143 lb 4.8 oz)  Protein Requirements: 78-91 g, 1.2-1.4 g/kg  Fluid Requirements (mL): 7315-2488, 1 ml/kcal  CHO Requirement: 177-207 g, 45% of kcal     Enteral Nutrition Formula: Diabetisource AC  Rate/Volume: 15 mL/hr  Water Flushes: 45 mL every 2hrs  Additives/Modulars: none at this time  Route: nasogastric tube  Method: continuous  Total Nutrition Provided by Tube Feeding, Additives, and Flushes:  Calories Provided  360 kcal/d, 23% needs   Protein Provided  18 g/d, 23% needs   Fluid Provided  696 ml/d, 44% needs   Continuous feeding calculations based on estimated 20 hr/d run " time unless otherwise stated.    Parenteral Nutrition Patient not receiving parenteral nutrition support at this time.    Evaluation of Received Nutrient Intake    Calories: not meeting estimated needs  Protein: not meeting estimated needs    Patient Education Not applicable.    Nutrition Diagnosis     PES: Inadequate energy intake related to inability to consume sufficient nutrients as evidenced by less than 80% needs met. (active)  PES: N/A             Nutrition Interventions     Interventions: modified composition of enteral nutrition, modified rate of enteral nutrition, and collaboration with other providers       Goal: Meet greater than 80% of nutritional needs by follow-up. (goal progressing)  Goal: Tolerate enteral feeding at goal rate by follow-up. (goal progressing)    Nutrition Goals & Monitoring     Dietitian will monitor: food and beverage intake, energy intake, enteral nutrition intake, weight, weight change, electrolyte/renal panel, beliefs/attitudes, glucose/endocrine profile, and gastrointestinal profile  Discharge planning: too early to determine; pending clinical course  Nutrition Risk/Follow-Up: patient at increased nutrition risk; dietitian will follow-up twice weekly   Please consult if re-assessment needed sooner.

## 2025-06-13 NOTE — PLAN OF CARE
Problem: Adult Inpatient Plan of Care  Goal: Plan of Care Review  6/13/2025 0246 by Vipul Caal RN  Outcome: Progressing  6/13/2025 0246 by Vipul Caal RN  Outcome: Progressing  Goal: Patient-Specific Goal (Individualized)  6/13/2025 0246 by Vipul Caal RN  Outcome: Progressing  6/13/2025 0246 by Vipul Caal RN  Outcome: Progressing  Goal: Absence of Hospital-Acquired Illness or Injury  6/13/2025 0246 by Vipul Caal RN  Outcome: Progressing  6/13/2025 0246 by Vipul Caal RN  Outcome: Progressing  Goal: Optimal Comfort and Wellbeing  6/13/2025 0246 by Vipul Caal RN  Outcome: Progressing  6/13/2025 0246 by Vipul Caal RN  Outcome: Progressing  Goal: Readiness for Transition of Care  6/13/2025 0246 by Vipul Caal RN  Outcome: Progressing  6/13/2025 0246 by Vipul Caal RN  Outcome: Progressing     Problem: Diabetes Comorbidity  Goal: Blood Glucose Level Within Targeted Range  6/13/2025 0246 by Vipul Caal RN  Outcome: Progressing  6/13/2025 0246 by Vipul Caal RN  Outcome: Progressing     Problem: Stroke, Intracerebral Hemorrhage  Goal: Optimal Coping  6/13/2025 0246 by Vipul Caal RN  Outcome: Progressing  6/13/2025 0246 by Vipul Caal RN  Outcome: Progressing  Goal: Effective Bowel Elimination  6/13/2025 0246 by Vipul Caal RN  Outcome: Progressing  6/13/2025 0246 by Vipul Caal RN  Outcome: Progressing  Goal: Optimal Cerebral Tissue Perfusion  6/13/2025 0246 by Vipul Caal RN  Outcome: Progressing  6/13/2025 0246 by Vipul Caal RN  Outcome: Progressing  Goal: Optimal Cognitive Function  6/13/2025 0246 by Vipul Caal RN  Outcome: Progressing  6/13/2025 0246 by Vipul Caal RN  Outcome: Progressing  Goal: Effective Communication Skills  6/13/2025 0246 by Vipul Caal RN  Outcome: Progressing  6/13/2025 0246 by Vipul Caal RN  Outcome: Progressing  Goal: Optimal Functional Ability  6/13/2025 0246 by Ten,  HILTON Matthew  Outcome: Progressing  6/13/2025 0246 by Vipul Caal RN  Outcome: Progressing  Goal: Optimal Nutrition Intake  6/13/2025 0246 by Vipul Caal RN  Outcome: Progressing  6/13/2025 0246 by Vipul Caal RN  Outcome: Progressing  Goal: Optimal Pain Control and Function  6/13/2025 0246 by Vipul Caal RN  Outcome: Progressing  6/13/2025 0246 by Vipul Caal RN  Outcome: Progressing  Goal: Effective Oxygenation and Ventilation  6/13/2025 0246 by Vipul Caal RN  Outcome: Progressing  6/13/2025 0246 by Vipul Caal RN  Outcome: Progressing  Goal: Improved Sensorimotor Function  6/13/2025 0246 by Vipul Caal RN  Outcome: Progressing  6/13/2025 0246 by Vipul Caal RN  Outcome: Progressing  Goal: Safe and Effective Swallow  6/13/2025 0246 by Vipul Caal RN  Outcome: Progressing  6/13/2025 0246 by Vipul Caal RN  Outcome: Progressing  Goal: Effective Urinary Elimination  6/13/2025 0246 by Vipul Caal RN  Outcome: Progressing  6/13/2025 0246 by Vipul Caal RN  Outcome: Progressing     Problem: Fall Injury Risk  Goal: Absence of Fall and Fall-Related Injury  6/13/2025 0246 by Vipul Caal RN  Outcome: Progressing  6/13/2025 0246 by Vipul Caal RN  Outcome: Progressing     Problem: Infection  Goal: Absence of Infection Signs and Symptoms  6/13/2025 0246 by Vipul Caal RN  Outcome: Progressing  6/13/2025 0246 by Vipul Caal RN  Outcome: Progressing     Problem: Coping Ineffective  Goal: Effective Coping  6/13/2025 0246 by Vipul Caal RN  Outcome: Progressing  6/13/2025 0246 by Vipul Caal RN  Outcome: Progressing

## 2025-06-13 NOTE — PLAN OF CARE
Problem: Adult Inpatient Plan of Care  Goal: Plan of Care Review  Outcome: Not Progressing  Goal: Patient-Specific Goal (Individualized)  Outcome: Not Progressing  Goal: Absence of Hospital-Acquired Illness or Injury  Outcome: Not Progressing  Goal: Optimal Comfort and Wellbeing  Outcome: Not Progressing  Goal: Readiness for Transition of Care  Outcome: Not Progressing     Problem: Diabetes Comorbidity  Goal: Blood Glucose Level Within Targeted Range  Outcome: Not Progressing     Problem: Stroke, Intracerebral Hemorrhage  Goal: Optimal Coping  Outcome: Not Progressing  Goal: Effective Bowel Elimination  Outcome: Not Progressing  Goal: Optimal Cerebral Tissue Perfusion  Outcome: Not Progressing  Goal: Optimal Cognitive Function  Outcome: Not Progressing  Goal: Effective Communication Skills  Outcome: Not Progressing  Goal: Optimal Functional Ability  Outcome: Not Progressing  Goal: Optimal Nutrition Intake  Outcome: Not Progressing  Goal: Optimal Pain Control and Function  Outcome: Not Progressing  Goal: Effective Oxygenation and Ventilation  Outcome: Not Progressing  Goal: Improved Sensorimotor Function  Outcome: Not Progressing  Goal: Safe and Effective Swallow  Outcome: Not Progressing  Goal: Effective Urinary Elimination  Outcome: Not Progressing     Problem: Skin Injury Risk Increased  Goal: Skin Health and Integrity  Outcome: Not Progressing     Problem: Fall Injury Risk  Goal: Absence of Fall and Fall-Related Injury  Outcome: Not Progressing     Problem: Infection  Goal: Absence of Infection Signs and Symptoms  Outcome: Not Progressing     Problem: Coping Ineffective  Goal: Effective Coping  Outcome: Not Progressing

## 2025-06-13 NOTE — PROGRESS NOTES
[facility] Hospital Medicine Progress Note        Chief Complaint: Inpatient Follow-up     HPI:   64-year-old male with type 1 diabetes mellitus, CAD status post stenting, hypertension, hyperlipidemia, prior CVA  presented on 06/09/2025 for evaluation of sudden onset headache, confusion, slurred speech.  Workup revealed  CT head revealed left occipital lobe parenchymal hemorrhage with trace adjacent subarachnoid hemorrhage.   CTA head/neck was also completed in ED, which revealed no large vessel occlusion, flow-limiting stenosis, aneurysm or evidence of a vascular malformation   MRI brain report:  1. Left occipital lobe hematoma with adjacent subarachnoid hemorrhage.  2. Innumerable chronic microhemorrhages with a subcortical location, may indicate underlying cerebral amyloid angiopathy.  3. Mild chronic microvascular ischemic changes     Neurology team evaluated the patient, initiated on stroke protocol, admitted to ICU services.  Neurosurgery team evaluated the patient, MRI likely suggestive of amyloid angiopathy, repeat CT head 6/10/25 unchanged, suggested holding antiplatelets for at least 2 weeks.  Patient is started on Keppra.  Neurosurgical team signed off.     Patient required Chao placement for retention, neurology team evaluated.     Patient cleared to downgrade to floors 06/12/2025 PM by ICU team.    Interval Hx:   No acute events reported overnight.  Patient was moved out of ICU.  Seen at bedside this morning.  Patient's spouse present during my interview.  Patient lying comfortably in the bed, reported less confused than yesterday, discussed plan of care, patient condition, prognosis with patient's spouse at bedside  Tmax 101, tachycardia  Labs showed leukocytosis 14 K  Case was discussed with patient's nurse     Objective/physical exam:  General: In no acute distress  Chest:  CTA anteriorly , comfortably breathing on 4 L  Heart: Tachycardia sinus  Neuro:  patient awake  alert confused , nonverbal , spontaneous movement in all extremities  : Chao  Abdomen:  Soft, Nontender    VITAL SIGNS: 24 HRS MIN & MAX LAST   Temp  Min: 99.3 °F (37.4 °C)  Max: 101 °F (38.3 °C) 99.8 °F (37.7 °C)   BP  Min: 113/86  Max: 166/79 (!) 142/60   Pulse  Min: 82  Max: 137  (!) 117   Resp  Min: 12  Max: 27 18   SpO2  Min: 92 %  Max: 99 % 96 %     I have reviewed the following labs:  Recent Labs   Lab 06/10/25  0443 06/11/25  0316 06/13/25  0353   WBC 15.44* 13.80* 14.03*   RBC 4.21* 4.35* 4.65*   HGB 13.1* 13.5* 14.3   HCT 39.1* 41.1* 42.6   MCV 92.9 94.5* 91.6   MCH 31.1* 31.0 30.8   MCHC 33.5 32.8* 33.6   RDW 12.3 11.9 11.9    150 177   MPV 9.7 9.8 10.0     Recent Labs   Lab 06/09/25  0820 06/10/25  0443 06/11/25  0316 06/13/25  0353    141 142 143   K 4.9 4.7 4.0 3.7    105 105 105   CO2 27 24 26 28   BUN 12.4 21.3 20.5 19.0   CREATININE 1.02 1.08 0.87 0.93   * 258* 147* 165*   CALCIUM 9.3 8.4* 9.0 9.2   MG 1.90 2.10  --   --    ALBUMIN 3.8 3.5 3.5 3.1*   PROT 7.3 6.9 7.2 7.2   ALKPHOS 102 91 87 91   ALT 30 22 22 26   AST 31 20 35 31   BILITOT 1.1 1.4 1.4 1.3     Microbiology Results (last 7 days)       Procedure Component Value Units Date/Time    Respiratory Culture [[id number]] Collected: 06/12/25 2143    Order Status: Completed Specimen: Sputum Updated: 06/13/25 0809     GRAM STAIN Quality 2+      Many Gram positive cocci      Few Gram Negative Rods    Blood Culture [[id number]] Collected: 06/12/25 1851    Order Status: Resulted Specimen: Blood from Arm, Left Updated: 06/12/25 1931    Blood Culture [[id number]] Collected: 06/12/25 1851    Order Status: Resulted Specimen: Blood from Arm, Right Updated: 06/12/25 1930             See below for Radiology    Assessment/Plan:  Acute Hemorrhagic CVA, left occipital lobe hematoma with adjacent subarachnoid hemorrhage  Possible cerebral amyloid angiopathy  Sirs- Fever,Sinus tachycardia?  Infectious versus noninfectious/central    Acute urinary retention status post Chao placement   Type 1 Diabetes Mellitus recent A1c 7.1%     Monitor on tele   Neurology, Neurosurgery team signed off   No antiplatelets for at least 2 weeks, continue Keppra per NSGY  Monitor BP, goal less than 140, continue Coreg, losartan, titrate , use IV p.r.n.  Given fever, tachycardia , sepsis workup initiated, blood cultures, respiratory cultures were sent  Start empiric IV Zosyn  Discontinue Chao, check UA post discontinuation, monitor bladder scans, Urology consultation patient needs a replacement of Chao  GI consulted for PEG placement, follow recommendations   Palliative care team following  Seroquel rod  Speech therapy evaluations, NG tube feeds,  Monitor blood sugars, cover with ISS, titrate long-acting insulin  Maintain aspiration precautions, delirium precautions     Code status: DNR    VTE prophylaxis:  SCDs    Patient condition:  Guarded    Anticipated discharge and Disposition:   TBD        Portions of this note dictated using EMR integrated voice recognition software, and may be subject to voice recognition errors not corrected at proofreading. Please contact writer for clarification if needed.   _____________________________________________________________________    Malnutrition Status:  Nutrition consulted. Most recent weight and BMI monitored-     Measurements:  Wt Readings from Last 1 Encounters:   06/11/25 65 kg (143 lb 4.8 oz)   Body mass index is 26.21 kg/m².    Patient has been screened and assessed by RD.    Malnutrition Type:  Context:    Level: other (see comments) (Does not meet criteria)    Malnutrition Characteristic Summary:  Weight Loss (Malnutrition): other (see comments) (Unable to assess)  Energy Intake (Malnutrition): other (see comments) (Unable to assess)  Muscle Mass (Malnutrition): mild depletion    Interventions/Recommendations (treatment strategy):        Scheduled Med:   carvediloL  12.5 mg Per NG tube BID    insulin glargine  U-100  10 Units Subcutaneous BID    levETIRAcetam (Keppra) IV (PEDS and ADULTS)  1,000 mg Intravenous Q12H    LIDOcaine HCl 2%   Urethral Once    losartan  50 mg Per NG tube Daily    mupirocin   Nasal BID    OLANZapine  10 mg Per NG tube Daily    QUEtiapine  100 mg Per NG tube BID    scopolamine  1 patch Transdermal Once    sodium chloride 0.9%  500 mL Intravenous Once      Continuous Infusions:     PRN Meds:    Current Facility-Administered Medications:     acetaminophen, 650 mg, Per NG tube, Q6H PRN    bisacodyL, 10 mg, Rectal, Daily PRN    butalbital-acetaminophen-caffeine -40 mg, 1 tablet, Per NG tube, Q4H PRN    dextrose 50%, 12.5 g, Intravenous, PRN    glucagon (human recombinant), 1 mg, Intramuscular, PRN    haloperidol lactate, 5 mg, Intravenous, Q6H PRN    insulin aspart U-100, 0-5 Units, Subcutaneous, Q6H PRN    labetalol, 10 mg, Intravenous, Q15 Min PRN    levalbuterol, 0.63 mg, Nebulization, Q4H PRN    lorazepam, 1 mg, Intravenous, Q4H PRN    ondansetron, 4 mg, Intravenous, Q8H PRN    sodium chloride 0.9%, 10 mL, Intravenous, PRN     Radiology:  I have personally reviewed the following imaging and agree with the radiologist.     X-Ray Chest 1 View  Narrative: EXAMINATION:  XR CHEST 1 VIEW    CPT 03920    CLINICAL HISTORY:  hypoxia;    COMPARISON:  June 12, 2025    FINDINGS:  Examination reveals cardiomediastinal silhouette and pleuroparenchymal changes to be essentially unchanged as compared with the previous exam  Impression: No significant change as compared with the previous exam    Electronically signed by: Spenser Solomon  Date:    06/13/2025  Time:    09:10  XR Gastric tube check, non-radiologist performed  Narrative: EXAMINATION:  XR GASTRIC TUBE CHECK, NON-RADIOLOGIST PERFORMED    CLINICAL HISTORY:  ng placement;    COMPARISON:  10 [place] 2025  Impression: Frontal image of the upper abdomen.  Enteric tube extends well into the stomach.    Electronically signed by: Wilbert  Eliana  Date:    06/13/2025  Time:    07:12      Simon Martinez MD  Department of Hospital Medicine   [facility]   06/13/2025

## 2025-06-13 NOTE — PLAN OF CARE
Problem: Occupational Therapy  Goal: Occupational Therapy Goal  Description: Goals to be met by: 7/11/25     Patient will increase functional independence with ADLs by performing:    LTG: Pt will perform basic ADLs and ADL transfers with SPV using LRAD by discharge.    STG: to be met by 7/11/25  Pt will follow commands at least 75% consistently throughout session  Pt will complete grooming seated, progressing to standing as appropriate with LRAD with min A.  Pt will complete UB dressing with min A.  Pt will complete LB dressing with min A using LRAD.  Pt will complete toileting with min A using LRAD.  Pt will complete functional mobility to/from toilet and toilet transfer with min A using LRAD.   Pt will demo visual attention to R side >80% of time with min verbal cues.  Pt will demo 4/5 strength in  BUE  for increased functional use during ADL tasks.     Outcome: Progressing

## 2025-06-13 NOTE — CONSULTS
Consult Note    Reason for Consult:      We were consulted to evaluate this patient for PEG .     HPI:     Patient is a 64 year old white male unknown to our group with a pmhx of CVA, CAD w/stents, DM, HTN    Patient presented to ER 6/9 with complaints of headache, confusion and slurred speech.  CT head with left occipital hemorrhagic stroke.  He was admitted to ICU for further care.  He was downgraded 6/12 to the floor.  Was tolerating Tfs in ICU.     GI consulted for PEG  102.1 F, wbc 14, tachycardic- Zosyn initiated 6/13.  Plt 177, INR 1.1, no thinners  No Previous history of gastric sleeve or gastric bypass  No previous abdominal imaging  Wife/family consents to PEG  Usually has been evaluated by speech and remains lethargic and unable to safely tolerate p.o.  Blood culture pending. CXR negative  Family confirms no thinners at home.    PCP:  Nicole Blanchard FNP    Review of patient's allergies indicates:  No Known Allergies     Current Medications[1]  Prescriptions Prior to Admission[2]    Past Medical History:  Past Medical History:   Diagnosis Date    Acne     Cataract     Coronary artery disease     Diabetes mellitus     Hearing loss     Hypertension     Seasonal allergies     Stroke       Past Surgical History:  Past Surgical History:   Procedure Laterality Date    ADENOIDECTOMY      ADENOIDECTOMY  1972    As a child    CORONARY STENT PLACEMENT  08/20/2012    EYE SURGERY  2011    Multiple    INNER EAR SURGERY      REPAIR OF RETINAL DETACHMENT WITH VITRECTOMY      TONSILLECTOMY      VITRECTOMY        Family History:  Family History   Problem Relation Name Age of Onset    Diabetes Mother Mamta Villarreal     Diabetes Father Mayito Villarreal     Dementia Father Mayito Villarreal      Social History:  Social History     Tobacco Use    Smoking status: Former     Current packs/day: 0.00     Average packs/day: 0.3 packs/day for 17.3 years (4.5 ttl pk-yrs)     Types: Cigarettes, Cigars     Start date: 1/1/2008     Quit  date:      Years since quittin.4    Smokeless tobacco: Never   Substance Use Topics    Alcohol use: Not Currently       Review of Systems:     Review of Systems   Unable to perform ROS: Patient nonverbal       Objective:     VITAL SIGNS: 24 HR MIN & MAX LAST    Temp  Min: 99.3 °F (37.4 °C)  Max: 101 °F (38.3 °C)  99.8 °F (37.7 °C)        BP  Min: 113/86  Max: 166/79  (!) 142/60     Pulse  Min: 100  Max: 137  (!) 117     Resp  Min: 13  Max: 27  18    SpO2  Min: 92 %  Max: 98 %  96 %        Intake/Output Summary (Last 24 hours) at 2025 1037  Last data filed at 2025 0923  Gross per 24 hour   Intake 154.05 ml   Output 1525 ml   Net -1370.95 ml       Physical Exam  Constitutional:       General: He is not in acute distress.     Appearance: He is ill-appearing. He is not toxic-appearing.      Comments: Lethargic, occasional movement of left leg   HENT:      Head: Normocephalic and atraumatic.      Mouth/Throat:      Mouth: Mucous membranes are dry.      Pharynx: Oropharynx is clear.   Cardiovascular:      Rate and Rhythm: Normal rate and regular rhythm.      Pulses: Normal pulses.      Heart sounds: Normal heart sounds.   Pulmonary:      Effort: Pulmonary effort is normal.      Breath sounds: Normal breath sounds.      Comments: 4L oxymask  Abdominal:      General: Bowel sounds are normal. There is no distension.      Palpations: Abdomen is soft. There is no mass.      Tenderness: There is no abdominal tenderness. There is no guarding.      Hernia: No hernia is present.      Comments: NGT clamped   Genitourinary:     Comments: Chao  Skin:     General: Skin is warm and dry.   Neurological:      Comments: Unable to assess   Psychiatric:      Comments: Unable to assess           Recent Results (from the past 48 hours)   POCT glucose    Collection Time: 25  2:25 PM   Result Value Ref Range    POCT Glucose 131 (H) 70 - 110 mg/dL   POCT glucose    Collection Time: 25  8:50 AM   Result Value Ref  Range    POCT Glucose 332 (H) 70 - 110 mg/dL   POCT glucose    Collection Time: 06/12/25  2:11 PM   Result Value Ref Range    POCT Glucose 271 (H) 70 - 110 mg/dL   EKG 12-lead    Collection Time: 06/12/25  9:21 PM   Result Value Ref Range    QRS Duration 114 ms    [facility] QTC Calculation 462 ms   Respiratory Culture    Collection Time: 06/12/25  9:43 PM    Specimen: Sputum   Result Value Ref Range    GRAM STAIN Quality 2+     GRAM STAIN Many Gram positive cocci     GRAM STAIN Few Gram Negative Rods    POCT glucose    Collection Time: 06/12/25 10:43 PM   Result Value Ref Range    POCT Glucose 208 (H) 70 - 110 mg/dL   Comprehensive Metabolic Panel    Collection Time: 06/13/25  3:53 AM   Result Value Ref Range    Sodium 143 136 - 145 mmol/L    Potassium 3.7 3.5 - 5.1 mmol/L    Chloride 105 98 - 107 mmol/L    CO2 28 23 - 31 mmol/L    Glucose 165 (H) 82 - 115 mg/dL    Blood Urea Nitrogen 19.0 8.4 - 25.7 mg/dL    Creatinine 0.93 0.72 - 1.25 mg/dL    Calcium 9.2 8.8 - 10.0 mg/dL    Protein Total 7.2 5.8 - 7.6 gm/dL    Albumin 3.1 (L) 3.4 - 4.8 g/dL    Globulin 4.1 (H) 2.4 - 3.5 gm/dL    Albumin/Globulin Ratio 0.8 (L) 1.1 - 2.0 ratio    Bilirubin Total 1.3 <=1.5 mg/dL    ALP 91 40 - 150 unit/L    ALT 26 0 - 55 unit/L    AST 31 11 - 45 unit/L    eGFR >60 mL/min/1.73/m2    Anion Gap 10.0 mEq/L    BUN/Creatinine Ratio 20    CBC with Differential    Collection Time: 06/13/25  3:53 AM   Result Value Ref Range    WBC 14.03 (H) 4.50 - 11.50 x10(3)/mcL    RBC 4.65 (L) 4.70 - 6.10 x10(6)/mcL    Hgb 14.3 14.0 - 18.0 g/dL    Hct 42.6 42.0 - 52.0 %    MCV 91.6 80.0 - 94.0 fL    MCH 30.8 27.0 - 31.0 pg    MCHC 33.6 33.0 - 36.0 g/dL    RDW 11.9 11.5 - 17.0 %    Platelet 177 130 - 400 x10(3)/mcL    MPV 10.0 7.4 - 10.4 fL    Neut % 79.9 %    Lymph % 8.0 %    Mono % 11.0 %    Eos % 0.1 %    Basophil % 0.4 %    Imm Grans % 0.6 %    Neut # 11.21 (H) 2.1 - 9.2 x10(3)/mcL    Lymph # 1.12 0.6 - 4.6 x10(3)/mcL    Mono # 1.55 (H) 0.1 - 1.3 x10(3)/mcL     Eos # 0.01 0 - 0.9 x10(3)/mcL    Baso # 0.06 <=0.2 x10(3)/mcL    Imm Gran # 0.08 (H) 0.00 - 0.04 x10(3)/mcL    NRBC% 0.0 %   POCT glucose    Collection Time: 06/13/25  6:43 AM   Result Value Ref Range    POCT Glucose 155 (H) 70 - 110 mg/dL   Echo    Collection Time: 06/13/25 10:33 AM   Result Value Ref Range    BSA 1.64 m2    A2C EF 58 %    A4C EF 57 %    LVOT stroke volume 39.9 cm3    LVIDd 3.4 (A) 3.5 - 6.0 cm    LV Systolic Volume 19 mL    LV Systolic Volume Index 11.4 mL/m2    LVIDs 2.3 2.1 - 4.0 cm    LV Diastolic Volume 46 mL    LV ESV A4C 11.60 mL    LV Diastolic Volume Index 27.71 mL/m2    LV EDV A2C 33 mL    LV EDV A4C 78.10 mL    Left Ventricular End Systolic Volume by Teichholz Method 18.90 mL    Left Ventricular End Diastolic Volume by Teichholz Method 46.40 mL    IVS 1.1 0.6 - 1.1 cm    LVOT diameter 1.8 cm    LVOT area 2.5 cm2    FS 32.4 28 - 44 %    Left Ventricle Relative Wall Thickness 0.47 cm    PW 0.8 0.6 - 1.1 cm    LV mass 91.8 g    LV Mass Index 55.3 g/m2    MV Peak E Tl 0.77 m/s    TDI LATERAL 0.07 m/s    TDI SEPTAL 0.06 m/s    E/E' ratio 12 m/s    MV Peak A Tl 1.24 m/s    TR Max Tl 1.9 m/s    E/A ratio 0.62     E wave deceleration time 129 msec    LV SEPTAL E/E' RATIO 12.8 m/s    LV LATERAL E/E' RATIO 11.0 m/s    LVOT peak tl 1.3 m/s    Left Ventricular Outflow Tract Mean Velocity 0.84 cm/s    Left Ventricular Outflow Tract Mean Gradient 4.00 mmHg    TAPSE 2.2 cm    LA size 2.6 cm    AV mean gradient 5 mmHg    AV peak gradient 10 mmHg    Ao peak tl 1.6 m/s    Ao VTI 20.9 cm    LVOT peak VTI 15.7 cm    AV valve area 1.9 cm²    AV Velocity Ratio 0.81     AV index (prosthetic) 0.75     JM by Velocity Ratio 2.1 cm²    MV mean gradient 3 mmHg    MV peak gradient 6 mmHg    MV stenosis pressure 1/2 time 56.00 ms    MV valve area p 1/2 method 3.93 cm2    MV valve area by continuity eq 2.66 cm2    MV VTI 15.0 cm    Triscuspid Valve Regurgitation Peak Gradient 15 mmHg    PV PEAK VELOCITY 1.57  m/s    PV peak gradient 10 mmHg    Mean e' 0.07 m/s    ZLVIDS -1.78     ZLVIDD -3.13     LA area A4C 6.42 cm2       X-Ray Chest 1 View  Result Date: 6/13/2025  EXAMINATION: XR CHEST 1 VIEW CPT 08986 CLINICAL HISTORY: hypoxia; COMPARISON: June 12, 2025 FINDINGS: Examination reveals cardiomediastinal silhouette and pleuroparenchymal changes to be essentially unchanged as compared with the previous exam     No significant change as compared with the previous exam Electronically signed by: Spenser Solomon Date:    06/13/2025 Time:    09:10    XR Gastric tube check, non-radiologist performed  Result Date: 6/13/2025  EXAMINATION: XR GASTRIC TUBE CHECK, NON-RADIOLOGIST PERFORMED CLINICAL HISTORY: ng placement; COMPARISON: 10 [place] 2025     Frontal image of the upper abdomen.  Enteric tube extends well into the stomach. Electronically signed by: Wilbert Monroy Date:    06/13/2025 Time:    07:12    X-Ray Chest 1 View  Result Date: 6/12/2025  EXAMINATION: XR CHEST 1 VIEW CLINICAL HISTORY: fever, acute cva; TECHNIQUE: Single frontal view of the chest was performed. COMPARISON: None available FINDINGS: The lungs are clear.  The heart is normal appearance.  The pulmonary vascularity is unremarkable.  Aorta appears grossly unremarkable.  No pleural effusions are seen.  Bones and joints show no acute abnormality.     No abnormality seen Electronically signed by: Alejandro Gramajo Date:    06/12/2025 Time:    18:47    XR Gastric tube check, non-radiologist performed  Result Date: 6/10/2025  EXAMINATION: XR GASTRIC TUBE CHECK, NON-RADIOLOGIST PERFORMED CLINICAL HISTORY: check placement; COMPARISON: None. FINDINGS: The nasogastric tube has its tip over the stomach.     Nasogastric tube with tip over the stomach Electronically signed by: Leela Mercado Date:    06/10/2025 Time:    16:30    CT Head Without Contrast  Result Date: 6/10/2025  Technique: CT of the head was performed without intravenous contrast with axial as well as coronal  and sagittal images. Comparison: Comparison is with study dated June 9, 2025. Dosage Information: Automated Exposure Control was utilized 1069 mGy.cm. Clinical history: STROKE FU. Findings: Hemorrhage: A grossly stable 3.6 x 2.7 x 2.7 cm sized intraparenchymal haemorrhage is seen in the left occipital region, surrounding hypodensity is seen. There is stable trace adjacent subarachnoid haemorrhage. There is stable local mass-effect with partial effacement of the left lateral ventricle. There is no midline shift or herniation. The basal cisterns are patent. CSF spaces: The ventricles sulci and basal cisterns are within normal limits. Brain parenchyma: Chronic scattered microvascular change is seen in portions of the periventricular and deep white matter tracts. Infarct: Chronic infarct of the left corona radiata is noted. Calvarium: No acute linear or depressed skull fracture is seen. Maxillofacial Structures: Paranasal sinuses: The visualized paranasal sinuses appear clear with no mucoperiosteal thickening or air fluid levels identified. Orbits: Left lobe appears hyperdense which could represent a ocular prostheses. Bilateral loss of pneumatization of mastoid air cells with opacification without interval difference.     Impression: A grossly stable 3.6 x 2.7 x 2.7 cm sized intraparenchymal haemorrhage is seen in the left parieto-occipital region, surrounding hypodensity is seen. There is stable trace adjacent subarachnoid haemorrhage. There is stable local mass-effect with partial effacement of the left lateral ventricle. There is no midline shift or herniation. The basal cisterns are patent. Recommend continued serial interval follow-up to resolution as indicated. No significant discrepancy with overnight report. Electronically signed by: Devyn Trinidad Date:    06/10/2025 Time:    06:20    MRI Brain W WO Contrast  Result Date: 6/9/2025  EXAMINATION: MRI BRAIN W WO CONTRAST CLINICAL HISTORY: Intraparenchymal  hemorrhage; TECHNIQUE: Multiplanar, multisequence MR images of the brain were obtained with and without administration of intravenous contrast. COMPARISON: CT head from the same day FINDINGS: There is no acute infarct identified.  Redemonstrated is a left occipital lobe parenchymal hemorrhage measuring approximately 3 x 3.2 cm in size with surrounding edema.  There is a small amount of adjacent subarachnoid hemorrhage.  There are innumerable chronic microhemorrhages predominantly in the right cerebral hemisphere, with a subcortical location.  Mild patchy T2/FLAIR hyperintensities in the subcortical and periventricular white matter, basal ganglia, thalami and cerebellum likely represent chronic microvascular ischemic changes.  There is no significant abnormal parenchymal or leptomeningeal enhancement. There is local mass effect with partial effacement the left lateral ventricle.  There is no midline shift or herniation.  The basal cisterns are patent.  There is diffuse parenchymal volume loss.  The major intracranial flow voids are patent.  The paranasal sinuses are clear.     1. Left occipital lobe hematoma with adjacent subarachnoid hemorrhage. 2. Innumerable chronic microhemorrhages with a subcortical location, may indicate underlying cerebral amyloid angiopathy. 3. Mild chronic microvascular ischemic changes. Electronically signed by: Leela Mercado Date:    06/09/2025 Time:    13:09    CTA Head and Neck (xpd)  Result Date: 6/9/2025  EXAMINATION: CTA HEAD AND NECK (XPD) CLINICAL HISTORY: Stroke, hemorrhagic; TECHNIQUE: Axial images obtained through the cervical region and [place] before and after the administration of intravenous contrast. Coronal, sagittal, MIP and 3D reconstructions were obtained from the axial data. Automatic exposure control was utilized to limit radiation dose. Radiation Dose: Total DLP: 1674 mGy*cm COMPARISON: CT head from the same day FINDINGS: Head CT with contrast: No interval  changes when compared to the previous CT. No enhancing abnormalities. If present, stenosis of the carotid bulbs is measured based on NASCET criteria, i.e. area of maximal stenosis compared to the cervical ICA distal to the bulb. Cervical CTA: The origins of the great vessels are patent with mild scattered calcifications. The common carotid arteries are patent.  There are calcifications at the left carotid bulb with 20-30% stenosis.  The right carotid bulb and bilateral internal carotid arteries are patent. The vertebral arteries are patent. Intracranial CTA: There are calcifications in the course carotid siphons with mild narrowing bilaterally.  The middle cerebral arteries and anterior cerebral arteries are patent The vertebral arteries are patent with mild narrowing bilaterally.  The basilar artery and posterior cerebral arteries are patent. The dural venous sinuses are patent.     No large vessel occlusion, flow-limiting stenosis, aneurysm or evidence of a vascular malformation Electronically signed by: Leela Mercado Date:    06/09/2025 Time:    09:39    CT Head Without Contrast  Result Date: 6/9/2025  EXAMINATION: CT HEAD WITHOUT CONTRAST CLINICAL HISTORY: Mental status change, unknown cause; TECHNIQUE: Axial scans were obtained from skull base to the vertex. Coronal and sagittal reconstructions obtained from the axial data. Automatic exposure control was utilized to limit radiation dose. Contrast: None Radiation Dose: Total DLP: 957 mGy*cm COMPARISON: None FINDINGS: Left occipital lobe parenchymal hemorrhage measures 2.9 x 3.5 cm in size with surrounding edema.  There is trace adjacent subarachnoid hemorrhage. Patchy hypodensities in the subcortical and periventricular white matter, basal ganglia and thalami likely represent chronic microvascular ischemic changes. There is local mass effect with partial effacement of the left lateral ventricle.  There is no midline shift or herniation.  The basal cisterns  are patent. the calvarium and skull base are intact.  There are bilateral mastoid effusions.     Left occipital lobe parenchymal hemorrhage with trace adjacent subarachnoid hemorrhage. Findings given to Dr. Claros at the time of dictation. Electronically signed by: Leela Mercado Date:    06/09/2025 Time:    09:34      Imaging personally reviewed by myself and SP.    Assessment / Plan:   64 year old white male unknown to our group with a pmhx of CVA, CAD w/stents, DM, HTN. Patient presented to ER 6/9 with complaints of headache, confusion and slurred speech.  CT head with left occipital hemorrhagic stroke.  He was admitted to ICU for further care.  He was downgraded 6/12 to the floor.     GI consulted for PEG  1. Need for alternative means of nutrition  2. Oropharyngeal dysphagia   Lethargic and unsafe for PO intake per speech  - Discussed EGD/PEG with family in detail including R/B/A.  Wishes to proceed.    - NPO after MN and Hold TFs at MN for tentative EGD/PEG Monday.   - Hold blood thinners 24 hours prior to peg  - Will need to keep abdominal binder in place at all times to prevent dislodgement.      Plan for Peg Monday if remains afebrile. Gi available over the weekend if needed.    Thank you for allowing us to participate in this patient's care.  Case and plan of care discussed with MD Liss Pillai NP with Dr. Cedric Ballard.        [1]   Current Facility-Administered Medications   Medication Dose Route Frequency Provider Last Rate Last Admin    acetaminophen tablet 650 mg  650 mg Per NG tube Q6H PRN Malinda Hogue MD   650 mg at 06/12/25 1747    bisacodyL suppository 10 mg  10 mg Rectal Daily PRN Bharat Dominguez MD        butalbital-acetaminophen-caffeine -40 mg per tablet 1 tablet  1 tablet Per NG tube Q4H PRN Malinda Hogue MD   1 tablet at 06/11/25 1126    carvediloL tablet 12.5 mg  12.5 mg Per NG tube BID Simon Martinez MD   12.5 mg at 06/13/25 0934    dextrose 50% injection 12.5 g  12.5 g  Intravenous PRN Bharat Dominguez MD        glucagon (human recombinant) injection 1 mg  1 mg Intramuscular PRN Bharat Dominguez MD        haloperidol lactate injection 5 mg  5 mg Intravenous Q6H PRN Tiki Mishra FNP   5 mg at 06/12/25 1759    insulin aspart U-100 injection 0-5 Units  0-5 Units Subcutaneous Q6H PRN Bharat Dominguez MD   3 Units at 06/12/25 1426    insulin glargine U-100 (Lantus) injection 10 Units  10 Units Subcutaneous BID Bharat Dominguez MD   10 Units at 06/12/25 2244    labetaloL injection 10 mg  10 mg Intravenous Q15 Min PRN Bharat Dominguez MD   10 mg at 06/13/25 0508    levalbuterol nebulizer solution 0.63 mg  0.63 mg Nebulization Q4H PRN Gabriela Hunter FNP   0.63 mg at 06/13/25 0923    levETIRAcetam in NaCl (iso-os) IVPB 1,000 mg  1,000 mg Intravenous Q12H Bharat Dominguez  mL/hr at 06/13/25 0938 1,000 mg at 06/13/25 0938    LIDOcaine HCl 2% urojet   Urethral Once Verónica Rob AGACNP-BC        LORazepam injection 1 mg  1 mg Intravenous Q4H PRN Tiki Mishra FNP   1 mg at 06/12/25 1513    losartan tablet 50 mg  50 mg Per NG tube Daily Simon Martinez MD   50 mg at 06/13/25 0934    mupirocin 2 % ointment   Nasal BID Malinda Hogue MD   Given at 06/13/25 0935    OLANZapine tablet 10 mg  10 mg Per NG tube Daily Malinda Hogue MD   10 mg at 06/12/25 1611    ondansetron injection 4 mg  4 mg Intravenous Q8H PRN Bharat Dominguez MD   4 mg at 06/09/25 1212    QUEtiapine tablet 100 mg  100 mg Per NG tube BID Malinda Hogue MD   100 mg at 06/13/25 0934    scopolamine 1.3-1.5 mg (1 mg over 3 days) 1 patch  1 patch Transdermal Once Gabriela Hunter FNP   1 patch at 06/12/25 2240    sodium chloride 0.9% bolus 500 mL 500 mL  500 mL Intravenous Once Gabriela Hunter FNP   Stopped at 06/13/25 0231    sodium chloride 0.9% flush 10 mL  10 mL Intravenous PRN Bharat Dominguez MD       [2]   Medications Prior to Admission   Medication Sig Dispense Refill  Last Dose/Taking    alpha lipoic acid 600 mg Cap Take 250 mg by mouth once daily.   6/8/2025    carvediloL (COREG) 6.25 MG tablet Take 1 tablet (6.25 mg total) by mouth 2 (two) times daily. 180 tablet 3 6/8/2025 Evening    gabapentin (NEURONTIN) 100 MG capsule Take 1 capsule (100 mg total) by mouth every evening. 90 capsule 1 6/8/2025 Evening    insulin aspart U-100 (NOVOLOG) 100 unit/mL (3 mL) InPn pen Inject 3-5 Units into the skin 3 (three) times daily with meals. 3 mL 11 6/8/2025 Evening    insulin glargine U-100, Lantus, (LANTUS SOLOSTAR U-100 INSULIN) 100 unit/mL (3 mL) InPn pen Inject 15 Units into the skin 2 (two) times a day. (Patient taking differently: Inject 15 Units into the skin 2 (two) times a day. Pt takes 12-14 units at night due to hypoglycemia over night) 3 mL 11 6/8/2025 Evening    losartan (COZAAR) 25 MG tablet Take 1 tablet (25 mg total) by mouth once daily. 90 tablet 3 6/8/2025 Morning    mv-min/folic/K1/lycopen/lutein (CENTRUM SILVER MEN ORAL) Take by mouth.   6/8/2025 Morning    potassium gluconate 600 mg (99 mg) Tab Take 1 tablet by mouth Daily.   6/8/2025 Morning    rosuvastatin (CRESTOR) 40 MG Tab Take 1 tablet (40 mg total) by mouth every evening. 90 tablet 2 6/8/2025 Evening    vitamin D (VITAMIN D3) 1000 units Tab Take 1,000 Units by mouth once daily.   6/8/2025 Morning    aspirin (ECOTRIN) 325 MG EC tablet Take 1 tablet (325 mg total) by mouth once daily. (Patient not taking: Reported on 5/16/2025)  0     carbamide peroxide (DEBROX) 6.5 % otic solution Place 5 drops into the right ear 2 (two) times daily. 15 mL 1     coQ10, ubiquinol, 100 mg Cap Take by mouth.   Unknown    cyanocobalamin, vitamin B-12, (LIQUID B-12) 1,000 mcg/15 mL Liqd Take by mouth once daily.       L.acidoph,rhamn-B.breve,longum (PROBIOTIC) 20 billion cell CpSP Take 30 Billion Cells by mouth Daily. (Patient not taking: Reported on 5/16/2025)       nitroGLYCERIN (NITROSTAT) 0.4 MG SL tablet Place 1 tablet (0.4 mg  "total) under the tongue every 5 (five) minutes as needed for Chest pain. Up to 3 doses, then call 911 30 tablet 1     pen needle, diabetic 32 gauge x 5/32" Ndle Use for insulin administration up to three times a day for long-acting and rapid-acting insulin 100 each 3      "

## 2025-06-14 LAB
ANION GAP SERPL CALC-SCNC: 13 MEQ/L
BACTERIA #/AREA URNS AUTO: ABNORMAL /HPF
BASOPHILS # BLD AUTO: 0.06 X10(3)/MCL
BASOPHILS NFR BLD AUTO: 0.3 %
BILIRUB UR QL STRIP.AUTO: NEGATIVE
BUN SERPL-MCNC: 29.8 MG/DL (ref 8.4–25.7)
CALCIUM SERPL-MCNC: 9.3 MG/DL (ref 8.8–10)
CHLORIDE SERPL-SCNC: 104 MMOL/L (ref 98–107)
CLARITY UR: CLEAR
CO2 SERPL-SCNC: 24 MMOL/L (ref 23–31)
COLOR UR AUTO: ABNORMAL
CREAT SERPL-MCNC: 1.19 MG/DL (ref 0.72–1.25)
CREAT/UREA NIT SERPL: 25
EOSINOPHIL # BLD AUTO: 0.04 X10(3)/MCL (ref 0–0.9)
EOSINOPHIL NFR BLD AUTO: 0.2 %
ERYTHROCYTE [DISTWIDTH] IN BLOOD BY AUTOMATED COUNT: 12 % (ref 11.5–17)
GFR SERPLBLD CREATININE-BSD FMLA CKD-EPI: >60 ML/MIN/1.73/M2
GLUCOSE SERPL-MCNC: 368 MG/DL (ref 82–115)
GLUCOSE UR QL STRIP: ABNORMAL
HCT VFR BLD AUTO: 38.4 % (ref 42–52)
HGB BLD-MCNC: 12.8 G/DL (ref 14–18)
HGB UR QL STRIP: ABNORMAL
IMM GRANULOCYTES # BLD AUTO: 0.11 X10(3)/MCL (ref 0–0.04)
IMM GRANULOCYTES NFR BLD AUTO: 0.6 %
KETONES UR QL STRIP: ABNORMAL
LEUKOCYTE ESTERASE UR QL STRIP: NEGATIVE
LYMPHOCYTES # BLD AUTO: 1.77 X10(3)/MCL (ref 0.6–4.6)
LYMPHOCYTES NFR BLD AUTO: 10.1 %
MCH RBC QN AUTO: 30.9 PG (ref 27–31)
MCHC RBC AUTO-ENTMCNC: 33.3 G/DL (ref 33–36)
MCV RBC AUTO: 92.8 FL (ref 80–94)
MONOCYTES # BLD AUTO: 1.78 X10(3)/MCL (ref 0.1–1.3)
MONOCYTES NFR BLD AUTO: 10.2 %
MRSA PCR SCRN (OHS): NOT DETECTED
MUCOUS THREADS URNS QL MICRO: ABNORMAL /LPF
NEUTROPHILS # BLD AUTO: 13.76 X10(3)/MCL (ref 2.1–9.2)
NEUTROPHILS NFR BLD AUTO: 78.6 %
NITRITE UR QL STRIP: NEGATIVE
NRBC BLD AUTO-RTO: 0 %
PH UR STRIP: 6 [PH]
PLATELET # BLD AUTO: 183 X10(3)/MCL (ref 130–400)
PMV BLD AUTO: 9.8 FL (ref 7.4–10.4)
POCT GLUCOSE: 332 MG/DL (ref 70–110)
POTASSIUM SERPL-SCNC: 3.7 MMOL/L (ref 3.5–5.1)
PROT UR QL STRIP: ABNORMAL
RBC # BLD AUTO: 4.14 X10(6)/MCL (ref 4.7–6.1)
RBC #/AREA URNS AUTO: ABNORMAL /HPF
SODIUM SERPL-SCNC: 141 MMOL/L (ref 136–145)
SP GR UR STRIP.AUTO: >1.05 (ref 1–1.03)
SQUAMOUS #/AREA URNS LPF: ABNORMAL /HPF
UROBILINOGEN UR STRIP-ACNC: NORMAL
WBC # BLD AUTO: 17.52 X10(3)/MCL (ref 4.5–11.5)
WBC #/AREA URNS AUTO: ABNORMAL /HPF

## 2025-06-14 PROCEDURE — 36415 COLL VENOUS BLD VENIPUNCTURE: CPT | Performed by: INTERNAL MEDICINE

## 2025-06-14 PROCEDURE — 63600175 PHARM REV CODE 636 W HCPCS

## 2025-06-14 PROCEDURE — 99900026 HC AIRWAY MAINTENANCE (STAT)

## 2025-06-14 PROCEDURE — 25500020 PHARM REV CODE 255: Performed by: INTERNAL MEDICINE

## 2025-06-14 PROCEDURE — 63600175 PHARM REV CODE 636 W HCPCS: Performed by: INTERNAL MEDICINE

## 2025-06-14 PROCEDURE — 25000003 PHARM REV CODE 250: Performed by: INTERNAL MEDICINE

## 2025-06-14 PROCEDURE — 21400001 HC TELEMETRY ROOM

## 2025-06-14 PROCEDURE — 81001 URINALYSIS AUTO W/SCOPE: CPT | Performed by: INTERNAL MEDICINE

## 2025-06-14 PROCEDURE — 87641 MR-STAPH DNA AMP PROBE: CPT | Performed by: INTERNAL MEDICINE

## 2025-06-14 PROCEDURE — 85025 COMPLETE CBC W/AUTO DIFF WBC: CPT | Performed by: INTERNAL MEDICINE

## 2025-06-14 PROCEDURE — 80048 BASIC METABOLIC PNL TOTAL CA: CPT | Performed by: INTERNAL MEDICINE

## 2025-06-14 PROCEDURE — 94761 N-INVAS EAR/PLS OXIMETRY MLT: CPT

## 2025-06-14 PROCEDURE — 99900035 HC TECH TIME PER 15 MIN (STAT)

## 2025-06-14 PROCEDURE — 51798 US URINE CAPACITY MEASURE: CPT

## 2025-06-14 PROCEDURE — 31720 CLEARANCE OF AIRWAYS: CPT

## 2025-06-14 PROCEDURE — 27000221 HC OXYGEN, UP TO 24 HOURS

## 2025-06-14 RX ORDER — LEVETIRACETAM 100 MG/ML
500 SOLUTION ORAL 2 TIMES DAILY
Status: DISCONTINUED | OUTPATIENT
Start: 2025-06-14 | End: 2025-06-17

## 2025-06-14 RX ORDER — QUETIAPINE FUMARATE 25 MG/1
50 TABLET, FILM COATED ORAL 2 TIMES DAILY
Status: DISCONTINUED | OUTPATIENT
Start: 2025-06-14 | End: 2025-06-14

## 2025-06-14 RX ORDER — INSULIN GLARGINE 100 [IU]/ML
15 INJECTION, SOLUTION SUBCUTANEOUS 2 TIMES DAILY
Status: DISCONTINUED | OUTPATIENT
Start: 2025-06-14 | End: 2025-06-16

## 2025-06-14 RX ORDER — QUETIAPINE FUMARATE 25 MG/1
50 TABLET, FILM COATED ORAL EVERY 12 HOURS PRN
Status: DISCONTINUED | OUTPATIENT
Start: 2025-06-14 | End: 2025-06-17

## 2025-06-14 RX ORDER — HALOPERIDOL LACTATE 5 MG/ML
5 INJECTION, SOLUTION INTRAMUSCULAR EVERY 6 HOURS PRN
Status: DISCONTINUED | OUTPATIENT
Start: 2025-06-14 | End: 2025-06-21

## 2025-06-14 RX ORDER — INSULIN GLARGINE 100 [IU]/ML
5 INJECTION, SOLUTION SUBCUTANEOUS ONCE
Status: COMPLETED | OUTPATIENT
Start: 2025-06-14 | End: 2025-06-14

## 2025-06-14 RX ADMIN — PIPERACILLIN SODIUM AND TAZOBACTAM SODIUM 4.5 G: 4; .5 INJECTION, POWDER, LYOPHILIZED, FOR SOLUTION INTRAVENOUS at 12:06

## 2025-06-14 RX ADMIN — INSULIN ASPART 4 UNITS: 100 INJECTION, SOLUTION INTRAVENOUS; SUBCUTANEOUS at 04:06

## 2025-06-14 RX ADMIN — HALOPERIDOL LACTATE 5 MG: 5 INJECTION, SOLUTION INTRAMUSCULAR at 10:06

## 2025-06-14 RX ADMIN — LOSARTAN POTASSIUM 50 MG: 50 TABLET, FILM COATED ORAL at 08:06

## 2025-06-14 RX ADMIN — LEVETIRACETAM INJECTION 1000 MG: 10 INJECTION INTRAVENOUS at 08:06

## 2025-06-14 RX ADMIN — IOHEXOL 100 ML: 350 INJECTION, SOLUTION INTRAVENOUS at 11:06

## 2025-06-14 RX ADMIN — OLANZAPINE 10 MG: 5 TABLET, FILM COATED ORAL at 04:06

## 2025-06-14 RX ADMIN — ACETAMINOPHEN 650 MG: 325 TABLET ORAL at 08:06

## 2025-06-14 RX ADMIN — PIPERACILLIN SODIUM AND TAZOBACTAM SODIUM 4.5 G: 4; .5 INJECTION, POWDER, LYOPHILIZED, FOR SOLUTION INTRAVENOUS at 05:06

## 2025-06-14 RX ADMIN — INSULIN ASPART 4 UNITS: 100 INJECTION, SOLUTION INTRAVENOUS; SUBCUTANEOUS at 07:06

## 2025-06-14 RX ADMIN — INSULIN GLARGINE 15 UNITS: 100 INJECTION, SOLUTION SUBCUTANEOUS at 09:06

## 2025-06-14 RX ADMIN — CARVEDILOL 12.5 MG: 12.5 TABLET, FILM COATED ORAL at 08:06

## 2025-06-14 RX ADMIN — QUETIAPINE FUMARATE 100 MG: 100 TABLET ORAL at 08:06

## 2025-06-14 RX ADMIN — LEVETIRACETAM 500 MG: 500 SOLUTION ORAL at 09:06

## 2025-06-14 RX ADMIN — CARVEDILOL 12.5 MG: 12.5 TABLET, FILM COATED ORAL at 09:06

## 2025-06-14 RX ADMIN — INSULIN GLARGINE 10 UNITS: 100 INJECTION, SOLUTION SUBCUTANEOUS at 08:06

## 2025-06-14 RX ADMIN — ACETAMINOPHEN 650 MG: 325 TABLET ORAL at 09:06

## 2025-06-14 RX ADMIN — INSULIN GLARGINE 5 UNITS: 100 INJECTION, SOLUTION SUBCUTANEOUS at 02:06

## 2025-06-14 RX ADMIN — INSULIN ASPART 2 UNITS: 100 INJECTION, SOLUTION INTRAVENOUS; SUBCUTANEOUS at 10:06

## 2025-06-14 RX ADMIN — INSULIN ASPART 3 UNITS: 100 INJECTION, SOLUTION INTRAVENOUS; SUBCUTANEOUS at 12:06

## 2025-06-14 RX ADMIN — PIPERACILLIN SODIUM AND TAZOBACTAM SODIUM 4.5 G: 4; .5 INJECTION, POWDER, LYOPHILIZED, FOR SOLUTION INTRAVENOUS at 09:06

## 2025-06-14 NOTE — PLAN OF CARE
Problem: Adult Inpatient Plan of Care  Goal: Plan of Care Review  Outcome: Progressing  Goal: Patient-Specific Goal (Individualized)  Outcome: Progressing  Goal: Absence of Hospital-Acquired Illness or Injury  Outcome: Progressing  Goal: Optimal Comfort and Wellbeing  Outcome: Progressing  Goal: Readiness for Transition of Care  Outcome: Progressing     Problem: Diabetes Comorbidity  Goal: Blood Glucose Level Within Targeted Range  Outcome: Progressing     Problem: Stroke, Intracerebral Hemorrhage  Goal: Optimal Coping  Outcome: Progressing  Goal: Effective Bowel Elimination  Outcome: Progressing  Goal: Optimal Cerebral Tissue Perfusion  Outcome: Progressing  Goal: Optimal Cognitive Function  Outcome: Progressing  Goal: Effective Communication Skills  Outcome: Progressing  Goal: Optimal Functional Ability  Outcome: Progressing  Goal: Optimal Nutrition Intake  Outcome: Progressing  Goal: Optimal Pain Control and Function  Outcome: Progressing  Goal: Effective Oxygenation and Ventilation  Outcome: Progressing  Goal: Improved Sensorimotor Function  Outcome: Progressing  Goal: Safe and Effective Swallow  Outcome: Progressing  Goal: Effective Urinary Elimination  Outcome: Progressing     Problem: Skin Injury Risk Increased  Goal: Skin Health and Integrity  Outcome: Progressing     Problem: Fall Injury Risk  Goal: Absence of Fall and Fall-Related Injury  Outcome: Progressing     Problem: Infection  Goal: Absence of Infection Signs and Symptoms  Outcome: Progressing     Problem: Infection  Goal: Absence of Infection Signs and Symptoms  Outcome: Progressing     Problem: Coping Ineffective  Goal: Effective Coping  Outcome: Progressing

## 2025-06-14 NOTE — PT/OT/SLP PROGRESS
Physical Therapy      Patient Name:  Federico Villarreal   MRN:  [id number]    Patient just returned from CT. Therapist speaking with family and preparing for session however urologist arrived to place catheter. Will follow up as schedule permits.

## 2025-06-14 NOTE — PROGRESS NOTES
[facility] Hospital Medicine Progress Note        Chief Complaint: Inpatient Follow-up     HPI:   64-year-old male with type 1 diabetes mellitus, CAD status post stenting, hypertension, hyperlipidemia, prior CVA  presented on 06/09/2025 for evaluation of sudden onset headache, confusion, slurred speech.  Workup revealed  CT head revealed left occipital lobe parenchymal hemorrhage with trace adjacent subarachnoid hemorrhage.   CTA head/neck was also completed in ED, which revealed no large vessel occlusion, flow-limiting stenosis, aneurysm or evidence of a vascular malformation   MRI brain report:  1. Left occipital lobe hematoma with adjacent subarachnoid hemorrhage.  2. Innumerable chronic microhemorrhages with a subcortical location, may indicate underlying cerebral amyloid angiopathy.  3. Mild chronic microvascular ischemic changes     Neurology team evaluated the patient, initiated on stroke protocol, admitted to ICU services.  Neurosurgery team evaluated the patient, MRI likely suggestive of amyloid angiopathy, repeat CT head 6/10/25 unchanged, suggested holding antiplatelets for at least 2 weeks.  Patient is started on Keppra.  Neurosurgical team signed off.     Patient required Nascimento placement for retention, neurology team evaluated.     Patient cleared to downgrade to floors 06/12/2025 PM by ICU team.    Interval Hx:   No acute events reported overnight.  Pt had fever ,tmax 102.8, tachycardic,wbc count increased 17K, decided to obtain pan scan, CT head stable, but notes to have dense consolidation left >right LL, constipation  Seen at bedside , multiple family members in room, incl spouse, pt resting on bed ,easily arousable,responded by calling his name,confused,spontaneous movements in ext noted, family reports tolerating tube feeds , discussed plan of care, family in agreement with the plan  RN reported pt having urinary retention post dc nascimento, unable to place cath,will get  urology to place catheter(he had one prior placed by urology)      Objective/physical exam:  General: In no acute distress  Chest:  b/l rhonchorus , breathing on 4 L  Heart: Tachycardia sinus  Neuro:  patient easily arousable, confused appearing, nonverbal , spontaneous movement in all extremities  Abdomen:  Soft, Nontender    VITAL SIGNS: 24 HRS MIN & MAX LAST   Temp  Min: 100 °F (37.8 °C)  Max: 102.8 °F (39.3 °C) (!) 101.9 °F (38.8 °C)   BP  Min: 132/75  Max: 166/69 (!) 143/78   Pulse  Min: 109  Max: 123  (!) 120   Resp  Min: 18  Max: 22 20   SpO2  Min: 96 %  Max: 97 % 96 %     I have reviewed the following labs:  Recent Labs   Lab 06/11/25  0316 06/13/25  0353 06/14/25  0833   WBC 13.80* 14.03* 17.52*   RBC 4.35* 4.65* 4.14*   HGB 13.5* 14.3 12.8*   HCT 41.1* 42.6 38.4*   MCV 94.5* 91.6 92.8   MCH 31.0 30.8 30.9   MCHC 32.8* 33.6 33.3   RDW 11.9 11.9 12.0    177 183   MPV 9.8 10.0 9.8     Recent Labs   Lab 06/09/25  0820 06/10/25  0443 06/11/25  0316 06/13/25  0353 06/14/25  0833    141 142 143 141   K 4.9 4.7 4.0 3.7 3.7    105 105 105 104   CO2 27 24 26 28 24   BUN 12.4 21.3 20.5 19.0 29.8*   CREATININE 1.02 1.08 0.87 0.93 1.19   * 258* 147* 165* 368*   CALCIUM 9.3 8.4* 9.0 9.2 9.3   MG 1.90 2.10  --   --   --    ALBUMIN 3.8 3.5 3.5 3.1*  --    PROT 7.3 6.9 7.2 7.2  --    ALKPHOS 102 91 87 91  --    ALT 30 22 22 26  --    AST 31 20 35 31  --    BILITOT 1.1 1.4 1.4 1.3  --      Microbiology Results (last 7 days)       Procedure Component Value Units Date/Time    Blood Culture [[id number]]  (Normal) Collected: 06/12/25 1851    Order Status: Completed Specimen: Blood from Arm, Right Updated: 06/13/25 2001     Blood Culture No Growth At 24 Hours    Blood Culture [[id number]]  (Normal) Collected: 06/12/25 1851    Order Status: Completed Specimen: Blood from Arm, Left Updated: 06/13/25 2001     Blood Culture No Growth At 24 Hours    Respiratory Culture [[id number]] Collected: 06/12/25 2143     Order Status: Completed Specimen: Sputum Updated: 06/13/25 0809     GRAM STAIN Quality 2+      Many Gram positive cocci      Few Gram Negative Rods             See below for Radiology    Assessment/Plan:  Acute Hemorrhagic CVA, left occipital lobe hematoma with adjacent subarachnoid hemorrhage  Possible cerebral amyloid angiopathy  Bibasilar PNA ?aspiration  Sepsis  Acute encephalopathy secondary to above all  Acute urinary retention requiring cath  Hyperglycemia, Type 1 Diabetes Mellitus recent A1c 7.1%    Hx  CAD status post stenting, hypertension, hyperlipidemia, prior CVA     Monitor on tele   Neurology, Neurosurgery team signed off   No antiplatelets for at least 2 weeks till 6/23   continue Keppra per NSGY, change iv to NG tube  Monitor BP, goal less than 140, continue Coreg, losartan, titrate , use IV p.r.n.  Cont  IV Zosyn  Follow  blood cultures, respiratory cultures , check MRSA PCR  Supplement O2 as needed to maintain saturations, wean as tolerated  GI consulted for PEG placement, planning PEG once sepsis improve, follow recommendations   Urology consultation for replacement of urinary catheterization, appreciate assistance  Palliative care team following  Titrate Seroquel  PT/OT, Speech therapy evaluations, NG tube feeds  Monitor blood sugars, cover with ISS, titrate long-acting insulin  Maintain aspiration precautions, delirium precautions     Code status: DNR    VTE prophylaxis:  SCDs    Patient condition:  Guarded    Anticipated discharge and Disposition:   TBD    Critical care time spent:40min  Critical care dx: sepsis     Portions of this note dictated using EMR integrated voice recognition software, and may be subject to voice recognition errors not corrected at proofreading. Please contact writer for clarification if needed.   _____________________________________________________________________    Malnutrition Status:  Nutrition consulted. Most recent weight and BMI monitored-      Measurements:  Wt Readings from Last 1 Encounters:   06/11/25 65 kg (143 lb 4.8 oz)   Body mass index is 26.21 kg/m².    Patient has been screened and assessed by RD.    Malnutrition Type:  Context:    Level: other (see comments) (Does not meet criteria)    Malnutrition Characteristic Summary:  Weight Loss (Malnutrition): other (see comments) (Unable to assess)  Energy Intake (Malnutrition): other (see comments) (Unable to assess)  Muscle Mass (Malnutrition): mild depletion    Interventions/Recommendations (treatment strategy):        Scheduled Med:   carvediloL  12.5 mg Per NG tube BID    insulin glargine U-100  10 Units Subcutaneous BID    levETIRAcetam (Keppra) IV (PEDS and ADULTS)  1,000 mg Intravenous Q12H    LIDOcaine HCl 2%   Urethral Once    losartan  50 mg Per NG tube Daily    OLANZapine  10 mg Per NG tube Daily    piperacillin-tazobactam (Zosyn) IV (PEDS and ADULTS) (extended infusion is not appropriate)  4.5 g Intravenous Q8H    QUEtiapine  100 mg Per NG tube BID    scopolamine  1 patch Transdermal Once    sodium chloride 0.9%  500 mL Intravenous Once      Continuous Infusions:     PRN Meds:    Current Facility-Administered Medications:     acetaminophen, 650 mg, Per NG tube, Q6H PRN    bisacodyL, 10 mg, Rectal, Daily PRN    butalbital-acetaminophen-caffeine -40 mg, 1 tablet, Per NG tube, Q4H PRN    dextrose 50%, 12.5 g, Intravenous, PRN    glucagon (human recombinant), 1 mg, Intramuscular, PRN    haloperidol lactate, 5 mg, Intravenous, Q6H PRN    insulin aspart U-100, 0-5 Units, Subcutaneous, Q6H PRN    labetalol, 10 mg, Intravenous, Q15 Min PRN    levalbuterol, 0.63 mg, Nebulization, Q4H PRN    lorazepam, 1 mg, Intravenous, Q4H PRN    ondansetron, 4 mg, Intravenous, Q8H PRN    sodium chloride 0.9%, 10 mL, Intravenous, PRN     Radiology:  I have personally reviewed the following imaging and agree with the radiologist.     Echo    Left Ventricle: The left ventricle is normal in size. Normal wall    thickness. There is normal systolic function with a visually estimated   ejection fraction of 55 - 60%. There is normal diastolic function.    Right Ventricle: The right ventricle is normal in size Wall thickness   is normal. Systolic function is normal. TAPSE is 2.2 cm.    IVC/SVC: Normal venous pressure at 3 mmHg.  X-Ray Chest 1 View  Narrative: EXAMINATION:  XR CHEST 1 VIEW    CPT 54918    CLINICAL HISTORY:  hypoxia;    COMPARISON:  June 12, 2025    FINDINGS:  Examination reveals cardiomediastinal silhouette and pleuroparenchymal changes to be essentially unchanged as compared with the previous exam  Impression: No significant change as compared with the previous exam    Electronically signed by: Spenser Solomon  Date:    06/13/2025  Time:    09:10  XR Gastric tube check, non-radiologist performed  Narrative: EXAMINATION:  XR GASTRIC TUBE CHECK, NON-RADIOLOGIST PERFORMED    CLINICAL HISTORY:  ng placement;    COMPARISON:  10 [place] 2025  Impression: Frontal image of the upper abdomen.  Enteric tube extends well into the stomach.    Electronically signed by: Wilbert Monroy  Date:    06/13/2025  Time:    07:12      Simon Martinez MD  Department of Hospital Medicine   [facility]   06/14/2025

## 2025-06-14 NOTE — CONSULTS
Date: 6/14/25   consultant: Jose Gomez MD  reason:m cath placement  HPI: post cva and combative- difficult cath placement several days ago   PMH/SH etc - see h/p no cahnge  assessment: AUR  plan : cath placement.    Patienty prepped draped and a 16 fr coude' placed- patient combative so somewhat difficuilt

## 2025-06-14 NOTE — NURSING
1020: Attempted to insert coude cath with help of Charge RN. Unable to place cath due to resistant and coiling. Patient tolerated poorly. Secure messaged Urologist.

## 2025-06-14 NOTE — PT/OT/SLP PROGRESS
[facility] Speech Language Pathology Department    Patient Name:  Federico Villarreal   MRN:  [id number]    Pt remains minimally arousable and unable to participate in clinical swallow/cognitive-communication evaluations.  Will continue to follow and tx as appropriate.

## 2025-06-15 LAB
ALBUMIN SERPL-MCNC: 2.4 G/DL (ref 3.4–4.8)
ALBUMIN/GLOB SERPL: 0.5 RATIO (ref 1.1–2)
ALP SERPL-CCNC: 106 UNIT/L (ref 40–150)
ALT SERPL-CCNC: 28 UNIT/L (ref 0–55)
ANION GAP SERPL CALC-SCNC: 7 MEQ/L
AST SERPL-CCNC: 28 UNIT/L (ref 11–45)
B-LACTAMASE USUAL SUSC ISLT: NEGATIVE
BACTERIA SPEC CULT: ABNORMAL
BACTERIA SPEC CULT: ABNORMAL
BILIRUB SERPL-MCNC: 0.6 MG/DL
BUN SERPL-MCNC: 27 MG/DL (ref 8.4–25.7)
CALCIUM SERPL-MCNC: 9.2 MG/DL (ref 8.8–10)
CHLORIDE SERPL-SCNC: 107 MMOL/L (ref 98–107)
CO2 SERPL-SCNC: 31 MMOL/L (ref 23–31)
CREAT SERPL-MCNC: 1.01 MG/DL (ref 0.72–1.25)
CREAT/UREA NIT SERPL: 27
ERYTHROCYTE [DISTWIDTH] IN BLOOD BY AUTOMATED COUNT: 12 % (ref 11.5–17)
GFR SERPLBLD CREATININE-BSD FMLA CKD-EPI: >60 ML/MIN/1.73/M2
GLOBULIN SER-MCNC: 4.4 GM/DL (ref 2.4–3.5)
GLUCOSE SERPL-MCNC: 308 MG/DL (ref 82–115)
GRAM STN SPEC: ABNORMAL
HCT VFR BLD AUTO: 37.2 % (ref 42–52)
HGB BLD-MCNC: 12.3 G/DL (ref 14–18)
MCH RBC QN AUTO: 30.7 PG (ref 27–31)
MCHC RBC AUTO-ENTMCNC: 33.1 G/DL (ref 33–36)
MCV RBC AUTO: 92.8 FL (ref 80–94)
NRBC BLD AUTO-RTO: 0 %
PLATELET # BLD AUTO: 218 X10(3)/MCL (ref 130–400)
PMV BLD AUTO: 10 FL (ref 7.4–10.4)
POTASSIUM SERPL-SCNC: 3.8 MMOL/L (ref 3.5–5.1)
PROT SERPL-MCNC: 6.8 GM/DL (ref 5.8–7.6)
RBC # BLD AUTO: 4.01 X10(6)/MCL (ref 4.7–6.1)
SODIUM SERPL-SCNC: 145 MMOL/L (ref 136–145)
WBC # BLD AUTO: 15.74 X10(3)/MCL (ref 4.5–11.5)

## 2025-06-15 PROCEDURE — 25000003 PHARM REV CODE 250: Performed by: INTERNAL MEDICINE

## 2025-06-15 PROCEDURE — 99900031 HC PATIENT EDUCATION (STAT)

## 2025-06-15 PROCEDURE — 80053 COMPREHEN METABOLIC PANEL: CPT | Performed by: INTERNAL MEDICINE

## 2025-06-15 PROCEDURE — 36415 COLL VENOUS BLD VENIPUNCTURE: CPT | Performed by: INTERNAL MEDICINE

## 2025-06-15 PROCEDURE — 27000221 HC OXYGEN, UP TO 24 HOURS

## 2025-06-15 PROCEDURE — 94761 N-INVAS EAR/PLS OXIMETRY MLT: CPT

## 2025-06-15 PROCEDURE — 63600175 PHARM REV CODE 636 W HCPCS: Performed by: INTERNAL MEDICINE

## 2025-06-15 PROCEDURE — 31720 CLEARANCE OF AIRWAYS: CPT

## 2025-06-15 PROCEDURE — 85027 COMPLETE CBC AUTOMATED: CPT | Performed by: INTERNAL MEDICINE

## 2025-06-15 PROCEDURE — 63600175 PHARM REV CODE 636 W HCPCS

## 2025-06-15 PROCEDURE — 99900026 HC AIRWAY MAINTENANCE (STAT)

## 2025-06-15 PROCEDURE — 21400001 HC TELEMETRY ROOM

## 2025-06-15 PROCEDURE — 99900035 HC TECH TIME PER 15 MIN (STAT)

## 2025-06-15 RX ORDER — IBUPROFEN 200 MG
16 TABLET ORAL
Status: DISCONTINUED | OUTPATIENT
Start: 2025-06-15 | End: 2025-07-02

## 2025-06-15 RX ORDER — IBUPROFEN 200 MG
24 TABLET ORAL
Status: DISCONTINUED | OUTPATIENT
Start: 2025-06-15 | End: 2025-07-02

## 2025-06-15 RX ORDER — ACETYLCYSTEINE 200 MG/ML
2 SOLUTION ORAL; RESPIRATORY (INHALATION) 3 TIMES DAILY PRN
Status: DISPENSED | OUTPATIENT
Start: 2025-06-16 | End: 2025-06-17

## 2025-06-15 RX ORDER — SCOPOLAMINE 1 MG/3D
1 PATCH, EXTENDED RELEASE TRANSDERMAL
Status: DISCONTINUED | OUTPATIENT
Start: 2025-06-16 | End: 2025-07-03

## 2025-06-15 RX ORDER — INSULIN ASPART 100 [IU]/ML
0-10 INJECTION, SOLUTION INTRAVENOUS; SUBCUTANEOUS
Status: DISCONTINUED | OUTPATIENT
Start: 2025-06-15 | End: 2025-06-17

## 2025-06-15 RX ORDER — GLUCAGON 1 MG
1 KIT INJECTION
Status: DISCONTINUED | OUTPATIENT
Start: 2025-06-15 | End: 2025-07-08

## 2025-06-15 RX ADMIN — CARVEDILOL 12.5 MG: 12.5 TABLET, FILM COATED ORAL at 09:06

## 2025-06-15 RX ADMIN — ACETAMINOPHEN 650 MG: 325 TABLET ORAL at 09:06

## 2025-06-15 RX ADMIN — PIPERACILLIN SODIUM AND TAZOBACTAM SODIUM 4.5 G: 4; .5 INJECTION, POWDER, LYOPHILIZED, FOR SOLUTION INTRAVENOUS at 05:06

## 2025-06-15 RX ADMIN — INSULIN GLARGINE 15 UNITS: 100 INJECTION, SOLUTION SUBCUTANEOUS at 09:06

## 2025-06-15 RX ADMIN — INSULIN ASPART 4 UNITS: 100 INJECTION, SOLUTION INTRAVENOUS; SUBCUTANEOUS at 12:06

## 2025-06-15 RX ADMIN — INSULIN ASPART 4 UNITS: 100 INJECTION, SOLUTION INTRAVENOUS; SUBCUTANEOUS at 04:06

## 2025-06-15 RX ADMIN — OLANZAPINE 10 MG: 5 TABLET, FILM COATED ORAL at 04:06

## 2025-06-15 RX ADMIN — HALOPERIDOL LACTATE 5 MG: 5 INJECTION, SOLUTION INTRAMUSCULAR at 09:06

## 2025-06-15 RX ADMIN — LEVETIRACETAM 500 MG: 500 SOLUTION ORAL at 09:06

## 2025-06-15 RX ADMIN — PIPERACILLIN SODIUM AND TAZOBACTAM SODIUM 4.5 G: 4; .5 INJECTION, POWDER, LYOPHILIZED, FOR SOLUTION INTRAVENOUS at 09:06

## 2025-06-15 RX ADMIN — INSULIN ASPART 4 UNITS: 100 INJECTION, SOLUTION INTRAVENOUS; SUBCUTANEOUS at 06:06

## 2025-06-15 RX ADMIN — LOSARTAN POTASSIUM 50 MG: 50 TABLET, FILM COATED ORAL at 09:06

## 2025-06-15 RX ADMIN — QUETIAPINE FUMARATE 50 MG: 25 TABLET ORAL at 12:06

## 2025-06-15 RX ADMIN — PIPERACILLIN SODIUM AND TAZOBACTAM SODIUM 4.5 G: 4; .5 INJECTION, POWDER, LYOPHILIZED, FOR SOLUTION INTRAVENOUS at 12:06

## 2025-06-15 RX ADMIN — LORAZEPAM 1 MG: 2 INJECTION INTRAMUSCULAR; INTRAVENOUS at 03:06

## 2025-06-15 RX ADMIN — INSULIN ASPART 2 UNITS: 100 INJECTION, SOLUTION INTRAVENOUS; SUBCUTANEOUS at 09:06

## 2025-06-15 NOTE — PROGRESS NOTES
[facility] Hospital Medicine Progress Note        Chief Complaint: Inpatient Follow-up     HPI:   64-year-old male with type 1 diabetes mellitus, CAD status post stenting, hypertension, hyperlipidemia, prior CVA  presented on 06/09/2025 for evaluation of sudden onset headache, confusion, slurred speech.  Workup revealed  CT head revealed left occipital lobe parenchymal hemorrhage with trace adjacent subarachnoid hemorrhage.   CTA head/neck was also completed in ED, which revealed no large vessel occlusion, flow-limiting stenosis, aneurysm or evidence of a vascular malformation   MRI brain report:  1. Left occipital lobe hematoma with adjacent subarachnoid hemorrhage.  2. Innumerable chronic microhemorrhages with a subcortical location, may indicate underlying cerebral amyloid angiopathy.  3. Mild chronic microvascular ischemic changes     Neurology team evaluated the patient, initiated on stroke protocol, admitted to ICU services.  Neurosurgery team evaluated the patient, MRI likely suggestive of amyloid angiopathy, repeat CT head 6/10/25 unchanged, suggested holding antiplatelets for at least 2 weeks.  Patient is started on Keppra.  Neurosurgical team signed off.     Patient required Chao placement for retention, neurology team evaluated.     Patient cleared to downgrade to floors 06/12/2025 PM by ICU team.     Pt had fever ,tmax 102.8, tachycardic,wbc count increased 17K, decided to obtain pan scan, CT head stable, but notes to have dense consolidation left >right LL, constipation    Interval Hx:   No acute events reported overnight.   Seen at bedside , no family members in room, RN present at bedside, patient appears much alert and awake,spontaneous movements in ext noted, minimal to no verbal but able to follow simple commands, tolerating tube feeds   T-max 100.4°, fevers improving, tachycardia improving blood pressure stable saturating well on 4 L nasal cannula  Labs from this  morning pending   Care discussed with the patient's nurse at bedside discussed weaning supplemental O2 as tolerated  Objective/physical exam:  General: In no acute distress  Chest:  b/l rhonchorus , breathing on 4 L  Heart: Tachycardia sinus  Neuro:  Alert awake confused , spontaneous movement in all extremities  Abdomen:  Soft, Nontender    VITAL SIGNS: 24 HRS MIN & MAX LAST   Temp  Min: 98 °F (36.7 °C)  Max: 100.4 °F (38 °C) (!) 100.4 °F (38 °C)   BP  Min: 115/58  Max: 157/90 (!) 144/72   Pulse  Min: 101  Max: 116  (!) 116   Resp  Min: 20  Max: 20 20   SpO2  Min: 95 %  Max: 95 % 95 %     I have reviewed the following labs:  Recent Labs   Lab 06/11/25  0316 06/13/25  0353 06/14/25  0833   WBC 13.80* 14.03* 17.52*   RBC 4.35* 4.65* 4.14*   HGB 13.5* 14.3 12.8*   HCT 41.1* 42.6 38.4*   MCV 94.5* 91.6 92.8   MCH 31.0 30.8 30.9   MCHC 32.8* 33.6 33.3   RDW 11.9 11.9 12.0    177 183   MPV 9.8 10.0 9.8     Recent Labs   Lab 06/09/25  0820 06/10/25  0443 06/11/25 0316 06/13/25  0353 06/14/25  0833    141 142 143 141   K 4.9 4.7 4.0 3.7 3.7    105 105 105 104   CO2 27 24 26 28 24   BUN 12.4 21.3 20.5 19.0 29.8*   CREATININE 1.02 1.08 0.87 0.93 1.19   * 258* 147* 165* 368*   CALCIUM 9.3 8.4* 9.0 9.2 9.3   MG 1.90 2.10  --   --   --    ALBUMIN 3.8 3.5 3.5 3.1*  --    PROT 7.3 6.9 7.2 7.2  --    ALKPHOS 102 91 87 91  --    ALT 30 22 22 26  --    AST 31 20 35 31  --    BILITOT 1.1 1.4 1.4 1.3  --      Microbiology Results (last 7 days)       Procedure Component Value Units Date/Time    Respiratory Culture [[id number]]  (Abnormal) Collected: 06/12/25 0817    Order Status: Completed Specimen: Sputum Updated: 06/15/25 0759     Respiratory Culture Many Streptococcus agalactiae (Group B)      Many Haemophilus influenzae     Comment: with normal respiratory romain  Amoxicillin/clavulanate, azithromycin, cefaclor and cefdinir are used as empiric therapy for respiratory tract infections due to Haemophilus.  The results of susceptibility tests with these antimicrobial agents are often not necessary for management of individual patients. Ampicillin only is reported at [facility]. If further susceptibility is needed, please contact the Microbiology department at [phone].  Haemophilus species- if positive beta lactamase resistant to ampicillin and amoxicillin , if negative sensitive to ampicillin and amoxicillin.        Beta Lactamase Negative     GRAM STAIN Quality 2+      Many Gram positive cocci      Few Gram Negative Rods    Blood Culture [[id number]]  (Normal) Collected: 06/12/25 1851    Order Status: Completed Specimen: Blood from Arm, Right Updated: 06/14/25 2001     Blood Culture No Growth At 48 Hours    Blood Culture [[id number]]  (Normal) Collected: 06/12/25 1851    Order Status: Completed Specimen: Blood from Arm, Left Updated: 06/14/25 2001     Blood Culture No Growth At 48 Hours             See below for Radiology    Assessment/Plan:  Acute Hemorrhagic CVA, left occipital lobe hematoma with adjacent subarachnoid hemorrhage  Possible cerebral amyloid angiopathy  Bibasilar PNA ?aspiration  Sepsis  Acute encephalopathy secondary to above all  Acute urinary retention requiring cath  Hyperglycemia, Type 1 Diabetes Mellitus recent A1c 7.1%    Hx  CAD status post stenting, hypertension, hyperlipidemia, prior CVA     Monitor on tele   Neurology, Neurosurgery team signed off   No antiplatelets for at least 2 weeks till 6/23   continue Keppra per NSGY  Monitor BP, goal less than 140, continue Coreg, losartan, titrate , use IV p.r.n.  Cont  IV Zosyn, respiratory cultures growing many strep group B, many Haemophilus influenzae  Follow  blood cultures,  MRSA PCR negative  Supplement O2 as needed to maintain saturations, wean as tolerated  GI consulted for PEG placement, planning PEG once sepsis improve, follow recommendations   Continue Chao care, follow urology recommendations   Palliative care team following  Titrate  Seroquel  PT/OT, Speech therapy evaluations, NG tube feeds  Monitor blood sugars, cover with ISS, long-acting insulin, titrate.  Maintain aspiration precautions, delirium precautions     Code status: DNR    VTE prophylaxis:  SCDs    Patient condition:  Guarded    Anticipated discharge and Disposition:   TBD    Critical care time spent:40min  Critical care dx: sepsis     Portions of this note dictated using EMR integrated voice recognition software, and may be subject to voice recognition errors not corrected at proofreading. Please contact writer for clarification if needed.   _____________________________________________________________________    Malnutrition Status:  Nutrition consulted. Most recent weight and BMI monitored-     Measurements:  Wt Readings from Last 1 Encounters:   06/11/25 65 kg (143 lb 4.8 oz)   Body mass index is 26.21 kg/m².    Patient has been screened and assessed by RD.    Malnutrition Type:  Context:    Level: other (see comments) (Does not meet criteria)    Malnutrition Characteristic Summary:  Weight Loss (Malnutrition): other (see comments) (Unable to assess)  Energy Intake (Malnutrition): other (see comments) (Unable to assess)  Muscle Mass (Malnutrition): mild depletion    Interventions/Recommendations (treatment strategy):        Scheduled Med:   carvediloL  12.5 mg Per NG tube BID    insulin glargine U-100  15 Units Subcutaneous BID    levetiracetam  500 mg Per NG tube BID    LIDOcaine HCl 2%   Urethral Once    losartan  50 mg Per NG tube Daily    OLANZapine  10 mg Per NG tube Daily    piperacillin-tazobactam (Zosyn) IV (PEDS and ADULTS) (extended infusion is not appropriate)  4.5 g Intravenous Q8H    scopolamine  1 patch Transdermal Once    sodium chloride 0.9%  500 mL Intravenous Once      Continuous Infusions:     PRN Meds:    Current Facility-Administered Medications:     acetaminophen, 650 mg, Per NG tube, Q6H PRN    bisacodyL, 10 mg, Rectal, Daily PRN     butalbital-acetaminophen-caffeine -40 mg, 1 tablet, Per NG tube, Q4H PRN    dextrose 50%, 12.5 g, Intravenous, PRN    glucagon (human recombinant), 1 mg, Intramuscular, PRN    haloperidol lactate, 5 mg, Intravenous, Q6H PRN    insulin aspart U-100, 0-5 Units, Subcutaneous, Q6H PRN    labetalol, 10 mg, Intravenous, Q15 Min PRN    levalbuterol, 0.63 mg, Nebulization, Q4H PRN    lorazepam, 1 mg, Intravenous, Q4H PRN    ondansetron, 4 mg, Intravenous, Q8H PRN    QUEtiapine, 50 mg, Per NG tube, Q12H PRN    sodium chloride 0.9%, 10 mL, Intravenous, PRN     Radiology:  I have personally reviewed the following imaging and agree with the radiologist.     CT Chest Abdomen Pelvis With IV Contrast (XPD) NO Oral Contrast  Narrative: EXAMINATION:  CT CHEST ABDOMEN PELVIS WITH IV CONTRAST (XPD)    CLINICAL HISTORY:  Sepsis;    TECHNIQUE:  Axial images of the chest abdomen and pelvis were obtained with IV contrast administration.  Coronal and sagittal reconstructions were provided.  Three dimensional and MIP images were obtained and evaluated.  Total DLP was 475 mGy-cm. Dose lowering technique and automated exposure control were utilized for this exam.    COMPARISON:  None    FINDINGS:  The airway is widely patent.  There is no mediastinal or hilar lymphadenopathy.  There is no central pulmonary embolus.  The heart is not enlarged.  There is a nasogastric tube in the gastric antrum.    There are dense left greater than right lower lobe airspace consolidations with internal air bronchograms.  There is no pleural effusion.  There is no pulmonary nodule or mass.    The liver is homogeneous in attenuation.  The portal vein is patent.  The gallbladder, spleen, pancreas and adrenal glands are normal.  The bilateral kidneys are normal.  There is no hydronephrosis or nephrolithiasis.    The stomach and small bowel are decompressed.  The appendix is not definitively visualized.  There is a large amount of stool throughout the colon.   The urinary bladder is normal.  There is no pelvic or retroperitoneal lymphadenopathy.  The aorta is nonaneurysmal.  There is no lytic or blastic osseous lesion.  Impression: 1. Dense airspace consolidations in the left greater than right lower lobes favored to reflect pneumonia versus aspiration.  2. Constipation.    Electronically signed by: Aaron Chris MD  Date:    06/14/2025  Time:    11:39  CT Head Without Contrast  Narrative: EXAMINATION:  CT HEAD WITHOUT CONTRAST    CLINICAL HISTORY:  Stroke, follow up;    TECHNIQUE:  Axial images of the head were obtained without IV contrast administration.  Coronal and sagittal reconstructions were provided.  Three dimensional and MIP images were obtained and evaluated.  Total DLP was 988 mGy-cm. Dose lowering technique and automated exposure control were utilized for this exam.    COMPARISON:  CT of the head 06/10/2025.    FINDINGS:  The left parietal intraparenchymal hematoma has not significantly changed.  The trace surrounding edema is unchanged.  There is no new hemorrhage.  The trace subarachnoid blood components in left parietal region are unchanged.  There is no downward transtentorial herniation.  There is no midline shift.    The calvarium is intact.  There is no fracture.  The bilateral orbits are normal.  The paranasal sinuses are free of disease.  Impression: Stable appearance of the left parietal intraparenchymal hematoma with minimal surrounding edema.  There is no new hemorrhage and there is no herniation.    Electronically signed by: Aaron Chris MD  Date:    06/14/2025  Time:    11:38      Simon Martinez MD  Department of Hospital Medicine   [facility]   06/15/2025

## 2025-06-16 ENCOUNTER — ANESTHESIA EVENT (OUTPATIENT)
Dept: ENDOSCOPY | Facility: HOSPITAL | Age: 65
End: 2025-06-16
Payer: MEDICARE

## 2025-06-16 ENCOUNTER — ANESTHESIA (OUTPATIENT)
Dept: ENDOSCOPY | Facility: HOSPITAL | Age: 65
End: 2025-06-16
Payer: MEDICARE

## 2025-06-16 LAB
ANION GAP SERPL CALC-SCNC: 12 MEQ/L
BASOPHILS # BLD AUTO: 0.08 X10(3)/MCL
BASOPHILS NFR BLD AUTO: 0.6 %
BUN SERPL-MCNC: 27.3 MG/DL (ref 8.4–25.7)
CALCIUM SERPL-MCNC: 8.6 MG/DL (ref 8.8–10)
CHLORIDE SERPL-SCNC: 103 MMOL/L (ref 98–107)
CO2 SERPL-SCNC: 33 MMOL/L (ref 23–31)
CREAT SERPL-MCNC: 1.1 MG/DL (ref 0.72–1.25)
CREAT/UREA NIT SERPL: 25
EOSINOPHIL # BLD AUTO: 0.11 X10(3)/MCL (ref 0–0.9)
EOSINOPHIL NFR BLD AUTO: 0.9 %
ERYTHROCYTE [DISTWIDTH] IN BLOOD BY AUTOMATED COUNT: 12.2 % (ref 11.5–17)
GFR SERPLBLD CREATININE-BSD FMLA CKD-EPI: >60 ML/MIN/1.73/M2
GLUCOSE SERPL-MCNC: 268 MG/DL (ref 82–115)
HCT VFR BLD AUTO: 40.1 % (ref 42–52)
HGB BLD-MCNC: 12.9 G/DL (ref 14–18)
IMM GRANULOCYTES # BLD AUTO: 0.09 X10(3)/MCL (ref 0–0.04)
IMM GRANULOCYTES NFR BLD AUTO: 0.7 %
INR PPP: 1
LYMPHOCYTES # BLD AUTO: 1.75 X10(3)/MCL (ref 0.6–4.6)
LYMPHOCYTES NFR BLD AUTO: 14 %
MCH RBC QN AUTO: 30.6 PG (ref 27–31)
MCHC RBC AUTO-ENTMCNC: 32.2 G/DL (ref 33–36)
MCV RBC AUTO: 95 FL (ref 80–94)
MONOCYTES # BLD AUTO: 1.39 X10(3)/MCL (ref 0.1–1.3)
MONOCYTES NFR BLD AUTO: 11.1 %
NEUTROPHILS # BLD AUTO: 9.11 X10(3)/MCL (ref 2.1–9.2)
NEUTROPHILS NFR BLD AUTO: 72.7 %
NRBC BLD AUTO-RTO: 0 %
PLATELET # BLD AUTO: 248 X10(3)/MCL (ref 130–400)
PMV BLD AUTO: 10 FL (ref 7.4–10.4)
POCT GLUCOSE: 218 MG/DL (ref 70–110)
POTASSIUM SERPL-SCNC: 3.8 MMOL/L (ref 3.5–5.1)
PROTHROMBIN TIME: 13.4 SECONDS (ref 12.5–14.5)
RBC # BLD AUTO: 4.22 X10(6)/MCL (ref 4.7–6.1)
SODIUM SERPL-SCNC: 148 MMOL/L (ref 136–145)
WBC # BLD AUTO: 12.53 X10(3)/MCL (ref 4.5–11.5)

## 2025-06-16 PROCEDURE — 37000009 HC ANESTHESIA EA ADD 15 MINS: Performed by: INTERNAL MEDICINE

## 2025-06-16 PROCEDURE — 25000003 PHARM REV CODE 250: Performed by: INTERNAL MEDICINE

## 2025-06-16 PROCEDURE — 99900031 HC PATIENT EDUCATION (STAT)

## 2025-06-16 PROCEDURE — 43246 EGD PLACE GASTROSTOMY TUBE: CPT | Performed by: INTERNAL MEDICINE

## 2025-06-16 PROCEDURE — 25000242 PHARM REV CODE 250 ALT 637 W/ HCPCS: Performed by: NURSE PRACTITIONER

## 2025-06-16 PROCEDURE — 63600175 PHARM REV CODE 636 W HCPCS

## 2025-06-16 PROCEDURE — 36415 COLL VENOUS BLD VENIPUNCTURE: CPT | Performed by: INTERNAL MEDICINE

## 2025-06-16 PROCEDURE — 27000221 HC OXYGEN, UP TO 24 HOURS

## 2025-06-16 PROCEDURE — 94760 N-INVAS EAR/PLS OXIMETRY 1: CPT

## 2025-06-16 PROCEDURE — 37000008 HC ANESTHESIA 1ST 15 MINUTES: Performed by: INTERNAL MEDICINE

## 2025-06-16 PROCEDURE — 63600175 PHARM REV CODE 636 W HCPCS: Performed by: NURSE ANESTHETIST, CERTIFIED REGISTERED

## 2025-06-16 PROCEDURE — 27201423 OPTIME MED/SURG SUP & DEVICES STERILE SUPPLY: Performed by: INTERNAL MEDICINE

## 2025-06-16 PROCEDURE — 0DH63UZ INSERTION OF FEEDING DEVICE INTO STOMACH, PERCUTANEOUS APPROACH: ICD-10-PCS | Performed by: INTERNAL MEDICINE

## 2025-06-16 PROCEDURE — 21400001 HC TELEMETRY ROOM

## 2025-06-16 PROCEDURE — 99231 SBSQ HOSP IP/OBS SF/LOW 25: CPT | Mod: ,,,

## 2025-06-16 PROCEDURE — 85025 COMPLETE CBC W/AUTO DIFF WBC: CPT | Performed by: INTERNAL MEDICINE

## 2025-06-16 PROCEDURE — 99900035 HC TECH TIME PER 15 MIN (STAT)

## 2025-06-16 PROCEDURE — 63600175 PHARM REV CODE 636 W HCPCS: Performed by: INTERNAL MEDICINE

## 2025-06-16 PROCEDURE — 31720 CLEARANCE OF AIRWAYS: CPT

## 2025-06-16 PROCEDURE — 25000003 PHARM REV CODE 250: Performed by: NURSE PRACTITIONER

## 2025-06-16 PROCEDURE — 85610 PROTHROMBIN TIME: CPT

## 2025-06-16 PROCEDURE — 80048 BASIC METABOLIC PNL TOTAL CA: CPT | Performed by: INTERNAL MEDICINE

## 2025-06-16 PROCEDURE — 94640 AIRWAY INHALATION TREATMENT: CPT

## 2025-06-16 PROCEDURE — 94667 MNPJ CHEST WALL 1ST: CPT

## 2025-06-16 RX ORDER — ALUMINUM HYDROXIDE, MAGNESIUM HYDROXIDE, AND SIMETHICONE 1200; 120; 1200 MG/30ML; MG/30ML; MG/30ML
30 SUSPENSION ORAL
Status: DISCONTINUED | OUTPATIENT
Start: 2025-06-16 | End: 2025-06-17

## 2025-06-16 RX ORDER — LOSARTAN POTASSIUM 50 MG/1
50 TABLET ORAL ONCE
Status: COMPLETED | OUTPATIENT
Start: 2025-06-16 | End: 2025-06-16

## 2025-06-16 RX ORDER — SUCRALFATE 1 G/1
1 TABLET ORAL EVERY 6 HOURS
Status: DISCONTINUED | OUTPATIENT
Start: 2025-06-17 | End: 2025-06-17

## 2025-06-16 RX ORDER — INSULIN GLARGINE 100 [IU]/ML
18 INJECTION, SOLUTION SUBCUTANEOUS 2 TIMES DAILY
Status: DISCONTINUED | OUTPATIENT
Start: 2025-06-16 | End: 2025-07-03

## 2025-06-16 RX ORDER — LIDOCAINE HYDROCHLORIDE 20 MG/ML
INJECTION, SOLUTION EPIDURAL; INFILTRATION; INTRACAUDAL; PERINEURAL
Status: DISPENSED
Start: 2025-06-16 | End: 2025-06-17

## 2025-06-16 RX ORDER — SODIUM CHLORIDE, SODIUM LACTATE, POTASSIUM CHLORIDE, CALCIUM CHLORIDE 600; 310; 30; 20 MG/100ML; MG/100ML; MG/100ML; MG/100ML
INJECTION, SOLUTION INTRAVENOUS CONTINUOUS PRN
Status: DISCONTINUED | OUTPATIENT
Start: 2025-06-16 | End: 2025-06-16

## 2025-06-16 RX ORDER — CARVEDILOL 12.5 MG/1
25 TABLET ORAL 2 TIMES DAILY
Status: DISCONTINUED | OUTPATIENT
Start: 2025-06-16 | End: 2025-06-16

## 2025-06-16 RX ORDER — LIDOCAINE HYDROCHLORIDE 20 MG/ML
INJECTION INTRAVENOUS
Status: DISCONTINUED | OUTPATIENT
Start: 2025-06-16 | End: 2025-06-16

## 2025-06-16 RX ORDER — CARVEDILOL 12.5 MG/1
12.5 TABLET ORAL 2 TIMES DAILY
Status: DISCONTINUED | OUTPATIENT
Start: 2025-06-16 | End: 2025-06-17

## 2025-06-16 RX ORDER — PROPOFOL 10 MG/ML
VIAL (ML) INTRAVENOUS
Status: DISPENSED
Start: 2025-06-16 | End: 2025-06-17

## 2025-06-16 RX ORDER — LOSARTAN POTASSIUM 50 MG/1
100 TABLET ORAL DAILY
Status: DISCONTINUED | OUTPATIENT
Start: 2025-06-17 | End: 2025-06-17

## 2025-06-16 RX ORDER — PROPOFOL 10 MG/ML
INJECTION, EMULSION INTRAVENOUS
Status: DISCONTINUED | OUTPATIENT
Start: 2025-06-16 | End: 2025-06-16

## 2025-06-16 RX ADMIN — LIDOCAINE HYDROCHLORIDE 50 MG: 20 INJECTION INTRAVENOUS at 03:06

## 2025-06-16 RX ADMIN — LEVALBUTEROL HYDROCHLORIDE 0.63 MG: 0.63 SOLUTION RESPIRATORY (INHALATION) at 12:06

## 2025-06-16 RX ADMIN — ACETYLCYSTEINE 2 ML: 200 INHALANT RESPIRATORY (INHALATION) at 09:06

## 2025-06-16 RX ADMIN — PIPERACILLIN SODIUM AND TAZOBACTAM SODIUM 4.5 G: 4; .5 INJECTION, POWDER, LYOPHILIZED, FOR SOLUTION INTRAVENOUS at 09:06

## 2025-06-16 RX ADMIN — LEVALBUTEROL HYDROCHLORIDE 0.63 MG: 0.63 SOLUTION RESPIRATORY (INHALATION) at 09:06

## 2025-06-16 RX ADMIN — LORAZEPAM 1 MG: 2 INJECTION INTRAMUSCULAR; INTRAVENOUS at 04:06

## 2025-06-16 RX ADMIN — SCOPOLAMINE 1 PATCH: 1 PATCH TRANSDERMAL at 01:06

## 2025-06-16 RX ADMIN — HALOPERIDOL LACTATE 5 MG: 5 INJECTION, SOLUTION INTRAMUSCULAR at 09:06

## 2025-06-16 RX ADMIN — SODIUM CHLORIDE, POTASSIUM CHLORIDE, SODIUM LACTATE AND CALCIUM CHLORIDE: 600; 310; 30; 20 INJECTION, SOLUTION INTRAVENOUS at 03:06

## 2025-06-16 RX ADMIN — ACETYLCYSTEINE 2 ML: 200 INHALANT RESPIRATORY (INHALATION) at 12:06

## 2025-06-16 RX ADMIN — CARVEDILOL 12.5 MG: 12.5 TABLET, FILM COATED ORAL at 09:06

## 2025-06-16 RX ADMIN — PROPOFOL 30 MG: 10 INJECTION, EMULSION INTRAVENOUS at 03:06

## 2025-06-16 RX ADMIN — PIPERACILLIN SODIUM AND TAZOBACTAM SODIUM 4.5 G: 4; .5 INJECTION, POWDER, LYOPHILIZED, FOR SOLUTION INTRAVENOUS at 05:06

## 2025-06-16 RX ADMIN — ALUMINUM HYDROXIDE, MAGNESIUM HYDROXIDE, AND DIMETHICONE 30 ML: 200; 20; 200 SUSPENSION ORAL at 09:06

## 2025-06-16 RX ADMIN — PROPOFOL 50 MG: 10 INJECTION, EMULSION INTRAVENOUS at 03:06

## 2025-06-16 RX ADMIN — LEVETIRACETAM 500 MG: 500 SOLUTION ORAL at 09:06

## 2025-06-16 RX ADMIN — LOSARTAN POTASSIUM 50 MG: 50 TABLET, FILM COATED ORAL at 09:06

## 2025-06-16 NOTE — TRANSFER OF CARE
"Anesthesia Transfer of Care Note    Patient: Federico Villarreal    Procedure(s) Performed: Procedure(s) (LRB):  PEG (N/A)    Patient location: GI    Anesthesia Type: general    Transport from OR: Transported from OR on room air with adequate spontaneous ventilation. Continuous SpO2 monitoring in transport    Post pain: adequate analgesia    Post assessment: no apparent anesthetic complications    Post vital signs: stable    Level of consciousness: awake (baseline mentation)    Nausea/Vomiting: no nausea/vomiting    Complications: none    Transfer of care protocol was followedComments: Vs stable. Report per sbar    Last vitals: Visit Vitals  BP (!) 165/80   Pulse 103   Temp 36.6 °C (97.9 °F)   Resp 18   Ht 5' 2" (1.575 m)   Wt 65 kg (143 lb 4.8 oz)   SpO2 97%   BMI 26.21 kg/m²     "

## 2025-06-16 NOTE — PROGRESS NOTES
[facility] Hospital Medicine Progress Note        Chief Complaint: Inpatient Follow-up     HPI:   64-year-old male with type 1 diabetes mellitus, CAD status post stenting, hypertension, hyperlipidemia, prior CVA  presented on 06/09/2025 for evaluation of sudden onset headache, confusion, slurred speech.  Workup revealed  CT head revealed left occipital lobe parenchymal hemorrhage with trace adjacent subarachnoid hemorrhage.   CTA head/neck was also completed in ED, which revealed no large vessel occlusion, flow-limiting stenosis, aneurysm or evidence of a vascular malformation   MRI brain report:  1. Left occipital lobe hematoma with adjacent subarachnoid hemorrhage.  2. Innumerable chronic microhemorrhages with a subcortical location, may indicate underlying cerebral amyloid angiopathy.  3. Mild chronic microvascular ischemic changes     Neurology team evaluated the patient, initiated on stroke protocol, admitted to ICU services.  Neurosurgery team evaluated the patient, MRI likely suggestive of amyloid angiopathy, repeat CT head 6/10/25 unchanged, suggested holding antiplatelets for at least 2 weeks.  Patient is started on Keppra.  Neurosurgical team signed off.     Patient required Chao placement for retention, neurology team evaluated.     Patient cleared to downgrade to floors 06/12/2025 PM by ICU team.    Palliative care team consulted while patient in ICU, code status DNR at the time of downgrade.     Pt had fever ,tmax 102.8, tachycardic,wbc count increased 17K, decided to obtain pan scan, CT head stable, but notes to have dense consolidation left >right LL, constipation  Respiratory cultures were obtained grew many strep group B Haemophilus influenzae    Patient remains unsafe for p.o. intake with speech evaluations, informed decision with the patient's spouse was made, GI team consulted for PEG placement        Interval Hx:   No acute events reported overnight. Seen at  bedside , patient's spouse at bedside in room,  patient lying in bed comfortably, easily arousable, confused, following simple commands, minimally able to verbalize words, spontaneously moving his extremities currently NPO for possible PEG placement later today discussed plan of care    T-max 102.7, last evening no fevers overnight, remains on nasal cannula, low-grade tachycardia    Labs from this morning showed improving leukocytosis 12.5 K, hemoglobin stable at 12.9, sodium 148, glucose 268    Care discussed with the patient's nurse at bedside    Objective/physical exam:  General: In no acute distress  Chest:  b/l rhonchorus , breathing on nasal cannula  Heart: Tachycardia sinus  Neuro:  Alert awake confused , spontaneously moving  all extremities, and on command  Abdomen:  Soft, Nontender    VITAL SIGNS: 24 HRS MIN & MAX LAST   Temp  Min: 97.7 °F (36.5 °C)  Max: 102.7 °F (39.3 °C) 97.9 °F (36.6 °C)   BP  Min: 118/65  Max: 162/90 (!) 155/75   Pulse  Min: 97  Max: 111  110   Resp  Min: 18  Max: 24 20   SpO2  Min: 92 %  Max: 96 % 96 %     I have reviewed the following labs:  Recent Labs   Lab 06/14/25  0833 06/15/25  0954 06/16/25  0412   WBC 17.52* 15.74* 12.53*   RBC 4.14* 4.01* 4.22*   HGB 12.8* 12.3* 12.9*   HCT 38.4* 37.2* 40.1*   MCV 92.8 92.8 95.0*   MCH 30.9 30.7 30.6   MCHC 33.3 33.1 32.2*   RDW 12.0 12.0 12.2    218 248   MPV 9.8 10.0 10.0     Recent Labs   Lab 06/09/25  0820 06/10/25  0443 06/11/25  0316 06/13/25  0353 06/14/25  0833 06/15/25  0954 06/16/25  0412    141 142 143 141 145 148*   K 4.9 4.7 4.0 3.7 3.7 3.8 3.8    105 105 105 104 107 103   CO2 27 24 26 28 24 31 33*   BUN 12.4 21.3 20.5 19.0 29.8* 27.0* 27.3*   CREATININE 1.02 1.08 0.87 0.93 1.19 1.01 1.10   * 258* 147* 165* 368* 308* 268*   CALCIUM 9.3 8.4* 9.0 9.2 9.3 9.2 8.6*   MG 1.90 2.10  --   --   --   --   --    ALBUMIN 3.8 3.5 3.5 3.1*  --  2.4*  --    PROT 7.3 6.9 7.2 7.2  --  6.8  --    ALKPHOS 102 91 87 91   --  106  --    ALT 30 22 22 26  --  28  --    AST 31 20 35 31  --  28  --    BILITOT 1.1 1.4 1.4 1.3  --  0.6  --      Microbiology Results (last 7 days)       Procedure Component Value Units Date/Time    Blood Culture [[id number]]  (Normal) Collected: 06/12/25 1851    Order Status: Completed Specimen: Blood from Arm, Right Updated: 06/15/25 2001     Blood Culture No Growth At 72 Hours    Blood Culture [[id number]]  (Normal) Collected: 06/12/25 1851    Order Status: Completed Specimen: Blood from Arm, Left Updated: 06/15/25 2001     Blood Culture No Growth At 72 Hours    Respiratory Culture [[id number]]  (Abnormal) Collected: 06/12/25 2143    Order Status: Completed Specimen: Sputum Updated: 06/15/25 1255     Respiratory Culture Many Streptococcus agalactiae (Group B)      Many Haemophilus influenzae     Comment: with normal respiratory romain  Amoxicillin/clavulanate, azithromycin, cefaclor and cefdinir are used as empiric therapy for respiratory tract infections due to Haemophilus. The results of susceptibility tests with these antimicrobial agents are often not necessary for management of individual patients. Ampicillin only is reported at [facility]. If further susceptibility is needed, please contact the Microbiology department at [phone].  Haemophilus species- if positive beta lactamase resistant to ampicillin and amoxicillin , if negative sensitive to ampicillin and amoxicillin.        Beta Lactamase Negative     GRAM STAIN Quality 2+      Many Gram positive cocci      Few Gram Negative Rods             See below for Radiology    Assessment/Plan:  Acute Hemorrhagic CVA, left occipital lobe hematoma with adjacent subarachnoid hemorrhage  Possible cerebral amyloid angiopathy  Bibasilar PNA ?aspiration  Sepsis  Acute respiratory failure  Acute encephalopathy secondary to above all  Acute urinary retention requiring cath  Hyperglycemia, Type 1 Diabetes Mellitus recent A1c 7.1%  Hypernatremia  Hx  CAD status post  stenting, hypertension, hyperlipidemia, prior CVA     Monitor on tele   Neurology, Neurosurgery team signed off   No antiplatelets for at least 2 weeks till 6/23   continue Keppra per NSGY  Monitor BP, goal less than 140, continue Coreg, increase losartan , use IV p.r.n.  Cont  IV Zosyn, respiratory cultures growing many strep group B, many Haemophilus influenzae  Follow  blood cultures, so far negative, MRSA PCR negative  Supplement O2 as needed to maintain saturations, wean as tolerated  GI planning PEG placement today , follow recommendations   Follow nutrition recommendations, free water flushes for free water deficit, avoiding IV free water given hyperglycemia , closely monitor blood sugars, updated long-acting insulin, cover with insulin sliding scale  Continue Chao care, follow urology recommendations  Palliative care team following  Seroquel p.r.n.  Therapy services  Maintain aspiration precautions, delirium precautions     Code status: DNR    VTE prophylaxis:  SCDs    Patient condition:  Guarded    Anticipated discharge and Disposition:   TBD    Critical care time spent:40min  Critical care dx: sepsis     Portions of this note dictated using EMR integrated voice recognition software, and may be subject to voice recognition errors not corrected at proofreading. Please contact writer for clarification if needed.   _____________________________________________________________________    Malnutrition Status:  Nutrition consulted. Most recent weight and BMI monitored-     Measurements:  Wt Readings from Last 1 Encounters:   06/11/25 65 kg (143 lb 4.8 oz)   Body mass index is 26.21 kg/m².    Patient has been screened and assessed by RD.    Malnutrition Type:  Context:    Level: other (see comments) (Does not meet criteria)    Malnutrition Characteristic Summary:  Weight Loss (Malnutrition): other (see comments) (Unable to assess)  Energy Intake (Malnutrition): other (see comments) (Unable to assess)  Muscle Mass  (Malnutrition): mild depletion    Interventions/Recommendations (treatment strategy):        Scheduled Med:   carvediloL  12.5 mg Per NG tube BID    insulin glargine U-100  15 Units Subcutaneous BID    levetiracetam  500 mg Per NG tube BID    LIDOcaine HCl 2%   Urethral Once    losartan  50 mg Per NG tube Daily    OLANZapine  10 mg Per NG tube Daily    piperacillin-tazobactam (Zosyn) IV (PEDS and ADULTS) (extended infusion is not appropriate)  4.5 g Intravenous Q8H    scopolamine  1 patch Transdermal Q3 Days    sodium chloride 0.9%  500 mL Intravenous Once      Continuous Infusions:     PRN Meds:    Current Facility-Administered Medications:     acetaminophen, 650 mg, Per NG tube, Q6H PRN    acetylcysteine 200 mg/ml (20%), 2 mL, Nebulization, TID PRN    bisacodyL, 10 mg, Rectal, Daily PRN    butalbital-acetaminophen-caffeine -40 mg, 1 tablet, Per NG tube, Q4H PRN    dextrose 50%, 12.5 g, Intravenous, PRN    dextrose 50%, 25 g, Intravenous, PRN    glucagon (human recombinant), 1 mg, Intramuscular, PRN    glucose, 16 g, Oral, PRN    glucose, 24 g, Oral, PRN    haloperidol lactate, 5 mg, Intravenous, Q6H PRN    insulin aspart U-100, 0-10 Units, Subcutaneous, QID (AC + HS) PRN    labetalol, 10 mg, Intravenous, Q15 Min PRN    levalbuterol, 0.63 mg, Nebulization, Q4H PRN    lorazepam, 1 mg, Intravenous, Q4H PRN    ondansetron, 4 mg, Intravenous, Q8H PRN    QUEtiapine, 50 mg, Per NG tube, Q12H PRN    sodium chloride 0.9%, 10 mL, Intravenous, PRN     Radiology:  I have personally reviewed the following imaging and agree with the radiologist.     CT Chest Abdomen Pelvis With IV Contrast (XPD) NO Oral Contrast  Narrative: EXAMINATION:  CT CHEST ABDOMEN PELVIS WITH IV CONTRAST (XPD)    CLINICAL HISTORY:  Sepsis;    TECHNIQUE:  Axial images of the chest abdomen and pelvis were obtained with IV contrast administration.  Coronal and sagittal reconstructions were provided.  Three dimensional and MIP images were obtained and  evaluated.  Total DLP was 475 mGy-cm. Dose lowering technique and automated exposure control were utilized for this exam.    COMPARISON:  None    FINDINGS:  The airway is widely patent.  There is no mediastinal or hilar lymphadenopathy.  There is no central pulmonary embolus.  The heart is not enlarged.  There is a nasogastric tube in the gastric antrum.    There are dense left greater than right lower lobe airspace consolidations with internal air bronchograms.  There is no pleural effusion.  There is no pulmonary nodule or mass.    The liver is homogeneous in attenuation.  The portal vein is patent.  The gallbladder, spleen, pancreas and adrenal glands are normal.  The bilateral kidneys are normal.  There is no hydronephrosis or nephrolithiasis.    The stomach and small bowel are decompressed.  The appendix is not definitively visualized.  There is a large amount of stool throughout the colon.  The urinary bladder is normal.  There is no pelvic or retroperitoneal lymphadenopathy.  The aorta is nonaneurysmal.  There is no lytic or blastic osseous lesion.  Impression: 1. Dense airspace consolidations in the left greater than right lower lobes favored to reflect pneumonia versus aspiration.  2. Constipation.    Electronically signed by: Aaron Chris MD  Date:    06/14/2025  Time:    11:39  CT Head Without Contrast  Narrative: EXAMINATION:  CT HEAD WITHOUT CONTRAST    CLINICAL HISTORY:  Stroke, follow up;    TECHNIQUE:  Axial images of the head were obtained without IV contrast administration.  Coronal and sagittal reconstructions were provided.  Three dimensional and MIP images were obtained and evaluated.  Total DLP was 988 mGy-cm. Dose lowering technique and automated exposure control were utilized for this exam.    COMPARISON:  CT of the head 06/10/2025.    FINDINGS:  The left parietal intraparenchymal hematoma has not significantly changed.  The trace surrounding edema is unchanged.  There is no new hemorrhage.   The trace subarachnoid blood components in left parietal region are unchanged.  There is no downward transtentorial herniation.  There is no midline shift.    The calvarium is intact.  There is no fracture.  The bilateral orbits are normal.  The paranasal sinuses are free of disease.  Impression: Stable appearance of the left parietal intraparenchymal hematoma with minimal surrounding edema.  There is no new hemorrhage and there is no herniation.    Electronically signed by: Aaron Chris MD  Date:    06/14/2025  Time:    11:38      Simon Martinez MD  Department of Hospital Medicine   [facility]   06/16/2025

## 2025-06-16 NOTE — NURSING
Cbg =249 per pt''s dexcom, spouse refused insulin d/t procedure and pt has a tendency to drop, no insulin given for am CBG

## 2025-06-16 NOTE — PT/OT/SLP PROGRESS
Physical Therapy      Patient Name:  Federico Villarreal   MRN:  [id number]    Patient off floor for PEG placement. Will follow up as schedule permits.

## 2025-06-16 NOTE — PROGRESS NOTES
Patient Name: Federico Villarreal   MRN: [id number]   Admission Date: 6/9/2025   Hospital Length of Stay: 7   Attending Provider: Simon Martinez MD   Consulting Provider: Tiki HINKLE  Reason for Consult: Goals of Care  Primary Care Physician:  Nicole Blanchard FNP     Principal Problem: <principal problem not specified>       Final diagnoses:  [R41.82] AMS (altered mental status)  [I61.9] Intraparenchymal hemorrhage of brain (Primary)  [I10] Hypertension, unspecified type      Assessment/Plan:     I reviewed the patient and family's understanding of the seriousness of the illness and its expected prognosis. We discussed the patient's goals of care and treatment preferences.        Went to assess patient but off floor all afternoon. Will have follow up tomorrow    Recommendations:     Palliative medicine will continue to follow      Interval History:     PEG tube placement today      Active Ambulatory Problems     Diagnosis Date Noted    Cataract of both eyes 07/22/2022    Hearing loss 07/22/2022    Hyperlipidemia 07/22/2022    Hypertension 07/22/2022    Retinal disorder 07/22/2022    Tobacco user 07/22/2022    Type 1 diabetes mellitus 07/22/2022    Vitamin D deficiency 07/22/2022    Coronary artery disease involving native coronary artery of native heart without angina pectoris 07/22/2022    PAD (peripheral artery disease) 07/22/2022    Type 1 diabetes mellitus with microalbuminuria 01/29/2024    Carotid artery disease 05/28/2024    Leukocytosis 07/02/2024    Diabetic polyneuropathy associated with type 1 diabetes mellitus 07/02/2024    Ischemic stroke 09/05/2024    Need for vaccination 10/02/2024     Resolved Ambulatory Problems     Diagnosis Date Noted    Wellness examination 07/22/2022    TIA (transient ischemic attack) 01/11/2024     Past Medical History:   Diagnosis Date    Acne     Cataract     Coronary artery disease     Diabetes mellitus     Seasonal allergies     Stroke         Past Surgical  "History:   Procedure Laterality Date    ADENOIDECTOMY      ADENOIDECTOMY  1972    As a child    CORONARY STENT PLACEMENT  08/20/2012    EYE SURGERY  2011    Multiple    INNER EAR SURGERY      REPAIR OF RETINAL DETACHMENT WITH VITRECTOMY      TONSILLECTOMY      VITRECTOMY          Review of patient's allergies indicates:  No Known Allergies     Current Medications[1]       Current Facility-Administered Medications:     acetaminophen, 650 mg, Per NG tube, Q6H PRN    acetylcysteine 200 mg/ml (20%), 2 mL, Nebulization, TID PRN    bisacodyL, 10 mg, Rectal, Daily PRN    butalbital-acetaminophen-caffeine -40 mg, 1 tablet, Per NG tube, Q4H PRN    dextrose 50%, 12.5 g, Intravenous, PRN    dextrose 50%, 25 g, Intravenous, PRN    glucagon (human recombinant), 1 mg, Intramuscular, PRN    glucose, 16 g, Oral, PRN    glucose, 24 g, Oral, PRN    haloperidol lactate, 5 mg, Intravenous, Q6H PRN    insulin aspart U-100, 0-10 Units, Subcutaneous, QID (AC + HS) PRN    labetalol, 10 mg, Intravenous, Q15 Min PRN    levalbuterol, 0.63 mg, Nebulization, Q4H PRN    lorazepam, 1 mg, Intravenous, Q4H PRN    ondansetron, 4 mg, Intravenous, Q8H PRN    QUEtiapine, 50 mg, Per NG tube, Q12H PRN    sodium chloride 0.9%, 10 mL, Intravenous, PRN     Family History   Problem Relation Name Age of Onset    Diabetes Mother Mamta Villarreal     Diabetes Father Mayito Villarreal     Dementia Father Mayito Villarreal           Review of Systems         Objective:   BP (!) 155/75   Pulse 110   Temp 97.9 °F (36.6 °C) (Oral)   Resp 20   Ht 5' 2" (1.575 m)   Wt 65 kg (143 lb 4.8 oz)   SpO2 95%   BMI 26.21 kg/m²      Physical Exam         Palliative Exam  Advance Care Planning   Advance Care Planning        PAINAD: NA    Caregiver burden formerly assessed: Yes      No results displayed because visit has over 200 results.               > 50% of 25 min of encounter was spent in chart review, face to face discussion of goals of care, symptom assessment, " coordination of care and emotional support.    ARIN Bear  Palliative Medicine  [facility]           [1]   Current Facility-Administered Medications:     acetaminophen tablet 650 mg, 650 mg, Per NG tube, Q6H PRN, Simon Martinez MD, 650 mg at 06/15/25 2158    acetylcysteine 200 mg/ml (20%) solution 2 mL, 2 mL, Nebulization, TID PRN, Gabriela Hunter FNP, 2 mL at 06/16/25 0054    bisacodyL suppository 10 mg, 10 mg, Rectal, Daily PRN, Bharat Dominguez MD    butalbital-acetaminophen-caffeine -40 mg per tablet 1 tablet, 1 tablet, Per NG tube, Q4H PRN, Malinda Hogue MD, 1 tablet at 06/11/25 1126    carvediloL tablet 12.5 mg, 12.5 mg, Per NG tube, BID, Simon Martinez MD, 12.5 mg at 06/15/25 2123    dextrose 50% injection 12.5 g, 12.5 g, Intravenous, PRN, Simon Martinez MD    dextrose 50% injection 25 g, 25 g, Intravenous, PRN, Simon Martinez MD    glucagon (human recombinant) injection 1 mg, 1 mg, Intramuscular, PRN, Simon Martinez MD    glucose chewable tablet 16 g, 16 g, Oral, PRN, Simon Martinez MD    glucose chewable tablet 24 g, 24 g, Oral, PRN, Simon Martinez MD    haloperidol lactate injection 5 mg, 5 mg, Intravenous, Q6H PRN, Simon Martinez MD, 5 mg at 06/15/25 2158    insulin aspart U-100 injection 0-10 Units, 0-10 Units, Subcutaneous, QID (AC + HS) PRN, Simon Martinez MD, 2 Units at 06/15/25 2143    insulin glargine U-100 (Lantus) injection 18 Units, 18 Units, Subcutaneous, BID, Simon Martinez MD    labetaloL injection 10 mg, 10 mg, Intravenous, Q15 Min PRN, Bharat Dominguez MD, 10 mg at 06/13/25 0508    levalbuterol nebulizer solution 0.63 mg, 0.63 mg, Nebulization, Q4H PRN, Gabriela Hunter, FNP, 0.63 mg at 06/16/25 0054    levetiracetam 500 mg/5 mL (5 mL) liquid Soln 500 mg, 500 mg, Per NG tube, BID, Simon Martinez MD, 500 mg at 06/15/25 2123    LIDOcaine HCl 2% urojet, , Urethral, Once, Verónica Rob,  AGACNP-BC    LORazepam injection 1 mg, 1 mg, Intravenous, Q4H PRN, Tiki Mishra FNP, 1 mg at 06/16/25 0417    losartan tablet 50 mg, 50 mg, Per NG tube, Daily, Simon Martinez MD, 50 mg at 06/15/25 0953    OLANZapine tablet 10 mg, 10 mg, Per NG tube, Daily, Malinda Hogue MD, 10 mg at 06/15/25 1618    ondansetron injection 4 mg, 4 mg, Intravenous, Q8H PRN, Bharat Dominguez MD, 4 mg at 06/09/25 1212    piperacillin-tazobactam (ZOSYN) 4.5 g in D5W 100 mL IVPB (MB+), 4.5 g, Intravenous, Q8H, Simon Martinez MD, Last Rate: 25 mL/hr at 06/16/25 0523, 4.5 g at 06/16/25 0523    QUEtiapine tablet 50 mg, 50 mg, Per NG tube, Q12H PRN, Simon Martinez MD, 50 mg at 06/15/25 1239    scopolamine 1.3-1.5 mg (1 mg over 3 days) 1 patch, 1 patch, Transdermal, Q3 Days, Gabriela Hunter FNP, 1 patch at 06/16/25 0111    sodium chloride 0.9% bolus 500 mL 500 mL, 500 mL, Intravenous, Once, Gabriela Hunter FNP, Stopped at 06/13/25 0231    sodium chloride 0.9% flush 10 mL, 10 mL, Intravenous, PRN, Bharat Dominguez MD

## 2025-06-16 NOTE — PROCEDURES
Federico Villarreal   MRN: [id number]   ADMISSION DATE: 2025  : 1960  AGE: 64 y.o.    DATE OF PROCEDURE:  2025     PROCEDURE:  Percutaneous percutaneous endoscopic gastrostomy.    SURGEON: HANNY DONNELLY    PREOPERATIVE DIAGNOSIS:  History of CVA, history of dysphagia    POSTOPERATIVE DIAGNOSIS:  1. Hiatal hernia, 2-3 cm.    2. Erosive gastritis, body and antrum.  3. Successful percutaneous endoscopic gastrostomy.      HISTORY AND PHYSICAL:  As per preoperative note.      DESCRIPTION OF PROCEDURE:  Informed consent was obtained.  Patient was placed in left lateral position.  Sedation per anesthesia service.  Olympus video gastroscope was introduced into the oral cavity and esophagus was intubated under direct visualization. The scope was carefully advanced to the distal duodenum.  Patient tolerated the procedure well without any complications.    FINDINGS:  Esophagus: Normal mucosa.  There was a 2-3 cm hiatal hernia.  Stomach:  Fundus and cardia appeared unremarkable.  There were scattered erosions noted in body and antrum.  No evidence of ulceration.  Duodenum: Duodenal bulb, 2nd and 3rd portion of the duodenum appeared unremarkable to the extent of exam.    The gastroscope was brought back into the stomach.  Adequate air insufflation was done.  Transillumination was done.  Light was clearly seen in the left upper quadrant close to the midline.  Position was confirmed with the finger indentation as well.  Site was sterilized.  Snare loop was passed via the biopsy port of the gastroscope.  Local anesthetic needle was clearly seen in the stomach.  A small incision was made with a scalpel.  This was replaced with a trocar with a sheath which was grasped with a snare loop.  Trocar was withdrawn and replaced with a wire.  This was grasped with a snare loop.  Peg tube was placed using standard Ponsky's technique.  Twenty Faroese tube was used.  Bumper was at approximately 3.0 cm.  Aseptic sterile dressing was  done.  Gastroscope was reintroduced.  Peg tube seemed to be in good location.  Photodocumentation was obtained.     ESTIMATED BLOOD LOSS:  3-4 cc.    COMPLICATIONS:  None    RECOMMENDATIONS:  1.  Follow up postprocedure orders.    2.  Do not use PEG tube today for nutrition.  Can start for PEG tube feeding tomorrow morning as per post procedures order if abdominal exam is unremarkable.  3. Nutrition consult regarding calorie intake.

## 2025-06-16 NOTE — PLAN OF CARE
Patient is currently in GI Lab for peg tube placement. Will follow up to discuss discharge planning (SNF).

## 2025-06-16 NOTE — PT/OT/SLP PROGRESS
Occupational Therapy      Patient Name:  Federico Villarreal   MRN:  [id number]    Patient not seen today secondary to OOR for PEG placement  . Will follow-up as appropriate.    6/16/2025

## 2025-06-16 NOTE — PROGRESS NOTES
Inpatient Nutrition Assessment    Admit Date: 6/9/2025   Total duration of encounter: 7 days   Patient Age: 64 y.o.    Nutrition Recommendation/Prescription     Restart EN once medically feasible. TF recs:  Diabetisource AC goal rate 75 ml/hr to provide:  1800 kcal 100% needs  90 g protein 100% needs  162 g CHO 92% needs  1230 ml water 77% needs, recommend 45 ml water flush every 2 hours to meet estimated fluid requirements    If bolus feeds preferred, recommend bolus 300 mL Diabetisource AC 5x/day. Flush tube with 50 mL free water before and after each bolus to better meet fluid needs.     Communication of Recommendations: reviewed with nurse    Nutrition Assessment     Malnutrition Assessment/Nutrition-Focused Physical Exam       Malnutrition Level: other (see comments) (Does not meet criteria) (06/11/25 [facility])  Energy Intake (Malnutrition): other (see comments) (Unable to assess) (06/11/25 1352)  Weight Loss (Malnutrition): other (see comments) (Unable to assess) (06/11/25 [facility])              Muscle Mass (Malnutrition): mild depletion (06/11/25 [facility])  [place] (Muscle Loss): mild depletion                                A minimum of two characteristics is recommended for diagnosis of either severe or non-severe malnutrition.    Chart Review    Reason Seen: physician consult for nurse requesting tube feeding and follow-up    Malnutrition Screening Tool Results   Have you recently lost weight without trying?: No  Have you been eating poorly because of a decreased appetite?: No   MST Score: 0   Diagnosis: left occipital hemorrhagic stroke     Relevant Medical History: T1DM, coronary artery disease status post stenting, hypertension, hyperlipidemia, history of ischemic stroke with previous left-sided weakness    Scheduled Medications:  carvediloL, 12.5 mg, BID  insulin glargine U-100, 18 Units, BID  levetiracetam, 500 mg, BID  LIDOcaine HCl 2%, , Once  losartan, 50 mg, Daily  OLANZapine, 10 mg,  Daily  piperacillin-tazobactam (Zosyn) IV (PEDS and ADULTS) (extended infusion is not appropriate), 4.5 g, Q8H  scopolamine, 1 patch, Q3 Days  sodium chloride 0.9%, 500 mL, Once    Continuous Infusions:     PRN Medications:   acetaminophen, 650 mg, Q6H PRN  acetylcysteine 200 mg/ml (20%), 2 mL, TID PRN  bisacodyL, 10 mg, Daily PRN  butalbital-acetaminophen-caffeine -40 mg, 1 tablet, Q4H PRN  dextrose 50%, 12.5 g, PRN  dextrose 50%, 25 g, PRN  glucagon (human recombinant), 1 mg, PRN  glucose, 16 g, PRN  glucose, 24 g, PRN  haloperidol lactate, 5 mg, Q6H PRN  insulin aspart U-100, 0-10 Units, QID (AC + HS) PRN  labetalol, 10 mg, Q15 Min PRN  levalbuterol, 0.63 mg, Q4H PRN  lorazepam, 1 mg, Q4H PRN  ondansetron, 4 mg, Q8H PRN  QUEtiapine, 50 mg, Q12H PRN  sodium chloride 0.9%, 10 mL, PRN    Calorie Containing IV Medications: no significant kcals from medications at this time    Recent Labs   Lab 06/09/25  1100 06/10/25  0443 06/11/25  0316 06/13/25  0353 06/14/25  0833 06/15/25  0954 06/16/25  0412   NA  --  141 142 143 141 145 148*   K  --  4.7 4.0 3.7 3.7 3.8 3.8   CALCIUM  --  8.4* 9.0 9.2 9.3 9.2 8.6*   PHOS  --  3.6  --   --   --   --   --    MG  --  2.10  --   --   --   --   --    CL  --  105 105 105 104 107 103   CO2  --  24 26 28 24 31 33*   BUN  --  21.3 20.5 19.0 29.8* 27.0* 27.3*   CREATININE  --  1.08 0.87 0.93 1.19 1.01 1.10   EGFRNORACEVR  --  >60 >60 >60 >60 >60 >60   GLU  --  258* 147* 165* 368* 308* 268*   BILITOT  --  1.4 1.4 1.3  --  0.6  --    ALKPHOS  --  91 87 91  --  106  --    ALT  --  22 22 26  --  28  --    AST  --  20 35 31  --  28  --    ALBUMIN  --  3.5 3.5 3.1*  --  2.4*  --    TRIG 97  --   --   --   --   --   --    HGBA1C 7.1*  --   --   --   --   --   --    WBC  --  15.44* 13.80* 14.03* 17.52* 15.74* 12.53*   HGB  --  13.1* 13.5* 14.3 12.8* 12.3* 12.9*   HCT  --  39.1* 41.1* 42.6 38.4* 37.2* 40.1*     Nutrition Orders:  Diet NPO  Tube Feedings/Formulas 75; 1,500; Diabetisource AC;  "NG; 45; Every 2 hours    Appetite/Oral Intake: NPO/not applicable  Factors Affecting Nutritional Intake: impaired cognitive status/motor control and NPO  Social Needs Impacting Access to Food: unable to assess at this time; will attempt on follow-up  Food/Caodaism/Cultural Preferences: unable to obtain  Food Allergies: none reported  Last Bowel Movement: 06/08/25  Wound(s): no pressure injuries documented at this time     Comments    6/11/25 Patient confused with episodes of agitation, sleeping during rounds. Nurse reports plans to start nasogastric tube feeding, orders provided.    6/13/25 PO intake remains unsafe per SLP. GI consulted for PEG placement possibly on Monday. RN reports patient tolerating tube feeding at 15 mL/hr. Last BM noted on 6/8/25, consider bowel regimen.      6//16/25: Pt's TF being held for PEG placement today. Pt remains NPO per SLP.    Anthropometrics    Height: 5' 2" (157.5 cm), Height Method: Stated  Last Weight: 65 kg (143 lb 4.8 oz) (06/11/25 1348), Weight Method: Bed Scale  BMI (Calculated): 26.2  BMI Classification: overweight (BMI 25-29.9)        Ideal Body Weight (IBW), Male: 118 lb     % Ideal Body Weight, Male (lb): 114.41 %                          Usual Weight Provided By: unable to obtain usual weight    Wt Readings from Last 5 Encounters:   06/11/25 65 kg (143 lb 4.8 oz)   01/27/25 63 kg (139 lb)   12/03/24 64.7 kg (142 lb 9.6 oz)   11/05/24 62 kg (136 lb 11 oz)   10/02/24 62 kg (136 lb 9.6 oz)     Weight Change(s) Since Admission:   6/11/25 initial weight 61.2 kg stated, bed weight 65 kg taken during rounds  Wt Readings from Last 1 Encounters:   06/11/25 1348 65 kg (143 lb 4.8 oz)   06/09/25 0805 61.2 kg (135 lb)   Admit Weight: 61.2 kg (135 lb) (06/09/25 0805), Weight Method: Stated    Estimated Needs    Weight Used For Calorie Calculations: 65 kg (143 lb 4.8 oz)  Energy Calorie Requirements (kcal): 5884-5723, 1.2-1.4 stress factor  Energy Need Method: Chloé " Jeor  Weight Used For Protein Calculations: 65 kg (143 lb 4.8 oz)  Protein Requirements: 78-91 g, 1.2-1.4 g/kg  Fluid Requirements (mL): 1629-1308, 1 ml/kcal  CHO Requirement: 177-207 g, 45% of kcal     Enteral Nutrition  (currently on hold)  Formula: Diabetisource AC  Rate/Volume: 15 mL/hr  Water Flushes: 45 mL every 2hrs  Additives/Modulars: none at this time  Route: nasogastric tube  Method: continuous  Total Nutrition Provided by Tube Feeding, Additives, and Flushes:  Calories Provided  360 kcal/d, 23% needs   Protein Provided  18 g/d, 23% needs   Fluid Provided  696 ml/d, 44% needs   Continuous feeding calculations based on estimated 20 hr/d run time unless otherwise stated.    Parenteral Nutrition Patient not receiving parenteral nutrition support at this time.    Evaluation of Received Nutrient Intake    Calories: not meeting estimated needs  Protein: not meeting estimated needs    Patient Education Not applicable.    Nutrition Diagnosis     PES: Inadequate energy intake related to inability to consume sufficient nutrients as evidenced by less than 80% needs met. (active)  PES: N/A             Nutrition Interventions     Interventions: modified composition of enteral nutrition, modified rate of enteral nutrition, and collaboration with other providers       Goal: Meet greater than 80% of nutritional needs by follow-up. (goal progressing)  Goal: Tolerate enteral feeding at goal rate by follow-up. (goal progressing)    Nutrition Goals & Monitoring     Dietitian will monitor: food and beverage intake, energy intake, enteral nutrition intake, weight, weight change, electrolyte/renal panel, beliefs/attitudes, glucose/endocrine profile, and gastrointestinal profile  Discharge planning: tube feeding (Diabetisource, goal rate of 75 mL/hr or bolus 300 mL 5x/day)  Nutrition Risk/Follow-Up: patient at increased nutrition risk; dietitian will follow-up twice weekly   Please consult if re-assessment needed sooner.

## 2025-06-16 NOTE — ANESTHESIA POSTPROCEDURE EVALUATION
Anesthesia Post Evaluation    Patient: Federico Villarreal    Procedure(s) Performed: Procedure(s) (LRB):  PEG (N/A)    Final Anesthesia Type: general      Patient location during evaluation: PACU  Patient participation: No - Unable to Participate, Coma/Other Inability to Communicate  Level of consciousness: awake and agitated  Post-procedure vital signs: reviewed and stable  Pain management: adequate  Airway patency: patent    PONV status at discharge: No PONV  Anesthetic complications: no      Cardiovascular status: hemodynamically stable  Respiratory status: unassisted  Hydration status: euvolemic  Follow-up not needed.  Comments: Unchanged mental status from preop.  Awake, agitated, confused.              Vitals Value Taken Time   /84 06/16/25 16:24   Temp 36.9 °C (98.4 °F) 06/16/25 16:04   Pulse 106 06/16/25 16:24   Resp 20 06/16/25 16:24   SpO2 99 % 06/16/25 16:24         No case tracking events are documented in the log.      Pain/Loraine Score: Pain Rating Prior to Med Admin: 0 (6/15/2025  9:58 PM)  Loraine Score: 9 (6/16/2025  4:14 PM)

## 2025-06-16 NOTE — ANESTHESIA PREPROCEDURE EVALUATION
06/16/2025  Federico Villarreal is a 64 y.o., male with type 1 diabetes mellitus, CAD status post stenting, hypertension, hyperlipidemia, prior CVA  presented on 06/09/2025 for evaluation of sudden onset headache, confusion, slurred speech.  CT head revealed left occipital lobe parenchymal hemorrhage with trace adjacent subarachnoid hemorrhage. Neurosurgery team evaluated the patient, MRI likely suggestive of amyloid angiopathy, repeat CT head 6/10/25 unchanged, suggested holding antiplatelets for at least 2 weeks. Patient is started on Keppra. Neurosurgical team signed off.     He now presents for PEG placement.    Other Medical History   Diabetes mellitus Hypertension   Stroke Coronary artery disease   Hearing loss Cataract   Acne Seasonal allergies     Surgical History    TONSILLECTOMY ADENOIDECTOMY   REPAIR OF RETINAL DETACHMENT WITH VITRECTOMY VITRECTOMY   INNER EAR SURGERY CORONARY STENT PLACEMENT   ADENOIDECTOMY EYE SURGERY     Problem List  Current as of 06/16/25 1328    Carotid artery disease Cataract of both eyes   Coronary artery disease involving native coronary artery of native heart without angina pectoris Diabetic polyneuropathy associated with type 1 diabetes mellitus   Hearing loss Hemorrhagic stroke   Hyperlipidemia Hypertension   Ischemic stroke Leukocytosis   Need for vaccination PAD (peripheral artery disease)   Retinal disorder Tobacco user   Type 1 diabetes mellitus Type 1 diabetes mellitus with microalbuminuria   Vitamin D deficiency      H/H: 12/40  PLT: 248  INR: 1.0  Gluc: 268  EKG: Sinus tachycardia   Right bundle branch block   T wave abnormality, consider inferior ischemia     ECHO:    Left Ventricle: The left ventricle is normal in size. Normal wall thickness. There is normal systolic function with a visually estimated ejection fraction of 55 - 60%. There is normal diastolic  function.    Right Ventricle: The right ventricle is normal in size Wall thickness is normal. Systolic function is normal. TAPSE is 2.2 cm.    IVC/SVC: Normal venous pressure at 3 mmHg.    Pre-op Assessment    I have reviewed the Patient Summary Reports.     I have reviewed the Nursing Notes. I have reviewed the NPO Status.   I have reviewed the Medications.     Review of Systems  Anesthesia Hx:  No problems with previous Anesthesia                Social:  Non-Smoker       Cardiovascular:     Hypertension   CAD                                          Neurological:   CVA Neuromuscular Disease,                                   Endocrine:  Diabetes, type 1               Physical Exam  General: Well nourished, Alert and Confusion    Airway:  Mallampati: unable to assess   Mouth Opening: Normal  TM Distance: Normal  Neck ROM: Normal ROM    Dental:  Edentulous    Chest/Lungs:  Normal Respiratory Rate, Rhonchi    Heart:  Rate: Normal  Rhythm: Regular Rhythm  Sounds: Normal    Abdomen:  Normal, Soft, Nontender        Anesthesia Plan  Type of Anesthesia, risks & benefits discussed:    Anesthesia Type: MAC  Intra-op Monitoring Plan: Standard ASA Monitors  Post Op Pain Control Plan: multimodal analgesia  Induction:  IV  Airway Plan: Direct  Informed Consent: Informed consent signed with the Patient and all parties understand the risks and agree with anesthesia plan.  All questions answered.   ASA Score: 4  Day of Surgery Review of History & Physical: H&P Update referred to the surgeon/provider.    Ready For Surgery From Anesthesia Perspective.     .

## 2025-06-17 LAB
ALBUMIN SERPL-MCNC: 2.3 G/DL (ref 3.4–4.8)
ALBUMIN/GLOB SERPL: 0.5 RATIO (ref 1.1–2)
ALP SERPL-CCNC: 99 UNIT/L (ref 40–150)
ALT SERPL-CCNC: 29 UNIT/L (ref 0–55)
ANION GAP SERPL CALC-SCNC: 10 MEQ/L
AST SERPL-CCNC: 28 UNIT/L (ref 11–45)
BACTERIA BLD CULT: NORMAL
BACTERIA BLD CULT: NORMAL
BASOPHILS # BLD AUTO: 0.1 X10(3)/MCL
BASOPHILS NFR BLD AUTO: 0.7 %
BILIRUB SERPL-MCNC: 0.8 MG/DL
BUN SERPL-MCNC: 26.2 MG/DL (ref 8.4–25.7)
CALCIUM SERPL-MCNC: 9.1 MG/DL (ref 8.8–10)
CHLORIDE SERPL-SCNC: 108 MMOL/L (ref 98–107)
CO2 SERPL-SCNC: 27 MMOL/L (ref 23–31)
CREAT SERPL-MCNC: 1.02 MG/DL (ref 0.72–1.25)
CREAT/UREA NIT SERPL: 26
EOSINOPHIL # BLD AUTO: 0.06 X10(3)/MCL (ref 0–0.9)
EOSINOPHIL NFR BLD AUTO: 0.4 %
ERYTHROCYTE [DISTWIDTH] IN BLOOD BY AUTOMATED COUNT: 12.2 % (ref 11.5–17)
GFR SERPLBLD CREATININE-BSD FMLA CKD-EPI: >60 ML/MIN/1.73/M2
GLOBULIN SER-MCNC: 5 GM/DL (ref 2.4–3.5)
GLUCOSE SERPL-MCNC: 318 MG/DL (ref 82–115)
HCT VFR BLD AUTO: 38.9 % (ref 42–52)
HGB BLD-MCNC: 12.6 G/DL (ref 14–18)
IMM GRANULOCYTES # BLD AUTO: 0.12 X10(3)/MCL (ref 0–0.04)
IMM GRANULOCYTES NFR BLD AUTO: 0.9 %
LYMPHOCYTES # BLD AUTO: 2.11 X10(3)/MCL (ref 0.6–4.6)
LYMPHOCYTES NFR BLD AUTO: 15 %
MCH RBC QN AUTO: 31.1 PG (ref 27–31)
MCHC RBC AUTO-ENTMCNC: 32.4 G/DL (ref 33–36)
MCV RBC AUTO: 96 FL (ref 80–94)
MONOCYTES # BLD AUTO: 1.71 X10(3)/MCL (ref 0.1–1.3)
MONOCYTES NFR BLD AUTO: 12.1 %
NEUTROPHILS # BLD AUTO: 10.01 X10(3)/MCL (ref 2.1–9.2)
NEUTROPHILS NFR BLD AUTO: 70.9 %
NRBC BLD AUTO-RTO: 0 %
PLATELET # BLD AUTO: 259 X10(3)/MCL (ref 130–400)
PMV BLD AUTO: 10.1 FL (ref 7.4–10.4)
POCT GLUCOSE: 204 MG/DL (ref 70–110)
POCT GLUCOSE: 323 MG/DL (ref 70–110)
POTASSIUM SERPL-SCNC: 4.2 MMOL/L (ref 3.5–5.1)
PROT SERPL-MCNC: 7.3 GM/DL (ref 5.8–7.6)
RBC # BLD AUTO: 4.05 X10(6)/MCL (ref 4.7–6.1)
SODIUM SERPL-SCNC: 145 MMOL/L (ref 136–145)
WBC # BLD AUTO: 14.11 X10(3)/MCL (ref 4.5–11.5)

## 2025-06-17 PROCEDURE — 25000003 PHARM REV CODE 250: Performed by: INTERNAL MEDICINE

## 2025-06-17 PROCEDURE — 94760 N-INVAS EAR/PLS OXIMETRY 1: CPT

## 2025-06-17 PROCEDURE — 94640 AIRWAY INHALATION TREATMENT: CPT

## 2025-06-17 PROCEDURE — 25000242 PHARM REV CODE 250 ALT 637 W/ HCPCS: Performed by: NURSE PRACTITIONER

## 2025-06-17 PROCEDURE — 99900035 HC TECH TIME PER 15 MIN (STAT)

## 2025-06-17 PROCEDURE — 0TJB8ZZ INSPECTION OF BLADDER, VIA NATURAL OR ARTIFICIAL OPENING ENDOSCOPIC: ICD-10-PCS | Performed by: UROLOGY

## 2025-06-17 PROCEDURE — 63600175 PHARM REV CODE 636 W HCPCS

## 2025-06-17 PROCEDURE — 36415 COLL VENOUS BLD VENIPUNCTURE: CPT | Performed by: INTERNAL MEDICINE

## 2025-06-17 PROCEDURE — 80053 COMPREHEN METABOLIC PANEL: CPT | Performed by: INTERNAL MEDICINE

## 2025-06-17 PROCEDURE — 99900031 HC PATIENT EDUCATION (STAT)

## 2025-06-17 PROCEDURE — 21400001 HC TELEMETRY ROOM

## 2025-06-17 PROCEDURE — 97535 SELF CARE MNGMENT TRAINING: CPT

## 2025-06-17 PROCEDURE — 85025 COMPLETE CBC W/AUTO DIFF WBC: CPT | Performed by: INTERNAL MEDICINE

## 2025-06-17 PROCEDURE — 27000221 HC OXYGEN, UP TO 24 HOURS

## 2025-06-17 PROCEDURE — 63600175 PHARM REV CODE 636 W HCPCS: Performed by: INTERNAL MEDICINE

## 2025-06-17 PROCEDURE — 11000001 HC ACUTE MED/SURG PRIVATE ROOM

## 2025-06-17 PROCEDURE — 97530 THERAPEUTIC ACTIVITIES: CPT

## 2025-06-17 RX ORDER — LEVETIRACETAM 100 MG/ML
500 SOLUTION ORAL 2 TIMES DAILY
Status: DISCONTINUED | OUTPATIENT
Start: 2025-06-17 | End: 2025-06-23

## 2025-06-17 RX ORDER — CARVEDILOL 12.5 MG/1
12.5 TABLET ORAL 2 TIMES DAILY
Status: DISCONTINUED | OUTPATIENT
Start: 2025-06-17 | End: 2025-07-13

## 2025-06-17 RX ORDER — ACETAMINOPHEN 325 MG/1
650 TABLET ORAL EVERY 6 HOURS PRN
Status: DISPENSED | OUTPATIENT
Start: 2025-06-17 | End: 2025-07-09

## 2025-06-17 RX ORDER — LORAZEPAM 2 MG/ML
INJECTION INTRAMUSCULAR
Status: COMPLETED
Start: 2025-06-17 | End: 2025-06-17

## 2025-06-17 RX ORDER — ALUMINUM HYDROXIDE, MAGNESIUM HYDROXIDE, AND SIMETHICONE 1200; 120; 1200 MG/30ML; MG/30ML; MG/30ML
30 SUSPENSION ORAL
Status: DISCONTINUED | OUTPATIENT
Start: 2025-06-17 | End: 2025-07-16 | Stop reason: HOSPADM

## 2025-06-17 RX ORDER — BUTALBITAL, ACETAMINOPHEN AND CAFFEINE 50; 325; 40 MG/1; MG/1; MG/1
1 TABLET ORAL EVERY 4 HOURS PRN
Status: DISCONTINUED | OUTPATIENT
Start: 2025-06-17 | End: 2025-07-16 | Stop reason: HOSPADM

## 2025-06-17 RX ORDER — SUCRALFATE 1 G/1
1 TABLET ORAL EVERY 6 HOURS
Status: DISCONTINUED | OUTPATIENT
Start: 2025-06-17 | End: 2025-07-16 | Stop reason: HOSPADM

## 2025-06-17 RX ORDER — CEFTRIAXONE 2 G/1
2 INJECTION, POWDER, FOR SOLUTION INTRAMUSCULAR; INTRAVENOUS
Status: COMPLETED | OUTPATIENT
Start: 2025-06-17 | End: 2025-06-21

## 2025-06-17 RX ORDER — QUETIAPINE FUMARATE 25 MG/1
50 TABLET, FILM COATED ORAL EVERY 12 HOURS PRN
Status: DISCONTINUED | OUTPATIENT
Start: 2025-06-17 | End: 2025-06-18

## 2025-06-17 RX ORDER — OLANZAPINE 5 MG/1
10 TABLET, FILM COATED ORAL DAILY
Status: DISCONTINUED | OUTPATIENT
Start: 2025-06-17 | End: 2025-06-18

## 2025-06-17 RX ORDER — LOSARTAN POTASSIUM 50 MG/1
100 TABLET ORAL DAILY
Status: DISCONTINUED | OUTPATIENT
Start: 2025-06-17 | End: 2025-07-13

## 2025-06-17 RX ADMIN — CARVEDILOL 12.5 MG: 12.5 TABLET, FILM COATED ORAL at 09:06

## 2025-06-17 RX ADMIN — LEVALBUTEROL HYDROCHLORIDE 0.63 MG: 0.63 SOLUTION RESPIRATORY (INHALATION) at 07:06

## 2025-06-17 RX ADMIN — LABETALOL HYDROCHLORIDE 10 MG: 5 INJECTION, SOLUTION INTRAVENOUS at 04:06

## 2025-06-17 RX ADMIN — LORAZEPAM: 2 INJECTION INTRAMUSCULAR; INTRAVENOUS at 04:06

## 2025-06-17 RX ADMIN — LABETALOL HYDROCHLORIDE 10 MG: 5 INJECTION, SOLUTION INTRAVENOUS at 01:06

## 2025-06-17 RX ADMIN — SUCRALFATE 1 G: 1 TABLET ORAL at 06:06

## 2025-06-17 RX ADMIN — LORAZEPAM 1 MG: 2 INJECTION INTRAMUSCULAR; INTRAVENOUS at 04:06

## 2025-06-17 RX ADMIN — LEVETIRACETAM 500 MG: 500 SOLUTION ORAL at 09:06

## 2025-06-17 RX ADMIN — SUCRALFATE 1 G: 1 TABLET ORAL at 11:06

## 2025-06-17 RX ADMIN — QUETIAPINE FUMARATE 50 MG: 25 TABLET ORAL at 10:06

## 2025-06-17 RX ADMIN — PIPERACILLIN SODIUM AND TAZOBACTAM SODIUM 4.5 G: 4; .5 INJECTION, POWDER, LYOPHILIZED, FOR SOLUTION INTRAVENOUS at 04:06

## 2025-06-17 RX ADMIN — CEFTRIAXONE SODIUM 2 G: 2 INJECTION, POWDER, FOR SOLUTION INTRAMUSCULAR; INTRAVENOUS at 09:06

## 2025-06-17 RX ADMIN — ALUMINUM HYDROXIDE, MAGNESIUM HYDROXIDE, AND DIMETHICONE 30 ML: 200; 20; 200 SUSPENSION ORAL at 11:06

## 2025-06-17 RX ADMIN — LOSARTAN POTASSIUM 100 MG: 50 TABLET, FILM COATED ORAL at 09:06

## 2025-06-17 RX ADMIN — HALOPERIDOL LACTATE 5 MG: 5 INJECTION, SOLUTION INTRAMUSCULAR at 10:06

## 2025-06-17 RX ADMIN — INSULIN ASPART 10 UNITS: 100 INJECTION, SOLUTION INTRAVENOUS; SUBCUTANEOUS at 07:06

## 2025-06-17 RX ADMIN — OLANZAPINE 10 MG: 5 TABLET, FILM COATED ORAL at 06:06

## 2025-06-17 RX ADMIN — INSULIN GLARGINE 18 UNITS: 100 INJECTION, SOLUTION SUBCUTANEOUS at 09:06

## 2025-06-17 RX ADMIN — ALUMINUM HYDROXIDE, MAGNESIUM HYDROXIDE, AND DIMETHICONE 30 ML: 200; 20; 200 SUSPENSION ORAL at 09:06

## 2025-06-17 RX ADMIN — ALUMINUM HYDROXIDE, MAGNESIUM HYDROXIDE, AND DIMETHICONE 30 ML: 200; 20; 200 SUSPENSION ORAL at 06:06

## 2025-06-17 RX ADMIN — ALUMINUM HYDROXIDE, MAGNESIUM HYDROXIDE, AND DIMETHICONE 30 ML: 200; 20; 200 SUSPENSION ORAL at 05:06

## 2025-06-17 NOTE — PT/OT/SLP PROGRESS
Pt remains minimally arousable and unable to participate in cognitive-communication evaluations despite MAX cueing.  SLP to sign off. Please reconsult when appropriate.

## 2025-06-17 NOTE — PLAN OF CARE
06/17/25 1036   Discharge Reassessment   Did the patient's condition or plan change since previous assessment? Yes   Discharge Plan discussed with: Spouse/sig other;Patient   Communicated ISAIAS with patient/caregiver Date not available/Unable to determine   Discharge Plan A Skilled Nursing Facility   Discharge Plan B Skilled Nursing Facility   DME Needed Upon Discharge  other (see comments)  (TBD)   Post-Acute Status   Post-Acute Authorization Placement   Post-Acute Placement Status Patient List Provided   Patient choice form signed by patient/caregiver List with quality metrics by geographic area provided;List from System Post-Acute Care     Spoke to wife at [facility] about skilled nursing facility (SNF) and provided with list. She would like to review and also requested that I contact her brother in law Amilcar. Spoke to brother in law Amilcar [phone] in regards to discharge planning and he requested that I send choice list for him to review. Electronic list sent. Emma sent to [facility] for [place]. Patient started on Seroquel and Olanzapine but has not psych history noted. Will discuss with Dr Damian and wife prior to completing PASRR. [facility] Discussed removal with nursing for placement.

## 2025-06-17 NOTE — PLAN OF CARE
Problem: Adult Inpatient Plan of Care  Goal: Plan of Care Review  Outcome: Progressing  Goal: Patient-Specific Goal (Individualized)  Outcome: Progressing  Goal: Absence of Hospital-Acquired Illness or Injury  Outcome: Progressing  Goal: Optimal Comfort and Wellbeing  Outcome: Progressing  Goal: Readiness for Transition of Care  Outcome: Progressing     Problem: Diabetes Comorbidity  Goal: Blood Glucose Level Within Targeted Range  Outcome: Progressing     Problem: Stroke, Intracerebral Hemorrhage  Goal: Optimal Coping  Outcome: Progressing  Goal: Effective Bowel Elimination  Outcome: Progressing  Goal: Optimal Cerebral Tissue Perfusion  Outcome: Progressing  Goal: Optimal Cognitive Function  Outcome: Progressing  Goal: Effective Communication Skills  Outcome: Progressing  Goal: Optimal Functional Ability  Outcome: Progressing  Goal: Optimal Nutrition Intake  Outcome: Progressing  Goal: Optimal Pain Control and Function  Outcome: Progressing  Goal: Effective Oxygenation and Ventilation  Outcome: Progressing  Goal: Improved Sensorimotor Function  Outcome: Progressing  Goal: Safe and Effective Swallow  Outcome: Progressing  Goal: Effective Urinary Elimination  Outcome: Progressing

## 2025-06-17 NOTE — PT/OT/SLP PROGRESS
Physical Therapy Treatment    Patient Name:  Federico Villarreal   MRN:  [id number]    Recommendations:     Discharge therapy intensity: Moderate Intensity Therapy   Discharge Equipment Recommendations: to be determined by next level of care  Barriers to discharge: Impaired mobility and Ongoing medical needs    Assessment:     Federico Villarreal is a 64 y.o. male admitted with a medical diagnosis of L occipital hemorrhagic CVA, presented with HA, confusion, slurred speech. Hx of L eye blindness.  He presents with the following impairments/functional limitations: weakness, impaired endurance, impaired self care skills, impaired functional mobility, impaired balance, impaired cognition, decreased upper extremity function, decreased lower extremity function, decreased safety awareness . Pt with improved alertness, attempting verbalizations several times throughout session but mostly nonsensical speech. Able to stand from bed x3 trials today with two-person assist. Remains appropriate for moderate intensity therapy at discharge. Decreasing POC to 5x/week to reflect current ability to participate and activity tolerance.    Rehab Prognosis: Good; patient would benefit from acute skilled PT services to address these deficits and reach maximum level of function.    Recent Surgery: Procedure(s) (LRB):  PEG (N/A) 1 Day Post-Op    Plan:     During this hospitalization, patient would benefit from acute PT services 5 x/week to address the identified rehab impairments via gait training, therapeutic activities, therapeutic exercises, neuromuscular re-education and progress toward the following goals:    Plan of Care Expires:  07/13/25    Subjective     Chief Complaint: unable to state  Patient/Family Comments/goals: unable to state  Pain/Comfort:  Pain Rating 1: 0/10      Objective:     Communicated with RN prior to session.  Patient found HOB elevated with SCD, telemetry, pulse ox (continuous), PEG Tube, nascimento catheter, pressure  relief boots ((B) mittens) upon PT entry to room.     General Precautions: Standard, fall, vision impaired (BP<140/90)  Orthopedic Precautions: N/A  Braces: N/A  Respiratory Status: oxymask  Skin Integrity: Visible skin intact      Functional Mobility:  Bed Mobility:     Supine to Sit: maximal assistance and of 2 persons  Sit to Supine: maximal assistance and of 2 persons  Transfers:     Sit to Stand:  moderate assistance and of 2 persons with hand-held assist  Balance: sitting balance = modA 2/2 posterior lean    Therapeutic Activities/Exercises:  Pt sat EOB x 10 minutes mod-maxA, frequent AP LOB. Attempted motor command following with BUE extremities, not following commands. Difficulty maintaining eyes open.  3 standing trials from EOB with modAx2, (B) knees blocked to prevent buckling. Feet blocked to prevent anterior sliding. Tolerated standing approx 10s ea trial.    Co-Treatment: Yes, due to High complexity requires 2 sets of skilled hands    Education:  Patient and spouse were provided with verbal education education regarding PT role/goals/POC, fall prevention, safety awareness, and discharge/DME recommendations.  Additional teaching is warranted.     Patient left HOB elevated with all lines intact, call button in reach, wedge under R side, pressure relief boots, RN notified, and wife present    GOALS:   Multidisciplinary Problems       Physical Therapy Goals          Problem: Physical Therapy    Goal Priority Disciplines Outcome Interventions   Physical Therapy Goal     PT, PT/OT Progressing    Description: Goals to be met by: 25     Patient will increase functional independence with mobility by performin. Supine to sit with MInimal Assistance  2. Sit to stand transfer with Minimal Assistance  3. Bed to chair transfer with Minimal Assistance using Rolling Walker  4. Pt will follow 5/5 motor commands for BLE.  5. Gait TBD as pt progresses                         Time Tracking:     PT Received On:  06/17/25  PT Start Time: 0959     PT Stop Time: 1029  PT Total Time (min): 30 min     Billable Minutes: Therapeutic Activity 30 min    Treatment Type: Treatment  PT/PTA: PT     Number of PTA visits since last PT visit: 1 06/17/2025

## 2025-06-17 NOTE — PROGRESS NOTES
[facility] Hospital Medicine Progress Note        Chief Complaint: Inpatient Follow-up     HPI:   64-year-old male with type 1 diabetes mellitus, CAD status post stenting, hypertension, hyperlipidemia, prior CVA  presented on 06/09/2025 for evaluation of sudden onset headache, confusion, slurred speech.  Workup revealed  CT head revealed left occipital lobe parenchymal hemorrhage with trace adjacent subarachnoid hemorrhage.   CTA head/neck was also completed in ED, which revealed no large vessel occlusion, flow-limiting stenosis, aneurysm or evidence of a vascular malformation   MRI brain report:  1. Left occipital lobe hematoma with adjacent subarachnoid hemorrhage.  2. Innumerable chronic microhemorrhages with a subcortical location, may indicate underlying cerebral amyloid angiopathy.  3. Mild chronic microvascular ischemic changes     Neurology team evaluated the patient, initiated on stroke protocol, admitted to ICU services.  Neurosurgery team evaluated the patient, MRI likely suggestive of amyloid angiopathy, repeat CT head 6/10/25 unchanged, suggested holding antiplatelets for at least 2 weeks.  Patient is started on Keppra.  Neurosurgical team signed off.     Patient required Chao placement for retention, neurology team evaluated.     Patient cleared to downgrade to floors 06/12/2025 PM by ICU team.    Palliative care team consulted while patient in ICU, code status DNR at the time of downgrade.     Pt had fever ,tmax 102.8, tachycardic,wbc count increased 17K, decided to obtain pan scan, CT head stable, but notes to have dense consolidation left >right LL, constipation  Respiratory cultures were obtained grew many strep group B Haemophilus influenzae    Patient remains unsafe for p.o. intake with speech evaluations, informed decision with the patient's spouse was made, GI team consulted for PEG placement        Interval Hx:   Pt underwent PEG 6/16/25    No acute events  reported overnight. Seen at bedside this mrng , no family at bedside in room,  RN present, pt alert, awake, confused, following simple commands, spontaneously moving his extremities ., mittens in place, tube feeds at low rate     T-max 100, remains on nasal cannula, low-grade tachycardia improving    Labs from this morning showed  leukocytosis 14 K, hemoglobin 12.6, sodium [id number]    Care discussed with the patient's nurse at bedside    Objective/physical exam:  General: In no acute distress  Chest:  b/l rhonchorus , breathing on nasal cannula  Heart: Tachycardia sinus  Neuro:  Alert awake confused , spontaneously moving  all extremities  Abdomen:  Soft, Nontender    VITAL SIGNS: 24 HRS MIN & MAX LAST   Temp  Min: 97.9 °F (36.6 °C)  Max: 100 °F (37.8 °C) 100 °F (37.8 °C)   BP  Min: 125/73  Max: 181/66 (!) 149/73   Pulse  Min: 76  Max: 121  100   Resp  Min: 16  Max: 20 16   SpO2  Min: 95 %  Max: 99 % 98 %     I have reviewed the following labs:  Recent Labs   Lab 06/15/25  0954 06/16/25  0412 06/17/25 0413   WBC 15.74* 12.53* 14.11*   RBC 4.01* 4.22* 4.05*   HGB 12.3* 12.9* 12.6*   HCT 37.2* 40.1* 38.9*   MCV 92.8 95.0* 96.0*   MCH 30.7 30.6 31.1*   MCHC 33.1 32.2* 32.4*   RDW 12.0 12.2 12.2    248 259   MPV 10.0 10.0 10.1     Recent Labs   Lab 06/13/25  0353 06/14/25  0833 06/15/25  0954 06/16/25  0412 06/17/25  0413      < > 145 148* 145   K 3.7   < > 3.8 3.8 4.2      < > 107 103 108*   CO2 28   < > 31 33* 27   BUN 19.0   < > 27.0* 27.3* 26.2*   CREATININE 0.93   < > 1.01 1.10 1.02   *   < > 308* 268* 318*   CALCIUM 9.2   < > 9.2 8.6* 9.1   ALBUMIN 3.1*  --  2.4*  --  2.3*   PROT 7.2  --  6.8  --  7.3   ALKPHOS 91  --  106  --  99   ALT 26  --  28  --  29   AST 31  --  28  --  28   BILITOT 1.3  --  0.6  --  0.8    < > = values in this interval not displayed.     Microbiology Results (last 7 days)       Procedure Component Value Units Date/Time    Blood Culture [[id number]]  (Normal)  Collected: 06/12/25 1851    Order Status: Completed Specimen: Blood from Arm, Right Updated: 06/16/25 2001     Blood Culture No Growth At 96 Hours    Blood Culture [[id number]]  (Normal) Collected: 06/12/25 1851    Order Status: Completed Specimen: Blood from Arm, Left Updated: 06/16/25 2001     Blood Culture No Growth At 96 Hours    Respiratory Culture [[id number]]  (Abnormal) Collected: 06/12/25 2143    Order Status: Completed Specimen: Sputum Updated: 06/15/25 1255     Respiratory Culture Many Streptococcus agalactiae (Group B)      Many Haemophilus influenzae     Comment: with normal respiratory romain  Amoxicillin/clavulanate, azithromycin, cefaclor and cefdinir are used as empiric therapy for respiratory tract infections due to Haemophilus. The results of susceptibility tests with these antimicrobial agents are often not necessary for management of individual patients. Ampicillin only is reported at [facility]. If further susceptibility is needed, please contact the Microbiology department at [phone].  Haemophilus species- if positive beta lactamase resistant to ampicillin and amoxicillin , if negative sensitive to ampicillin and amoxicillin.        Beta Lactamase Negative     GRAM STAIN Quality 2+      Many Gram positive cocci      Few Gram Negative Rods             See below for Radiology    Assessment/Plan:  Acute Hemorrhagic CVA, left occipital lobe hematoma with adjacent subarachnoid hemorrhage  Possible cerebral amyloid angiopathy  Bibasilar PNA ?aspiration  Sepsis  Acute respiratory failure  Acute encephalopathy secondary to above all  S/p peg 6/16/25  Acute urinary retention requiring cath  Hyperglycemia, Type 1 Diabetes Mellitus recent A1c 7.1%  Hypernatremia-resolved  Hx  CAD status post stenting, hypertension, hyperlipidemia, prior CVA     Monitor on tele   Neurology, Neurosurgery team signed off   No antiplatelets for at least 2 weeks till 6/23   continue Keppra per NSGY  Monitor BP, goal less than 140,  continue Coreg, losartan , use IV p.r.n.   respiratory cultures growing many strep group B, many Haemophilus influenzae, completed 4 days of IV Zosyn,  will switch to IV ceftriaxone to complete the course  Follow  blood cultures, so far negative, MRSA PCR negative  Supplement O2 as needed to maintain saturations, wean as tolerated   follow GI team recommendations   Peg care  Follow nutrition recommendations regarding PEG tube feeds, free water flushes   closely monitor blood sugars, titrate long-acting insulin, cover with insulin sliding scale  Continue Chao care, follow urology recommendations  Palliative care team following  Seroquel p.r.n. olanzapine, obtain psychiatry input to adjust medications  Therapy services  Maintain aspiration precautions, delirium precautions  Case discussed case management today     Code status: DNR    VTE prophylaxis:  SCDs    Patient condition:  Guarded    Anticipated discharge and Disposition:   TBD/needs SNF         Portions of this note dictated using EMR integrated voice recognition software, and may be subject to voice recognition errors not corrected at proofreading. Please contact writer for clarification if needed.   _____________________________________________________________________    Malnutrition Status:  Nutrition consulted. Most recent weight and BMI monitored-     Measurements:  Wt Readings from Last 1 Encounters:   06/11/25 65 kg (143 lb 4.8 oz)   Body mass index is 26.21 kg/m².    Patient has been screened and assessed by RD.    Malnutrition Type:  Context:    Level: other (see comments) (Does not meet criteria)    Malnutrition Characteristic Summary:  Weight Loss (Malnutrition): other (see comments) (Unable to assess)  Energy Intake (Malnutrition): other (see comments) (Unable to assess)  Muscle Mass (Malnutrition): mild depletion    Interventions/Recommendations (treatment strategy):        Scheduled Med:   aluminum-magnesium hydroxide-simethicone  30 mL Per G  Tube QID (AC & HS)    carvediloL  12.5 mg Per G Tube BID    insulin glargine U-100  18 Units Subcutaneous BID    levetiracetam  500 mg Per G Tube BID    LIDOcaine HCl 2%   Urethral Once    losartan  100 mg Per G Tube Daily    OLANZapine  10 mg Per G Tube Daily    piperacillin-tazobactam (Zosyn) IV (PEDS and ADULTS) (extended infusion is not appropriate)  4.5 g Intravenous Q8H    scopolamine  1 patch Transdermal Q3 Days    sodium chloride 0.9%  500 mL Intravenous Once    sucralfate  1 g Per G Tube Q6H      Continuous Infusions:     PRN Meds:    Current Facility-Administered Medications:     acetaminophen, 650 mg, Per G Tube, Q6H PRN    bisacodyL, 10 mg, Rectal, Daily PRN    butalbital-acetaminophen-caffeine -40 mg, 1 tablet, Per G Tube, Q4H PRN    dextrose 50%, 12.5 g, Intravenous, PRN    dextrose 50%, 25 g, Intravenous, PRN    glucagon (human recombinant), 1 mg, Intramuscular, PRN    glucose, 16 g, Oral, PRN    glucose, 24 g, Oral, PRN    haloperidol lactate, 5 mg, Intravenous, Q6H PRN    labetalol, 10 mg, Intravenous, Q15 Min PRN    levalbuterol, 0.63 mg, Nebulization, Q4H PRN    lorazepam, 1 mg, Intravenous, Q4H PRN    ondansetron, 4 mg, Intravenous, Q8H PRN    QUEtiapine, 50 mg, Per G Tube, Q12H PRN    sodium chloride 0.9%, 10 mL, Intravenous, PRN     Radiology:  I have personally reviewed the following imaging and agree with the radiologist.     CT Chest Abdomen Pelvis With IV Contrast (XPD) NO Oral Contrast  Narrative: EXAMINATION:  CT CHEST ABDOMEN PELVIS WITH IV CONTRAST (XPD)    CLINICAL HISTORY:  Sepsis;    TECHNIQUE:  Axial images of the chest abdomen and pelvis were obtained with IV contrast administration.  Coronal and sagittal reconstructions were provided.  Three dimensional and MIP images were obtained and evaluated.  Total DLP was 475 mGy-cm. Dose lowering technique and automated exposure control were utilized for this exam.    COMPARISON:  None    FINDINGS:  The airway is widely patent.  There  is no mediastinal or hilar lymphadenopathy.  There is no central pulmonary embolus.  The heart is not enlarged.  There is a nasogastric tube in the gastric antrum.    There are dense left greater than right lower lobe airspace consolidations with internal air bronchograms.  There is no pleural effusion.  There is no pulmonary nodule or mass.    The liver is homogeneous in attenuation.  The portal vein is patent.  The gallbladder, spleen, pancreas and adrenal glands are normal.  The bilateral kidneys are normal.  There is no hydronephrosis or nephrolithiasis.    The stomach and small bowel are decompressed.  The appendix is not definitively visualized.  There is a large amount of stool throughout the colon.  The urinary bladder is normal.  There is no pelvic or retroperitoneal lymphadenopathy.  The aorta is nonaneurysmal.  There is no lytic or blastic osseous lesion.  Impression: 1. Dense airspace consolidations in the left greater than right lower lobes favored to reflect pneumonia versus aspiration.  2. Constipation.    Electronically signed by: Aaron Chris MD  Date:    06/14/2025  Time:    11:39  CT Head Without Contrast  Narrative: EXAMINATION:  CT HEAD WITHOUT CONTRAST    CLINICAL HISTORY:  Stroke, follow up;    TECHNIQUE:  Axial images of the head were obtained without IV contrast administration.  Coronal and sagittal reconstructions were provided.  Three dimensional and MIP images were obtained and evaluated.  Total DLP was 988 mGy-cm. Dose lowering technique and automated exposure control were utilized for this exam.    COMPARISON:  CT of the head 06/10/2025.    FINDINGS:  The left parietal intraparenchymal hematoma has not significantly changed.  The trace surrounding edema is unchanged.  There is no new hemorrhage.  The trace subarachnoid blood components in left parietal region are unchanged.  There is no downward transtentorial herniation.  There is no midline shift.    The calvarium is intact.  There is  no fracture.  The bilateral orbits are normal.  The paranasal sinuses are free of disease.  Impression: Stable appearance of the left parietal intraparenchymal hematoma with minimal surrounding edema.  There is no new hemorrhage and there is no herniation.    Electronically signed by: Aaron Chris MD  Date:    06/14/2025  Time:    11:38      Simon Martinez MD  Department of Hospital Medicine   [facility]   06/17/2025

## 2025-06-17 NOTE — OP NOTE
PREOP DIAGNOSIS:  Difficult nascimento    POSTOP DIAGNOSIS:  Same    PROCEDURE:  Cystourethroscopy  Complex nascimento placement.    SURGEON:  Dr. Puentes    ANESTHESIA:  Local lidocaine    COMPLICATIONS:  None.    SPECIMENS:  None    EBL:  miminal    INDICATIONS:  64 who presents now for cystourethroscopy further evaluation  of the lower urinary tract.    SUMMARY:  The risks, benefits, complications, treatment options, and expected outcomes were discussed  with the patient. A timeout was performed confirming patient identity, the surgical procedure,  site, and surgeon. The patient concurred with the proposed plan, giving informed consent.  The patient was placed in the supine position. The patient was prepped with and draped in the  usual sterile fashion. Cystoscopy was then performed using a 16 Fr flexible cystoscope.     Then a wire was placed into the bladder. A 16fr nascimento was then placed over the wire into the bladder.        FINDINGS:  Meatus and Anterior urethra: Unremarkable. No strictures or lesions.  Membraneous Urethra: intact with appropriate appearance.    Prostatic urethra: Approximately 2 cm in length from the veru to the  bladder neck with bi/trilobar enlargement.    Bladder: Unremarkable. No suspicious lesions or stones. Minimal trabeculation.       Ureteral orifice(s): Orthotopic. Clear efflux bilaterally.      CONDITION:  Stable    ASSESSMENT/PLAN:  Nascimento care and management per primary team    6/9/25  9:15pm

## 2025-06-17 NOTE — PLAN OF CARE
Problem: Stroke, Intracerebral Hemorrhage  Goal: Optimal Coping  Outcome: Progressing  Goal: Effective Bowel Elimination  Outcome: Progressing  Goal: Optimal Cerebral Tissue Perfusion  Outcome: Progressing  Goal: Optimal Cognitive Function  Outcome: Progressing  Goal: Effective Communication Skills  Outcome: Progressing  Goal: Optimal Functional Ability  Outcome: Progressing  Goal: Optimal Nutrition Intake  Outcome: Progressing  Goal: Optimal Pain Control and Function  Outcome: Progressing  Goal: Effective Oxygenation and Ventilation  Outcome: Progressing  Goal: Improved Sensorimotor Function  Outcome: Progressing  Goal: Safe and Effective Swallow  Outcome: Progressing  Goal: Effective Urinary Elimination  Outcome: Progressing

## 2025-06-17 NOTE — PLAN OF CARE
GI Short Note    Chart reviewed.  Discussed with nursing staff. Tolerating continuous TFs at 20 cc/hr.  No residuals per nursing and no issues with medications per PEG.      64 year old white male unknown to our group with a pmhx of CVA, CAD w/stents, DM, HTN. Patient presented to ER 6/9 with complaints of headache, confusion and slurred speech.  CT head with left occipital hemorrhagic stroke.  He was admitted to ICU for further care.  He was downgraded 6/12 to the floor. Lethargic and unsafe for PO intake per speech.  GI consulted for PEG    1. Need for alternative means of nutrition  2. Oropharyngeal dysphagia   s/p EGD/PEG 6/16/25  - Continue TFs and advance as tolerated to goal as per set orders.   - PEG site care.   - Bumper was at approximately 3.0 cm   - Keep abdominal binder in place at all times to prevent dislodgement.    Please call GI if needed.

## 2025-06-17 NOTE — PLAN OF CARE
Spoke to Dr Damian in regards to diagnosis for Seroquel and Zyprexa. Psych consulted and eval is pending. I also spoke and patient has no history of mental illness or dementia. Will complete PASRR after psych eval.

## 2025-06-17 NOTE — PT/OT/SLP PROGRESS
Occupational Therapy   Treatment    Name: Federico Villarreal  MRN: [id number]    Recommendations:     Recommended therapy intensity at discharge: Moderate Intensity Therapy   Discharge Equipment Recommendations:  to be determined by next level of care  Barriers to discharge:   (ongoing medical needs; placement)    Assessment:     Federico Villarreal is a 64 y.o. male with a medical diagnosis of L occipital hemorrhagic CVA, presented with HA, confusion, slurred speech. Hx of L eye blindness.       Performance deficits affecting function are weakness, impaired endurance, impaired self care skills, impaired functional mobility, impaired balance, visual deficits, impaired cognition, decreased coordination, decreased safety awareness.     Patient alert and attempting to speak with therapy team this AM. He continues to only demo involuntary movements with BUE and not following simple commands or tracking with eyes appropriately. He was able to stand EOB x3 attempts with mod A x2.    Rehab Prognosis:  Good; patient would benefit from acute skilled OT services to address these deficits and reach maximum level of function.       Plan:     Patient to be seen 4 x/week to address the above listed problems via self-care/home management, therapeutic activities, therapeutic exercises, neuromuscular re-education  Plan of Care Expires: 07/11/25  Plan of Care Reviewed with: patient    Subjective     Pain/Comfort:  Pain Rating 1: 0/10    Objective:     Communicated with: nurse prior to session.  Patient found HOB elevated with peripheral IV, telemetry, pulse ox (continuous), PEG Tube, oxygen, restraints, pressure relief boots, SCD upon OT entry to room.    General Precautions: Standard, aspiration, fall, vision impaired (L eye blind; <140/90)    Orthopedic Precautions: none  Braces: none  Respiratory Status: Non-rebreather mask, flow 7 L/min     Occupational Performance:     Functional Mobility/Transfers:  Bed mobility:    Supine to Sit:  maximal assistance and of 2 persons  Sit to Supine: maximal assistance and of 2 persons  Transfers: Sit to Stand: moderate assistance and of 2 persons with hand-held assist x3 attempts with BLE blocked    Activities of Daily Living:  Feeding:  dependence PEG in place  Lower Body Dressing: total assistance adjustment of B socks  Toileting: dependence catheter in place    Balance:   Static Sitting Balance: No UE support: Poor: Patient requires handhold support and moderate to maximal assistance to maintain position.    [facility] 6 Click ADL: 11    Co-Treatment: Yes, due to Limited activity tolerance    Patient Education:  Patient and spouse were provided with verbal education education regarding OT role/goals/POC, fall prevention, safety awareness, Discharge/DME recommendations, and pressure ulcer prevention.  Additional teaching is warranted.      Patient left left sidelying with all lines intact, call button in reach, wedge under R side, pressure relief boots, restraints reapplied at end of session, nurse notified, and wife present.    GOALS:   Multidisciplinary Problems       Occupational Therapy Goals          Problem: Occupational Therapy    Goal Priority Disciplines Outcome Interventions   Occupational Therapy Goal     OT, PT/OT Progressing    Description: Goals to be met by: 7/11/25     Patient will increase functional independence with ADLs by performing:    LTG: Pt will perform basic ADLs and ADL transfers with SPV using LRAD by discharge.    STG: to be met by 7/11/25  Pt will follow commands at least 75% consistently throughout session  Pt will complete grooming seated, progressing to standing as appropriate with LRAD with min A.  Pt will complete UB dressing with min A.  Pt will complete LB dressing with min A using LRAD.  Pt will complete toileting with min A using LRAD.  Pt will complete functional mobility to/from toilet and toilet transfer with min A using LRAD.   Pt will demo visual attention to R side  >80% of time with min verbal cues.  Pt will demo 4/5 strength in  BUE  for increased functional use during ADL tasks.                          Time Tracking:     OT Date of Treatment: 06/17/25  OT Start Time: 0959  OT Stop Time: 1026  OT Total Time (min): 27 min    Billable Minutes:Self Care/Home Management 27    OT/THOMAS: OT     Number of THOMAS visits since last OT visit: 1    6/17/2025

## 2025-06-18 PROBLEM — D64.9 ANEMIA: Status: ACTIVE | Noted: 2025-06-18

## 2025-06-18 LAB
POCT GLUCOSE: 186 MG/DL (ref 70–110)
POCT GLUCOSE: 237 MG/DL (ref 70–110)
POCT GLUCOSE: 255 MG/DL (ref 70–110)

## 2025-06-18 PROCEDURE — 11000001 HC ACUTE MED/SURG PRIVATE ROOM

## 2025-06-18 PROCEDURE — 63600175 PHARM REV CODE 636 W HCPCS: Performed by: INTERNAL MEDICINE

## 2025-06-18 PROCEDURE — 99900031 HC PATIENT EDUCATION (STAT)

## 2025-06-18 PROCEDURE — 25000003 PHARM REV CODE 250

## 2025-06-18 PROCEDURE — 21400001 HC TELEMETRY ROOM

## 2025-06-18 PROCEDURE — 99900035 HC TECH TIME PER 15 MIN (STAT)

## 2025-06-18 PROCEDURE — 63600175 PHARM REV CODE 636 W HCPCS

## 2025-06-18 PROCEDURE — 27000221 HC OXYGEN, UP TO 24 HOURS

## 2025-06-18 PROCEDURE — 99900026 HC AIRWAY MAINTENANCE (STAT)

## 2025-06-18 PROCEDURE — 94760 N-INVAS EAR/PLS OXIMETRY 1: CPT

## 2025-06-18 PROCEDURE — 97530 THERAPEUTIC ACTIVITIES: CPT

## 2025-06-18 PROCEDURE — 25000003 PHARM REV CODE 250: Performed by: INTERNAL MEDICINE

## 2025-06-18 RX ORDER — INSULIN ASPART 100 [IU]/ML
0-10 INJECTION, SOLUTION INTRAVENOUS; SUBCUTANEOUS EVERY 6 HOURS PRN
Status: DISCONTINUED | OUTPATIENT
Start: 2025-06-18 | End: 2025-06-18

## 2025-06-18 RX ORDER — GLUCAGON 1 MG
1 KIT INJECTION
Status: DISCONTINUED | OUTPATIENT
Start: 2025-06-18 | End: 2025-07-16 | Stop reason: HOSPADM

## 2025-06-18 RX ORDER — INSULIN ASPART 100 [IU]/ML
0-10 INJECTION, SOLUTION INTRAVENOUS; SUBCUTANEOUS
Status: DISCONTINUED | OUTPATIENT
Start: 2025-06-18 | End: 2025-07-16 | Stop reason: HOSPADM

## 2025-06-18 RX ORDER — QUETIAPINE FUMARATE 25 MG/1
50 TABLET, FILM COATED ORAL 2 TIMES DAILY
Status: DISCONTINUED | OUTPATIENT
Start: 2025-06-18 | End: 2025-06-27

## 2025-06-18 RX ADMIN — ALUMINUM HYDROXIDE, MAGNESIUM HYDROXIDE, AND DIMETHICONE 30 ML: 200; 20; 200 SUSPENSION ORAL at 05:06

## 2025-06-18 RX ADMIN — ALUMINUM HYDROXIDE, MAGNESIUM HYDROXIDE, AND DIMETHICONE 30 ML: 200; 20; 200 SUSPENSION ORAL at 06:06

## 2025-06-18 RX ADMIN — CARVEDILOL 12.5 MG: 12.5 TABLET, FILM COATED ORAL at 09:06

## 2025-06-18 RX ADMIN — INSULIN GLARGINE 18 UNITS: 100 INJECTION, SOLUTION SUBCUTANEOUS at 09:06

## 2025-06-18 RX ADMIN — QUETIAPINE FUMARATE 50 MG: 25 TABLET ORAL at 11:06

## 2025-06-18 RX ADMIN — LEVETIRACETAM 500 MG: 500 SOLUTION ORAL at 09:06

## 2025-06-18 RX ADMIN — ALUMINUM HYDROXIDE, MAGNESIUM HYDROXIDE, AND DIMETHICONE 30 ML: 200; 20; 200 SUSPENSION ORAL at 09:06

## 2025-06-18 RX ADMIN — SUCRALFATE 1 G: 1 TABLET ORAL at 06:06

## 2025-06-18 RX ADMIN — LORAZEPAM 1 MG: 2 INJECTION INTRAMUSCULAR; INTRAVENOUS at 12:06

## 2025-06-18 RX ADMIN — LOSARTAN POTASSIUM 100 MG: 50 TABLET, FILM COATED ORAL at 09:06

## 2025-06-18 RX ADMIN — QUETIAPINE FUMARATE 50 MG: 25 TABLET ORAL at 09:06

## 2025-06-18 RX ADMIN — SUCRALFATE 1 G: 1 TABLET ORAL at 05:06

## 2025-06-18 RX ADMIN — SUCRALFATE 1 G: 1 TABLET ORAL at 11:06

## 2025-06-18 RX ADMIN — SUCRALFATE 1 G: 1 TABLET ORAL at 12:06

## 2025-06-18 RX ADMIN — CEFTRIAXONE SODIUM 2 G: 2 INJECTION, POWDER, FOR SOLUTION INTRAMUSCULAR; INTRAVENOUS at 09:06

## 2025-06-18 RX ADMIN — INSULIN ASPART 3 UNITS: 100 INJECTION, SOLUTION INTRAVENOUS; SUBCUTANEOUS at 10:06

## 2025-06-18 RX ADMIN — ALUMINUM HYDROXIDE, MAGNESIUM HYDROXIDE, AND DIMETHICONE 30 ML: 200; 20; 200 SUSPENSION ORAL at 11:06

## 2025-06-18 NOTE — ASSESSMENT & PLAN NOTE
.  Antithrombotics for secondary stroke prevention: Antiplatelets: None: Intracerebral Hemorrhage    Statins for secondary stroke prevention and hyperlipidemia, if present:   Statins: Atorvastatin- 40 mg daily    Aggressive risk factor modification: HTN, Smoking, CAD     Rehab efforts: The patient has been evaluated by a stroke team provider and the therapy needs have been fully considered based off the presenting complaints and exam findings. The following therapy evaluations are needed: PT evaluate and treat, OT evaluate and treat, SLP evaluate and treat, PM&R evaluate for appropriate placement    Diagnostics ordered/pending: CT scan of head without contrast to asses brain parenchyma, MRI head without contrast to assess brain parenchyma    VTE prophylaxis: None: Reason for No Pharmacological VTE Prophylaxis: History of systemic or intracranial bleeding    BP parameters: ICH: SBP Goal Range 130-150

## 2025-06-18 NOTE — PLAN OF CARE
Psych evaluation completed. PASRR completed. Spoke to wife and as of now [facility] is 1st choice but will be visiting and researching others. Patient is no longer in mittens but had fall last night and is currently rollbelt.  Will continue to follow and send referrals when appropriate.

## 2025-06-18 NOTE — ASSESSMENT & PLAN NOTE
Anemia is likely due to chronic blood loss. Most recent hemoglobin and hematocrit are listed below.  Recent Labs     06/16/25  0412 06/17/25  0413   HGB 12.9* 12.6*   HCT 40.1* 38.9*     Plan  - Monitor serial CBC: Daily  - Transfuse PRBC if patient becomes hemodynamically unstable, symptomatic or H/H drops below 7/21.  - Patient has not received any PRBC transfusions to date  - Patient's anemia is currently stable  - .

## 2025-06-18 NOTE — ASSESSMENT & PLAN NOTE
Advise to stop.  Can offer nicotine patch.  Due to his condition he will likely not be able to smoke again(cessation=4mins)

## 2025-06-18 NOTE — ASSESSMENT & PLAN NOTE
Patient's blood pressure range in the last 24 hours was: BP  Min: 111/62  Max: 138/73.The patient's inpatient anti-hypertensive regimen is listed below:  Current Antihypertensives  labetaloL injection 10 mg, Every 15 min PRN, Intravenous  losartan tablet 100 mg, Daily, Per G Tube  carvediloL tablet 12.5 mg, 2 times daily, Per G Tube    Plan  - BP is controlled, no changes needed to their regimen  - .

## 2025-06-18 NOTE — SUBJECTIVE & OBJECTIVE
Interval History:     Review of Systems   Unable to perform ROS: Acuity of condition     Objective:     Vital Signs (Most Recent):  Temp: 97.9 °F (36.6 °C) (06/18/25 1428)  Pulse: 88 (06/18/25 1428)  Resp: 18 (06/18/25 1428)  BP: 111/62 (06/18/25 1428)  SpO2: 95 % (06/18/25 1428) Vital Signs (24h Range):  Temp:  [97.4 °F (36.3 °C)-98.3 °F (36.8 °C)] 97.9 °F (36.6 °C)  Pulse:  [] 88  Resp:  [18-20] 18  SpO2:  [94 %-97 %] 95 %  BP: (111-138)/(55-73) 111/62     Weight: 65 kg (143 lb 4.8 oz)  Body mass index is 26.21 kg/m².    Intake/Output Summary (Last 24 hours) at 6/18/2025 1436  Last data filed at 6/18/2025 0622  Gross per 24 hour   Intake 1193.47 ml   Output 1050 ml   Net 143.47 ml         Physical Exam  Constitutional:       General: He is awake.      Appearance: He is ill-appearing.   HENT:      Head: Normocephalic and atraumatic.      Nose: Nose normal.      Mouth/Throat:      Mouth: Mucous membranes are moist.      Pharynx: Oropharynx is clear.   Eyes:      Extraocular Movements: Extraocular movements intact.      Conjunctiva/sclera: Conjunctivae normal.      Pupils: Pupils are equal, round, and reactive to light.   Cardiovascular:      Rate and Rhythm: Normal rate and regular rhythm.      Pulses: Normal pulses.      Heart sounds: Normal heart sounds.   Pulmonary:      Effort: Pulmonary effort is normal.      Breath sounds: Normal breath sounds.   Abdominal:      General: Bowel sounds are normal.      Palpations: Abdomen is soft.      Comments: PEG   Musculoskeletal:         General: Normal range of motion.      Cervical back: Normal range of motion and neck supple.   Skin:     General: Skin is dry.      Capillary Refill: Capillary refill takes 2 to 3 seconds.   Neurological:      Mental Status: He is disoriented.      Motor: Weakness present.      Gait: Gait abnormal.   Psychiatric:         Cognition and Memory: Cognition is impaired. Memory is impaired. He exhibits impaired recent memory and impaired  "remote memory.               Significant Labs: All pertinent labs within the past 24 hours have been reviewed.  BMP:   Recent Labs   Lab 06/17/25  0413   *      K 4.2   *   CO2 27   BUN 26.2*   CREATININE 1.02   CALCIUM 9.1     CBC:   Recent Labs   Lab 06/17/25 0413   WBC 14.11*   HGB 12.6*   HCT 38.9*        CMP:   Recent Labs   Lab 06/17/25 0413      K 4.2   *   CO2 27   *   BUN 26.2*   CREATININE 1.02   CALCIUM 9.1   PROT 7.3   ALBUMIN 2.3*   BILITOT 0.8   ALKPHOS 99   AST 28   ALT 29     Magnesium: No results for input(s): "MG" in the last 48 hours.    Significant Imaging: I have reviewed all pertinent imaging results/findings within the past 24 hours.  "

## 2025-06-18 NOTE — PROGRESS NOTES
Advance Care Planning     Date: 06/17/2025    Met with patient's wife and daughter at bedside. PEG tube placed 06/16, tube feedings initiated and per nursing is currently tolerating tube feedings at a low rate and dietary titrating to goal. Patient remains confused,  requiring mittens at times, psych eval pending. Discussed interventions that could help decrease agitation such as decrease stimulation in room and frequent assessment of postioning/comfort and hygiene needs.     Discussed goals of care with patient's wife and step daughter, reports at this time goal is for patient to proceed to facility where he can attempt rehabilitation (SNF). Step-daughter questioned regarding LTAC informed that discharge disposition would be dependent upon how patient tolerated tube feedings and oxygen demands and that case management would discuss further as patient progresses towards discharge.     Palliative care to continue to follow, patient resting calmly upon completion of visit, remains on oxygen  noted nasopharyngeal airway noted in left nostril, nurse reports respiratory to come by for further assessment of possible removal. Palliative care to continue to follow.       [facility] [place]  I engaged the family in a voluntary conversation about advance care planning and we specifically addressed what the goals of care would be moving forward, in light of the patient's change in clinical status, specifically goals of care.  We explored the patient's values and preferences for future care.  The family endorses that what is most important right now is to focus on symptom/pain control, improvement in condition but with limits to invasive therapies, and comfort and QOL     Accordingly, we have decided that the best plan to meet the patient's goals includes continuing with treatment    Palliative Care RN visit was spent on advance care planning, goals of care discussion, emotional support, formulating and communicating prognosis and exploring  burden/benefit of various approaches of treatment. This discussion occurred on a fully voluntary basis with the verbal consent of the patient and/or family.

## 2025-06-18 NOTE — PT/OT/SLP PROGRESS
Physical Therapy Treatment    Patient Name:  Federico Villarreal   MRN:  [id number]    Recommendations:     Discharge therapy intensity: Moderate Intensity Therapy   Discharge Equipment Recommendations: to be determined by next level of care  Barriers to discharge: Impaired mobility and Ongoing medical needs    Assessment:     Federico Villarreal is a 64 y.o. male admitted with a medical diagnosis of  L occipital hemorrhagic CVA, presented with HA, confusion, slurred speech. Hx of L eye blindness.  .  He presents with the following impairments/functional limitations: weakness, impaired endurance, impaired balance, impaired self care skills, impaired functional mobility, decreased safety awareness, impaired cognition     Rehab Prognosis: Good; patient would benefit from acute skilled PT services to address these deficits and reach maximum level of function.    Recent Surgery: Procedure(s) (LRB):  PEG (N/A) 2 Days Post-Op    Plan:     During this hospitalization, patient would benefit from acute PT services 5 x/week to address the identified rehab impairments via therapeutic activities, therapeutic exercises, neuromuscular re-education and progress toward the following goals:    Plan of Care Expires:  07/13/25    Subjective     Chief Complaint:   Patient/Family Comments/goals:   Pain/Comfort:         Objective:     Communicated with nurse prior to session.  Patient found supine with peripheral IV, telemetry, pulse ox (continuous), PEG Tube, oxygen, pressure relief boots, SCD upon PT entry to room.     General Precautions: Standard, vision impaired, fall  Orthopedic Precautions: N/A  Braces: N/A  Respiratory Status: oxymask  Blood Pressure:   Skin Integrity: Visible skin intact      Functional Mobility:  Bed Mobility:     Scooting: of max a x 2  persons  Supine to Sit: of max a x 2  persons  Sit to Supine: of maxa x 2 persons  Transfers:     Sit to Stand:  maximal assistance with no AD  Pt did have a bm upon standing and he  was cleaned by the therapist and therapy tech  Pt performed sit to stand x 4 attempts with focus on pt keeping head and shoulders up. He was able to straighten up when asked to do so 50% of the time       Therapeutic Activities/Exercises:  e    Co-Treatment: No    Education:  Patient provided with verbal education education regarding PT role/goals/POC, fall prevention, and safety awareness.  Additional teaching is warranted.     Patient left supine with all lines intact and call button in reach    GOALS:   Multidisciplinary Problems       Physical Therapy Goals          Problem: Physical Therapy    Goal Priority Disciplines Outcome Interventions   Physical Therapy Goal     PT, PT/OT Progressing    Description: Goals to be met by: 25     Patient will increase functional independence with mobility by performin. Supine to sit with MInimal Assistance  2. Sit to stand transfer with Minimal Assistance  3. Bed to chair transfer with Minimal Assistance using Rolling Walker  4. Pt will follow 5/5 motor commands for BLE.  5. Gait TBD as pt progresses                         Time Tracking:     PT Received On:    PT Start Time: 1115     PT Stop Time: 1145  PT Total Time (min): 30 min     Billable Minutes: Therapeutic Activity 30    Treatment Type: Treatment  PT/PTA: PT     Number of PTA visits since last PT visit: 2     2025

## 2025-06-18 NOTE — HPI
64-year-old male with type 1 diabetes mellitus, CAD status post stenting, hypertension, hyperlipidemia, prior CVA  presented on 06/09/2025 for evaluation of sudden onset headache, confusion, slurred speech.  Workup revealed  CT head revealed left occipital lobe parenchymal hemorrhage with trace adjacent subarachnoid hemorrhage.   CTA head/neck was also completed in ED, which revealed no large vessel occlusion, flow-limiting stenosis, aneurysm or evidence of a vascular malformation   MRI brain report:  1. Left occipital lobe hematoma with adjacent subarachnoid hemorrhage.  2. Innumerable chronic microhemorrhages with a subcortical location, may indicate underlying cerebral amyloid angiopathy.  3. Mild chronic microvascular ischemic changes     Neurology team evaluated the patient, initiated on stroke protocol, admitted to ICU services.  Neurosurgery team evaluated the patient, MRI likely suggestive of amyloid angiopathy, repeat CT head 6/10/25 unchanged, suggested holding antiplatelets for at least 2 weeks.  Patient is started on Keppra.  Neurosurgical team signed off.     Patient required Chao placement for retention, neurology team evaluated.     Patient cleared to downgrade to floors 06/12/2025 PM by ICU team.     Palliative care team consulted while patient in ICU, code status DNR at the time of downgrade.      Pt had fever ,tmax 102.8, tachycardic,wbc count increased 17K, decided to obtain pan scan, CT head stable, but notes to have dense consolidation left >right LL, constipation  Respiratory cultures were obtained grew many strep group B Haemophilus influenzae     Patient remains unsafe for p.o. intake with speech evaluations, informed decision with the patient's spouse was made, GI team consulted for PEG placement   .

## 2025-06-18 NOTE — CONSULTS
"6/18/2025  Federico Villarreal   1960   [id number]            Psychiatry Initial Consult Note     Date of Admission: 6/9/2025  8:08 AM    Chief Complaint: Psychiatric consult for "agitation/confusion, med adjustments"    SUBJECTIVE:   History of Present Illness:   Federico Villarreal is a 64 y.o. male with a past medical history taht includes CAD, T1DM, HTN, HLD, and history of ischemic stroke with left-sided weakness who presented to [facility] ED on 06/09/25 with confusion and slurred speech noted that morning. His wife had reported he had woken up with headache at 0115 that morning and then woke up with confusion around 0545. CT scan showed left occipital lobe parenchymal hemorrhage. Being seen by psychiatry to day due to confusion and agitation.     Upon evaluation he is resting in bed with occasional fidgety behavior. His wife is at the bedside. He displays confusion and unable to answer questions appropriately at this time. When asked where he lives he names various numbers. He displays poor eye-contact and impaired attention and currently appears drowsy. His wife reports that he experienced a stroke approximately a year ago and displayed impaired short-term memory over the past year but no other cognitive deficits noted and current presentation is an abrupt change in baseline cognition. She reports he has not been sleeping and that agitation has been worse at night. He will also have intermittent episodes of sitting up in bed and displays psychomotor agitation. Past psychiatric, family, social, substance, and legal history obtained from patients wife. Medically he was diagnosed with acute respiratory failure and sepsis with elevated temperatures during admission. He also experienced urinary retention requiring catheter.         Past Psychiatric History:   Previous Psychiatric Hospitalizations: None   Previous Medication Trials: None  Previous Suicide Attempts: None   Outpatient psychiatrist: None     Current " Medications:   Home Psychiatric Meds: None    Past Medical/Surgical History:   Past Medical History:   Diagnosis Date    Acne     Cataract     Coronary artery disease     Diabetes mellitus     Hearing loss     Hypertension     Seasonal allergies     Stroke      Past Surgical History:   Procedure Laterality Date    ADENOIDECTOMY      ADENOIDECTOMY  1972    As a child    CORONARY STENT PLACEMENT  08/20/2012    ESOPHAGOGASTRODUODENOSCOPY W/ PEG N/A 6/16/2025    Procedure: PEG;  Surgeon: Mike Navarro MD;  Location: [facility] ENDOSCOPY;  Service: Gastroenterology;  Laterality: N/A;    EYE SURGERY  2011    Multiple    INNER EAR SURGERY      REPAIR OF RETINAL DETACHMENT WITH VITRECTOMY      TONSILLECTOMY      VITRECTOMY           Family Psychiatric History:   Father- Dementia, alcohol use  Mother- Possible Alzheimer's dementia, alcohol use     Allergies:   Review of patient's allergies indicates:  No Known Allergies    Substance Abuse History:   Tobacco: Former, last use one year ago  Alcohol: Former  Illicit Substances: None  Treatment: None        Scheduled Meds:    aluminum-magnesium hydroxide-simethicone  30 mL Per G Tube QID (AC & HS)    carvediloL  12.5 mg Per G Tube BID    cefTRIAXone (Rocephin) IV (PEDS and ADULTS)  2 g Intravenous Q24H    insulin glargine U-100  18 Units Subcutaneous BID    levetiracetam  500 mg Per G Tube BID    LIDOcaine HCl 2%   Urethral Once    losartan  100 mg Per G Tube Daily    QUEtiapine  50 mg Oral BID    scopolamine  1 patch Transdermal Q3 Days    sodium chloride 0.9%  500 mL Intravenous Once    sucralfate  1 g Per G Tube Q6H      PRN Meds:   Current Facility-Administered Medications:     acetaminophen, 650 mg, Per G Tube, Q6H PRN    bisacodyL, 10 mg, Rectal, Daily PRN    butalbital-acetaminophen-caffeine -40 mg, 1 tablet, Per G Tube, Q4H PRN    dextrose 50%, 12.5 g, Intravenous, PRN    dextrose 50%, 25 g, Intravenous, PRN    glucagon (human recombinant), 1 mg, Intramuscular,  PRN    glucose, 16 g, Oral, PRN    glucose, 24 g, Oral, PRN    haloperidol lactate, 5 mg, Intravenous, Q6H PRN    labetalol, 10 mg, Intravenous, Q15 Min PRN    levalbuterol, 0.63 mg, Nebulization, Q4H PRN    ondansetron, 4 mg, Intravenous, Q8H PRN    sodium chloride 0.9%, 10 mL, Intravenous, PRN   Psychotherapeutics (From admission, onward)      Start     Stop Route Frequency Ordered    06/18/25 1115  QUEtiapine tablet 50 mg         -- Oral 2 times daily 06/18/25 1007    06/14/25 1547  haloperidol lactate injection 5 mg         -- IV Every 6 hours PRN 06/14/25 1448              Social History:  Housing Status: Lives with wife  Relationship Status/Sexual Orientation:    Children: 1 + 2 step-children  Education: GED   Employment Status/Info: Retired, disabled    history: None  Access to gun: None       Legal History:   Past Charges/Incarcerations: DWI   Pending charges: None      OBJECTIVE:       Vitals   Vitals:    06/18/25 0942   BP: 120/64   Pulse: 90   Resp:    Temp:         Labs/Imaging/Studies:   Recent Results (from the past 48 hours)   POCT glucose    Collection Time: 06/16/25 11:03 AM   Result Value Ref Range    POCT Glucose 218 (H) 70 - 110 mg/dL   Comprehensive Metabolic Panel    Collection Time: 06/17/25  4:13 AM   Result Value Ref Range    Sodium 145 136 - 145 mmol/L    Potassium 4.2 3.5 - 5.1 mmol/L    Chloride 108 (H) 98 - 107 mmol/L    CO2 27 23 - 31 mmol/L    Glucose 318 (H) 82 - 115 mg/dL    Blood Urea Nitrogen 26.2 (H) 8.4 - 25.7 mg/dL    Creatinine 1.02 0.72 - 1.25 mg/dL    Calcium 9.1 8.8 - 10.0 mg/dL    Protein Total 7.3 5.8 - 7.6 gm/dL    Albumin 2.3 (L) 3.4 - 4.8 g/dL    Globulin 5.0 (H) 2.4 - 3.5 gm/dL    Albumin/Globulin Ratio 0.5 (L) 1.1 - 2.0 ratio    Bilirubin Total 0.8 <=1.5 mg/dL    ALP 99 40 - 150 unit/L    ALT 29 0 - 55 unit/L    AST 28 11 - 45 unit/L    eGFR >60 mL/min/1.73/m2    Anion Gap 10.0 mEq/L    BUN/Creatinine Ratio 26    CBC with Differential    Collection  "Time: 06/17/25  4:13 AM   Result Value Ref Range    WBC 14.11 (H) 4.50 - 11.50 x10(3)/mcL    RBC 4.05 (L) 4.70 - 6.10 x10(6)/mcL    Hgb 12.6 (L) 14.0 - 18.0 g/dL    Hct 38.9 (L) 42.0 - 52.0 %    MCV 96.0 (H) 80.0 - 94.0 fL    MCH 31.1 (H) 27.0 - 31.0 pg    MCHC 32.4 (L) 33.0 - 36.0 g/dL    RDW 12.2 11.5 - 17.0 %    Platelet 259 130 - 400 x10(3)/mcL    MPV 10.1 7.4 - 10.4 fL    Neut % 70.9 %    Lymph % 15.0 %    Mono % 12.1 %    Eos % 0.4 %    Basophil % 0.7 %    Imm Grans % 0.9 %    Neut # 10.01 (H) 2.1 - 9.2 x10(3)/mcL    Lymph # 2.11 0.6 - 4.6 x10(3)/mcL    Mono # 1.71 (H) 0.1 - 1.3 x10(3)/mcL    Eos # 0.06 0 - 0.9 x10(3)/mcL    Baso # 0.10 <=0.2 x10(3)/mcL    Imm Gran # 0.12 (H) 0.00 - 0.04 x10(3)/mcL    NRBC% 0.0 %   POCT glucose    Collection Time: 06/17/25  6:02 PM   Result Value Ref Range    POCT Glucose 204 (H) 70 - 110 mg/dL   POCT glucose    Collection Time: 06/17/25  9:37 PM   Result Value Ref Range    POCT Glucose 323 (H) 70 - 110 mg/dL   POCT glucose    Collection Time: 06/18/25  5:59 AM   Result Value Ref Range    POCT Glucose 186 (H) 70 - 110 mg/dL      No results found for: "PHENYTOIN", "PHENOBARB", "VALPROATE", "CBMZ"        Psychiatric Mental Status Exam:  General Appearance: dressed in hospital garb, lying in bed, in no acute distress, rollbelt, appears older than stated age  Arousal: drowsy  Behavior: minimal responses, restless and fidgety, poor eye contact  Movements and Motor Activity: +psychomotor agitation  Orientation: Unable to Assess  Speech: mumbling  Mood: Guarded  Affect: constricted  Thought Process: Unable to Assess  Associations: Unable to Assess  Thought Content and Perceptions: Unable to Assess  Recent and Remote Memory: Unable to Assess; per interview/observation with patient  Attention and Concentration: impaired; per interview/observation with patient  Fund of Knowledge: Unable to Assess; based on history, vocabulary, fund of knowledge, syntax, grammar, and content  Insight: " impaired; based on understanding of severity of illness and HPI  Judgment: impaired; based on patient's behavior and HPI        ASSESSMENT/PLAN:   Diagnoses:  Delirium, acute, hyperactive due to multiple etiologies     Past Medical History:   Diagnosis Date    Acne     Cataract     Coronary artery disease     Diabetes mellitus     Hearing loss     Hypertension     Seasonal allergies     Stroke           Problem lists and Management Plans:  Medication Management  Discontinue olanzapine  Change PRN quetiapine to 50mg PO BID scheduled  Discontinue lorazepam  Continue haloperidol 5mg IV Q6hr PRN agitation  Monitoring  EKG on 06/12/25 with Qtc of 462ms  Recommend delirium precautions  Legal  PEC not recommended.   Psychiatry will continue to follow          Federico Perea

## 2025-06-18 NOTE — HOSPITAL COURSE
6/18/25-No new issues today.  He is still confused.  He does seem to be tolerating the TF's.    6/19/25-Patient is still confused which will likely not improve.  He has PRN meds ordered for this.  Will add melatonin tonight.    6/20/25-Patient is still agitated at times requiring meds.  Will consult psych as well to help with his delirium.

## 2025-06-18 NOTE — PROGRESS NOTES
[facility] - 4th Floor [facility] Medicine  Progress Note    Patient Name: Federico Villarreal  MRN: [id number]  Patient Class: IP- Inpatient   Admission Date: 6/9/2025  Length of Stay: 9 days  Attending Physician: Shaw Christopher MD  Primary Care Provider: Nicole Blanchard FNP        Subjective     Principal Problem:Hemorrhagic stroke        HPI:  64-year-old male with type 1 diabetes mellitus, CAD status post stenting, hypertension, hyperlipidemia, prior CVA  presented on 06/09/2025 for evaluation of sudden onset headache, confusion, slurred speech.  Workup revealed  CT head revealed left occipital lobe parenchymal hemorrhage with trace adjacent subarachnoid hemorrhage.   CTA head/neck was also completed in ED, which revealed no large vessel occlusion, flow-limiting stenosis, aneurysm or evidence of a vascular malformation   MRI brain report:  1. Left occipital lobe hematoma with adjacent subarachnoid hemorrhage.  2. Innumerable chronic microhemorrhages with a subcortical location, may indicate underlying cerebral amyloid angiopathy.  3. Mild chronic microvascular ischemic changes     Neurology team evaluated the patient, initiated on stroke protocol, admitted to ICU services.  Neurosurgery team evaluated the patient, MRI likely suggestive of amyloid angiopathy, repeat CT head 6/10/25 unchanged, suggested holding antiplatelets for at least 2 weeks.  Patient is started on Keppra.  Neurosurgical team signed off.     Patient required Chao placement for retention, neurology team evaluated.     Patient cleared to downgrade to floors 06/12/2025 PM by ICU team.     Palliative care team consulted while patient in ICU, code status DNR at the time of downgrade.      Pt had fever ,tmax 102.8, tachycardic,wbc count increased 17K, decided to obtain pan scan, CT head stable, but notes to have dense consolidation left >right LL, constipation  Respiratory cultures were obtained grew many  strep group B Haemophilus influenzae     Patient remains unsafe for p.o. intake with speech evaluations, informed decision with the patient's spouse was made, GI team consulted for PEG placement       Overview/Hospital Course:  6/18/25-No new issues today.  He is still confused.  He does seem to be tolerating the TF's.    Interval History:     Review of Systems   Unable to perform ROS: Acuity of condition     Objective:     Vital Signs (Most Recent):  Temp: 97.9 °F (36.6 °C) (06/18/25 1428)  Pulse: 88 (06/18/25 1428)  Resp: 18 (06/18/25 1428)  BP: 111/62 (06/18/25 1428)  SpO2: 95 % (06/18/25 1428) Vital Signs (24h Range):  Temp:  [97.4 °F (36.3 °C)-98.3 °F (36.8 °C)] 97.9 °F (36.6 °C)  Pulse:  [] 88  Resp:  [18-20] 18  SpO2:  [94 %-97 %] 95 %  BP: (111-138)/(55-73) 111/62     Weight: 65 kg (143 lb 4.8 oz)  Body mass index is 26.21 kg/m².    Intake/Output Summary (Last 24 hours) at 6/18/2025 1436  Last data filed at 6/18/2025 0622  Gross per 24 hour   Intake 1193.47 ml   Output 1050 ml   Net 143.47 ml         Physical Exam  Constitutional:       General: He is awake.      Appearance: He is ill-appearing.   HENT:      Head: Normocephalic and atraumatic.      Nose: Nose normal.      Mouth/Throat:      Mouth: Mucous membranes are moist.      Pharynx: Oropharynx is clear.   Eyes:      Extraocular Movements: Extraocular movements intact.      Conjunctiva/sclera: Conjunctivae normal.      Pupils: Pupils are equal, round, and reactive to light.   Cardiovascular:      Rate and Rhythm: Normal rate and regular rhythm.      Pulses: Normal pulses.      Heart sounds: Normal heart sounds.   Pulmonary:      Effort: Pulmonary effort is normal.      Breath sounds: Normal breath sounds.   Abdominal:      General: Bowel sounds are normal.      Palpations: Abdomen is soft.      Comments: PEG   Musculoskeletal:         General: Normal range of motion.      Cervical back: Normal range of motion and neck supple.   Skin:     General:  "Skin is dry.      Capillary Refill: Capillary refill takes 2 to 3 seconds.   Neurological:      Mental Status: He is disoriented.      Motor: Weakness present.      Gait: Gait abnormal.   Psychiatric:         Cognition and Memory: Cognition is impaired. Memory is impaired. He exhibits impaired recent memory and impaired remote memory.               Significant Labs: All pertinent labs within the past 24 hours have been reviewed.  BMP:   Recent Labs   Lab 06/17/25 0413   *      K 4.2   *   CO2 27   BUN 26.2*   CREATININE 1.02   CALCIUM 9.1     CBC:   Recent Labs   Lab 06/17/25 0413   WBC 14.11*   HGB 12.6*   HCT 38.9*        CMP:   Recent Labs   Lab 06/17/25 0413      K 4.2   *   CO2 27   *   BUN 26.2*   CREATININE 1.02   CALCIUM 9.1   PROT 7.3   ALBUMIN 2.3*   BILITOT 0.8   ALKPHOS 99   AST 28   ALT 29     Magnesium: No results for input(s): "MG" in the last 48 hours.    Significant Imaging: I have reviewed all pertinent imaging results/findings within the past 24 hours.      Assessment & Plan  Hemorrhagic stroke  .  Antithrombotics for secondary stroke prevention: Antiplatelets: None: Intracerebral Hemorrhage    Statins for secondary stroke prevention and hyperlipidemia, if present:   Statins: Atorvastatin- 40 mg daily    Aggressive risk factor modification: HTN, Smoking, CAD     Rehab efforts: The patient has been evaluated by a stroke team provider and the therapy needs have been fully considered based off the presenting complaints and exam findings. The following therapy evaluations are needed: PT evaluate and treat, OT evaluate and treat, SLP evaluate and treat, PM&R evaluate for appropriate placement    Diagnostics ordered/pending: CT scan of head without contrast to asses brain parenchyma, MRI head without contrast to assess brain parenchyma    VTE prophylaxis: None: Reason for No Pharmacological VTE Prophylaxis: History of systemic or intracranial " bleeding    BP parameters: ICH: SBP Goal Range 130-150      Hypertension  Patient's blood pressure range in the last 24 hours was: BP  Min: 111/62  Max: 138/73.The patient's inpatient anti-hypertensive regimen is listed below:  Current Antihypertensives  labetaloL injection 10 mg, Every 15 min PRN, Intravenous  losartan tablet 100 mg, Daily, Per G Tube  carvediloL tablet 12.5 mg, 2 times daily, Per G Tube    Plan  - BP is controlled, no changes needed to their regimen  - .  Tobacco user  Advise to stop.  Can offer nicotine patch.  Due to his condition he will likely not be able to smoke again(cessation=4mins)    Anemia  Anemia is likely due to chronic blood loss. Most recent hemoglobin and hematocrit are listed below.  Recent Labs     06/16/25  0412 06/17/25  0413   HGB 12.9* 12.6*   HCT 40.1* 38.9*     Plan  - Monitor serial CBC: Daily  - Transfuse PRBC if patient becomes hemodynamically unstable, symptomatic or H/H drops below 7/21.  - Patient has not received any PRBC transfusions to date  - Patient's anemia is currently stable  - .  VTE Risk Mitigation (From admission, onward)           Ordered     Reason for No Pharmacological VTE Prophylaxis  Once        Question:  Reasons:  Answer:  Risk of Bleeding    06/09/25 1055     IP VTE HIGH RISK PATIENT  Once         06/09/25 1055     Place sequential compression device  Until discontinued         06/09/25 1055                Therapy  ?placement  Follow labs  Wean O2    Discharge Planning   ISAIAS:      Code Status: DNR   Medical Readiness for Discharge Date:   Discharge Plan A: Skilled Nursing Facility                Please place Justification for DME        Shaw Christopher MD  Department of Hospital Medicine   [facility] - 4th Floor Medical Telemetry

## 2025-06-18 NOTE — PLAN OF CARE
Problem: Adult Inpatient Plan of Care  Goal: Plan of Care Review  Outcome: Progressing  Goal: Patient-Specific Goal (Individualized)  Outcome: Progressing  Goal: Absence of Hospital-Acquired Illness or Injury  Outcome: Progressing  Goal: Optimal Comfort and Wellbeing  Outcome: Progressing  Goal: Readiness for Transition of Care  Outcome: Progressing     Problem: Diabetes Comorbidity  Goal: Blood Glucose Level Within Targeted Range  Outcome: Progressing     Problem: Stroke, Intracerebral Hemorrhage  Goal: Optimal Coping  Outcome: Progressing  Goal: Effective Bowel Elimination  Outcome: Progressing  Goal: Optimal Cerebral Tissue Perfusion  Outcome: Progressing  Goal: Optimal Cognitive Function  Outcome: Progressing  Goal: Effective Communication Skills  Outcome: Progressing  Goal: Optimal Functional Ability  Outcome: Progressing  Goal: Optimal Nutrition Intake  Outcome: Progressing  Goal: Optimal Pain Control and Function  Outcome: Progressing  Goal: Effective Oxygenation and Ventilation  Outcome: Progressing  Goal: Improved Sensorimotor Function  Outcome: Progressing  Goal: Safe and Effective Swallow  Outcome: Progressing  Goal: Effective Urinary Elimination  Outcome: Progressing     Problem: Skin Injury Risk Increased  Goal: Skin Health and Integrity  Outcome: Progressing     Problem: Infection  Goal: Absence of Infection Signs and Symptoms  Outcome: Progressing     Problem: Coping Ineffective  Goal: Effective Coping  Outcome: Progressing

## 2025-06-19 LAB
ALBUMIN SERPL-MCNC: 2.2 G/DL (ref 3.4–4.8)
ALBUMIN/GLOB SERPL: 0.5 RATIO (ref 1.1–2)
ALP SERPL-CCNC: 113 UNIT/L (ref 40–150)
ALT SERPL-CCNC: 30 UNIT/L (ref 0–55)
ANION GAP SERPL CALC-SCNC: 6 MEQ/L
AST SERPL-CCNC: 27 UNIT/L (ref 11–45)
BASOPHILS # BLD AUTO: 0.09 X10(3)/MCL
BASOPHILS NFR BLD AUTO: 0.8 %
BILIRUB SERPL-MCNC: 0.3 MG/DL
BUN SERPL-MCNC: 33.7 MG/DL (ref 8.4–25.7)
CALCIUM SERPL-MCNC: 9.2 MG/DL (ref 8.8–10)
CHLORIDE SERPL-SCNC: 110 MMOL/L (ref 98–107)
CO2 SERPL-SCNC: 34 MMOL/L (ref 23–31)
CREAT SERPL-MCNC: 0.91 MG/DL (ref 0.72–1.25)
CREAT/UREA NIT SERPL: 37
EOSINOPHIL # BLD AUTO: 0.36 X10(3)/MCL (ref 0–0.9)
EOSINOPHIL NFR BLD AUTO: 3 %
ERYTHROCYTE [DISTWIDTH] IN BLOOD BY AUTOMATED COUNT: 12.2 % (ref 11.5–17)
GFR SERPLBLD CREATININE-BSD FMLA CKD-EPI: >60 ML/MIN/1.73/M2
GLOBULIN SER-MCNC: 4.7 GM/DL (ref 2.4–3.5)
GLUCOSE SERPL-MCNC: 166 MG/DL (ref 82–115)
GLUCOSE SERPL-MCNC: 182 MG/DL (ref 70–110)
HCT VFR BLD AUTO: 40 % (ref 42–52)
HGB BLD-MCNC: 12.8 G/DL (ref 14–18)
IMM GRANULOCYTES # BLD AUTO: 0.31 X10(3)/MCL (ref 0–0.04)
IMM GRANULOCYTES NFR BLD AUTO: 2.6 %
LYMPHOCYTES # BLD AUTO: 2.18 X10(3)/MCL (ref 0.6–4.6)
LYMPHOCYTES NFR BLD AUTO: 18.4 %
MAGNESIUM SERPL-MCNC: 2.7 MG/DL (ref 1.6–2.6)
MCH RBC QN AUTO: 30.6 PG (ref 27–31)
MCHC RBC AUTO-ENTMCNC: 32 G/DL (ref 33–36)
MCV RBC AUTO: 95.7 FL (ref 80–94)
MONOCYTES # BLD AUTO: 1.28 X10(3)/MCL (ref 0.1–1.3)
MONOCYTES NFR BLD AUTO: 10.8 %
NEUTROPHILS # BLD AUTO: 7.62 X10(3)/MCL (ref 2.1–9.2)
NEUTROPHILS NFR BLD AUTO: 64.4 %
NRBC BLD AUTO-RTO: 0 %
PLATELET # BLD AUTO: 328 X10(3)/MCL (ref 130–400)
PMV BLD AUTO: 9.6 FL (ref 7.4–10.4)
POCT GLUCOSE: 175 MG/DL (ref 70–110)
POCT GLUCOSE: 182 MG/DL (ref 70–110)
POCT GLUCOSE: 218 MG/DL (ref 70–110)
POCT GLUCOSE: 287 MG/DL (ref 70–110)
POTASSIUM SERPL-SCNC: 3.8 MMOL/L (ref 3.5–5.1)
PROT SERPL-MCNC: 6.9 GM/DL (ref 5.8–7.6)
RBC # BLD AUTO: 4.18 X10(6)/MCL (ref 4.7–6.1)
SODIUM SERPL-SCNC: 150 MMOL/L (ref 136–145)
WBC # BLD AUTO: 11.84 X10(3)/MCL (ref 4.5–11.5)

## 2025-06-19 PROCEDURE — 25000003 PHARM REV CODE 250: Performed by: INTERNAL MEDICINE

## 2025-06-19 PROCEDURE — 80053 COMPREHEN METABOLIC PANEL: CPT | Performed by: INTERNAL MEDICINE

## 2025-06-19 PROCEDURE — 27000221 HC OXYGEN, UP TO 24 HOURS

## 2025-06-19 PROCEDURE — 25000003 PHARM REV CODE 250

## 2025-06-19 PROCEDURE — 85025 COMPLETE CBC W/AUTO DIFF WBC: CPT | Performed by: INTERNAL MEDICINE

## 2025-06-19 PROCEDURE — 63600175 PHARM REV CODE 636 W HCPCS: Performed by: INTERNAL MEDICINE

## 2025-06-19 PROCEDURE — 11000001 HC ACUTE MED/SURG PRIVATE ROOM

## 2025-06-19 PROCEDURE — 21400001 HC TELEMETRY ROOM

## 2025-06-19 PROCEDURE — 36415 COLL VENOUS BLD VENIPUNCTURE: CPT | Performed by: INTERNAL MEDICINE

## 2025-06-19 PROCEDURE — 25000003 PHARM REV CODE 250: Performed by: NURSE PRACTITIONER

## 2025-06-19 PROCEDURE — 83735 ASSAY OF MAGNESIUM: CPT | Performed by: INTERNAL MEDICINE

## 2025-06-19 RX ORDER — TALC
6 POWDER (GRAM) TOPICAL NIGHTLY
Status: DISCONTINUED | OUTPATIENT
Start: 2025-06-19 | End: 2025-07-08

## 2025-06-19 RX ORDER — TRAZODONE HYDROCHLORIDE 50 MG/1
50 TABLET ORAL NIGHTLY
Status: DISCONTINUED | OUTPATIENT
Start: 2025-06-19 | End: 2025-06-20

## 2025-06-19 RX ADMIN — SUCRALFATE 1 G: 1 TABLET ORAL at 12:06

## 2025-06-19 RX ADMIN — INSULIN GLARGINE 18 UNITS: 100 INJECTION, SOLUTION SUBCUTANEOUS at 09:06

## 2025-06-19 RX ADMIN — ALUMINUM HYDROXIDE, MAGNESIUM HYDROXIDE, AND DIMETHICONE 30 ML: 200; 20; 200 SUSPENSION ORAL at 11:06

## 2025-06-19 RX ADMIN — SUCRALFATE 1 G: 1 TABLET ORAL at 05:06

## 2025-06-19 RX ADMIN — ALUMINUM HYDROXIDE, MAGNESIUM HYDROXIDE, AND DIMETHICONE 30 ML: 200; 20; 200 SUSPENSION ORAL at 05:06

## 2025-06-19 RX ADMIN — CARVEDILOL 12.5 MG: 12.5 TABLET, FILM COATED ORAL at 09:06

## 2025-06-19 RX ADMIN — INSULIN ASPART 6 UNITS: 100 INJECTION, SOLUTION INTRAVENOUS; SUBCUTANEOUS at 11:06

## 2025-06-19 RX ADMIN — ALUMINUM HYDROXIDE, MAGNESIUM HYDROXIDE, AND DIMETHICONE 30 ML: 200; 20; 200 SUSPENSION ORAL at 04:06

## 2025-06-19 RX ADMIN — SUCRALFATE 1 G: 1 TABLET ORAL at 04:06

## 2025-06-19 RX ADMIN — BISACODYL 10 MG: 10 SUPPOSITORY RECTAL at 09:06

## 2025-06-19 RX ADMIN — LEVETIRACETAM 500 MG: 500 SOLUTION ORAL at 08:06

## 2025-06-19 RX ADMIN — HALOPERIDOL LACTATE 5 MG: 5 INJECTION, SOLUTION INTRAMUSCULAR at 12:06

## 2025-06-19 RX ADMIN — Medication 6 MG: at 09:06

## 2025-06-19 RX ADMIN — INSULIN ASPART 2 UNITS: 100 INJECTION, SOLUTION INTRAVENOUS; SUBCUTANEOUS at 04:06

## 2025-06-19 RX ADMIN — QUETIAPINE FUMARATE 50 MG: 25 TABLET ORAL at 08:06

## 2025-06-19 RX ADMIN — CARVEDILOL 12.5 MG: 12.5 TABLET, FILM COATED ORAL at 08:06

## 2025-06-19 RX ADMIN — SCOPOLAMINE 1 PATCH: 1 PATCH TRANSDERMAL at 01:06

## 2025-06-19 RX ADMIN — HALOPERIDOL LACTATE 5 MG: 5 INJECTION, SOLUTION INTRAMUSCULAR at 02:06

## 2025-06-19 RX ADMIN — TRAZODONE HYDROCHLORIDE 50 MG: 50 TABLET ORAL at 09:06

## 2025-06-19 RX ADMIN — SUCRALFATE 1 G: 1 TABLET ORAL at 11:06

## 2025-06-19 RX ADMIN — INSULIN ASPART 2 UNITS: 100 INJECTION, SOLUTION INTRAVENOUS; SUBCUTANEOUS at 05:06

## 2025-06-19 RX ADMIN — ACETAMINOPHEN 650 MG: 325 TABLET ORAL at 11:06

## 2025-06-19 RX ADMIN — ACETAMINOPHEN 650 MG: 325 TABLET ORAL at 04:06

## 2025-06-19 RX ADMIN — INSULIN GLARGINE 18 UNITS: 100 INJECTION, SOLUTION SUBCUTANEOUS at 08:06

## 2025-06-19 RX ADMIN — LOSARTAN POTASSIUM 100 MG: 50 TABLET, FILM COATED ORAL at 08:06

## 2025-06-19 RX ADMIN — CEFTRIAXONE SODIUM 2 G: 2 INJECTION, POWDER, FOR SOLUTION INTRAMUSCULAR; INTRAVENOUS at 09:06

## 2025-06-19 RX ADMIN — INSULIN ASPART 2 UNITS: 100 INJECTION, SOLUTION INTRAVENOUS; SUBCUTANEOUS at 09:06

## 2025-06-19 RX ADMIN — LEVETIRACETAM 500 MG: 500 SOLUTION ORAL at 09:06

## 2025-06-19 RX ADMIN — QUETIAPINE FUMARATE 50 MG: 25 TABLET ORAL at 09:06

## 2025-06-19 NOTE — PT/OT/SLP PROGRESS
Occupational Therapy      Patient Name:  Federico Villarreal   MRN:  [id number]    Patient not seen today secondary to RN administered haloperidol ~5 minutes prior to OT arrival.  Will follow-up as appropriate.    6/19/2025

## 2025-06-19 NOTE — ASSESSMENT & PLAN NOTE
Anemia is likely due to chronic blood loss. Most recent hemoglobin and hematocrit are listed below.  Recent Labs     06/17/25  0413 06/19/25  0400   HGB 12.6* 12.8*   HCT 38.9* 40.0*     Plan  - Monitor serial CBC: Daily  - Transfuse PRBC if patient becomes hemodynamically unstable, symptomatic or H/H drops below 7/21.  - Patient has not received any PRBC transfusions to date  - Patient's anemia is currently stable  - .

## 2025-06-19 NOTE — PROGRESS NOTES
Inpatient Nutrition Assessment    Admit Date: 6/9/2025   Total duration of encounter: 10 days   Patient Age: 64 y.o.    Nutrition Recommendation/Prescription     Continue to advance tube feeding to goal rate:  Diabetisource AC goal rate 75 ml/hr to provide  1800 kcal 100% needs  90 g protein 100% needs  162 g CHO 92% needs  1230 ml water 77% needs, recommend 45 ml water flush every 2 hours to meet estimated fluid requirements  calculations based on estimated 20 hour run time     Bolus recommendation: 250 ml formula and 150 ml water flush 6 times daily will provide same nutrition as above    Communication of Recommendations: reviewed with nurse    Nutrition Assessment     Malnutrition Assessment/Nutrition-Focused Physical Exam       Malnutrition Level: other (see comments) (Does not meet criteria) (06/11/25 1352)  Energy Intake (Malnutrition): other (see comments) (Unable to assess) (06/11/25 1352)  Weight Loss (Malnutrition): other (see comments) (Unable to assess) (06/11/25 1352)              Muscle Mass (Malnutrition): mild depletion (06/11/25 [facility])  [place] (Muscle Loss): mild depletion                                A minimum of two characteristics is recommended for diagnosis of either severe or non-severe malnutrition.    Chart Review    Reason Seen: follow-up    Malnutrition Screening Tool Results   Have you recently lost weight without trying?: No  Have you been eating poorly because of a decreased appetite?: No   MST Score: 0   Diagnosis: left occipital hemorrhagic stroke     Relevant Medical History: T1DM, coronary artery disease status post stenting, hypertension, hyperlipidemia, history of ischemic stroke with previous left-sided weakness    Scheduled Medications:  aluminum-magnesium hydroxide-simethicone, 30 mL, QID (AC & HS)  carvediloL, 12.5 mg, BID  cefTRIAXone (Rocephin) IV (PEDS and ADULTS), 2 g, Q24H  insulin glargine U-100, 18 Units, BID  levetiracetam, 500 mg, BID  LIDOcaine HCl 2%, ,  Once  losartan, 100 mg, Daily  melatonin, 6 mg, Nightly  QUEtiapine, 50 mg, BID  scopolamine, 1 patch, Q3 Days  sodium chloride 0.9%, 500 mL, Once  sucralfate, 1 g, Q6H    Continuous Infusions: none       PRN Medications:   acetaminophen, 650 mg, Q6H PRN  bisacodyL, 10 mg, Daily PRN  butalbital-acetaminophen-caffeine -40 mg, 1 tablet, Q4H PRN  dextrose 50%, 12.5 g, PRN  dextrose 50%, 12.5 g, PRN  dextrose 50%, 25 g, PRN  glucagon (human recombinant), 1 mg, PRN  glucagon (human recombinant), 1 mg, PRN  glucose, 16 g, PRN  glucose, 24 g, PRN  haloperidol lactate, 5 mg, Q6H PRN  insulin aspart U-100, 0-10 Units, QID (AC + HS) PRN  labetalol, 10 mg, Q15 Min PRN  levalbuterol, 0.63 mg, Q4H PRN  ondansetron, 4 mg, Q8H PRN  sodium chloride 0.9%, 10 mL, PRN    Calorie Containing IV Medications: no significant kcals from medications at this time    Recent Labs   Lab 06/13/25  0353 06/14/25  0833 06/15/25  0954 06/16/25  0412 06/17/25  0413 06/19/25  0359 06/19/25  0400    141 145 148* 145 150*  --    K 3.7 3.7 3.8 3.8 4.2 3.8  --    CALCIUM 9.2 9.3 9.2 8.6* 9.1 9.2  --    MG  --   --   --   --   --  2.70*  --     104 107 103 108* 110*  --    CO2 28 24 31 33* 27 34*  --    BUN 19.0 29.8* 27.0* 27.3* 26.2* 33.7*  --    CREATININE 0.93 1.19 1.01 1.10 1.02 0.91  --    EGFRNORACEVR >60 >60 >60 >60 >60 >60  --    * 368* 308* 268* 318* 166*  --    BILITOT 1.3  --  0.6  --  0.8 0.3  --    ALKPHOS 91  --  106  --  99 113  --    ALT 26  --  28  --  29 30  --    AST 31  --  28  --  28 27  --    ALBUMIN 3.1*  --  2.4*  --  2.3* 2.2*  --    WBC 14.03* 17.52* 15.74* 12.53* 14.11*  --  11.84*   HGB 14.3 12.8* 12.3* 12.9* 12.6*  --  12.8*   HCT 42.6 38.4* 37.2* 40.1* 38.9*  --  40.0*     Nutrition Orders:  Diet NPO  Tube Feedings/Formulas 75; 1,500; Diabetisource AC; NG; 45; Every 2 hours    Appetite/Oral Intake: NPO/not applicable  Factors Affecting Nutritional Intake: impaired cognitive status/motor control and  "NPO  Social Needs Impacting Access to Food: unable to assess at this time; will attempt on follow-up  Food/Pentecostal/Cultural Preferences: unable to obtain  Food Allergies: none reported  Last Bowel Movement: 06/18/25  Wound(s): no pressure injuries documented at this time     Comments    6/11/25 Patient confused with episodes of agitation, sleeping during rounds. Nurse reports plans to start nasogastric tube feeding, orders provided.    6/13/25 PO intake remains unsafe per SLP. GI consulted for PEG placement possibly on Monday. RN reports patient tolerating tube feeding at 15 mL/hr. Last BM noted on 6/8/25, consider bowel regimen.      6//16/25: Pt's TF being held for PEG placement today. Pt remains NPO per SLP.    6/19/25 Tube feeding progressing toward goal rate, currently at 60 ml/hr, goal 75 ml/hr.    Anthropometrics    Height: 5' 2" (157.5 cm), Height Method: Stated  Last Weight: 65 kg (143 lb 4.8 oz) (06/11/25 1348), Weight Method: Bed Scale  BMI (Calculated): 26.2  BMI Classification: overweight (BMI 25-29.9)        Ideal Body Weight (IBW), Male: 118 lb     % Ideal Body Weight, Male (lb): 114.41 %                          Usual Weight Provided By: unable to obtain usual weight    Wt Readings from Last 5 Encounters:   06/11/25 65 kg (143 lb 4.8 oz)   01/27/25 63 kg (139 lb)   12/03/24 64.7 kg (142 lb 9.6 oz)   11/05/24 62 kg (136 lb 11 oz)   10/02/24 62 kg (136 lb 9.6 oz)     Weight Change(s) Since Admission:   6/11/25 initial weight 61.2 kg stated, bed weight 65 kg taken during rounds  Wt Readings from Last 1 Encounters:   06/11/25 1348 65 kg (143 lb 4.8 oz)   06/09/25 0805 61.2 kg (135 lb)   Admit Weight: 61.2 kg (135 lb) (06/09/25 0805), Weight Method: Stated    Estimated Needs    Weight Used For Calorie Calculations: 65 kg (143 lb 4.8 oz)  Energy Calorie Requirements (kcal): 1912-5669, 1.2-1.4 stress factor  Energy Need Method: [place]-St Sr  Weight Used For Protein Calculations: 65 kg (143 lb 4.8 " oz)  Protein Requirements: 78-91 g, 1.2-1.4 g/kg  Fluid Requirements (mL): 4047-0173, 1 ml/kcal  CHO Requirement: 177-207 g, 45% of kcal     Enteral Nutrition    Formula: Diabetisource AC  Rate/Volume: 60 ml/hr  Water Flushes: 35 ml every 2 hours  Additives/Modulars: none at this time  Route: gastrostomy tube  Method: continuous  Total Nutrition Provided by Tube Feeding, Additives, and Flushes:  Calories Provided  1440 kcal/d, 91% needs   Protein Provided  72 g/d, 92% needs   Fluid Provided  1684 ml/d, 100% needs   Continuous feeding calculations based on estimated 20 hr/d run time unless otherwise stated.    Parenteral Nutrition Patient not receiving parenteral nutrition support at this time.    Evaluation of Received Nutrient Intake    Calories: meeting estimated needs  Protein: meeting estimated needs    Patient Education Not applicable.    Nutrition Diagnosis     PES: Inadequate energy intake related to inability to consume sufficient nutrients as evidenced by less than 80% needs met. (resolved)  PES: N/A             Nutrition Interventions     Interventions: modified composition of enteral nutrition, modified rate of enteral nutrition, and collaboration with other providers       Goal: Meet greater than 80% of nutritional needs by follow-up. (goal met)  Goal: Tolerate enteral feeding at goal rate by follow-up. (goal met)    Nutrition Goals & Monitoring     Dietitian will monitor: food and beverage intake, energy intake, enteral nutrition intake, weight, weight change, electrolyte/renal panel, beliefs/attitudes, glucose/endocrine profile, and gastrointestinal profile  Discharge planning: tube feeding (Diabetisource AC or equivalent substitute)  Nutrition Risk/Follow-Up: patient at increased nutrition risk; dietitian will follow-up twice weekly   Please consult if re-assessment needed sooner.

## 2025-06-19 NOTE — PROGRESS NOTES
[facility] - 4th Floor [facility] Medicine  Progress Note    Patient Name: Federico Villarreal  MRN: [id number]  Patient Class: IP- Inpatient   Admission Date: 6/9/2025  Length of Stay: 10 days  Attending Physician: Shaw Christopher MD  Primary Care Provider: Nicole Blanchard FNP        Subjective     Principal Problem:Hemorrhagic stroke        HPI:  64-year-old male with type 1 diabetes mellitus, CAD status post stenting, hypertension, hyperlipidemia, prior CVA  presented on 06/09/2025 for evaluation of sudden onset headache, confusion, slurred speech.  Workup revealed  CT head revealed left occipital lobe parenchymal hemorrhage with trace adjacent subarachnoid hemorrhage.   CTA head/neck was also completed in ED, which revealed no large vessel occlusion, flow-limiting stenosis, aneurysm or evidence of a vascular malformation   MRI brain report:  1. Left occipital lobe hematoma with adjacent subarachnoid hemorrhage.  2. Innumerable chronic microhemorrhages with a subcortical location, may indicate underlying cerebral amyloid angiopathy.  3. Mild chronic microvascular ischemic changes     Neurology team evaluated the patient, initiated on stroke protocol, admitted to ICU services.  Neurosurgery team evaluated the patient, MRI likely suggestive of amyloid angiopathy, repeat CT head 6/10/25 unchanged, suggested holding antiplatelets for at least 2 weeks.  Patient is started on Keppra.  Neurosurgical team signed off.     Patient required Chao placement for retention, neurology team evaluated.     Patient cleared to downgrade to floors 06/12/2025 PM by ICU team.     Palliative care team consulted while patient in ICU, code status DNR at the time of downgrade.      Pt had fever ,tmax 102.8, tachycardic,wbc count increased 17K, decided to obtain pan scan, CT head stable, but notes to have dense consolidation left >right LL, constipation  Respiratory cultures were obtained grew many  strep group B Haemophilus influenzae     Patient remains unsafe for p.o. intake with speech evaluations, informed decision with the patient's spouse was made, GI team consulted for PEG placement   .    Overview/Hospital Course:  6/18/25-No new issues today.  He is still confused.  He does seem to be tolerating the TF's.    6/19/25-Patient is still confused which will likely not improve.  He has PRN meds ordered for this.  Will add melatonin tonight.    Interval History:     Review of Systems   Unable to perform ROS: Acuity of condition     Objective:     Vital Signs (Most Recent):  Temp: 97.7 °F (36.5 °C) (06/19/25 1428)  Pulse: 96 (06/19/25 1428)  Resp: 18 (06/19/25 1428)  BP: (!) 142/79 (06/19/25 1428)  SpO2: (!) 93 % (06/19/25 1428) Vital Signs (24h Range):  Temp:  [97.7 °F (36.5 °C)-98.7 °F (37.1 °C)] 97.7 °F (36.5 °C)  Pulse:  [] 96  Resp:  [18-20] 18  SpO2:  [92 %-96 %] 93 %  BP: (116-145)/(55-79) 142/79     Weight: 65 kg (143 lb 4.8 oz)  Body mass index is 26.21 kg/m².    Intake/Output Summary (Last 24 hours) at 6/19/2025 1447  Last data filed at 6/19/2025 0400  Gross per 24 hour   Intake 518 ml   Output 900 ml   Net -382 ml         Physical Exam  Constitutional:       General: He is awake.      Appearance: He is ill-appearing.   HENT:      Head: Normocephalic and atraumatic.      Nose: Nose normal.      Mouth/Throat:      Mouth: Mucous membranes are dry.      Pharynx: Oropharynx is clear.   Eyes:      Extraocular Movements: Extraocular movements intact.      Conjunctiva/sclera: Conjunctivae normal.      Pupils: Pupils are equal, round, and reactive to light.   Cardiovascular:      Rate and Rhythm: Normal rate and regular rhythm.      Pulses: Normal pulses.      Heart sounds: Normal heart sounds.   Pulmonary:      Effort: Pulmonary effort is normal.      Breath sounds: Normal breath sounds.   Abdominal:      General: Bowel sounds are normal.      Palpations: Abdomen is soft.      Comments: PEG    Musculoskeletal:         General: Normal range of motion.      Cervical back: Normal range of motion and neck supple.   Skin:     General: Skin is warm and dry.      Capillary Refill: Capillary refill takes 2 to 3 seconds.   Neurological:      Mental Status: He is disoriented.      Motor: Weakness present.      Gait: Gait abnormal.   Psychiatric:         Cognition and Memory: Cognition is impaired. Memory is impaired. He exhibits impaired recent memory and impaired remote memory.               Significant Labs: All pertinent labs within the past 24 hours have been reviewed.  BMP:   Recent Labs   Lab 06/19/25  0359   *   *   K 3.8   *   CO2 34*   BUN 33.7*   CREATININE 0.91   CALCIUM 9.2   MG 2.70*     CBC:   Recent Labs   Lab 06/19/25  0400   WBC 11.84*   HGB 12.8*   HCT 40.0*        CMP:   Recent Labs   Lab 06/19/25  0359   *   K 3.8   *   CO2 34*   *   BUN 33.7*   CREATININE 0.91   CALCIUM 9.2   PROT 6.9   ALBUMIN 2.2*   BILITOT 0.3   ALKPHOS 113   AST 27   ALT 30     Magnesium:   Recent Labs   Lab 06/19/25  0359   MG 2.70*       Significant Imaging: I have reviewed all pertinent imaging results/findings within the past 24 hours.      Assessment & Plan  Hemorrhagic stroke  .  Antithrombotics for secondary stroke prevention: Antiplatelets: None: Intracerebral Hemorrhage    Statins for secondary stroke prevention and hyperlipidemia, if present:   Statins: Atorvastatin- 40 mg daily    Aggressive risk factor modification: HTN, Smoking, CAD     Rehab efforts: The patient has been evaluated by a stroke team provider and the therapy needs have been fully considered based off the presenting complaints and exam findings. The following therapy evaluations are needed: PT evaluate and treat, OT evaluate and treat, SLP evaluate and treat, PM&R evaluate for appropriate placement    Diagnostics ordered/pending: CT scan of head without contrast to asses brain parenchyma, MRI head  without contrast to assess brain parenchyma    VTE prophylaxis: None: Reason for No Pharmacological VTE Prophylaxis: History of systemic or intracranial bleeding    BP parameters: ICH: SBP Goal Range 130-150      Hypertension  Patient's blood pressure range in the last 24 hours was: BP  Min: 116/58  Max: 145/64.The patient's inpatient anti-hypertensive regimen is listed below:  Current Antihypertensives  labetaloL injection 10 mg, Every 15 min PRN, Intravenous  losartan tablet 100 mg, Daily, Per G Tube  carvediloL tablet 12.5 mg, 2 times daily, Per G Tube    Plan  - BP is controlled, no changes needed to their regimen  - .  Tobacco user  Advise to stop.  Can offer nicotine patch.  Due to his condition he will likely not be able to smoke again(cessation=4mins)    Anemia  Anemia is likely due to chronic blood loss. Most recent hemoglobin and hematocrit are listed below.  Recent Labs     06/17/25  0413 06/19/25  0400   HGB 12.6* 12.8*   HCT 38.9* 40.0*     Plan  - Monitor serial CBC: Daily  - Transfuse PRBC if patient becomes hemodynamically unstable, symptomatic or H/H drops below 7/21.  - Patient has not received any PRBC transfusions to date  - Patient's anemia is currently stable  - .  VTE Risk Mitigation (From admission, onward)           Ordered     Reason for No Pharmacological VTE Prophylaxis  Once        Question:  Reasons:  Answer:  Risk of Bleeding    06/09/25 1055     IP VTE HIGH RISK PATIENT  Once         06/09/25 1055     Place sequential compression device  Until discontinued         06/09/25 1055                Continue with current meds  Therapy  Needs placement  TF's at goal      Discharge Planning   ISAIAS:      Code Status: DNR   Patient is Medically Not Yet Ready for discharge.  Medical Readiness for Discharge Date:   Discharge Plan A: Skilled Nursing Facility          SDOH Screening:  The patient was screened for utility difficulties, food insecurity, transport difficulties, housing insecurity, and  interpersonal safety and there were no concerns identified this admission.                Please place Justification for DME      Shaw Christopher MD  Department of Hospital Medicine   [facility] - 4th Floor Medical Telemetry

## 2025-06-19 NOTE — SUBJECTIVE & OBJECTIVE
Interval History:     Review of Systems   Unable to perform ROS: Acuity of condition     Objective:     Vital Signs (Most Recent):  Temp: 97.7 °F (36.5 °C) (06/19/25 1428)  Pulse: 96 (06/19/25 1428)  Resp: 18 (06/19/25 1428)  BP: (!) 142/79 (06/19/25 1428)  SpO2: (!) 93 % (06/19/25 1428) Vital Signs (24h Range):  Temp:  [97.7 °F (36.5 °C)-98.7 °F (37.1 °C)] 97.7 °F (36.5 °C)  Pulse:  [] 96  Resp:  [18-20] 18  SpO2:  [92 %-96 %] 93 %  BP: (116-145)/(55-79) 142/79     Weight: 65 kg (143 lb 4.8 oz)  Body mass index is 26.21 kg/m².    Intake/Output Summary (Last 24 hours) at 6/19/2025 1447  Last data filed at 6/19/2025 0400  Gross per 24 hour   Intake 518 ml   Output 900 ml   Net -382 ml         Physical Exam  Constitutional:       General: He is awake.      Appearance: He is ill-appearing.   HENT:      Head: Normocephalic and atraumatic.      Nose: Nose normal.      Mouth/Throat:      Mouth: Mucous membranes are dry.      Pharynx: Oropharynx is clear.   Eyes:      Extraocular Movements: Extraocular movements intact.      Conjunctiva/sclera: Conjunctivae normal.      Pupils: Pupils are equal, round, and reactive to light.   Cardiovascular:      Rate and Rhythm: Normal rate and regular rhythm.      Pulses: Normal pulses.      Heart sounds: Normal heart sounds.   Pulmonary:      Effort: Pulmonary effort is normal.      Breath sounds: Normal breath sounds.   Abdominal:      General: Bowel sounds are normal.      Palpations: Abdomen is soft.      Comments: PEG   Musculoskeletal:         General: Normal range of motion.      Cervical back: Normal range of motion and neck supple.   Skin:     General: Skin is warm and dry.      Capillary Refill: Capillary refill takes 2 to 3 seconds.   Neurological:      Mental Status: He is disoriented.      Motor: Weakness present.      Gait: Gait abnormal.   Psychiatric:         Cognition and Memory: Cognition is impaired. Memory is impaired. He exhibits impaired recent memory and  impaired remote memory.               Significant Labs: All pertinent labs within the past 24 hours have been reviewed.  BMP:   Recent Labs   Lab 06/19/25  0359   *   *   K 3.8   *   CO2 34*   BUN 33.7*   CREATININE 0.91   CALCIUM 9.2   MG 2.70*     CBC:   Recent Labs   Lab 06/19/25  0400   WBC 11.84*   HGB 12.8*   HCT 40.0*        CMP:   Recent Labs   Lab 06/19/25  0359   *   K 3.8   *   CO2 34*   *   BUN 33.7*   CREATININE 0.91   CALCIUM 9.2   PROT 6.9   ALBUMIN 2.2*   BILITOT 0.3   ALKPHOS 113   AST 27   ALT 30     Magnesium:   Recent Labs   Lab 06/19/25  0359   MG 2.70*       Significant Imaging: I have reviewed all pertinent imaging results/findings within the past 24 hours.

## 2025-06-19 NOTE — ASSESSMENT & PLAN NOTE
Patient's blood pressure range in the last 24 hours was: BP  Min: 116/58  Max: 145/64.The patient's inpatient anti-hypertensive regimen is listed below:  Current Antihypertensives  labetaloL injection 10 mg, Every 15 min PRN, Intravenous  losartan tablet 100 mg, Daily, Per G Tube  carvediloL tablet 12.5 mg, 2 times daily, Per G Tube    Plan  - BP is controlled, no changes needed to their regimen  - .

## 2025-06-19 NOTE — PLAN OF CARE
Patient remains in rollbelt at this time and still requiring IV Haldol. Snf placement once ready. Will continue to follow.

## 2025-06-19 NOTE — PLAN OF CARE
Problem: Adult Inpatient Plan of Care  Goal: Plan of Care Review  Outcome: Progressing  Goal: Patient-Specific Goal (Individualized)  Outcome: Progressing  Goal: Absence of Hospital-Acquired Illness or Injury  Outcome: Progressing  Goal: Optimal Comfort and Wellbeing  Outcome: Progressing  Goal: Readiness for Transition of Care  Outcome: Progressing     Problem: Diabetes Comorbidity  Goal: Blood Glucose Level Within Targeted Range  Outcome: Progressing     Problem: Stroke, Intracerebral Hemorrhage  Goal: Optimal Coping  Outcome: Progressing  Goal: Effective Bowel Elimination  Outcome: Progressing  Goal: Optimal Cerebral Tissue Perfusion  Outcome: Progressing  Goal: Optimal Cognitive Function  Outcome: Progressing  Goal: Effective Communication Skills  Outcome: Progressing  Goal: Optimal Functional Ability  Outcome: Progressing  Goal: Optimal Nutrition Intake  Outcome: Progressing  Goal: Optimal Pain Control and Function  Outcome: Progressing  Goal: Effective Oxygenation and Ventilation  Outcome: Progressing  Goal: Improved Sensorimotor Function  Outcome: Progressing  Goal: Safe and Effective Swallow  Outcome: Progressing  Goal: Effective Urinary Elimination  Outcome: Progressing     Problem: Fall Injury Risk  Goal: Absence of Fall and Fall-Related Injury  Outcome: Progressing     Problem: Infection  Goal: Absence of Infection Signs and Symptoms  Outcome: Progressing     Problem: Coping Ineffective  Goal: Effective Coping  Outcome: Progressing

## 2025-06-19 NOTE — PROGRESS NOTES
"6/19/2025  Federico Villarreal   1960   [id number]        Psychiatry Progress Note       SUBJECTIVE:   Federico Villarreal is a 64 y.o. male with a past medical history taht includes CAD, T1DM, HTN, HLD, and history of ischemic stroke with left-sided weakness who presented to [facility] ED on 06/09/25 with confusion and slurred speech noted that morning. His wife had reported he had woken up with headache at 0115 that morning and then woke up with confusion around 0545. CT scan showed left occipital lobe parenchymal hemorrhage. Being seen by psychiatry to day due to confusion and agitation.     Seen at the bedside with his wife present. She reports he had minimal sleep, at most five minutes at a time, last night. Also with psychomotor agitation requiring PRN haloperidol. He is oriented to self only. When asked for location he displays disorganized speech and mentions being between two roads. When asked what city he is in he reports "[place]". Impaired attention. Is resting quietly at this time in no apparent distress and with euthymic affect. His wife reports occasional responses to internal stimuli in which he will point to things he is seeing on the ceiling.        Current Medications:   Scheduled Meds:    aluminum-magnesium hydroxide-simethicone  30 mL Per G Tube QID (AC & HS)    carvediloL  12.5 mg Per G Tube BID    cefTRIAXone (Rocephin) IV (PEDS and ADULTS)  2 g Intravenous Q24H    insulin glargine U-100  18 Units Subcutaneous BID    levetiracetam  500 mg Per G Tube BID    LIDOcaine HCl 2%   Urethral Once    losartan  100 mg Per G Tube Daily    melatonin  6 mg Per G Tube Nightly    QUEtiapine  50 mg Oral BID    scopolamine  1 patch Transdermal Q3 Days    sodium chloride 0.9%  500 mL Intravenous Once    sucralfate  1 g Per G Tube Q6H      PRN Meds:   Current Facility-Administered Medications:     acetaminophen, 650 mg, Per G Tube, Q6H PRN    bisacodyL, 10 mg, Rectal, Daily PRN    butalbital-acetaminophen-caffeine " -40 mg, 1 tablet, Per G Tube, Q4H PRN    dextrose 50%, 12.5 g, Intravenous, PRN    dextrose 50%, 12.5 g, Intravenous, PRN    dextrose 50%, 25 g, Intravenous, PRN    glucagon (human recombinant), 1 mg, Intramuscular, PRN    glucagon (human recombinant), 1 mg, Intramuscular, PRN    glucose, 16 g, Oral, PRN    glucose, 24 g, Oral, PRN    haloperidol lactate, 5 mg, Intravenous, Q6H PRN    insulin aspart U-100, 0-10 Units, Subcutaneous, QID (AC + HS) PRN    labetalol, 10 mg, Intravenous, Q15 Min PRN    levalbuterol, 0.63 mg, Nebulization, Q4H PRN    ondansetron, 4 mg, Intravenous, Q8H PRN    sodium chloride 0.9%, 10 mL, Intravenous, PRN   Psychotherapeutics (From admission, onward)      Start     Stop Route Frequency Ordered    06/18/25 1115  QUEtiapine tablet 50 mg         -- Oral 2 times daily 06/18/25 1007    06/14/25 1547  haloperidol lactate injection 5 mg         -- IV Every 6 hours PRN 06/14/25 1448            Allergies:   Review of patient's allergies indicates:  No Known Allergies     OBJECTIVE:   Vitals   Vitals:    06/19/25 1117   BP:    Pulse: 99   Resp:    Temp:         Labs/Imaging/Studies:   Recent Results (from the past 36 hours)   POCT glucose    Collection Time: 06/18/25  5:59 AM   Result Value Ref Range    POCT Glucose 186 (H) 70 - 110 mg/dL   POCT glucose    Collection Time: 06/18/25 10:30 AM   Result Value Ref Range    POCT Glucose 237 (H) 70 - 110 mg/dL   POCT glucose    Collection Time: 06/18/25  9:43 PM   Result Value Ref Range    POCT Glucose 255 (H) 70 - 110 mg/dL   Magnesium    Collection Time: 06/19/25  3:59 AM   Result Value Ref Range    Magnesium Level 2.70 (H) 1.60 - 2.60 mg/dL   Comprehensive Metabolic Panel    Collection Time: 06/19/25  3:59 AM   Result Value Ref Range    Sodium 150 (H) 136 - 145 mmol/L    Potassium 3.8 3.5 - 5.1 mmol/L    Chloride 110 (H) 98 - 107 mmol/L    CO2 34 (H) 23 - 31 mmol/L    Glucose 166 (H) 82 - 115 mg/dL    Blood Urea Nitrogen 33.7 (H) 8.4 - 25.7 mg/dL     Creatinine 0.91 0.72 - 1.25 mg/dL    Calcium 9.2 8.8 - 10.0 mg/dL    Protein Total 6.9 5.8 - 7.6 gm/dL    Albumin 2.2 (L) 3.4 - 4.8 g/dL    Globulin 4.7 (H) 2.4 - 3.5 gm/dL    Albumin/Globulin Ratio 0.5 (L) 1.1 - 2.0 ratio    Bilirubin Total 0.3 <=1.5 mg/dL     40 - 150 unit/L    ALT 30 0 - 55 unit/L    AST 27 11 - 45 unit/L    eGFR >60 mL/min/1.73/m2    Anion Gap 6.0 mEq/L    BUN/Creatinine Ratio 37    CBC with Differential    Collection Time: 06/19/25  4:00 AM   Result Value Ref Range    WBC 11.84 (H) 4.50 - 11.50 x10(3)/mcL    RBC 4.18 (L) 4.70 - 6.10 x10(6)/mcL    Hgb 12.8 (L) 14.0 - 18.0 g/dL    Hct 40.0 (L) 42.0 - 52.0 %    MCV 95.7 (H) 80.0 - 94.0 fL    MCH 30.6 27.0 - 31.0 pg    MCHC 32.0 (L) 33.0 - 36.0 g/dL    RDW 12.2 11.5 - 17.0 %    Platelet 328 130 - 400 x10(3)/mcL    MPV 9.6 7.4 - 10.4 fL    Neut % 64.4 %    Lymph % 18.4 %    Mono % 10.8 %    Eos % 3.0 %    Basophil % 0.8 %    Imm Grans % 2.6 %    Neut # 7.62 2.1 - 9.2 x10(3)/mcL    Lymph # 2.18 0.6 - 4.6 x10(3)/mcL    Mono # 1.28 0.1 - 1.3 x10(3)/mcL    Eos # 0.36 0 - 0.9 x10(3)/mcL    Baso # 0.09 <=0.2 x10(3)/mcL    Imm Gran # 0.31 (H) 0.00 - 0.04 x10(3)/mcL    NRBC% 0.0 %   POCT glucose    Collection Time: 06/19/25  5:10 AM   Result Value Ref Range    POC Glucose 182 (A) 70 - 110 MG/DL   POCT glucose    Collection Time: 06/19/25  5:10 AM   Result Value Ref Range    POCT Glucose 182 (H) 70 - 110 mg/dL   POCT glucose    Collection Time: 06/19/25 11:18 AM   Result Value Ref Range    POCT Glucose 287 (H) 70 - 110 mg/dL          Psychiatric Mental Status Exam:  General Appearance: dressed in hospital garb, lying in bed, in no acute distress, appears older than stated age  Arousal: alert  Behavior: cooperative, poor eye contact  Movements and Motor Activity: no tics, no tremors, no akathisia, no dystonia, no evidence of tardive dyskinesia  Orientation: oriented to person only  Speech: coherent  Mood: Euthymic  Affect: euthymic,  reactive  Thought Process: disorganized  Associations: no loosening of associations  Thought Content and Perceptions: no suicidal ideation, no homicidal ideation, + visual hallucinations  Recent and Remote Memory: impaired; per interview/observation with patient  Attention and Concentration: impaired; per interview/observation with patient  Fund of Knowledge: vocabulary appropriate; based on history, vocabulary, fund of knowledge, syntax, grammar, and content  Insight: impaired; based on understanding of severity of illness and HPI  Judgment: impaired; based on patient's behavior and HPI    ASSESSMENT/PLAN:   Problems Addressed/Diagnoses:  Delirium, acute, hyperactive due to multiple etiologies     Past Medical History:   Diagnosis Date    Acne     Cataract     Coronary artery disease     Diabetes mellitus     Hearing loss     Hypertension     Seasonal allergies     Stroke         Plan:  Medication Management  Quetiapine 50mg  BID  Haloperidol 5mg IV Q6hr PRN agitation  Trazodone 50mg  QHS  Monitoring  EKG on 06/12/25 with Qtc of 462 ms  Legal  PEC not recommended  Recommend delirium precautions  Psychiatry will continue to follow        Federico Perea

## 2025-06-20 LAB
POCT GLUCOSE: 114 MG/DL (ref 70–110)
POCT GLUCOSE: 174 MG/DL (ref 70–110)
POCT GLUCOSE: 260 MG/DL (ref 70–110)
POCT GLUCOSE: 93 MG/DL (ref 70–110)
POCT GLUCOSE: 95 MG/DL (ref 70–110)

## 2025-06-20 PROCEDURE — 94761 N-INVAS EAR/PLS OXIMETRY MLT: CPT

## 2025-06-20 PROCEDURE — 21400001 HC TELEMETRY ROOM

## 2025-06-20 PROCEDURE — 63600175 PHARM REV CODE 636 W HCPCS: Performed by: INTERNAL MEDICINE

## 2025-06-20 PROCEDURE — 11000001 HC ACUTE MED/SURG PRIVATE ROOM

## 2025-06-20 PROCEDURE — 25000003 PHARM REV CODE 250: Performed by: INTERNAL MEDICINE

## 2025-06-20 PROCEDURE — 97116 GAIT TRAINING THERAPY: CPT

## 2025-06-20 PROCEDURE — 25000003 PHARM REV CODE 250

## 2025-06-20 PROCEDURE — 99900031 HC PATIENT EDUCATION (STAT)

## 2025-06-20 PROCEDURE — 94668 MNPJ CHEST WALL SBSQ: CPT

## 2025-06-20 PROCEDURE — 94760 N-INVAS EAR/PLS OXIMETRY 1: CPT

## 2025-06-20 RX ORDER — TRAZODONE HYDROCHLORIDE 100 MG/1
100 TABLET ORAL NIGHTLY
Status: DISCONTINUED | OUTPATIENT
Start: 2025-06-20 | End: 2025-07-15

## 2025-06-20 RX ADMIN — SUCRALFATE 1 G: 1 TABLET ORAL at 04:06

## 2025-06-20 RX ADMIN — LEVETIRACETAM 500 MG: 500 SOLUTION ORAL at 08:06

## 2025-06-20 RX ADMIN — Medication 6 MG: at 09:06

## 2025-06-20 RX ADMIN — ALUMINUM HYDROXIDE, MAGNESIUM HYDROXIDE, AND DIMETHICONE 30 ML: 200; 20; 200 SUSPENSION ORAL at 04:06

## 2025-06-20 RX ADMIN — INSULIN ASPART 6 UNITS: 100 INJECTION, SOLUTION INTRAVENOUS; SUBCUTANEOUS at 06:06

## 2025-06-20 RX ADMIN — ALUMINUM HYDROXIDE, MAGNESIUM HYDROXIDE, AND DIMETHICONE 30 ML: 200; 20; 200 SUSPENSION ORAL at 06:06

## 2025-06-20 RX ADMIN — ALUMINUM HYDROXIDE, MAGNESIUM HYDROXIDE, AND DIMETHICONE 30 ML: 200; 20; 200 SUSPENSION ORAL at 09:06

## 2025-06-20 RX ADMIN — CARVEDILOL 12.5 MG: 12.5 TABLET, FILM COATED ORAL at 08:06

## 2025-06-20 RX ADMIN — ALUMINUM HYDROXIDE, MAGNESIUM HYDROXIDE, AND DIMETHICONE 30 ML: 200; 20; 200 SUSPENSION ORAL at 12:06

## 2025-06-20 RX ADMIN — HALOPERIDOL LACTATE 5 MG: 5 INJECTION, SOLUTION INTRAMUSCULAR at 12:06

## 2025-06-20 RX ADMIN — TRAZODONE HYDROCHLORIDE 100 MG: 100 TABLET ORAL at 09:06

## 2025-06-20 RX ADMIN — LEVETIRACETAM 500 MG: 500 SOLUTION ORAL at 09:06

## 2025-06-20 RX ADMIN — SUCRALFATE 1 G: 1 TABLET ORAL at 06:06

## 2025-06-20 RX ADMIN — CEFTRIAXONE SODIUM 2 G: 2 INJECTION, POWDER, FOR SOLUTION INTRAMUSCULAR; INTRAVENOUS at 10:06

## 2025-06-20 RX ADMIN — HALOPERIDOL LACTATE 5 MG: 5 INJECTION, SOLUTION INTRAMUSCULAR at 04:06

## 2025-06-20 RX ADMIN — LOSARTAN POTASSIUM 100 MG: 50 TABLET, FILM COATED ORAL at 08:06

## 2025-06-20 RX ADMIN — QUETIAPINE FUMARATE 50 MG: 25 TABLET ORAL at 09:06

## 2025-06-20 RX ADMIN — CARVEDILOL 12.5 MG: 12.5 TABLET, FILM COATED ORAL at 09:06

## 2025-06-20 RX ADMIN — QUETIAPINE FUMARATE 50 MG: 25 TABLET ORAL at 08:06

## 2025-06-20 RX ADMIN — INSULIN ASPART 2 UNITS: 100 INJECTION, SOLUTION INTRAVENOUS; SUBCUTANEOUS at 04:06

## 2025-06-20 RX ADMIN — SUCRALFATE 1 G: 1 TABLET ORAL at 12:06

## 2025-06-20 RX ADMIN — HALOPERIDOL LACTATE 5 MG: 5 INJECTION, SOLUTION INTRAMUSCULAR at 06:06

## 2025-06-20 NOTE — ASSESSMENT & PLAN NOTE
Patient's blood pressure range in the last 24 hours was: BP  Min: 110/63  Max: 182/75.The patient's inpatient anti-hypertensive regimen is listed below:  Current Antihypertensives  labetaloL injection 10 mg, Every 15 min PRN, Intravenous  losartan tablet 100 mg, Daily, Per G Tube  carvediloL tablet 12.5 mg, 2 times daily, Per G Tube    Plan  - BP is controlled, no changes needed to their regimen  - .

## 2025-06-20 NOTE — PROGRESS NOTES
"6/20/2025  Federico Villarreal   1960   [id number]        Psychiatry Progress Note     SUBJECTIVE:   Federico Villarreal is a 64 y.o. male with a past medical history that includes CAD, T1DM, HTN, HLD, and history of ischemic stroke with left-sided weakness who presented to [facility] ED on 06/09/25 with confusion and slurred speech noted that morning. His wife had reported he had woken up with headache at 0115 that morning and then woke up with confusion around 0545. CT scan showed left occipital lobe parenchymal hemorrhage. Being seen by psychiatry to day due to confusion and agitation.    Continued to have impaired sleep with restlessness and psychomotor agitation overnight requiring PRN haloperidol. At this time he is resting quietly in bed with his wife at the bedside. Was able to report being in "[place]" today but not oriented to the fact that he is in a hospital. His wife reports he continues to have visual hallucinations at times. Thought process overall disorganized and attention impaired. Wears glasses which his wife reports he previously refused to wear but at this time we were able to get him to wear them. Has hearing problems, but no hearing aides. Wife reports they had used cheap "[employer]" hearing aides but they were only helpful temporarily.        Current Medications:   Scheduled Meds:    aluminum-magnesium hydroxide-simethicone  30 mL Per G Tube QID (AC & HS)    carvediloL  12.5 mg Per G Tube BID    cefTRIAXone (Rocephin) IV (PEDS and ADULTS)  2 g Intravenous Q24H    insulin glargine U-100  18 Units Subcutaneous BID    levetiracetam  500 mg Per G Tube BID    LIDOcaine HCl 2%   Urethral Once    losartan  100 mg Per G Tube Daily    melatonin  6 mg Per G Tube Nightly    QUEtiapine  50 mg Oral BID    scopolamine  1 patch Transdermal Q3 Days    sodium chloride 0.9%  500 mL Intravenous Once    sucralfate  1 g Per G Tube Q6H    traZODone  50 mg Per G Tube QHS      PRN Meds:   Current Facility-Administered " Medications:     acetaminophen, 650 mg, Per G Tube, Q6H PRN    bisacodyL, 10 mg, Rectal, Daily PRN    butalbital-acetaminophen-caffeine -40 mg, 1 tablet, Per G Tube, Q4H PRN    dextrose 50%, 12.5 g, Intravenous, PRN    dextrose 50%, 12.5 g, Intravenous, PRN    dextrose 50%, 25 g, Intravenous, PRN    glucagon (human recombinant), 1 mg, Intramuscular, PRN    glucagon (human recombinant), 1 mg, Intramuscular, PRN    glucose, 16 g, Oral, PRN    glucose, 24 g, Oral, PRN    haloperidol lactate, 5 mg, Intravenous, Q6H PRN    insulin aspart U-100, 0-10 Units, Subcutaneous, QID (AC + HS) PRN    labetalol, 10 mg, Intravenous, Q15 Min PRN    levalbuterol, 0.63 mg, Nebulization, Q4H PRN    ondansetron, 4 mg, Intravenous, Q8H PRN    sodium chloride 0.9%, 10 mL, Intravenous, PRN   Psychotherapeutics (From admission, onward)      Start     Stop Route Frequency Ordered    06/19/25 2100  traZODone tablet 50 mg         -- PER G TUBE Nightly 06/19/25 1352    06/18/25 1115  QUEtiapine tablet 50 mg         -- Oral 2 times daily 06/18/25 1007    06/14/25 1547  haloperidol lactate injection 5 mg         -- IV Every 6 hours PRN 06/14/25 1448            Allergies:   Review of patient's allergies indicates:  No Known Allergies     OBJECTIVE:   Vitals   Vitals:    06/20/25 1140   BP: 120/76   Pulse: 91   Resp: 18   Temp: 97.3 °F (36.3 °C)        Labs/Imaging/Studies:   Recent Results (from the past 36 hours)   Magnesium    Collection Time: 06/19/25  3:59 AM   Result Value Ref Range    Magnesium Level 2.70 (H) 1.60 - 2.60 mg/dL   Comprehensive Metabolic Panel    Collection Time: 06/19/25  3:59 AM   Result Value Ref Range    Sodium 150 (H) 136 - 145 mmol/L    Potassium 3.8 3.5 - 5.1 mmol/L    Chloride 110 (H) 98 - 107 mmol/L    CO2 34 (H) 23 - 31 mmol/L    Glucose 166 (H) 82 - 115 mg/dL    Blood Urea Nitrogen 33.7 (H) 8.4 - 25.7 mg/dL    Creatinine 0.91 0.72 - 1.25 mg/dL    Calcium 9.2 8.8 - 10.0 mg/dL    Protein Total 6.9 5.8 - 7.6 gm/dL     Albumin 2.2 (L) 3.4 - 4.8 g/dL    Globulin 4.7 (H) 2.4 - 3.5 gm/dL    Albumin/Globulin Ratio 0.5 (L) 1.1 - 2.0 ratio    Bilirubin Total 0.3 <=1.5 mg/dL     40 - 150 unit/L    ALT 30 0 - 55 unit/L    AST 27 11 - 45 unit/L    eGFR >60 mL/min/1.73/m2    Anion Gap 6.0 mEq/L    BUN/Creatinine Ratio 37    CBC with Differential    Collection Time: 06/19/25  4:00 AM   Result Value Ref Range    WBC 11.84 (H) 4.50 - 11.50 x10(3)/mcL    RBC 4.18 (L) 4.70 - 6.10 x10(6)/mcL    Hgb 12.8 (L) 14.0 - 18.0 g/dL    Hct 40.0 (L) 42.0 - 52.0 %    MCV 95.7 (H) 80.0 - 94.0 fL    MCH 30.6 27.0 - 31.0 pg    MCHC 32.0 (L) 33.0 - 36.0 g/dL    RDW 12.2 11.5 - 17.0 %    Platelet 328 130 - 400 x10(3)/mcL    MPV 9.6 7.4 - 10.4 fL    Neut % 64.4 %    Lymph % 18.4 %    Mono % 10.8 %    Eos % 3.0 %    Basophil % 0.8 %    Imm Grans % 2.6 %    Neut # 7.62 2.1 - 9.2 x10(3)/mcL    Lymph # 2.18 0.6 - 4.6 x10(3)/mcL    Mono # 1.28 0.1 - 1.3 x10(3)/mcL    Eos # 0.36 0 - 0.9 x10(3)/mcL    Baso # 0.09 <=0.2 x10(3)/mcL    Imm Gran # 0.31 (H) 0.00 - 0.04 x10(3)/mcL    NRBC% 0.0 %   POCT glucose    Collection Time: 06/19/25  5:10 AM   Result Value Ref Range    POC Glucose 182 (A) 70 - 110 MG/DL   POCT glucose    Collection Time: 06/19/25  5:10 AM   Result Value Ref Range    POCT Glucose 182 (H) 70 - 110 mg/dL   POCT glucose    Collection Time: 06/19/25 11:18 AM   Result Value Ref Range    POCT Glucose 287 (H) 70 - 110 mg/dL   POCT glucose    Collection Time: 06/19/25  4:05 PM   Result Value Ref Range    POCT Glucose 175 (H) 70 - 110 mg/dL   POCT glucose    Collection Time: 06/19/25  9:05 PM   Result Value Ref Range    POCT Glucose 218 (H) 70 - 110 mg/dL   POCT glucose    Collection Time: 06/20/25  6:12 AM   Result Value Ref Range    POCT Glucose 260 (H) 70 - 110 mg/dL   POCT glucose    Collection Time: 06/20/25  9:42 AM   Result Value Ref Range    POCT Glucose 93 70 - 110 mg/dL   POCT glucose    Collection Time: 06/20/25 10:59 AM   Result Value Ref  Range    POCT Glucose 95 70 - 110 mg/dL        Psychiatric Mental Status Exam:  General Appearance: dressed in hospital garb, lying in bed, in no acute distress, appears older than stated age, bilateral mittens in place  Arousal: alert  Behavior: cooperative, poor eye contact  Movements and Motor Activity: no tics, no tremors, no akathisia, no dystonia, no evidence of tardive dyskinesia  Orientation: oriented to person only  Speech: coherent  Mood: Euthymic  Affect: euthymic, reactive  Thought Process: disorganized  Associations: no loosening of associations  Thought Content and Perceptions: no suicidal ideation, no homicidal ideation, + visual hallucinations  Recent and Remote Memory: impaired; per interview/observation with patient  Attention and Concentration: impaired; per interview/observation with patient  Fund of Knowledge: vocabulary appropriate; based on history, vocabulary, fund of knowledge, syntax, grammar, and content  Insight: impaired; based on understanding of severity of illness and HPI  Judgment: impaired; based on patient's behavior and HPI    ASSESSMENT/PLAN:   Problems Addressed/Diagnoses:  Delirium, acute, hyperactive due to multiple etiologies     Past Medical History:   Diagnosis Date    Acne     Cataract     Coronary artery disease     Diabetes mellitus     Hearing loss     Hypertension     Seasonal allergies     Stroke         Plan:  Medication Management  Quetiapine 50mg  BID  Haloperidol 5mg IV Q6hr PRN agitation  Trazodone 100mg  QHS  Monitoring  EKG on 06/12/25 with Qtc of 462 ms  Legal  PEC not recommended  Recommend delirium precautions  Psychiatry will continue to follow        Federico Perea

## 2025-06-20 NOTE — PT/OT/SLP PROGRESS
Physical Therapy Treatment    Patient Name:  Federico Villarreal   MRN:  [id number]    Recommendations:     Discharge therapy intensity: Moderate Intensity Therapy   Discharge Equipment Recommendations: to be determined by next level of care  Barriers to discharge: impaired mobility    Assessment:     Federico Villarreal is a 64 y.o. male admitted with a medical diagnosis of L occipital hemorrhagic CVA, presented with HA, confusion, slurred speech. .  He presents with the following impairments/functional limitations: weakness, gait instability, impaired endurance, impaired self care skills, impaired functional mobility, impaired cognition, decreased safety awareness, impaired balance     Rehab Prognosis: Good; patient would benefit from acute skilled PT services to address these deficits and reach maximum level of function.    Recent Surgery: Procedure(s) (LRB):  PEG (N/A) 4 Days Post-Op    Plan:     During this hospitalization, patient would benefit from acute PT services 5 x/week to address the identified rehab impairments via gait training, therapeutic activities, therapeutic exercises and progress toward the following goals:    Plan of Care Expires:  07/13/25    Subjective     Chief Complaint:   Patient/Family Comments/goals:   Pain/Comfort:         Objective:     Communicated with nurse prior to session.  Patient found supine with oxygen, nascimento catheter, pulse ox (continuous), telemetry, pressure relief boots upon PT entry to room.     General Precautions: Standard, fall, vision impaired  Orthopedic Precautions: N/A  Braces: N/A  Respiratory Status: Room air  Blood Pressure:   Skin Integrity: Visible skin intact      Functional Mobility:  Bed Mobility:     Supine to Sit: moderate assistance  Sit to Supine: maximal assistance  Transfers:     Sit to Stand:  moderate assistance with no AD  Gait: pt took 4 steps forward and backward 4 times with PT supporting in front. Pt was not consistent with step length or placement  of his feet. He required moda with gait to control balance, postioning  and  foot placement    Therapeutic Activities/Exercises:      Co-Treatment: No    Education:  Patient provided with verbal education education regarding PT role/goals/POC, fall prevention, and safety awareness.  Understanding was verbalized, however additional teaching warranted.     Patient left supine with all lines intact and call button in reach    GOALS:   Multidisciplinary Problems       Physical Therapy Goals          Problem: Physical Therapy    Goal Priority Disciplines Outcome Interventions   Physical Therapy Goal     PT, PT/OT Progressing    Description: Goals to be met by: 25     Patient will increase functional independence with mobility by performin. Supine to sit with MInimal Assistance  2. Sit to stand transfer with Minimal Assistance  3. Bed to chair transfer with Minimal Assistance using Rolling Walker  4. Pt will follow 5/5 motor commands for BLE.  5. Gait TBD as pt progresses                         Time Tracking:     PT Received On:    PT Start Time: 1130     PT Stop Time: 1153  PT Total Time (min): 23 min     Billable Minutes: Gait Training 23    Treatment Type: Treatment  PT/PTA: PT     Number of PTA visits since last PT visit: 3     2025

## 2025-06-20 NOTE — PROGRESS NOTES
[facility] - 4th Floor [facility] Medicine  Progress Note    Patient Name: Federico Villarreal  MRN: [id number]  Patient Class: IP- Inpatient   Admission Date: 6/9/2025  Length of Stay: 11 days  Attending Physician: Shaw Christopher MD  Primary Care Provider: Nicole Blanchard FNP        Subjective     Principal Problem:Hemorrhagic stroke        HPI:  64-year-old male with type 1 diabetes mellitus, CAD status post stenting, hypertension, hyperlipidemia, prior CVA  presented on 06/09/2025 for evaluation of sudden onset headache, confusion, slurred speech.  Workup revealed  CT head revealed left occipital lobe parenchymal hemorrhage with trace adjacent subarachnoid hemorrhage.   CTA head/neck was also completed in ED, which revealed no large vessel occlusion, flow-limiting stenosis, aneurysm or evidence of a vascular malformation   MRI brain report:  1. Left occipital lobe hematoma with adjacent subarachnoid hemorrhage.  2. Innumerable chronic microhemorrhages with a subcortical location, may indicate underlying cerebral amyloid angiopathy.  3. Mild chronic microvascular ischemic changes     Neurology team evaluated the patient, initiated on stroke protocol, admitted to ICU services.  Neurosurgery team evaluated the patient, MRI likely suggestive of amyloid angiopathy, repeat CT head 6/10/25 unchanged, suggested holding antiplatelets for at least 2 weeks.  Patient is started on Keppra.  Neurosurgical team signed off.     Patient required Chao placement for retention, neurology team evaluated.     Patient cleared to downgrade to floors 06/12/2025 PM by ICU team.     Palliative care team consulted while patient in ICU, code status DNR at the time of downgrade.      Pt had fever ,tmax 102.8, tachycardic,wbc count increased 17K, decided to obtain pan scan, CT head stable, but notes to have dense consolidation left >right LL, constipation  Respiratory cultures were obtained grew many  strep group B Haemophilus influenzae     Patient remains unsafe for p.o. intake with speech evaluations, informed decision with the patient's spouse was made, GI team consulted for PEG placement   .    Overview/Hospital Course:  6/18/25-No new issues today.  He is still confused.  He does seem to be tolerating the TF's.    6/19/25-Patient is still confused which will likely not improve.  He has PRN meds ordered for this.  Will add melatonin tonight.    6/20/25-Patient is still agitated at times requiring meds.  Will consult psych as well to help with his delirium.      Interval History:     Review of Systems   Unable to perform ROS: Acuity of condition     Objective:     Vital Signs (Most Recent):  Temp: 98 °F (36.7 °C) (06/20/25 0718)  Pulse: 88 (06/20/25 0815)  Resp: 17 (06/20/25 0805)  BP: (!) 182/75 (06/20/25 0815)  SpO2: 95 % (06/20/25 0805) Vital Signs (24h Range):  Temp:  [97.3 °F (36.3 °C)-98.3 °F (36.8 °C)] 98 °F (36.7 °C)  Pulse:  [] 88  Resp:  [17-20] 17  SpO2:  [91 %-95 %] 95 %  BP: (110-182)/(54-79) 182/75     Weight: 65 kg (143 lb 4.8 oz)  Body mass index is 26.21 kg/m².    Intake/Output Summary (Last 24 hours) at 6/20/2025 1052  Last data filed at 6/20/2025 0606  Gross per 24 hour   Intake --   Output 1000 ml   Net -1000 ml         Physical Exam  Constitutional:       General: He is awake.      Appearance: Normal appearance.   HENT:      Head: Normocephalic and atraumatic.      Nose: Nose normal.      Mouth/Throat:      Mouth: Mucous membranes are moist.      Pharynx: Oropharynx is clear.   Eyes:      Extraocular Movements: Extraocular movements intact.      Conjunctiva/sclera: Conjunctivae normal.      Pupils: Pupils are equal, round, and reactive to light.   Cardiovascular:      Rate and Rhythm: Normal rate and regular rhythm.      Pulses: Normal pulses.      Heart sounds: Normal heart sounds.   Pulmonary:      Effort: Pulmonary effort is normal.      Breath sounds: Normal breath sounds.  "  Abdominal:      General: Bowel sounds are normal.      Palpations: Abdomen is soft.      Comments: PEG   Musculoskeletal:         General: Normal range of motion.      Cervical back: Normal range of motion and neck supple.   Skin:     General: Skin is dry.      Capillary Refill: Capillary refill takes 2 to 3 seconds.   Neurological:      Mental Status: He is disoriented.      Motor: Weakness present.      Gait: Gait abnormal.   Psychiatric:         Cognition and Memory: Cognition is impaired. Memory is impaired. He exhibits impaired recent memory and impaired remote memory.               Significant Labs: All pertinent labs within the past 24 hours have been reviewed.  BMP:   Recent Labs   Lab 06/19/25  0359   *   *   K 3.8   *   CO2 34*   BUN 33.7*   CREATININE 0.91   CALCIUM 9.2   MG 2.70*     CBC:   Recent Labs   Lab 06/19/25  0400   WBC 11.84*   HGB 12.8*   HCT 40.0*        CMP:   Recent Labs   Lab 06/19/25  0359   *   K 3.8   *   CO2 34*   *   BUN 33.7*   CREATININE 0.91   CALCIUM 9.2   PROT 6.9   ALBUMIN 2.2*   BILITOT 0.3   ALKPHOS 113   AST 27   ALT 30     Lipid Panel: No results for input(s): "CHOL", "HDL", "LDLCALC", "TRIG", "CHOLHDL" in the last 48 hours.  Magnesium:   Recent Labs   Lab 06/19/25  0359   MG 2.70*       Significant Imaging: I have reviewed all pertinent imaging results/findings within the past 24 hours.      Assessment & Plan  Hemorrhagic stroke  .  Antithrombotics for secondary stroke prevention: Antiplatelets: None: Intracerebral Hemorrhage    Statins for secondary stroke prevention and hyperlipidemia, if present:   Statins: Atorvastatin- 40 mg daily    Aggressive risk factor modification: HTN, Smoking, CAD     Rehab efforts: The patient has been evaluated by a stroke team provider and the therapy needs have been fully considered based off the presenting complaints and exam findings. The following therapy evaluations are needed: PT evaluate " and treat, OT evaluate and treat, SLP evaluate and treat, PM&R evaluate for appropriate placement    Diagnostics ordered/pending: CT scan of head without contrast to asses brain parenchyma, MRI head without contrast to assess brain parenchyma    VTE prophylaxis: None: Reason for No Pharmacological VTE Prophylaxis: History of systemic or intracranial bleeding    BP parameters: ICH: SBP Goal Range 130-150      Hypertension  Patient's blood pressure range in the last 24 hours was: BP  Min: 110/63  Max: 182/75.The patient's inpatient anti-hypertensive regimen is listed below:  Current Antihypertensives  labetaloL injection 10 mg, Every 15 min PRN, Intravenous  losartan tablet 100 mg, Daily, Per G Tube  carvediloL tablet 12.5 mg, 2 times daily, Per G Tube    Plan  - BP is controlled, no changes needed to their regimen  - .  Tobacco user  Advise to stop.  Can offer nicotine patch.  Due to his condition he will likely not be able to smoke again(cessation=4mins)    Anemia  Anemia is likely due to chronic blood loss. Most recent hemoglobin and hematocrit are listed below.  Recent Labs     06/19/25  0400   HGB 12.8*   HCT 40.0*     Plan  - Monitor serial CBC: Daily  - Transfuse PRBC if patient becomes hemodynamically unstable, symptomatic or H/H drops below 7/21.  - Patient has not received any PRBC transfusions to date  - Patient's anemia is currently stable  - .  VTE Risk Mitigation (From admission, onward)           Ordered     Reason for No Pharmacological VTE Prophylaxis  Once        Question:  Reasons:  Answer:  Risk of Bleeding    06/09/25 1055     IP VTE HIGH RISK PATIENT  Once         06/09/25 1055     Place sequential compression device  Until discontinued         06/09/25 1055                  Consult psych  Resume TF's  Labs as needed  Waiting for placement  Discharge Planning   ISAIAS:      Code Status: DNR   Patient is Medically Not Yet Ready for discharge.  Medical Readiness for Discharge Date:  6/19/2025  Discharge Plan A: Skilled Nursing Facility                  Please place Justification for DME      Shaw Christopher MD  Department of Hospital Medicine   [facility] - 4th Floor Medical Telemetry

## 2025-06-20 NOTE — ASSESSMENT & PLAN NOTE
Anemia is likely due to chronic blood loss. Most recent hemoglobin and hematocrit are listed below.  Recent Labs     06/19/25  0400   HGB 12.8*   HCT 40.0*     Plan  - Monitor serial CBC: Daily  - Transfuse PRBC if patient becomes hemodynamically unstable, symptomatic or H/H drops below 7/21.  - Patient has not received any PRBC transfusions to date  - Patient's anemia is currently stable  - .

## 2025-06-20 NOTE — PLAN OF CARE
Problem: Adult Inpatient Plan of Care  Goal: Optimal Comfort and Wellbeing  Outcome: Progressing  Goal: Readiness for Transition of Care  Outcome: Progressing     Problem: Diabetes Comorbidity  Goal: Blood Glucose Level Within Targeted Range  Outcome: Progressing     Problem: Stroke, Intracerebral Hemorrhage  Goal: Optimal Coping  Outcome: Progressing  Goal: Effective Bowel Elimination  Outcome: Progressing  Goal: Optimal Cerebral Tissue Perfusion  Outcome: Progressing  Goal: Optimal Cognitive Function  Outcome: Progressing  Goal: Effective Communication Skills  Outcome: Progressing

## 2025-06-20 NOTE — SUBJECTIVE & OBJECTIVE
Interval History:     Review of Systems   Unable to perform ROS: Acuity of condition     Objective:     Vital Signs (Most Recent):  Temp: 98 °F (36.7 °C) (06/20/25 0718)  Pulse: 88 (06/20/25 0815)  Resp: 17 (06/20/25 0805)  BP: (!) 182/75 (06/20/25 0815)  SpO2: 95 % (06/20/25 0805) Vital Signs (24h Range):  Temp:  [97.3 °F (36.3 °C)-98.3 °F (36.8 °C)] 98 °F (36.7 °C)  Pulse:  [] 88  Resp:  [17-20] 17  SpO2:  [91 %-95 %] 95 %  BP: (110-182)/(54-79) 182/75     Weight: 65 kg (143 lb 4.8 oz)  Body mass index is 26.21 kg/m².    Intake/Output Summary (Last 24 hours) at 6/20/2025 1052  Last data filed at 6/20/2025 0606  Gross per 24 hour   Intake --   Output 1000 ml   Net -1000 ml         Physical Exam  Constitutional:       General: He is awake.      Appearance: Normal appearance.   HENT:      Head: Normocephalic and atraumatic.      Nose: Nose normal.      Mouth/Throat:      Mouth: Mucous membranes are moist.      Pharynx: Oropharynx is clear.   Eyes:      Extraocular Movements: Extraocular movements intact.      Conjunctiva/sclera: Conjunctivae normal.      Pupils: Pupils are equal, round, and reactive to light.   Cardiovascular:      Rate and Rhythm: Normal rate and regular rhythm.      Pulses: Normal pulses.      Heart sounds: Normal heart sounds.   Pulmonary:      Effort: Pulmonary effort is normal.      Breath sounds: Normal breath sounds.   Abdominal:      General: Bowel sounds are normal.      Palpations: Abdomen is soft.      Comments: PEG   Musculoskeletal:         General: Normal range of motion.      Cervical back: Normal range of motion and neck supple.   Skin:     General: Skin is dry.      Capillary Refill: Capillary refill takes 2 to 3 seconds.   Neurological:      Mental Status: He is disoriented.      Motor: Weakness present.      Gait: Gait abnormal.   Psychiatric:         Cognition and Memory: Cognition is impaired. Memory is impaired. He exhibits impaired recent memory and impaired remote  "memory.               Significant Labs: All pertinent labs within the past 24 hours have been reviewed.  BMP:   Recent Labs   Lab 06/19/25  0359   *   *   K 3.8   *   CO2 34*   BUN 33.7*   CREATININE 0.91   CALCIUM 9.2   MG 2.70*     CBC:   Recent Labs   Lab 06/19/25  0400   WBC 11.84*   HGB 12.8*   HCT 40.0*        CMP:   Recent Labs   Lab 06/19/25  0359   *   K 3.8   *   CO2 34*   *   BUN 33.7*   CREATININE 0.91   CALCIUM 9.2   PROT 6.9   ALBUMIN 2.2*   BILITOT 0.3   ALKPHOS 113   AST 27   ALT 30     Lipid Panel: No results for input(s): "CHOL", "HDL", "LDLCALC", "TRIG", "CHOLHDL" in the last 48 hours.  Magnesium:   Recent Labs   Lab 06/19/25  0359   MG 2.70*       Significant Imaging: I have reviewed all pertinent imaging results/findings within the past 24 hours.  "

## 2025-06-20 NOTE — PLAN OF CARE
Received PASRR/142 from [facility] and uploaded to  in patient's chart. Advised CM it was received.

## 2025-06-20 NOTE — PLAN OF CARE
Patient remains in rollbelt and not has on mittens and still receiving IV Haldol. Will continue to follow and start SNF placement when appropriate.

## 2025-06-21 LAB
POCT GLUCOSE: 296 MG/DL (ref 70–110)
POCT GLUCOSE: 337 MG/DL (ref 70–110)
POCT GLUCOSE: 351 MG/DL (ref 70–110)
POCT GLUCOSE: 402 MG/DL (ref 70–110)
POCT GLUCOSE: 450 MG/DL (ref 70–110)

## 2025-06-21 PROCEDURE — 63600175 PHARM REV CODE 636 W HCPCS: Performed by: INTERNAL MEDICINE

## 2025-06-21 PROCEDURE — 25000003 PHARM REV CODE 250: Performed by: INTERNAL MEDICINE

## 2025-06-21 PROCEDURE — 94760 N-INVAS EAR/PLS OXIMETRY 1: CPT

## 2025-06-21 PROCEDURE — 25000003 PHARM REV CODE 250

## 2025-06-21 PROCEDURE — 11000001 HC ACUTE MED/SURG PRIVATE ROOM

## 2025-06-21 PROCEDURE — 21400001 HC TELEMETRY ROOM

## 2025-06-21 RX ORDER — ZIPRASIDONE MESYLATE 20 MG/ML
20 INJECTION, POWDER, LYOPHILIZED, FOR SOLUTION INTRAMUSCULAR EVERY 6 HOURS PRN
Status: DISCONTINUED | OUTPATIENT
Start: 2025-06-21 | End: 2025-06-21

## 2025-06-21 RX ORDER — OLANZAPINE 10 MG/2ML
5 INJECTION, POWDER, FOR SOLUTION INTRAMUSCULAR EVERY 8 HOURS PRN
Status: DISCONTINUED | OUTPATIENT
Start: 2025-06-21 | End: 2025-06-27

## 2025-06-21 RX ADMIN — SUCRALFATE 1 G: 1 TABLET ORAL at 05:06

## 2025-06-21 RX ADMIN — INSULIN ASPART 8 UNITS: 100 INJECTION, SOLUTION INTRAVENOUS; SUBCUTANEOUS at 05:06

## 2025-06-21 RX ADMIN — Medication 6 MG: at 08:06

## 2025-06-21 RX ADMIN — LEVETIRACETAM 500 MG: 500 SOLUTION ORAL at 09:06

## 2025-06-21 RX ADMIN — INSULIN ASPART 10 UNITS: 100 INJECTION, SOLUTION INTRAVENOUS; SUBCUTANEOUS at 11:06

## 2025-06-21 RX ADMIN — INSULIN GLARGINE 18 UNITS: 100 INJECTION, SOLUTION SUBCUTANEOUS at 09:06

## 2025-06-21 RX ADMIN — ALUMINUM HYDROXIDE, MAGNESIUM HYDROXIDE, AND DIMETHICONE 30 ML: 200; 20; 200 SUSPENSION ORAL at 11:06

## 2025-06-21 RX ADMIN — ALUMINUM HYDROXIDE, MAGNESIUM HYDROXIDE, AND DIMETHICONE 30 ML: 200; 20; 200 SUSPENSION ORAL at 05:06

## 2025-06-21 RX ADMIN — LOSARTAN POTASSIUM 100 MG: 50 TABLET, FILM COATED ORAL at 09:06

## 2025-06-21 RX ADMIN — QUETIAPINE FUMARATE 50 MG: 25 TABLET ORAL at 08:06

## 2025-06-21 RX ADMIN — CARVEDILOL 12.5 MG: 12.5 TABLET, FILM COATED ORAL at 08:06

## 2025-06-21 RX ADMIN — TRAZODONE HYDROCHLORIDE 100 MG: 100 TABLET ORAL at 08:06

## 2025-06-21 RX ADMIN — QUETIAPINE FUMARATE 50 MG: 25 TABLET ORAL at 09:06

## 2025-06-21 RX ADMIN — CEFTRIAXONE SODIUM 2 G: 2 INJECTION, POWDER, FOR SOLUTION INTRAMUSCULAR; INTRAVENOUS at 10:06

## 2025-06-21 RX ADMIN — INSULIN ASPART 3 UNITS: 100 INJECTION, SOLUTION INTRAVENOUS; SUBCUTANEOUS at 08:06

## 2025-06-21 RX ADMIN — INSULIN ASPART 10 UNITS: 100 INJECTION, SOLUTION INTRAVENOUS; SUBCUTANEOUS at 05:06

## 2025-06-21 RX ADMIN — CARVEDILOL 12.5 MG: 12.5 TABLET, FILM COATED ORAL at 09:06

## 2025-06-21 RX ADMIN — SUCRALFATE 1 G: 1 TABLET ORAL at 12:06

## 2025-06-21 RX ADMIN — ALUMINUM HYDROXIDE, MAGNESIUM HYDROXIDE, AND DIMETHICONE 30 ML: 200; 20; 200 SUSPENSION ORAL at 08:06

## 2025-06-21 RX ADMIN — ALUMINUM HYDROXIDE, MAGNESIUM HYDROXIDE, AND DIMETHICONE 30 ML: 200; 20; 200 SUSPENSION ORAL at 06:06

## 2025-06-21 RX ADMIN — LEVETIRACETAM 500 MG: 500 SOLUTION ORAL at 08:06

## 2025-06-21 RX ADMIN — SUCRALFATE 1 G: 1 TABLET ORAL at 11:06

## 2025-06-21 NOTE — PROGRESS NOTES
[facility] Medicine Progress Note        Chief Complaint: Inpatient Follow-up for     HPI:     HPI reviewed    Interval Hx:   Afebrile.  Doing well on room air.  Disoriented.  Wife at bedside.  Follows commands.  Mittens in place.  PEG feeds continued.  No labs available      Objective/physical exam:  Vitals:    06/20/25 2108 06/20/25 2325 06/21/25 0349 06/21/25 0737   BP: 133/68 116/60 131/77 131/71   BP Location:       Patient Position:       Pulse:  93 88 96   Resp:  18 18    Temp:  98 °F (36.7 °C) 97.9 °F (36.6 °C) 97.9 °F (36.6 °C)   TempSrc:  Oral Oral Oral   SpO2:  (!) 90% (!) 94%    Weight:       Height:         General: In no acute distress, afebrile  Respiratory: Clear to auscultation bilaterally  Cardiovascular: S1, S2, no appreciable murmur  Abdomen: Soft, nontender, BS +, peg site CDI  MSK: Warm, no lower extremity edema, no clubbing or cyanosis  Neurologic: Alert alert, oriented x1      Lab Results   Component Value Date     (H) 06/19/2025    K 3.8 06/19/2025     (H) 06/19/2025    CO2 34 (H) 06/19/2025    BUN 33.7 (H) 06/19/2025    CREATININE 0.91 06/19/2025    CALCIUM 9.2 06/19/2025    EGFRNONAA >60 07/18/2022      Lab Results   Component Value Date    ALT 30 06/19/2025    AST 27 06/19/2025    ALKPHOS 113 06/19/2025    BILITOT 0.3 06/19/2025      Lab Results   Component Value Date    WBC 11.84 (H) 06/19/2025    HGB 12.8 (L) 06/19/2025    HCT 40.0 (L) 06/19/2025    MCV 95.7 (H) 06/19/2025     06/19/2025           Medications:   aluminum-magnesium hydroxide-simethicone  30 mL Per G Tube QID (AC & HS)    carvediloL  12.5 mg Per G Tube BID    cefTRIAXone (Rocephin) IV (PEDS and ADULTS)  2 g Intravenous Q24H    insulin glargine U-100  18 Units Subcutaneous BID    levetiracetam  500 mg Per G Tube BID    LIDOcaine HCl 2%   Urethral Once    losartan  100 mg Per G Tube Daily    melatonin  6 mg Per G Tube Nightly    QUEtiapine  50 mg Oral BID     scopolamine  1 patch Transdermal Q3 Days    sodium chloride 0.9%  500 mL Intravenous Once    sucralfate  1 g Per G Tube Q6H    traZODone  100 mg Per G Tube QHS        Current Facility-Administered Medications:     acetaminophen, 650 mg, Per G Tube, Q6H PRN    bisacodyL, 10 mg, Rectal, Daily PRN    butalbital-acetaminophen-caffeine -40 mg, 1 tablet, Per G Tube, Q4H PRN    dextrose 50%, 12.5 g, Intravenous, PRN    dextrose 50%, 12.5 g, Intravenous, PRN    dextrose 50%, 25 g, Intravenous, PRN    glucagon (human recombinant), 1 mg, Intramuscular, PRN    glucagon (human recombinant), 1 mg, Intramuscular, PRN    glucose, 16 g, Oral, PRN    glucose, 24 g, Oral, PRN    haloperidol lactate, 5 mg, Intravenous, Q6H PRN    insulin aspart U-100, 0-10 Units, Subcutaneous, QID (AC + HS) PRN    labetalol, 10 mg, Intravenous, Q15 Min PRN    levalbuterol, 0.63 mg, Nebulization, Q4H PRN    ondansetron, 4 mg, Intravenous, Q8H PRN    sodium chloride 0.9%, 10 mL, Intravenous, PRN     Assessment/Plan:      Hemorrhagic CVA-Left occipital lobe hematoma, adjacent subarachnoid hemorrhage  Dense left lobe consolidation-he mobilize influenza pneumonia  Dysphagia s/p peg tube  Delirium  Tobacco user    HX: T1 dm, CAD s/p PCI, HTN, HLD, history of CVA    Plan:   -delirium improving.  Appreciate Psychiatry input.  Continue delirium precautions.  -PT OT.  Needs placement   -stable from respiratory standpoint.  Completing antibiotics  -continue current insulin regimen  -PEG feeds as tolerated.  Ensure regular bowel movements       SCDs      Cristian Syed MD

## 2025-06-21 NOTE — PROGRESS NOTES
6/21/2025  Federico Villarreal   1960   [id number]        Psychiatry Progress Note     SUBJECTIVE:   Federico Villarreal is a 64 y.o. male with a past medical history that includes CAD, T1DM, HTN, HLD, and history of ischemic stroke with left-sided weakness who presented to [facility] ED on 06/09/25 with confusion and slurred speech noted that morning. His wife had reported he had woken up with headache at 0115 that morning and then woke up with confusion around 0545. CT scan showed left occipital lobe parenchymal hemorrhage. Being seen by psychiatry to day due to confusion and agitation.    Continued to have impaired sleep with restlessness and psychomotor agitation overnight requiring PRN haloperidol, though RN states this is mostly ineffective. Patient did apparently sleep better with the addition of Trazodone. Will switch the Haldol to Zyprexa to assess for better efficacy. At this time he is resting quietly in bed with his wife at the bedside. His wife reports he continues to have visual hallucinations at times. Thought process overall disorganized and attention impaired. Will continue with the current POC and monitor for need to augment.      Current Medications:   Scheduled Meds:    aluminum-magnesium hydroxide-simethicone  30 mL Per G Tube QID (AC & HS)    carvediloL  12.5 mg Per G Tube BID    cefTRIAXone (Rocephin) IV (PEDS and ADULTS)  2 g Intravenous Q24H    insulin glargine U-100  18 Units Subcutaneous BID    levetiracetam  500 mg Per G Tube BID    LIDOcaine HCl 2%   Urethral Once    losartan  100 mg Per G Tube Daily    melatonin  6 mg Per G Tube Nightly    QUEtiapine  50 mg Oral BID    scopolamine  1 patch Transdermal Q3 Days    sodium chloride 0.9%  500 mL Intravenous Once    sucralfate  1 g Per G Tube Q6H    traZODone  100 mg Per G Tube QHS      PRN Meds:   Current Facility-Administered Medications:     acetaminophen, 650 mg, Per G Tube, Q6H PRN    bisacodyL, 10 mg, Rectal, Daily PRN     butalbital-acetaminophen-caffeine -40 mg, 1 tablet, Per G Tube, Q4H PRN    dextrose 50%, 12.5 g, Intravenous, PRN    dextrose 50%, 12.5 g, Intravenous, PRN    dextrose 50%, 25 g, Intravenous, PRN    glucagon (human recombinant), 1 mg, Intramuscular, PRN    glucagon (human recombinant), 1 mg, Intramuscular, PRN    glucose, 16 g, Oral, PRN    glucose, 24 g, Oral, PRN    insulin aspart U-100, 0-10 Units, Subcutaneous, QID (AC + HS) PRN    labetalol, 10 mg, Intravenous, Q15 Min PRN    levalbuterol, 0.63 mg, Nebulization, Q4H PRN    ondansetron, 4 mg, Intravenous, Q8H PRN    sodium chloride 0.9%, 10 mL, Intravenous, PRN    ziprasidone, 20 mg, Intramuscular, Q6H PRN   Psychotherapeutics (From admission, onward)      Start     Stop Route Frequency Ordered    06/21/25 1352  ziprasidone injection 20 mg         -- IM Every 6 hours PRN 06/21/25 1253    06/20/25 2100  traZODone tablet 100 mg         -- PER G TUBE Nightly 06/20/25 1200    06/18/25 1115  QUEtiapine tablet 50 mg         -- Oral 2 times daily 06/18/25 1007            Allergies:   Review of patient's allergies indicates:  No Known Allergies     OBJECTIVE:   Vitals   Vitals:    06/21/25 1143   BP: (!) 122/59   Pulse: 85   Resp:    Temp: 98.8 °F (37.1 °C)        Labs/Imaging/Studies:   Recent Results (from the past 36 hours)   POCT glucose    Collection Time: 06/20/25  6:12 AM   Result Value Ref Range    POCT Glucose 260 (H) 70 - 110 mg/dL   POCT glucose    Collection Time: 06/20/25  9:42 AM   Result Value Ref Range    POCT Glucose 93 70 - 110 mg/dL   POCT glucose    Collection Time: 06/20/25 10:59 AM   Result Value Ref Range    POCT Glucose 95 70 - 110 mg/dL   POCT glucose    Collection Time: 06/20/25  4:20 PM   Result Value Ref Range    POCT Glucose 174 (H) 70 - 110 mg/dL   POCT glucose    Collection Time: 06/20/25  9:04 PM   Result Value Ref Range    POCT Glucose 114 (H) 70 - 110 mg/dL   POCT glucose    Collection Time: 06/21/25  2:58 AM   Result Value Ref  Range    POCT Glucose 351 (H) 70 - 110 mg/dL   POCT glucose    Collection Time: 06/21/25  5:06 AM   Result Value Ref Range    POCT Glucose 402 (H) 70 - 110 mg/dL        Psychiatric Mental Status Exam:  General Appearance: dressed in hospital garb, lying in bed, in no acute distress, appears older than stated age, bilateral mittens in place  Arousal: alert  Behavior: cooperative, poor eye contact  Movements and Motor Activity: no tics, no tremors, no akathisia, no dystonia, no evidence of tardive dyskinesia  Orientation: oriented to person only  Speech: coherent  Mood: Euthymic  Affect: euthymic, reactive  Thought Process: disorganized  Associations: no loosening of associations  Thought Content and Perceptions: no suicidal ideation, no homicidal ideation, + visual hallucinations  Recent and Remote Memory: impaired; per interview/observation with patient  Attention and Concentration: impaired; per interview/observation with patient  Fund of Knowledge: vocabulary appropriate; based on history, vocabulary, fund of knowledge, syntax, grammar, and content  Insight: impaired; based on understanding of severity of illness and HPI  Judgment: impaired; based on patient's behavior and HPI    ASSESSMENT/PLAN:   Problems Addressed/Diagnoses:  Delirium, acute, hyperactive due to multiple etiologies     Past Medical History:   Diagnosis Date    Acne     Cataract     Coronary artery disease     Diabetes mellitus     Hearing loss     Hypertension     Seasonal allergies     Stroke         Plan:  Medication Management  Quetiapine 50mg  BID  Zyprexa 5 mg IM Q8hr PRN agitation  Trazodone 100mg  QHS  Monitoring  EKG on 06/12/25 with Qtc of 462 ms  Legal  PEC not recommended  Recommend delirium precautions  Psychiatry will continue to follow        Felix Gilbert

## 2025-06-22 LAB
POCT GLUCOSE: 254 MG/DL (ref 70–110)
POCT GLUCOSE: 289 MG/DL (ref 70–110)
POCT GLUCOSE: 299 MG/DL (ref 70–110)

## 2025-06-22 PROCEDURE — 25000003 PHARM REV CODE 250: Performed by: INTERNAL MEDICINE

## 2025-06-22 PROCEDURE — 63600175 PHARM REV CODE 636 W HCPCS: Performed by: INTERNAL MEDICINE

## 2025-06-22 PROCEDURE — 63600175 PHARM REV CODE 636 W HCPCS

## 2025-06-22 PROCEDURE — 25000003 PHARM REV CODE 250

## 2025-06-22 PROCEDURE — 25000003 PHARM REV CODE 250: Performed by: NURSE PRACTITIONER

## 2025-06-22 PROCEDURE — 11000001 HC ACUTE MED/SURG PRIVATE ROOM

## 2025-06-22 PROCEDURE — 21400001 HC TELEMETRY ROOM

## 2025-06-22 RX ORDER — LOPERAMIDE HYDROCHLORIDE 2 MG/1
2 CAPSULE ORAL 4 TIMES DAILY PRN
Status: DISCONTINUED | OUTPATIENT
Start: 2025-06-22 | End: 2025-07-04

## 2025-06-22 RX ADMIN — ALUMINUM HYDROXIDE, MAGNESIUM HYDROXIDE, AND DIMETHICONE 30 ML: 200; 20; 200 SUSPENSION ORAL at 03:06

## 2025-06-22 RX ADMIN — Medication 6 MG: at 08:06

## 2025-06-22 RX ADMIN — SUCRALFATE 1 G: 1 TABLET ORAL at 05:06

## 2025-06-22 RX ADMIN — INSULIN GLARGINE 18 UNITS: 100 INJECTION, SOLUTION SUBCUTANEOUS at 09:06

## 2025-06-22 RX ADMIN — LOSARTAN POTASSIUM 100 MG: 50 TABLET, FILM COATED ORAL at 09:06

## 2025-06-22 RX ADMIN — LEVETIRACETAM 500 MG: 500 SOLUTION ORAL at 08:06

## 2025-06-22 RX ADMIN — ALUMINUM HYDROXIDE, MAGNESIUM HYDROXIDE, AND DIMETHICONE 30 ML: 200; 20; 200 SUSPENSION ORAL at 05:06

## 2025-06-22 RX ADMIN — CARVEDILOL 12.5 MG: 12.5 TABLET, FILM COATED ORAL at 08:06

## 2025-06-22 RX ADMIN — LOPERAMIDE HYDROCHLORIDE 2 MG: 2 CAPSULE ORAL at 03:06

## 2025-06-22 RX ADMIN — INSULIN ASPART 6 UNITS: 100 INJECTION, SOLUTION INTRAVENOUS; SUBCUTANEOUS at 11:06

## 2025-06-22 RX ADMIN — SUCRALFATE 1 G: 1 TABLET ORAL at 11:06

## 2025-06-22 RX ADMIN — QUETIAPINE FUMARATE 50 MG: 25 TABLET ORAL at 08:06

## 2025-06-22 RX ADMIN — LOPERAMIDE HYDROCHLORIDE 2 MG: 2 CAPSULE ORAL at 08:06

## 2025-06-22 RX ADMIN — INSULIN GLARGINE 18 UNITS: 100 INJECTION, SOLUTION SUBCUTANEOUS at 08:06

## 2025-06-22 RX ADMIN — ALUMINUM HYDROXIDE, MAGNESIUM HYDROXIDE, AND DIMETHICONE 30 ML: 200; 20; 200 SUSPENSION ORAL at 11:06

## 2025-06-22 RX ADMIN — INSULIN ASPART 6 UNITS: 100 INJECTION, SOLUTION INTRAVENOUS; SUBCUTANEOUS at 05:06

## 2025-06-22 RX ADMIN — TRAZODONE HYDROCHLORIDE 100 MG: 100 TABLET ORAL at 08:06

## 2025-06-22 RX ADMIN — OLANZAPINE 5 MG: 10 INJECTION, POWDER, FOR SOLUTION INTRAMUSCULAR at 02:06

## 2025-06-22 RX ADMIN — SUCRALFATE 1 G: 1 TABLET ORAL at 12:06

## 2025-06-22 RX ADMIN — INSULIN ASPART 2 UNITS: 100 INJECTION, SOLUTION INTRAVENOUS; SUBCUTANEOUS at 03:06

## 2025-06-22 RX ADMIN — QUETIAPINE FUMARATE 50 MG: 25 TABLET ORAL at 09:06

## 2025-06-22 RX ADMIN — SCOPOLAMINE 1 PATCH: 1 PATCH TRANSDERMAL at 12:06

## 2025-06-22 RX ADMIN — LEVETIRACETAM 500 MG: 500 SOLUTION ORAL at 09:06

## 2025-06-22 RX ADMIN — ALUMINUM HYDROXIDE, MAGNESIUM HYDROXIDE, AND DIMETHICONE 30 ML: 200; 20; 200 SUSPENSION ORAL at 08:06

## 2025-06-22 RX ADMIN — INSULIN ASPART 3 UNITS: 100 INJECTION, SOLUTION INTRAVENOUS; SUBCUTANEOUS at 08:06

## 2025-06-22 RX ADMIN — CARVEDILOL 12.5 MG: 12.5 TABLET, FILM COATED ORAL at 09:06

## 2025-06-22 NOTE — PROGRESS NOTES
[facility] Medicine Progress Note        Chief Complaint: Inpatient Follow-up for     HPI:     HPI reviewed    Interval Hx:   Alert, resting in the bed.  Delirium continues requiring p.r.n. medications.  Wife at bedside.  He is doing well from respiratory standpoint.  Saturating well on room air.  Tolerating PEG feeds, moving bowels    Objective/physical exam:  Vitals:    06/21/25 2020 06/21/25 2302 06/22/25 0506 06/22/25 0654   BP: 129/70 113/65 133/64 117/64   Pulse: 89 92 86 93   Resp:       Temp: 97.3 °F (36.3 °C) 98.3 °F (36.8 °C) 97.3 °F (36.3 °C) 98.2 °F (36.8 °C)   TempSrc: Axillary Axillary Axillary Oral   SpO2: (!) 93% (!) 93% 96%    Weight:       Height:         General: In no acute distress, afebrile  Respiratory: Clear to auscultation bilaterally  Cardiovascular: S1, S2, no appreciable murmur  Abdomen: Soft, nontender, BS +, peg site CDI  Extremities: Mittens in place  Neurologic: Alert alert, oriented x1      Lab Results   Component Value Date     (H) 06/19/2025    K 3.8 06/19/2025     (H) 06/19/2025    CO2 34 (H) 06/19/2025    BUN 33.7 (H) 06/19/2025    CREATININE 0.91 06/19/2025    CALCIUM 9.2 06/19/2025    EGFRNONAA >60 07/18/2022      Lab Results   Component Value Date    ALT 30 06/19/2025    AST 27 06/19/2025    ALKPHOS 113 06/19/2025    BILITOT 0.3 06/19/2025      Lab Results   Component Value Date    WBC 11.84 (H) 06/19/2025    HGB 12.8 (L) 06/19/2025    HCT 40.0 (L) 06/19/2025    MCV 95.7 (H) 06/19/2025     06/19/2025           Medications:   aluminum-magnesium hydroxide-simethicone  30 mL Per G Tube QID (AC & HS)    carvediloL  12.5 mg Per G Tube BID    insulin glargine U-100  18 Units Subcutaneous BID    levetiracetam  500 mg Per G Tube BID    LIDOcaine HCl 2%   Urethral Once    losartan  100 mg Per G Tube Daily    melatonin  6 mg Per G Tube Nightly    QUEtiapine  50 mg Oral BID    scopolamine  1 patch Transdermal Q3 Days    sodium  chloride 0.9%  500 mL Intravenous Once    sucralfate  1 g Per G Tube Q6H    traZODone  100 mg Per G Tube QHS        Current Facility-Administered Medications:     acetaminophen, 650 mg, Per G Tube, Q6H PRN    bisacodyL, 10 mg, Rectal, Daily PRN    butalbital-acetaminophen-caffeine -40 mg, 1 tablet, Per G Tube, Q4H PRN    dextrose 50%, 12.5 g, Intravenous, PRN    dextrose 50%, 12.5 g, Intravenous, PRN    dextrose 50%, 25 g, Intravenous, PRN    glucagon (human recombinant), 1 mg, Intramuscular, PRN    glucagon (human recombinant), 1 mg, Intramuscular, PRN    glucose, 16 g, Oral, PRN    glucose, 24 g, Oral, PRN    insulin aspart U-100, 0-10 Units, Subcutaneous, QID (AC + HS) PRN    labetalol, 10 mg, Intravenous, Q15 Min PRN    levalbuterol, 0.63 mg, Nebulization, Q4H PRN    OLANZapine, 5 mg, Intramuscular, Q8H PRN    ondansetron, 4 mg, Intravenous, Q8H PRN    sodium chloride 0.9%, 10 mL, Intravenous, PRN     Assessment/Plan:      Hemorrhagic CVA-Left occipital lobe hematoma, adjacent subarachnoid hemorrhage  Dense left lobe consolidation-he mobilize influenza pneumonia  Dysphagia s/p peg tube  Delirium  Tobacco user    HX: T1 dm, CAD s/p PCI, HTN, HLD, history of CVA    Plan:   -delirium improving.  Appreciate Psychiatry input.  Continue delirium precautions.  P.r.n. meds if needed  -PT OT.  Needs placement   -stable from respiratory standpoint..  Completed antibiotics  -continue current insulin regimen  -PEG feeds as tolerated.  Ensure regular bowel movements       SCDs      Cristian Syed MD

## 2025-06-23 LAB
ANION GAP SERPL CALC-SCNC: 7 MEQ/L
BASOPHILS # BLD AUTO: 0.08 X10(3)/MCL
BASOPHILS NFR BLD AUTO: 0.5 %
BUN SERPL-MCNC: 33.3 MG/DL (ref 8.4–25.7)
CALCIUM SERPL-MCNC: 8.5 MG/DL (ref 8.8–10)
CHLORIDE SERPL-SCNC: 101 MMOL/L (ref 98–107)
CO2 SERPL-SCNC: 34 MMOL/L (ref 23–31)
CREAT SERPL-MCNC: 0.97 MG/DL (ref 0.72–1.25)
CREAT/UREA NIT SERPL: 34
EOSINOPHIL # BLD AUTO: 0.48 X10(3)/MCL (ref 0–0.9)
EOSINOPHIL NFR BLD AUTO: 3.2 %
ERYTHROCYTE [DISTWIDTH] IN BLOOD BY AUTOMATED COUNT: 11.9 % (ref 11.5–17)
GFR SERPLBLD CREATININE-BSD FMLA CKD-EPI: >60 ML/MIN/1.73/M2
GLUCOSE SERPL-MCNC: 212 MG/DL (ref 82–115)
HCT VFR BLD AUTO: 42.3 % (ref 42–52)
HGB BLD-MCNC: 13.4 G/DL (ref 14–18)
IMM GRANULOCYTES # BLD AUTO: 0.29 X10(3)/MCL (ref 0–0.04)
IMM GRANULOCYTES NFR BLD AUTO: 1.9 %
LYMPHOCYTES # BLD AUTO: 2.47 X10(3)/MCL (ref 0.6–4.6)
LYMPHOCYTES NFR BLD AUTO: 16.4 %
MAGNESIUM SERPL-MCNC: 2.7 MG/DL (ref 1.6–2.6)
MCH RBC QN AUTO: 30.9 PG (ref 27–31)
MCHC RBC AUTO-ENTMCNC: 31.7 G/DL (ref 33–36)
MCV RBC AUTO: 97.5 FL (ref 80–94)
MONOCYTES # BLD AUTO: 0.91 X10(3)/MCL (ref 0.1–1.3)
MONOCYTES NFR BLD AUTO: 6 %
NEUTROPHILS # BLD AUTO: 10.85 X10(3)/MCL (ref 2.1–9.2)
NEUTROPHILS NFR BLD AUTO: 72 %
NRBC BLD AUTO-RTO: 0 %
PLATELET # BLD AUTO: 414 X10(3)/MCL (ref 130–400)
PMV BLD AUTO: 9.7 FL (ref 7.4–10.4)
POCT GLUCOSE: 157 MG/DL (ref 70–110)
POCT GLUCOSE: 169 MG/DL (ref 70–110)
POCT GLUCOSE: 182 MG/DL (ref 70–110)
POCT GLUCOSE: 214 MG/DL (ref 70–110)
POCT GLUCOSE: 223 MG/DL (ref 70–110)
POTASSIUM SERPL-SCNC: 4.5 MMOL/L (ref 3.5–5.1)
RBC # BLD AUTO: 4.34 X10(6)/MCL (ref 4.7–6.1)
SODIUM SERPL-SCNC: 142 MMOL/L (ref 136–145)
WBC # BLD AUTO: 15.08 X10(3)/MCL (ref 4.5–11.5)

## 2025-06-23 PROCEDURE — 25000003 PHARM REV CODE 250: Performed by: INTERNAL MEDICINE

## 2025-06-23 PROCEDURE — 63600175 PHARM REV CODE 636 W HCPCS: Performed by: INTERNAL MEDICINE

## 2025-06-23 PROCEDURE — 80048 BASIC METABOLIC PNL TOTAL CA: CPT | Performed by: INTERNAL MEDICINE

## 2025-06-23 PROCEDURE — 36415 COLL VENOUS BLD VENIPUNCTURE: CPT | Performed by: INTERNAL MEDICINE

## 2025-06-23 PROCEDURE — 21400001 HC TELEMETRY ROOM

## 2025-06-23 PROCEDURE — 25000003 PHARM REV CODE 250

## 2025-06-23 PROCEDURE — 11000001 HC ACUTE MED/SURG PRIVATE ROOM

## 2025-06-23 PROCEDURE — 85025 COMPLETE CBC W/AUTO DIFF WBC: CPT | Performed by: INTERNAL MEDICINE

## 2025-06-23 PROCEDURE — 97530 THERAPEUTIC ACTIVITIES: CPT | Mod: CQ

## 2025-06-23 PROCEDURE — 83735 ASSAY OF MAGNESIUM: CPT | Performed by: INTERNAL MEDICINE

## 2025-06-23 RX ADMIN — SUCRALFATE 1 G: 1 TABLET ORAL at 12:06

## 2025-06-23 RX ADMIN — Medication 6 MG: at 08:06

## 2025-06-23 RX ADMIN — ALUMINUM HYDROXIDE, MAGNESIUM HYDROXIDE, AND DIMETHICONE 30 ML: 200; 20; 200 SUSPENSION ORAL at 04:06

## 2025-06-23 RX ADMIN — ALUMINUM HYDROXIDE, MAGNESIUM HYDROXIDE, AND DIMETHICONE 30 ML: 200; 20; 200 SUSPENSION ORAL at 05:06

## 2025-06-23 RX ADMIN — INSULIN ASPART 2 UNITS: 100 INJECTION, SOLUTION INTRAVENOUS; SUBCUTANEOUS at 12:06

## 2025-06-23 RX ADMIN — ALUMINUM HYDROXIDE, MAGNESIUM HYDROXIDE, AND DIMETHICONE 30 ML: 200; 20; 200 SUSPENSION ORAL at 12:06

## 2025-06-23 RX ADMIN — CARVEDILOL 12.5 MG: 12.5 TABLET, FILM COATED ORAL at 08:06

## 2025-06-23 RX ADMIN — LOSARTAN POTASSIUM 100 MG: 50 TABLET, FILM COATED ORAL at 08:06

## 2025-06-23 RX ADMIN — INSULIN ASPART 2 UNITS: 100 INJECTION, SOLUTION INTRAVENOUS; SUBCUTANEOUS at 06:06

## 2025-06-23 RX ADMIN — LOPERAMIDE HYDROCHLORIDE 2 MG: 2 CAPSULE ORAL at 09:06

## 2025-06-23 RX ADMIN — LOPERAMIDE HYDROCHLORIDE 2 MG: 2 CAPSULE ORAL at 08:06

## 2025-06-23 RX ADMIN — ALUMINUM HYDROXIDE, MAGNESIUM HYDROXIDE, AND DIMETHICONE 30 ML: 200; 20; 200 SUSPENSION ORAL at 08:06

## 2025-06-23 RX ADMIN — LEVETIRACETAM 500 MG: 500 SOLUTION ORAL at 08:06

## 2025-06-23 RX ADMIN — INSULIN ASPART 1 UNITS: 100 INJECTION, SOLUTION INTRAVENOUS; SUBCUTANEOUS at 08:06

## 2025-06-23 RX ADMIN — ACETAMINOPHEN 650 MG: 325 TABLET ORAL at 06:06

## 2025-06-23 RX ADMIN — INSULIN GLARGINE 18 UNITS: 100 INJECTION, SOLUTION SUBCUTANEOUS at 08:06

## 2025-06-23 RX ADMIN — INSULIN ASPART 4 UNITS: 100 INJECTION, SOLUTION INTRAVENOUS; SUBCUTANEOUS at 07:06

## 2025-06-23 RX ADMIN — SUCRALFATE 1 G: 1 TABLET ORAL at 05:06

## 2025-06-23 RX ADMIN — SUCRALFATE 1 G: 1 TABLET ORAL at 06:06

## 2025-06-23 RX ADMIN — TRAZODONE HYDROCHLORIDE 100 MG: 100 TABLET ORAL at 08:06

## 2025-06-23 RX ADMIN — QUETIAPINE FUMARATE 50 MG: 25 TABLET ORAL at 08:06

## 2025-06-23 NOTE — PROGRESS NOTES
6/23/2025  Federico Villarreal   1960   [id number]        Psychiatry Progress Note     SUBJECTIVE:   Federico Villarreal is a 64 y.o. male with a past medical history that includes CAD, T1DM, HTN, HLD, and history of ischemic stroke with left-sided weakness who presented to [facility] ED on 06/09/25 with confusion and slurred speech noted that morning. His wife had reported he had woken up with headache at 0115 that morning and then woke up with confusion around 0545. CT scan showed left occipital lobe parenchymal hemorrhage. Being seen by psychiatry to day due to confusion and agitation.     Drowsy this morning, but easily awaken. Impaired attention and memory with disorganized thoughts still present. Improved sleep reported. Has not required PRN antipsychotics since Saturday night.        Current Medications:   Scheduled Meds:    aluminum-magnesium hydroxide-simethicone  30 mL Per G Tube QID (AC & HS)    carvediloL  12.5 mg Per G Tube BID    insulin glargine U-100  18 Units Subcutaneous BID    levetiracetam  500 mg Per G Tube BID    LIDOcaine HCl 2%   Urethral Once    losartan  100 mg Per G Tube Daily    melatonin  6 mg Per G Tube Nightly    QUEtiapine  50 mg Oral BID    scopolamine  1 patch Transdermal Q3 Days    sodium chloride 0.9%  500 mL Intravenous Once    sucralfate  1 g Per G Tube Q6H    traZODone  100 mg Per G Tube QHS      PRN Meds:   Current Facility-Administered Medications:     acetaminophen, 650 mg, Per G Tube, Q6H PRN    bisacodyL, 10 mg, Rectal, Daily PRN    butalbital-acetaminophen-caffeine -40 mg, 1 tablet, Per G Tube, Q4H PRN    dextrose 50%, 12.5 g, Intravenous, PRN    dextrose 50%, 12.5 g, Intravenous, PRN    dextrose 50%, 25 g, Intravenous, PRN    glucagon (human recombinant), 1 mg, Intramuscular, PRN    glucagon (human recombinant), 1 mg, Intramuscular, PRN    glucose, 16 g, Oral, PRN    glucose, 24 g, Oral, PRN    insulin aspart U-100, 0-10 Units, Subcutaneous, QID (AC + HS) PRN     labetalol, 10 mg, Intravenous, Q15 Min PRN    levalbuterol, 0.63 mg, Nebulization, Q4H PRN    loperamide, 2 mg, Per G Tube, QID PRN    OLANZapine, 5 mg, Intramuscular, Q8H PRN    ondansetron, 4 mg, Intravenous, Q8H PRN    sodium chloride 0.9%, 10 mL, Intravenous, PRN   Psychotherapeutics (From admission, onward)      Start     Stop Route Frequency Ordered    06/21/25 1359  OLANZapine injection 5 mg         -- IM Every 8 hours PRN 06/21/25 1259    06/20/25 2100  traZODone tablet 100 mg         -- PER G TUBE Nightly 06/20/25 1200    06/18/25 1115  QUEtiapine tablet 50 mg         -- Oral 2 times daily 06/18/25 1007            Allergies:   Review of patient's allergies indicates:  No Known Allergies     OBJECTIVE:   Vitals   Vitals:    06/23/25 0824   BP: 118/68   Pulse:    Resp:    Temp:         Labs/Imaging/Studies:   Recent Results (from the past 36 hours)   POCT glucose    Collection Time: 06/22/25  5:18 AM   Result Value Ref Range    POCT Glucose 299 (H) 70 - 110 mg/dL   POCT glucose    Collection Time: 06/22/25 11:09 AM   Result Value Ref Range    POCT Glucose 289 (H) 70 - 110 mg/dL   POCT Glucose, Hand-Held Device    Collection Time: 06/22/25  3:45 PM   Result Value Ref Range    POC Glucose 176 (A) 70 - 110 MG/DL   POCT glucose    Collection Time: 06/22/25  8:22 PM   Result Value Ref Range    POCT Glucose 254 (H) 70 - 110 mg/dL   Basic Metabolic Panel    Collection Time: 06/23/25  4:08 AM   Result Value Ref Range    Sodium 142 136 - 145 mmol/L    Potassium 4.5 3.5 - 5.1 mmol/L    Chloride 101 98 - 107 mmol/L    CO2 34 (H) 23 - 31 mmol/L    Glucose 212 (H) 82 - 115 mg/dL    Blood Urea Nitrogen 33.3 (H) 8.4 - 25.7 mg/dL    Creatinine 0.97 0.72 - 1.25 mg/dL    BUN/Creatinine Ratio 34     Calcium 8.5 (L) 8.8 - 10.0 mg/dL    Anion Gap 7.0 mEq/L    eGFR >60 mL/min/1.73/m2   CBC with Differential    Collection Time: 06/23/25  4:08 AM   Result Value Ref Range    WBC 15.08 (H) 4.50 - 11.50 x10(3)/mcL    RBC 4.34 (L) 4.70  - 6.10 x10(6)/mcL    Hgb 13.4 (L) 14.0 - 18.0 g/dL    Hct 42.3 42.0 - 52.0 %    MCV 97.5 (H) 80.0 - 94.0 fL    MCH 30.9 27.0 - 31.0 pg    MCHC 31.7 (L) 33.0 - 36.0 g/dL    RDW 11.9 11.5 - 17.0 %    Platelet 414 (H) 130 - 400 x10(3)/mcL    MPV 9.7 7.4 - 10.4 fL    Neut % 72.0 %    Lymph % 16.4 %    Mono % 6.0 %    Eos % 3.2 %    Basophil % 0.5 %    Imm Grans % 1.9 %    Neut # 10.85 (H) 2.1 - 9.2 x10(3)/mcL    Lymph # 2.47 0.6 - 4.6 x10(3)/mcL    Mono # 0.91 0.1 - 1.3 x10(3)/mcL    Eos # 0.48 0 - 0.9 x10(3)/mcL    Baso # 0.08 <=0.2 x10(3)/mcL    Imm Gran # 0.29 (H) 0.00 - 0.04 x10(3)/mcL    NRBC% 0.0 %   Magnesium    Collection Time: 06/23/25  4:08 AM   Result Value Ref Range    Magnesium Level 2.70 (H) 1.60 - 2.60 mg/dL   POCT glucose    Collection Time: 06/23/25  5:45 AM   Result Value Ref Range    POCT Glucose 214 (H) 70 - 110 mg/dL   POCT glucose    Collection Time: 06/23/25  7:29 AM   Result Value Ref Range    POCT Glucose 223 (H) 70 - 110 mg/dL          Psychiatric Mental Status Exam:  General Appearance: dressed in hospital garb, lying in bed, in no acute distress, appears older than stated age  Arousal: drowsy  Behavior: cooperative, minimal responses, poor eye contact  Movements and Motor Activity: no tics, no tremors, no akathisia, no dystonia, no evidence of tardive dyskinesia  Orientation: oriented to person only  Speech: coherent  Mood: Euthymic  Affect: constricted  Thought Process: disorganized  Associations: no loosening of associations  Thought Content and Perceptions: no suicidal ideation, no homicidal ideation  Recent and Remote Memory: impaired; per interview/observation with patient  Attention and Concentration: impaired; per interview/observation with patient  Fund of Knowledge: vocabulary appropriate; based on history, vocabulary, fund of knowledge, syntax, grammar, and content  Insight: impaired; based on understanding of severity of illness and HPI  Judgment: impaired; based on patient's  behavior and HPI    ASSESSMENT/PLAN:   Problems Addressed/Diagnoses:  Delirium, acute, hyperactive due to multiple etiologies     Past Medical History:   Diagnosis Date    Acne     Cataract     Coronary artery disease     Diabetes mellitus     Hearing loss     Hypertension     Seasonal allergies     Stroke         Plan:  Medication Management  Quetiapine 50mg  BID  Zyprexa 5 mg IM Q8hr PRN agitation  Trazodone 100mg  QHS  Monitoring  EKG on 06/12/25 with Qtc of 462 ms  Legal  PEC not recommended  Recommend delirium precautions  Psychiatry will sign-off      Federico Perea

## 2025-06-23 NOTE — PT/OT/SLP PROGRESS
Physical Therapy Treatment    Patient Name:  Federico Villarreal   MRN:  [id number]    Recommendations:     Discharge therapy intensity: Moderate Intensity Therapy   Discharge Equipment Recommendations: to be determined by next level of care  Barriers to discharge: Impaired mobility and Ongoing medical needs    Assessment:     Federico Villarreal is a 64 y.o. male admitted with a medical diagnosis of L occipital hemorrhagic CVA, presented with HA, confusion, slurred speech. Hx of L eye blindness.  He presents with the following impairments/functional limitations: weakness, gait instability, impaired endurance, impaired balance, impaired self care skills, impaired functional mobility, decreased safety awareness, visual deficits, decreased lower extremity function, decreased upper extremity function, impaired cognition .     Pt drowsy but arousable. Tolerated session fairly well overall. Improved transfers when RW introduced, able to take side steps to HOB.     Rehab Prognosis: Good; patient would benefit from acute skilled PT services to address these deficits and reach maximum level of function.    Recent Surgery: Procedure(s) (LRB):  PEG (N/A) 7 Days Post-Op    Plan:     During this hospitalization, patient would benefit from acute PT services 5 x/week to address the identified rehab impairments via gait training, therapeutic activities, therapeutic exercises and progress toward the following goals:    Plan of Care Expires:  07/13/25    Subjective     Chief Complaint: unable to state  Patient/Family Comments/goals: unable to state  Pain/Comfort:  Pain Rating 1: 0/10      Objective:     Communicated with RN prior to session.  Patient found HOB elevated with bed alarm, pulse ox (continuous), nascimento catheter, PEG Tube, telemetry upon PT entry to room.     General Precautions: Standard, fall, vision impaired  Orthopedic Precautions: N/A  Braces: N/A  Respiratory Status: Room air  Skin Integrity: Visible skin  intact      Functional Mobility:  Bed Mobility:     Supine<->sit: Max assist  Transfers:     Sit to Stand:  Mod assist x 2  Balance: Static sitting balance ranged from CGA to Min assist 2/2 impulsivity   Transfers:   Sit<->stand: Mod assist x 2 with HHA x 2  X 2 trials  Introduced the RW, blocked R knee however no buckling noted.   Gait: Side steps to HOB with RW x 3 trials, Mod assist to manage RW, min to mod assist for balance    Therapeutic Activities/Exercises:      Co-Treatment: No    Education:  Patient provided with verbal education education regarding PT role/goals/POC, fall prevention, safety awareness, and discharge/DME recommendations.  Additional teaching is warranted.     Patient left HOB elevated with all lines intact, call button in reach, wedge under L side, pressure relief boots, and RN notified    GOALS:   Multidisciplinary Problems       Physical Therapy Goals          Problem: Physical Therapy    Goal Priority Disciplines Outcome Interventions   Physical Therapy Goal     PT, PT/OT Progressing    Description: Goals to be met by: 25     Patient will increase functional independence with mobility by performin. Supine to sit with MInimal Assistance  2. Sit to stand transfer with Minimal Assistance  3. Bed to chair transfer with Minimal Assistance using Rolling Walker  4. Pt will follow 5/5 motor commands for BLE.  5. Gait TBD as pt progresses                         Time Tracking:     PT Received On: 25  PT Start Time: 843     PT Stop Time: 906  PT Total Time (min): 23 min     Billable Minutes: Therapeutic Activity 2 units    Treatment Type: Treatment  PT/PTA: PTA     Number of PTA visits since last PT visit: 4     2025

## 2025-06-23 NOTE — PROGRESS NOTES
Inpatient Nutrition Assessment    Admit Date: 6/9/2025   Total duration of encounter: 14 days   Patient Age: 64 y.o.    Nutrition Recommendation/Prescription     Bolus 250 ml Diabetisource AC and 150 ml water 6 times daily  1800 kcal 100% needs  90 g protein 100% needs  162 g CHO 92% needs  1680 ml water 100% needs    Communication of Recommendations: reviewed with nurse    Nutrition Assessment     Malnutrition Assessment/Nutrition-Focused Physical Exam       Malnutrition Level: other (see comments) (Does not meet criteria) (06/11/25 [facility])  Energy Intake (Malnutrition): other (see comments) (Unable to assess) (06/11/25 [facility])  Weight Loss (Malnutrition): other (see comments) (Unable to assess) (06/11/25 [facility])              Muscle Mass (Malnutrition): mild depletion (06/11/25 1352)  [place] (Muscle Loss): mild depletion                                A minimum of two characteristics is recommended for diagnosis of either severe or non-severe malnutrition.    Chart Review    Reason Seen: follow-up    Malnutrition Screening Tool Results   Have you recently lost weight without trying?: No  Have you been eating poorly because of a decreased appetite?: No   MST Score: 0   Diagnosis: left occipital hemorrhagic stroke     Relevant Medical History: T1DM, coronary artery disease status post stenting, hypertension, hyperlipidemia, history of ischemic stroke with previous left-sided weakness    Scheduled Medications:  aluminum-magnesium hydroxide-simethicone, 30 mL, QID (AC & HS)  carvediloL, 12.5 mg, BID  insulin glargine U-100, 18 Units, BID  LIDOcaine HCl 2%, , Once  losartan, 100 mg, Daily  melatonin, 6 mg, Nightly  QUEtiapine, 50 mg, BID  scopolamine, 1 patch, Q3 Days  sodium chloride 0.9%, 500 mL, Once  sucralfate, 1 g, Q6H  traZODone, 100 mg, QHS    Continuous Infusions: none       PRN Medications:   acetaminophen, 650 mg, Q6H PRN  bisacodyL, 10 mg, Daily PRN  butalbital-acetaminophen-caffeine -40 mg, 1 tablet,  Q4H PRN  dextrose 50%, 12.5 g, PRN  dextrose 50%, 12.5 g, PRN  dextrose 50%, 25 g, PRN  glucagon (human recombinant), 1 mg, PRN  glucagon (human recombinant), 1 mg, PRN  glucose, 16 g, PRN  glucose, 24 g, PRN  insulin aspart U-100, 0-10 Units, QID (AC + HS) PRN  labetalol, 10 mg, Q15 Min PRN  levalbuterol, 0.63 mg, Q4H PRN  loperamide, 2 mg, QID PRN  OLANZapine, 5 mg, Q8H PRN  ondansetron, 4 mg, Q8H PRN  sodium chloride 0.9%, 10 mL, PRN    Calorie Containing IV Medications: no significant kcals from medications at this time    Recent Labs   Lab 06/17/25  0413 06/19/25  0359 06/19/25  0400 06/23/25  0408    150*  --  142   K 4.2 3.8  --  4.5   CALCIUM 9.1 9.2  --  8.5*   MG  --  2.70*  --  2.70*   * 110*  --  101   CO2 27 34*  --  34*   BUN 26.2* 33.7*  --  33.3*   CREATININE 1.02 0.91  --  0.97   EGFRNORACEVR >60 >60  --  >60   * 166*  --  212*   BILITOT 0.8 0.3  --   --    ALKPHOS 99 113  --   --    ALT 29 30  --   --    AST 28 27  --   --    ALBUMIN 2.3* 2.2*  --   --    WBC 14.11*  --  11.84* 15.08*   HGB 12.6*  --  12.8* 13.4*   HCT 38.9*  --  40.0* 42.3     Nutrition Orders:  Diet NPO  Tube Feedings/Formulas 75; 1,500; Diabetisource AC; NG; 45; Every 2 hours    Appetite/Oral Intake: NPO/not applicable  Factors Affecting Nutritional Intake: impaired cognitive status/motor control and NPO  Social Needs Impacting Access to Food: unable to assess at this time; will attempt on follow-up  Food/Jehovah's witness/Cultural Preferences: unable to obtain  Food Allergies: none reported  Last Bowel Movement: 06/22/25  Wound(s): no pressure injuries documented at this time     Comments    6/11/25 Patient confused with episodes of agitation, sleeping during rounds. Nurse reports plans to start nasogastric tube feeding, orders provided.    6/13/25 PO intake remains unsafe per SLP. GI consulted for PEG placement possibly on Monday. RN reports patient tolerating tube feeding at 15 mL/hr. Last BM noted on 6/8/25,  "consider bowel regimen.      6//16/25: Pt's TF being held for PEG placement today. Pt remains NPO per SLP.    6/19/25 Tube feeding progressing toward goal rate, currently at 60 ml/hr, goal 75 ml/hr.    6/23/25 Tube feeding at goal, will transition to bolus feeding.    Anthropometrics    Height: 5' 2" (157.5 cm), Height Method: Stated  Last Weight: 65 kg (143 lb 4.8 oz) (06/11/25 1348), Weight Method: Bed Scale  BMI (Calculated): 26.2  BMI Classification: overweight (BMI 25-29.9)        Ideal Body Weight (IBW), Male: 118 lb     % Ideal Body Weight, Male (lb): 114.41 %                          Usual Weight Provided By: unable to obtain usual weight    Wt Readings from Last 5 Encounters:   06/11/25 65 kg (143 lb 4.8 oz)   01/27/25 63 kg (139 lb)   12/03/24 64.7 kg (142 lb 9.6 oz)   11/05/24 62 kg (136 lb 11 oz)   10/02/24 62 kg (136 lb 9.6 oz)     Weight Change(s) Since Admission:   6/11/25 initial weight 61.2 kg stated, bed weight 65 kg taken during rounds  Wt Readings from Last 1 Encounters:   06/11/25 1348 65 kg (143 lb 4.8 oz)   06/09/25 0805 61.2 kg (135 lb)   Admit Weight: 61.2 kg (135 lb) (06/09/25 0805), Weight Method: Stated    Estimated Needs    Weight Used For Calorie Calculations: 65 kg (143 lb 4.8 oz)  Energy Calorie Requirements (kcal): 4287-7297, 1.2-1.4 stress factor  Energy Need Method: [facility]  Weight Used For Protein Calculations: 65 kg (143 lb 4.8 oz)  Protein Requirements: 78-91 g, 1.2-1.4 g/kg  Fluid Requirements (mL): 5840-6504, 1 ml/kcal  CHO Requirement: 177-207 g, 45% of kcal     Enteral Nutrition    Formula: Diabetisource AC  Rate/Volume: 75 ml/hr  Water Flushes: 45 ml every 2 hours  Additives/Modulars: none at this time  Route: gastrostomy tube  Method: continuous  Total Nutrition Provided by Tube Feeding, Additives, and Flushes:  Calories Provided  1800 kcal/d, 100% needs   Protein Provided  90 g/d, 100% needs   Fluid Provided  1680 ml/d, 100% needs   Continuous feeding calculations " based on estimated 20 hr/d run time unless otherwise stated.    Parenteral Nutrition Patient not receiving parenteral nutrition support at this time.    Evaluation of Received Nutrient Intake    Calories: meeting estimated needs  Protein: meeting estimated needs    Patient Education Not applicable.    Nutrition Diagnosis     PES: Inadequate energy intake related to inability to consume sufficient nutrients as evidenced by less than 80% needs met. (resolved)  PES: N/A             Nutrition Interventions     Interventions: modified composition of enteral nutrition and collaboration with other providers       Goal: Meet greater than 80% of nutritional needs by follow-up. (goal met)  Goal: Tolerate enteral feeding at goal rate by follow-up. (goal met)    Nutrition Goals & Monitoring     Dietitian will monitor: food and beverage intake, energy intake, enteral nutrition intake, weight, weight change, electrolyte/renal panel, beliefs/attitudes, glucose/endocrine profile, and gastrointestinal profile  Discharge planning: tube feeding (Diabetisource AC or equivalent substitute)  Nutrition Risk/Follow-Up: patient at increased nutrition risk; dietitian will follow-up twice weekly   Please consult if re-assessment needed sooner.

## 2025-06-23 NOTE — PT/OT/SLP PROGRESS
Occupational Therapy      Patient Name:  Federico Villarreal   MRN:  [id number]    Patient not seen today secondary to nursing staff in room at OT arrival giving patient a bath. Will follow-up as schedule allows.    6/23/2025

## 2025-06-23 NOTE — PROGRESS NOTES
[facility] Hospital Medicine Progress Note        Chief Complaint: Inpatient Follow-up for     HPI:     HPI reviewed    Interval Hx:     Alert, resting in the bed.  No family member at bedside.  He is doing well on room air.  Bump in white count seen today with stable hemoglobin, new onset thrombocytosis.  Sodium and chloride improving.      Objective/physical exam:  Vitals:    06/22/25 2014 06/22/25 2025 06/22/25 2338 06/23/25 0437   BP: (!) 147/82 (!) 147/82 (!) 120/51 104/60   Pulse: 86  86 88   Resp:   18 18   Temp: 98.5 °F (36.9 °C)  97.6 °F (36.4 °C) 97.8 °F (36.6 °C)   TempSrc: Axillary  Oral Oral   SpO2: (!) 94%  (!) 92% (!) 94%   Weight:       Height:         General: In no acute distress, afebrile  Respiratory: Clear to auscultation bilaterally  Cardiovascular: S1, S2, no appreciable murmur  Abdomen: Soft, nontender, BS +, peg site CDI  Extremities: Mittens in place  Neurologic: Alert alert, oriented x1      Lab Results   Component Value Date     06/23/2025    K 4.5 06/23/2025     06/23/2025    CO2 34 (H) 06/23/2025    BUN 33.3 (H) 06/23/2025    CREATININE 0.97 06/23/2025    CALCIUM 8.5 (L) 06/23/2025    EGFRNONAA >60 07/18/2022      Lab Results   Component Value Date    ALT 30 06/19/2025    AST 27 06/19/2025    ALKPHOS 113 06/19/2025    BILITOT 0.3 06/19/2025      Lab Results   Component Value Date    WBC 15.08 (H) 06/23/2025    HGB 13.4 (L) 06/23/2025    HCT 42.3 06/23/2025    MCV 97.5 (H) 06/23/2025     (H) 06/23/2025           Medications:   aluminum-magnesium hydroxide-simethicone  30 mL Per G Tube QID (AC & HS)    carvediloL  12.5 mg Per G Tube BID    insulin glargine U-100  18 Units Subcutaneous BID    levetiracetam  500 mg Per G Tube BID    LIDOcaine HCl 2%   Urethral Once    losartan  100 mg Per G Tube Daily    melatonin  6 mg Per G Tube Nightly    QUEtiapine  50 mg Oral BID    scopolamine  1 patch Transdermal Q3 Days    sodium chloride 0.9%  500  mL Intravenous Once    sucralfate  1 g Per G Tube Q6H    traZODone  100 mg Per G Tube QHS        Current Facility-Administered Medications:     acetaminophen, 650 mg, Per G Tube, Q6H PRN    bisacodyL, 10 mg, Rectal, Daily PRN    butalbital-acetaminophen-caffeine -40 mg, 1 tablet, Per G Tube, Q4H PRN    dextrose 50%, 12.5 g, Intravenous, PRN    dextrose 50%, 12.5 g, Intravenous, PRN    dextrose 50%, 25 g, Intravenous, PRN    glucagon (human recombinant), 1 mg, Intramuscular, PRN    glucagon (human recombinant), 1 mg, Intramuscular, PRN    glucose, 16 g, Oral, PRN    glucose, 24 g, Oral, PRN    insulin aspart U-100, 0-10 Units, Subcutaneous, QID (AC + HS) PRN    labetalol, 10 mg, Intravenous, Q15 Min PRN    levalbuterol, 0.63 mg, Nebulization, Q4H PRN    loperamide, 2 mg, Per G Tube, QID PRN    OLANZapine, 5 mg, Intramuscular, Q8H PRN    ondansetron, 4 mg, Intravenous, Q8H PRN    sodium chloride 0.9%, 10 mL, Intravenous, PRN     Assessment/Plan:      Hemorrhagic CVA-Left occipital lobe hematoma, adjacent subarachnoid hemorrhage  Dense left lobe consolidation-he mobilize influenza pneumonia  Dysphagia s/p peg tube  Delirium  Tobacco user  Leukocytosis and thrombocytosis-?  Reactive    HX: T1 dm, CAD s/p PCI, HTN, HLD, history of CVA    Plan:   -continue current medical, supportive management for delirium..  Continue delirium precautions.  P.r.n. meds if needed  -DC Keppra  -stable from respiratory standpoint..  Completed antibiotics.  Monitor white count and platelet count  -continue current insulin regimen  -PEG feeds as tolerated.  Ensure regular bowel movements   -PT OT.  Needs placement       SCDs      Cristian Syed MD

## 2025-06-24 LAB
POCT GLUCOSE: 151 MG/DL (ref 70–110)
POCT GLUCOSE: 160 MG/DL (ref 70–110)
POCT GLUCOSE: 162 MG/DL (ref 70–110)
POCT GLUCOSE: 173 MG/DL (ref 70–110)
POCT GLUCOSE: 59 MG/DL (ref 70–110)

## 2025-06-24 PROCEDURE — 63600175 PHARM REV CODE 636 W HCPCS: Performed by: INTERNAL MEDICINE

## 2025-06-24 PROCEDURE — 25000003 PHARM REV CODE 250

## 2025-06-24 PROCEDURE — 97530 THERAPEUTIC ACTIVITIES: CPT | Mod: CQ

## 2025-06-24 PROCEDURE — 97535 SELF CARE MNGMENT TRAINING: CPT

## 2025-06-24 PROCEDURE — 21400001 HC TELEMETRY ROOM

## 2025-06-24 PROCEDURE — 11000001 HC ACUTE MED/SURG PRIVATE ROOM

## 2025-06-24 PROCEDURE — 25000003 PHARM REV CODE 250: Performed by: INTERNAL MEDICINE

## 2025-06-24 RX ADMIN — SUCRALFATE 1 G: 1 TABLET ORAL at 12:06

## 2025-06-24 RX ADMIN — SUCRALFATE 1 G: 1 TABLET ORAL at 04:06

## 2025-06-24 RX ADMIN — QUETIAPINE FUMARATE 50 MG: 25 TABLET ORAL at 09:06

## 2025-06-24 RX ADMIN — CARVEDILOL 12.5 MG: 12.5 TABLET, FILM COATED ORAL at 08:06

## 2025-06-24 RX ADMIN — LOSARTAN POTASSIUM 100 MG: 50 TABLET, FILM COATED ORAL at 08:06

## 2025-06-24 RX ADMIN — INSULIN ASPART 2 UNITS: 100 INJECTION, SOLUTION INTRAVENOUS; SUBCUTANEOUS at 06:06

## 2025-06-24 RX ADMIN — INSULIN GLARGINE 18 UNITS: 100 INJECTION, SOLUTION SUBCUTANEOUS at 08:06

## 2025-06-24 RX ADMIN — INSULIN ASPART 2 UNITS: 100 INJECTION, SOLUTION INTRAVENOUS; SUBCUTANEOUS at 08:06

## 2025-06-24 RX ADMIN — Medication 16 G: at 10:06

## 2025-06-24 RX ADMIN — SUCRALFATE 1 G: 1 TABLET ORAL at 06:06

## 2025-06-24 RX ADMIN — INSULIN ASPART 4 UNITS: 100 INJECTION, SOLUTION INTRAVENOUS; SUBCUTANEOUS at 06:06

## 2025-06-24 RX ADMIN — QUETIAPINE FUMARATE 50 MG: 25 TABLET ORAL at 08:06

## 2025-06-24 RX ADMIN — ALUMINUM HYDROXIDE, MAGNESIUM HYDROXIDE, AND DIMETHICONE 30 ML: 200; 20; 200 SUSPENSION ORAL at 04:06

## 2025-06-24 RX ADMIN — ALUMINUM HYDROXIDE, MAGNESIUM HYDROXIDE, AND DIMETHICONE 30 ML: 200; 20; 200 SUSPENSION ORAL at 12:06

## 2025-06-24 RX ADMIN — CARVEDILOL 12.5 MG: 12.5 TABLET, FILM COATED ORAL at 09:06

## 2025-06-24 NOTE — PT/OT/SLP PROGRESS
Occupational Therapy   Treatment    Name: Federico Villarreal  MRN: [id number]    Recommendations:     Recommended therapy intensity at discharge: Moderate Intensity Therapy   Discharge Equipment Recommendations:  to be determined by next level of care  Barriers to discharge:   (ongoing medical needs)    Assessment:     Federico Villarreal is a 64 y.o. male with a medical diagnosis of L occipital hemorrhagic CVA, presented with HA, confusion, slurred speech. Hx of L eye blindness.       Performance deficits affecting function are weakness, impaired endurance, impaired self care skills, impaired functional mobility, impaired balance, visual deficits, impaired cognition, decreased coordination, decreased safety awareness.     Patient alert and able to follow most simple commands today.    Rehab Prognosis:  Good; patient would benefit from acute skilled OT services to address these deficits and reach maximum level of function.       Plan:     Patient to be seen 4 x/week to address the above listed problems via self-care/home management, therapeutic activities, therapeutic exercises, neuromuscular re-education  Plan of Care Expires: 07/11/25  Plan of Care Reviewed with: patient    Subjective     Pain/Comfort:  Pain Rating 1: 0/10    Objective:     Communicated with: nurse prior to session.  Patient found HOB elevated with peripheral IV, telemetry, pulse ox (continuous), PEG Tube, restraints, SCD, pressure relief boots upon OT entry to room.    General Precautions: Standard, fall, vision impaired (L eye blind; <140/90)    Orthopedic Precautions:N/A  Braces: N/A  Respiratory Status: Room air  Vital Signs: Blood Pressure: 119/68     Occupational Performance:     Functional Mobility/Transfers:  Bed mobility:    Supine to Sit: moderate assistance  Sit to Supine: maximal assistance  Transfers: Sit to Stand: moderate assistance and of 2 persons with hand-held assist and rolling walker    Activities of Daily Living:  Feeding:   dependence PEG in place  Grooming: maximal assistance washing face and oral care with swab while sitting EOB; patient unable to manipulate swab with L hand  Lower Body Dressing: total assistance tamanna socks  Toileting: total assistance +BM upon standing requiring total A for pericare; catheter in place    Balance:   Static Sitting Balance: No UE support: Fair: Patient able to maintain balance with handhold support; may require occasional minimal assistance.    Patient Education:  Patient provided with verbal education education regarding OT role/goals/POC, fall prevention, safety awareness, Discharge/DME recommendations, and pressure ulcer prevention.  Additional teaching is warranted.      Patient left HOB elevated with all lines intact, call button in reach, bed alarm on, restraints reapplied at end of session, and nurse notified.    GOALS:   Multidisciplinary Problems       Occupational Therapy Goals          Problem: Occupational Therapy    Goal Priority Disciplines Outcome Interventions   Occupational Therapy Goal     OT, PT/OT Progressing    Description: Goals to be met by: 7/11/25     Patient will increase functional independence with ADLs by performing:    LTG: Pt will perform basic ADLs and ADL transfers with SPV using LRAD by discharge.    STG: to be met by 7/11/25  Pt will follow commands at least 75% consistently throughout session  Pt will complete grooming seated, progressing to standing as appropriate with LRAD with min A.  Pt will complete UB dressing with min A.  Pt will complete LB dressing with min A using LRAD.  Pt will complete toileting with min A using LRAD.  Pt will complete functional mobility to/from toilet and toilet transfer with min A using LRAD.   Pt will demo visual attention to R side >80% of time with min verbal cues.  Pt will demo 4/5 strength in  BUE  for increased functional use during ADL tasks.                          Time Tracking:     OT Date of Treatment: 06/24/25  OT Start  Time: 1515  OT Stop Time: 1543  OT Total Time (min): 28 min    Billable Minutes:Self Care/Home Management 28    OT/THOMAS: OT     Number of THOMAS visits since last OT visit: 2    6/24/2025

## 2025-06-24 NOTE — PLAN OF CARE
Problem: Adult Inpatient Plan of Care  Goal: Absence of Hospital-Acquired Illness or Injury  6/24/2025 1520 by Scot Snyder RN  Outcome: Progressing  6/24/2025 1518 by Scot Snyder RN  Outcome: Progressing  6/24/2025 1517 by Scot Snyder RN  Outcome: Progressing  Goal: Optimal Comfort and Wellbeing  6/24/2025 1520 by Scot Snyder RN  Outcome: Progressing  6/24/2025 1518 by cSot Snyder RN  Outcome: Progressing  6/24/2025 1517 by Scot Snyder RN  Outcome: Progressing  Goal: Readiness for Transition of Care  6/24/2025 1520 by Scot Snyder RN  Outcome: Progressing  6/24/2025 1518 by Scot Snyder RN  Outcome: Progressing  6/24/2025 1517 by Scot Snyder RN  Outcome: Not Progressing     Problem: Diabetes Comorbidity  Goal: Blood Glucose Level Within Targeted Range  6/24/2025 1520 by Scot Snyder RN  Outcome: Progressing  6/24/2025 1518 by Scot Snyder RN  Outcome: Progressing  6/24/2025 1517 by Scot Snyder RN  Outcome: Progressing     Problem: Stroke, Intracerebral Hemorrhage  Goal: Optimal Coping  6/24/2025 1520 by Scot Snyder RN  Outcome: Progressing  6/24/2025 1518 by Scot Snyder, RN  Outcome: Progressing  6/24/2025 1517 by Scot Snyder, RN  Outcome: Progressing  Goal: Effective Bowel Elimination  6/24/2025 1520 by Scot Snyder, RN  Outcome: Progressing  6/24/2025 1518 by Scot Snyder RN  Outcome: Progressing  6/24/2025 1517 by Scot Snyder RN  Outcome: Progressing     Problem: Skin Injury Risk Increased  Goal: Skin Health and Integrity  6/24/2025 1520 by Scot Snyder RN  Outcome: Progressing  6/24/2025 1518 by Scot Snyder, RN  Outcome: Progressing  6/24/2025 1517 by Scot Snyder RN  Outcome: Progressing     Problem: Fall Injury Risk  Goal: Absence of Fall and Fall-Related Injury  6/24/2025 1520 by Scot Snyder, RN  Outcome: Progressing  6/24/2025 1518 by Scot Snyder, RN  Outcome: Progressing  6/24/2025 1517 by Scto Snyder,  RN  Outcome: Progressing     Problem: Infection  Goal: Absence of Infection Signs and Symptoms  6/24/2025 1520 by Scot Snyder RN  Outcome: Progressing  6/24/2025 1518 by Scot Snyder RN  Outcome: Progressing  6/24/2025 1517 by Scot Snyder RN  Outcome: Progressing     Problem: Coping Ineffective  Goal: Effective Coping  6/24/2025 1520 by Scot Snyder RN  Outcome: Progressing  6/24/2025 1518 by Scot Snyder RN  Outcome: Progressing

## 2025-06-24 NOTE — PT/OT/SLP PROGRESS
Physical Therapy Treatment    Patient Name:  Federico Villarreal   MRN:  [id number]    Recommendations:     Discharge therapy intensity: Moderate Intensity Therapy   Discharge Equipment Recommendations: to be determined by next level of care  Barriers to discharge: Impaired mobility, Ongoing medical needs, and placement    Assessment:     Federico Villarreal is a 64 y.o. male admitted with a medical diagnosis of L occipital hemorrhagic CVA, presented with HA, confusion, slurred speech. Hx of L eye blindness.  He presents with the following impairments/functional limitations: weakness, gait instability, impaired endurance, impaired balance, impaired self care skills, impaired functional mobility, decreased safety awareness, visual deficits, decreased lower extremity function, decreased upper extremity function, impaired cognition.    Patient alert and able to follow most simple commands today.     Rehab Prognosis: Good; patient would benefit from acute skilled PT services to address these deficits and reach maximum level of function.    Recent Surgery: Procedure(s) (LRB):  PEG (N/A) 8 Days Post-Op    Plan:     During this hospitalization, patient would benefit from acute PT services 5 x/week to address the identified rehab impairments via gait training, therapeutic activities, therapeutic exercises and progress toward the following goals:    Plan of Care Expires:  07/13/25    Subjective     Chief Complaint: n/a  Patient/Family Comments/goals:   Pain/Comfort:         Objective:     Communicated with RN prior to session.  Patient found HOB elevated with PEG Tube, peripheral IV, telemetry, pulse ox (continuous), SCD, pressure relief boots (roll belt, brenda mittens) upon PT entry to room.     General Precautions: Standard, fall, vision impaired  Orthopedic Precautions: N/A  Braces: N/A  Respiratory Status: Room air  Blood Pressure: 119/68  Skin Integrity: Visible skin intact    Functional Mobility:  Bed Mobility:     Supine to  Sit: moderate assistance  Sit to Supine: maximal assistance  Transfers:     Sit to Stand:  moderate assistance and of 2 persons with hand-held assist  X 2 trials EOB  Attempted lateral steps, but pt unable to sequence steps and returned self B2B  Balance: min-modA 2/2 multi-directional LOB and impulsive behavior at time    Co-Treatment: Yes, due to Limited activity tolerance    Education:  Patient provided with verbal education education regarding PT role/goals/POC, fall prevention, safety awareness, and pressure ulcer prevention.  Additional teaching is warranted.     Patient left HOB elevated with all lines intact, call button in reach, pressure relief boots, bed alarm on, restraints reapplied at end of session, RN notified, and CNA present    GOALS:   Multidisciplinary Problems       Physical Therapy Goals          Problem: Physical Therapy    Goal Priority Disciplines Outcome Interventions   Physical Therapy Goal     PT, PT/OT Progressing    Description: Goals to be met by: 25     Patient will increase functional independence with mobility by performin. Supine to sit with MInimal Assistance  2. Sit to stand transfer with Minimal Assistance  3. Bed to chair transfer with Minimal Assistance using Rolling Walker  4. Pt will follow 5/5 motor commands for BLE.  5. Gait TBD as pt progresses                         Time Tracking:     PT Received On: 25  PT Start Time: 1520     PT Stop Time: 1543  PT Total Time (min): 23 min     Billable Minutes: Therapeutic Activity 2    Treatment Type: Treatment  PT/PTA: PTA     Number of PTA visits since last PT visit: 2025

## 2025-06-24 NOTE — PROGRESS NOTES
[facility] Hospital Medicine Progress Note        Chief Complaint: Inpatient Follow-up for     HPI:     HPI reviewed    Interval Hx:     Alert, resting in the bed.  Doing well on room air.  No family members at bedside.  Delirium precautions continued.  Mittens in place.  Tube feeds switch to bolus feeds.      Objective/physical exam:  Vitals:    06/23/25 2018 06/23/25 2037 06/24/25 0021 06/24/25 0431   BP: 99/62 99/62 104/63 (!) 154/74   BP Location:   Right arm Right arm   Patient Position:   Lying Lying   Pulse: 80 80 74 87   Resp: 18  18 18   Temp: 98 °F (36.7 °C)  97.4 °F (36.3 °C) 97.5 °F (36.4 °C)   TempSrc: Oral  Oral Oral   SpO2: 95%  (!) 91% (!) 93%   Weight:       Height:         General: In no acute distress, afebrile  Respiratory: Clear to auscultation bilaterally  Cardiovascular: S1, S2, no appreciable murmur  Abdomen: Soft, nontender, BS +, peg site CDI  Extremities: Mittens in place  Neurologic: Alert alert, oriented x1      Lab Results   Component Value Date     06/23/2025    K 4.5 06/23/2025     06/23/2025    CO2 34 (H) 06/23/2025    BUN 33.3 (H) 06/23/2025    CREATININE 0.97 06/23/2025    CALCIUM 8.5 (L) 06/23/2025    EGFRNONAA >60 07/18/2022      Lab Results   Component Value Date    ALT 30 06/19/2025    AST 27 06/19/2025    ALKPHOS 113 06/19/2025    BILITOT 0.3 06/19/2025      Lab Results   Component Value Date    WBC 15.08 (H) 06/23/2025    HGB 13.4 (L) 06/23/2025    HCT 42.3 06/23/2025    MCV 97.5 (H) 06/23/2025     (H) 06/23/2025           Medications:   aluminum-magnesium hydroxide-simethicone  30 mL Per G Tube QID (AC & HS)    carvediloL  12.5 mg Per G Tube BID    insulin glargine U-100  18 Units Subcutaneous BID    LIDOcaine HCl 2%   Urethral Once    losartan  100 mg Per G Tube Daily    melatonin  6 mg Per G Tube Nightly    QUEtiapine  50 mg Oral BID    scopolamine  1 patch Transdermal Q3 Days    sodium chloride 0.9%  500 mL Intravenous  Once    sucralfate  1 g Per G Tube Q6H    traZODone  100 mg Per G Tube QHS        Current Facility-Administered Medications:     acetaminophen, 650 mg, Per G Tube, Q6H PRN    bisacodyL, 10 mg, Rectal, Daily PRN    butalbital-acetaminophen-caffeine -40 mg, 1 tablet, Per G Tube, Q4H PRN    dextrose 50%, 12.5 g, Intravenous, PRN    dextrose 50%, 12.5 g, Intravenous, PRN    dextrose 50%, 25 g, Intravenous, PRN    glucagon (human recombinant), 1 mg, Intramuscular, PRN    glucagon (human recombinant), 1 mg, Intramuscular, PRN    glucose, 16 g, Oral, PRN    glucose, 24 g, Oral, PRN    insulin aspart U-100, 0-10 Units, Subcutaneous, QID (AC + HS) PRN    labetalol, 10 mg, Intravenous, Q15 Min PRN    levalbuterol, 0.63 mg, Nebulization, Q4H PRN    loperamide, 2 mg, Per G Tube, QID PRN    OLANZapine, 5 mg, Intramuscular, Q8H PRN    ondansetron, 4 mg, Intravenous, Q8H PRN    sodium chloride 0.9%, 10 mL, Intravenous, PRN     Assessment/Plan:      Hemorrhagic CVA-Left occipital lobe hematoma, adjacent subarachnoid hemorrhage  Dense left lobe consolidation-he mobilize influenza pneumonia  Dysphagia s/p peg tube  Delirium  Tobacco user  Leukocytosis and thrombocytosis-?  Reactive    HX: T1 dm, CAD s/p PCI, HTN, HLD, history of CVA    Plan:   -continue current medical, supportive management for delirium..  Continue delirium precautions.  P.r.n. meds if needed  -discontinued Keppra on 06/23/2025  -stable from respiratory standpoint..  Completed antibiotics.  Monitor white count and platelet count  -continue current insulin regimen  -PEG feeds as tolerated.  Ensure regular bowel movements   -PT OT.  Needs placement       SCDs      Cristian Syed MD

## 2025-06-25 LAB
ANION GAP SERPL CALC-SCNC: 9 MEQ/L
BUN SERPL-MCNC: 28.6 MG/DL (ref 8.4–25.7)
CALCIUM SERPL-MCNC: 9.1 MG/DL (ref 8.8–10)
CHLORIDE SERPL-SCNC: 103 MMOL/L (ref 98–107)
CO2 SERPL-SCNC: 28 MMOL/L (ref 23–31)
CREAT SERPL-MCNC: 0.83 MG/DL (ref 0.72–1.25)
CREAT/UREA NIT SERPL: 34
GFR SERPLBLD CREATININE-BSD FMLA CKD-EPI: >60 ML/MIN/1.73/M2
GLUCOSE SERPL-MCNC: 174 MG/DL (ref 82–115)
MAGNESIUM SERPL-MCNC: 2.5 MG/DL (ref 1.6–2.6)
POCT GLUCOSE: 136 MG/DL (ref 70–110)
POCT GLUCOSE: 185 MG/DL (ref 70–110)
POCT GLUCOSE: 196 MG/DL (ref 70–110)
POCT GLUCOSE: 212 MG/DL (ref 70–110)
POCT GLUCOSE: 241 MG/DL (ref 70–110)
POCT GLUCOSE: 244 MG/DL (ref 70–110)
POCT GLUCOSE: 351 MG/DL (ref 70–110)
POCT GLUCOSE: 363 MG/DL (ref 70–110)
POTASSIUM SERPL-SCNC: 4.3 MMOL/L (ref 3.5–5.1)
SODIUM SERPL-SCNC: 140 MMOL/L (ref 136–145)

## 2025-06-25 PROCEDURE — 25000003 PHARM REV CODE 250: Performed by: NURSE PRACTITIONER

## 2025-06-25 PROCEDURE — 63600175 PHARM REV CODE 636 W HCPCS

## 2025-06-25 PROCEDURE — 63600175 PHARM REV CODE 636 W HCPCS: Performed by: INTERNAL MEDICINE

## 2025-06-25 PROCEDURE — 80048 BASIC METABOLIC PNL TOTAL CA: CPT | Performed by: INTERNAL MEDICINE

## 2025-06-25 PROCEDURE — 25000003 PHARM REV CODE 250: Performed by: INTERNAL MEDICINE

## 2025-06-25 PROCEDURE — 11000001 HC ACUTE MED/SURG PRIVATE ROOM

## 2025-06-25 PROCEDURE — 83735 ASSAY OF MAGNESIUM: CPT | Performed by: INTERNAL MEDICINE

## 2025-06-25 PROCEDURE — 97164 PT RE-EVAL EST PLAN CARE: CPT

## 2025-06-25 PROCEDURE — 36415 COLL VENOUS BLD VENIPUNCTURE: CPT | Performed by: INTERNAL MEDICINE

## 2025-06-25 PROCEDURE — 21400001 HC TELEMETRY ROOM

## 2025-06-25 PROCEDURE — 25000003 PHARM REV CODE 250

## 2025-06-25 PROCEDURE — 97530 THERAPEUTIC ACTIVITIES: CPT

## 2025-06-25 RX ADMIN — SUCRALFATE 1 G: 1 TABLET ORAL at 01:06

## 2025-06-25 RX ADMIN — QUETIAPINE FUMARATE 50 MG: 25 TABLET ORAL at 08:06

## 2025-06-25 RX ADMIN — LOPERAMIDE HYDROCHLORIDE 2 MG: 2 CAPSULE ORAL at 08:06

## 2025-06-25 RX ADMIN — SUCRALFATE 1 G: 1 TABLET ORAL at 05:06

## 2025-06-25 RX ADMIN — Medication 6 MG: at 08:06

## 2025-06-25 RX ADMIN — LOSARTAN POTASSIUM 100 MG: 50 TABLET, FILM COATED ORAL at 09:06

## 2025-06-25 RX ADMIN — TRAZODONE HYDROCHLORIDE 100 MG: 100 TABLET ORAL at 08:06

## 2025-06-25 RX ADMIN — QUETIAPINE FUMARATE 50 MG: 25 TABLET ORAL at 09:06

## 2025-06-25 RX ADMIN — SUCRALFATE 1 G: 1 TABLET ORAL at 06:06

## 2025-06-25 RX ADMIN — OLANZAPINE 5 MG: 10 INJECTION, POWDER, FOR SOLUTION INTRAMUSCULAR at 01:06

## 2025-06-25 RX ADMIN — CARVEDILOL 12.5 MG: 12.5 TABLET, FILM COATED ORAL at 09:06

## 2025-06-25 RX ADMIN — INSULIN ASPART 10 UNITS: 100 INJECTION, SOLUTION INTRAVENOUS; SUBCUTANEOUS at 02:06

## 2025-06-25 RX ADMIN — OLANZAPINE 5 MG: 10 INJECTION, POWDER, FOR SOLUTION INTRAMUSCULAR at 04:06

## 2025-06-25 RX ADMIN — INSULIN ASPART 3 UNITS: 100 INJECTION, SOLUTION INTRAVENOUS; SUBCUTANEOUS at 08:06

## 2025-06-25 RX ADMIN — SUCRALFATE 1 G: 1 TABLET ORAL at 12:06

## 2025-06-25 RX ADMIN — SCOPOLAMINE 1 PATCH: 1 PATCH TRANSDERMAL at 01:06

## 2025-06-25 RX ADMIN — CARVEDILOL 12.5 MG: 12.5 TABLET, FILM COATED ORAL at 08:06

## 2025-06-25 RX ADMIN — INSULIN GLARGINE 18 UNITS: 100 INJECTION, SOLUTION SUBCUTANEOUS at 09:06

## 2025-06-25 RX ADMIN — INSULIN GLARGINE 18 UNITS: 100 INJECTION, SOLUTION SUBCUTANEOUS at 08:06

## 2025-06-25 RX ADMIN — SUCRALFATE 1 G: 1 TABLET ORAL at 11:06

## 2025-06-25 NOTE — NURSING
Pt combative 1/3 of the night. Given zyprexa x1, effective.   BS @ 2100: 59, OJ + 4 glucose tabs in peg,   CBG @ 2242 - 185.,   CBG @ 2a - 351, 10 units Asparte provide.   CBG @4a - 241,   CBG @ 6a  244

## 2025-06-25 NOTE — PROGRESS NOTES
[facility] Hospital Medicine Progress Note        Chief Complaint: Inpatient Follow-up for     HPI:     HPI reviewed    Psych meds were continued for delirium. Continuous PEG Tube feeds were converted to Bolus feeds.     Interval Hx:     More alert, cooperative today.  Wife at bedside.  He is doing well on room air.  Tolerating bolus feeds.  Moving bowels.  Delirium precautions continued.      Objective/physical exam:  Vitals:    06/24/25 2126 06/24/25 2331 06/25/25 0432 06/25/25 0713   BP: 127/69 134/71 126/63 (!) 144/73   BP Location:  Right arm Right arm    Patient Position:  Lying Lying    Pulse: 108 94 (!) 112 108   Resp:  18 18    Temp:  97.8 °F (36.6 °C) 98.7 °F (37.1 °C) 96.8 °F (36 °C)   TempSrc:  Oral Oral Axillary   SpO2:  95%  (!) 91%   Weight:       Height:         General: In no acute distress, afebrile, mittens in place  Respiratory: Clear to auscultation bilaterally  Cardiovascular: S1, S2, no appreciable murmur  Abdomen: Soft, nontender, BS +, peg site CDI  Extremities: Mittens in place  Neurologic: Alert alert, oriented x1      Lab Results   Component Value Date     06/25/2025    K 4.3 06/25/2025     06/25/2025    CO2 28 06/25/2025    BUN 28.6 (H) 06/25/2025    CREATININE 0.83 06/25/2025    CALCIUM 9.1 06/25/2025    EGFRNONAA >60 07/18/2022      Lab Results   Component Value Date    ALT 30 06/19/2025    AST 27 06/19/2025    ALKPHOS 113 06/19/2025    BILITOT 0.3 06/19/2025      Lab Results   Component Value Date    WBC 15.08 (H) 06/23/2025    HGB 13.4 (L) 06/23/2025    HCT 42.3 06/23/2025    MCV 97.5 (H) 06/23/2025     (H) 06/23/2025           Medications:   aluminum-magnesium hydroxide-simethicone  30 mL Per G Tube QID (AC & HS)    carvediloL  12.5 mg Per G Tube BID    insulin glargine U-100  18 Units Subcutaneous BID    losartan  100 mg Per G Tube Daily    melatonin  6 mg Per G Tube Nightly    QUEtiapine  50 mg Oral BID    scopolamine  1 patch  Transdermal Q3 Days    sucralfate  1 g Per G Tube Q6H    traZODone  100 mg Per G Tube QHS        Current Facility-Administered Medications:     acetaminophen, 650 mg, Per G Tube, Q6H PRN    bisacodyL, 10 mg, Rectal, Daily PRN    butalbital-acetaminophen-caffeine -40 mg, 1 tablet, Per G Tube, Q4H PRN    dextrose 50%, 12.5 g, Intravenous, PRN    dextrose 50%, 12.5 g, Intravenous, PRN    dextrose 50%, 25 g, Intravenous, PRN    glucagon (human recombinant), 1 mg, Intramuscular, PRN    glucagon (human recombinant), 1 mg, Intramuscular, PRN    glucose, 16 g, Oral, PRN    glucose, 24 g, Oral, PRN    insulin aspart U-100, 0-10 Units, Subcutaneous, QID (AC + HS) PRN    labetalol, 10 mg, Intravenous, Q15 Min PRN    levalbuterol, 0.63 mg, Nebulization, Q4H PRN    loperamide, 2 mg, Per G Tube, QID PRN    OLANZapine, 5 mg, Intramuscular, Q8H PRN    ondansetron, 4 mg, Intravenous, Q8H PRN    sodium chloride 0.9%, 10 mL, Intravenous, PRN     Assessment/Plan:      Hemorrhagic CVA-Left occipital lobe hematoma, adjacent subarachnoid hemorrhage  Dense left lobe consolidation-Haemophilus influenza pneumonia  Dysphagia s/p peg tube  Delirium-improving  Tobacco user  Leukocytosis and thrombocytosis-?  Reactive    HX: T1 dm, CAD s/p PCI, HTN, HLD, history of CVA    Plan:   -continue current medical, supportive management for delirium..  Continue delirium precautions.  P.r.n. meds if needed  -discontinued Keppra on 06/23/2025  -stable from respiratory standpoint..  Completed antibiotics.  Monitor white count and platelet count  -continue current insulin regimen  -PEG feeds as tolerated.  Ensure regular bowel movements   -PT OT.  Needs placement when he is off soft restraints    SCDs      Cristian Syed MD

## 2025-06-25 NOTE — PT/OT/SLP RE-EVAL
Physical Therapy Re-Evaluation    Patient Name:  Federico Villarreal   MRN:  [id number]    Recommendations:     Discharge therapy intensity: Moderate Intensity Therapy   Discharge Equipment Recommendations: to be determined by next level of care   Barriers to discharge: Impaired mobility    Assessment:      Federico Villarreal is a 64 y.o. male admitted with a medical diagnosis of  L occipital hemorrhagic CVA, presented with HA, confusion, slurred speech.  .  He presents with the following impairments/functional limitations: weakness, gait instability, impaired cognition, decreased safety awareness, impaired endurance, impaired balance, impaired functional mobility, impaired self care skills the patient seemed more confused and agitated  so session was limited    Rehab Prognosis: Good; patient would benefit from acute skilled PT services to address these deficits and reach maximum level of function.    Recent Surgery: Procedure(s) (LRB):  PEG (N/A) 9 Days Post-Op    Plan:     During this hospitalization, patient would benefit from acute PT services 5 x/week to address the identified rehab impairments via gait training, therapeutic activities, therapeutic exercises and progress toward the following goals:    Plan of Care Expires:  07/13/25    PT/PTA conference to discuss PT POC and patient's progression towards goals held with Dank Jacobs PTA.     Subjective     Chief Complaint:   Patient/Family Comments/goals:   Pain/Comfort:       Patients cultural, spiritual, Mosque conflicts given the current situation: no    Objective:     Communicated with nurse prior to session.  Patient found supine with bed alarm, restraints, pressure relief boots, SCD, nascimento catheter, telemetry  upon PT entry to room.    General Precautions: Standard, fall  Orthopedic Precautions:N/A   Braces: N/A  Respiratory Status: Room air  Blood Pressure:       Exams:    Skin integrity:       Functional Mobility:  Bed Mobility:     Scooting: maximal  assistance  Supine to Sit: maximal assistance  Sit to Supine: maximal assistance  Transfers:     Sit to Stand:  maxa with no AD      AM-PAC 6 CLICK MOBILITY  Total Score:      Co-Treatment: No    Treatment & Education:      Patient provided with verbal education education regarding PT role/goals/POC, fall prevention, and safety awareness.  Additional teaching is warranted.     Patient left supine with all lines intact, call button in reach, bed alarm on, and restraints reapplied at end of session.    DME Justification:  No DME recommended requiring DME justifications    GOALS:   Multidisciplinary Problems       Physical Therapy Goals          Problem: Physical Therapy    Goal Priority Disciplines Outcome Interventions   Physical Therapy Goal     PT, PT/OT Progressing    Description: Goals to be met by: 25     Patient will increase functional independence with mobility by performin. Supine to sit with MInimal Assistance  2. Sit to stand transfer with Minimal Assistance  3. Bed to chair transfer with Minimal Assistance using Rolling Walker  4. Pt will follow 5/5 motor commands for BLE.  5. Gait TBD as pt progresses                         History:     Past Medical History:   Diagnosis Date    Acne     Cataract     Coronary artery disease     Diabetes mellitus     Hearing loss     Hypertension     Seasonal allergies     Stroke        Past Surgical History:   Procedure Laterality Date    ADENOIDECTOMY      ADENOIDECTOMY  1972    As a child    CORONARY STENT PLACEMENT  2012    ESOPHAGOGASTRODUODENOSCOPY W/ PEG N/A 2025    Procedure: PEG;  Surgeon: Mike Navarro MD;  Location: [facility] ENDOSCOPY;  Service: Gastroenterology;  Laterality: N/A;    EYE SURGERY      Multiple    INNER EAR SURGERY      REPAIR OF RETINAL DETACHMENT WITH VITRECTOMY      TONSILLECTOMY      VITRECTOMY         Time Tracking:     PT Received On: 25  PT Start Time: 1400     PT Stop Time: 1418  PT Total Time (min): 18  min     Billable Minutes: reeval 18min      06/25/2025

## 2025-06-25 NOTE — PLAN OF CARE
Problem: Adult Inpatient Plan of Care  Goal: Absence of Hospital-Acquired Illness or Injury  Outcome: Progressing  Goal: Optimal Comfort and Wellbeing  Outcome: Progressing     Problem: Diabetes Comorbidity  Goal: Blood Glucose Level Within Targeted Range  Outcome: Progressing     Problem: Skin Injury Risk Increased  Goal: Skin Health and Integrity  Outcome: Progressing     Problem: Fall Injury Risk  Goal: Absence of Fall and Fall-Related Injury  Outcome: Progressing     Problem: Coping Ineffective  Goal: Effective Coping  Outcome: Progressing

## 2025-06-26 LAB
GLUCOSE SERPL-MCNC: 140 MG/DL (ref 70–110)
POCT GLUCOSE: 105 MG/DL (ref 70–110)
POCT GLUCOSE: 260 MG/DL (ref 70–110)
POCT GLUCOSE: 278 MG/DL (ref 70–110)
POCT GLUCOSE: 279 MG/DL (ref 70–110)
POCT GLUCOSE: 301 MG/DL (ref 70–110)

## 2025-06-26 PROCEDURE — 63600175 PHARM REV CODE 636 W HCPCS: Performed by: NURSE PRACTITIONER

## 2025-06-26 PROCEDURE — 27000221 HC OXYGEN, UP TO 24 HOURS

## 2025-06-26 PROCEDURE — 97535 SELF CARE MNGMENT TRAINING: CPT

## 2025-06-26 PROCEDURE — 97530 THERAPEUTIC ACTIVITIES: CPT

## 2025-06-26 PROCEDURE — 25000003 PHARM REV CODE 250: Performed by: INTERNAL MEDICINE

## 2025-06-26 PROCEDURE — 94668 MNPJ CHEST WALL SBSQ: CPT

## 2025-06-26 PROCEDURE — 63600175 PHARM REV CODE 636 W HCPCS: Performed by: INTERNAL MEDICINE

## 2025-06-26 PROCEDURE — 63600175 PHARM REV CODE 636 W HCPCS

## 2025-06-26 PROCEDURE — 25000003 PHARM REV CODE 250

## 2025-06-26 PROCEDURE — 21400001 HC TELEMETRY ROOM

## 2025-06-26 PROCEDURE — 11000001 HC ACUTE MED/SURG PRIVATE ROOM

## 2025-06-26 RX ORDER — HALOPERIDOL LACTATE 5 MG/ML
2 INJECTION, SOLUTION INTRAMUSCULAR ONCE
Status: COMPLETED | OUTPATIENT
Start: 2025-06-26 | End: 2025-06-26

## 2025-06-26 RX ORDER — DIPHENHYDRAMINE HYDROCHLORIDE 50 MG/ML
25 INJECTION, SOLUTION INTRAMUSCULAR; INTRAVENOUS ONCE
Status: COMPLETED | OUTPATIENT
Start: 2025-06-26 | End: 2025-06-26

## 2025-06-26 RX ADMIN — TRAZODONE HYDROCHLORIDE 100 MG: 100 TABLET ORAL at 08:06

## 2025-06-26 RX ADMIN — LOSARTAN POTASSIUM 100 MG: 50 TABLET, FILM COATED ORAL at 09:06

## 2025-06-26 RX ADMIN — SUCRALFATE 1 G: 1 TABLET ORAL at 11:06

## 2025-06-26 RX ADMIN — INSULIN ASPART 3 UNITS: 100 INJECTION, SOLUTION INTRAVENOUS; SUBCUTANEOUS at 06:06

## 2025-06-26 RX ADMIN — QUETIAPINE FUMARATE 50 MG: 25 TABLET ORAL at 09:06

## 2025-06-26 RX ADMIN — DIPHENHYDRAMINE HYDROCHLORIDE 25 MG: 50 INJECTION INTRAMUSCULAR; INTRAVENOUS at 08:06

## 2025-06-26 RX ADMIN — QUETIAPINE FUMARATE 50 MG: 25 TABLET ORAL at 08:06

## 2025-06-26 RX ADMIN — SUCRALFATE 1 G: 1 TABLET ORAL at 12:06

## 2025-06-26 RX ADMIN — CARVEDILOL 12.5 MG: 12.5 TABLET, FILM COATED ORAL at 09:06

## 2025-06-26 RX ADMIN — INSULIN GLARGINE 18 UNITS: 100 INJECTION, SOLUTION SUBCUTANEOUS at 09:06

## 2025-06-26 RX ADMIN — BUTALBITAL, ACETAMINOPHEN, AND CAFFEINE 1 TABLET: 325; 50; 40 TABLET ORAL at 01:06

## 2025-06-26 RX ADMIN — SUCRALFATE 1 G: 1 TABLET ORAL at 05:06

## 2025-06-26 RX ADMIN — INSULIN GLARGINE 18 UNITS: 100 INJECTION, SOLUTION SUBCUTANEOUS at 08:06

## 2025-06-26 RX ADMIN — HALOPERIDOL LACTATE 2 MG: 5 INJECTION, SOLUTION INTRAMUSCULAR at 08:06

## 2025-06-26 RX ADMIN — Medication 6 MG: at 08:06

## 2025-06-26 RX ADMIN — INSULIN ASPART 8 UNITS: 100 INJECTION, SOLUTION INTRAVENOUS; SUBCUTANEOUS at 03:06

## 2025-06-26 RX ADMIN — OLANZAPINE 5 MG: 10 INJECTION, POWDER, FOR SOLUTION INTRAMUSCULAR at 04:06

## 2025-06-26 NOTE — PROGRESS NOTES
[facility] Hospital Medicine Progress Note        Chief Complaint: Inpatient Follow-up     HPI: 64-year-old male with type 1 diabetes mellitus, CAD status post stenting, hypertension, hyperlipidemia, prior CVA  presented on 06/09/2025 for evaluation of sudden onset headache, confusion, slurred speech.      Workup revealed:  CT head -left occipital lobe parenchymal hemorrhage with trace adjacent subarachnoid hemorrhage.   CTA head/neck was also completed in ED, which revealed no large vessel occlusion, flow-limiting stenosis, aneurysm or evidence of a vascular malformation   MRI brain report:  1. Left occipital lobe hematoma with adjacent subarachnoid hemorrhage.  2. Innumerable chronic microhemorrhages with a subcortical location, may indicate underlying cerebral amyloid angiopathy.  3. Mild chronic microvascular ischemic changes     Neurology team evaluated the patient, initiated on stroke protocol, admitted to ICU services.  Neurosurgery team evaluated the patient, MRI likely suggestive of amyloid angiopathy, repeat CT head 6/10/25 unchanged, suggested holding antiplatelets for at least 2 weeks.  Patient is started on Keppra.  Neurosurgical team signed off.    Patient required Chao placement for retention, seen by Urology    Patient cleared to downgrade to floors 06/12/2025 PM by ICU team.    Palliative care team consulted while patient in ICU, code status DNR at the time of downgrade.      Pt had fever ,tmax 102.8, tachycardic,wbc count increased 17K, decided to obtain pan scan, CT head stable, but notes to have dense consolidation left >right LL, constipation  Respiratory cultures were obtained grew many strep group B Haemophilus influenzae     Patient remains unsafe for p.o. intake with speech evaluations, informed decision with the patient's spouse was made, GI team consulted for PEG placement.    Psych was consulted as well to help with his delirium.      Interval Hx:      Patient was seen and examined, sleeping, case discussed with the staff, appears to be at his baseline, chart was reviewed.    Objective/physical exam:  General: In no acute distress, afebrile  Chest: Clear to auscultation bilaterally  Heart:  +S1, S2, no appreciable murmur  Abdomen: Soft, nontender, BS +, PEG tube  MSK: Warm, no lower extremity edema, no clubbing or cyanosis  Neurologic: Alert and oriented x1    VITAL SIGNS: 24 HRS MIN & MAX LAST   Temp  Min: 96.9 °F (36.1 °C)  Max: 97.9 °F (36.6 °C) 97.5 °F (36.4 °C)   BP  Min: 98/56  Max: 155/86 (!) 155/86   Pulse  Min: 79  Max: 112  89   Resp  Min: 17  Max: 18 17   SpO2  Min: 90 %  Max: 100 % 95 %     I have reviewed the following labs:  Recent Labs   Lab 06/23/25  0408   WBC 15.08*   RBC 4.34*   HGB 13.4*   HCT 42.3   MCV 97.5*   MCH 30.9   MCHC 31.7*   RDW 11.9   *   MPV 9.7     Recent Labs   Lab 06/23/25  0408 06/25/25  0437    140   K 4.5 4.3    103   CO2 34* 28   BUN 33.3* 28.6*   CREATININE 0.97 0.83   * 174*   CALCIUM 8.5* 9.1   MG 2.70* 2.50     Microbiology Results (last 7 days)       ** No results found for the last 168 hours. **             See below for Radiology    Assessment/Plan:  Hemorrhagic CVA-Left occipital lobe hematoma, adjacent subarachnoid hemorrhage  Urinary retention  Dense left lobe consolidation-Haemophilus influenza pneumonia  Delirium-improving  Dysphagia s/p peg tube  Tobacco user  Leukocytosis and thrombocytosis-?  Reactive     HX: T1 dm, CAD s/p PCI, HTN, HLD, history of CVA    Plan   Seen by Neurology, Neurosurgery.  Discontinued Keppra on 06/23/2025   Stable from respiratory standpoint.Completed antibiotics.  Monitor white count and platelet count.  Obtain blood cultures if spiking fevers .  Seen by psychiatry  Continue tube feedings, monitor electrolytes and replete appropriately, ensure regular bowel movements.  Continue current insulin regimen, monitor blood sugars  Monitor blood pressure and  adjust the regimen as needed.  Keep SBP less than 140/90.  Continue supportive care.  PT OT.  Needs placement when he is off soft restraints    VTE prophylaxis: scds      Anticipated discharge and Disposition:  To be decided        All diagnosis and differential diagnosis have been reviewed; assessment and plan has been documented; I have personally reviewed the labs and test results that are presently available; I have reviewed the patients medication list; I have reviewed the consulting providers response and recommendations. I have reviewed or attempted to review medical records based upon their availability    All of the patient's questions have been  addressed and answered. Patient's is agreeable to the above stated plan. I will continue to monitor closely and make adjustments to medical management as needed.    Portions of this note dictated using EMR integrated voice recognition software, and may be subject to voice recognition errors not corrected at proofreading. Please contact writer for clarification if needed.   _____________________________________________________________________    Malnutrition Status:  Nutrition consulted. Most recent weight and BMI monitored-     Measurements:  Wt Readings from Last 1 Encounters:   06/11/25 65 kg (143 lb 4.8 oz)   Body mass index is 26.21 kg/m².    Patient has been screened and assessed by RD.    Malnutrition Type:  Context:    Level: other (see comments) (Does not meet criteria)    Malnutrition Characteristic Summary:  Weight Loss (Malnutrition): other (see comments) (Unable to assess)  Energy Intake (Malnutrition): other (see comments) (Unable to assess)  Muscle Mass (Malnutrition): mild depletion    Interventions/Recommendations (treatment strategy):        Scheduled Med:   aluminum-magnesium hydroxide-simethicone  30 mL Per G Tube QID (AC & HS)    carvediloL  12.5 mg Per G Tube BID    insulin glargine U-100  18 Units Subcutaneous BID    losartan  100 mg Per G  Tube Daily    melatonin  6 mg Per G Tube Nightly    QUEtiapine  50 mg Oral BID    scopolamine  1 patch Transdermal Q3 Days    sucralfate  1 g Per G Tube Q6H    traZODone  100 mg Per G Tube QHS      Continuous Infusions:     PRN Meds:    Current Facility-Administered Medications:     acetaminophen, 650 mg, Per G Tube, Q6H PRN    bisacodyL, 10 mg, Rectal, Daily PRN    butalbital-acetaminophen-caffeine -40 mg, 1 tablet, Per G Tube, Q4H PRN    dextrose 50%, 12.5 g, Intravenous, PRN    dextrose 50%, 12.5 g, Intravenous, PRN    dextrose 50%, 25 g, Intravenous, PRN    glucagon (human recombinant), 1 mg, Intramuscular, PRN    glucagon (human recombinant), 1 mg, Intramuscular, PRN    glucose, 16 g, Oral, PRN    glucose, 24 g, Oral, PRN    insulin aspart U-100, 0-10 Units, Subcutaneous, QID (AC + HS) PRN    labetalol, 10 mg, Intravenous, Q15 Min PRN    levalbuterol, 0.63 mg, Nebulization, Q4H PRN    loperamide, 2 mg, Per G Tube, QID PRN    OLANZapine, 5 mg, Intramuscular, Q8H PRN    ondansetron, 4 mg, Intravenous, Q8H PRN    sodium chloride 0.9%, 10 mL, Intravenous, PRN     Radiology:  I have personally reviewed the following imaging and agree with the radiologist.     CT Cervical Spine Without Contrast  Narrative: EXAMINATION:  CT CERVICAL SPINE WITHOUT CONTRAST    CLINICAL HISTORY:  fall; fall.  Altered mental status.  Intracranial hemorrhage.    TECHNIQUE:  Low dose helical acquired images with axial, sagittal and coronal reformations though the cervical spine.  Contrast was not administered.    All CT scans at this location are performed using dose optimization techniques as appropriate to a performed exam including the following automated exposure control, adjustment of the mA and/or kV according to patient size and/or use of iterative reconstruction technique    DLP: 400 mGycm    COMPARISON:  No relevant prior available for comparison.    FINDINGS:  BONES: No fracture. Normal alignment. The dens is intact, the  lateral masses of C1 are normally aligned, and the atlantodental interval is normal.    DISCS AND FACETS: Multilevel disc space narrowing with anterior and posterior disc osteophytes.  Uncovertebral hypertrophy.  Facet arthropathy.    SPINAL CANAL AND NEURAL FORAMINA: Posterior disc osteophytes mildly narrow the central canal.  For example at C4-C5 AP diameter of the thecal sac estimated 8 mm.  Facet and uncovertebral hypertrophy contribute to neural foraminal stenosis.    SOFT TISSUES: There are atherosclerotic calcifications at the left carotid bulb.    LUNG APICES: Clear  Impression: Degenerative change without appreciable acute osseous abnormality by CT evaluation    The preliminary and final reports are concordant.    Electronically signed by: Kiya [place]  Date:    06/18/2025  Time:    08:06  CT Head Without Contrast  Narrative: EXAMINATION:  CT HEAD WITHOUT CONTRAST    CLINICAL HISTORY:  fall;  altered mental status    TECHNIQUE:  Low dose axial CT images obtained throughout the head without intravenous contrast.  Axial, sagittal and coronal reconstructions were performed and interpreted.    DLP: 1370 mGycm    All CT scans at this location are performed using dose optimization techniques as appropriate to a performed exam including the following automated exposure control, adjustment of the mA and/or kV according to patient size and/or use of iterative reconstruction technique    COMPARISON:  CT head 06/14/2025    FINDINGS:  BRAIN: Left parietooccipital hemorrhage measures approximately 3.3 x 3.3 cm; similar in volume to previous exam.  Margins are slightly less distinct compared to prior with decreasing density compatible with expected evolution of hemorrhage.  Mild surrounding edema similar to prior.  No significant mass effect.  No midline shift. Periventricular and subcortical white matter changes most compatible with chronic small vessel ischemic disease.    VENTRICLES: Normal in size and  configuration.    EXTRA-AXIAL: Trace subarachnoid hemorrhage the left parietal lobe, improved from prior    BONES: Calvarium is intact.    SINUSES AND MASTOIDS: Question postsurgical change at the right mastoid with opacification of the mastoid and middle ear.  Opacification of the left mastoid and middle ear.  Mucoperiosteal thickening at the maxillary sinuses and ethmoid sinuses.    OTHER: Unchanged hyperdensity at the posterior chamber left globe.  Impression: Expected evolution at the left cerebral hemorrhage with mild edema.  No midline shift.    Trace subarachnoid hemorrhage.    The preliminary and final reports are concordant.    Electronically signed by: Kiya Washington  Date:    06/18/2025  Time:    07:59      Margarita Campos MD  Department of Hospital Medicine   [facility]   06/26/2025

## 2025-06-26 NOTE — PT/OT/SLP PROGRESS
Occupational Therapy   Treatment    Name: Federico Villarreal  MRN: [id number]    Recommendations:     Recommended therapy intensity at discharge: Moderate Intensity Therapy   Discharge Equipment Recommendations:  to be determined by next level of care  Barriers to discharge:       Assessment:     Federico Villarreal is a 64 y.o. male with a medical diagnosis of L occipital hemorrhagic CVA, presented with HA, confusion, slurred speech. Hx of L eye blindness.  Performance deficits affecting function are weakness, impaired endurance, impaired self care skills, impaired functional mobility, impaired balance, visual deficits, impaired cognition, decreased coordination, decreased safety awareness.     Rehab Prognosis:  Good; patient would benefit from acute skilled OT services to address these deficits and reach maximum level of function.       Plan:     Patient to be seen 4 x/week to address the above listed problems via self-care/home management, therapeutic activities, therapeutic exercises, neuromuscular re-education  Plan of Care Expires: 07/11/25  Plan of Care Reviewed with: patient    Subjective     Pain/Comfort:  Pain Rating 1: 0/10    Objective:     Communicated with: nurse prior to session.  Patient found HOB elevated with pulse ox (continuous), telemetry (PEG tube, bed alarm, roll belt, B mitts, SCD, Chao cath) upon OT entry to room.    General Precautions: Standard, fall, vision impaired (L eye blind; <140/90)    Orthopedic Precautions:N/A  Braces: N/A  Respiratory Status: Room air  Vital Signs: Blood Pressure: 118/54     Occupational Performance:   Tx promoting pt's endurance and transfer skills necessary to progress towards ADL participation.     Functional Mobility/Transfers:  Bed mobility:    Supine <> Sit: moderate assistance x2  Transfers:   Sit to Stand: moderate assistance and of 2 persons with hand-held assist and rolling walker  Bed<>chair t/f completed via steps with modAx2 and RW.   Pt impulsive and  requriing max cues for safety/hand placement on RW and technique. Increased assist to manage RW.   Will plan to address BSC transfers in future sessions to promote toileting partic.     Activities of Daily Living:  Feeding:  dependence PEG in place  Lower Body Dressing: maximal assistance to don socks seated EOB    Patient Education:  Patient provided with verbal education education regarding OT role/goals/POC, fall prevention, safety awareness, Discharge/DME recommendations, and pressure ulcer prevention.  Additional teaching is warranted.      Patient left HOB elevated with all lines intact, call button in reach, bed alarm on, restraints reapplied at end of session, and nurse notified.    GOALS:   Multidisciplinary Problems       Occupational Therapy Goals          Problem: Occupational Therapy    Goal Priority Disciplines Outcome Interventions   Occupational Therapy Goal     OT, PT/OT Progressing    Description: Goals to be met by: 7/11/25     Patient will increase functional independence with ADLs by performing:    LTG: Pt will perform basic ADLs and ADL transfers with SPV using LRAD by discharge.    STG: to be met by 7/11/25  Pt will follow commands at least 75% consistently throughout session  Pt will complete grooming seated, progressing to standing as appropriate with LRAD with min A.  Pt will complete UB dressing with min A.  Pt will complete LB dressing with min A using LRAD.  Pt will complete toileting with min A using LRAD.  Pt will complete functional mobility to/from toilet and toilet transfer with min A using LRAD.   Pt will demo visual attention to R side >80% of time with min verbal cues.  Pt will demo 4/5 strength in  BUE  for increased functional use during ADL tasks.                          Time Tracking:     OT Date of Treatment: 06/26/25  OT Start Time: 1446  OT Stop Time: 1509  OT Total Time (min): 23 min    Billable Minutes:Self Care/Home Management 2    OT/THOMAS: OT     Number of THOMAS visits  since last OT visit: 3    6/26/2025

## 2025-06-26 NOTE — PT/OT/SLP PROGRESS
Physical Therapy Treatment    Patient Name:  Federico Villarreal   MRN:  [id number]    Recommendations:     Discharge therapy intensity: Moderate Intensity Therapy   Discharge Equipment Recommendations: to be determined by next level of care  Barriers to discharge: Impaired mobility    Assessment:     Federico Villarreal is a 64 y.o. male admitted with a medical diagnosis of L occipital hemorrhage CVA, dysphagia s/p PEG placement, delirium. Hx of L eye blindness. .  He presents with the following impairments/functional limitations: weakness, gait instability, impaired cognition, decreased safety awareness, impaired endurance, impaired balance, impaired functional mobility, impaired self care skills.    Pt presents with poor safety awareness and impulsiveness, however was able to transfer to and from chair w RW today with verbal and tactile cues for safety. Vision impairment also impacted transfer and pt required guidance for safety. Will attempt HHA tomorrow, as pt was holding up walker off of the ground during transfer back to bed. Will also attempt ambulation in hallway tomorrow.     Rehab Prognosis: Good; patient would benefit from acute skilled PT services to address these deficits and reach maximum level of function.    Recent Surgery: Procedure(s) (LRB):  PEG (N/A) 10 Days Post-Op    Plan:     During this hospitalization, patient would benefit from acute PT services 5 x/week to address the identified rehab impairments via gait training, therapeutic activities, therapeutic exercises and progress toward the following goals:    Plan of Care Expires:  07/13/25    Subjective     Chief Complaint: none  Patient/Family Comments/goals: ROYAL, confused  Pain/Comfort:  Pain Rating 1: 0/10      Objective:     Communicated with nurse prior to session.  Patient found HOB elevated with bed alarm, SCD, nascimento catheter, telemetry, PEG Tube, Other (comments) (roll belt and B mittens) upon PT entry to room.     General Precautions:  Standard, fall, vision impaired, aspiration  Orthopedic Precautions: N/A  Braces: N/A  Respiratory Status: Room air  Blood Pressure: 118/54  Skin Integrity: Visible skin intact      Functional Mobility:  Bed Mobility:     Scooting: moderate assistance  Supine to Sit: moderate assistance and of 2 persons  Sit to Supine: moderate assistance and of 2 persons  Transfers:     Sit to Stand:  moderate assistance and of 2 persons with rolling walker  Gait: 3ft x 2, to and from bedside chair, with Min-Mod A x2 for RW steering, sequencing, and balance. Pt required cues especially for LLE advancement.       Co-Treatment: Yes, due to Limited activity tolerance    Education:  Patient provided with verbal education education regarding PT role/goals/POC, fall prevention, and safety awareness.  Additional teaching is warranted.     Patient left HOB elevated with all lines intact, call button in reach, bed alarm on, nurse notified, and roll belt reapplied, mittens reapplied.       GOALS:   Multidisciplinary Problems       Physical Therapy Goals          Problem: Physical Therapy    Goal Priority Disciplines Outcome Interventions   Physical Therapy Goal     PT, PT/OT Progressing    Description: Goals to be met by: 25     Patient will increase functional independence with mobility by performin. Supine to sit with MInimal Assistance  2. Sit to stand transfer with Minimal Assistance  3. Bed to chair transfer with Minimal Assistance using Rolling Walker  4. Pt will follow 5/5 motor commands for BLE.  5. Gait TBD as pt progresses                         Time Tracking:     PT Received On: 25  PT Start Time: 1447     PT Stop Time: 1507  PT Total Time (min): 20 min     Billable Minutes: Therapeutic Activity 20    Treatment Type: Treatment  PT/PTA: PT     Number of PTA visits since last PT visit: 2025

## 2025-06-27 LAB
ANION GAP SERPL CALC-SCNC: 5 MEQ/L
BASOPHILS # BLD AUTO: 0.08 X10(3)/MCL
BASOPHILS NFR BLD AUTO: 0.7 %
BUN SERPL-MCNC: 26.4 MG/DL (ref 8.4–25.7)
CALCIUM SERPL-MCNC: 9.2 MG/DL (ref 8.8–10)
CHLORIDE SERPL-SCNC: 102 MMOL/L (ref 98–107)
CO2 SERPL-SCNC: 33 MMOL/L (ref 23–31)
CREAT SERPL-MCNC: 0.9 MG/DL (ref 0.72–1.25)
CREAT/UREA NIT SERPL: 29
EOSINOPHIL # BLD AUTO: 0.12 X10(3)/MCL (ref 0–0.9)
EOSINOPHIL NFR BLD AUTO: 1 %
ERYTHROCYTE [DISTWIDTH] IN BLOOD BY AUTOMATED COUNT: 12.2 % (ref 11.5–17)
GFR SERPLBLD CREATININE-BSD FMLA CKD-EPI: >60 ML/MIN/1.73/M2
GLUCOSE SERPL-MCNC: 156 MG/DL (ref 82–115)
HCT VFR BLD AUTO: 42.1 % (ref 42–52)
HGB BLD-MCNC: 13.7 G/DL (ref 14–18)
IMM GRANULOCYTES # BLD AUTO: 0.13 X10(3)/MCL (ref 0–0.04)
IMM GRANULOCYTES NFR BLD AUTO: 1.1 %
LYMPHOCYTES # BLD AUTO: 2.11 X10(3)/MCL (ref 0.6–4.6)
LYMPHOCYTES NFR BLD AUTO: 17.4 %
MAGNESIUM SERPL-MCNC: 2.4 MG/DL (ref 1.6–2.6)
MCH RBC QN AUTO: 30.6 PG (ref 27–31)
MCHC RBC AUTO-ENTMCNC: 32.5 G/DL (ref 33–36)
MCV RBC AUTO: 94 FL (ref 80–94)
MONOCYTES # BLD AUTO: 1.06 X10(3)/MCL (ref 0.1–1.3)
MONOCYTES NFR BLD AUTO: 8.7 %
NEUTROPHILS # BLD AUTO: 8.63 X10(3)/MCL (ref 2.1–9.2)
NEUTROPHILS NFR BLD AUTO: 71.1 %
NRBC BLD AUTO-RTO: 0 %
PHOSPHATE SERPL-MCNC: 3.6 MG/DL (ref 2.3–4.7)
PLATELET # BLD AUTO: 413 X10(3)/MCL (ref 130–400)
PMV BLD AUTO: 10.3 FL (ref 7.4–10.4)
POCT GLUCOSE: 116 MG/DL (ref 70–110)
POCT GLUCOSE: 169 MG/DL (ref 70–110)
POCT GLUCOSE: 183 MG/DL (ref 70–110)
POCT GLUCOSE: 209 MG/DL (ref 70–110)
POCT GLUCOSE: 252 MG/DL (ref 70–110)
POTASSIUM SERPL-SCNC: 4.7 MMOL/L (ref 3.5–5.1)
RBC # BLD AUTO: 4.48 X10(6)/MCL (ref 4.7–6.1)
SODIUM SERPL-SCNC: 140 MMOL/L (ref 136–145)
WBC # BLD AUTO: 12.13 X10(3)/MCL (ref 4.5–11.5)

## 2025-06-27 PROCEDURE — 63600175 PHARM REV CODE 636 W HCPCS

## 2025-06-27 PROCEDURE — 97116 GAIT TRAINING THERAPY: CPT

## 2025-06-27 PROCEDURE — 80048 BASIC METABOLIC PNL TOTAL CA: CPT | Performed by: INTERNAL MEDICINE

## 2025-06-27 PROCEDURE — 83735 ASSAY OF MAGNESIUM: CPT | Performed by: INTERNAL MEDICINE

## 2025-06-27 PROCEDURE — 11000001 HC ACUTE MED/SURG PRIVATE ROOM

## 2025-06-27 PROCEDURE — 25000003 PHARM REV CODE 250

## 2025-06-27 PROCEDURE — 99900035 HC TECH TIME PER 15 MIN (STAT)

## 2025-06-27 PROCEDURE — 63600175 PHARM REV CODE 636 W HCPCS: Performed by: INTERNAL MEDICINE

## 2025-06-27 PROCEDURE — 27000221 HC OXYGEN, UP TO 24 HOURS

## 2025-06-27 PROCEDURE — 85025 COMPLETE CBC W/AUTO DIFF WBC: CPT | Performed by: INTERNAL MEDICINE

## 2025-06-27 PROCEDURE — 25000003 PHARM REV CODE 250: Performed by: INTERNAL MEDICINE

## 2025-06-27 PROCEDURE — 36415 COLL VENOUS BLD VENIPUNCTURE: CPT | Performed by: INTERNAL MEDICINE

## 2025-06-27 PROCEDURE — 84100 ASSAY OF PHOSPHORUS: CPT | Performed by: INTERNAL MEDICINE

## 2025-06-27 PROCEDURE — 21400001 HC TELEMETRY ROOM

## 2025-06-27 PROCEDURE — 94668 MNPJ CHEST WALL SBSQ: CPT

## 2025-06-27 PROCEDURE — 97530 THERAPEUTIC ACTIVITIES: CPT

## 2025-06-27 RX ORDER — HALOPERIDOL LACTATE 5 MG/ML
2 INJECTION, SOLUTION INTRAMUSCULAR EVERY 6 HOURS PRN
Status: DISCONTINUED | OUTPATIENT
Start: 2025-06-27 | End: 2025-07-16 | Stop reason: HOSPADM

## 2025-06-27 RX ORDER — RISPERIDONE 0.25 MG/1
0.5 TABLET ORAL 2 TIMES DAILY
Status: DISCONTINUED | OUTPATIENT
Start: 2025-06-27 | End: 2025-07-01

## 2025-06-27 RX ORDER — HYDROXYZINE 50 MG/ML
25 INJECTION, SOLUTION INTRAMUSCULAR EVERY 6 HOURS PRN
Status: DISCONTINUED | OUTPATIENT
Start: 2025-06-27 | End: 2025-07-16 | Stop reason: HOSPADM

## 2025-06-27 RX ADMIN — SUCRALFATE 1 G: 1 TABLET ORAL at 05:06

## 2025-06-27 RX ADMIN — CARVEDILOL 12.5 MG: 12.5 TABLET, FILM COATED ORAL at 09:06

## 2025-06-27 RX ADMIN — RISPERIDONE 0.5 MG: 0.25 TABLET, FILM COATED ORAL at 09:06

## 2025-06-27 RX ADMIN — QUETIAPINE FUMARATE 50 MG: 25 TABLET ORAL at 09:06

## 2025-06-27 RX ADMIN — INSULIN GLARGINE 18 UNITS: 100 INJECTION, SOLUTION SUBCUTANEOUS at 09:06

## 2025-06-27 RX ADMIN — HYDROXYZINE HYDROCHLORIDE 25 MG: 50 INJECTION, SOLUTION INTRAMUSCULAR at 04:06

## 2025-06-27 RX ADMIN — LOSARTAN POTASSIUM 100 MG: 50 TABLET, FILM COATED ORAL at 09:06

## 2025-06-27 RX ADMIN — OLANZAPINE 5 MG: 10 INJECTION, POWDER, FOR SOLUTION INTRAMUSCULAR at 05:06

## 2025-06-27 RX ADMIN — SUCRALFATE 1 G: 1 TABLET ORAL at 06:06

## 2025-06-27 RX ADMIN — Medication 6 MG: at 09:06

## 2025-06-27 RX ADMIN — SUCRALFATE 1 G: 1 TABLET ORAL at 11:06

## 2025-06-27 RX ADMIN — INSULIN ASPART 6 UNITS: 100 INJECTION, SOLUTION INTRAVENOUS; SUBCUTANEOUS at 11:06

## 2025-06-27 RX ADMIN — INSULIN ASPART 2 UNITS: 100 INJECTION, SOLUTION INTRAVENOUS; SUBCUTANEOUS at 05:06

## 2025-06-27 RX ADMIN — TRAZODONE HYDROCHLORIDE 100 MG: 100 TABLET ORAL at 09:06

## 2025-06-27 RX ADMIN — OLANZAPINE 5 MG: 10 INJECTION, POWDER, FOR SOLUTION INTRAMUSCULAR at 02:06

## 2025-06-27 NOTE — PROGRESS NOTES
Inpatient Nutrition Assessment    Admit Date: 6/9/2025   Total duration of encounter: 18 days   Patient Age: 64 y.o.    Nutrition Recommendation/Prescription     Bolus 250 ml Diabetisource AC and 150 ml water 6 times daily  1800 kcal 100% needs  90 g protein 100% needs  162 g CHO 92% needs  1680 ml water 100% needs    Communication of Recommendations: reviewed with nurse    Nutrition Assessment     Malnutrition Assessment/Nutrition-Focused Physical Exam       Malnutrition Level: other (see comments) (Does not meet criteria) (06/11/25 [facility]2)  Energy Intake (Malnutrition): other (see comments) (Unable to assess) (06/11/25 1352)  Weight Loss (Malnutrition): other (see comments) (Unable to assess) (06/11/25 [facility]2)              Muscle Mass (Malnutrition): mild depletion (06/11/25 1352)  [place] (Muscle Loss): mild depletion                                A minimum of two characteristics is recommended for diagnosis of either severe or non-severe malnutrition.    Chart Review    Reason Seen: follow-up    Malnutrition Screening Tool Results   Have you recently lost weight without trying?: No  Have you been eating poorly because of a decreased appetite?: No   MST Score: 0   Diagnosis: left occipital hemorrhagic stroke     Relevant Medical History: T1DM, coronary artery disease status post stenting, hypertension, hyperlipidemia, history of ischemic stroke with previous left-sided weakness    Scheduled Medications:  aluminum-magnesium hydroxide-simethicone, 30 mL, QID (AC & HS)  carvediloL, 12.5 mg, BID  insulin glargine U-100, 18 Units, BID  losartan, 100 mg, Daily  melatonin, 6 mg, Nightly  QUEtiapine, 50 mg, BID  scopolamine, 1 patch, Q3 Days  sucralfate, 1 g, Q6H  traZODone, 100 mg, QHS    Continuous Infusions: none       PRN Medications:   acetaminophen, 650 mg, Q6H PRN  bisacodyL, 10 mg, Daily PRN  butalbital-acetaminophen-caffeine -40 mg, 1 tablet, Q4H PRN  dextrose 50%, 12.5 g, PRN  dextrose 50%, 12.5 g,  PRN  dextrose 50%, 25 g, PRN  glucagon (human recombinant), 1 mg, PRN  glucagon (human recombinant), 1 mg, PRN  glucose, 16 g, PRN  glucose, 24 g, PRN  insulin aspart U-100, 0-10 Units, QID (AC + HS) PRN  labetalol, 10 mg, Q15 Min PRN  levalbuterol, 0.63 mg, Q4H PRN  loperamide, 2 mg, QID PRN  OLANZapine, 5 mg, Q8H PRN  ondansetron, 4 mg, Q8H PRN  sodium chloride 0.9%, 10 mL, PRN    Calorie Containing IV Medications: no significant kcals from medications at this time    Recent Labs   Lab 06/23/25  0408 06/25/25  0437 06/27/25  0735    140 140   K 4.5 4.3 4.7   CALCIUM 8.5* 9.1 9.2   PHOS  --   --  3.6   MG 2.70* 2.50 2.40    103 102   CO2 34* 28 33*   BUN 33.3* 28.6* 26.4*   CREATININE 0.97 0.83 0.90   EGFRNORACEVR >60 >60 >60   * 174* 156*   WBC 15.08*  --  12.13*   HGB 13.4*  --  13.7*   HCT 42.3  --  42.1     Nutrition Orders:  Diet NPO  Tube Feedings/Formulas 250; Diabetisource AC; Gastrostomy (6 times daily); 150; Every 4 hours    Appetite/Oral Intake: NPO/not applicable  Factors Affecting Nutritional Intake: impaired cognitive status/motor control and NPO  Social Needs Impacting Access to Food: unable to assess at this time; will attempt on follow-up  Food/Anglican/Cultural Preferences: unable to obtain  Food Allergies: none reported  Last Bowel Movement: 06/26/25  Wound(s): no pressure injuries documented at this time     Comments    6/11/25 Patient confused with episodes of agitation, sleeping during rounds. Nurse reports plans to start nasogastric tube feeding, orders provided.    6/13/25 PO intake remains unsafe per SLP. GI consulted for PEG placement possibly on Monday. RN reports patient tolerating tube feeding at 15 mL/hr. Last BM noted on 6/8/25, consider bowel regimen.      6//16/25: Pt's TF being held for PEG placement today. Pt remains NPO per SLP.    6/19/25 Tube feeding progressing toward goal rate, currently at 60 ml/hr, goal 75 ml/hr.    6/23/25 Tube feeding at goal, will  "transition to bolus feeding.    6/27/25 Tolerating bolus feedings.    Anthropometrics    Height: 5' 2" (157.5 cm), Height Method: Stated  Last Weight: 65 kg (143 lb 4.8 oz) (06/11/25 1348), Weight Method: Bed Scale  BMI (Calculated): 26.2  BMI Classification: overweight (BMI 25-29.9)        Ideal Body Weight (IBW), Male: 118 lb     % Ideal Body Weight, Male (lb): 114.41 %                          Usual Weight Provided By: unable to obtain usual weight    Wt Readings from Last 5 Encounters:   06/11/25 65 kg (143 lb 4.8 oz)   01/27/25 63 kg (139 lb)   12/03/24 64.7 kg (142 lb 9.6 oz)   11/05/24 62 kg (136 lb 11 oz)   10/02/24 62 kg (136 lb 9.6 oz)     Weight Change(s) Since Admission:   6/11/25 initial weight 61.2 kg stated, bed weight 65 kg taken during rounds  Wt Readings from Last 1 Encounters:   06/11/25 1348 65 kg (143 lb 4.8 oz)   06/09/25 0805 61.2 kg (135 lb)   Admit Weight: 61.2 kg (135 lb) (06/09/25 0805), Weight Method: Stated    Estimated Needs    Weight Used For Calorie Calculations: 65 kg (143 lb 4.8 oz)  Energy Calorie Requirements (kcal): 1549-0903, 1.2-1.4 stress factor  Energy Need Method: [facility]  Weight Used For Protein Calculations: 65 kg (143 lb 4.8 oz)  Protein Requirements: 78-91 g, 1.2-1.4 g/kg  Fluid Requirements (mL): 5747-0364, 1 ml/kcal  CHO Requirement: 177-207 g, 45% of kcal     Enteral Nutrition    Formula: Diabetisource AC  Rate/Volume: 250 ml 6 times daily  Water Flushes: 150 ml 6 times daily  Additives/Modulars: none at this time  Route: gastrostomy tube  Method: continuous  Total Nutrition Provided by Tube Feeding, Additives, and Flushes:  Calories Provided  1800 kcal/d, 100% needs   Protein Provided  90 g/d, 100% needs   Fluid Provided  1680 ml/d, 100% needs   Continuous feeding calculations based on estimated 20 hr/d run time unless otherwise stated.    Parenteral Nutrition Patient not receiving parenteral nutrition support at this time.    Evaluation of Received Nutrient " Intake    Calories: meeting estimated needs  Protein: meeting estimated needs    Patient Education Not applicable.    Nutrition Diagnosis     PES: Inadequate energy intake related to inability to consume sufficient nutrients as evidenced by less than 80% needs met. (resolved)  PES: N/A             Nutrition Interventions     Interventions: modified composition of enteral nutrition and collaboration with other providers       Goal: Meet greater than 80% of nutritional needs by follow-up. (goal met)  Goal: Tolerate enteral feeding at goal rate by follow-up. (goal met)    Nutrition Goals & Monitoring     Dietitian will monitor: food and beverage intake, energy intake, enteral nutrition intake, weight, weight change, electrolyte/renal panel, beliefs/attitudes, glucose/endocrine profile, and gastrointestinal profile  Discharge planning: tube feeding (Diabetisource AC or equivalent substitute)  Nutrition Risk/Follow-Up: patient at increased nutrition risk; dietitian will follow-up twice weekly   Please consult if re-assessment needed sooner.

## 2025-06-27 NOTE — PT/OT/SLP PROGRESS
Physical Therapy Treatment    Patient Name:  Federico Villarreal   MRN:  [id number]    Recommendations:     Discharge therapy intensity: Moderate Intensity Therapy   Discharge Equipment Recommendations: to be determined by next level of care  Barriers to discharge: None    Assessment:     Federico Villarreal is a 64 y.o. male admitted with a medical diagnosis of L occipital hemorrhage CVA, dysphagia s/p PEG placement, delirium. Hx of L eye blindness.  He presents with the following impairments/functional limitations: weakness, gait instability, impaired cognition, decreased safety awareness, impaired endurance, impaired balance, impaired functional mobility, impaired self care skills.    Pt continues to be pleasantly confused and needs several repetitions to follow simple commands. Pt was able to ambulate in hallways with RW. Pt demonstrates LLE weakness and L lean. Fall risk, but making progress. Continue to recommend moderate intensity.       Rehab Prognosis: Good; patient would benefit from acute skilled PT services to address these deficits and reach maximum level of function.    Recent Surgery: Procedure(s) (LRB):  PEG (N/A) 11 Days Post-Op    Plan:     During this hospitalization, patient would benefit from acute PT services 5 x/week to address the identified rehab impairments via gait training, therapeutic activities, therapeutic exercises, neuromuscular re-education and progress toward the following goals:    Plan of Care Expires:  07/13/25    Subjective     Chief Complaint: none  Patient/Family Comments/goals: none  Pain/Comfort:  Pain Rating 1: 0/10      Objective:     Communicated with nurse prior to session.  Patient found HOB elevated with bed alarm, telemetry, nascimento catheter, PEG Tube (mittens and roll belt) upon PT entry to room.     General Precautions: Standard, fall, vision impaired, aspiration  Orthopedic Precautions: N/A  Braces: N/A  Respiratory Status: Room air  Blood Pressure: 131/68, 105 HR  Skin  Integrity: Visible skin intact      Functional Mobility:  Bed Mobility:     Scooting: moderate assistance and of 2 persons  Supine to Sit: moderate assistance and of 2 persons  Sit to Supine: moderate assistance and of 2 persons  Transfers:     Sit to Stand:  minimum assistance and of 2 persons with rolling walker  Bed to Chair: moderate assistance and of 2 persons with  hand-held assist  using  Stand Pivot  Gait: 50ft, seated rest break, then 60ft more with RW and Min-Mod A for steering RW, balance, and safety. Pt demonstrates decreased LLE step length and L trunk lean.     Co-Treatment: No    Education:  Patient provided with verbal education education regarding PT role/goals/POC, fall prevention, and safety awareness.  Understanding was verbalized.     Patient left HOB elevated with all lines intact, call button in reach, pressure relief boots, bed alarm on, and restraints reapplied at end of session      GOALS:   Multidisciplinary Problems       Physical Therapy Goals          Problem: Physical Therapy    Goal Priority Disciplines Outcome Interventions   Physical Therapy Goal     PT, PT/OT Progressing    Description: Goals to be met by: 25     Patient will increase functional independence with mobility by performin. Supine to sit with Minimal Assistance  2. Sit to stand transfer with Minimal Assistance  3. Bed to chair transfer with Minimal Assistance using Rolling Walker  4. Pt will follow 5/5 motor commands for BLE.  5. Pt will ambulate 150ft with CGA and RW.                          Time Tracking:     PT Received On: 25  PT Start Time: 0950     PT Stop Time: 1022  PT Total Time (min): 32 min     Billable Minutes: Gait Training 16 and Therapeutic Activity 16    Treatment Type: Treatment  PT/PTA: PT     Number of PTA visits since last PT visit: 2     2025

## 2025-06-27 NOTE — PROGRESS NOTES
[facility] Hospital Medicine Progress Note        Chief Complaint: Inpatient Follow-up       HPI: 64-year-old male with type 1 diabetes mellitus, CAD status post stenting, hypertension, hyperlipidemia, prior CVA  presented on 06/09/2025 for evaluation of sudden onset headache, confusion, slurred speech.      Workup revealed:  CT head -left occipital lobe parenchymal hemorrhage with trace adjacent subarachnoid hemorrhage.   CTA head/neck was also completed in ED, which revealed no large vessel occlusion, flow-limiting stenosis, aneurysm or evidence of a vascular malformation   MRI brain report:  1. Left occipital lobe hematoma with adjacent subarachnoid hemorrhage.  2. Innumerable chronic microhemorrhages with a subcortical location, may indicate underlying cerebral amyloid angiopathy.  3. Mild chronic microvascular ischemic changes     Neurology team evaluated the patient, initiated on stroke protocol, admitted to ICU services.  Neurosurgery team evaluated the patient, MRI likely suggestive of amyloid angiopathy, repeat CT head 6/10/25 unchanged, suggested holding antiplatelets for at least 2 weeks.  Patient is started on Keppra.  Neurosurgical team signed off.    Patient required Chao placement for retention, seen by Urology    Patient cleared to downgrade to floors 06/12/2025 PM by ICU team.    Palliative care team consulted while patient in ICU, code status DNR at the time of downgrade.      Pt had fever ,tmax 102.8, tachycardic,wbc count increased 17K, decided to obtain pan scan, CT head stable, but notes to have dense consolidation left >right LL, constipation  Respiratory cultures were obtained grew many strep group B Haemophilus influenzae     Patient remains unsafe for p.o. intake with speech evaluations, informed decision with the patient's spouse was made, GI team consulted for PEG placement.    Psych was consulted as well to help with his delirium.      Interval Hx:      Patient was seen and examined, agitated, case discussed with the staff, appears to be at his baseline, chart was reviewed.    Objective/physical exam:  General: In no acute distress, afebrile  Chest: Clear to auscultation bilaterally  Heart:  +S1, S2, no appreciable murmur  Abdomen: Soft, nontender, BS +, PEG tube  MSK: Warm, no lower extremity edema, no clubbing or cyanosis  Neurologic: Alert and oriented x1    VITAL SIGNS: 24 HRS MIN & MAX LAST   Temp  Min: 97.3 °F (36.3 °C)  Max: 98 °F (36.7 °C) 98 °F (36.7 °C)   BP  Min: 118/54  Max: 189/82 138/77   Pulse  Min: 93  Max: 128  109   Resp  Min: 18  Max: 18 18   SpO2  Min: 94 %  Max: 96 % (!) 94 %     I have reviewed the following labs:  Recent Labs   Lab 06/23/25  0408 06/27/25  0735   WBC 15.08* 12.13*   RBC 4.34* 4.48*   HGB 13.4* 13.7*   HCT 42.3 42.1   MCV 97.5* 94.0   MCH 30.9 30.6   MCHC 31.7* 32.5*   RDW 11.9 12.2   * 413*   MPV 9.7 10.3     Recent Labs   Lab 06/23/25  0408 06/25/25  0437 06/27/25  0735    140 140   K 4.5 4.3 4.7    103 102   CO2 34* 28 33*   BUN 33.3* 28.6* 26.4*   CREATININE 0.97 0.83 0.90   * 174* 156*   CALCIUM 8.5* 9.1 9.2   MG 2.70* 2.50 2.40     Microbiology Results (last 7 days)       ** No results found for the last 168 hours. **             See below for Radiology    Assessment/Plan:  Hemorrhagic CVA-Left occipital lobe hematoma, adjacent subarachnoid hemorrhage  Urinary retention  Dense left lobe consolidation-Haemophilus influenza pneumonia  Delirium  Dysphagia s/p peg tube  Tobacco user  Leukocytosis and thrombocytosis-?  Reactive     HX: T1 dm, CAD s/p PCI, HTN, HLD, history of CVA    Plan   Seen by Neurology, Neurosurgery.  Discontinued Keppra on 06/23/2025 .  Stable from respiratory standpoint.Completed antibiotics.  Monitor white count and platelet count.  Obtain blood cultures if spiking fevers .  Seen by psychiatry  Continue tube feedings, monitor electrolytes and replete appropriately, ensure  regular bowel movements.  Continue current insulin regimen, monitor blood sugars  Monitor blood pressure and adjust the regimen as needed.  Keep SBP less than 140/90.  Continue supportive care.  PT OT.  Needs placement when he is off soft restraints .  We will discuss with psych if delirium not improving    VTE prophylaxis: scds      Anticipated discharge and Disposition:  To be decided  , placement once off restraints      All diagnosis and differential diagnosis have been reviewed; assessment and plan has been documented; I have personally reviewed the labs and test results that are presently available; I have reviewed the patients medication list; I have reviewed the consulting providers response and recommendations. I have reviewed or attempted to review medical records based upon their availability    All of the patient's questions have been  addressed and answered. Patient's is agreeable to the above stated plan. I will continue to monitor closely and make adjustments to medical management as needed.    Portions of this note dictated using EMR integrated voice recognition software, and may be subject to voice recognition errors not corrected at proofreading. Please contact writer for clarification if needed.   _____________________________________________________________________    Malnutrition Status:  Nutrition consulted. Most recent weight and BMI monitored-     Measurements:  Wt Readings from Last 1 Encounters:   06/11/25 65 kg (143 lb 4.8 oz)   Body mass index is 26.21 kg/m².    Patient has been screened and assessed by RD.    Malnutrition Type:  Context:    Level: other (see comments) (Does not meet criteria)    Malnutrition Characteristic Summary:  Weight Loss (Malnutrition): other (see comments) (Unable to assess)  Energy Intake (Malnutrition): other (see comments) (Unable to assess)  Muscle Mass (Malnutrition): mild depletion    Interventions/Recommendations (treatment strategy):        Scheduled  Med:   aluminum-magnesium hydroxide-simethicone  30 mL Per G Tube QID (AC & HS)    carvediloL  12.5 mg Per G Tube BID    insulin glargine U-100  18 Units Subcutaneous BID    losartan  100 mg Per G Tube Daily    melatonin  6 mg Per G Tube Nightly    QUEtiapine  50 mg Oral BID    scopolamine  1 patch Transdermal Q3 Days    sucralfate  1 g Per G Tube Q6H    traZODone  100 mg Per G Tube QHS      Continuous Infusions:     PRN Meds:    Current Facility-Administered Medications:     acetaminophen, 650 mg, Per G Tube, Q6H PRN    bisacodyL, 10 mg, Rectal, Daily PRN    butalbital-acetaminophen-caffeine -40 mg, 1 tablet, Per G Tube, Q4H PRN    dextrose 50%, 12.5 g, Intravenous, PRN    dextrose 50%, 12.5 g, Intravenous, PRN    dextrose 50%, 25 g, Intravenous, PRN    glucagon (human recombinant), 1 mg, Intramuscular, PRN    glucagon (human recombinant), 1 mg, Intramuscular, PRN    glucose, 16 g, Oral, PRN    glucose, 24 g, Oral, PRN    insulin aspart U-100, 0-10 Units, Subcutaneous, QID (AC + HS) PRN    labetalol, 10 mg, Intravenous, Q15 Min PRN    levalbuterol, 0.63 mg, Nebulization, Q4H PRN    loperamide, 2 mg, Per G Tube, QID PRN    OLANZapine, 5 mg, Intramuscular, Q8H PRN    ondansetron, 4 mg, Intravenous, Q8H PRN    sodium chloride 0.9%, 10 mL, Intravenous, PRN     Radiology:  I have personally reviewed the following imaging and agree with the radiologist.     CT Cervical Spine Without Contrast  Narrative: EXAMINATION:  CT CERVICAL SPINE WITHOUT CONTRAST    CLINICAL HISTORY:  fall; fall.  Altered mental status.  Intracranial hemorrhage.    TECHNIQUE:  Low dose helical acquired images with axial, sagittal and coronal reformations though the cervical spine.  Contrast was not administered.    All CT scans at this location are performed using dose optimization techniques as appropriate to a performed exam including the following automated exposure control, adjustment of the mA and/or kV according to patient size and/or  use of iterative reconstruction technique    DLP: 400 mGycm    COMPARISON:  No relevant prior available for comparison.    FINDINGS:  BONES: No fracture. Normal alignment. The dens is intact, the lateral masses of C1 are normally aligned, and the atlantodental interval is normal.    DISCS AND FACETS: Multilevel disc space narrowing with anterior and posterior disc osteophytes.  Uncovertebral hypertrophy.  Facet arthropathy.    SPINAL CANAL AND NEURAL FORAMINA: Posterior disc osteophytes mildly narrow the central canal.  For example at C4-C5 AP diameter of the thecal sac estimated 8 mm.  Facet and uncovertebral hypertrophy contribute to neural foraminal stenosis.    SOFT TISSUES: There are atherosclerotic calcifications at the left carotid bulb.    LUNG APICES: Clear  Impression: Degenerative change without appreciable acute osseous abnormality by CT evaluation    The preliminary and final reports are concordant.    Electronically signed by: Kiya Washington  Date:    06/18/2025  Time:    08:06  CT Head Without Contrast  Narrative: EXAMINATION:  CT HEAD WITHOUT CONTRAST    CLINICAL HISTORY:  fall;  altered mental status    TECHNIQUE:  Low dose axial CT images obtained throughout the head without intravenous contrast.  Axial, sagittal and coronal reconstructions were performed and interpreted.    DLP: 1370 mGycm    All CT scans at this location are performed using dose optimization techniques as appropriate to a performed exam including the following automated exposure control, adjustment of the mA and/or kV according to patient size and/or use of iterative reconstruction technique    COMPARISON:  CT head 06/14/2025    FINDINGS:  BRAIN: Left parietooccipital hemorrhage measures approximately 3.3 x 3.3 cm; similar in volume to previous exam.  Margins are slightly less distinct compared to prior with decreasing density compatible with expected evolution of hemorrhage.  Mild surrounding edema similar to prior.  No  significant mass effect.  No midline shift. Periventricular and subcortical white matter changes most compatible with chronic small vessel ischemic disease.    VENTRICLES: Normal in size and configuration.    EXTRA-AXIAL: Trace subarachnoid hemorrhage the left parietal lobe, improved from prior    BONES: Calvarium is intact.    SINUSES AND MASTOIDS: Question postsurgical change at the right mastoid with opacification of the mastoid and middle ear.  Opacification of the left mastoid and middle ear.  Mucoperiosteal thickening at the maxillary sinuses and ethmoid sinuses.    OTHER: Unchanged hyperdensity at the posterior chamber left globe.  Impression: Expected evolution at the left cerebral hemorrhage with mild edema.  No midline shift.    Trace subarachnoid hemorrhage.    The preliminary and final reports are concordant.    Electronically signed by: Kiya Washington  Date:    06/18/2025  Time:    07:59      Margarita Campos MD  Department of Hospital Medicine   [facility]   06/27/2025

## 2025-06-27 NOTE — PROGRESS NOTES
6/27/2025  Federico Villarreal   1960   [id number]        Psychiatry Progress Note     SUBJECTIVE:   Federico Villarreal is a 64 y.o. male with a past medical history that includes CAD, T1DM, HTN, HLD, and history of ischemic stroke with left-sided weakness who presented to [facility] ED on 06/09/25 with confusion and slurred speech noted that morning. His wife had reported he had woken up with headache at 0115 that morning and then woke up with confusion around 0545. CT scan showed left occipital lobe parenchymal hemorrhage. Being seen by psychiatry to day due to confusion and agitation.      Seen today as he has been displaying restlessness with difficulty being redirected. Has been receiving PRN olanzapine with no relief but 2mg haloperidol and diphenhydramine reported to be effective last night. Currently awake and alert but with poor eye-contact and difficulty participating in evaluation. Poor attention and disorganized thinking. Sitter reports that he has been talking to family that is not there.       Current Medications:   Scheduled Meds:    aluminum-magnesium hydroxide-simethicone  30 mL Per G Tube QID (AC & HS)    carvediloL  12.5 mg Per G Tube BID    insulin glargine U-100  18 Units Subcutaneous BID    losartan  100 mg Per G Tube Daily    melatonin  6 mg Per G Tube Nightly    QUEtiapine  50 mg Oral BID    scopolamine  1 patch Transdermal Q3 Days    sucralfate  1 g Per G Tube Q6H    traZODone  100 mg Per G Tube QHS      PRN Meds:   Current Facility-Administered Medications:     acetaminophen, 650 mg, Per G Tube, Q6H PRN    bisacodyL, 10 mg, Rectal, Daily PRN    butalbital-acetaminophen-caffeine -40 mg, 1 tablet, Per G Tube, Q4H PRN    dextrose 50%, 12.5 g, Intravenous, PRN    dextrose 50%, 12.5 g, Intravenous, PRN    dextrose 50%, 25 g, Intravenous, PRN    glucagon (human recombinant), 1 mg, Intramuscular, PRN    glucagon (human recombinant), 1 mg, Intramuscular, PRN    glucose, 16 g, Oral, PRN    glucose,  24 g, Oral, PRN    insulin aspart U-100, 0-10 Units, Subcutaneous, QID (AC + HS) PRN    labetalol, 10 mg, Intravenous, Q15 Min PRN    levalbuterol, 0.63 mg, Nebulization, Q4H PRN    loperamide, 2 mg, Per G Tube, QID PRN    OLANZapine, 5 mg, Intramuscular, Q8H PRN    ondansetron, 4 mg, Intravenous, Q8H PRN    sodium chloride 0.9%, 10 mL, Intravenous, PRN   Psychotherapeutics (From admission, onward)      Start     Stop Route Frequency Ordered    06/21/25 1359  OLANZapine injection 5 mg         -- IM Every 8 hours PRN 06/21/25 1259    06/20/25 2100  traZODone tablet 100 mg         -- PER G TUBE Nightly 06/20/25 1200    06/18/25 1115  QUEtiapine tablet 50 mg         -- Oral 2 times daily 06/18/25 1007            Allergies:   Review of patient's allergies indicates:  No Known Allergies     OBJECTIVE:   Vitals   Vitals:    06/27/25 1136   BP: 138/77   Pulse: 109   Resp:    Temp: 98 °F (36.7 °C)        Labs/Imaging/Studies:   Recent Results (from the past 36 hours)   POCT glucose    Collection Time: 06/26/25  5:45 AM   Result Value Ref Range    POCT Glucose 279 (H) 70 - 110 mg/dL   POCT glucose    Collection Time: 06/26/25  6:37 AM   Result Value Ref Range    POCT Glucose 260 (H) 70 - 110 mg/dL   POCT glucose    Collection Time: 06/26/25 11:30 AM   Result Value Ref Range    POC Glucose 140 (A) 70 - 110 MG/DL   POCT glucose    Collection Time: 06/26/25  3:35 PM   Result Value Ref Range    POCT Glucose 301 (H) 70 - 110 mg/dL   POCT glucose    Collection Time: 06/26/25  7:58 PM   Result Value Ref Range    POCT Glucose 105 70 - 110 mg/dL   POCT glucose    Collection Time: 06/27/25  5:37 AM   Result Value Ref Range    POCT Glucose 116 (H) 70 - 110 mg/dL   Basic Metabolic Panel    Collection Time: 06/27/25  7:35 AM   Result Value Ref Range    Sodium 140 136 - 145 mmol/L    Potassium 4.7 3.5 - 5.1 mmol/L    Chloride 102 98 - 107 mmol/L    CO2 33 (H) 23 - 31 mmol/L    Glucose 156 (H) 82 - 115 mg/dL    Blood Urea Nitrogen 26.4 (H)  8.4 - 25.7 mg/dL    Creatinine 0.90 0.72 - 1.25 mg/dL    BUN/Creatinine Ratio 29     Calcium 9.2 8.8 - 10.0 mg/dL    Anion Gap 5.0 mEq/L    eGFR >60 mL/min/1.73/m2   Magnesium    Collection Time: 06/27/25  7:35 AM   Result Value Ref Range    Magnesium Level 2.40 1.60 - 2.60 mg/dL   Phosphorus    Collection Time: 06/27/25  7:35 AM   Result Value Ref Range    Phosphorus Level 3.6 2.3 - 4.7 mg/dL   CBC with Differential    Collection Time: 06/27/25  7:35 AM   Result Value Ref Range    WBC 12.13 (H) 4.50 - 11.50 x10(3)/mcL    RBC 4.48 (L) 4.70 - 6.10 x10(6)/mcL    Hgb 13.7 (L) 14.0 - 18.0 g/dL    Hct 42.1 42.0 - 52.0 %    MCV 94.0 80.0 - 94.0 fL    MCH 30.6 27.0 - 31.0 pg    MCHC 32.5 (L) 33.0 - 36.0 g/dL    RDW 12.2 11.5 - 17.0 %    Platelet 413 (H) 130 - 400 x10(3)/mcL    MPV 10.3 7.4 - 10.4 fL    Neut % 71.1 %    Lymph % 17.4 %    Mono % 8.7 %    Eos % 1.0 %    Basophil % 0.7 %    Imm Grans % 1.1 %    Neut # 8.63 2.1 - 9.2 x10(3)/mcL    Lymph # 2.11 0.6 - 4.6 x10(3)/mcL    Mono # 1.06 0.1 - 1.3 x10(3)/mcL    Eos # 0.12 0 - 0.9 x10(3)/mcL    Baso # 0.08 <=0.2 x10(3)/mcL    Imm Gran # 0.13 (H) 0.00 - 0.04 x10(3)/mcL    NRBC% 0.0 %   POCT glucose    Collection Time: 06/27/25 10:26 AM   Result Value Ref Range    POCT Glucose 209 (H) 70 - 110 mg/dL   POCT glucose    Collection Time: 06/27/25 11:00 AM   Result Value Ref Range    POCT Glucose 252 (H) 70 - 110 mg/dL            Psychiatric Mental Status Exam:  General Appearance: appears stated age, dressed in hospital garb, lying in bed, in no acute distress  Arousal: alert  Behavior: unable to participate, restless and fidgety, poor eye contact  Movements and Motor Activity: no tics, no tremors, no akathisia, no dystonia, no evidence of tardive dyskinesia, +psychomotor agitation  Orientation: Unable to Assess  Speech: coherent  Mood: Euthymic  Affect: euthymic, reactive, full-range  Thought Process: disorganized  Associations: no loosening of associations  Thought Content  and Perceptions: no suicidal ideation, no homicidal ideation  Recent and Remote Memory: impaired; per interview/observation with patient  Attention and Concentration: impaired; per interview/observation with patient  Fund of Knowledge: vocabulary appropriate; based on history, vocabulary, fund of knowledge, syntax, grammar, and content  Insight: impaired; based on understanding of severity of illness and HPI  Judgment: impaired; based on patient's behavior and HPI    ASSESSMENT/PLAN:   Problems Addressed/Diagnoses:  Delirium, acute, hyperactive due to multiple etiologies     Past Medical History:   Diagnosis Date    Acne     Cataract     Coronary artery disease     Diabetes mellitus     Hearing loss     Hypertension     Seasonal allergies     Stroke         Plan:  Medication Management  Discontinue quetiapine  Risperidone 0.5mg BID for psychosis  Continue trazodone 100mg QHS  Discontinue PRN olanzapine  Haloperidol 2mg IV Q6hr PRN agitation  Hydroxyzine 25mg IM Q6hr PRN agitation  Delirium precautions        Federico Perea

## 2025-06-27 NOTE — PLAN OF CARE
Problem: Adult Inpatient Plan of Care  Goal: Plan of Care Review  Outcome: Progressing  Goal: Patient-Specific Goal (Individualized)  Outcome: Progressing  Goal: Absence of Hospital-Acquired Illness or Injury  Outcome: Progressing  Goal: Optimal Comfort and Wellbeing  Outcome: Progressing  Goal: Readiness for Transition of Care  Outcome: Progressing     Problem: Diabetes Comorbidity  Goal: Blood Glucose Level Within Targeted Range  Outcome: Progressing     Problem: Stroke, Intracerebral Hemorrhage  Goal: Optimal Coping  Outcome: Progressing  Goal: Effective Bowel Elimination  Outcome: Progressing  Goal: Optimal Cerebral Tissue Perfusion  Outcome: Progressing  Goal: Optimal Cognitive Function  Outcome: Progressing  Goal: Effective Communication Skills  Outcome: Progressing  Goal: Optimal Functional Ability  Outcome: Progressing  Goal: Optimal Nutrition Intake  Outcome: Progressing  Goal: Optimal Pain Control and Function  Outcome: Progressing  Goal: Effective Oxygenation and Ventilation  Outcome: Progressing  Goal: Improved Sensorimotor Function  Outcome: Progressing  Goal: Safe and Effective Swallow  Outcome: Progressing  Goal: Effective Urinary Elimination  Outcome: Progressing     Problem: Skin Injury Risk Increased  Goal: Skin Health and Integrity  Outcome: Progressing     Problem: Fall Injury Risk  Goal: Absence of Fall and Fall-Related Injury  Outcome: Progressing  Intervention: Identify and Manage Contributors  Flowsheets (Taken 6/26/2025 1920)  Self-Care Promotion: independence encouraged  Intervention: Promote Injury-Free Environment  Flowsheets (Taken 6/26/2025 1920)  Safety Promotion/Fall Prevention:   assistive device/personal item within reach   nonskid shoes/socks when out of bed     Problem: Infection  Goal: Absence of Infection Signs and Symptoms  Outcome: Progressing     Problem: Coping Ineffective  Goal: Effective Coping  Outcome: Progressing

## 2025-06-28 LAB
POCT GLUCOSE: 124 MG/DL (ref 70–110)
POCT GLUCOSE: 194 MG/DL (ref 70–110)
POCT GLUCOSE: 258 MG/DL (ref 70–110)
POCT GLUCOSE: 398 MG/DL (ref 70–110)

## 2025-06-28 PROCEDURE — 63600175 PHARM REV CODE 636 W HCPCS

## 2025-06-28 PROCEDURE — 94761 N-INVAS EAR/PLS OXIMETRY MLT: CPT

## 2025-06-28 PROCEDURE — 63600175 PHARM REV CODE 636 W HCPCS: Performed by: INTERNAL MEDICINE

## 2025-06-28 PROCEDURE — 25000003 PHARM REV CODE 250: Performed by: NURSE PRACTITIONER

## 2025-06-28 PROCEDURE — 99900031 HC PATIENT EDUCATION (STAT)

## 2025-06-28 PROCEDURE — 25000003 PHARM REV CODE 250

## 2025-06-28 PROCEDURE — 25000003 PHARM REV CODE 250: Performed by: INTERNAL MEDICINE

## 2025-06-28 PROCEDURE — 94668 MNPJ CHEST WALL SBSQ: CPT

## 2025-06-28 PROCEDURE — 21400001 HC TELEMETRY ROOM

## 2025-06-28 PROCEDURE — 99900035 HC TECH TIME PER 15 MIN (STAT)

## 2025-06-28 RX ADMIN — ALUMINUM HYDROXIDE, MAGNESIUM HYDROXIDE, AND DIMETHICONE 30 ML: 200; 20; 200 SUSPENSION ORAL at 11:06

## 2025-06-28 RX ADMIN — HALOPERIDOL LACTATE 2 MG: 5 INJECTION, SOLUTION INTRAMUSCULAR at 07:06

## 2025-06-28 RX ADMIN — HYDROXYZINE HYDROCHLORIDE 25 MG: 50 INJECTION, SOLUTION INTRAMUSCULAR at 08:06

## 2025-06-28 RX ADMIN — SCOPOLAMINE 1 PATCH: 1 PATCH TRANSDERMAL at 01:06

## 2025-06-28 RX ADMIN — CARVEDILOL 12.5 MG: 12.5 TABLET, FILM COATED ORAL at 08:06

## 2025-06-28 RX ADMIN — Medication 6 MG: at 08:06

## 2025-06-28 RX ADMIN — RISPERIDONE 0.5 MG: 0.25 TABLET, FILM COATED ORAL at 08:06

## 2025-06-28 RX ADMIN — TRAZODONE HYDROCHLORIDE 100 MG: 100 TABLET ORAL at 08:06

## 2025-06-28 RX ADMIN — INSULIN ASPART 6 UNITS: 100 INJECTION, SOLUTION INTRAVENOUS; SUBCUTANEOUS at 12:06

## 2025-06-28 RX ADMIN — INSULIN ASPART 10 UNITS: 100 INJECTION, SOLUTION INTRAVENOUS; SUBCUTANEOUS at 05:06

## 2025-06-28 RX ADMIN — INSULIN GLARGINE 18 UNITS: 100 INJECTION, SOLUTION SUBCUTANEOUS at 08:06

## 2025-06-28 RX ADMIN — ALUMINUM HYDROXIDE, MAGNESIUM HYDROXIDE, AND DIMETHICONE 30 ML: 200; 20; 200 SUSPENSION ORAL at 08:06

## 2025-06-28 RX ADMIN — LOSARTAN POTASSIUM 100 MG: 50 TABLET, FILM COATED ORAL at 08:06

## 2025-06-28 RX ADMIN — SUCRALFATE 1 G: 1 TABLET ORAL at 11:06

## 2025-06-28 RX ADMIN — ALUMINUM HYDROXIDE, MAGNESIUM HYDROXIDE, AND DIMETHICONE 30 ML: 200; 20; 200 SUSPENSION ORAL at 05:06

## 2025-06-28 RX ADMIN — HYDROXYZINE HYDROCHLORIDE 25 MG: 50 INJECTION, SOLUTION INTRAMUSCULAR at 01:06

## 2025-06-28 RX ADMIN — INSULIN GLARGINE 18 UNITS: 100 INJECTION, SOLUTION SUBCUTANEOUS at 10:06

## 2025-06-28 RX ADMIN — SUCRALFATE 1 G: 1 TABLET ORAL at 05:06

## 2025-06-28 RX ADMIN — SUCRALFATE 1 G: 1 TABLET ORAL at 01:06

## 2025-06-28 RX ADMIN — INSULIN ASPART 2 UNITS: 100 INJECTION, SOLUTION INTRAVENOUS; SUBCUTANEOUS at 05:06

## 2025-06-28 NOTE — PROGRESS NOTES
[facility] Hospital Medicine Progress Note        Chief Complaint: Inpatient Follow-up       HPI: 64-year-old male with type 1 diabetes mellitus, CAD status post stenting, hypertension, hyperlipidemia, prior CVA  presented on 06/09/2025 for evaluation of sudden onset headache, confusion, slurred speech.      Workup revealed:  CT head -left occipital lobe parenchymal hemorrhage with trace adjacent subarachnoid hemorrhage.   CTA head/neck was also completed in ED, which revealed no large vessel occlusion, flow-limiting stenosis, aneurysm or evidence of a vascular malformation   MRI brain report:  1. Left occipital lobe hematoma with adjacent subarachnoid hemorrhage.  2. Innumerable chronic microhemorrhages with a subcortical location, may indicate underlying cerebral amyloid angiopathy.  3. Mild chronic microvascular ischemic changes     Neurology team evaluated the patient, initiated on stroke protocol, admitted to ICU services.  Neurosurgery team evaluated the patient, MRI likely suggestive of amyloid angiopathy, repeat CT head 6/10/25 unchanged, suggested holding antiplatelets for at least 2 weeks.  Patient is started on Keppra.  Neurosurgical team signed off.    Patient required Chao placement for retention, seen by Urology    Patient cleared to downgrade to floors 06/12/2025 PM by ICU team.    Palliative care team consulted while patient in ICU, code status DNR at the time of downgrade.      Pt had fever ,tmax 102.8, tachycardic,wbc count increased 17K, decided to obtain pan scan, CT head stable, but notes to have dense consolidation left >right LL, constipation  Respiratory cultures were obtained grew many strep group B Haemophilus influenzae.  Received antibiotics     Patient remains unsafe for p.o. intake with speech evaluations, informed decision with the patient's spouse was made, GI team consulted for PEG placement.    Psych was consulted as well to help with his delirium.       PTOT recommended moderate intensity therapy case management was consulted for placement.    Interval Hx:   Case management was having hard time placing due to delirium. Psych was reconsulted to help with delirium management.    Patient was seen and examined, chart was reviewed.    Objective/physical exam:  General: In no acute distress, afebrile  Chest: Clear to auscultation bilaterally  Heart:  +S1, S2, no appreciable murmur  Abdomen: Soft, nontender, BS +, PEG tube  MSK: Warm, no lower extremity edema, no clubbing or cyanosis  Neurologic: Alert and oriented x1    VITAL SIGNS: 24 HRS MIN & MAX LAST   Temp  Min: 97.6 °F (36.4 °C)  Max: 98.8 °F (37.1 °C) 98.1 °F (36.7 °C)   BP  Min: 96/52  Max: 167/85 (!) 102/56   Pulse  Min: 84  Max: 112  84   Resp  Min: 18  Max: 20 18   SpO2  Min: 85 %  Max: 96 % (!) 93 %     I have reviewed the following labs:  Recent Labs   Lab 06/23/25  0408 06/27/25  0735   WBC 15.08* 12.13*   RBC 4.34* 4.48*   HGB 13.4* 13.7*   HCT 42.3 42.1   MCV 97.5* 94.0   MCH 30.9 30.6   MCHC 31.7* 32.5*   RDW 11.9 12.2   * 413*   MPV 9.7 10.3     Recent Labs   Lab 06/23/25  0408 06/25/25  0437 06/27/25  0735    140 140   K 4.5 4.3 4.7    103 102   CO2 34* 28 33*   BUN 33.3* 28.6* 26.4*   CREATININE 0.97 0.83 0.90   * 174* 156*   CALCIUM 8.5* 9.1 9.2   MG 2.70* 2.50 2.40     Microbiology Results (last 7 days)       ** No results found for the last 168 hours. **             See below for Radiology    Assessment/Plan:  Hemorrhagic CVA-Left occipital lobe hematoma, adjacent subarachnoid hemorrhage  Urinary retention  Dense left lobe consolidation-Haemophilus influenza pneumonia  Delirium   Dysphagia s/p peg tube  Tobacco use       HX: T1 dm, CAD s/p PCI, HTN, HLD, history of CVA    Plan   Seen by Neurology, Neurosurgery.  Discontinued Keppra on 06/23/2025 .  Stable from respiratory standpoint.Completed antibiotics.  Been afebrile.  Obtain blood cultures if spiking fevers  .  Psych on board  Continue tube feedings, monitor electrolytes and replete appropriately, ensure regular bowel movements.  Continue current insulin regimen, monitor blood sugars  Monitor blood pressure and adjust the regimen as needed.  Keep SBP less than 140/90.  Continue supportive care.  PT OT.  Needs placement when he is off soft restraints .  We will discuss with psych if delirium not improving    VTE prophylaxis: scds      Anticipated discharge and Disposition:  To be decided  , placement once off restraints      All diagnosis and differential diagnosis have been reviewed; assessment and plan has been documented; I have personally reviewed the labs and test results that are presently available; I have reviewed the patients medication list; I have reviewed the consulting providers response and recommendations. I have reviewed or attempted to review medical records based upon their availability    All of the patient's questions have been  addressed and answered. Patient's is agreeable to the above stated plan. I will continue to monitor closely and make adjustments to medical management as needed.    Portions of this note dictated using EMR integrated voice recognition software, and may be subject to voice recognition errors not corrected at proofreading. Please contact writer for clarification if needed.   _____________________________________________________________________    Malnutrition Status:  Nutrition consulted. Most recent weight and BMI monitored-     Measurements:  Wt Readings from Last 1 Encounters:   06/11/25 65 kg (143 lb 4.8 oz)   Body mass index is 26.21 kg/m².    Patient has been screened and assessed by RD.    Malnutrition Type:  Context:    Level: other (see comments) (Does not meet criteria)    Malnutrition Characteristic Summary:  Weight Loss (Malnutrition): other (see comments) (Unable to assess)  Energy Intake (Malnutrition): other (see comments) (Unable to assess)  Muscle Mass  (Malnutrition): mild depletion    Interventions/Recommendations (treatment strategy):        Scheduled Med:   aluminum-magnesium hydroxide-simethicone  30 mL Per G Tube QID (AC & HS)    carvediloL  12.5 mg Per G Tube BID    insulin glargine U-100  18 Units Subcutaneous BID    losartan  100 mg Per G Tube Daily    melatonin  6 mg Per G Tube Nightly    risperiDONE  0.5 mg Oral BID    scopolamine  1 patch Transdermal Q3 Days    sucralfate  1 g Per G Tube Q6H    traZODone  100 mg Per G Tube QHS      Continuous Infusions:     PRN Meds:    Current Facility-Administered Medications:     acetaminophen, 650 mg, Per G Tube, Q6H PRN    bisacodyL, 10 mg, Rectal, Daily PRN    butalbital-acetaminophen-caffeine -40 mg, 1 tablet, Per G Tube, Q4H PRN    dextrose 50%, 12.5 g, Intravenous, PRN    dextrose 50%, 12.5 g, Intravenous, PRN    dextrose 50%, 25 g, Intravenous, PRN    glucagon (human recombinant), 1 mg, Intramuscular, PRN    glucagon (human recombinant), 1 mg, Intramuscular, PRN    glucose, 16 g, Oral, PRN    glucose, 24 g, Oral, PRN    haloperidol lactate, 2 mg, Intravenous, Q6H PRN    hydrOXYzine, 25 mg, Intramuscular, Q6H PRN    insulin aspart U-100, 0-10 Units, Subcutaneous, QID (AC + HS) PRN    labetalol, 10 mg, Intravenous, Q15 Min PRN    levalbuterol, 0.63 mg, Nebulization, Q4H PRN    loperamide, 2 mg, Per G Tube, QID PRN    ondansetron, 4 mg, Intravenous, Q8H PRN    sodium chloride 0.9%, 10 mL, Intravenous, PRN     Radiology:  I have personally reviewed the following imaging and agree with the radiologist.     CT Cervical Spine Without Contrast  Narrative: EXAMINATION:  CT CERVICAL SPINE WITHOUT CONTRAST    CLINICAL HISTORY:  fall; fall.  Altered mental status.  Intracranial hemorrhage.    TECHNIQUE:  Low dose helical acquired images with axial, sagittal and coronal reformations though the cervical spine.  Contrast was not administered.    All CT scans at this location are performed using dose optimization  techniques as appropriate to a performed exam including the following automated exposure control, adjustment of the mA and/or kV according to patient size and/or use of iterative reconstruction technique    DLP: 400 mGycm    COMPARISON:  No relevant prior available for comparison.    FINDINGS:  BONES: No fracture. Normal alignment. The dens is intact, the lateral masses of C1 are normally aligned, and the atlantodental interval is normal.    DISCS AND FACETS: Multilevel disc space narrowing with anterior and posterior disc osteophytes.  Uncovertebral hypertrophy.  Facet arthropathy.    SPINAL CANAL AND NEURAL FORAMINA: Posterior disc osteophytes mildly narrow the central canal.  For example at C4-C5 AP diameter of the thecal sac estimated 8 mm.  Facet and uncovertebral hypertrophy contribute to neural foraminal stenosis.    SOFT TISSUES: There are atherosclerotic calcifications at the left carotid bulb.    LUNG APICES: Clear  Impression: Degenerative change without appreciable acute osseous abnormality by CT evaluation    The preliminary and final reports are concordant.    Electronically signed by: Kiya Washington  Date:    06/18/2025  Time:    08:06  CT Head Without Contrast  Narrative: EXAMINATION:  CT HEAD WITHOUT CONTRAST    CLINICAL HISTORY:  fall;  altered mental status    TECHNIQUE:  Low dose axial CT images obtained throughout the head without intravenous contrast.  Axial, sagittal and coronal reconstructions were performed and interpreted.    DLP: 1370 mGycm    All CT scans at this location are performed using dose optimization techniques as appropriate to a performed exam including the following automated exposure control, adjustment of the mA and/or kV according to patient size and/or use of iterative reconstruction technique    COMPARISON:  CT head 06/14/2025    FINDINGS:  BRAIN: Left parietooccipital hemorrhage measures approximately 3.3 x 3.3 cm; similar in volume to previous exam.  Margins are  slightly less distinct compared to prior with decreasing density compatible with expected evolution of hemorrhage.  Mild surrounding edema similar to prior.  No significant mass effect.  No midline shift. Periventricular and subcortical white matter changes most compatible with chronic small vessel ischemic disease.    VENTRICLES: Normal in size and configuration.    EXTRA-AXIAL: Trace subarachnoid hemorrhage the left parietal lobe, improved from prior    BONES: Calvarium is intact.    SINUSES AND MASTOIDS: Question postsurgical change at the right mastoid with opacification of the mastoid and middle ear.  Opacification of the left mastoid and middle ear.  Mucoperiosteal thickening at the maxillary sinuses and ethmoid sinuses.    OTHER: Unchanged hyperdensity at the posterior chamber left globe.  Impression: Expected evolution at the left cerebral hemorrhage with mild edema.  No midline shift.    Trace subarachnoid hemorrhage.    The preliminary and final reports are concordant.    Electronically signed by: Kiya Washington  Date:    06/18/2025  Time:    07:59      Margarita Campos MD  Department of Hospital Medicine   [facility]   06/28/2025

## 2025-06-29 LAB
GLUCOSE SERPL-MCNC: 362 MG/DL (ref 70–110)
POCT GLUCOSE: 122 MG/DL (ref 70–110)
POCT GLUCOSE: 145 MG/DL (ref 70–110)
POCT GLUCOSE: 160 MG/DL (ref 70–110)
POCT GLUCOSE: 170 MG/DL (ref 70–110)
POCT GLUCOSE: 362 MG/DL (ref 70–110)

## 2025-06-29 PROCEDURE — 63600175 PHARM REV CODE 636 W HCPCS: Performed by: INTERNAL MEDICINE

## 2025-06-29 PROCEDURE — 21400001 HC TELEMETRY ROOM

## 2025-06-29 PROCEDURE — 25000003 PHARM REV CODE 250

## 2025-06-29 PROCEDURE — 25000003 PHARM REV CODE 250: Performed by: INTERNAL MEDICINE

## 2025-06-29 PROCEDURE — 63600175 PHARM REV CODE 636 W HCPCS

## 2025-06-29 RX ADMIN — SUCRALFATE 1 G: 1 TABLET ORAL at 05:06

## 2025-06-29 RX ADMIN — RISPERIDONE 0.5 MG: 0.25 TABLET, FILM COATED ORAL at 08:06

## 2025-06-29 RX ADMIN — INSULIN ASPART 2 UNITS: 100 INJECTION, SOLUTION INTRAVENOUS; SUBCUTANEOUS at 12:06

## 2025-06-29 RX ADMIN — LOSARTAN POTASSIUM 100 MG: 50 TABLET, FILM COATED ORAL at 08:06

## 2025-06-29 RX ADMIN — INSULIN GLARGINE 18 UNITS: 100 INJECTION, SOLUTION SUBCUTANEOUS at 08:06

## 2025-06-29 RX ADMIN — Medication 6 MG: at 08:06

## 2025-06-29 RX ADMIN — TRAZODONE HYDROCHLORIDE 100 MG: 100 TABLET ORAL at 08:06

## 2025-06-29 RX ADMIN — INSULIN ASPART 2 UNITS: 100 INJECTION, SOLUTION INTRAVENOUS; SUBCUTANEOUS at 05:06

## 2025-06-29 RX ADMIN — SUCRALFATE 1 G: 1 TABLET ORAL at 01:06

## 2025-06-29 RX ADMIN — INSULIN ASPART 10 UNITS: 100 INJECTION, SOLUTION INTRAVENOUS; SUBCUTANEOUS at 06:06

## 2025-06-29 RX ADMIN — ALUMINUM HYDROXIDE, MAGNESIUM HYDROXIDE, AND DIMETHICONE 30 ML: 200; 20; 200 SUSPENSION ORAL at 11:06

## 2025-06-29 RX ADMIN — CARVEDILOL 12.5 MG: 12.5 TABLET, FILM COATED ORAL at 08:06

## 2025-06-29 RX ADMIN — ALUMINUM HYDROXIDE, MAGNESIUM HYDROXIDE, AND DIMETHICONE 30 ML: 200; 20; 200 SUSPENSION ORAL at 08:06

## 2025-06-29 RX ADMIN — SUCRALFATE 1 G: 1 TABLET ORAL at 06:06

## 2025-06-29 RX ADMIN — SUCRALFATE 1 G: 1 TABLET ORAL at 11:06

## 2025-06-29 RX ADMIN — HALOPERIDOL LACTATE 2 MG: 5 INJECTION, SOLUTION INTRAMUSCULAR at 03:06

## 2025-06-29 RX ADMIN — ALUMINUM HYDROXIDE, MAGNESIUM HYDROXIDE, AND DIMETHICONE 30 ML: 200; 20; 200 SUSPENSION ORAL at 06:06

## 2025-06-29 NOTE — PLAN OF CARE
Problem: Adult Inpatient Plan of Care  Goal: Patient-Specific Goal (Individualized)  Outcome: Progressing  Goal: Absence of Hospital-Acquired Illness or Injury  Outcome: Progressing  Goal: Optimal Comfort and Wellbeing  Outcome: Progressing     Problem: Stroke, Intracerebral Hemorrhage  Goal: Optimal Coping  Outcome: Progressing  Goal: Optimal Nutrition Intake  Outcome: Progressing  Goal: Optimal Pain Control and Function  Outcome: Progressing  Goal: Effective Oxygenation and Ventilation  Outcome: Progressing

## 2025-06-29 NOTE — PROGRESS NOTES
[facility] Hospital Medicine Progress Note        Chief Complaint: Inpatient Follow-up       HPI: 64-year-old male with type 1 diabetes mellitus, CAD status post stenting, hypertension, hyperlipidemia, prior CVA  presented on 06/09/2025 for evaluation of sudden onset headache, confusion, slurred speech.      Workup revealed:  CT head -left occipital lobe parenchymal hemorrhage with trace adjacent subarachnoid hemorrhage.   CTA head/neck was also completed in ED, which revealed no large vessel occlusion, flow-limiting stenosis, aneurysm or evidence of a vascular malformation   MRI brain report:  1. Left occipital lobe hematoma with adjacent subarachnoid hemorrhage.  2. Innumerable chronic microhemorrhages with a subcortical location, may indicate underlying cerebral amyloid angiopathy.  3. Mild chronic microvascular ischemic changes     Neurology team evaluated the patient, initiated on stroke protocol, admitted to ICU services.  Neurosurgery team evaluated the patient, MRI likely suggestive of amyloid angiopathy, repeat CT head 6/10/25 unchanged, suggested holding antiplatelets for at least 2 weeks.  Patient is started on Keppra.  Neurosurgical team signed off.    Patient required Chao placement for retention, seen by Urology    Patient cleared to downgrade to floors 06/12/2025 PM by ICU team.    Palliative care team consulted while patient in ICU, code status DNR at the time of downgrade.      Pt had fever ,tmax 102.8, tachycardic,wbc count increased 17K, decided to obtain pan scan, CT head stable, but notes to have dense consolidation left >right LL, constipation  Respiratory cultures were obtained grew many strep group B Haemophilus influenzae.  Received antibiotics     Patient remains unsafe for p.o. intake with speech evaluations, informed decision with the patient's spouse was made, GI team consulted for PEG placement.    Psych was consulted as well to help with his delirium.       PTOT recommended moderate intensity therapy case management was consulted for placement.    Case management was having hard time placing due to delirium. Psych was reconsulted to help with delirium management.    Interval Hx:   Psych following, adjusting medications for delirium.  No overnight events reported    Patient was seen and examined, chart was reviewed.    Objective/physical exam:  General: In no acute distress, afebrile  Chest: Clear to auscultation bilaterally  Heart:  +S1, S2, no appreciable murmur  Abdomen: Soft, nontender, BS +, PEG tube  MSK: Warm, no lower extremity edema, no clubbing or cyanosis  Neurologic: Alert and oriented x1, does not follow commands    VITAL SIGNS: 24 HRS MIN & MAX LAST   Temp  Min: 97.7 °F (36.5 °C)  Max: 99.1 °F (37.3 °C) 99.1 °F (37.3 °C)   BP  Min: 96/52  Max: 183/80 101/64   Pulse  Min: 84  Max: 108  88   Resp  Min: 18  Max: 20 20   SpO2  Min: 91 %  Max: 95 % 95 %     I have reviewed the following labs:  Recent Labs   Lab 06/23/25  0408 06/27/25  0735   WBC 15.08* 12.13*   RBC 4.34* 4.48*   HGB 13.4* 13.7*   HCT 42.3 42.1   MCV 97.5* 94.0   MCH 30.9 30.6   MCHC 31.7* 32.5*   RDW 11.9 12.2   * 413*   MPV 9.7 10.3     Recent Labs   Lab 06/23/25  0408 06/25/25  0437 06/27/25  0735    140 140   K 4.5 4.3 4.7    103 102   CO2 34* 28 33*   BUN 33.3* 28.6* 26.4*   CREATININE 0.97 0.83 0.90   * 174* 156*   CALCIUM 8.5* 9.1 9.2   MG 2.70* 2.50 2.40     Microbiology Results (last 7 days)       ** No results found for the last 168 hours. **             See below for Radiology    Assessment/Plan:  Hemorrhagic CVA-Left occipital lobe hematoma, adjacent subarachnoid hemorrhage  Urinary retention  Dense left lobe consolidation-Haemophilus influenza pneumonia  Delirium   Dysphagia s/p peg tube  Tobacco use       HX: T1 dm, CAD s/p PCI, HTN, HLD, history of CVA    Plan   Seen by Neurology, Neurosurgery.  Discontinued Keppra on 06/23/2025 .  Stable from  respiratory standpoint.Completed antibiotics.  Been afebrile.  Obtain blood cultures if spiking fevers .  Psych on board .  Continue tube feedings, monitor electrolytes and replete appropriately, ensure regular bowel movements.  Continue current insulin regimen, monitor blood sugars  Monitor blood pressure and adjust the regimen as needed.  Keep SBP less than 140/90.  Continue supportive care.  PT OT.  Needs placement when he is off soft restraints .  We will discuss with psych if delirium not improving.  Will plan to monitor off of retsraints tonight  Voiding trial eventually    VTE prophylaxis: scds      Anticipated discharge and Disposition:  To be decided  , placement once off restraints      All diagnosis and differential diagnosis have been reviewed; assessment and plan has been documented; I have personally reviewed the labs and test results that are presently available; I have reviewed the patients medication list; I have reviewed the consulting providers response and recommendations. I have reviewed or attempted to review medical records based upon their availability    All of the patient's questions have been  addressed and answered. Patient's is agreeable to the above stated plan. I will continue to monitor closely and make adjustments to medical management as needed.    Portions of this note dictated using EMR integrated voice recognition software, and may be subject to voice recognition errors not corrected at proofreading. Please contact writer for clarification if needed.   _____________________________________________________________________    Malnutrition Status:  Nutrition consulted. Most recent weight and BMI monitored-     Measurements:  Wt Readings from Last 1 Encounters:   06/11/25 65 kg (143 lb 4.8 oz)   Body mass index is 26.21 kg/m².    Patient has been screened and assessed by RD.    Malnutrition Type:  Context:    Level: other (see comments) (Does not meet criteria)    Malnutrition  Characteristic Summary:  Weight Loss (Malnutrition): other (see comments) (Unable to assess)  Energy Intake (Malnutrition): other (see comments) (Unable to assess)  Muscle Mass (Malnutrition): mild depletion    Interventions/Recommendations (treatment strategy):        Scheduled Med:   aluminum-magnesium hydroxide-simethicone  30 mL Per G Tube QID (AC & HS)    carvediloL  12.5 mg Per G Tube BID    insulin glargine U-100  18 Units Subcutaneous BID    losartan  100 mg Per G Tube Daily    melatonin  6 mg Per G Tube Nightly    risperiDONE  0.5 mg Oral BID    scopolamine  1 patch Transdermal Q3 Days    sucralfate  1 g Per G Tube Q6H    traZODone  100 mg Per G Tube QHS      Continuous Infusions:     PRN Meds:    Current Facility-Administered Medications:     acetaminophen, 650 mg, Per G Tube, Q6H PRN    bisacodyL, 10 mg, Rectal, Daily PRN    butalbital-acetaminophen-caffeine -40 mg, 1 tablet, Per G Tube, Q4H PRN    dextrose 50%, 12.5 g, Intravenous, PRN    dextrose 50%, 12.5 g, Intravenous, PRN    dextrose 50%, 25 g, Intravenous, PRN    glucagon (human recombinant), 1 mg, Intramuscular, PRN    glucagon (human recombinant), 1 mg, Intramuscular, PRN    glucose, 16 g, Oral, PRN    glucose, 24 g, Oral, PRN    haloperidol lactate, 2 mg, Intravenous, Q6H PRN    hydrOXYzine, 25 mg, Intramuscular, Q6H PRN    insulin aspart U-100, 0-10 Units, Subcutaneous, QID (AC + HS) PRN    labetalol, 10 mg, Intravenous, Q15 Min PRN    levalbuterol, 0.63 mg, Nebulization, Q4H PRN    loperamide, 2 mg, Per G Tube, QID PRN    ondansetron, 4 mg, Intravenous, Q8H PRN    sodium chloride 0.9%, 10 mL, Intravenous, PRN     Radiology:  I have personally reviewed the following imaging and agree with the radiologist.     CT Cervical Spine Without Contrast  Narrative: EXAMINATION:  CT CERVICAL SPINE WITHOUT CONTRAST    CLINICAL HISTORY:  fall; fall.  Altered mental status.  Intracranial hemorrhage.    TECHNIQUE:  Low dose helical acquired images with  axial, sagittal and coronal reformations though the cervical spine.  Contrast was not administered.    All CT scans at this location are performed using dose optimization techniques as appropriate to a performed exam including the following automated exposure control, adjustment of the mA and/or kV according to patient size and/or use of iterative reconstruction technique    DLP: 400 mGycm    COMPARISON:  No relevant prior available for comparison.    FINDINGS:  BONES: No fracture. Normal alignment. The dens is intact, the lateral masses of C1 are normally aligned, and the atlantodental interval is normal.    DISCS AND FACETS: Multilevel disc space narrowing with anterior and posterior disc osteophytes.  Uncovertebral hypertrophy.  Facet arthropathy.    SPINAL CANAL AND NEURAL FORAMINA: Posterior disc osteophytes mildly narrow the central canal.  For example at C4-C5 AP diameter of the thecal sac estimated 8 mm.  Facet and uncovertebral hypertrophy contribute to neural foraminal stenosis.    SOFT TISSUES: There are atherosclerotic calcifications at the left carotid bulb.    LUNG APICES: Clear  Impression: Degenerative change without appreciable acute osseous abnormality by CT evaluation    The preliminary and final reports are concordant.    Electronically signed by: Kiya Washington  Date:    06/18/2025  Time:    08:06  CT Head Without Contrast  Narrative: EXAMINATION:  CT HEAD WITHOUT CONTRAST    CLINICAL HISTORY:  fall;  altered mental status    TECHNIQUE:  Low dose axial CT images obtained throughout the head without intravenous contrast.  Axial, sagittal and coronal reconstructions were performed and interpreted.    DLP: 1370 mGycm    All CT scans at this location are performed using dose optimization techniques as appropriate to a performed exam including the following automated exposure control, adjustment of the mA and/or kV according to patient size and/or use of iterative reconstruction  technique    COMPARISON:  CT head 06/14/2025    FINDINGS:  BRAIN: Left parietooccipital hemorrhage measures approximately 3.3 x 3.3 cm; similar in volume to previous exam.  Margins are slightly less distinct compared to prior with decreasing density compatible with expected evolution of hemorrhage.  Mild surrounding edema similar to prior.  No significant mass effect.  No midline shift. Periventricular and subcortical white matter changes most compatible with chronic small vessel ischemic disease.    VENTRICLES: Normal in size and configuration.    EXTRA-AXIAL: Trace subarachnoid hemorrhage the left parietal lobe, improved from prior    BONES: Calvarium is intact.    SINUSES AND MASTOIDS: Question postsurgical change at the right mastoid with opacification of the mastoid and middle ear.  Opacification of the left mastoid and middle ear.  Mucoperiosteal thickening at the maxillary sinuses and ethmoid sinuses.    OTHER: Unchanged hyperdensity at the posterior chamber left globe.  Impression: Expected evolution at the left cerebral hemorrhage with mild edema.  No midline shift.    Trace subarachnoid hemorrhage.    The preliminary and final reports are concordant.    Electronically signed by: Kiya Washington  Date:    06/18/2025  Time:    07:59      Margarita Campos MD  Department of Hospital Medicine   [facility]   06/29/2025

## 2025-06-30 LAB
POCT GLUCOSE: 164 MG/DL (ref 70–110)
POCT GLUCOSE: 169 MG/DL (ref 70–110)
POCT GLUCOSE: 236 MG/DL (ref 70–110)
POCT GLUCOSE: 413 MG/DL (ref 70–110)

## 2025-06-30 PROCEDURE — 63600175 PHARM REV CODE 636 W HCPCS: Performed by: INTERNAL MEDICINE

## 2025-06-30 PROCEDURE — 25000242 PHARM REV CODE 250 ALT 637 W/ HCPCS: Performed by: NURSE PRACTITIONER

## 2025-06-30 PROCEDURE — 25000003 PHARM REV CODE 250: Performed by: INTERNAL MEDICINE

## 2025-06-30 PROCEDURE — 94761 N-INVAS EAR/PLS OXIMETRY MLT: CPT

## 2025-06-30 PROCEDURE — 25000003 PHARM REV CODE 250

## 2025-06-30 PROCEDURE — 21400001 HC TELEMETRY ROOM

## 2025-06-30 PROCEDURE — 99900035 HC TECH TIME PER 15 MIN (STAT)

## 2025-06-30 PROCEDURE — 99232 SBSQ HOSP IP/OBS MODERATE 35: CPT | Mod: ,,,

## 2025-06-30 PROCEDURE — 99900031 HC PATIENT EDUCATION (STAT)

## 2025-06-30 RX ADMIN — SUCRALFATE 1 G: 1 TABLET ORAL at 11:06

## 2025-06-30 RX ADMIN — INSULIN ASPART 2 UNITS: 100 INJECTION, SOLUTION INTRAVENOUS; SUBCUTANEOUS at 05:06

## 2025-06-30 RX ADMIN — ALUMINUM HYDROXIDE, MAGNESIUM HYDROXIDE, AND DIMETHICONE 30 ML: 200; 20; 200 SUSPENSION ORAL at 05:06

## 2025-06-30 RX ADMIN — INSULIN GLARGINE 18 UNITS: 100 INJECTION, SOLUTION SUBCUTANEOUS at 09:06

## 2025-06-30 RX ADMIN — INSULIN ASPART 2 UNITS: 100 INJECTION, SOLUTION INTRAVENOUS; SUBCUTANEOUS at 11:06

## 2025-06-30 RX ADMIN — SUCRALFATE 1 G: 1 TABLET ORAL at 05:06

## 2025-06-30 RX ADMIN — SUCRALFATE 1 G: 1 TABLET ORAL at 12:06

## 2025-06-30 RX ADMIN — CARVEDILOL 12.5 MG: 12.5 TABLET, FILM COATED ORAL at 08:06

## 2025-06-30 RX ADMIN — LEVALBUTEROL HYDROCHLORIDE 0.63 MG: 0.63 SOLUTION RESPIRATORY (INHALATION) at 09:06

## 2025-06-30 RX ADMIN — ALUMINUM HYDROXIDE, MAGNESIUM HYDROXIDE, AND DIMETHICONE 30 ML: 200; 20; 200 SUSPENSION ORAL at 11:06

## 2025-06-30 RX ADMIN — INSULIN GLARGINE 18 UNITS: 100 INJECTION, SOLUTION SUBCUTANEOUS at 08:06

## 2025-06-30 RX ADMIN — TRAZODONE HYDROCHLORIDE 100 MG: 100 TABLET ORAL at 09:06

## 2025-06-30 RX ADMIN — ALUMINUM HYDROXIDE, MAGNESIUM HYDROXIDE, AND DIMETHICONE 30 ML: 200; 20; 200 SUSPENSION ORAL at 09:06

## 2025-06-30 RX ADMIN — Medication 6 MG: at 09:06

## 2025-06-30 RX ADMIN — CARVEDILOL 12.5 MG: 12.5 TABLET, FILM COATED ORAL at 09:06

## 2025-06-30 RX ADMIN — LOSARTAN POTASSIUM 100 MG: 50 TABLET, FILM COATED ORAL at 08:06

## 2025-06-30 RX ADMIN — INSULIN ASPART 10 UNITS: 100 INJECTION, SOLUTION INTRAVENOUS; SUBCUTANEOUS at 06:06

## 2025-06-30 RX ADMIN — RISPERIDONE 0.5 MG: 0.25 TABLET, FILM COATED ORAL at 08:06

## 2025-06-30 RX ADMIN — RISPERIDONE 0.5 MG: 0.25 TABLET, FILM COATED ORAL at 09:06

## 2025-06-30 NOTE — PT/OT/SLP PROGRESS
Occupational Therapy      Patient Name:  Federico Villarreal   MRN:  [id number]    OT attempt bedside therapy intervention for 2 attempts (1105 and 1331) patient not seen today secondary to Unarousable for both attempts despite maximal verbal/tactile cues. Will follow-up 7/1.    6/30/2025

## 2025-06-30 NOTE — PLAN OF CARE
Problem: Adult Inpatient Plan of Care  Goal: Plan of Care Review  Outcome: Progressing  Goal: Absence of Hospital-Acquired Illness or Injury  Outcome: Progressing  Goal: Optimal Comfort and Wellbeing  Outcome: Progressing     Problem: Stroke, Intracerebral Hemorrhage  Goal: Effective Bowel Elimination  Outcome: Progressing     Problem: Skin Injury Risk Increased  Goal: Skin Health and Integrity  Outcome: Progressing     Problem: Fall Injury Risk  Goal: Absence of Fall and Fall-Related Injury  Outcome: Progressing     Problem: Infection  Goal: Absence of Infection Signs and Symptoms  Outcome: Progressing

## 2025-06-30 NOTE — PROGRESS NOTES
Patient Name: Federico Villarreal   MRN: [id number]   Admission Date: 6/9/2025   Hospital Length of Stay: 21   Attending Provider: Margarita Campos MD   Consulting Provider: Tiki HINKLE  Reason for Consult: Goals of Care  Primary Care Physician:  Nicole Blanchard FNP     Principal Problem: Hemorrhagic stroke       Final diagnoses:  [R41.82] AMS (altered mental status)  [I61.9] Intraparenchymal hemorrhage of brain (Primary)  [I10] Hypertension, unspecified type      Assessment/Plan:     I reviewed the patient and family's understanding of the seriousness of the illness and its expected prognosis. We discussed the patient's goals of care and treatment preferences.        Called and spoke to his wife, Juliana. She states she comes to visit every morning, lunch and then again in the evening. She works across the street in the [employer]. I updated her and explained that he has been very somnolent today and was unable to participate in therapy. She did notice he did not wake up this morning and at lunch for their visit. I explained that this could be possibly due to his medications. It has been tough controlling his agitation. She verbalizes understanding. Support provided.  Reflective listening, validation concerns, and normalization of fears provided.    Palliative medicine will continue to be available if needed.    Discussed with nursing    Interval History:     Patient was started on Risperidone on Friday. He received a dose of haldol yesterday afternoon. Today, he is somnolent and difficult to arouse. Unable to participate with therapy.       Active Ambulatory Problems     Diagnosis Date Noted    Cataract of both eyes 07/22/2022    Hearing loss 07/22/2022    Hyperlipidemia 07/22/2022    Hypertension 07/22/2022    Retinal disorder 07/22/2022    Tobacco user 07/22/2022    Type 1 diabetes mellitus 07/22/2022    Vitamin D deficiency 07/22/2022    Coronary artery disease involving native coronary artery of native  heart without angina pectoris 07/22/2022    PAD (peripheral artery disease) 07/22/2022    Type 1 diabetes mellitus with microalbuminuria 01/29/2024    Carotid artery disease 05/28/2024    Leukocytosis 07/02/2024    Diabetic polyneuropathy associated with type 1 diabetes mellitus 07/02/2024    Ischemic stroke 09/05/2024    Need for vaccination 10/02/2024     Resolved Ambulatory Problems     Diagnosis Date Noted    Wellness examination 07/22/2022    TIA (transient ischemic attack) 01/11/2024     Past Medical History:   Diagnosis Date    Acne     Cataract     Coronary artery disease     Diabetes mellitus     Seasonal allergies     Stroke         Past Surgical History:   Procedure Laterality Date    ADENOIDECTOMY      ADENOIDECTOMY  1972    As a child    CORONARY STENT PLACEMENT  08/20/2012    ESOPHAGOGASTRODUODENOSCOPY W/ PEG N/A 6/16/2025    Procedure: PEG;  Surgeon: Mike Navarro MD;  Location: [facility] ENDOSCOPY;  Service: Gastroenterology;  Laterality: N/A;    EYE SURGERY  2011    Multiple    INNER EAR SURGERY      REPAIR OF RETINAL DETACHMENT WITH VITRECTOMY      TONSILLECTOMY      VITRECTOMY          Review of patient's allergies indicates:  No Known Allergies     Current Medications[1]       Current Facility-Administered Medications:     acetaminophen, 650 mg, Per G Tube, Q6H PRN    bisacodyL, 10 mg, Rectal, Daily PRN    butalbital-acetaminophen-caffeine -40 mg, 1 tablet, Per G Tube, Q4H PRN    dextrose 50%, 12.5 g, Intravenous, PRN    dextrose 50%, 12.5 g, Intravenous, PRN    dextrose 50%, 25 g, Intravenous, PRN    glucagon (human recombinant), 1 mg, Intramuscular, PRN    glucagon (human recombinant), 1 mg, Intramuscular, PRN    glucose, 16 g, Oral, PRN    glucose, 24 g, Oral, PRN    haloperidol lactate, 2 mg, Intravenous, Q6H PRN    hydrOXYzine, 25 mg, Intramuscular, Q6H PRN    insulin aspart U-100, 0-10 Units, Subcutaneous, QID (AC + HS) PRN    labetalol, 10 mg, Intravenous, Q15 Min PRN     "levalbuterol, 0.63 mg, Nebulization, Q4H PRN    loperamide, 2 mg, Per G Tube, QID PRN    ondansetron, 4 mg, Intravenous, Q8H PRN    sodium chloride 0.9%, 10 mL, Intravenous, PRN     Family History   Problem Relation Name Age of Onset    Diabetes Mother Mamta Villarreal     Diabetes Father Mayito Villarreal     Dementia Father Mayito Villarreal           Review of Systems   Unable to perform ROS: Patient nonverbal            Objective:   BP (!) 97/54   Pulse 95   Temp 99.2 °F (37.3 °C) (Oral)   Resp 18   Ht 5' 2" (1.575 m)   Wt 65 kg (143 lb 4.8 oz)   SpO2 (!) 92%   BMI 26.21 kg/m²      Physical Exam   Constitutional: He appears lethargic. He is sleeping. He appears ill.   HENT:   Head: Normocephalic and atraumatic.   Cardiovascular: Normal rate. Pulmonary:      Effort: Pulmonary effort is normal.     Neurological: He appears lethargic.   Severely obtunded            Review of Symptoms      Symptom Assessment (ESAS 0-10 Scale)  Pain:  0  Dyspnea:  0  Anxiety:  0  Nausea:  0  Depression:  0  Anorexia:  0  Fatigue:  0  Insomnia:  0  Restlessness:  0  Agitation:  0         Bowel Management Plan (BMP):  Yes      Living Arrangements:  Lives with spouse    Psychosocial/Cultural:   See Palliative Psychosocial Note: Yes  **Primary  to Follow**  Palliative Care  Consult: No      Advance Care Planning   Advance Directives:   Do Not Resuscitate Status: Yes      Decision Making:  Family answered questions  Goals of Care: What is most important right now is to focus on symptom/pain control, improvement in condition but with limits to invasive therapies, comfort and QOL . Accordingly, we have decided that the best plan to meet the patient's goals includes continuing with treatment.          PAINAD: NA    Caregiver burden formerly assessed: Yes      No results displayed because visit has over 200 results.               > 50% of 35 min of encounter was spent in chart review, face to face discussion of goals of " care, symptom assessment, coordination of care and emotional support.    Tiki Rivera, XAVIERP  Palliative Medicine  [facility]         [1]   Current Facility-Administered Medications:     acetaminophen tablet 650 mg, 650 mg, Per G Tube, Q6H PRN, Simon Martinez MD, 650 mg at 06/23/25 1802    aluminum-magnesium hydroxide-simethicone 200-200-20 mg/5 mL suspension 30 mL, 30 mL, Per G Tube, QID (AC & HS), Simon Martinez MD, 30 mL at 06/30/25 1151    bisacodyL suppository 10 mg, 10 mg, Rectal, Daily PRN, Bharat Dominguez MD, 10 mg at 06/19/25 2103    butalbital-acetaminophen-caffeine -40 mg per tablet 1 tablet, 1 tablet, Per G Tube, Q4H PRN, Simon Martinez MD, 1 tablet at 06/26/25 0109    carvediloL tablet 12.5 mg, 12.5 mg, Per G Tube, BID, Simon Martinez MD, 12.5 mg at 06/30/25 0815    dextrose 50% injection 12.5 g, 12.5 g, Intravenous, PRNMichelle Praneet, MD    dextrose 50% injection 12.5 g, 12.5 g, Intravenous, PRN, Shaw Christopher MD    dextrose 50% injection 25 g, 25 g, Intravenous, PRNMichelle Praneet, MD    glucagon (human recombinant) injection 1 mg, 1 mg, Intramuscular, PRNMichelle Praneet, MD    glucagon (human recombinant) injection 1 mg, 1 mg, Intramuscular, PRN, Shaw Christopher MD    glucose chewable tablet 16 g, 16 g, Oral, PRNMichelle Praneet, MD, 16 g at 06/24/25 2209    glucose chewable tablet 24 g, 24 g, Oral, PRNMichelle Praneet, MD    haloperidol lactate injection 2 mg, 2 mg, Intravenous, Q6H PRN, Federico Perea NP, 2 mg at 06/29/25 1555    hydrOXYzine injection 25 mg, 25 mg, Intramuscular, Q6H PRN, Federico Perea, JOSEPH, 25 mg at 06/28/25 2001    insulin aspart U-100 injection 0-10 Units, 0-10 Units, Subcutaneous, QID (AC + HS) PRN, Shaw Christopher MD, 2 Units at 06/30/25 1150    insulin glargine U-100 (Lantus) injection 18 Units, 18 Units, Subcutaneous, BID, Simon Martinez MD, 18 Units at 06/30/25 0815     labetaloL injection 10 mg, 10 mg, Intravenous, Q15 Min PRN, Bharat Dominguez MD, 10 mg at 06/17/25 0428    levalbuterol nebulizer solution 0.63 mg, 0.63 mg, Nebulization, Q4H PRN, Gabriela Hunter FNP, 0.63 mg at 06/30/25 0904    loperamide capsule 2 mg, 2 mg, Per G Tube, QID PRN, Cristian Syed MD, 2 mg at 06/25/25 2046    losartan tablet 100 mg, 100 mg, Per G Tube, Daily, Simon Martinez MD, 100 mg at 06/30/25 0815    melatonin tablet 6 mg, 6 mg, Per G Tube, Nightly, Shaw Christopher MD, 6 mg at 06/29/25 2012    ondansetron injection 4 mg, 4 mg, Intravenous, Q8H PRN, Bharat Dominguez MD, 4 mg at 06/09/25 1212    risperiDONE tablet 0.5 mg, 0.5 mg, Oral, BID, Federico Perea NP, 0.5 mg at 06/30/25 0815    scopolamine 1.3-1.5 mg (1 mg over 3 days) 1 patch, 1 patch, Transdermal, Q3 Days, Gabriela Hunter FNP, 1 patch at 06/28/25 0108    sodium chloride 0.9% flush 10 mL, 10 mL, Intravenous, PRN, Bharat Dominguez MD    sucralfate tablet 1 g, 1 g, Per G Tube, Q6H, Simon Martinez MD, 1 g at 06/30/25 1150    traZODone tablet 100 mg, 100 mg, Per G Tube, QHS, Federico Perea NP, 100 mg at 06/29/25 2012

## 2025-06-30 NOTE — PROGRESS NOTES
[facility] Hospital Medicine Progress Note        Chief Complaint: Inpatient Follow-up       HPI: 64-year-old male with type 1 diabetes mellitus, CAD status post stenting, hypertension, hyperlipidemia, prior CVA  presented on 06/09/2025 for evaluation of sudden onset headache, confusion, slurred speech.      Workup revealed:  CT head -left occipital lobe parenchymal hemorrhage with trace adjacent subarachnoid hemorrhage.   CTA head/neck was also completed in ED, which revealed no large vessel occlusion, flow-limiting stenosis, aneurysm or evidence of a vascular malformation   MRI brain report:  1. Left occipital lobe hematoma with adjacent subarachnoid hemorrhage.  2. Innumerable chronic microhemorrhages with a subcortical location, may indicate underlying cerebral amyloid angiopathy.  3. Mild chronic microvascular ischemic changes     Neurology team evaluated the patient, initiated on stroke protocol, admitted to ICU services.  Neurosurgery team evaluated the patient, MRI likely suggestive of amyloid angiopathy, repeat CT head 6/10/25 unchanged, suggested holding antiplatelets for at least 2 weeks.  Patient is started on Keppra.  Neurosurgical team signed off.    Patient required Chao placement for retention, seen by Urology    Patient cleared to downgrade to floors 06/12/2025 PM by ICU team.    Palliative care team consulted while patient in ICU, code status DNR at the time of downgrade.      Pt had fever ,tmax 102.8, tachycardic,wbc count increased 17K, decided to obtain pan scan, CT head stable, but notes to have dense consolidation left >right LL, constipation  Respiratory cultures were obtained grew many strep group B Haemophilus influenzae.  Received antibiotics     Patient remains unsafe for p.o. intake with speech evaluations, informed decision with the patient's spouse was made, GI team consulted for PEG placement.    Psych was consulted as well to help with his delirium.       PTOT recommended moderate intensity therapy case management was consulted for placement.    Case management was having hard time placing due to delirium. Psych was reconsulted to help with delirium management.    Interval Hx:   Needs placement.  Case management on board.  Psych following, adjusting medications for delirium.  No overnight events reported by the staff.    Patient was seen and examined, remains asleep, case was discussed with the staff, requiring p.r.n.iv Haldol.  chart was reviewed.  Afebrile, T-max Ninety-nine    Objective/physical exam:  General: In no acute distress, afebrile  Chest: Clear to auscultation bilaterally  Heart:  +S1, S2, no appreciable murmur  Abdomen: Soft, nontender, BS +, PEG tube  MSK: Warm, no lower extremity edema, no clubbing or cyanosis  Neurologic: Alert and oriented x1, does not follow commands    VITAL SIGNS: 24 HRS MIN & MAX LAST   Temp  Min: 97.6 °F (36.4 °C)  Max: 99.6 °F (37.6 °C) 98.6 °F (37 °C)   BP  Min: 97/54  Max: 136/60 (!) 108/57   Pulse  Min: 94  Max: 100  94   Resp  Min: 18  Max: 18 18   SpO2  Min: 91 %  Max: 94 % (!) 94 %     I have reviewed the following labs:  Recent Labs   Lab 06/27/25  0735   WBC 12.13*   RBC 4.48*   HGB 13.7*   HCT 42.1   MCV 94.0   MCH 30.6   MCHC 32.5*   RDW 12.2   *   MPV 10.3     Recent Labs   Lab 06/25/25  0437 06/27/25  0735    140   K 4.3 4.7    102   CO2 28 33*   BUN 28.6* 26.4*   CREATININE 0.83 0.90   * 156*   CALCIUM 9.1 9.2   MG 2.50 2.40     Microbiology Results (last 7 days)       ** No results found for the last 168 hours. **             See below for Radiology    Assessment/Plan:  Hemorrhagic CVA-Left occipital lobe hematoma, adjacent subarachnoid hemorrhage  Urinary retention  Dense left lobe consolidation-Haemophilus influenza pneumonia  Delirium   Dysphagia s/p peg tube  Tobacco use       HX: T1 dm, CAD s/p PCI, HTN, HLD, history of CVA    Plan   Seen by Neurology, Neurosurgery.   Discontinued Keppra on 06/23/2025 .  Stable from respiratory standpoint.Completed antibiotics.  Been afebrile.  Obtain blood cultures if spiking fevers .  Psych on board .  Continue tube feedings, monitor electrolytes and replete appropriately, ensure regular bowel movements.  Continue current insulin regimen, monitor blood sugars  Monitor blood pressure and adjust the regimen as needed.  Keep SBP less than 140/90.  Continue supportive care.  PT OT.  Needs placement when he is off soft restraints .  We will discuss with psych if delirium not improving.  Discussed with staff.Will plan to monitor off of retsraints tonight  Voiding trial eventually    VTE prophylaxis: scds      Anticipated discharge and Disposition:  To be decided  , placement once off restraints      All diagnosis and differential diagnosis have been reviewed; assessment and plan has been documented; I have personally reviewed the labs and test results that are presently available; I have reviewed the patients medication list; I have reviewed the consulting providers response and recommendations. I have reviewed or attempted to review medical records based upon their availability    All of the patient's questions have been  addressed and answered. Patient's is agreeable to the above stated plan. I will continue to monitor closely and make adjustments to medical management as needed.    Portions of this note dictated using EMR integrated voice recognition software, and may be subject to voice recognition errors not corrected at proofreading. Please contact writer for clarification if needed.   _____________________________________________________________________    Malnutrition Status:  Nutrition consulted. Most recent weight and BMI monitored-     Measurements:  Wt Readings from Last 1 Encounters:   06/11/25 65 kg (143 lb 4.8 oz)   Body mass index is 26.21 kg/m².    Patient has been screened and assessed by RD.    Malnutrition Type:  Context:     Level: other (see comments) (Does not meet criteria)    Malnutrition Characteristic Summary:  Weight Loss (Malnutrition): other (see comments) (Unable to assess)  Energy Intake (Malnutrition): other (see comments) (Unable to assess)  Muscle Mass (Malnutrition): mild depletion    Interventions/Recommendations (treatment strategy):        Scheduled Med:   aluminum-magnesium hydroxide-simethicone  30 mL Per G Tube QID (AC & HS)    carvediloL  12.5 mg Per G Tube BID    insulin glargine U-100  18 Units Subcutaneous BID    losartan  100 mg Per G Tube Daily    melatonin  6 mg Per G Tube Nightly    risperiDONE  0.5 mg Oral BID    scopolamine  1 patch Transdermal Q3 Days    sucralfate  1 g Per G Tube Q6H    traZODone  100 mg Per G Tube QHS      Continuous Infusions:     PRN Meds:    Current Facility-Administered Medications:     acetaminophen, 650 mg, Per G Tube, Q6H PRN    bisacodyL, 10 mg, Rectal, Daily PRN    butalbital-acetaminophen-caffeine -40 mg, 1 tablet, Per G Tube, Q4H PRN    dextrose 50%, 12.5 g, Intravenous, PRN    dextrose 50%, 12.5 g, Intravenous, PRN    dextrose 50%, 25 g, Intravenous, PRN    glucagon (human recombinant), 1 mg, Intramuscular, PRN    glucagon (human recombinant), 1 mg, Intramuscular, PRN    glucose, 16 g, Oral, PRN    glucose, 24 g, Oral, PRN    haloperidol lactate, 2 mg, Intravenous, Q6H PRN    hydrOXYzine, 25 mg, Intramuscular, Q6H PRN    insulin aspart U-100, 0-10 Units, Subcutaneous, QID (AC + HS) PRN    labetalol, 10 mg, Intravenous, Q15 Min PRN    levalbuterol, 0.63 mg, Nebulization, Q4H PRN    loperamide, 2 mg, Per G Tube, QID PRN    ondansetron, 4 mg, Intravenous, Q8H PRN    sodium chloride 0.9%, 10 mL, Intravenous, PRN     Radiology:  I have personally reviewed the following imaging and agree with the radiologist.     CT Cervical Spine Without Contrast  Narrative: EXAMINATION:  CT CERVICAL SPINE WITHOUT CONTRAST    CLINICAL HISTORY:  fall; fall.  Altered mental status.   Intracranial hemorrhage.    TECHNIQUE:  Low dose helical acquired images with axial, sagittal and coronal reformations though the cervical spine.  Contrast was not administered.    All CT scans at this location are performed using dose optimization techniques as appropriate to a performed exam including the following automated exposure control, adjustment of the mA and/or kV according to patient size and/or use of iterative reconstruction technique    DLP: 400 mGycm    COMPARISON:  No relevant prior available for comparison.    FINDINGS:  BONES: No fracture. Normal alignment. The dens is intact, the lateral masses of C1 are normally aligned, and the atlantodental interval is normal.    DISCS AND FACETS: Multilevel disc space narrowing with anterior and posterior disc osteophytes.  Uncovertebral hypertrophy.  Facet arthropathy.    SPINAL CANAL AND NEURAL FORAMINA: Posterior disc osteophytes mildly narrow the central canal.  For example at C4-C5 AP diameter of the thecal sac estimated 8 mm.  Facet and uncovertebral hypertrophy contribute to neural foraminal stenosis.    SOFT TISSUES: There are atherosclerotic calcifications at the left carotid bulb.    LUNG APICES: Clear  Impression: Degenerative change without appreciable acute osseous abnormality by CT evaluation    The preliminary and final reports are concordant.    Electronically signed by: Kiya Washington  Date:    06/18/2025  Time:    08:06  CT Head Without Contrast  Narrative: EXAMINATION:  CT HEAD WITHOUT CONTRAST    CLINICAL HISTORY:  fall;  altered mental status    TECHNIQUE:  Low dose axial CT images obtained throughout the head without intravenous contrast.  Axial, sagittal and coronal reconstructions were performed and interpreted.    DLP: 1370 mGycm    All CT scans at this location are performed using dose optimization techniques as appropriate to a performed exam including the following automated exposure control, adjustment of the mA and/or kV  according to patient size and/or use of iterative reconstruction technique    COMPARISON:  CT head 06/14/2025    FINDINGS:  BRAIN: Left parietooccipital hemorrhage measures approximately 3.3 x 3.3 cm; similar in volume to previous exam.  Margins are slightly less distinct compared to prior with decreasing density compatible with expected evolution of hemorrhage.  Mild surrounding edema similar to prior.  No significant mass effect.  No midline shift. Periventricular and subcortical white matter changes most compatible with chronic small vessel ischemic disease.    VENTRICLES: Normal in size and configuration.    EXTRA-AXIAL: Trace subarachnoid hemorrhage the left parietal lobe, improved from prior    BONES: Calvarium is intact.    SINUSES AND MASTOIDS: Question postsurgical change at the right mastoid with opacification of the mastoid and middle ear.  Opacification of the left mastoid and middle ear.  Mucoperiosteal thickening at the maxillary sinuses and ethmoid sinuses.    OTHER: Unchanged hyperdensity at the posterior chamber left globe.  Impression: Expected evolution at the left cerebral hemorrhage with mild edema.  No midline shift.    Trace subarachnoid hemorrhage.    The preliminary and final reports are concordant.    Electronically signed by: Kiya Washington  Date:    06/18/2025  Time:    07:59      Margarita Campos MD  Department of Hospital Medicine   [facility]   06/30/2025

## 2025-06-30 NOTE — PLAN OF CARE
Problem: Adult Inpatient Plan of Care  Goal: Plan of Care Review  6/29/2025 1928 by Agus Cason RN  Outcome: Progressing  6/29/2025 0726 by Agus Cason RN  Outcome: Progressing  Goal: Patient-Specific Goal (Individualized)  6/29/2025 1928 by Agus Cason RN  Outcome: Progressing  6/29/2025 0726 by Agus Cason RN  Outcome: Progressing  Goal: Absence of Hospital-Acquired Illness or Injury  6/29/2025 1928 by Agus Cason RN  Outcome: Progressing  6/29/2025 0726 by Agus Cason RN  Outcome: Progressing  Goal: Optimal Comfort and Wellbeing  6/29/2025 1928 by Agus Cason RN  Outcome: Progressing  6/29/2025 0726 by Agus Cason RN  Outcome: Progressing  Goal: Readiness for Transition of Care  6/29/2025 1928 by Agus Cason RN  Outcome: Progressing  6/29/2025 0726 by Agus Cason RN  Outcome: Progressing     Problem: Diabetes Comorbidity  Goal: Blood Glucose Level Within Targeted Range  6/29/2025 1928 by Agus Cason RN  Outcome: Progressing  6/29/2025 0726 by Agus Cason RN  Outcome: Progressing

## 2025-06-30 NOTE — PT/OT/SLP PROGRESS
Physical Therapy      Patient Name:  Federico Villarreal   MRN:  [id number]    Attempted to see pt for therapy twice today. Both times pt in a deep sleep and unable to be aroused even with max stimulation  . Will follow-up tomorrow.

## 2025-06-30 NOTE — PT/OT/SLP PROGRESS
Occupational Therapy      Patient Name:  Federico Villarreal   MRN:  [id number]    Patient not seen today secondary to Unarousable despite maximal verbal cues and tactile cues via sternal rub. Will follow-up this afternoon as time permits.    6/30/2025

## 2025-07-01 LAB
MAGNESIUM SERPL-MCNC: 2.2 MG/DL (ref 1.6–2.6)
PHOSPHATE SERPL-MCNC: 2.3 MG/DL (ref 2.3–4.7)
POCT GLUCOSE: 132 MG/DL (ref 70–110)
POCT GLUCOSE: 170 MG/DL (ref 70–110)
POCT GLUCOSE: 308 MG/DL (ref 70–110)
POTASSIUM SERPL-SCNC: 3.9 MMOL/L (ref 3.5–5.1)

## 2025-07-01 PROCEDURE — 25000003 PHARM REV CODE 250: Performed by: INTERNAL MEDICINE

## 2025-07-01 PROCEDURE — 36415 COLL VENOUS BLD VENIPUNCTURE: CPT | Performed by: INTERNAL MEDICINE

## 2025-07-01 PROCEDURE — 21400001 HC TELEMETRY ROOM

## 2025-07-01 PROCEDURE — 25000003 PHARM REV CODE 250: Performed by: NURSE PRACTITIONER

## 2025-07-01 PROCEDURE — 63600175 PHARM REV CODE 636 W HCPCS

## 2025-07-01 PROCEDURE — 83735 ASSAY OF MAGNESIUM: CPT | Performed by: INTERNAL MEDICINE

## 2025-07-01 PROCEDURE — 84132 ASSAY OF SERUM POTASSIUM: CPT | Performed by: INTERNAL MEDICINE

## 2025-07-01 PROCEDURE — 63600175 PHARM REV CODE 636 W HCPCS: Performed by: INTERNAL MEDICINE

## 2025-07-01 PROCEDURE — 25000003 PHARM REV CODE 250

## 2025-07-01 PROCEDURE — 84100 ASSAY OF PHOSPHORUS: CPT | Performed by: INTERNAL MEDICINE

## 2025-07-01 RX ORDER — RISPERIDONE 1 MG/1
1 TABLET ORAL 2 TIMES DAILY
Status: DISCONTINUED | OUTPATIENT
Start: 2025-07-01 | End: 2025-07-02

## 2025-07-01 RX ADMIN — INSULIN GLARGINE 18 UNITS: 100 INJECTION, SOLUTION SUBCUTANEOUS at 08:07

## 2025-07-01 RX ADMIN — CARVEDILOL 12.5 MG: 12.5 TABLET, FILM COATED ORAL at 08:07

## 2025-07-01 RX ADMIN — INSULIN ASPART 2 UNITS: 100 INJECTION, SOLUTION INTRAVENOUS; SUBCUTANEOUS at 11:07

## 2025-07-01 RX ADMIN — HYDROXYZINE HYDROCHLORIDE 25 MG: 50 INJECTION, SOLUTION INTRAMUSCULAR at 11:07

## 2025-07-01 RX ADMIN — INSULIN ASPART 8 UNITS: 100 INJECTION, SOLUTION INTRAVENOUS; SUBCUTANEOUS at 05:07

## 2025-07-01 RX ADMIN — INSULIN ASPART 2 UNITS: 100 INJECTION, SOLUTION INTRAVENOUS; SUBCUTANEOUS at 08:07

## 2025-07-01 RX ADMIN — SUCRALFATE 1 G: 1 TABLET ORAL at 05:07

## 2025-07-01 RX ADMIN — Medication 6 MG: at 08:07

## 2025-07-01 RX ADMIN — ALUMINUM HYDROXIDE, MAGNESIUM HYDROXIDE, AND DIMETHICONE 30 ML: 200; 20; 200 SUSPENSION ORAL at 06:07

## 2025-07-01 RX ADMIN — SUCRALFATE 1 G: 1 TABLET ORAL at 01:07

## 2025-07-01 RX ADMIN — TRAZODONE HYDROCHLORIDE 100 MG: 100 TABLET ORAL at 08:07

## 2025-07-01 RX ADMIN — LOSARTAN POTASSIUM 100 MG: 50 TABLET, FILM COATED ORAL at 08:07

## 2025-07-01 RX ADMIN — HALOPERIDOL LACTATE 2 MG: 5 INJECTION, SOLUTION INTRAMUSCULAR at 08:07

## 2025-07-01 RX ADMIN — ALUMINUM HYDROXIDE, MAGNESIUM HYDROXIDE, AND DIMETHICONE 30 ML: 200; 20; 200 SUSPENSION ORAL at 08:07

## 2025-07-01 RX ADMIN — RISPERIDONE 0.5 MG: 0.25 TABLET, FILM COATED ORAL at 08:07

## 2025-07-01 RX ADMIN — RISPERIDONE 1 MG: 1 TABLET, FILM COATED ORAL at 08:07

## 2025-07-01 RX ADMIN — SUCRALFATE 1 G: 1 TABLET ORAL at 11:07

## 2025-07-01 RX ADMIN — SCOPOLAMINE 1 PATCH: 1 PATCH TRANSDERMAL at 01:07

## 2025-07-01 RX ADMIN — ALUMINUM HYDROXIDE, MAGNESIUM HYDROXIDE, AND DIMETHICONE 30 ML: 200; 20; 200 SUSPENSION ORAL at 11:07

## 2025-07-01 NOTE — PLAN OF CARE
Problem: Stroke, Intracerebral Hemorrhage  Goal: Safe and Effective Swallow  Outcome: Progressing     Problem: Stroke, Intracerebral Hemorrhage  Goal: Effective Oxygenation and Ventilation  Outcome: Progressing     Problem: Stroke, Intracerebral Hemorrhage  Goal: Optimal Functional Ability  Outcome: Progressing     Problem: Stroke, Intracerebral Hemorrhage  Goal: Effective Communication Skills  Outcome: Progressing     Problem: Stroke, Intracerebral Hemorrhage  Goal: Optimal Cognitive Function  Outcome: Progressing     Problem: Stroke, Intracerebral Hemorrhage  Goal: Optimal Cerebral Tissue Perfusion  Outcome: Progressing     Problem: Skin Injury Risk Increased  Goal: Skin Health and Integrity  Outcome: Progressing     Problem: Fall Injury Risk  Goal: Absence of Fall and Fall-Related Injury  Outcome: Progressing     Problem: Infection  Goal: Absence of Infection Signs and Symptoms  Outcome: Progressing     Problem: Coping Ineffective  Goal: Effective Coping  Outcome: Progressing

## 2025-07-01 NOTE — PLAN OF CARE
Patient remains in rollbelt and mittens and also received IV Haldol. Snf placement once medically stable. Will continue to follow.

## 2025-07-01 NOTE — PROGRESS NOTES
[facility] Hospital Medicine Progress Note        Chief Complaint: Inpatient Follow-up       HPI: 64-year-old male with type 1 diabetes mellitus, CAD status post stenting, hypertension, hyperlipidemia, prior CVA  presented on 06/09/2025 for evaluation of sudden onset headache, confusion, slurred speech.      Workup revealed:  CT head -left occipital lobe parenchymal hemorrhage with trace adjacent subarachnoid hemorrhage.   CTA head/neck was also completed in ED, which revealed no large vessel occlusion, flow-limiting stenosis, aneurysm or evidence of a vascular malformation   MRI brain report:  1. Left occipital lobe hematoma with adjacent subarachnoid hemorrhage.  2. Innumerable chronic microhemorrhages with a subcortical location, may indicate underlying cerebral amyloid angiopathy.  3. Mild chronic microvascular ischemic changes     Neurology team evaluated the patient, initiated on stroke protocol, admitted to ICU services.  Neurosurgery team evaluated the patient, MRI likely suggestive of amyloid angiopathy, repeat CT head 6/10/25 unchanged, suggested holding antiplatelets for at least 2 weeks.  Patient is started on Keppra.  Neurosurgical team signed off.    Patient required Chao placement for retention, seen by Urology    Patient cleared to downgrade to floors 06/12/2025 PM by ICU team.    Palliative care team consulted while patient in ICU, code status DNR at the time of downgrade.      Pt had fever ,tmax 102.8, tachycardic,wbc count increased 17K, decided to obtain pan scan, CT head stable, but notes to have dense consolidation left >right LL, constipation  Respiratory cultures were obtained grew many strep group B Haemophilus influenzae.  Received antibiotics     Patient remains unsafe for p.o. intake with speech evaluations, informed decision with the patient's spouse was made, GI team consulted for PEG placement.    Psych was consulted as well to help with his delirium.       PTOT recommended moderate intensity therapy case management was consulted for placement.    Case management was having hard time placing due to delirium. Psych was reconsulted to help with delirium management.    Interval Hx:   Psych following, adjusting medications for delirium.  Discussed with psych again today.  No overnight events reported by the staff.    Patient was seen and examined, alert and engaging in conversations today, denied any pain or any concerns.  Met with family.  chart was reviewed.  Afebrile    Objective/physical exam:  General: In no acute distress, afebrile  Chest: Clear to auscultation bilaterally  Heart:  +S1, S2, no appreciable murmur  Abdomen: Soft, nontender, BS +, PEG tube  MSK: Warm, no lower extremity edema, no clubbing or cyanosis  Neurologic: Alert and oriented x1, does not follow commands    VITAL SIGNS: 24 HRS MIN & MAX LAST   Temp  Min: 97.6 °F (36.4 °C)  Max: 98.9 °F (37.2 °C) 97.9 °F (36.6 °C)   BP  Min: 123/67  Max: 152/73 (!) 150/69   Pulse  Min: 94  Max: 112  110   Resp  Min: 18  Max: 20 18   SpO2  Min: 92 %  Max: 92 % (!) 92 %     I have reviewed the following labs:  Recent Labs   Lab 06/27/25  0735   WBC 12.13*   RBC 4.48*   HGB 13.7*   HCT 42.1   MCV 94.0   MCH 30.6   MCHC 32.5*   RDW 12.2   *   MPV 10.3     Recent Labs   Lab 06/25/25  0437 06/27/25  0735    140   K 4.3 4.7    102   CO2 28 33*   BUN 28.6* 26.4*   CREATININE 0.83 0.90   * 156*   CALCIUM 9.1 9.2   MG 2.50 2.40     Microbiology Results (last 7 days)       ** No results found for the last 168 hours. **             See below for Radiology    Assessment/Plan:  Hemorrhagic CVA-Left occipital lobe hematoma, adjacent subarachnoid hemorrhage  Dense left lobe consolidation-Haemophilus influenza pneumonia  Dysphagia s/p peg tube  Delirium  Urinary retention, with Chao  Tobacco use       HX: T1 dm, CAD s/p PCI, HTN, HLD, history of CVA    Plan   Seen by Neurology, Neurosurgery.   Discontinued Keppra on 06/23/2025 .  Per chart review,  Neurosurgery recommended to stay off of any antiplatelets for at least 2 weeks.  No antiplatelets recommendations per Neurology.  Stable from respiratory standpoint.Completed antibiotics.  Been afebrile.  Obtain blood cultures if spiking fevers .  Psych on board .  Continue tube feedings, monitor electrolytes and replete appropriately, ensure regular bowel movements.  Monitor blood pressure and adjust the regimen as needed.  Keep SBP less than 140/90.  Likely intermittent acceleration secondary to agitation.  Also on p.r.n. medications if needed.  Continue current insulin regimen, monitor blood sugars  Continue PT OT-recommending moderate intensity therapy.  Needs placement when he is off soft restraints which the staff was unable to accomplish due to ongoing agitation  .  Discussed with psych again today .  On risperidone and trazodone.  P.r.n. Haldol and hydralazine.  Can probably go up on risperidone, we will also await psych recs. Delirium precautions .  Can probably do a follow up scan if medications not helping.  Continue supportive care.Needs voiding trial eventually once agitation improves.  Labs PRN        VTE prophylaxis: scds    Anticipated discharge and Disposition:  To be decided  , placement once off restraints      All diagnosis and differential diagnosis have been reviewed; assessment and plan has been documented; I have personally reviewed the labs and test results that are presently available; I have reviewed the patients medication list; I have reviewed the consulting providers response and recommendations. I have reviewed or attempted to review medical records based upon their availability    All of the patient's questions have been  addressed and answered. Patient's is agreeable to the above stated plan. I will continue to monitor closely and make adjustments to medical management as needed.    Portions of this note dictated using EMR  integrated voice recognition software, and may be subject to voice recognition errors not corrected at proofreading. Please contact writer for clarification if needed.   _____________________________________________________________________    Malnutrition Status:  Nutrition consulted. Most recent weight and BMI monitored-     Measurements:  Wt Readings from Last 1 Encounters:   06/11/25 65 kg (143 lb 4.8 oz)   Body mass index is 26.21 kg/m².    Patient has been screened and assessed by RD.    Malnutrition Type:  Context:    Level: other (see comments) (Does not meet criteria)    Malnutrition Characteristic Summary:  Weight Loss (Malnutrition): other (see comments) (Unable to assess)  Energy Intake (Malnutrition): other (see comments) (Unable to assess)  Muscle Mass (Malnutrition): mild depletion    Interventions/Recommendations (treatment strategy):        Scheduled Med:   aluminum-magnesium hydroxide-simethicone  30 mL Per G Tube QID (AC & HS)    carvediloL  12.5 mg Per G Tube BID    insulin glargine U-100  18 Units Subcutaneous BID    losartan  100 mg Per G Tube Daily    melatonin  6 mg Per G Tube Nightly    risperiDONE  0.5 mg Oral BID    scopolamine  1 patch Transdermal Q3 Days    sucralfate  1 g Per G Tube Q6H    traZODone  100 mg Per G Tube QHS      Continuous Infusions:     PRN Meds:    Current Facility-Administered Medications:     acetaminophen, 650 mg, Per G Tube, Q6H PRN    bisacodyL, 10 mg, Rectal, Daily PRN    butalbital-acetaminophen-caffeine -40 mg, 1 tablet, Per G Tube, Q4H PRN    dextrose 50%, 12.5 g, Intravenous, PRN    dextrose 50%, 12.5 g, Intravenous, PRN    dextrose 50%, 25 g, Intravenous, PRN    glucagon (human recombinant), 1 mg, Intramuscular, PRN    glucagon (human recombinant), 1 mg, Intramuscular, PRN    glucose, 16 g, Oral, PRN    glucose, 24 g, Oral, PRN    haloperidol lactate, 2 mg, Intravenous, Q6H PRN    hydrOXYzine, 25 mg, Intramuscular, Q6H PRN    insulin aspart U-100, 0-10  Units, Subcutaneous, QID (AC + HS) PRN    labetalol, 10 mg, Intravenous, Q15 Min PRN    levalbuterol, 0.63 mg, Nebulization, Q4H PRN    loperamide, 2 mg, Per G Tube, QID PRN    ondansetron, 4 mg, Intravenous, Q8H PRN    sodium chloride 0.9%, 10 mL, Intravenous, PRN     Radiology:  I have personally reviewed the following imaging and agree with the radiologist.     CT Cervical Spine Without Contrast  Narrative: EXAMINATION:  CT CERVICAL SPINE WITHOUT CONTRAST    CLINICAL HISTORY:  fall; fall.  Altered mental status.  Intracranial hemorrhage.    TECHNIQUE:  Low dose helical acquired images with axial, sagittal and coronal reformations though the cervical spine.  Contrast was not administered.    All CT scans at this location are performed using dose optimization techniques as appropriate to a performed exam including the following automated exposure control, adjustment of the mA and/or kV according to patient size and/or use of iterative reconstruction technique    DLP: 400 mGycm    COMPARISON:  No relevant prior available for comparison.    FINDINGS:  BONES: No fracture. Normal alignment. The dens is intact, the lateral masses of C1 are normally aligned, and the atlantodental interval is normal.    DISCS AND FACETS: Multilevel disc space narrowing with anterior and posterior disc osteophytes.  Uncovertebral hypertrophy.  Facet arthropathy.    SPINAL CANAL AND NEURAL FORAMINA: Posterior disc osteophytes mildly narrow the central canal.  For example at C4-C5 AP diameter of the thecal sac estimated 8 mm.  Facet and uncovertebral hypertrophy contribute to neural foraminal stenosis.    SOFT TISSUES: There are atherosclerotic calcifications at the left carotid bulb.    LUNG APICES: Clear  Impression: Degenerative change without appreciable acute osseous abnormality by CT evaluation    The preliminary and final reports are concordant.    Electronically signed by: Kiya Washington  Date:    06/18/2025  Time:    08:06  CT  Head Without Contrast  Narrative: EXAMINATION:  CT HEAD WITHOUT CONTRAST    CLINICAL HISTORY:  fall;  altered mental status    TECHNIQUE:  Low dose axial CT images obtained throughout the head without intravenous contrast.  Axial, sagittal and coronal reconstructions were performed and interpreted.    DLP: 1370 mGycm    All CT scans at this location are performed using dose optimization techniques as appropriate to a performed exam including the following automated exposure control, adjustment of the mA and/or kV according to patient size and/or use of iterative reconstruction technique    COMPARISON:  CT head 06/14/2025    FINDINGS:  BRAIN: Left parietooccipital hemorrhage measures approximately 3.3 x 3.3 cm; similar in volume to previous exam.  Margins are slightly less distinct compared to prior with decreasing density compatible with expected evolution of hemorrhage.  Mild surrounding edema similar to prior.  No significant mass effect.  No midline shift. Periventricular and subcortical white matter changes most compatible with chronic small vessel ischemic disease.    VENTRICLES: Normal in size and configuration.    EXTRA-AXIAL: Trace subarachnoid hemorrhage the left parietal lobe, improved from prior    BONES: Calvarium is intact.    SINUSES AND MASTOIDS: Question postsurgical change at the right mastoid with opacification of the mastoid and middle ear.  Opacification of the left mastoid and middle ear.  Mucoperiosteal thickening at the maxillary sinuses and ethmoid sinuses.    OTHER: Unchanged hyperdensity at the posterior chamber left globe.  Impression: Expected evolution at the left cerebral hemorrhage with mild edema.  No midline shift.    Trace subarachnoid hemorrhage.    The preliminary and final reports are concordant.    Electronically signed by: Kiya Washington  Date:    06/18/2025  Time:    07:59      Margarita Campos MD  Department of Hospital Medicine   [facility]    07/01/2025

## 2025-07-01 NOTE — PT/OT/SLP PROGRESS
Occupational Therapy      Patient Name:  Federico Villarreal   MRN:  [id number]    Spoke to RN, will hold therapy this date as pt is agitated and combative; not appropriate for therapy at this time. Will follow-up as appropriate.    7/1/2025

## 2025-07-01 NOTE — PROGRESS NOTES
Inpatient Nutrition Assessment    Admit Date: 6/9/2025   Total duration of encounter: 22 days   Patient Age: 64 y.o.    Nutrition Recommendation/Prescription     Bolus 250 ml Diabetisource AC and 150 ml water 6 times daily  1800 kcal 100% needs  90 g protein 100% needs  162 g CHO 92% needs  1680 ml water 100% needs    Communication of Recommendations: reviewed with nurse    Nutrition Assessment     Malnutrition Assessment/Nutrition-Focused Physical Exam       Malnutrition Level: other (see comments) (Does not meet criteria) (06/11/25 [facility])  Energy Intake (Malnutrition): other (see comments) (Unable to assess) (06/11/25 1352)  Weight Loss (Malnutrition): other (see comments) (Unable to assess) (06/11/25 [facility])              Muscle Mass (Malnutrition): mild depletion (06/11/25 1352)  [place] (Muscle Loss): mild depletion                                A minimum of two characteristics is recommended for diagnosis of either severe or non-severe malnutrition.    Chart Review    Reason Seen: follow-up    Malnutrition Screening Tool Results   Have you recently lost weight without trying?: No  Have you been eating poorly because of a decreased appetite?: No   MST Score: 0   Diagnosis: left occipital hemorrhagic stroke     Relevant Medical History: T1DM, coronary artery disease status post stenting, hypertension, hyperlipidemia, history of ischemic stroke with previous left-sided weakness    Scheduled Medications:  aluminum-magnesium hydroxide-simethicone, 30 mL, QID (AC & HS)  carvediloL, 12.5 mg, BID  insulin glargine U-100, 18 Units, BID  losartan, 100 mg, Daily  melatonin, 6 mg, Nightly  risperiDONE, 0.5 mg, BID  scopolamine, 1 patch, Q3 Days  sucralfate, 1 g, Q6H  traZODone, 100 mg, QHS    Continuous Infusions: none       PRN Medications:   acetaminophen, 650 mg, Q6H PRN  bisacodyL, 10 mg, Daily PRN  butalbital-acetaminophen-caffeine -40 mg, 1 tablet, Q4H PRN  dextrose 50%, 12.5 g, PRN  dextrose 50%, 12.5 g,  PRN  dextrose 50%, 25 g, PRN  glucagon (human recombinant), 1 mg, PRN  glucagon (human recombinant), 1 mg, PRN  glucose, 16 g, PRN  glucose, 24 g, PRN  haloperidol lactate, 2 mg, Q6H PRN  hydrOXYzine, 25 mg, Q6H PRN  insulin aspart U-100, 0-10 Units, QID (AC + HS) PRN  labetalol, 10 mg, Q15 Min PRN  levalbuterol, 0.63 mg, Q4H PRN  loperamide, 2 mg, QID PRN  ondansetron, 4 mg, Q8H PRN  sodium chloride 0.9%, 10 mL, PRN    Calorie Containing IV Medications: no significant kcals from medications at this time    Recent Labs   Lab 06/25/25  0437 06/27/25  0735    140   K 4.3 4.7   CALCIUM 9.1 9.2   PHOS  --  3.6   MG 2.50 2.40    102   CO2 28 33*   BUN 28.6* 26.4*   CREATININE 0.83 0.90   EGFRNORACEVR >60 >60   * 156*   WBC  --  12.13*   HGB  --  13.7*   HCT  --  42.1     Nutrition Orders:  Diet NPO  Tube Feedings/Formulas 250; Diabetisource AC; Gastrostomy (6 times daily); 150; Every 4 hours    Appetite/Oral Intake: NPO/not applicable  Factors Affecting Nutritional Intake: impaired cognitive status/motor control and NPO  Social Needs Impacting Access to Food: unable to assess at this time; will attempt on follow-up  Food/Confucianism/Cultural Preferences: unable to obtain  Food Allergies: none reported  Last Bowel Movement: 07/01/25  Wound(s): no pressure injuries documented at this time     Comments    6/11/25 Patient confused with episodes of agitation, sleeping during rounds. Nurse reports plans to start nasogastric tube feeding, orders provided.    6/13/25 PO intake remains unsafe per SLP. GI consulted for PEG placement possibly on Monday. RN reports patient tolerating tube feeding at 15 mL/hr. Last BM noted on 6/8/25, consider bowel regimen.      6//16/25: Pt's TF being held for PEG placement today. Pt remains NPO per SLP.    6/19/25 Tube feeding progressing toward goal rate, currently at 60 ml/hr, goal 75 ml/hr.    6/23/25 Tube feeding at goal, will transition to bolus feeding.    6/27/25 Tolerating  "bolus feedings.    7/1/25 Nurse reports patient continues to tolerate bolus feeding regimen.    Anthropometrics    Height: 5' 2" (157.5 cm), Height Method: Stated  Last Weight: 65 kg (143 lb 4.8 oz) (06/11/25 1348), Weight Method: Bed Scale  BMI (Calculated): 26.2  BMI Classification: overweight (BMI 25-29.9)        Ideal Body Weight (IBW), Male: 118 lb     % Ideal Body Weight, Male (lb): 114.41 %                          Usual Weight Provided By: unable to obtain usual weight    Wt Readings from Last 5 Encounters:   06/11/25 65 kg (143 lb 4.8 oz)   01/27/25 63 kg (139 lb)   12/03/24 64.7 kg (142 lb 9.6 oz)   11/05/24 62 kg (136 lb 11 oz)   10/02/24 62 kg (136 lb 9.6 oz)     Weight Change(s) Since Admission:   6/11/25 initial weight 61.2 kg stated, bed weight 65 kg taken during rounds  Wt Readings from Last 1 Encounters:   06/11/25 1348 65 kg (143 lb 4.8 oz)   06/09/25 0805 61.2 kg (135 lb)   Admit Weight: 61.2 kg (135 lb) (06/09/25 0805), Weight Method: Stated    Estimated Needs    Weight Used For Calorie Calculations: 65 kg (143 lb 4.8 oz)  Energy Calorie Requirements (kcal): 5952-0427, 1.2-1.4 stress factor  Energy Need Method: [facility]  Weight Used For Protein Calculations: 65 kg (143 lb 4.8 oz)  Protein Requirements: 78-91 g, 1.2-1.4 g/kg  Fluid Requirements (mL): 5265-7671, 1 ml/kcal  CHO Requirement: 177-207 g, 45% of kcal     Enteral Nutrition    Formula: Diabetisource AC  Rate/Volume: 250 ml 6 times daily  Water Flushes: 150 ml 6 times daily  Additives/Modulars: none at this time  Route: gastrostomy tube  Method: continuous  Total Nutrition Provided by Tube Feeding, Additives, and Flushes:  Calories Provided  1800 kcal/d, 100% needs   Protein Provided  90 g/d, 100% needs   Fluid Provided  1680 ml/d, 100% needs   Continuous feeding calculations based on estimated 20 hr/d run time unless otherwise stated.    Parenteral Nutrition Patient not receiving parenteral nutrition support at this " time.    Evaluation of Received Nutrient Intake    Calories: meeting estimated needs  Protein: meeting estimated needs    Patient Education Not applicable.    Nutrition Diagnosis     PES: Inadequate energy intake related to inability to consume sufficient nutrients as evidenced by less than 80% needs met. (resolved)  PES: N/A             Nutrition Interventions     Interventions: modified composition of enteral nutrition and collaboration with other providers       Goal: Meet greater than 80% of nutritional needs by follow-up. (goal met)  Goal: Tolerate enteral feeding at goal rate by follow-up. (goal met)    Nutrition Goals & Monitoring     Dietitian will monitor: food and beverage intake, energy intake, enteral nutrition intake, weight, weight change, electrolyte/renal panel, beliefs/attitudes, glucose/endocrine profile, and gastrointestinal profile  Discharge planning: tube feeding (Diabetisource AC or equivalent substitute)  Nutrition Risk/Follow-Up: patient at increased nutrition risk; dietitian will follow-up twice weekly   Please consult if re-assessment needed sooner.

## 2025-07-01 NOTE — PT/OT/SLP PROGRESS
Physical Therapy      Patient Name:  Federico Villarreal   MRN:  [id number]    Spoke to RN, will hold therapy this date as pt is agitated and combative; not appropriate for therapy at this time. Will follow-up as appropriate.

## 2025-07-02 LAB
POCT GLUCOSE: 144 MG/DL (ref 70–110)
POCT GLUCOSE: 220 MG/DL (ref 70–110)
POCT GLUCOSE: 225 MG/DL (ref 70–110)
POCT GLUCOSE: 228 MG/DL (ref 70–110)

## 2025-07-02 PROCEDURE — 25000003 PHARM REV CODE 250: Performed by: INTERNAL MEDICINE

## 2025-07-02 PROCEDURE — 97535 SELF CARE MNGMENT TRAINING: CPT

## 2025-07-02 PROCEDURE — 63600175 PHARM REV CODE 636 W HCPCS: Performed by: INTERNAL MEDICINE

## 2025-07-02 PROCEDURE — 21400001 HC TELEMETRY ROOM

## 2025-07-02 PROCEDURE — 25000003 PHARM REV CODE 250

## 2025-07-02 PROCEDURE — 97530 THERAPEUTIC ACTIVITIES: CPT

## 2025-07-02 RX ORDER — IBUPROFEN 200 MG
24 TABLET ORAL
Status: DISCONTINUED | OUTPATIENT
Start: 2025-07-02 | End: 2025-07-08

## 2025-07-02 RX ORDER — RISPERIDONE 1 MG/1
1 TABLET ORAL 2 TIMES DAILY
Status: DISCONTINUED | OUTPATIENT
Start: 2025-07-02 | End: 2025-07-10

## 2025-07-02 RX ORDER — IBUPROFEN 200 MG
16 TABLET ORAL
Status: DISCONTINUED | OUTPATIENT
Start: 2025-07-02 | End: 2025-07-08

## 2025-07-02 RX ORDER — GUAIFENESIN AND DEXTROMETHORPHAN HYDROBROMIDE 10; 100 MG/5ML; MG/5ML
5 SYRUP ORAL EVERY 4 HOURS PRN
Status: DISCONTINUED | OUTPATIENT
Start: 2025-07-02 | End: 2025-07-16 | Stop reason: HOSPADM

## 2025-07-02 RX ADMIN — SUCRALFATE 1 G: 1 TABLET ORAL at 06:07

## 2025-07-02 RX ADMIN — INSULIN ASPART 4 UNITS: 100 INJECTION, SOLUTION INTRAVENOUS; SUBCUTANEOUS at 12:07

## 2025-07-02 RX ADMIN — SUCRALFATE 1 G: 1 TABLET ORAL at 11:07

## 2025-07-02 RX ADMIN — INSULIN ASPART 4 UNITS: 100 INJECTION, SOLUTION INTRAVENOUS; SUBCUTANEOUS at 05:07

## 2025-07-02 RX ADMIN — CARVEDILOL 12.5 MG: 12.5 TABLET, FILM COATED ORAL at 09:07

## 2025-07-02 RX ADMIN — ALUMINUM HYDROXIDE, MAGNESIUM HYDROXIDE, AND DIMETHICONE 30 ML: 200; 20; 200 SUSPENSION ORAL at 06:07

## 2025-07-02 RX ADMIN — ALUMINUM HYDROXIDE, MAGNESIUM HYDROXIDE, AND DIMETHICONE 30 ML: 200; 20; 200 SUSPENSION ORAL at 11:07

## 2025-07-02 RX ADMIN — SUCRALFATE 1 G: 1 TABLET ORAL at 12:07

## 2025-07-02 RX ADMIN — INSULIN GLARGINE 18 UNITS: 100 INJECTION, SOLUTION SUBCUTANEOUS at 09:07

## 2025-07-02 RX ADMIN — RISPERIDONE 1 MG: 1 TABLET, FILM COATED ORAL at 09:07

## 2025-07-02 RX ADMIN — INSULIN GLARGINE 18 UNITS: 100 INJECTION, SOLUTION SUBCUTANEOUS at 08:07

## 2025-07-02 RX ADMIN — ALUMINUM HYDROXIDE, MAGNESIUM HYDROXIDE, AND DIMETHICONE 30 ML: 200; 20; 200 SUSPENSION ORAL at 09:07

## 2025-07-02 RX ADMIN — SUCRALFATE 1 G: 1 TABLET ORAL at 05:07

## 2025-07-02 RX ADMIN — LOSARTAN POTASSIUM 100 MG: 50 TABLET, FILM COATED ORAL at 09:07

## 2025-07-02 RX ADMIN — GUAIFENESIN AND DEXTROMETHORPHAN 5 ML: 100; 10 SYRUP ORAL at 09:07

## 2025-07-02 NOTE — PLAN OF CARE
Problem: Adult Inpatient Plan of Care  Goal: Plan of Care Review  Outcome: Progressing  Goal: Patient-Specific Goal (Individualized)  Outcome: Progressing  Goal: Absence of Hospital-Acquired Illness or Injury  Outcome: Progressing  Goal: Optimal Comfort and Wellbeing  Outcome: Progressing  Goal: Readiness for Transition of Care  Outcome: Progressing     Problem: Diabetes Comorbidity  Goal: Blood Glucose Level Within Targeted Range  Outcome: Progressing     Problem: Stroke, Intracerebral Hemorrhage  Goal: Optimal Coping  Outcome: Progressing  Goal: Effective Bowel Elimination  Outcome: Progressing  Goal: Optimal Cerebral Tissue Perfusion  Outcome: Progressing  Goal: Optimal Cognitive Function  Outcome: Progressing  Goal: Effective Communication Skills  Outcome: Progressing  Goal: Optimal Functional Ability  Outcome: Progressing  Goal: Optimal Nutrition Intake  Outcome: Progressing  Goal: Optimal Pain Control and Function  Outcome: Progressing  Goal: Effective Oxygenation and Ventilation  Outcome: Progressing  Goal: Improved Sensorimotor Function  Outcome: Progressing  Goal: Safe and Effective Swallow  Outcome: Progressing  Goal: Effective Urinary Elimination  Outcome: Progressing     Problem: Skin Injury Risk Increased  Goal: Skin Health and Integrity  Outcome: Progressing     Problem: Fall Injury Risk  Goal: Absence of Fall and Fall-Related Injury  Outcome: Progressing     Problem: Infection  Goal: Absence of Infection Signs and Symptoms  Outcome: Progressing     Problem: Coping Ineffective  Goal: Effective Coping  Outcome: Progressing     Problem: Restraint, Nonviolent  Goal: Absence of Harm or Injury  Outcome: Progressing

## 2025-07-02 NOTE — PROGRESS NOTES
[facility] Hospital Medicine Progress Note        Chief Complaint: Inpatient Follow-up       HPI: 64-year-old male with type 1 diabetes mellitus, CAD status post stenting, hypertension, hyperlipidemia, prior CVA  presented on 06/09/2025 for evaluation of sudden onset headache, confusion, slurred speech.      Workup revealed:  CT head -left occipital lobe parenchymal hemorrhage with trace adjacent subarachnoid hemorrhage.   CTA head/neck was also completed in ED, which revealed no large vessel occlusion, flow-limiting stenosis, aneurysm or evidence of a vascular malformation   MRI brain report:  1. Left occipital lobe hematoma with adjacent subarachnoid hemorrhage.  2. Innumerable chronic microhemorrhages with a subcortical location, may indicate underlying cerebral amyloid angiopathy.  3. Mild chronic microvascular ischemic changes     Neurology team evaluated the patient, initiated on stroke protocol, admitted to ICU services.  Neurosurgery team evaluated the patient, MRI likely suggestive of amyloid angiopathy, repeat CT head 6/10/25 unchanged, suggested holding antiplatelets for at least 2 weeks.  Patient is started on Keppra.  Neurosurgical team signed off.    Patient required Chao placement for retention, seen by Urology    Patient cleared to downgrade to floors 06/12/2025 PM by ICU team.    Palliative care team consulted while patient in ICU, code status DNR at the time of downgrade.     Pt had fever ,tmax 102.8, tachycardic,wbc count increased 17K, decided to obtain pan scan, CT head stable, but notes to have dense consolidation left >right LL, constipation  Respiratory cultures were obtained grew many strep group B Haemophilus influenzae.  Received antibiotics     Patient remains unsafe for p.o. intake with speech evaluations, informed decision with the patient's spouse was made, GI team consulted for PEG placement.    Psych was consulted as well to help with his delirium.       PTOT recommended moderate intensity therapy case management was consulted for placement.    Case management was having hard time placing due to delirium. Psych was reconsulted to help with delirium management.    Interval Hx:   Went up on risperidone, delirium seems to be slightly better per report from the staff.  Repeat CT looks stable, no new findings.    Patient was seen and examined, comfortably sleeping, case was discussed with the staff.   chart was reviewed.  Afebrile    Objective/physical exam:  General: In no acute distress, afebrile  Chest: Clear to auscultation bilaterally  Heart:  +S1, S2, no appreciable murmur  Abdomen: Soft, nontender, BS +, PEG tube  MSK: Warm, no lower extremity edema, no clubbing or cyanosis  Neurologic: Alert and oriented x1, does not follow commands    VITAL SIGNS: 24 HRS MIN & MAX LAST   Temp  Min: 96.9 °F (36.1 °C)  Max: 98.2 °F (36.8 °C) 97.8 °F (36.6 °C)   BP  Min: 108/61  Max: 150/69 108/61   Pulse  Min: 82  Max: 110  90   Resp  Min: 18  Max: 20 18   SpO2  Min: 93 %  Max: 95 % (!) 94 %     I have reviewed the following labs:  Recent Labs   Lab 06/27/25  0735   WBC 12.13*   RBC 4.48*   HGB 13.7*   HCT 42.1   MCV 94.0   MCH 30.6   MCHC 32.5*   RDW 12.2   *   MPV 10.3     Recent Labs   Lab 06/27/25  0735 07/01/25  1556     --    K 4.7 3.9     --    CO2 33*  --    BUN 26.4*  --    CREATININE 0.90  --    *  --    CALCIUM 9.2  --    MG 2.40 2.20     Microbiology Results (last 7 days)       ** No results found for the last 168 hours. **             See below for Radiology    Assessment/Plan:  Hemorrhagic CVA-Left occipital lobe hematoma, adjacent subarachnoid hemorrhage  Dense left lobe consolidation-Haemophilus influenza pneumonia  Dysphagia s/p peg tube  Delirium  Urinary retention, with Chao  Tobacco use       HX: T1 dm, CAD s/p PCI, HTN, HLD, history of CVA    Plan   Seen by Neurology, Neurosurgery.  Discontinued Keppra on 06/23/2025 .  Per chart  review,  Neurosurgery recommended to stay off of any antiplatelets for at least 2 weeks.  No antiplatelets recommendations per Neurology at this point of time.  Stable from respiratory standpoint.Completed antibiotics. Been afebrile. Obtain blood cultures if spiking fevers .  Continue tube feedings, monitor electrolytes and replete appropriately, ensure regular bowel movements.  Delirious, Psych on board .  Adjusting medications .  Continue delirium precautions . On risperidone and trazodone.  P.r.n. Haldol and hydralazine.  Voiding trial eventually.  Continue supportive care  Monitor blood pressure and adjust the regimen as needed.  Keep SBP less than 140/90.  Likely intermittent acceleration secondary to agitation.  Also on p.r.n. medications if needed.  Continue current insulin regimen, monitor blood sugars  Continue PT OT-recommending moderate intensity therapy.  Needs placement when he is off soft restraints .  Had a better night last night per report and off of roll belt, hopefully we can get placement ?.  Case management on board .      VTE prophylaxis: scds    Anticipated discharge and Disposition:  To be decided  , placement once off restraints      All diagnosis and differential diagnosis have been reviewed; assessment and plan has been documented; I have personally reviewed the labs and test results that are presently available; I have reviewed the patients medication list; I have reviewed the consulting providers response and recommendations. I have reviewed or attempted to review medical records based upon their availability    All of the patient's questions have been  addressed and answered. Patient's is agreeable to the above stated plan. I will continue to monitor closely and make adjustments to medical management as needed.    Portions of this note dictated using EMR integrated voice recognition software, and may be subject to voice recognition errors not corrected at proofreading. Please contact  writer for clarification if needed.   _____________________________________________________________________    Malnutrition Status:  Nutrition consulted. Most recent weight and BMI monitored-     Measurements:  Wt Readings from Last 1 Encounters:   06/11/25 65 kg (143 lb 4.8 oz)   Body mass index is 26.21 kg/m².    Patient has been screened and assessed by RD.    Malnutrition Type:  Context:    Level: other (see comments) (Does not meet criteria)    Malnutrition Characteristic Summary:  Weight Loss (Malnutrition): other (see comments) (Unable to assess)  Energy Intake (Malnutrition): other (see comments) (Unable to assess)  Muscle Mass (Malnutrition): mild depletion    Interventions/Recommendations (treatment strategy):        Scheduled Med:   aluminum-magnesium hydroxide-simethicone  30 mL Per G Tube QID (AC & HS)    carvediloL  12.5 mg Per G Tube BID    insulin glargine U-100  18 Units Subcutaneous BID    losartan  100 mg Per G Tube Daily    melatonin  6 mg Per G Tube Nightly    risperiDONE  1 mg Per G Tube BID    scopolamine  1 patch Transdermal Q3 Days    sucralfate  1 g Per G Tube Q6H    traZODone  100 mg Per G Tube QHS      Continuous Infusions:     PRN Meds:    Current Facility-Administered Medications:     acetaminophen, 650 mg, Per G Tube, Q6H PRN    bisacodyL, 10 mg, Rectal, Daily PRN    butalbital-acetaminophen-caffeine -40 mg, 1 tablet, Per G Tube, Q4H PRN    dextrose 50%, 12.5 g, Intravenous, PRN    dextrose 50%, 12.5 g, Intravenous, PRN    dextrose 50%, 25 g, Intravenous, PRN    glucagon (human recombinant), 1 mg, Intramuscular, PRN    glucagon (human recombinant), 1 mg, Intramuscular, PRN    glucose, 16 g, Per G Tube, PRN    glucose, 24 g, Per G Tube, PRN    haloperidol lactate, 2 mg, Intravenous, Q6H PRN    hydrOXYzine, 25 mg, Intramuscular, Q6H PRN    insulin aspart U-100, 0-10 Units, Subcutaneous, QID (AC + HS) PRN    labetalol, 10 mg, Intravenous, Q15 Min PRN    levalbuterol, 0.63 mg,  Nebulization, Q4H PRN    loperamide, 2 mg, Per G Tube, QID PRN    ondansetron, 4 mg, Intravenous, Q8H PRN    sodium chloride 0.9%, 10 mL, Intravenous, PRN     Radiology:  I have personally reviewed the following imaging and agree with the radiologist.     CT Head Without Contrast  Narrative: EXAMINATION:  CT HEAD WITHOUT CONTRAST    CLINICAL HISTORY:  Mental status change, unknown cause;    TECHNIQUE:  Sequential axial images were performed of the brain without contrast.    Dose product length of 971 mGycm. Automated exposure control was utilized to minimize radiation dose.    COMPARISON:  September 1, 2025.    FINDINGS:  Hematoma centered about the left occipital lobe and surrounding brain edematous changes overall size and appearance is similar.  This causes slight compressive effect upon the occipital horn of left lateral ventricle.  There is no midline shift.  There is no new intra-axial or extra-axial hemorrhage.    There is no new sulcal effacement or low attenuation changes to suggest recent large vessel territory infarction.  Left basal ganglia old lacunar infarct.  Chronic appearing periventricular and subcortical white matter low attenuation changes are present and are consistent with chronic microangiopathic ischemia. The ventricular system and sulcal markings prominence is consistent with atrophy.  Impression: Stable left occipital lobe hematoma surrounding brain edema.  No new findings.    Electronically signed by: Devyn Trinidad  Date:    07/01/2025  Time:    20:38      Margarita Campos MD  Department of Hospital Medicine   [facility]   07/02/2025

## 2025-07-02 NOTE — PT/OT/SLP PROGRESS
Physical Therapy Treatment    Patient Name:  Federico Villarreal   MRN:  [id number]    Recommendations:     Discharge therapy intensity: Moderate Intensity Therapy   Discharge Equipment Recommendations: to be determined by next level of care  Barriers to discharge: Impaired mobility and Ongoing medical needs    Assessment:     Federico Villarreal is a 64 y.o. male admitted with a medical diagnosis of  L occipital hemorrhage CVA, dysphagia s/p PEG placement, delirium. Hx of L eye blindness.  He presents with the following impairments/functional limitations: weakness, gait instability, impaired cognition, decreased safety awareness, impaired endurance, impaired balance, impaired functional mobility, impaired self care skills.     Pt seen with RETA blancas and could open eyes to verbal stimuli. Pt with delayed and flat affect. Confusion present as well. Assisted pt to sitting EOB, and performed two sit<>stand transfers w RW. Pt with poor standing balance and was not safe to ambulate away from bed today.       Rehab Prognosis: Good; patient would benefit from acute skilled PT services to address these deficits and reach maximum level of function.    Recent Surgery: Procedure(s) (LRB):  PEG (N/A) 16 Days Post-Op    Plan:     During this hospitalization, patient would benefit from acute PT services 5 x/week to address the identified rehab impairments via gait training, therapeutic activities, therapeutic exercises, neuromuscular re-education and progress toward the following goals:    Plan of Care Expires:  07/13/25    Subjective     Chief Complaint: none  Patient/Family Comments/goals: ROYAL  Pain/Comfort:  Pain Rating 1: 0/10      Objective:     Communicated with nurse prior to session.  Patient found HOB elevated with bed alarm, PEG Tube, nascimento catheter (mittens) upon PT entry to room.     General Precautions: Standard, fall, vision impaired, aspiration  Orthopedic Precautions: N/A  Braces: N/A  Respiratory Status: Room  air  Blood Pressure: 108/61  Skin Integrity: Visible skin intact      Functional Mobility:  Bed Mobility:     Scooting: maximal assistance and of 2 persons  Supine to Sit: maximal assistance and of 2 persons  Sit to Supine: maximal assistance and of 2 persons  Transfers:     Sit to Stand:  maximal assistance and of 2 persons with rolling walker  Balance: Min A for sitting balance. Mod x2 for standing balance.    Therapeutic Activities/Exercises:  Performed a second sit<>stand transfer from EOB w Mod A x2. Less retropulsion on second attempt.     Co-Treatment: Yes, due to High complexity requires 2 sets of skilled hands    Education:  Patient provided with verbal education education regarding PT role/goals/POC, fall prevention, and safety awareness.  Understanding was verbalized.     Patient left left sidelying with all lines intact, call button in reach, wedge under R side, bed alarm on, and mittens placed. RN in room.       GOALS:   Multidisciplinary Problems       Physical Therapy Goals          Problem: Physical Therapy    Goal Priority Disciplines Outcome Interventions   Physical Therapy Goal     PT, PT/OT Progressing    Description: Goals to be met by: 25     Patient will increase functional independence with mobility by performin. Supine to sit with Minimal Assistance  2. Sit to stand transfer with Minimal Assistance  3. Bed to chair transfer with Minimal Assistance using Rolling Walker  4. Pt will follow 5/5 motor commands for BLE.  5. Pt will ambulate 150ft with CGA and RW.                          Time Tracking:     PT Received On: 25  PT Start Time: 1126     PT Stop Time: 1157  PT Total Time (min): 31 min     Billable Minutes: Therapeutic Activity 31    Treatment Type: Treatment  PT/PTA: PT     Number of PTA visits since last PT visit: 3     2025

## 2025-07-02 NOTE — PT/OT/SLP PROGRESS
Occupational Therapy   Treatment    Name: Federico Villarreal  MRN: [id number]    Recommendations:     Recommended therapy intensity at discharge: Moderate Intensity Therapy   Discharge Equipment Recommendations:  to be determined by next level of care  Barriers to discharge:   (ongoing medical needs; placement)    Assessment:     Federico Villarreal is a 64 y.o. male with a medical diagnosis of L occipital hemorrhage CVA, dysphagia s/p PEG placement, delirium. Hx of L eye blindness.      Performance deficits affecting function are weakness, impaired endurance, impaired self care skills, impaired functional mobility, impaired balance, visual deficits, impaired cognition, decreased coordination, decreased safety awareness.       Rehab Prognosis:  Good; patient would benefit from acute skilled OT services to address these deficits and reach maximum level of function.       Plan:     Patient to be seen 3 x/week to address the above listed problems via self-care/home management, therapeutic activities, therapeutic exercises, neuromuscular re-education  Plan of Care Expires: 07/11/25  Plan of Care Reviewed with: patient    Subjective     Pain/Comfort:       Objective:     Communicated with: nurse prior to session.  Patient found HOB elevated with peripheral IV, telemetry, pulse ox (continuous), nascimento catheter, PEG Tube upon OT entry to room.    General Precautions: Standard, fall, vision impaired (L eye blind; <140/90)    Orthopedic Precautions:N/A  Braces: N/A  Respiratory Status: Room air  Vital Signs: Blood Pressure: 108/61     Occupational Performance:     Functional Mobility/Transfers:  Bed mobility:    Supine to Sit: maximal assistance and of 2 persons  Sit to Supine: maximal assistance and of 2 persons  Transfers: Sit to Stand: maximal assistance and of 2 persons with rolling walker    Activities of Daily Living:  Feeding:  dependence PEG in place  Grooming: total assistance attempted oral care but patient requested OT  to stop  Toileting: total assistance ongoing BM upon standing; total A for pericare  Upper Body Dressing: total A to doff soiled gown and tamanna new gown    Co-Treatment: Yes, due to Limited activity tolerance    Patient Education:  Patient provided with verbal education education regarding OT role/goals/POC, fall prevention, safety awareness, Discharge/DME recommendations, and pressure ulcer prevention.  Additional teaching is warranted.      Patient left left sidelying with all lines intact, call button in reach, wedge under R side, pressure relief boots, bed alarm on, and nurse and PT present.    GOALS:   Multidisciplinary Problems       Occupational Therapy Goals          Problem: Occupational Therapy    Goal Priority Disciplines Outcome Interventions   Occupational Therapy Goal     OT, PT/OT Progressing    Description: Goals to be met by: 7/11/25     Patient will increase functional independence with ADLs by performing:    LTG: Pt will perform basic ADLs and ADL transfers with SPV using LRAD by discharge.    STG: to be met by 7/11/25  Pt will follow commands at least 75% consistently throughout session  Pt will complete grooming seated, progressing to standing as appropriate with LRAD with min A.  Pt will complete UB dressing with min A.  Pt will complete LB dressing with min A using LRAD.  Pt will complete toileting with min A using LRAD.  Pt will complete functional mobility to/from toilet and toilet transfer with min A using LRAD.   Pt will demo visual attention to R side >80% of time with min verbal cues.  Pt will demo 4/5 strength in  BUE  for increased functional use during ADL tasks.                          Time Tracking:     OT Date of Treatment: 07/02/25  OT Start Time: 1116  OT Stop Time: 1151  OT Total Time (min): 35 min    Billable Minutes:Self Care/Home Management 35    OT/THOMAS: OT     Number of THOMAS visits since last OT visit: 4    7/2/2025

## 2025-07-03 LAB
POCT GLUCOSE: 206 MG/DL (ref 70–110)
POCT GLUCOSE: 213 MG/DL (ref 70–110)
POCT GLUCOSE: 221 MG/DL (ref 70–110)
POCT GLUCOSE: 223 MG/DL (ref 70–110)
POCT GLUCOSE: 240 MG/DL (ref 70–110)
POCT GLUCOSE: 250 MG/DL (ref 70–110)

## 2025-07-03 PROCEDURE — 63600175 PHARM REV CODE 636 W HCPCS: Performed by: INTERNAL MEDICINE

## 2025-07-03 PROCEDURE — 63600175 PHARM REV CODE 636 W HCPCS

## 2025-07-03 PROCEDURE — 25000003 PHARM REV CODE 250: Performed by: INTERNAL MEDICINE

## 2025-07-03 PROCEDURE — 25000003 PHARM REV CODE 250

## 2025-07-03 PROCEDURE — 21400001 HC TELEMETRY ROOM

## 2025-07-03 PROCEDURE — 97530 THERAPEUTIC ACTIVITIES: CPT | Mod: CQ

## 2025-07-03 RX ORDER — INSULIN GLARGINE 100 [IU]/ML
20 INJECTION, SOLUTION SUBCUTANEOUS 2 TIMES DAILY
Status: DISCONTINUED | OUTPATIENT
Start: 2025-07-04 | End: 2025-07-04

## 2025-07-03 RX ADMIN — RISPERIDONE 1 MG: 1 TABLET, FILM COATED ORAL at 08:07

## 2025-07-03 RX ADMIN — HYDROXYZINE HYDROCHLORIDE 25 MG: 50 INJECTION, SOLUTION INTRAMUSCULAR at 11:07

## 2025-07-03 RX ADMIN — INSULIN ASPART 4 UNITS: 100 INJECTION, SOLUTION INTRAVENOUS; SUBCUTANEOUS at 04:07

## 2025-07-03 RX ADMIN — CARVEDILOL 12.5 MG: 12.5 TABLET, FILM COATED ORAL at 08:07

## 2025-07-03 RX ADMIN — ALUMINUM HYDROXIDE, MAGNESIUM HYDROXIDE, AND DIMETHICONE 30 ML: 200; 20; 200 SUSPENSION ORAL at 04:07

## 2025-07-03 RX ADMIN — INSULIN GLARGINE 18 UNITS: 100 INJECTION, SOLUTION SUBCUTANEOUS at 08:07

## 2025-07-03 RX ADMIN — LOSARTAN POTASSIUM 100 MG: 50 TABLET, FILM COATED ORAL at 08:07

## 2025-07-03 RX ADMIN — ALUMINUM HYDROXIDE, MAGNESIUM HYDROXIDE, AND DIMETHICONE 30 ML: 200; 20; 200 SUSPENSION ORAL at 11:07

## 2025-07-03 RX ADMIN — SUCRALFATE 1 G: 1 TABLET ORAL at 06:07

## 2025-07-03 RX ADMIN — INSULIN ASPART 4 UNITS: 100 INJECTION, SOLUTION INTRAVENOUS; SUBCUTANEOUS at 11:07

## 2025-07-03 RX ADMIN — TRAZODONE HYDROCHLORIDE 100 MG: 100 TABLET ORAL at 08:07

## 2025-07-03 RX ADMIN — SUCRALFATE 1 G: 1 TABLET ORAL at 11:07

## 2025-07-03 RX ADMIN — GUAIFENESIN AND DEXTROMETHORPHAN 5 ML: 100; 10 SYRUP ORAL at 04:07

## 2025-07-03 RX ADMIN — HALOPERIDOL LACTATE 2 MG: 5 INJECTION, SOLUTION INTRAMUSCULAR at 10:07

## 2025-07-03 RX ADMIN — INSULIN ASPART 2 UNITS: 100 INJECTION, SOLUTION INTRAVENOUS; SUBCUTANEOUS at 09:07

## 2025-07-03 RX ADMIN — SUCRALFATE 1 G: 1 TABLET ORAL at 02:07

## 2025-07-03 RX ADMIN — INSULIN ASPART 4 UNITS: 100 INJECTION, SOLUTION INTRAVENOUS; SUBCUTANEOUS at 06:07

## 2025-07-03 RX ADMIN — ALUMINUM HYDROXIDE, MAGNESIUM HYDROXIDE, AND DIMETHICONE 30 ML: 200; 20; 200 SUSPENSION ORAL at 10:07

## 2025-07-03 RX ADMIN — Medication 6 MG: at 08:07

## 2025-07-03 NOTE — PT/OT/SLP PROGRESS
Physical Therapy Treatment    Patient Name:  Federico Villarreal   MRN:  [id number]    Recommendations:     Discharge therapy intensity: Moderate Intensity Therapy   Discharge Equipment Recommendations: to be determined by next level of care  Barriers to discharge: Impaired mobility, Ongoing medical needs, and placement    Assessment:     Federico Villarreal is a 64 y.o. male admitted with a medical diagnosis of  L occipital hemorrhage CVA, dysphagia s/p PEG placement, delirium. Hx of L eye blindness.  He presents with the following impairments/functional limitations: weakness, gait instability, impaired cognition, decreased safety awareness, impaired endurance, impaired balance, impaired functional mobility, impaired self care skills.    Pt demo'd poor safety awareness and on-going restlessness during tx session. Assisted pt to EOB, performed x 2 trials STS and lateral steps towards HOB; pt w/poor standing balance, ataxic stepping, and impulsive behavior; difficult to redirect and unsafe to mobilize OOB today. Will cont to progress as able.    Rehab Prognosis: Fair; patient would benefit from acute skilled PT services to address these deficits and reach maximum level of function.    Recent Surgery: Procedure(s) (LRB):  PEG (N/A) 17 Days Post-Op    Plan:     During this hospitalization, patient would benefit from acute PT services 5 x/week to address the identified rehab impairments via gait training, therapeutic activities, therapeutic exercises, neuromuscular re-education and progress toward the following goals:    Plan of Care Expires:  07/13/25    Subjective     Chief Complaint: n/a  Patient/Family Comments/goals:   Pain/Comfort:         Objective:     Communicated with RN prior to session.  Patient found HOB elevated with bed alarm, PEG Tube, nascimento catheter (mittens) upon PT entry to room.     General Precautions: Standard, fall, vision impaired, aspiration  Orthopedic Precautions: N/A  Braces: N/A  Respiratory  Status: Room air  Blood Pressure: NT  Skin Integrity: Visible skin intact    Functional Mobility:  Bed Mobility:     Supine to Sit: maximal assistance  Sit to Supine: maximal assistance  Transfers:     Sit to Stand:  maximal assistance with rolling walker  Pre-Gait: Lateral steps towards HOB maxA w/RW; ataxic stepping and poor AD management despite increased verbal cueing; difficult to redirect; return pt B2B.  Balance: mod-maxA standing     Co-Treatment: No    Education:  Patient provided with verbal education education regarding PT role/goals/POC, fall prevention, safety awareness, and pressure ulcer prevention.  Additional teaching is warranted.     Patient left HOB elevated with all lines intact, call button in reach, bed alarm on, RN notified and present.      GOALS:   Multidisciplinary Problems       Physical Therapy Goals          Problem: Physical Therapy    Goal Priority Disciplines Outcome Interventions   Physical Therapy Goal     PT, PT/OT Progressing    Description: Goals to be met by: 25     Patient will increase functional independence with mobility by performin. Supine to sit with Minimal Assistance  2. Sit to stand transfer with Minimal Assistance  3. Bed to chair transfer with Minimal Assistance using Rolling Walker  4. Pt will follow 5/5 motor commands for BLE.  5. Pt will ambulate 150ft with CGA and RW.                          Time Tracking:     PT Received On: 25  PT Start Time: 921     PT Stop Time: 934  PT Total Time (min): 13 min     Billable Minutes: Therapeutic Activity 1    Treatment Type: Treatment  PT/PTA: PTA     Number of PTA visits since last PT visit: 4     2025

## 2025-07-03 NOTE — PROGRESS NOTES
Inpatient Nutrition Assessment    Admit Date: 6/9/2025   Total duration of encounter: 24 days   Patient Age: 64 y.o.    Nutrition Recommendation/Prescription     Bolus 250 ml Diabetisource AC and 150 ml water 6 times daily  1800 kcal 100% needs  90 g protein 100% needs  162 g CHO 92% needs  1680 ml water 100% needs    Communication of Recommendations: reviewed with nurse    Nutrition Assessment     Malnutrition Assessment/Nutrition-Focused Physical Exam       Malnutrition Level: other (see comments) (Does not meet criteria) (06/11/25 1352)  Energy Intake (Malnutrition): other (see comments) (Unable to assess) (06/11/25 1352)  Weight Loss (Malnutrition): other (see comments) (Unable to assess) (06/11/25 1352)              Muscle Mass (Malnutrition): mild depletion (06/11/25 1352)  [place] (Muscle Loss): mild depletion                                A minimum of two characteristics is recommended for diagnosis of either severe or non-severe malnutrition.    Chart Review    Reason Seen: follow-up    Malnutrition Screening Tool Results   Have you recently lost weight without trying?: No  Have you been eating poorly because of a decreased appetite?: No   MST Score: 0   Diagnosis: left occipital hemorrhagic stroke     Relevant Medical History: T1DM, coronary artery disease status post stenting, hypertension, hyperlipidemia, history of ischemic stroke with previous left-sided weakness    Scheduled Medications:  aluminum-magnesium hydroxide-simethicone, 30 mL, QID (AC & HS)  carvediloL, 12.5 mg, BID  insulin glargine U-100, 18 Units, BID  losartan, 100 mg, Daily  melatonin, 6 mg, Nightly  risperiDONE, 1 mg, BID  scopolamine, 1 patch, Q3 Days  sucralfate, 1 g, Q6H  traZODone, 100 mg, QHS    Continuous Infusions: none       PRN Medications:   acetaminophen, 650 mg, Q6H PRN  bisacodyL, 10 mg, Daily PRN  butalbital-acetaminophen-caffeine -40 mg, 1 tablet, Q4H PRN  dextromethorphan-guaiFENesin  mg/5 ml, 5 mL,  Q4H PRN  dextrose 50%, 12.5 g, PRN  dextrose 50%, 12.5 g, PRN  dextrose 50%, 25 g, PRN  glucagon (human recombinant), 1 mg, PRN  glucagon (human recombinant), 1 mg, PRN  glucose, 16 g, PRN  glucose, 24 g, PRN  haloperidol lactate, 2 mg, Q6H PRN  hydrOXYzine, 25 mg, Q6H PRN  insulin aspart U-100, 0-10 Units, QID (AC + HS) PRN  labetalol, 10 mg, Q15 Min PRN  levalbuterol, 0.63 mg, Q4H PRN  loperamide, 2 mg, QID PRN  ondansetron, 4 mg, Q8H PRN  sodium chloride 0.9%, 10 mL, PRN    Calorie Containing IV Medications: no significant kcals from medications at this time    Recent Labs   Lab 06/27/25  0735 07/01/25  1556     --    K 4.7 3.9   CALCIUM 9.2  --    PHOS 3.6 2.3   MG 2.40 2.20     --    CO2 33*  --    BUN 26.4*  --    CREATININE 0.90  --    EGFRNORACEVR >60  --    *  --    WBC 12.13*  --    HGB 13.7*  --    HCT 42.1  --      Nutrition Orders:  Diet NPO  Tube Feedings/Formulas 250; Diabetisource AC; Gastrostomy (6 times daily); 150; Every 4 hours    Appetite/Oral Intake: NPO/not applicable  Factors Affecting Nutritional Intake: impaired cognitive status/motor control and NPO  Social Needs Impacting Access to Food: none identified  Food/Church/Cultural Preferences: unable to obtain  Food Allergies: none reported  Last Bowel Movement: 07/03/25  Wound(s): no pressure injuries documented at this time     Comments    6/11/25 Patient confused with episodes of agitation, sleeping during rounds. Nurse reports plans to start nasogastric tube feeding, orders provided.    6/13/25 PO intake remains unsafe per SLP. GI consulted for PEG placement possibly on Monday. RN reports patient tolerating tube feeding at 15 mL/hr. Last BM noted on 6/8/25, consider bowel regimen.      6//16/25: Pt's TF being held for PEG placement today. Pt remains NPO per SLP.    6/19/25 Tube feeding progressing toward goal rate, currently at 60 ml/hr, goal 75 ml/hr.    6/23/25 Tube feeding at goal, will transition to bolus  "feeding.    6/27/25 Tolerating bolus feedings.    7/1/25 Nurse reports patient continues to tolerate bolus feeding regimen.    7/3/25 Tolerating bolus feedings.    Anthropometrics    Height: 5' 2" (157.5 cm), Height Method: Stated  Last Weight: 65 kg (143 lb 4.8 oz) (06/11/25 1348), Weight Method: Bed Scale  BMI (Calculated): 26.2  BMI Classification: overweight (BMI 25-29.9)        Ideal Body Weight (IBW), Male: 118 lb     % Ideal Body Weight, Male (lb): 114.41 %                          Usual Weight Provided By: unable to obtain usual weight    Wt Readings from Last 5 Encounters:   06/11/25 65 kg (143 lb 4.8 oz)   01/27/25 63 kg (139 lb)   12/03/24 64.7 kg (142 lb 9.6 oz)   11/05/24 62 kg (136 lb 11 oz)   10/02/24 62 kg (136 lb 9.6 oz)     Weight Change(s) Since Admission:   6/11/25 initial weight 61.2 kg stated, bed weight 65 kg taken during rounds  Wt Readings from Last 1 Encounters:   06/11/25 1348 65 kg (143 lb 4.8 oz)   06/09/25 0805 61.2 kg (135 lb)   Admit Weight: 61.2 kg (135 lb) (06/09/25 0805), Weight Method: Stated    Estimated Needs    Weight Used For Calorie Calculations: 65 kg (143 lb 4.8 oz)  Energy Calorie Requirements (kcal): 7409-8723, 1.2-1.4 stress factor  Energy Need Method: [facility]  Weight Used For Protein Calculations: 65 kg (143 lb 4.8 oz)  Protein Requirements: 78-91 g, 1.2-1.4 g/kg  Fluid Requirements (mL): 3823-3169, 1 ml/kcal  CHO Requirement: 177-207 g, 45% of kcal     Enteral Nutrition    Formula: Diabetisource AC  Rate/Volume: 250 ml 6 times daily  Water Flushes: 150 ml 6 times daily  Additives/Modulars: none at this time  Route: gastrostomy tube  Method: continuous  Total Nutrition Provided by Tube Feeding, Additives, and Flushes:  Calories Provided  1800 kcal/d, 100% needs   Protein Provided  90 g/d, 100% needs   Fluid Provided  1680 ml/d, 100% needs   Continuous feeding calculations based on estimated 20 hr/d run time unless otherwise stated.    Parenteral Nutrition " Patient not receiving parenteral nutrition support at this time.    Evaluation of Received Nutrient Intake    Calories: meeting estimated needs  Protein: meeting estimated needs    Patient Education Not applicable.    Nutrition Diagnosis     PES: Inadequate energy intake related to inability to consume sufficient nutrients as evidenced by less than 80% needs met. (resolved)  PES: N/A             Nutrition Interventions     Interventions: modified composition of enteral nutrition and collaboration with other providers       Goal: Meet greater than 80% of nutritional needs by follow-up. (goal met)  Goal: Tolerate enteral feeding at goal rate by follow-up. (goal met)    Nutrition Goals & Monitoring     Dietitian will monitor: food and beverage intake, energy intake, enteral nutrition intake, weight, weight change, electrolyte/renal panel, beliefs/attitudes, glucose/endocrine profile, and gastrointestinal profile  Discharge planning: tube feeding (Diabetisource AC or equivalent substitute)  Nutrition Risk/Follow-Up: dietitian will follow-up one time per week   Please consult if re-assessment needed sooner.

## 2025-07-03 NOTE — PLAN OF CARE
Problem: Adult Inpatient Plan of Care  Goal: Plan of Care Review  Outcome: Progressing  Goal: Patient-Specific Goal (Individualized)  Outcome: Progressing  Goal: Absence of Hospital-Acquired Illness or Injury  Outcome: Progressing  Goal: Optimal Comfort and Wellbeing  Outcome: Progressing  Goal: Readiness for Transition of Care  Outcome: Progressing     Problem: Diabetes Comorbidity  Goal: Blood Glucose Level Within Targeted Range  Outcome: Progressing     Problem: Stroke, Intracerebral Hemorrhage  Goal: Optimal Coping  Outcome: Progressing  Goal: Effective Bowel Elimination  Outcome: Progressing  Goal: Optimal Cerebral Tissue Perfusion  Outcome: Progressing  Goal: Optimal Cognitive Function  Outcome: Progressing  Goal: Effective Communication Skills  Outcome: Progressing  Goal: Optimal Functional Ability  Outcome: Progressing  Goal: Optimal Nutrition Intake  Outcome: Progressing  Goal: Optimal Pain Control and Function  Outcome: Progressing

## 2025-07-04 LAB
POCT GLUCOSE: 130 MG/DL (ref 70–110)
POCT GLUCOSE: 166 MG/DL (ref 70–110)
POCT GLUCOSE: 184 MG/DL (ref 70–110)
POCT GLUCOSE: 267 MG/DL (ref 70–110)
POCT GLUCOSE: 294 MG/DL (ref 70–110)

## 2025-07-04 PROCEDURE — 63600175 PHARM REV CODE 636 W HCPCS

## 2025-07-04 PROCEDURE — 97535 SELF CARE MNGMENT TRAINING: CPT | Mod: CO

## 2025-07-04 PROCEDURE — 63600175 PHARM REV CODE 636 W HCPCS: Performed by: INTERNAL MEDICINE

## 2025-07-04 PROCEDURE — 25000003 PHARM REV CODE 250

## 2025-07-04 PROCEDURE — 21400001 HC TELEMETRY ROOM

## 2025-07-04 PROCEDURE — 25000003 PHARM REV CODE 250: Performed by: INTERNAL MEDICINE

## 2025-07-04 PROCEDURE — 97530 THERAPEUTIC ACTIVITIES: CPT

## 2025-07-04 PROCEDURE — 97116 GAIT TRAINING THERAPY: CPT

## 2025-07-04 RX ORDER — INSULIN GLARGINE 100 [IU]/ML
25 INJECTION, SOLUTION SUBCUTANEOUS 2 TIMES DAILY
Status: DISCONTINUED | OUTPATIENT
Start: 2025-07-04 | End: 2025-07-13

## 2025-07-04 RX ADMIN — CARVEDILOL 12.5 MG: 12.5 TABLET, FILM COATED ORAL at 09:07

## 2025-07-04 RX ADMIN — CARVEDILOL 12.5 MG: 12.5 TABLET, FILM COATED ORAL at 10:07

## 2025-07-04 RX ADMIN — RISPERIDONE 1 MG: 1 TABLET, FILM COATED ORAL at 10:07

## 2025-07-04 RX ADMIN — ALUMINUM HYDROXIDE, MAGNESIUM HYDROXIDE, AND DIMETHICONE 30 ML: 200; 20; 200 SUSPENSION ORAL at 05:07

## 2025-07-04 RX ADMIN — SUCRALFATE 1 G: 1 TABLET ORAL at 11:07

## 2025-07-04 RX ADMIN — SUCRALFATE 1 G: 1 TABLET ORAL at 05:07

## 2025-07-04 RX ADMIN — INSULIN ASPART 6 UNITS: 100 INJECTION, SOLUTION INTRAVENOUS; SUBCUTANEOUS at 05:07

## 2025-07-04 RX ADMIN — INSULIN GLARGINE 25 UNITS: 100 INJECTION, SOLUTION SUBCUTANEOUS at 09:07

## 2025-07-04 RX ADMIN — INSULIN ASPART 1 UNITS: 100 INJECTION, SOLUTION INTRAVENOUS; SUBCUTANEOUS at 10:07

## 2025-07-04 RX ADMIN — INSULIN ASPART 6 UNITS: 100 INJECTION, SOLUTION INTRAVENOUS; SUBCUTANEOUS at 12:07

## 2025-07-04 RX ADMIN — LOSARTAN POTASSIUM 100 MG: 50 TABLET, FILM COATED ORAL at 09:07

## 2025-07-04 RX ADMIN — INSULIN GLARGINE 25 UNITS: 100 INJECTION, SOLUTION SUBCUTANEOUS at 10:07

## 2025-07-04 RX ADMIN — ALUMINUM HYDROXIDE, MAGNESIUM HYDROXIDE, AND DIMETHICONE 30 ML: 200; 20; 200 SUSPENSION ORAL at 10:07

## 2025-07-04 RX ADMIN — ALUMINUM HYDROXIDE, MAGNESIUM HYDROXIDE, AND DIMETHICONE 30 ML: 200; 20; 200 SUSPENSION ORAL at 11:07

## 2025-07-04 RX ADMIN — TRAZODONE HYDROCHLORIDE 100 MG: 100 TABLET ORAL at 10:07

## 2025-07-04 RX ADMIN — HALOPERIDOL LACTATE 2 MG: 5 INJECTION, SOLUTION INTRAMUSCULAR at 05:07

## 2025-07-04 RX ADMIN — RISPERIDONE 1 MG: 1 TABLET, FILM COATED ORAL at 09:07

## 2025-07-04 RX ADMIN — SUCRALFATE 1 G: 1 TABLET ORAL at 12:07

## 2025-07-04 NOTE — PROGRESS NOTES
[facility] Hospital Medicine Progress Note        Chief Complaint: Inpatient Follow-up       HPI: 64-year-old male with type 1 diabetes mellitus, CAD status post stenting, hypertension, hyperlipidemia, prior CVA  presented on 06/09/2025 for evaluation of sudden onset headache, confusion, slurred speech.      Workup revealed:  CT head -left occipital lobe parenchymal hemorrhage with trace adjacent subarachnoid hemorrhage.   CTA head/neck was also completed in ED, which revealed no large vessel occlusion, flow-limiting stenosis, aneurysm or evidence of a vascular malformation   MRI brain report:  1. Left occipital lobe hematoma with adjacent subarachnoid hemorrhage.  2. Innumerable chronic microhemorrhages with a subcortical location, may indicate underlying cerebral amyloid angiopathy.  3. Mild chronic microvascular ischemic changes     Neurology team evaluated the patient, initiated on stroke protocol, admitted to ICU services.  Neurosurgery team evaluated the patient, MRI likely suggestive of amyloid angiopathy, repeat CT head 6/10/25 unchanged, suggested holding antiplatelets for at least 2 weeks.  Patient is started on Keppra.  Neurosurgical team signed off.    Patient required Chao placement for retention, seen by Urology    Patient cleared to downgrade to floors 06/12/2025 PM by ICU team.    Palliative care team consulted while patient in ICU, code status DNR at the time of downgrade.     Pt had fever ,tmax 102.8, tachycardic,wbc count increased 17K, decided to obtain pan scan, CT head stable, but notes to have dense consolidation left >right LL, constipation  Respiratory cultures were obtained grew many strep group B Haemophilus influenzae.  Received antibiotics     Patient remains unsafe for p.o. intake with speech evaluations, informed decision with the patient's spouse was made, GI team consulted for PEG placement.    Psych was consulted as well to help with his delirium.       PTOT recommended moderate intensity therapy case management was consulted for placement.    Case management was having hard time placing due to delirium. Psych was reconsulted to help with delirium management.    Interval Hx:   Only in mittens this afternoon.  Off roll belt   Following commands.  Answering appropriately  Tolerating diet     Objective/physical exam:  General: In no acute distress, afebrile  Chest: Clear to auscultation bilaterally  Heart:  +S1, S2, no appreciable murmur  Abdomen: Soft, nontender, BS +, PEG tube with binder  MSK: Warm, no lower extremity edema, no clubbing or cyanosis  Neurologic: Alert and oriented x1, following commands, mittens on hands    VITAL SIGNS: 24 HRS MIN & MAX LAST   Temp  Min: 97.8 °F (36.6 °C)  Max: 100 °F (37.8 °C) 100 °F (37.8 °C)   BP  Min: 102/56  Max: 155/74 134/76   Pulse  Min: 59  Max: 109  108   Resp  Min: 16  Max: 18 18   SpO2  Min: 92 %  Max: 97 % (!) 93 %     I have reviewed the following labs:  Recent Labs   Lab 06/27/25  0735   WBC 12.13*   RBC 4.48*   HGB 13.7*   HCT 42.1   MCV 94.0   MCH 30.6   MCHC 32.5*   RDW 12.2   *   MPV 10.3     Recent Labs   Lab 06/27/25  0735 07/01/25  1556     --    K 4.7 3.9     --    CO2 33*  --    BUN 26.4*  --    CREATININE 0.90  --    *  --    CALCIUM 9.2  --    MG 2.40 2.20     Microbiology Results (last 7 days)       ** No results found for the last 168 hours. **             See below for Radiology    Assessment/Plan:  Hemorrhagic CVA-Left occipital lobe hematoma, adjacent subarachnoid hemorrhage  Dense left lobe consolidation-Haemophilus influenza pneumonia s/p treatment   Dysphagia s/p peg tube  Delirium  Urinary retention, with Chao  Tobacco use     Chronic medical conditions  T1 dm  CAD s/p PCI   HTN  HLD   history of CVA    Plan   Seen by Neurology, Neurosurgery.  Discontinued Keppra on 06/23/2025 .  Per chart review,  Neurosurgery recommended to stay off of any antiplatelets for at least  2 weeks.  No antiplatelets recommendations per Neurology at this point of time.    Stable from respiratory standpoint.Completed antibiotics. Been afebrile. Continue surveillance.     Continue tube feedings, monitor electrolytes and replete appropriately, ensure regular bowel movements.    Delirious, Psych on board .  Adjusting medications .  Continue delirium precautions . On risperidone  1mg BID and trazodone.  P.r.n. Haldol and hydralazine.    Voiding trial eventually      monitor blood sugars;   increased to 20 units Glargine BID and ISS     Continue PT OT-recommending moderate intensity therapy.     VTE prophylaxis: scds    Anticipated discharge and Disposition:  To be decided  , placement once off restraints      All diagnosis and differential diagnosis have been reviewed; assessment and plan has been documented; I have personally reviewed the labs and test results that are presently available; I have reviewed the patients medication list; I have reviewed the consulting providers response and recommendations. I have reviewed or attempted to review medical records based upon their availability    All of the patient's questions have been  addressed and answered. Patient's is agreeable to the above stated plan. I will continue to monitor closely and make adjustments to medical management as needed.    Portions of this note dictated using EMR integrated voice recognition software, and may be subject to voice recognition errors not corrected at proofreading. Please contact writer for clarification if needed.   _____________________________________________________________________    Malnutrition Status:  Nutrition consulted. Most recent weight and BMI monitored-     Measurements:  Wt Readings from Last 1 Encounters:   06/11/25 65 kg (143 lb 4.8 oz)   Body mass index is 26.21 kg/m².    Patient has been screened and assessed by RD.    Malnutrition Type:  Context:    Level: other (see comments) (Does not meet  criteria)    Malnutrition Characteristic Summary:  Weight Loss (Malnutrition): other (see comments) (Unable to assess)  Energy Intake (Malnutrition): other (see comments) (Unable to assess)  Muscle Mass (Malnutrition): mild depletion    Interventions/Recommendations (treatment strategy):        Scheduled Med:   aluminum-magnesium hydroxide-simethicone  30 mL Per G Tube QID (AC & HS)    carvediloL  12.5 mg Per G Tube BID    insulin glargine U-100  18 Units Subcutaneous BID    losartan  100 mg Per G Tube Daily    melatonin  6 mg Per G Tube Nightly    risperiDONE  1 mg Per G Tube BID    scopolamine  1 patch Transdermal Q3 Days    sucralfate  1 g Per G Tube Q6H    traZODone  100 mg Per G Tube QHS      Continuous Infusions:     PRN Meds:    Current Facility-Administered Medications:     acetaminophen, 650 mg, Per G Tube, Q6H PRN    bisacodyL, 10 mg, Rectal, Daily PRN    butalbital-acetaminophen-caffeine -40 mg, 1 tablet, Per G Tube, Q4H PRN    dextromethorphan-guaiFENesin  mg/5 ml, 5 mL, Per G Tube, Q4H PRN    dextrose 50%, 12.5 g, Intravenous, PRN    dextrose 50%, 12.5 g, Intravenous, PRN    dextrose 50%, 25 g, Intravenous, PRN    glucagon (human recombinant), 1 mg, Intramuscular, PRN    glucagon (human recombinant), 1 mg, Intramuscular, PRN    glucose, 16 g, Per G Tube, PRN    glucose, 24 g, Per G Tube, PRN    haloperidol lactate, 2 mg, Intravenous, Q6H PRN    hydrOXYzine, 25 mg, Intramuscular, Q6H PRN    insulin aspart U-100, 0-10 Units, Subcutaneous, QID (AC + HS) PRN    labetalol, 10 mg, Intravenous, Q15 Min PRN    levalbuterol, 0.63 mg, Nebulization, Q4H PRN    loperamide, 2 mg, Per G Tube, QID PRN    ondansetron, 4 mg, Intravenous, Q8H PRN    sodium chloride 0.9%, 10 mL, Intravenous, PRN     Radiology:  I have personally reviewed the following imaging and agree with the radiologist.     CT Head Without Contrast  Narrative: EXAMINATION:  CT HEAD WITHOUT CONTRAST    CLINICAL HISTORY:  Mental status  change, unknown cause;    TECHNIQUE:  Sequential axial images were performed of the brain without contrast.    Dose product length of 971 mGycm. Automated exposure control was utilized to minimize radiation dose.    COMPARISON:  September 1, 2025.    FINDINGS:  Hematoma centered about the left occipital lobe and surrounding brain edematous changes overall size and appearance is similar.  This causes slight compressive effect upon the occipital horn of left lateral ventricle.  There is no midline shift.  There is no new intra-axial or extra-axial hemorrhage.    There is no new sulcal effacement or low attenuation changes to suggest recent large vessel territory infarction.  Left basal ganglia old lacunar infarct.  Chronic appearing periventricular and subcortical white matter low attenuation changes are present and are consistent with chronic microangiopathic ischemia. The ventricular system and sulcal markings prominence is consistent with atrophy.  Impression: Stable left occipital lobe hematoma surrounding brain edema.  No new findings.    Electronically signed by: Devyn Trinidad  Date:    07/01/2025  Time:    20:38      Stacey Orantes MD  Department of Hospital Medicine   [facility]   07/03/2025

## 2025-07-04 NOTE — PT/OT/SLP PROGRESS
Occupational Therapy   Treatment    Name: Federico Villarreal  MRN: [id number]    Recommendations:     Recommended therapy intensity at discharge: Moderate Intensity Therapy   Discharge Equipment Recommendations:  to be determined by next level of care  Barriers to discharge:       Assessment:     Federico Villarreal is a 64 y.o. male with a medical diagnosis of Hemorrhagic stroke.  He presents with difficulty following directions. Performance deficits affecting function are weakness, impaired endurance, impaired self care skills, impaired functional mobility, impaired balance, visual deficits, impaired cognition, decreased coordination, decreased safety awareness.     Rehab Prognosis:  Good; patient would benefit from acute skilled OT services to address these deficits and reach maximum level of function.       Plan:     Patient to be seen 3 x/week to address the above listed problems via self-care/home management, therapeutic activities, therapeutic exercises, neuromuscular re-education  Plan of Care Expires: 07/11/25  Plan of Care Reviewed with: patient    Subjective     Pain/Comfort:  Pain Rating 1: 0/10    Objective:     Communicated with: nurse prior to session.  Patient found HOB elevated with peripheral IV, telemetry, nascimento catheter, PEG Tube upon OT entry to room.    General Precautions: Standard, fall, vision impaired, aspiration    Orthopedic Precautions:N/A  Braces: N/A  Respiratory Status: Room air     Occupational Performance:     Functional Mobility/Transfers:  Bed mobility:    Scooting: minimum assistance  Supine to Sit: moderate assistance and of 2 persons  Sit to Supine: moderate assistance and of 2 persons  Transfers: Sit to Stand: minimum assistance and of 2 persons with rolling walker with left lateral lean    Activities of Daily Living:  Grooming: hand over hand assist to wash face    Balance:   Static Sitting Balance: Bilateral UE support: Fair: Patient able to maintain balance with handhold  support; may require occasional minimal assistance.    Therapeutic Activities:  Cognition techniques with following directions to assist in increasing ADL independence     Therapeutic Positioning    OT interventions performed during the course of today's session in an effort to prevent and/or reduce acquired pressure injuries:   Therapeutic positioning was provided at the conclusion of session to offload all bony prominences for the prevention and/or reduction of pressure injuries      [facility] 6 Click ADL: 11    Co-Treatment: Yes, due to Limited activity tolerance    Patient Education:  Patient provided with verbal education and demonstrations education regarding fall prevention and safety awareness.  Additional teaching is warranted.      Patient left HOB elevated with all lines intact, call button in reach, and restraints reapplied at end of session.    GOALS:   Multidisciplinary Problems       Occupational Therapy Goals          Problem: Occupational Therapy    Goal Priority Disciplines Outcome Interventions   Occupational Therapy Goal     OT, PT/OT Progressing    Description: Goals to be met by: 7/11/25     Patient will increase functional independence with ADLs by performing:    LTG: Pt will perform basic ADLs and ADL transfers with SPV using LRAD by discharge.    STG: to be met by 7/11/25  Pt will follow commands at least 75% consistently throughout session  Pt will complete grooming seated, progressing to standing as appropriate with LRAD with min A.  Pt will complete UB dressing with min A.  Pt will complete LB dressing with min A using LRAD.  Pt will complete toileting with min A using LRAD.  Pt will complete functional mobility to/from toilet and toilet transfer with min A using LRAD.   Pt will demo visual attention to R side >80% of time with min verbal cues.  Pt will demo 4/5 strength in  BUE  for increased functional use during ADL tasks.                          Time Tracking:     OT Date of Treatment:  07/04/25  OT Start Time: 0832  OT Stop Time: 0915  OT Total Time (min): 43 min    Billable Minutes:Self Care/Home Management 43    OTR/L readily available for conference at the time of the provision of services: PASCUAL Qiu  OT/THOMAS: THOMAS     Number of THOMAS visits since last OT visit: 5    7/4/2025

## 2025-07-04 NOTE — PROGRESS NOTES
[facility] Hospital Medicine Progress Note        Chief Complaint: Inpatient Follow-up       HPI per admitting team: 64-year-old male with type 1 diabetes mellitus, CAD status post stenting, hypertension, hyperlipidemia, prior CVA  presented on 06/09/2025 for evaluation of sudden onset headache, confusion, slurred speech.    Workup revealed:  CT head -left occipital lobe parenchymal hemorrhage with trace adjacent subarachnoid hemorrhage.   CTA head/neck was also completed in ED, which revealed no large vessel occlusion, flow-limiting stenosis, aneurysm or evidence of a vascular malformation   MRI brain report:Left occipital lobe hematoma with adjacent subarachnoid hemorrhage. Innumerable chronic microhemorrhages with a subcortical location, may indicate underlying cerebral amyloid angiopathy. Mild chronic microvascular ischemic changes  Neurology team evaluated the patient, initiated on stroke protocol, admitted to ICU services.  Neurosurgery team evaluated the patient, MRI likely suggestive of amyloid angiopathy, repeat CT head 6/10/25 unchanged, suggested holding antiplatelets for at least 2 weeks.  Patient is started on Keppra.  Neurosurgical team signed off  Patient required Chao placement for retention, seen by Urolog  Patient cleared to downgrade to floors 06/12/2025 PM by ICU team  Palliative care team consulted while patient in ICU, code status DNR at the time of downgrade.  Pt had fever, tmax 102.8, tachycardic,wbc count increased 17K, decided to obtain pan scan, CT head stable, but notes to have dense consolidation left >right LL, constipation  Respiratory cultures were obtained grew many strep group B Haemophilus influenzae.  Received antibiotics  Patient remains unsafe for p.o. intake with speech evaluations, informed decision with the patient's spouse was made, GI team consulted for PEG placement.  Psych was consulted as well to help with his delirium.    PTOT recommended  "moderate intensity therapy,case management was consulted for placement.  Case management was having hard time placing due to delirium. Psych was reconsulted to help with delirium management.    Interval Hx:   Patient is sleeping comfortably, mildly snoring, no family is at bedside  Did not wake to my calling on examination    Objective/physical exam:  General: In no acute distress, afebrile  Chest: Clear to auscultation bilaterally  Heart:  +S1, S2, no appreciable murmur  Abdomen: Soft, nontender, BS +, PEG tube with binder  MSK: Warm, no lower extremity edema, no clubbing or cyanosis  Neurologic:  Asleep and snoring    VITAL SIGNS: 24 HRS MIN & MAX LAST   Temp  Min: 97.5 °F (36.4 °C)  Max: 100 °F (37.8 °C) 98.2 °F (36.8 °C)   BP  Min: 117/55  Max: 151/60 (!) 143/66   Pulse  Min: 85  Max: 110  94   Resp  Min: 16  Max: 18 18   SpO2  Min: 93 %  Max: 98 % 98 %     I have reviewed the following labs:  No results for input(s): "WBC", "RBC", "HGB", "HCT", "MCV", "MCH", "MCHC", "RDW", "PLT", "MPV", "GRAN", "LYMPH", "MONO", "BASO", "NRBC" in the last 168 hours.    Recent Labs   Lab 07/01/25  1556   K 3.9   MG 2.20     Microbiology Results (last 7 days)       ** No results found for the last 168 hours. **             See below for Radiology    Assessment/Plan:  Hemorrhagic CVA-  Left occipital lobe hematoma, adjacent subarachnoid hemorrhage  Dense left lobe consolidation- Haemophilus influenza pneumonia - treated  Dysphagia s/p peg tube  Delirium  Acute Urinary retention now with Chao  Tobacco use  Known T1 DM  CAD s/p PCI   HTN  HLD  History of CVA  DNR status       Seen by Neurology, Neurosurgery.  Discontinued Keppra on 06/23/2025.  Per chart review,  Neurosurgery recommended to stay off of antiplatelets for at least 2 weeks but Neurology recommended to avoid antiplatelet or anticoagulation at this time   Stable from respiratory standpoint. Completed antibiotics. Been afebrile. Continue surveillance.   Continue tube " feedings, keep head of bed at 35° I do not time  Psych on board. Adjusting medications. Continue delirium precautions. On risperidone  1mg BID and trazodone 100mg hs, P.r.n. IM Haldol and IM hydroxyzine PRN agitation  Voiding trial eventually   Monitor blood sugars, still elevated;   increased lantus to 25 units BID and continue ISS   Continue PT OT- recommending moderate intensity therapy.   CM on board    VTE prophylaxis: scds    Anticipated discharge and Disposition:  TBD, SNF once off restraints      All diagnosis and differential diagnosis have been reviewed; assessment and plan has been documented; I have personally reviewed the labs and test results that are presently available; I have reviewed the patients medication list; I have reviewed the consulting providers response and recommendations. I have reviewed or attempted to review medical records based upon their availability    All of the patient's questions have been  addressed and answered. Patient's is agreeable to the above stated plan. I will continue to monitor closely and make adjustments to medical management as needed.    Portions of this note dictated using EMR integrated voice recognition software, and may be subject to voice recognition errors not corrected at proofreading. Please contact writer for clarification if needed.   _____________________________________________________________________    Malnutrition Status:  Nutrition consulted. Most recent weight and BMI monitored-     Measurements:  Wt Readings from Last 1 Encounters:   06/11/25 65 kg (143 lb 4.8 oz)   Body mass index is 26.21 kg/m².    Patient has been screened and assessed by RD.    Malnutrition Type:  Context:    Level: other (see comments) (Does not meet criteria)    Malnutrition Characteristic Summary:  Weight Loss (Malnutrition): other (see comments) (Unable to assess)  Energy Intake (Malnutrition): other (see comments) (Unable to assess)  Muscle Mass (Malnutrition): mild  depletion    Interventions/Recommendations (treatment strategy):        Scheduled Med:   aluminum-magnesium hydroxide-simethicone  30 mL Per G Tube QID (AC & HS)    carvediloL  12.5 mg Per G Tube BID    insulin glargine U-100  25 Units Subcutaneous BID    losartan  100 mg Per G Tube Daily    melatonin  6 mg Per G Tube Nightly    risperiDONE  1 mg Per G Tube BID    sucralfate  1 g Per G Tube Q6H    traZODone  100 mg Per G Tube QHS      Continuous Infusions:     PRN Meds:  Current Facility-Administered Medications:     acetaminophen, 650 mg, Per G Tube, Q6H PRN    bisacodyL, 10 mg, Rectal, Daily PRN    butalbital-acetaminophen-caffeine -40 mg, 1 tablet, Per G Tube, Q4H PRN    dextromethorphan-guaiFENesin  mg/5 ml, 5 mL, Per G Tube, Q4H PRN    dextrose 50%, 12.5 g, Intravenous, PRN    dextrose 50%, 12.5 g, Intravenous, PRN    dextrose 50%, 25 g, Intravenous, PRN    glucagon (human recombinant), 1 mg, Intramuscular, PRN    glucagon (human recombinant), 1 mg, Intramuscular, PRN    glucose, 16 g, Per G Tube, PRN    glucose, 24 g, Per G Tube, PRN    haloperidol lactate, 2 mg, Intravenous, Q6H PRN    hydrOXYzine, 25 mg, Intramuscular, Q6H PRN    insulin aspart U-100, 0-10 Units, Subcutaneous, QID (AC + HS) PRN    labetalol, 10 mg, Intravenous, Q15 Min PRN    levalbuterol, 0.63 mg, Nebulization, Q4H PRN    ondansetron, 4 mg, Intravenous, Q8H PRN    sodium chloride 0.9%, 10 mL, Intravenous, PRN     Radiology:  I have personally reviewed the following imaging and agree with the radiologist.     CT Head Without Contrast  Narrative: EXAMINATION:  CT HEAD WITHOUT CONTRAST    CLINICAL HISTORY:  Mental status change, unknown cause;    TECHNIQUE:  Sequential axial images were performed of the brain without contrast.    Dose product length of 971 mGycm. Automated exposure control was utilized to minimize radiation dose.    COMPARISON:  September 1, 2025.    FINDINGS:  Hematoma centered about the left occipital lobe and  surrounding brain edematous changes overall size and appearance is similar.  This causes slight compressive effect upon the occipital horn of left lateral ventricle.  There is no midline shift.  There is no new intra-axial or extra-axial hemorrhage.    There is no new sulcal effacement or low attenuation changes to suggest recent large vessel territory infarction.  Left basal ganglia old lacunar infarct.  Chronic appearing periventricular and subcortical white matter low attenuation changes are present and are consistent with chronic microangiopathic ischemia. The ventricular system and sulcal markings prominence is consistent with atrophy.  Impression: Stable left occipital lobe hematoma surrounding brain edema.  No new findings.    Electronically signed by: Devyn Trinidad  Date:    07/01/2025  Time:    20:38      Rupesh Powers MD  Department of Hospital Medicine   [facility]   07/04/2025

## 2025-07-04 NOTE — PT/OT/SLP PROGRESS
Physical Therapy Treatment    Patient Name:  Federico Villarreal   MRN:  [id number]    Recommendations:     Discharge therapy intensity: Moderate Intensity Therapy   Discharge Equipment Recommendations: to be determined by next level of care  Barriers to discharge: Impaired mobility and Ongoing medical needs    Assessment:     Federico Villarreal is a 64 y.o. male admitted with a medical diagnosis of L occipital hemorrhage CVA, dysphagia s/p PEG placement, delirium. Hx of L eye blindness.   He presents with the following impairments/functional limitations: weakness, gait instability, impaired cognition, decreased safety awareness, impaired endurance, impaired balance, impaired functional mobility, impaired self care skills.     Pt with flat affect and drowsiness, but able to participate with therapy today. Pt requires extra time for sequencing and extra cues to complete movement. Pt was able to ambulate in hallway today with RW and Mod x2 assist.     Rehab Prognosis: Good; patient would benefit from acute skilled PT services to address these deficits and reach maximum level of function.    Recent Surgery: Procedure(s) (LRB):  PEG (N/A) 18 Days Post-Op    Plan:     During this hospitalization, patient would benefit from acute PT services 5 x/week to address the identified rehab impairments via gait training, therapeutic activities, therapeutic exercises, neuromuscular re-education and progress toward the following goals:    Plan of Care Expires:  07/13/25    Subjective     Chief Complaint: none  Patient/Family Comments/goals: ROYLA  Pain/Comfort:  Pain Rating 1: 0/10      Objective:     Communicated with nurse prior to session.  Patient found HOB elevated with bed alarm, PEG Tube, nascimento catheter (mittens) upon PT entry to room.     General Precautions: Standard, fall, vision impaired, aspiration  Orthopedic Precautions: N/A  Braces: N/A  Respiratory Status: Room air      Functional Mobility:  Bed Mobility:     Scooting:  moderate assistance and of 2 persons  Supine to Sit: moderate assistance and of 2 persons  Transfers:     Sit to Stand:  minimum assistance, moderate assistance, and of 2 persons with rolling walker  Gait: 50ft with RW and Mod x2 assist for L lean, veering walker to L, and sequencing. Pt demonstrates very small HENRIETTA.     Therapeutic Activities/Exercises:  - stand step transfer from bed to chair w RW, Mod x2.  -stand step transfer from chair back to bed, HHA due to confusion with maneuvering RW. Mod x2 assist.     Co-Treatment: Yes, due to previous pt agitation and pt confusion. However, pt was not agitated during session today.      Education:  Patient provided with verbal education education regarding PT role/goals/POC.  Additional teaching is warranted.     Patient left right sidelying with all lines intact, call button in reach, wedge under L side, pressure relief boots, and bed alarm on      GOALS:   Multidisciplinary Problems       Physical Therapy Goals          Problem: Physical Therapy    Goal Priority Disciplines Outcome Interventions   Physical Therapy Goal     PT, PT/OT Progressing    Description: Goals to be met by: 25     Patient will increase functional independence with mobility by performin. Supine to sit with Minimal Assistance  2. Sit to stand transfer with Minimal Assistance  3. Bed to chair transfer with Minimal Assistance using Rolling Walker  4. Pt will follow 5/5 motor commands for BLE.  5. Pt will ambulate 150ft with CGA and RW.                          Time Tracking:     PT Received On: 25  PT Start Time: 830     PT Stop Time: 15  PT Total Time (min): 45 min     Billable Minutes: Therapeutic Activity 45    Treatment Type: Treatment  PT/PTA: PT     Number of PTA visits since last PT visit: 5     2025

## 2025-07-04 NOTE — PLAN OF CARE
Problem: Adult Inpatient Plan of Care  Goal: Plan of Care Review  Outcome: Progressing  Goal: Patient-Specific Goal (Individualized)  Outcome: Progressing  Goal: Absence of Hospital-Acquired Illness or Injury  Outcome: Progressing  Goal: Optimal Comfort and Wellbeing  Outcome: Progressing  Goal: Readiness for Transition of Care  Outcome: Progressing     Problem: Stroke, Intracerebral Hemorrhage  Goal: Optimal Coping  Outcome: Progressing

## 2025-07-05 LAB
GLUCOSE SERPL-MCNC: 161 MG/DL (ref 70–110)
GLUCOSE SERPL-MCNC: 201 MG/DL (ref 70–110)
POCT GLUCOSE: 182 MG/DL (ref 70–110)
POCT GLUCOSE: 274 MG/DL (ref 70–110)

## 2025-07-05 PROCEDURE — 25000003 PHARM REV CODE 250

## 2025-07-05 PROCEDURE — 21400001 HC TELEMETRY ROOM

## 2025-07-05 PROCEDURE — 25000003 PHARM REV CODE 250: Performed by: INTERNAL MEDICINE

## 2025-07-05 PROCEDURE — 63600175 PHARM REV CODE 636 W HCPCS: Performed by: INTERNAL MEDICINE

## 2025-07-05 RX ADMIN — INSULIN ASPART 2 UNITS: 100 INJECTION, SOLUTION INTRAVENOUS; SUBCUTANEOUS at 04:07

## 2025-07-05 RX ADMIN — ALUMINUM HYDROXIDE, MAGNESIUM HYDROXIDE, AND DIMETHICONE 30 ML: 200; 20; 200 SUSPENSION ORAL at 05:07

## 2025-07-05 RX ADMIN — RISPERIDONE 1 MG: 1 TABLET, FILM COATED ORAL at 08:07

## 2025-07-05 RX ADMIN — SUCRALFATE 1 G: 1 TABLET ORAL at 05:07

## 2025-07-05 RX ADMIN — SUCRALFATE 1 G: 1 TABLET ORAL at 12:07

## 2025-07-05 RX ADMIN — RISPERIDONE 1 MG: 1 TABLET, FILM COATED ORAL at 09:07

## 2025-07-05 RX ADMIN — INSULIN ASPART 4 UNITS: 100 INJECTION, SOLUTION INTRAVENOUS; SUBCUTANEOUS at 12:07

## 2025-07-05 RX ADMIN — ACETAMINOPHEN 650 MG: 325 TABLET ORAL at 08:07

## 2025-07-05 RX ADMIN — LOSARTAN POTASSIUM 100 MG: 50 TABLET, FILM COATED ORAL at 09:07

## 2025-07-05 RX ADMIN — CARVEDILOL 12.5 MG: 12.5 TABLET, FILM COATED ORAL at 09:07

## 2025-07-05 RX ADMIN — INSULIN GLARGINE 25 UNITS: 100 INJECTION, SOLUTION SUBCUTANEOUS at 09:07

## 2025-07-05 RX ADMIN — TRAZODONE HYDROCHLORIDE 100 MG: 100 TABLET ORAL at 08:07

## 2025-07-05 RX ADMIN — Medication 6 MG: at 08:07

## 2025-07-05 RX ADMIN — INSULIN ASPART 6 UNITS: 100 INJECTION, SOLUTION INTRAVENOUS; SUBCUTANEOUS at 06:07

## 2025-07-05 RX ADMIN — INSULIN ASPART 1 UNITS: 100 INJECTION, SOLUTION INTRAVENOUS; SUBCUTANEOUS at 08:07

## 2025-07-05 RX ADMIN — ALUMINUM HYDROXIDE, MAGNESIUM HYDROXIDE, AND DIMETHICONE 30 ML: 200; 20; 200 SUSPENSION ORAL at 12:07

## 2025-07-05 RX ADMIN — INSULIN GLARGINE 25 UNITS: 100 INJECTION, SOLUTION SUBCUTANEOUS at 08:07

## 2025-07-05 RX ADMIN — ALUMINUM HYDROXIDE, MAGNESIUM HYDROXIDE, AND DIMETHICONE 30 ML: 200; 20; 200 SUSPENSION ORAL at 08:07

## 2025-07-05 RX ADMIN — CARVEDILOL 12.5 MG: 12.5 TABLET, FILM COATED ORAL at 08:07

## 2025-07-05 NOTE — PROGRESS NOTES
[facility] Hospital Medicine Progress Note        Chief Complaint: Inpatient Follow-up       HPI per admitting team: 64-year-old male with type 1 diabetes mellitus, CAD status post stenting, hypertension, hyperlipidemia, prior CVA  presented on 06/09/2025 for evaluation of sudden onset headache, confusion, slurred speech.    Workup revealed:  CT head -left occipital lobe parenchymal hemorrhage with trace adjacent subarachnoid hemorrhage.   CTA head/neck was also completed in ED, which revealed no large vessel occlusion, flow-limiting stenosis, aneurysm or evidence of a vascular malformation   MRI brain report:Left occipital lobe hematoma with adjacent subarachnoid hemorrhage. Innumerable chronic microhemorrhages with a subcortical location, may indicate underlying cerebral amyloid angiopathy. Mild chronic microvascular ischemic changes  Neurology team evaluated the patient, initiated on stroke protocol, admitted to ICU services.  Neurosurgery team evaluated the patient, MRI likely suggestive of amyloid angiopathy, repeat CT head 6/10/25 unchanged, suggested holding antiplatelets for at least 2 weeks.  Patient is started on Keppra.  Neurosurgical team signed off  Patient required Chao placement for retention, seen by Urolog  Patient cleared to downgrade to floors 06/12/2025 PM by ICU team  Palliative care team consulted while patient in ICU, code status DNR at the time of downgrade.  Pt had fever, tmax 102.8, tachycardic,wbc count increased 17K, decided to obtain pan scan, CT head stable, but notes to have dense consolidation left >right LL, constipation  Respiratory cultures were obtained grew many strep group B Haemophilus influenzae.  Received antibiotics  Patient remains unsafe for p.o. intake with speech evaluations, informed decision with the patient's spouse was made, GI team consulted for PEG placement.  Psych was consulted as well to help with his delirium.    PTOT recommended  "moderate intensity therapy,case management was consulted for placement.  Case management was having hard time placing due to delirium. Psych was reconsulted to help with delirium management.    Interval Hx:   Patient is awake, laying in bed, confused.  Did not answered any of my questions, has poor vision also  No family at bedside  Case was discussed with patient's nurse on the floor    Objective/physical exam:  General: In no acute distress, afebrile  Chest: Clear to auscultation bilaterally  Heart:  +S1, S2, no appreciable murmur  Abdomen: Soft, nontender, BS +, PEG tube with abdominal binder  MSK: Warm, hands in mittens  Neurologic:  Confused    VITAL SIGNS: 24 HRS MIN & MAX LAST   Temp  Min: 98.2 °F (36.8 °C)  Max: 98.6 °F (37 °C) 98.6 °F (37 °C)   BP  Min: 104/61  Max: 179/70 (!) 179/70   Pulse  Min: 89  Max: 108  108   Resp  Min: 18  Max: 18 18   SpO2  Min: 92 %  Max: 98 % 97 %     I have reviewed the following labs:  No results for input(s): "WBC", "RBC", "HGB", "HCT", "MCV", "MCH", "MCHC", "RDW", "PLT", "MPV", "GRAN", "LYMPH", "MONO", "BASO", "NRBC" in the last 168 hours.    Recent Labs   Lab 07/01/25  1556   K 3.9   MG 2.20     Microbiology Results (last 7 days)       ** No results found for the last 168 hours. **             See below for Radiology    Assessment/Plan:  Hemorrhagic CVA-  Left occipital lobe hematoma, adjacent subarachnoid hemorrhage  Dense left lobe consolidation- Haemophilus influenza pneumonia - treated  Oropharyngeal Dysphagia s/p peg tube  Delirium  Acute Urinary retention now with Chao  Known T1 DM  CAD s/p PCI, HTN, HLD  History of CVA  Tobacco use  DNR status     Seen by Neurology, Neurosurgery.  Discontinued Keppra on 06/23/2025.  Per chart review, Neurosurgery recommended to stay off of antiplatelets for at least 2 weeks but Neurology recommended to avoid antiplatelet or anticoagulation at this time   Completed antibiotics, afebrile  Continue tube feedings, keep head of bed at " 35° at all times   Psych on board. Adjusting medications. Continue delirium precautions. On risperidone  1mg BID and trazodone 100mg hs, P.r.n. IM Haldol and IM hydroxyzine PRN agitation  Voiding trial eventually   Monitor blood sugars, still elevated;   increased lantus to 25 units BID and continue ISS   Continue PT OT- recommending moderate intensity therapy.   CM on board  Morning CBC, BMP, Mag ordered    VTE prophylaxis: scds    Anticipated discharge and Disposition:  TBD, SNF once accepted      All diagnosis and differential diagnosis have been reviewed; assessment and plan has been documented; I have personally reviewed the labs and test results that are presently available; I have reviewed the patients medication list; I have reviewed the consulting providers response and recommendations. I have reviewed or attempted to review medical records based upon their availability    All of the patient's questions have been  addressed and answered. Patient's is agreeable to the above stated plan. I will continue to monitor closely and make adjustments to medical management as needed.    Portions of this note dictated using EMR integrated voice recognition software, and may be subject to voice recognition errors not corrected at proofreading. Please contact writer for clarification if needed.   _____________________________________________________________________    Malnutrition Status:  Nutrition consulted. Most recent weight and BMI monitored-     Measurements:  Wt Readings from Last 1 Encounters:   06/11/25 65 kg (143 lb 4.8 oz)   Body mass index is 26.21 kg/m².    Patient has been screened and assessed by RD.    Malnutrition Type:  Context:    Level: other (see comments) (Does not meet criteria)    Malnutrition Characteristic Summary:  Weight Loss (Malnutrition): other (see comments) (Unable to assess)  Energy Intake (Malnutrition): other (see comments) (Unable to assess)  Muscle Mass (Malnutrition): mild  depletion    Interventions/Recommendations (treatment strategy):        Scheduled Med:   aluminum-magnesium hydroxide-simethicone  30 mL Per G Tube QID (AC & HS)    carvediloL  12.5 mg Per G Tube BID    insulin glargine U-100  25 Units Subcutaneous BID    losartan  100 mg Per G Tube Daily    melatonin  6 mg Per G Tube Nightly    risperiDONE  1 mg Per G Tube BID    sucralfate  1 g Per G Tube Q6H    traZODone  100 mg Per G Tube QHS      Continuous Infusions:     PRN Meds:  Current Facility-Administered Medications:     acetaminophen, 650 mg, Per G Tube, Q6H PRN    bisacodyL, 10 mg, Rectal, Daily PRN    butalbital-acetaminophen-caffeine -40 mg, 1 tablet, Per G Tube, Q4H PRN    dextromethorphan-guaiFENesin  mg/5 ml, 5 mL, Per G Tube, Q4H PRN    dextrose 50%, 12.5 g, Intravenous, PRN    dextrose 50%, 12.5 g, Intravenous, PRN    dextrose 50%, 25 g, Intravenous, PRN    glucagon (human recombinant), 1 mg, Intramuscular, PRN    glucagon (human recombinant), 1 mg, Intramuscular, PRN    glucose, 16 g, Per G Tube, PRN    glucose, 24 g, Per G Tube, PRN    haloperidol lactate, 2 mg, Intravenous, Q6H PRN    hydrOXYzine, 25 mg, Intramuscular, Q6H PRN    insulin aspart U-100, 0-10 Units, Subcutaneous, QID (AC + HS) PRN    labetalol, 10 mg, Intravenous, Q15 Min PRN    levalbuterol, 0.63 mg, Nebulization, Q4H PRN    ondansetron, 4 mg, Intravenous, Q8H PRN    sodium chloride 0.9%, 10 mL, Intravenous, PRN     Radiology:  I have personally reviewed the following imaging and agree with the radiologist.     CT Head Without Contrast  Narrative: EXAMINATION:  CT HEAD WITHOUT CONTRAST    CLINICAL HISTORY:  Mental status change, unknown cause;    TECHNIQUE:  Sequential axial images were performed of the brain without contrast.    Dose product length of 971 mGycm. Automated exposure control was utilized to minimize radiation dose.    COMPARISON:  September 1, 2025.    FINDINGS:  Hematoma centered about the left occipital lobe and  surrounding brain edematous changes overall size and appearance is similar.  This causes slight compressive effect upon the occipital horn of left lateral ventricle.  There is no midline shift.  There is no new intra-axial or extra-axial hemorrhage.    There is no new sulcal effacement or low attenuation changes to suggest recent large vessel territory infarction.  Left basal ganglia old lacunar infarct.  Chronic appearing periventricular and subcortical white matter low attenuation changes are present and are consistent with chronic microangiopathic ischemia. The ventricular system and sulcal markings prominence is consistent with atrophy.  Impression: Stable left occipital lobe hematoma surrounding brain edema.  No new findings.    Electronically signed by: Devyn Trinidad  Date:    07/01/2025  Time:    20:38      Rupesh Powers MD  Department of Hospital Medicine   [facility]   07/05/2025

## 2025-07-06 LAB
ANION GAP SERPL CALC-SCNC: 8 MEQ/L
BUN SERPL-MCNC: 34.1 MG/DL (ref 8.4–25.7)
CALCIUM SERPL-MCNC: 8.6 MG/DL (ref 8.8–10)
CHLORIDE SERPL-SCNC: 98 MMOL/L (ref 98–107)
CO2 SERPL-SCNC: 32 MMOL/L (ref 23–31)
CREAT SERPL-MCNC: 1.05 MG/DL (ref 0.72–1.25)
CREAT/UREA NIT SERPL: 32
ERYTHROCYTE [DISTWIDTH] IN BLOOD BY AUTOMATED COUNT: 12.1 % (ref 11.5–17)
GFR SERPLBLD CREATININE-BSD FMLA CKD-EPI: >60 ML/MIN/1.73/M2
GLUCOSE SERPL-MCNC: 190 MG/DL (ref 70–110)
GLUCOSE SERPL-MCNC: 240 MG/DL (ref 82–115)
GLUCOSE SERPL-MCNC: 256 MG/DL (ref 70–110)
HCT VFR BLD AUTO: 36 % (ref 42–52)
HGB BLD-MCNC: 11.7 G/DL (ref 14–18)
MAGNESIUM SERPL-MCNC: 2.9 MG/DL (ref 1.6–2.6)
MCH RBC QN AUTO: 30.6 PG (ref 27–31)
MCHC RBC AUTO-ENTMCNC: 32.5 G/DL (ref 33–36)
MCV RBC AUTO: 94.2 FL (ref 80–94)
NRBC BLD AUTO-RTO: 0 %
PLATELET # BLD AUTO: 209 X10(3)/MCL (ref 130–400)
PMV BLD AUTO: 10.4 FL (ref 7.4–10.4)
POCT GLUCOSE: 174 MG/DL (ref 70–110)
POCT GLUCOSE: 182 MG/DL (ref 70–110)
POTASSIUM SERPL-SCNC: 4.4 MMOL/L (ref 3.5–5.1)
RBC # BLD AUTO: 3.82 X10(6)/MCL (ref 4.7–6.1)
SODIUM SERPL-SCNC: 138 MMOL/L (ref 136–145)
WBC # BLD AUTO: 12.58 X10(3)/MCL (ref 4.5–11.5)

## 2025-07-06 PROCEDURE — 80048 BASIC METABOLIC PNL TOTAL CA: CPT | Performed by: INTERNAL MEDICINE

## 2025-07-06 PROCEDURE — 25000003 PHARM REV CODE 250

## 2025-07-06 PROCEDURE — 21400001 HC TELEMETRY ROOM

## 2025-07-06 PROCEDURE — 83735 ASSAY OF MAGNESIUM: CPT | Performed by: INTERNAL MEDICINE

## 2025-07-06 PROCEDURE — 25000003 PHARM REV CODE 250: Performed by: INTERNAL MEDICINE

## 2025-07-06 PROCEDURE — 63600175 PHARM REV CODE 636 W HCPCS: Performed by: INTERNAL MEDICINE

## 2025-07-06 PROCEDURE — 36415 COLL VENOUS BLD VENIPUNCTURE: CPT | Performed by: INTERNAL MEDICINE

## 2025-07-06 PROCEDURE — 85027 COMPLETE CBC AUTOMATED: CPT | Performed by: INTERNAL MEDICINE

## 2025-07-06 RX ADMIN — Medication 6 MG: at 08:07

## 2025-07-06 RX ADMIN — SUCRALFATE 1 G: 1 TABLET ORAL at 05:07

## 2025-07-06 RX ADMIN — RISPERIDONE 1 MG: 1 TABLET, FILM COATED ORAL at 08:07

## 2025-07-06 RX ADMIN — TRAZODONE HYDROCHLORIDE 100 MG: 100 TABLET ORAL at 08:07

## 2025-07-06 RX ADMIN — ALUMINUM HYDROXIDE, MAGNESIUM HYDROXIDE, AND DIMETHICONE 30 ML: 200; 20; 200 SUSPENSION ORAL at 08:07

## 2025-07-06 RX ADMIN — CARVEDILOL 12.5 MG: 12.5 TABLET, FILM COATED ORAL at 09:07

## 2025-07-06 RX ADMIN — SUCRALFATE 1 G: 1 TABLET ORAL at 11:07

## 2025-07-06 RX ADMIN — INSULIN ASPART 1 UNITS: 100 INJECTION, SOLUTION INTRAVENOUS; SUBCUTANEOUS at 08:07

## 2025-07-06 RX ADMIN — INSULIN GLARGINE 25 UNITS: 100 INJECTION, SOLUTION SUBCUTANEOUS at 09:07

## 2025-07-06 RX ADMIN — INSULIN GLARGINE 25 UNITS: 100 INJECTION, SOLUTION SUBCUTANEOUS at 08:07

## 2025-07-06 RX ADMIN — CARVEDILOL 12.5 MG: 12.5 TABLET, FILM COATED ORAL at 08:07

## 2025-07-06 RX ADMIN — LOSARTAN POTASSIUM 100 MG: 50 TABLET, FILM COATED ORAL at 09:07

## 2025-07-06 RX ADMIN — ACETAMINOPHEN 650 MG: 325 TABLET ORAL at 11:07

## 2025-07-06 RX ADMIN — ALUMINUM HYDROXIDE, MAGNESIUM HYDROXIDE, AND DIMETHICONE 30 ML: 200; 20; 200 SUSPENSION ORAL at 11:07

## 2025-07-06 RX ADMIN — SUCRALFATE 1 G: 1 TABLET ORAL at 12:07

## 2025-07-06 RX ADMIN — ALUMINUM HYDROXIDE, MAGNESIUM HYDROXIDE, AND DIMETHICONE 30 ML: 200; 20; 200 SUSPENSION ORAL at 05:07

## 2025-07-06 RX ADMIN — RISPERIDONE 1 MG: 1 TABLET, FILM COATED ORAL at 09:07

## 2025-07-06 RX ADMIN — INSULIN ASPART 6 UNITS: 100 INJECTION, SOLUTION INTRAVENOUS; SUBCUTANEOUS at 03:07

## 2025-07-06 RX ADMIN — INSULIN ASPART 2 UNITS: 100 INJECTION, SOLUTION INTRAVENOUS; SUBCUTANEOUS at 11:07

## 2025-07-06 RX ADMIN — INSULIN ASPART 2 UNITS: 100 INJECTION, SOLUTION INTRAVENOUS; SUBCUTANEOUS at 05:07

## 2025-07-06 NOTE — PLAN OF CARE
Problem: Adult Inpatient Plan of Care  Goal: Plan of Care Review  Outcome: Progressing  Goal: Patient-Specific Goal (Individualized)  Outcome: Progressing  Goal: Absence of Hospital-Acquired Illness or Injury  Outcome: Progressing  Goal: Optimal Comfort and Wellbeing  Outcome: Progressing  Goal: Readiness for Transition of Care  Outcome: Progressing     Problem: Diabetes Comorbidity  Goal: Blood Glucose Level Within Targeted Range  Outcome: Progressing     Problem: Stroke, Intracerebral Hemorrhage  Goal: Optimal Coping  Outcome: Progressing  Goal: Optimal Cognitive Function  Outcome: Progressing  Goal: Effective Communication Skills  Outcome: Progressing

## 2025-07-06 NOTE — PROGRESS NOTES
[facility] Hospital Medicine Progress Note        Chief Complaint: Inpatient Follow-up       HPI per admitting team: 64-year-old male with type 1 diabetes mellitus, CAD status post stenting, hypertension, hyperlipidemia, prior CVA  presented on 06/09/2025 for evaluation of sudden onset headache, confusion, slurred speech.    Workup revealed:  CT head -left occipital lobe parenchymal hemorrhage with trace adjacent subarachnoid hemorrhage.   CTA head/neck was also completed in ED, which revealed no large vessel occlusion, flow-limiting stenosis, aneurysm or evidence of a vascular malformation   MRI brain report:Left occipital lobe hematoma with adjacent subarachnoid hemorrhage. Innumerable chronic microhemorrhages with a subcortical location, may indicate underlying cerebral amyloid angiopathy. Mild chronic microvascular ischemic changes  Neurology team evaluated the patient, initiated on stroke protocol, admitted to ICU services.  Neurosurgery team evaluated the patient, MRI likely suggestive of amyloid angiopathy, repeat CT head 6/10/25 unchanged, suggested holding antiplatelets for at least 2 weeks.  Patient is started on Keppra.  Neurosurgical team signed off  Patient required Chao placement for retention, seen by Urolog  Patient cleared to downgrade to floors 06/12/2025 PM by ICU team  Palliative care team consulted while patient in ICU, code status DNR at the time of downgrade.  Pt had fever, tmax 102.8, tachycardic,wbc count increased 17K, decided to obtain pan scan, CT head stable, but notes to have dense consolidation left >right LL, constipation  Respiratory cultures were obtained grew many strep group B Haemophilus influenzae.  Received antibiotics  Patient remains unsafe for p.o. intake with speech evaluations, informed decision with the patient's spouse was made, GI team consulted for PEG placement.  Psych was consulted as well to help with his delirium.    PTOT recommended  moderate intensity therapy,case management was consulted for placement.  Case management was having hard time placing due to delirium. Psych was reconsulted to help with delirium management.    Interval Hx:   Patient is awake, laying in bed, confused.  Did not answered any of my questions, hands in mittens, has poor vision also  No family at bedside  Case was discussed with patient's nurse on the floor    Objective/physical exam:  General: In no acute distress, afebrile  Chest: Clear to auscultation bilaterally  Heart:  +S1, S2, no appreciable murmur  Abdomen: Soft, nontender, BS +, PEG tube with abdominal binder  MSK: Warm, hands in mittens  Neurologic:  Confused, minimally interactive    VITAL SIGNS: 24 HRS MIN & MAX LAST   Temp  Min: 97.8 °F (36.6 °C)  Max: 99.2 °F (37.3 °C) 98 °F (36.7 °C)   BP  Min: 86/52  Max: 132/83 130/70   Pulse  Min: 81  Max: 104  93   No data recorded 18   SpO2  Min: 91 %  Max: 98 % (!) 91 %     I have reviewed the following labs:  Recent Labs   Lab 07/06/25  0733   WBC 12.58*   RBC 3.82*   HGB 11.7*   HCT 36.0*   MCV 94.2*   MCH 30.6   MCHC 32.5*   RDW 12.1      MPV 10.4       Recent Labs   Lab 07/01/25  1556 07/06/25  0733   NA  --  138   K 3.9 4.4   CL  --  98   CO2  --  32*   BUN  --  34.1*   CREATININE  --  1.05   GLU  --  240*   CALCIUM  --  8.6*   MG 2.20 2.90*     Microbiology Results (last 7 days)       ** No results found for the last 168 hours. **             See below for Radiology    Assessment/Plan:  Hemorrhagic CVA-  Left occipital lobe hematoma, adjacent subarachnoid hemorrhage  Dense left lobe consolidation- Haemophilus influenza pneumonia - treated  Oropharyngeal Dysphagia s/p peg tube  Delirium  Acute Urinary retention now with Chao  Known T1 DM  CAD s/p PCI, HTN, HLD  History of CVA  Tobacco use  DNR status       Seen by Neurology, Neurosurgery.  Discontinued Keppra on 06/23/2025.  Per chart review, Neurosurgery recommended to stay off of antiplatelets for at  least 2 weeks but Neurology recommended to avoid antiplatelet or anticoagulation at this time   Completed antibiotics, afebrile  Continue tube feedings, keep head of bed at 35° at all times   Psych on board. Adjusting medications. Continue delirium precautions. On risperidone  1mg BID and trazodone 100mg hs, P.r.n. IM Haldol and IM hydroxyzine PRN agitation  Voiding trial eventually once more mobile  Glucose much improved with increased dose of lantus to 25 units BID and continue ISS   Continue PT OT- recommending moderate intensity therapy.   CM on board  Morning they will    VTE prophylaxis: scds    Anticipated discharge and Disposition:  TBD, SNF once accepted      All diagnosis and differential diagnosis have been reviewed; assessment and plan has been documented; I have personally reviewed the labs and test results that are presently available; I have reviewed the patients medication list; I have reviewed the consulting providers response and recommendations. I have reviewed or attempted to review medical records based upon their availability    All of the patient's questions have been  addressed and answered. Patient's is agreeable to the above stated plan. I will continue to monitor closely and make adjustments to medical management as needed.    Portions of this note dictated using EMR integrated voice recognition software, and may be subject to voice recognition errors not corrected at proofreading. Please contact writer for clarification if needed.   _____________________________________________________________________    Malnutrition Status:  Nutrition consulted. Most recent weight and BMI monitored-     Measurements:  Wt Readings from Last 1 Encounters:   06/11/25 65 kg (143 lb 4.8 oz)   Body mass index is 26.21 kg/m².    Patient has been screened and assessed by RD.    Malnutrition Type:  Context:    Level: other (see comments) (Does not meet criteria)    Malnutrition Characteristic Summary:  Weight  Loss (Malnutrition): other (see comments) (Unable to assess)  Energy Intake (Malnutrition): other (see comments) (Unable to assess)  Muscle Mass (Malnutrition): mild depletion    Interventions/Recommendations (treatment strategy):        Scheduled Med:   aluminum-magnesium hydroxide-simethicone  30 mL Per G Tube QID (AC & HS)    carvediloL  12.5 mg Per G Tube BID    insulin glargine U-100  25 Units Subcutaneous BID    losartan  100 mg Per G Tube Daily    melatonin  6 mg Per G Tube Nightly    risperiDONE  1 mg Per G Tube BID    sucralfate  1 g Per G Tube Q6H    traZODone  100 mg Per G Tube QHS      Continuous Infusions:     PRN Meds:  Current Facility-Administered Medications:     acetaminophen, 650 mg, Per G Tube, Q6H PRN    bisacodyL, 10 mg, Rectal, Daily PRN    butalbital-acetaminophen-caffeine -40 mg, 1 tablet, Per G Tube, Q4H PRN    dextromethorphan-guaiFENesin  mg/5 ml, 5 mL, Per G Tube, Q4H PRN    dextrose 50%, 12.5 g, Intravenous, PRN    dextrose 50%, 12.5 g, Intravenous, PRN    dextrose 50%, 25 g, Intravenous, PRN    glucagon (human recombinant), 1 mg, Intramuscular, PRN    glucagon (human recombinant), 1 mg, Intramuscular, PRN    glucose, 16 g, Per G Tube, PRN    glucose, 24 g, Per G Tube, PRN    haloperidol lactate, 2 mg, Intravenous, Q6H PRN    hydrOXYzine, 25 mg, Intramuscular, Q6H PRN    insulin aspart U-100, 0-10 Units, Subcutaneous, QID (AC + HS) PRN    labetalol, 10 mg, Intravenous, Q15 Min PRN    levalbuterol, 0.63 mg, Nebulization, Q4H PRN    ondansetron, 4 mg, Intravenous, Q8H PRN    sodium chloride 0.9%, 10 mL, Intravenous, PRN     Radiology:  I have personally reviewed the following imaging and agree with the radiologist.     CT Head Without Contrast  Narrative: EXAMINATION:  CT HEAD WITHOUT CONTRAST    CLINICAL HISTORY:  Mental status change, unknown cause;    TECHNIQUE:  Sequential axial images were performed of the brain without contrast.    Dose product length of 971 mGycm.  Automated exposure control was utilized to minimize radiation dose.    COMPARISON:  September 1, 2025.    FINDINGS:  Hematoma centered about the left occipital lobe and surrounding brain edematous changes overall size and appearance is similar.  This causes slight compressive effect upon the occipital horn of left lateral ventricle.  There is no midline shift.  There is no new intra-axial or extra-axial hemorrhage.    There is no new sulcal effacement or low attenuation changes to suggest recent large vessel territory infarction.  Left basal ganglia old lacunar infarct.  Chronic appearing periventricular and subcortical white matter low attenuation changes are present and are consistent with chronic microangiopathic ischemia. The ventricular system and sulcal markings prominence is consistent with atrophy.  Impression: Stable left occipital lobe hematoma surrounding brain edema.  No new findings.    Electronically signed by: Devyn Trinidad  Date:    07/01/2025  Time:    20:38      Rupesh Powers MD  Department of Hospital Medicine   [facility]   07/06/2025

## 2025-07-07 LAB
GLUCOSE SERPL-MCNC: 187 MG/DL (ref 70–110)
POCT GLUCOSE: 143 MG/DL (ref 70–110)
POCT GLUCOSE: 163 MG/DL (ref 70–110)
POCT GLUCOSE: 187 MG/DL (ref 70–110)
POCT GLUCOSE: 242 MG/DL (ref 70–110)
POCT GLUCOSE: 247 MG/DL (ref 70–110)
POCT GLUCOSE: 57 MG/DL (ref 70–110)

## 2025-07-07 PROCEDURE — 25000003 PHARM REV CODE 250: Performed by: INTERNAL MEDICINE

## 2025-07-07 PROCEDURE — 21400001 HC TELEMETRY ROOM

## 2025-07-07 PROCEDURE — 25000003 PHARM REV CODE 250

## 2025-07-07 PROCEDURE — 63600175 PHARM REV CODE 636 W HCPCS: Performed by: INTERNAL MEDICINE

## 2025-07-07 PROCEDURE — 51702 INSERT TEMP BLADDER CATH: CPT

## 2025-07-07 PROCEDURE — 63600175 PHARM REV CODE 636 W HCPCS: Performed by: NURSE PRACTITIONER

## 2025-07-07 PROCEDURE — 51798 US URINE CAPACITY MEASURE: CPT

## 2025-07-07 RX ORDER — LIDOCAINE HYDROCHLORIDE 20 MG/ML
JELLY TOPICAL ONCE AS NEEDED
Status: DISCONTINUED | OUTPATIENT
Start: 2025-07-07 | End: 2025-07-16 | Stop reason: HOSPADM

## 2025-07-07 RX ORDER — MORPHINE SULFATE 4 MG/ML
3 INJECTION, SOLUTION INTRAMUSCULAR; INTRAVENOUS
Status: DISCONTINUED | OUTPATIENT
Start: 2025-07-07 | End: 2025-07-16 | Stop reason: HOSPADM

## 2025-07-07 RX ADMIN — SUCRALFATE 1 G: 1 TABLET ORAL at 11:07

## 2025-07-07 RX ADMIN — INSULIN ASPART 2 UNITS: 100 INJECTION, SOLUTION INTRAVENOUS; SUBCUTANEOUS at 10:07

## 2025-07-07 RX ADMIN — RISPERIDONE 1 MG: 1 TABLET, FILM COATED ORAL at 08:07

## 2025-07-07 RX ADMIN — TRAZODONE HYDROCHLORIDE 100 MG: 100 TABLET ORAL at 08:07

## 2025-07-07 RX ADMIN — INSULIN ASPART 2 UNITS: 100 INJECTION, SOLUTION INTRAVENOUS; SUBCUTANEOUS at 08:07

## 2025-07-07 RX ADMIN — CARVEDILOL 12.5 MG: 12.5 TABLET, FILM COATED ORAL at 08:07

## 2025-07-07 RX ADMIN — ALUMINUM HYDROXIDE, MAGNESIUM HYDROXIDE, AND DIMETHICONE 30 ML: 200; 20; 200 SUSPENSION ORAL at 11:07

## 2025-07-07 RX ADMIN — SUCRALFATE 1 G: 1 TABLET ORAL at 05:07

## 2025-07-07 RX ADMIN — INSULIN ASPART 2 UNITS: 100 INJECTION, SOLUTION INTRAVENOUS; SUBCUTANEOUS at 05:07

## 2025-07-07 RX ADMIN — ALUMINUM HYDROXIDE, MAGNESIUM HYDROXIDE, AND DIMETHICONE 30 ML: 200; 20; 200 SUSPENSION ORAL at 08:07

## 2025-07-07 RX ADMIN — INSULIN GLARGINE 25 UNITS: 100 INJECTION, SOLUTION SUBCUTANEOUS at 08:07

## 2025-07-07 RX ADMIN — Medication 6 MG: at 08:07

## 2025-07-07 RX ADMIN — LOSARTAN POTASSIUM 100 MG: 50 TABLET, FILM COATED ORAL at 08:07

## 2025-07-07 RX ADMIN — MORPHINE SULFATE 3 MG: 4 INJECTION, SOLUTION INTRAMUSCULAR; INTRAVENOUS at 03:07

## 2025-07-07 RX ADMIN — SUCRALFATE 1 G: 1 TABLET ORAL at 01:07

## 2025-07-07 RX ADMIN — ALUMINUM HYDROXIDE, MAGNESIUM HYDROXIDE, AND DIMETHICONE 30 ML: 200; 20; 200 SUSPENSION ORAL at 05:07

## 2025-07-07 RX ADMIN — DEXTROSE MONOHYDRATE 12.5 G: 25 INJECTION, SOLUTION INTRAVENOUS at 01:07

## 2025-07-07 NOTE — PT/OT/SLP PROGRESS
Physical Therapy      Patient Name:  Federico Villarreal   MRN:  [id number]    Pt with continued confusion and not following commands to participate with therapy today. Also noted pt bleeding in genital area and soiled, RN notified and already aware of the bleeding. Pt left with RN and CNA in room for cleanup  . Will follow-up at later time schedule permitting.

## 2025-07-07 NOTE — CONSULTS
Federico Villarreal 1960  [id number]  7/7/2025    CONSULTING PHYSICIAN: Dr. Powers    REASON FOR CONSULTATION: patient pulled out nascimento; retention    HPI:  The patient is a 64-year-old male with a history of diabetes, CAD, hypertension, hyperlipidemia, ischemic CVA with left-sided deficits.  He was admitted on 06/09/2025 for left occipital hemorrhagic stroke.  He had retention and ICU initially on admission, Nascimento catheter was placed by Dr. Puentes.  The patient pulled out this catheter and nursing was unable to replace, Nascimento catheter was replaced by Dr. Gomez on 06/14/2025.  He continues with some altered mental status/delirium.  He pulled out his Nascimento catheter overnight.  He has not voided since catheter pulled out, bladder scan at noon with about 600 mL urine.  Nursing unable to replace Nascimento.  His wife is at the bedside.    Past Medical History:   Diagnosis Date    Acne     Cataract     Coronary artery disease     Diabetes mellitus     Hearing loss     Hypertension     Seasonal allergies     Stroke      Past Surgical History:   Procedure Laterality Date    ADENOIDECTOMY      ADENOIDECTOMY  1972    As a child    CORONARY STENT PLACEMENT  08/20/2012    ESOPHAGOGASTRODUODENOSCOPY W/ PEG N/A 6/16/2025    Procedure: PEG;  Surgeon: Mike Navarro MD;  Location: [facility] ENDOSCOPY;  Service: Gastroenterology;  Laterality: N/A;    EYE SURGERY  2011    Multiple    INNER EAR SURGERY      REPAIR OF RETINAL DETACHMENT WITH VITRECTOMY      TONSILLECTOMY      VITRECTOMY       Family History   Problem Relation Name Age of Onset    Diabetes Mother Mamta Villarreal     Diabetes Father Mayito Villarreal     Dementia Father Mayito Villarreal      Social History[1]  Current Medications[2]  Review of patient's allergies indicates:  No Known Allergies    ROS: 12 point review of systems negative other than the HPI    PHYSICAL EXAM:  Vitals:    07/07/25 1142 07/07/25 1310 07/07/25 1513 07/07/25 1539   BP: 127/60  139/63    Pulse: 110  102     Resp:  18 18 18   Temp: 98.9 °F (37.2 °C)  99.3 °F (37.4 °C)    TempSrc: Axillary  Axillary    SpO2: (!) 91%  96%    Weight:       Height:           Intake/Output Summary (Last 24 hours) at 7/7/2025 1607  Last data filed at 7/7/2025 1557  Gross per 24 hour   Intake 150 ml   Output 1275 ml   Net -1125 ml       GEN: NAD  HEENT: normocephalic, atraumatic  CV: RRR  RESP: Even and unlabored  ABD:  Soft, nondistended  :  Suprapubic fullness with distended bladder.  Eighteen [place] coude catheter placed without difficulty.  Initially urine concentrated, yellow, began with some hematuria towards the end of emptying without clots  EXT: no C/C/E  NEURO: no focal deficits, MAEW, AAOx4    LABS:  Recent Results (from the past 24 hours)   POCT glucose    Collection Time: 07/06/25  8:30 PM   Result Value Ref Range    POCT Glucose 182 (H) 70 - 110 mg/dL   POCT glucose    Collection Time: 07/07/25  1:07 AM   Result Value Ref Range    POCT Glucose 57 (L) 70 - 110 mg/dL   POCT glucose    Collection Time: 07/07/25  5:25 AM   Result Value Ref Range    POCT Glucose 143 (H) 70 - 110 mg/dL   POCT glucose    Collection Time: 07/07/25 10:09 AM   Result Value Ref Range    POCT Glucose 187 (H) 70 - 110 mg/dL   POCT Glucose, Hand-Held Device    Collection Time: 07/07/25 10:21 AM   Result Value Ref Range    POC Glucose 187 (A) 70 - 110 MG/DL       IMAGING:  Imaging Results              MRI Brain W WO Contrast (Final result)  Result time 06/09/25 13:09:59      Final result by Leela Mercado MD (06/09/25 13:09:59)                   Impression:      1. Left occipital lobe hematoma with adjacent subarachnoid hemorrhage.  2. Innumerable chronic microhemorrhages with a subcortical location, may indicate underlying cerebral amyloid angiopathy.  3. Mild chronic microvascular ischemic changes.      Electronically signed by: Leela Mercado  Date:    06/09/2025  Time:    13:09               Narrative:    EXAMINATION:  MRI BRAIN W WO  CONTRAST    CLINICAL HISTORY:  Intraparenchymal hemorrhage;    TECHNIQUE:  Multiplanar, multisequence MR images of the brain were obtained with and without administration of intravenous contrast.    COMPARISON:  CT head from the same day    FINDINGS:  There is no acute infarct identified.  Redemonstrated is a left occipital lobe parenchymal hemorrhage measuring approximately 3 x 3.2 cm in size with surrounding edema.  There is a small amount of adjacent subarachnoid hemorrhage.  There are innumerable chronic microhemorrhages predominantly in the right cerebral hemisphere, with a subcortical location.  Mild patchy T2/FLAIR hyperintensities in the subcortical and periventricular white matter, basal ganglia, thalami and cerebellum likely represent chronic microvascular ischemic changes.  There is no significant abnormal parenchymal or leptomeningeal enhancement.    There is local mass effect with partial effacement the left lateral ventricle.  There is no midline shift or herniation.  The basal cisterns are patent.  There is diffuse parenchymal volume loss.  The major intracranial flow voids are patent.  The paranasal sinuses are clear.                                       CTA Head and Neck (xpd) (Final result)  Result time 06/09/25 09:39:47      Final result by Leela Mercado MD (06/09/25 09:39:47)                   Impression:      No large vessel occlusion, flow-limiting stenosis, aneurysm or evidence of a vascular malformation      Electronically signed by: Leela Mercado  Date:    06/09/2025  Time:    09:39               Narrative:    EXAMINATION:  CTA HEAD AND NECK (XPD)    CLINICAL HISTORY:  Stroke, hemorrhagic;    TECHNIQUE:  Axial images obtained through the cervical region and [place] of [place] before and after the administration of intravenous contrast.    Coronal, sagittal, MIP and 3D reconstructions were obtained from the axial data.    Automatic exposure control was utilized to limit radiation  dose.    Radiation Dose:    Total DLP: 1674 mGy*cm    COMPARISON:  CT head from the same day    FINDINGS:  Head CT with contrast:    No interval changes when compared to the previous CT.    No enhancing abnormalities.    If present, stenosis of the carotid bulbs is measured based on NASCET criteria,    i.e. area of maximal stenosis compared to the cervical ICA distal to the bulb.    Cervical CTA:    The origins of the great vessels are patent with mild scattered calcifications.    The common carotid arteries are patent.  There are calcifications at the left carotid bulb with 20-30% stenosis.  The right carotid bulb and bilateral internal carotid arteries are patent.    The vertebral arteries are patent.    Intracranial CTA:    There are calcifications in the course carotid siphons with mild narrowing bilaterally.  The middle cerebral arteries and anterior cerebral arteries are patent    The vertebral arteries are patent with mild narrowing bilaterally.  The basilar artery and posterior cerebral arteries are patent.    The dural venous sinuses are patent.                                       CT Head Without Contrast (Final result)  Result time 06/09/25 09:34:11      Final result by Leela Mercado MD (06/09/25 09:34:11)                   Impression:      Left occipital lobe parenchymal hemorrhage with trace adjacent subarachnoid hemorrhage.    Findings given to Dr. Claros at the time of dictation.      Electronically signed by: Leela Mercado  Date:    06/09/2025  Time:    09:34               Narrative:    EXAMINATION:  CT HEAD WITHOUT CONTRAST    CLINICAL HISTORY:  Mental status change, unknown cause;    TECHNIQUE:  Axial scans were obtained from skull base to the vertex.    Coronal and sagittal reconstructions obtained from the axial data.    Automatic exposure control was utilized to limit radiation dose.    Contrast: None    Radiation Dose:    Total DLP: 957 mGy*cm    COMPARISON:  None    FINDINGS:  Left  occipital lobe parenchymal hemorrhage measures 2.9 x 3.5 cm in size with surrounding edema.  There is trace adjacent subarachnoid hemorrhage. Patchy hypodensities in the subcortical and periventricular white matter, basal ganglia and thalami likely represent chronic microvascular ischemic changes.    There is local mass effect with partial effacement of the left lateral ventricle.  There is no midline shift or herniation.  The basal cisterns are patent. the calvarium and skull base are intact.  There are bilateral mastoid effusions.                                       Radiology (Final result)  Result time 25 14:28:07      Final result by Unknown User (25 14:28:07)                                        ASSESSMENT:  64-year-old male with multiple comorbidities admitted with hemorrhagic stroke, has been having issues with urinary retention since admission.  He has pulled out his Chao catheter twice, most recently last night.  Unable to void since Chao removed, bladder scan with 600 cc urine at noon.  Nursing unable to place Chao    Hematuria secondary to urinary retention    PLAN:  -18 French coude catheter placed without difficulty  -Continue indwelling Chao, can consider void trial if if and when his mental status improves   -Hand irrigate catheter as needed with 60 cc cath tip syringe to maintain patency.  -Flomax   -Discussed with nursing, wife        Verónica Rob NP         [1]   Social History  Tobacco Use    Smoking status: Former     Current packs/day: 0.00     Average packs/day: 0.3 packs/day for 17.3 years (4.5 ttl pk-yrs)     Types: Cigarettes, Cigars     Start date: 2008     Quit date:      Years since quittin.5    Smokeless tobacco: Never   Substance Use Topics    Alcohol use: Not Currently    Drug use: Not Currently   [2]   Current Facility-Administered Medications   Medication Dose Route Frequency Provider Last Rate Last Admin    acetaminophen tablet 650 mg  650 mg Per G  Tube Q6H PRN Simon Martinez MD   650 mg at 07/06/25 1125    aluminum-magnesium hydroxide-simethicone 200-200-20 mg/5 mL suspension 30 mL  30 mL Per G Tube QID (AC & HS) Simon Martinez MD   30 mL at 07/07/25 1135    bisacodyL suppository 10 mg  10 mg Rectal Daily PRN Bharat Dominguez MD   10 mg at 06/19/25 2103    butalbital-acetaminophen-caffeine -40 mg per tablet 1 tablet  1 tablet Per G Tube Q4H PRN Simon Martinez MD   1 tablet at 06/26/25 0109    carvediloL tablet 12.5 mg  12.5 mg Per G Tube BID Simon Martinez MD   12.5 mg at 07/07/25 0852    dextromethorphan-guaiFENesin  mg/5 ml liquid 5 mL  5 mL Per G Tube Q4H PRN Cyn Roland FNP   5 mL at 07/03/25 0442    dextrose 50% injection 12.5 g  12.5 g Intravenous PRN Simon Martinez MD   12.5 g at 07/07/25 0125    dextrose 50% injection 12.5 g  12.5 g Intravenous PRN Shaw Christopher MD        dextrose 50% injection 25 g  25 g Intravenous PRN Simon Martinez MD        glucagon (human recombinant) injection 1 mg  1 mg Intramuscular PRN Simon Martinez MD        glucagon (human recombinant) injection 1 mg  1 mg Intramuscular PRN Shaw Christopher MD        glucose chewable tablet 16 g  16 g Per G Tube PRN Margarita Campos MD        glucose chewable tablet 24 g  24 g Per G Tube PRN Margarita Campos MD        haloperidol lactate injection 2 mg  2 mg Intravenous Q6H PRN Federico Perea NP   2 mg at 07/04/25 0543    hydrOXYzine injection 25 mg  25 mg Intramuscular Q6H PRN Federico Perea NP   25 mg at 07/03/25 1136    insulin aspart U-100 injection 0-10 Units  0-10 Units Subcutaneous QID (AC + HS) PRN Shaw Christopher MD   2 Units at 07/07/25 1022    insulin glargine U-100 (Lantus) injection 25 Units  25 Units Subcutaneous BID Rupesh Powers MD   25 Units at 07/07/25 0852    labetaloL injection 10 mg  10 mg Intravenous Q15 Min PRN Bharat Dominguez MD   10 mg at 06/17/25 0848    levalbuterol nebulizer  solution 0.63 mg  0.63 mg Nebulization Q4H PRN Gabriela Hunter FNP   0.63 mg at 06/30/25 0904    LIDOcaine HCl 2% urojet   Urethral Once PRN Verónica Rob AGACNP-BC        losartan tablet 100 mg  100 mg Per G Tube Daily Simon Martinez MD   100 mg at 07/07/25 0852    melatonin tablet 6 mg  6 mg Per G Tube Nightly Shaw Christopher MD   6 mg at 07/06/25 2036    morphine injection 3 mg  3 mg Intravenous On Call Procedure Verónica Rob AGACNP-BC   3 mg at 07/07/25 1539    ondansetron injection 4 mg  4 mg Intravenous Q8H PRN Bharat Dominguez MD   4 mg at 06/09/25 1212    risperiDONE tablet 1 mg  1 mg Per G Tube BID Margarita Campos MD   1 mg at 07/07/25 0852    sodium chloride 0.9% flush 10 mL  10 mL Intravenous PRN Bharat Dominguez MD        sucralfate tablet 1 g  1 g Per G Tube Q6H Simon Martinez MD   1 g at 07/07/25 1135    traZODone tablet 100 mg  100 mg Per G Tube QHS Federico Perea NP   100 mg at 07/06/25 2037

## 2025-07-07 NOTE — PROGRESS NOTES
[facility] Hospital Medicine Progress Note        Chief Complaint: Inpatient Follow-up       HPI per admitting team: 64-year-old male with type 1 diabetes mellitus, CAD status post stenting, hypertension, hyperlipidemia, prior CVA  presented on 06/09/2025 for evaluation of sudden onset headache, confusion, slurred speech.    Workup revealed:  CT head -left occipital lobe parenchymal hemorrhage with trace adjacent subarachnoid hemorrhage.   CTA head/neck was also completed in ED, which revealed no large vessel occlusion, flow-limiting stenosis, aneurysm or evidence of a vascular malformation   MRI brain report:Left occipital lobe hematoma with adjacent subarachnoid hemorrhage. Innumerable chronic microhemorrhages with a subcortical location, may indicate underlying cerebral amyloid angiopathy. Mild chronic microvascular ischemic changes  Neurology team evaluated the patient, initiated on stroke protocol, admitted to ICU services.  Neurosurgery team evaluated the patient, MRI likely suggestive of amyloid angiopathy, repeat CT head 6/10/25 unchanged, suggested holding antiplatelets for at least 2 weeks.  Patient is started on Keppra.  Neurosurgical team signed off  Patient required Chao placement for retention, seen by Urolog  Patient cleared to downgrade to floors 06/12/2025 PM by ICU team  Palliative care team consulted while patient in ICU, code status DNR at the time of downgrade.  Pt had fever, tmax 102.8, tachycardic,wbc count increased 17K, decided to obtain pan scan, CT head stable, but notes to have dense consolidation left >right LL, constipation  Respiratory cultures were obtained grew many strep group B Haemophilus influenzae.  Received antibiotics  Patient remains unsafe for p.o. intake with speech evaluations, informed decision with the patient's spouse was made, GI team consulted for PEG placement.  Psych was consulted as well to help with his delirium.    PTOT recommended  moderate intensity therapy,case management was consulted for placement.  Case management was having hard time placing due to delirium. Psych was reconsulted to help with delirium management.    Interval Hx:   Patient asleep, hands in mittens.  Did not wake up to my calling.  Nurse informed me patient has pulled his Chao catheter out yesterday, is now having hematuria.  Last time Urology placed his Chao over guidewire.  Urology reconsulted  Requested nurse to make sure patient is wearing mittens to prevent further accidents like that  No family at bedside   Case was discussed with patient's nurse and  on the floor    Objective/physical exam:  General: In no acute distress, afebrile  Chest: Clear to auscultation bilaterally  Heart:  +S1, S2, no appreciable murmur  Abdomen: Soft, nontender, BS +, PEG tube with abdominal binder  MSK: Warm, hands in mittens  Neurologic:  Asleep    VITAL SIGNS: 24 HRS MIN & MAX LAST   Temp  Min: 97.5 °F (36.4 °C)  Max: 99.6 °F (37.6 °C) 98.9 °F (37.2 °C)   BP  Min: 117/73  Max: 147/76 127/60   Pulse  Min: 96  Max: 110  110   Resp  Min: 18  Max: 20 18   SpO2  Min: 91 %  Max: 94 % (!) 91 %     I have reviewed the following labs:  Recent Labs   Lab 07/06/25  0733   WBC 12.58*   RBC 3.82*   HGB 11.7*   HCT 36.0*   MCV 94.2*   MCH 30.6   MCHC 32.5*   RDW 12.1      MPV 10.4       Recent Labs   Lab 07/01/25  1556 07/06/25  0733   NA  --  138   K 3.9 4.4   CL  --  98   CO2  --  32*   BUN  --  34.1*   CREATININE  --  1.05   GLU  --  240*   CALCIUM  --  8.6*   MG 2.20 2.90*     Microbiology Results (last 7 days)       ** No results found for the last 168 hours. **             See below for Radiology    Assessment/Plan:  Traumatic hematuria- patient has pulled Chao out  Hemorrhagic CVA-  Left occipital lobe hematoma, adjacent subarachnoid hemorrhage  Dense left lobe consolidation- Haemophilus influenza pneumonia - treated  Oropharyngeal Dysphagia s/p peg tube  Delirium  Acute  Urinary retention now with Chao  Known T1 DM  CAD s/p PCI, HTN, HLD  History of CVA  Tobacco use  DNR status       Overnight patient has pulled his Chao out, now has gross bleeding.  Nurse reported last time Urology placed the Chao over a guidewire on 6/10.  Will consult Urology again for their assistance  Voiding trial eventually once more mobile  Seen by Neurology, Neurosurgery.  Discontinued Keppra on 06/23/2025.  Per chart review, Neurosurgery recommended to stay off of antiplatelets for at least 2 weeks but Neurology recommended to avoid antiplatelet or anticoagulation at this time   Completed antibiotics, afebrile  Continue tube feedings, keep head of bed at 35° at all times   Psych on board.. Continue delirium precautions. On risperidone  1mg BID and trazodone 100mg hs, P.r.n. IM Haldol and IM hydroxyzine PRN agitation  Glucose much improved with increased dose of lantus to 25 units BID and continue ISS   Continue PT OT- recommending moderate intensity therapy.   CM on board, will send request once patient is medically cleared  Morning CBC, CMP ordered    VTE prophylaxis: scds    Anticipated discharge and Disposition:  TBD, SNF once medically ready      All diagnosis and differential diagnosis have been reviewed; assessment and plan has been documented; I have personally reviewed the labs and test results that are presently available; I have reviewed the patients medication list; I have reviewed the consulting providers response and recommendations. I have reviewed or attempted to review medical records based upon their availability    All of the patient's questions have been  addressed and answered. Patient's is agreeable to the above stated plan. I will continue to monitor closely and make adjustments to medical management as needed.    Portions of this note dictated using EMR integrated voice recognition software, and may be subject to voice recognition errors not corrected at proofreading. Please  contact writer for clarification if needed.   _____________________________________________________________________    Malnutrition Status:  Nutrition consulted. Most recent weight and BMI monitored-     Measurements:  Wt Readings from Last 1 Encounters:   06/11/25 65 kg (143 lb 4.8 oz)   Body mass index is 26.21 kg/m².    Patient has been screened and assessed by RD.    Malnutrition Type:  Context:    Level: other (see comments) (Does not meet criteria)    Malnutrition Characteristic Summary:  Weight Loss (Malnutrition): other (see comments) (Unable to assess)  Energy Intake (Malnutrition): other (see comments) (Unable to assess)  Muscle Mass (Malnutrition): mild depletion    Interventions/Recommendations (treatment strategy):        Scheduled Med:   aluminum-magnesium hydroxide-simethicone  30 mL Per G Tube QID (AC & HS)    carvediloL  12.5 mg Per G Tube BID    insulin glargine U-100  25 Units Subcutaneous BID    losartan  100 mg Per G Tube Daily    melatonin  6 mg Per G Tube Nightly    risperiDONE  1 mg Per G Tube BID    sucralfate  1 g Per G Tube Q6H    traZODone  100 mg Per G Tube QHS      Continuous Infusions:     PRN Meds:  Current Facility-Administered Medications:     acetaminophen, 650 mg, Per G Tube, Q6H PRN    bisacodyL, 10 mg, Rectal, Daily PRN    butalbital-acetaminophen-caffeine -40 mg, 1 tablet, Per G Tube, Q4H PRN    dextromethorphan-guaiFENesin  mg/5 ml, 5 mL, Per G Tube, Q4H PRN    dextrose 50%, 12.5 g, Intravenous, PRN    dextrose 50%, 12.5 g, Intravenous, PRN    dextrose 50%, 25 g, Intravenous, PRN    glucagon (human recombinant), 1 mg, Intramuscular, PRN    glucagon (human recombinant), 1 mg, Intramuscular, PRN    glucose, 16 g, Per G Tube, PRN    glucose, 24 g, Per G Tube, PRN    haloperidol lactate, 2 mg, Intravenous, Q6H PRN    hydrOXYzine, 25 mg, Intramuscular, Q6H PRN    insulin aspart U-100, 0-10 Units, Subcutaneous, QID (AC + HS) PRN    labetalol, 10 mg, Intravenous, Q15 Min  PRN    levalbuterol, 0.63 mg, Nebulization, Q4H PRN    LIDOcaine HCl 2%, , Urethral, Once PRN    morphine, 3 mg, Intravenous, On Call Procedure    ondansetron, 4 mg, Intravenous, Q8H PRN    sodium chloride 0.9%, 10 mL, Intravenous, PRN     Radiology:  I have personally reviewed the following imaging and agree with the radiologist.     CT Head Without Contrast  Narrative: EXAMINATION:  CT HEAD WITHOUT CONTRAST    CLINICAL HISTORY:  Mental status change, unknown cause;    TECHNIQUE:  Sequential axial images were performed of the brain without contrast.    Dose product length of 971 mGycm. Automated exposure control was utilized to minimize radiation dose.    COMPARISON:  September 1, 2025.    FINDINGS:  Hematoma centered about the left occipital lobe and surrounding brain edematous changes overall size and appearance is similar.  This causes slight compressive effect upon the occipital horn of left lateral ventricle.  There is no midline shift.  There is no new intra-axial or extra-axial hemorrhage.    There is no new sulcal effacement or low attenuation changes to suggest recent large vessel territory infarction.  Left basal ganglia old lacunar infarct.  Chronic appearing periventricular and subcortical white matter low attenuation changes are present and are consistent with chronic microangiopathic ischemia. The ventricular system and sulcal markings prominence is consistent with atrophy.  Impression: Stable left occipital lobe hematoma surrounding brain edema.  No new findings.    Electronically signed by: Devyn Trinidad  Date:    07/01/2025  Time:    20:38      Rupesh Powers MD  Department of Hospital Medicine   [facility]   07/07/2025

## 2025-07-08 LAB
ALBUMIN SERPL-MCNC: 2.2 G/DL (ref 3.4–4.8)
ALBUMIN/GLOB SERPL: 0.6 RATIO (ref 1.1–2)
ALP SERPL-CCNC: 156 UNIT/L (ref 40–150)
ALT SERPL-CCNC: 23 UNIT/L (ref 0–55)
ANION GAP SERPL CALC-SCNC: 8 MEQ/L
AST SERPL-CCNC: 18 UNIT/L (ref 11–45)
BILIRUB SERPL-MCNC: 0.4 MG/DL
BUN SERPL-MCNC: 31.4 MG/DL (ref 8.4–25.7)
CALCIUM SERPL-MCNC: 8.3 MG/DL (ref 8.8–10)
CHLORIDE SERPL-SCNC: 99 MMOL/L (ref 98–107)
CO2 SERPL-SCNC: 34 MMOL/L (ref 23–31)
CREAT SERPL-MCNC: 0.8 MG/DL (ref 0.72–1.25)
CREAT/UREA NIT SERPL: 39
ERYTHROCYTE [DISTWIDTH] IN BLOOD BY AUTOMATED COUNT: 12.1 % (ref 11.5–17)
GFR SERPLBLD CREATININE-BSD FMLA CKD-EPI: >60 ML/MIN/1.73/M2
GLOBULIN SER-MCNC: 3.8 GM/DL (ref 2.4–3.5)
GLUCOSE SERPL-MCNC: 159 MG/DL (ref 82–115)
HCT VFR BLD AUTO: 36.5 % (ref 42–52)
HGB BLD-MCNC: 11.6 G/DL (ref 14–18)
MCH RBC QN AUTO: 31 PG (ref 27–31)
MCHC RBC AUTO-ENTMCNC: 31.8 G/DL (ref 33–36)
MCV RBC AUTO: 97.6 FL (ref 80–94)
NRBC BLD AUTO-RTO: 0 %
PLATELET # BLD AUTO: 186 X10(3)/MCL (ref 130–400)
PMV BLD AUTO: 9.8 FL (ref 7.4–10.4)
POCT GLUCOSE: 129 MG/DL (ref 70–110)
POCT GLUCOSE: 138 MG/DL (ref 70–110)
POCT GLUCOSE: 147 MG/DL (ref 70–110)
POCT GLUCOSE: 351 MG/DL (ref 70–110)
POTASSIUM SERPL-SCNC: 4.4 MMOL/L (ref 3.5–5.1)
PROT SERPL-MCNC: 6 GM/DL (ref 5.8–7.6)
RBC # BLD AUTO: 3.74 X10(6)/MCL (ref 4.7–6.1)
SODIUM SERPL-SCNC: 141 MMOL/L (ref 136–145)
WBC # BLD AUTO: 10.18 X10(3)/MCL (ref 4.5–11.5)

## 2025-07-08 PROCEDURE — 25000003 PHARM REV CODE 250: Performed by: INTERNAL MEDICINE

## 2025-07-08 PROCEDURE — 97535 SELF CARE MNGMENT TRAINING: CPT

## 2025-07-08 PROCEDURE — 63600175 PHARM REV CODE 636 W HCPCS: Performed by: INTERNAL MEDICINE

## 2025-07-08 PROCEDURE — 85027 COMPLETE CBC AUTOMATED: CPT | Performed by: INTERNAL MEDICINE

## 2025-07-08 PROCEDURE — 80053 COMPREHEN METABOLIC PANEL: CPT | Performed by: INTERNAL MEDICINE

## 2025-07-08 PROCEDURE — 25000003 PHARM REV CODE 250

## 2025-07-08 PROCEDURE — 25000003 PHARM REV CODE 250: Performed by: LICENSED PRACTICAL NURSE

## 2025-07-08 PROCEDURE — 21400001 HC TELEMETRY ROOM

## 2025-07-08 PROCEDURE — 36415 COLL VENOUS BLD VENIPUNCTURE: CPT | Performed by: INTERNAL MEDICINE

## 2025-07-08 PROCEDURE — 97168 OT RE-EVAL EST PLAN CARE: CPT

## 2025-07-08 PROCEDURE — 97164 PT RE-EVAL EST PLAN CARE: CPT

## 2025-07-08 RX ORDER — TALC
3 POWDER (GRAM) TOPICAL NIGHTLY
Status: DISCONTINUED | OUTPATIENT
Start: 2025-07-08 | End: 2025-07-16 | Stop reason: HOSPADM

## 2025-07-08 RX ORDER — DIVALPROEX SODIUM 250 MG/1
250 TABLET, DELAYED RELEASE ORAL 2 TIMES DAILY
Status: DISCONTINUED | OUTPATIENT
Start: 2025-07-08 | End: 2025-07-09

## 2025-07-08 RX ADMIN — INSULIN GLARGINE 25 UNITS: 100 INJECTION, SOLUTION SUBCUTANEOUS at 08:07

## 2025-07-08 RX ADMIN — SUCRALFATE 1 G: 1 TABLET ORAL at 05:07

## 2025-07-08 RX ADMIN — ALUMINUM HYDROXIDE, MAGNESIUM HYDROXIDE, AND DIMETHICONE 30 ML: 200; 20; 200 SUSPENSION ORAL at 08:07

## 2025-07-08 RX ADMIN — TRAZODONE HYDROCHLORIDE 100 MG: 100 TABLET ORAL at 08:07

## 2025-07-08 RX ADMIN — CARVEDILOL 12.5 MG: 12.5 TABLET, FILM COATED ORAL at 09:07

## 2025-07-08 RX ADMIN — SUCRALFATE 1 G: 1 TABLET ORAL at 12:07

## 2025-07-08 RX ADMIN — GUAIFENESIN AND DEXTROMETHORPHAN 5 ML: 100; 10 SYRUP ORAL at 08:07

## 2025-07-08 RX ADMIN — CARVEDILOL 12.5 MG: 12.5 TABLET, FILM COATED ORAL at 08:07

## 2025-07-08 RX ADMIN — DIVALPROEX SODIUM 250 MG: 250 TABLET, DELAYED RELEASE ORAL at 08:07

## 2025-07-08 RX ADMIN — RISPERIDONE 1 MG: 1 TABLET, FILM COATED ORAL at 08:07

## 2025-07-08 RX ADMIN — Medication 3 MG: at 08:07

## 2025-07-08 RX ADMIN — ALUMINUM HYDROXIDE, MAGNESIUM HYDROXIDE, AND DIMETHICONE 30 ML: 200; 20; 200 SUSPENSION ORAL at 05:07

## 2025-07-08 RX ADMIN — INSULIN GLARGINE 25 UNITS: 100 INJECTION, SOLUTION SUBCUTANEOUS at 09:07

## 2025-07-08 RX ADMIN — INSULIN ASPART 10 UNITS: 100 INJECTION, SOLUTION INTRAVENOUS; SUBCUTANEOUS at 04:07

## 2025-07-08 RX ADMIN — LOSARTAN POTASSIUM 100 MG: 50 TABLET, FILM COATED ORAL at 09:07

## 2025-07-08 RX ADMIN — ALUMINUM HYDROXIDE, MAGNESIUM HYDROXIDE, AND DIMETHICONE 30 ML: 200; 20; 200 SUSPENSION ORAL at 12:07

## 2025-07-08 RX ADMIN — ACETAMINOPHEN 650 MG: 325 TABLET ORAL at 08:07

## 2025-07-08 RX ADMIN — RISPERIDONE 1 MG: 1 TABLET, FILM COATED ORAL at 09:07

## 2025-07-08 NOTE — PLAN OF CARE
Problem: Occupational Therapy  Goal: Occupational Therapy Goal    All goals to be met by 8/5/25    Pt will follow commands at least 75% consistently throughout session  Pt will complete grooming seated, progressing to standing as appropriate with LRAD with min A.  Pt will complete UB dressing with min A.  Pt will complete LB dressing with min A using LRAD.  Pt will complete toileting with min A using LRAD.  Pt will complete functional mobility to/from toilet and toilet transfer with min A using LRAD.   Pt will demo visual attention to R side >80% of time with min verbal cues.  Pt will demo 4/5 strength in  BUE  for increased functional use during ADL tasks.     Outcome: Ongoing

## 2025-07-08 NOTE — PROGRESS NOTES
"7/8/2025  Federico Villarreal   1960   [id number]        Psychiatry Progress Note     Chief Complaint: agitation    SUBJECTIVE:   Federico Villarreal is a 64 y.o. male with a past medical history that includes CAD, T1DM, HTN, HLD, and history of ischemic stroke with left-sided weakness who presented to [facility] ED on 06/09/25 with confusion and slurred speech noted that morning. His wife had reported he had woken up with headache at 0115 that morning and then woke up with confusion around 0545. CT scan showed left occipital lobe parenchymal hemorrhage. Being seen by psychiatry to day due to confusion and agitation.     7/8/25: Pt seen today for psychiatric follow up in assigned room laying in bed resting, calm no acute distress noted. PT states mood is "okay." Pt oriented to person only minimal responses to questions. Staff reports agitation intermittently and when he receives assistance with changing and cleaning. Staff reports no prns given for acute agitation recently will review medications and adjust if applicable.     Current Medications:   Scheduled Meds:    aluminum-magnesium hydroxide-simethicone  30 mL Per G Tube QID (AC & HS)    carvediloL  12.5 mg Per G Tube BID    insulin glargine U-100  25 Units Subcutaneous BID    losartan  100 mg Per G Tube Daily    melatonin  6 mg Per G Tube Nightly    risperiDONE  1 mg Per G Tube BID    sucralfate  1 g Per G Tube Q6H    traZODone  100 mg Per G Tube QHS      PRN Meds:   Current Facility-Administered Medications:     acetaminophen, 650 mg, Per G Tube, Q6H PRN    bisacodyL, 10 mg, Rectal, Daily PRN    butalbital-acetaminophen-caffeine -40 mg, 1 tablet, Per G Tube, Q4H PRN    dextromethorphan-guaiFENesin  mg/5 ml, 5 mL, Per G Tube, Q4H PRN    dextrose 50%, 12.5 g, Intravenous, PRN    glucagon (human recombinant), 1 mg, Intramuscular, PRN    haloperidol lactate, 2 mg, Intravenous, Q6H PRN    hydrOXYzine, 25 mg, Intramuscular, Q6H PRN    insulin aspart U-100, 0-10 " Units, Subcutaneous, QID (AC + HS) PRN    labetalol, 10 mg, Intravenous, Q15 Min PRN    levalbuterol, 0.63 mg, Nebulization, Q4H PRN    LIDOcaine HCl 2%, , Urethral, Once PRN    morphine, 3 mg, Intravenous, On Call Procedure    ondansetron, 4 mg, Intravenous, Q8H PRN    sodium chloride 0.9%, 10 mL, Intravenous, PRN   Psychotherapeutics (From admission, onward)      Start     Stop Route Frequency Ordered    07/02/25 2100  risperiDONE tablet 1 mg         -- PER G TUBE 2 times daily 07/02/25 0930    06/27/25 1614  haloperidol lactate injection 2 mg         -- IV Every 6 hours PRN 06/27/25 1514    06/20/25 2100  traZODone tablet 100 mg         -- PER G TUBE Nightly 06/20/25 1200            Allergies:   Review of patient's allergies indicates:  No Known Allergies     OBJECTIVE:   Vitals   Vitals:    07/08/25 1602   BP: 97/69   Pulse: 94   Resp: 18   Temp: 99.1 °F (37.3 °C)        Labs/Imaging/Studies:   Recent Results (from the past 36 hours)   POCT glucose    Collection Time: 07/07/25 10:09 AM   Result Value Ref Range    POCT Glucose 187 (H) 70 - 110 mg/dL   POCT Glucose, Hand-Held Device    Collection Time: 07/07/25 10:21 AM   Result Value Ref Range    POC Glucose 187 (A) 70 - 110 MG/DL   POCT glucose    Collection Time: 07/07/25  3:08 PM   Result Value Ref Range    POCT Glucose 247 (H) 70 - 110 mg/dL   POCT glucose    Collection Time: 07/07/25  5:29 PM   Result Value Ref Range    POCT Glucose 163 (H) 70 - 110 mg/dL   POCT glucose    Collection Time: 07/07/25  8:07 PM   Result Value Ref Range    POCT Glucose 242 (H) 70 - 110 mg/dL   CBC Without Differential    Collection Time: 07/08/25  3:55 AM   Result Value Ref Range    WBC 10.18 4.50 - 11.50 x10(3)/mcL    RBC 3.74 (L) 4.70 - 6.10 x10(6)/mcL    Hgb 11.6 (L) 14.0 - 18.0 g/dL    Hct 36.5 (L) 42.0 - 52.0 %    MCV 97.6 (H) 80.0 - 94.0 fL    MCH 31.0 27.0 - 31.0 pg    MCHC 31.8 (L) 33.0 - 36.0 g/dL    RDW 12.1 11.5 - 17.0 %    Platelet 186 130 - 400 x10(3)/mcL    MPV 9.8  7.4 - 10.4 fL    NRBC% 0.0 %   Comprehensive Metabolic Panel    Collection Time: 07/08/25  3:55 AM   Result Value Ref Range    Sodium 141 136 - 145 mmol/L    Potassium 4.4 3.5 - 5.1 mmol/L    Chloride 99 98 - 107 mmol/L    CO2 34 (H) 23 - 31 mmol/L    Glucose 159 (H) 82 - 115 mg/dL    Blood Urea Nitrogen 31.4 (H) 8.4 - 25.7 mg/dL    Creatinine 0.80 0.72 - 1.25 mg/dL    Calcium 8.3 (L) 8.8 - 10.0 mg/dL    Protein Total 6.0 5.8 - 7.6 gm/dL    Albumin 2.2 (L) 3.4 - 4.8 g/dL    Globulin 3.8 (H) 2.4 - 3.5 gm/dL    Albumin/Globulin Ratio 0.6 (L) 1.1 - 2.0 ratio    Bilirubin Total 0.4 <=1.5 mg/dL     (H) 40 - 150 unit/L    ALT 23 0 - 55 unit/L    AST 18 11 - 45 unit/L    eGFR >60 mL/min/1.73/m2    Anion Gap 8.0 mEq/L    BUN/Creatinine Ratio 39    POCT glucose    Collection Time: 07/08/25  5:55 AM   Result Value Ref Range    POCT Glucose 147 (H) 70 - 110 mg/dL   POCT glucose    Collection Time: 07/08/25 10:08 AM   Result Value Ref Range    POCT Glucose 138 (H) 70 - 110 mg/dL   POCT glucose    Collection Time: 07/08/25  3:51 PM   Result Value Ref Range    POCT Glucose 351 (H) 70 - 110 mg/dL          Medical Review Of Systems:  defer      Psychiatric Mental Status Exam:  General Appearance: dressed in hospital garb, lying in bed, in no acute distress, appears older than stated age  Arousal: drowsy  Behavior: cooperative, minimal responses, poor eye contact  Movements and Motor Activity: no tics, no tremors, no akathisia, no dystonia, no evidence of tardive dyskinesia  Orientation: oriented to person only  Speech: coherent  Mood: Euthymic  Affect: constricted  Thought Process: disorganized  Associations: no loosening of associations  Thought Content and Perceptions: no suicidal ideation, no homicidal ideation  Recent and Remote Memory: impaired; per interview/observation with patient  Attention and Concentration: impaired; per interview/observation with patient  Fund of Knowledge: vocabulary appropriate; based on  history, vocabulary, fund of knowledge, syntax, grammar, and content  Insight: impaired; based on understanding of severity of illness and HPI  Judgment: impaired; based on patient's behavior and HPI    ASSESSMENT/PLAN:   Problems Addressed/Diagnoses:  Delirium, acute, hyperactive due to multiple etiologies  F05.9  G31.84 Mild cognitive impairment of uncertain etiology      Past Medical History:   Diagnosis Date    Acne     Cataract     Coronary artery disease     Diabetes mellitus     Hearing loss     Hypertension     Seasonal allergies     Stroke         Plan:  Medication Management  Risperidone 0.5mg BID for psychosis  Start depakote 250mg po BID - for mood/agitation  Continue trazodone 100mg QHS  Discontinue PRN olanzapine  Haloperidol 2mg IV Q6hr PRN agitation  Hydroxyzine 25mg IM Q6hr PRN agitation  Decrease Melatonin to 3mg po q hs  Delirium precautions  Monitor for tolerability of medication an oversedation/mood changes             Amish De Souza

## 2025-07-08 NOTE — PT/OT/SLP RE-EVAL
Physical Therapy Re-Evaluation    Patient Name:  Federico Villarreal   MRN:  [id number]    Recommendations:     Discharge therapy intensity: Moderate Intensity Therapy   Discharge Equipment Recommendations: to be determined by next level of care   Barriers to discharge: Impaired mobility    Assessment:     Federico Villarreal is a 64 y.o. male admitted with a medical diagnosis of L occipital hemorrhage CVA, dysphagia s/p PEG placement, delirium. Hx of L eye blindness.   .  He presents with the following impairments/functional limitations: weakness, gait instability, impaired endurance, impaired functional mobility, impaired balance, impaired self care skills, decreased safety awareness, decreased coordination, impaired cognition . Pt has not been doing much with therapy ,so I'm reducing his POC    DME Justification:  No DME recommended requiring DME justifications    Rehab Prognosis: Good; patient would benefit from acute skilled PT services to address these deficits and reach maximum level of function.    Recent Surgery: Procedure(s) (LRB):  PEG (N/A) 22 Days Post-Op    Plan:     During this hospitalization, patient would benefit from acute PT services 3 x/week to address the identified rehab impairments via gait training, therapeutic activities, therapeutic exercises and progress toward the following goals:    Plan of Care Expires:  07/13/25    PT/PTA conference to discuss PT POC and patient's progression towards goals held with Dank Jacobs PTA.     Subjective     Chief Complaint:   Patient/Family Comments/goals:   Pain/Comfort:       Patients cultural, spiritual, Congregational conflicts given the current situation: no    Objective:     Communicated with nurse prior to session.  Patient found supine with bed alarm, peripheral IV, pulse ox (continuous), telemetry  upon PT entry to room.    General Precautions: Standard, fall  Orthopedic Precautions:N/A   Braces: N/A  Respiratory Status: Room air  Blood Pressure:        Exams:    Skin integrity:       Functional Mobility:  Bed Mobility:     Scooting: maximal assistance  Supine to Sit: maximal assistance  Sit to Supine: maximal assistance  Transfers:     Sit to Stand:  moderate assistance with no AD      AM-PAC 6 CLICK MOBILITY  Total Score:      Co-Treatment: No    Treatment & Education:      Patient provided with verbal education education regarding PT role/goals/POC.  Additional teaching is warranted.     Patient left supine with all lines intact, call button in reach, and bed alarm on.      GOALS:   Multidisciplinary Problems       Physical Therapy Goals          Problem: Physical Therapy    Goal Priority Disciplines Outcome Interventions   Physical Therapy Goal     PT, PT/OT Progressing    Description: Goals to be met by: 25     Patient will increase functional independence with mobility by performin. Supine to sit with Minimal Assistance  2. Sit to stand transfer with Minimal Assistance  3. Bed to chair transfer with Minimal Assistance using Rolling Walker  4. Pt will follow 5/5 motor commands for BLE.  5. Pt will ambulate 150ft with CGA and RW.                          History:     Past Medical History:   Diagnosis Date    Acne     Cataract     Coronary artery disease     Diabetes mellitus     Hearing loss     Hypertension     Seasonal allergies     Stroke        Past Surgical History:   Procedure Laterality Date    ADENOIDECTOMY      ADENOIDECTOMY  1972    As a child    CORONARY STENT PLACEMENT  2012    ESOPHAGOGASTRODUODENOSCOPY W/ PEG N/A 2025    Procedure: PEG;  Surgeon: Mike Navarro MD;  Location: [facility] ENDOSCOPY;  Service: Gastroenterology;  Laterality: N/A;    EYE SURGERY      Multiple    INNER EAR SURGERY      REPAIR OF RETINAL DETACHMENT WITH VITRECTOMY      TONSILLECTOMY      VITRECTOMY         Time Tracking:     PT Received On: 25  PT Start Time: 1206     PT Stop Time: 1225  PT Total Time (min): 19 min     Billable  Minutes: Re-eval 19      07/08/2025

## 2025-07-08 NOTE — PROGRESS NOTES
UROLOGY  PROGRESS  NOTE    Federico Villarreal 1960  [id number]  7/8/2025    The patient is resting in bed   Continues with altered mental status   Hematuria resolved  Afebrile   450 mL urine overnight  Cr 0.8    Intake/Output:  I/O this shift:  In: -   Out: 1100 [Urine:1100]  I/O last 3 completed shifts:  In: 1615 [NG/GT:1615]  Out: 1725 [Urine:1725]     Exam:    NAD  Card: RRR  Resp: unlabored  Abd: soft, NTND  : yellow urine in  bag    Recent Results (from the past 24 hours)   POCT glucose    Collection Time: 07/07/25  5:29 PM   Result Value Ref Range    POCT Glucose 163 (H) 70 - 110 mg/dL   POCT glucose    Collection Time: 07/07/25  8:07 PM   Result Value Ref Range    POCT Glucose 242 (H) 70 - 110 mg/dL   CBC Without Differential    Collection Time: 07/08/25  3:55 AM   Result Value Ref Range    WBC 10.18 4.50 - 11.50 x10(3)/mcL    RBC 3.74 (L) 4.70 - 6.10 x10(6)/mcL    Hgb 11.6 (L) 14.0 - 18.0 g/dL    Hct 36.5 (L) 42.0 - 52.0 %    MCV 97.6 (H) 80.0 - 94.0 fL    MCH 31.0 27.0 - 31.0 pg    MCHC 31.8 (L) 33.0 - 36.0 g/dL    RDW 12.1 11.5 - 17.0 %    Platelet 186 130 - 400 x10(3)/mcL    MPV 9.8 7.4 - 10.4 fL    NRBC% 0.0 %   Comprehensive Metabolic Panel    Collection Time: 07/08/25  3:55 AM   Result Value Ref Range    Sodium 141 136 - 145 mmol/L    Potassium 4.4 3.5 - 5.1 mmol/L    Chloride 99 98 - 107 mmol/L    CO2 34 (H) 23 - 31 mmol/L    Glucose 159 (H) 82 - 115 mg/dL    Blood Urea Nitrogen 31.4 (H) 8.4 - 25.7 mg/dL    Creatinine 0.80 0.72 - 1.25 mg/dL    Calcium 8.3 (L) 8.8 - 10.0 mg/dL    Protein Total 6.0 5.8 - 7.6 gm/dL    Albumin 2.2 (L) 3.4 - 4.8 g/dL    Globulin 3.8 (H) 2.4 - 3.5 gm/dL    Albumin/Globulin Ratio 0.6 (L) 1.1 - 2.0 ratio    Bilirubin Total 0.4 <=1.5 mg/dL     (H) 40 - 150 unit/L    ALT 23 0 - 55 unit/L    AST 18 11 - 45 unit/L    eGFR >60 mL/min/1.73/m2    Anion Gap 8.0 mEq/L    BUN/Creatinine Ratio 39    POCT glucose    Collection Time: 07/08/25  5:55 AM   Result Value Ref Range     POCT Glucose 147 (H) 70 - 110 mg/dL   POCT glucose    Collection Time: 07/08/25 10:08 AM   Result Value Ref Range    POCT Glucose 138 (H) 70 - 110 mg/dL       Assessment:  64-year-old male with multiple comorbidities admitted with hemorrhagic stroke, has been having issues with urinary retention since admission.  He has pulled out his Nascimento catheter twice, most recently last night.  Unable to void since Nascimento removed, bladder scan with 600 cc urine at noon.  Nursing unable to place Nascimento     Hematuria secondary to urinary retention - hematuria resolved    Plan:  -Continue indwelling nascimento.   -Recommend outpatient f/u for void trial once his acute issues improve  -Urology is signing off. Please call as needed with any issues      Verónica Rob NP

## 2025-07-08 NOTE — PT/OT/SLP RE-EVAL
"Occupational Therapy   Re-evaluation    Name: Federico Villarreal  MRN: [id number]  Recent Surgery: Procedure(s) (LRB):  PEG (N/A) 22 Days Post-Op    Recommendations:     Discharge therapy intensity: Moderate Intensity Therapy   Discharge Equipment Recommendations:  to be determined by next level of care  Barriers to discharge:       Assessment:     Federico Villarreal is a 64 y.o. male with a medical diagnosis of L occipital hemorrhage CVA, dysphagia s/p PEG placement, delirium. Hx of L eye blindness. He presents with the following performance deficits affecting function: weakness, impaired self care skills, impaired balance, decreased coordination, decreased safety awareness, visual deficits, impaired functional mobility, impaired endurance, gait instability, impaired cognition.      Pt with fair tolerance of OT re-eval this date. Pt appearing more fatigued/disengaged compared to previous sessions, but willing to participate with therapy. He continues to demo impaired cognition/command following and expressive aphasia; requiring increased assist for all ADLs/mobility. Remains appropriate for mod intensity placement; will cont to progress as able during acute stay.     Rehab Prognosis: Good and Fair; patient would benefit from acute skilled OT services to address these deficits and reach maximum level of function.       DME Justification: No DME recommended requiring DME justifications    Plan:     Patient to be seen 3 x/week to address the above listed problems via self-care/home management, therapeutic activities, therapeutic exercises, neuromuscular re-education, sensory integration  Plan of Care Expires: 08/05/25  Plan of Care Reviewed with: patient, spouse    Subjective     Chief Complaint: "thirsty"  Patient/Family Comments/goals: get better    Pain/Comfort:  Pain Rating 1: 0/10    Patients cultural, spiritual, Druze conflicts given the current situation: no    Objective:     OT communicated with RN prior to " session.      Patient was found HOB elevated with  (vital monitoring, SCD, B mitts, PEG tube, abd binder) upon OT entry to room.    General Precautions: Standard, fall, vision impaired, aspiration  Orthopedic Precautions: N/A  Braces: N/A    Vital Signs: Blood Pressure: 131/72    Functional Mobility/Transfers:  Bed mobility:    Sup<>sit: max A   Rolling (B): max A  Transfers: Sit to Stand: minimum assistance and of 2 persons with hand-held assist  Takes x2 shuffle side steps towards HOB with Rebecca x2 via HHA.   Pt with active BM upon standing; warranting return to bed.   Decreased command following likely impacting overall performance this date     Activities of Daily Living:  Lower Body Dressing: maximal assistance to don socks and brief like underwear  Toileting: maximal assistance CNA performs pericare while pt stands (Rebecca and HHA). Continuing to have BM; transitioned B2B and set up on Bed pan. RN and CNA informed and verbally agreed to assist pt off bed pan in 15 minutes. Wife at bedside also verbalized understanding.    [facility] 6 Click ADL:  AMPAC Total Score: 11    Functional Cognition:  Affect: Cooperative. Pt appearing more fatigued/disengaged compared to previous sessions, but willing to participate with therapy.   Orientation: oriented to Person  Command Following: Follows simple one-step commands with <50% accuracy  Communication: expressive aphasia noted    Visual Perceptual Skills:  Chronic blindness in L eye  Limited assessment of R visual perception 2/2 impaired cognition.   Unable to ID color of glove (with or without options)  Unable to ID # of digits (hand positioned directly at eye level <1ft from pt).     Upper Extremity Function:  Right/Left Upper Extremity:   Can initiate movement, u/a to follow commands for full assessment but apparently has had strength to warrant mittens and restraints    Balance:   Static sitting balance: WFL except with CGA-Rebecca occasionally 2/2 poor  awareness/attention  Dynamic sitting balance:Impaired.    Static standing balance:Impaired. Rebecca x2 with HHA required  Dynamic standing balance:Impaired. See above    Therapeutic Positioning  Skin assessment: visible skin intact.   RN & CNA verbally agreed to position pt with PUP kit once off bed pan. Wife verbalized understanding as well.     Co-Treatment: No    Patient Education:  Patient and spouse were provided with verbal education education regarding OT role/goals/POC, fall prevention, safety awareness, Discharge/DME recommendations, and pressure ulcer prevention.  Understanding was verbalized, however additional teaching warranted.     Patient left on bed pan with all lines intact, call button in reach, bed alarm on, RN/CNA notified, wife present, and B mitts donned    GOALS:   Multidisciplinary Problems       Occupational Therapy Goals          Problem: Occupational Therapy    Goal Priority Disciplines Outcome Interventions   Occupational Therapy Goal     OT, PT/OT Ongoing    Description: Goals to be met by: 7/11/25     Patient will increase functional independence with ADLs by performing:    LTG: Pt will perform basic ADLs and ADL transfers with SPV using LRAD by discharge.    STG: to be met by 7/11/25  Pt will follow commands at least 75% consistently throughout session  Pt will complete grooming seated, progressing to standing as appropriate with LRAD with min A.  Pt will complete UB dressing with min A.  Pt will complete LB dressing with min A using LRAD.  Pt will complete toileting with min A using LRAD.  Pt will complete functional mobility to/from toilet and toilet transfer with min A using LRAD.   Pt will demo visual attention to R side >80% of time with min verbal cues.  Pt will demo 4/5 strength in  BUE  for increased functional use during ADL tasks.                          History:     Past Medical History:   Diagnosis Date    Acne     Cataract     Coronary artery disease     Diabetes mellitus      Hearing loss     Hypertension     Seasonal allergies     Stroke          Past Surgical History:   Procedure Laterality Date    ADENOIDECTOMY      ADENOIDECTOMY  1972    As a child    CORONARY STENT PLACEMENT  08/20/2012    ESOPHAGOGASTRODUODENOSCOPY W/ PEG N/A 6/16/2025    Procedure: PEG;  Surgeon: Mike Navarro MD;  Location: [facility] ENDOSCOPY;  Service: Gastroenterology;  Laterality: N/A;    EYE SURGERY  2011    Multiple    INNER EAR SURGERY      REPAIR OF RETINAL DETACHMENT WITH VITRECTOMY      TONSILLECTOMY      VITRECTOMY         Time Tracking:     OT Date of Treatment: 07/08/25  OT Start Time: 1528  OT Stop Time: 1600  OT Total Time (min): 32 min    Billable Minutes:Re-eval 1  Self Care/Home Management 2    7/8/2025

## 2025-07-08 NOTE — PROGRESS NOTES
[facility] Hospital Medicine Progress Note        Chief Complaint: Inpatient Follow-up       HPI per admitting team: 64-year-old male with type 1 diabetes mellitus, CAD status post stenting, hypertension, hyperlipidemia, prior CVA  presented on 06/09/2025 for evaluation of sudden onset headache, confusion, slurred speech.    Workup revealed:  CT head -left occipital lobe parenchymal hemorrhage with trace adjacent subarachnoid hemorrhage.   CTA head/neck was also completed in ED, which revealed no large vessel occlusion, flow-limiting stenosis, aneurysm or evidence of a vascular malformation   MRI brain report:Left occipital lobe hematoma with adjacent subarachnoid hemorrhage. Innumerable chronic microhemorrhages with a subcortical location, may indicate underlying cerebral amyloid angiopathy. Mild chronic microvascular ischemic changes  Neurology team evaluated the patient, initiated on stroke protocol, admitted to ICU services.  Neurosurgery team evaluated the patient, MRI likely suggestive of amyloid angiopathy, repeat CT head 6/10/25 unchanged, suggested holding antiplatelets for at least 2 weeks.  Patient is started on Keppra.  Neurosurgical team signed off  Patient required Chao placement for retention, seen by Urolog  Patient cleared to downgrade to floors 06/12/2025 PM by ICU team  Palliative care team consulted while patient in ICU, code status DNR at the time of downgrade.  Pt had fever, tmax 102.8, tachycardic,wbc count increased 17K, decided to obtain pan scan, CT head stable, but notes to have dense consolidation left >right LL, constipation  Respiratory cultures were obtained grew many strep group B Haemophilus influenzae.  Received antibiotics  Patient remains unsafe for p.o. intake with speech evaluations, informed decision with the patient's spouse was made, GI team consulted for PEG placement.  Psych was consulted as well to help with his delirium.    PTOT recommended  moderate intensity therapy,case management was consulted for placement.  Case management was having hard time placing due to delirium. Psych was reconsulted to help with delirium management.    Interval Hx:   Patient awake, but does not respond to questions, mumbles but does not make sense.  Noted Chao has been replaced per Urology, clear urine in bag  Discussed with nurse to keep patient's mittens on to prevent further accidents    No family at bedside   Case was discussed with patient's nurse and  on the floor    Objective/physical exam:  General: In no acute distress, afebrile  Chest: Clear to auscultation bilaterally  Heart:  +S1, S2, no appreciable murmur  Abdomen: Soft, nontender, BS +, PEG tube with abdominal binder  :  Chao present with clear urine  MSK: Warm, hands in mittens  Neurologic:  Awake, unable to assess orientation given patient does not respond to questions much    VITAL SIGNS: 24 HRS MIN & MAX LAST   Temp  Min: 97 °F (36.1 °C)  Max: 99.3 °F (37.4 °C) 97 °F (36.1 °C)   BP  Min: 113/57  Max: 139/63 124/67   Pulse  Min: 84  Max: 102  88   Resp  Min: 16  Max: 20 18   SpO2  Min: 92 %  Max: 98 % 98 %     I have reviewed the following labs:  Recent Labs   Lab 07/06/25  0733 07/08/25  0355   WBC 12.58* 10.18   RBC 3.82* 3.74*   HGB 11.7* 11.6*   HCT 36.0* 36.5*   MCV 94.2* 97.6*   MCH 30.6 31.0   MCHC 32.5* 31.8*   RDW 12.1 12.1    186   MPV 10.4 9.8       Recent Labs   Lab 07/01/25  1556 07/06/25  0733 07/08/25  0355   NA  --  138 141   K 3.9 4.4 4.4   CL  --  98 99   CO2  --  32* 34*   BUN  --  34.1* 31.4*   CREATININE  --  1.05 0.80   GLU  --  240* 159*   CALCIUM  --  8.6* 8.3*   MG 2.20 2.90*  --    ALBUMIN  --   --  2.2*   PROT  --   --  6.0   ALKPHOS  --   --  156*   ALT  --   --  23   AST  --   --  18   BILITOT  --   --  0.4     Microbiology Results (last 7 days)       ** No results found for the last 168 hours. **             See below for  Radiology    Assessment/Plan:  Traumatic hematuria- patient has pulled Chao out- resolving  Urinary retention- continue with Chao  Hemorrhagic CVA-  Left occipital lobe hematoma, adjacent subarachnoid hemorrhage  Dense left lobe consolidation- Haemophilus influenza pneumonia - treated  Oropharyngeal Dysphagia s/p peg tube  Delirium  Known T1 DM  CAD s/p PCI, HTN, HLD  History of CVA  Tobacco use  DNR status       7/7-patient has pulled his Chao out-->  gross bleeding.  Appreciate Urology, Chao replaced, recommended voiding trial if and when his mental status improved   Pasha is stable  Neurology, Neurosurgery has now signed off.    Keppra discontinued on 06/23/2025.  Per chart review, Neurosurgery recommended to stay off of antiplatelets for at least 2 weeks but Neurology recommended to avoid antiplatelet or anticoagulation at this time   Completed antibiotics, afebrile  Continue tube feedings, keep head of bed at 35° at all times   Psych last saw pt on 6/27- Continue delirium precautions. On risperidone 1mg BID and trazodone 100mg hs, P.r.n. IM Haldol (last given 7/4) and IM hydroxyzine (last given 7/3) PRN agitation  Glucose much improved with increased dose of lantus to 25 units BID and continue ISS   Continue PT OT- recommending moderate intensity therapy.  PT saw patient on 07/08, OT last saw the patient on 07/04  CM on board- pending acceptance   Morning lab stable    VTE prophylaxis: scds    Anticipated discharge and Disposition:  TBD, SNF once accepted    All diagnosis and differential diagnosis have been reviewed; assessment and plan has been documented; I have personally reviewed the labs and test results that are presently available; I have reviewed the patients medication list; I have reviewed the consulting providers response and recommendations. I have reviewed or attempted to review medical records based upon their availability    All of the patient's questions have been  addressed and answered.  Patient's is agreeable to the above stated plan. I will continue to monitor closely and make adjustments to medical management as needed.    Portions of this note dictated using EMR integrated voice recognition software, and may be subject to voice recognition errors not corrected at proofreading. Please contact writer for clarification if needed.   _____________________________________________________________________    Malnutrition Status:  Nutrition consulted. Most recent weight and BMI monitored-     Measurements:  Wt Readings from Last 1 Encounters:   06/11/25 65 kg (143 lb 4.8 oz)   Body mass index is 26.21 kg/m².    Patient has been screened and assessed by RD.    Malnutrition Type:  Context:    Level: other (see comments) (Does not meet criteria)    Malnutrition Characteristic Summary:  Weight Loss (Malnutrition): other (see comments) (Unable to assess)  Energy Intake (Malnutrition): other (see comments) (Unable to assess)  Muscle Mass (Malnutrition): mild depletion    Interventions/Recommendations (treatment strategy):        Scheduled Med:   aluminum-magnesium hydroxide-simethicone  30 mL Per G Tube QID (AC & HS)    carvediloL  12.5 mg Per G Tube BID    insulin glargine U-100  25 Units Subcutaneous BID    losartan  100 mg Per G Tube Daily    melatonin  6 mg Per G Tube Nightly    risperiDONE  1 mg Per G Tube BID    sucralfate  1 g Per G Tube Q6H    traZODone  100 mg Per G Tube QHS      Continuous Infusions:     PRN Meds:  Current Facility-Administered Medications:     acetaminophen, 650 mg, Per G Tube, Q6H PRN    bisacodyL, 10 mg, Rectal, Daily PRN    butalbital-acetaminophen-caffeine -40 mg, 1 tablet, Per G Tube, Q4H PRN    dextromethorphan-guaiFENesin  mg/5 ml, 5 mL, Per G Tube, Q4H PRN    dextrose 50%, 12.5 g, Intravenous, PRN    glucagon (human recombinant), 1 mg, Intramuscular, PRN    haloperidol lactate, 2 mg, Intravenous, Q6H PRN    hydrOXYzine, 25 mg, Intramuscular, Q6H PRN    insulin  aspart U-100, 0-10 Units, Subcutaneous, QID (AC + HS) PRN    labetalol, 10 mg, Intravenous, Q15 Min PRN    levalbuterol, 0.63 mg, Nebulization, Q4H PRN    LIDOcaine HCl 2%, , Urethral, Once PRN    morphine, 3 mg, Intravenous, On Call Procedure    ondansetron, 4 mg, Intravenous, Q8H PRN    sodium chloride 0.9%, 10 mL, Intravenous, PRN     Radiology:  I have personally reviewed the following imaging and agree with the radiologist.     CT Head Without Contrast  Narrative: EXAMINATION:  CT HEAD WITHOUT CONTRAST    CLINICAL HISTORY:  Mental status change, unknown cause;    TECHNIQUE:  Sequential axial images were performed of the brain without contrast.    Dose product length of 971 mGycm. Automated exposure control was utilized to minimize radiation dose.    COMPARISON:  September 1, 2025.    FINDINGS:  Hematoma centered about the left occipital lobe and surrounding brain edematous changes overall size and appearance is similar.  This causes slight compressive effect upon the occipital horn of left lateral ventricle.  There is no midline shift.  There is no new intra-axial or extra-axial hemorrhage.    There is no new sulcal effacement or low attenuation changes to suggest recent large vessel territory infarction.  Left basal ganglia old lacunar infarct.  Chronic appearing periventricular and subcortical white matter low attenuation changes are present and are consistent with chronic microangiopathic ischemia. The ventricular system and sulcal markings prominence is consistent with atrophy.  Impression: Stable left occipital lobe hematoma surrounding brain edema.  No new findings.    Electronically signed by: Devyn Trinidad  Date:    07/01/2025  Time:    20:38      Rupesh Powers MD  Department of Hospital Medicine   [facility]   07/08/2025

## 2025-07-08 NOTE — PLAN OF CARE
Spoke to patient's wife discussed that he was unable to follow commands with therapy and if he is unable to may have to go intermediate. She states that he previously was able to participate. Will wait for notes from today and proceed with NH placement if he is able to participate suggested that she try to do the application for long term medicaid. She states that she started it and got confused by it. Also discussed that if he qualifies she may eligible for spousal impoverishment and that the  at the NH can assist with that

## 2025-07-09 LAB
POCT GLUCOSE: 107 MG/DL (ref 70–110)
POCT GLUCOSE: 128 MG/DL (ref 70–110)
POCT GLUCOSE: 182 MG/DL (ref 70–110)

## 2025-07-09 PROCEDURE — 25000003 PHARM REV CODE 250: Performed by: LICENSED PRACTICAL NURSE

## 2025-07-09 PROCEDURE — 21400001 HC TELEMETRY ROOM

## 2025-07-09 PROCEDURE — 63600175 PHARM REV CODE 636 W HCPCS: Performed by: INTERNAL MEDICINE

## 2025-07-09 PROCEDURE — 25000003 PHARM REV CODE 250: Performed by: INTERNAL MEDICINE

## 2025-07-09 PROCEDURE — 25000003 PHARM REV CODE 250

## 2025-07-09 RX ORDER — VALPROIC ACID 250 MG/5ML
250 SOLUTION ORAL EVERY 12 HOURS
Status: DISCONTINUED | OUTPATIENT
Start: 2025-07-09 | End: 2025-07-10

## 2025-07-09 RX ADMIN — SUCRALFATE 1 G: 1 TABLET ORAL at 12:07

## 2025-07-09 RX ADMIN — Medication 3 MG: at 09:07

## 2025-07-09 RX ADMIN — ALUMINUM HYDROXIDE, MAGNESIUM HYDROXIDE, AND DIMETHICONE 30 ML: 200; 20; 200 SUSPENSION ORAL at 09:07

## 2025-07-09 RX ADMIN — VALPROIC ACID 250 MG: 250 SOLUTION ORAL at 10:07

## 2025-07-09 RX ADMIN — RISPERIDONE 1 MG: 1 TABLET, FILM COATED ORAL at 09:07

## 2025-07-09 RX ADMIN — INSULIN GLARGINE 25 UNITS: 100 INJECTION, SOLUTION SUBCUTANEOUS at 09:07

## 2025-07-09 RX ADMIN — CARVEDILOL 12.5 MG: 12.5 TABLET, FILM COATED ORAL at 09:07

## 2025-07-09 RX ADMIN — INSULIN ASPART 2 UNITS: 100 INJECTION, SOLUTION INTRAVENOUS; SUBCUTANEOUS at 04:07

## 2025-07-09 RX ADMIN — INSULIN ASPART 1 UNITS: 100 INJECTION, SOLUTION INTRAVENOUS; SUBCUTANEOUS at 09:07

## 2025-07-09 RX ADMIN — SUCRALFATE 1 G: 1 TABLET ORAL at 06:07

## 2025-07-09 RX ADMIN — ALUMINUM HYDROXIDE, MAGNESIUM HYDROXIDE, AND DIMETHICONE 30 ML: 200; 20; 200 SUSPENSION ORAL at 06:07

## 2025-07-09 RX ADMIN — LOSARTAN POTASSIUM 100 MG: 50 TABLET, FILM COATED ORAL at 09:07

## 2025-07-09 RX ADMIN — TRAZODONE HYDROCHLORIDE 100 MG: 100 TABLET ORAL at 09:07

## 2025-07-09 RX ADMIN — GUAIFENESIN AND DEXTROMETHORPHAN 5 ML: 100; 10 SYRUP ORAL at 09:07

## 2025-07-09 RX ADMIN — DIVALPROEX SODIUM 250 MG: 250 TABLET, DELAYED RELEASE ORAL at 09:07

## 2025-07-09 RX ADMIN — ALUMINUM HYDROXIDE, MAGNESIUM HYDROXIDE, AND DIMETHICONE 30 ML: 200; 20; 200 SUSPENSION ORAL at 04:07

## 2025-07-09 RX ADMIN — SUCRALFATE 1 G: 1 TABLET ORAL at 04:07

## 2025-07-09 NOTE — PROGRESS NOTES
"7/9/2025  Federico Villarreal   1960   [id number]        Psychiatry Progress Note     Chief Complaint: agitation    SUBJECTIVE:   Federico Villarreal is a 64 y.o. male with a past medical history that includes CAD, T1DM, HTN, HLD, and history of ischemic stroke with left-sided weakness who presented to [facility] ED on 06/09/25 with confusion and slurred speech noted that morning. His wife had reported he had woken up with headache at 0115 that morning and then woke up with confusion around 0545. CT scan showed left occipital lobe parenchymal hemorrhage. Being seen by psychiatry to day due to confusion and agitation.      7/8/25: Pt seen today for psychiatric follow up in assigned room laying in bed resting, calm no acute distress noted. PT states mood is "okay." Pt oriented to person only minimal responses to questions. Staff reports agitation intermittently and when he receives assistance with changing and cleaning. Staff reports no prns given for acute agitation recently will review medications and adjust if applicable.    7/9/25: Pt seen today for psychiatric follow up in assigned room calm in no apparent distress. Pt state mood is okay, pt oriented to person only. Depakote 250mg po BID was added yesterday with improvement in mood and agitation. One to one monitoring has been discontinued and pt is doing well thus far and awaiting placement. Will reevaluate tomorrow and adjust depakote accordingly.       Current Medications:   Scheduled Meds:    aluminum-magnesium hydroxide-simethicone  30 mL Per G Tube QID (AC & HS)    carvediloL  12.5 mg Per G Tube BID    divalproex  250 mg Oral BID    insulin glargine U-100  25 Units Subcutaneous BID    losartan  100 mg Per G Tube Daily    melatonin  3 mg Per G Tube Nightly    risperiDONE  1 mg Per G Tube BID    sucralfate  1 g Per G Tube Q6H    traZODone  100 mg Per G Tube QHS      PRN Meds:   Current Facility-Administered Medications:     bisacodyL, 10 mg, Rectal, Daily PRN    " butalbital-acetaminophen-caffeine -40 mg, 1 tablet, Per G Tube, Q4H PRN    dextromethorphan-guaiFENesin  mg/5 ml, 5 mL, Per G Tube, Q4H PRN    dextrose 50%, 12.5 g, Intravenous, PRN    glucagon (human recombinant), 1 mg, Intramuscular, PRN    haloperidol lactate, 2 mg, Intravenous, Q6H PRN    hydrOXYzine, 25 mg, Intramuscular, Q6H PRN    insulin aspart U-100, 0-10 Units, Subcutaneous, QID (AC + HS) PRN    labetalol, 10 mg, Intravenous, Q15 Min PRN    levalbuterol, 0.63 mg, Nebulization, Q4H PRN    LIDOcaine HCl 2%, , Urethral, Once PRN    morphine, 3 mg, Intravenous, On Call Procedure    ondansetron, 4 mg, Intravenous, Q8H PRN    sodium chloride 0.9%, 10 mL, Intravenous, PRN   Psychotherapeutics (From admission, onward)      Start     Stop Route Frequency Ordered    07/02/25 2100  risperiDONE tablet 1 mg         -- PER G TUBE 2 times daily 07/02/25 0930    06/27/25 1614  haloperidol lactate injection 2 mg         -- IV Every 6 hours PRN 06/27/25 1514    06/20/25 2100  traZODone tablet 100 mg         -- PER G TUBE Nightly 06/20/25 1200            Allergies:   Review of patient's allergies indicates:  No Known Allergies     OBJECTIVE:   Vitals   Vitals:    07/09/25 1458   BP: 125/70   Pulse: 91   Resp:    Temp: 98 °F (36.7 °C)        Labs/Imaging/Studies:   Recent Results (from the past 36 hours)   POCT glucose    Collection Time: 07/08/25  5:55 AM   Result Value Ref Range    POCT Glucose 147 (H) 70 - 110 mg/dL   POCT glucose    Collection Time: 07/08/25 10:08 AM   Result Value Ref Range    POCT Glucose 138 (H) 70 - 110 mg/dL   POCT glucose    Collection Time: 07/08/25  3:51 PM   Result Value Ref Range    POCT Glucose 351 (H) 70 - 110 mg/dL   POCT glucose    Collection Time: 07/08/25 10:55 PM   Result Value Ref Range    POCT Glucose 129 (H) 70 - 110 mg/dL   POCT glucose    Collection Time: 07/09/25 11:42 AM   Result Value Ref Range    POCT Glucose 107 70 - 110 mg/dL   POCT glucose    Collection Time:  07/09/25  4:39 PM   Result Value Ref Range    POCT Glucose 182 (H) 70 - 110 mg/dL          Medical Review Of Systems:  defer      Psychiatric Mental Status Exam:  General Appearance: dressed in hospital garb, lying in bed, in no acute distress, appears older than stated age  Arousal: drowsy  Behavior: cooperative, minimal responses, poor eye contact  Movements and Motor Activity: no tics, no tremors, no akathisia, no dystonia, no evidence of tardive dyskinesia  Orientation: oriented to person only  Speech: coherent  Mood: Euthymic  Affect: constricted  Thought Process: disorganized  Associations: no loosening of associations  Thought Content and Perceptions: no suicidal ideation, no homicidal ideation  Recent and Remote Memory: impaired; per interview/observation with patient  Attention and Concentration: impaired; per interview/observation with patient  Fund of Knowledge: vocabulary appropriate; based on history, vocabulary, fund of knowledge, syntax, grammar, and content  Insight: impaired; based on understanding of severity of illness and HPI  Judgment: impaired; based on patient's behavior and HPI    ASSESSMENT/PLAN:   Problems Addressed/Diagnoses:  Delirium, acute, hyperactive due to multiple etiologies  F05.9  G31.84 Mild cognitive impairment of uncertain etiology    Past Medical History:   Diagnosis Date    Acne     Cataract     Coronary artery disease     Diabetes mellitus     Hearing loss     Hypertension     Seasonal allergies     Stroke         Plan:  Medication Management  Risperidone 0.5mg BID for psychosis  Continue depakote 250mg po BID - for mood/agitation  Continue trazodone 100mg QHS  Haloperidol 2mg IV Q6hr PRN agitation  Hydroxyzine 25mg IM Q6hr PRN agitation  Decrease Melatonin to 3mg po q hs  Delirium precautions  Monitor for tolerability of medication an oversedation/mood changes               Amish De Souza

## 2025-07-09 NOTE — PROGRESS NOTES
[facility] Hospital Medicine Progress Note        Chief Complaint: Inpatient Follow-up       HPI per admitting team: 64-year-old male with type 1 diabetes mellitus, CAD status post stenting, hypertension, hyperlipidemia, prior CVA  presented on 06/09/2025 for evaluation of sudden onset headache, confusion, slurred speech.    Workup revealed:  CT head -left occipital lobe parenchymal hemorrhage with trace adjacent subarachnoid hemorrhage.   CTA head/neck was also completed in ED, which revealed no large vessel occlusion, flow-limiting stenosis, aneurysm or evidence of a vascular malformation   MRI brain report:Left occipital lobe hematoma with adjacent subarachnoid hemorrhage. Innumerable chronic microhemorrhages with a subcortical location, may indicate underlying cerebral amyloid angiopathy. Mild chronic microvascular ischemic changes  Neurology team evaluated the patient, initiated on stroke protocol, admitted to ICU services.  Neurosurgery team evaluated the patient, MRI likely suggestive of amyloid angiopathy, repeat CT head 6/10/25 unchanged, suggested holding antiplatelets for at least 2 weeks.  Patient is started on Keppra.  Neurosurgical team signed off  Patient required Chao placement for retention, seen by Urolog  Patient cleared to downgrade to floors 06/12/2025 PM by ICU team  Palliative care team consulted while patient in ICU, code status DNR at the time of downgrade.  Pt had fever, tmax 102.8, tachycardic,wbc count increased 17K, decided to obtain pan scan, CT head stable, but notes to have dense consolidation left >right LL, constipation  Respiratory cultures were obtained grew many strep group B Haemophilus influenzae.  Received antibiotics  Patient remains unsafe for p.o. intake with speech evaluations, informed decision with the patient's spouse was made, GI team consulted for PEG placement.  Psych was consulted as well to help with his delirium.    PTOT recommended  moderate intensity therapy,case management was consulted for placement.  Case management was having hard time placing due to delirium. Psych was reconsulted to help with delirium management.    Interval Hx:   Patient awake, noted he had a big bowel movement in the bed.  Informed patient's nurse   No family at bedside   Case was discussed with patient's nurse and  on the floor    Objective/physical exam:  General: In no acute distress, afebrile  Chest: Clear to auscultation bilaterally  Heart:  +S1, S2, no appreciable murmur  Abdomen: Soft, nontender, BS +, PEG tube with abdominal binder  :  Chao present with clear urine  MSK: Warm, hands in mittens  Neurologic:  Awake, unable to assess orientation given patient does not respond to questions much    VITAL SIGNS: 24 HRS MIN & MAX LAST   Temp  Min: 97.9 °F (36.6 °C)  Max: 99.1 °F (37.3 °C) 98.3 °F (36.8 °C)   BP  Min: 95/56  Max: 122/74 110/67   Pulse  Min: 76  Max: 94  83   Resp  Min: 18  Max: 20 18   SpO2  Min: 93 %  Max: 98 % 98 %     I have reviewed the following labs:  Recent Labs   Lab 07/06/25  0733 07/08/25  0355   WBC 12.58* 10.18   RBC 3.82* 3.74*   HGB 11.7* 11.6*   HCT 36.0* 36.5*   MCV 94.2* 97.6*   MCH 30.6 31.0   MCHC 32.5* 31.8*   RDW 12.1 12.1    186   MPV 10.4 9.8       Recent Labs   Lab 07/06/25  0733 07/08/25  0355    141   K 4.4 4.4   CL 98 99   CO2 32* 34*   BUN 34.1* 31.4*   CREATININE 1.05 0.80   * 159*   CALCIUM 8.6* 8.3*   MG 2.90*  --    ALBUMIN  --  2.2*   PROT  --  6.0   ALKPHOS  --  156*   ALT  --  23   AST  --  18   BILITOT  --  0.4     Microbiology Results (last 7 days)       ** No results found for the last 168 hours. **             See below for Radiology    Assessment/Plan:  Traumatic hematuria- patient has pulled Chao out- resolving  Urinary retention- continue with Chao  Hemorrhagic CVA-  Left occipital lobe hematoma, adjacent subarachnoid hemorrhage  Dense left lobe consolidation- Haemophilus  influenza pneumonia - treated  Oropharyngeal Dysphagia s/p peg tube  Delirium  Known T1 DM  CAD s/p PCI, HTN, HLD  History of CVA  Tobacco use  DNR status       7/7-patient has pulled his Chao out-->  gross bleeding.  Appreciate Urology, Chao replaced, recommended voiding trial if and when his mental status improved   Hematuria resolved  Hb is stable  Neurology, Neurosurgery has now signed off.    Keppra discontinued on 06/23/2025.  Per chart review, Neurosurgery recommended to stay off of antiplatelets for at least 2 weeks but Neurology recommended to avoid antiplatelet or anticoagulation at this time   Completed antibiotics, afebrile  Continue tube feedings, keep head of bed at 35° at all times   Psych re-evaluated the patient-. On risperidone 0.5 mg BID for psychosis, started Depakote 250 mg b.i.d. for mood and agitation.  Continue trazodone 100mg hs, decreased melatonin to 3 mg PO HS.  P.r.n. IM Haldol (last given 7/4) and IM hydroxyzine (last given 7/3) PRN agitation  Discontinued PRN olanzapine  Continue delirium precautions  Glucose much improved with increased dose of lantus to 25 units BID and continue ISS   Continue PT OT- recommending moderate intensity therapy  CM on board- pending acceptance  Monitor labs Q Monday or PRN indications    VTE prophylaxis: scds    Anticipated discharge and Disposition:  TBD, SNF once accepted    All diagnosis and differential diagnosis have been reviewed; assessment and plan has been documented; I have personally reviewed the labs and test results that are presently available; I have reviewed the patients medication list; I have reviewed the consulting providers response and recommendations. I have reviewed or attempted to review medical records based upon their availability    All of the patient's questions have been  addressed and answered. Patient's is agreeable to the above stated plan. I will continue to monitor closely and make adjustments to medical management as  needed.    Portions of this note dictated using EMR integrated voice recognition software, and may be subject to voice recognition errors not corrected at proofreading. Please contact writer for clarification if needed.   _____________________________________________________________________    Malnutrition Status:  Nutrition consulted. Most recent weight and BMI monitored-     Measurements:  Wt Readings from Last 1 Encounters:   06/11/25 65 kg (143 lb 4.8 oz)   Body mass index is 26.21 kg/m².    Patient has been screened and assessed by RD.    Malnutrition Type:  Context:    Level: other (see comments) (Does not meet criteria)    Malnutrition Characteristic Summary:  Weight Loss (Malnutrition): other (see comments) (Unable to assess)  Energy Intake (Malnutrition): other (see comments) (Unable to assess)  Muscle Mass (Malnutrition): mild depletion    Interventions/Recommendations (treatment strategy):        Scheduled Med:   aluminum-magnesium hydroxide-simethicone  30 mL Per G Tube QID (AC & HS)    carvediloL  12.5 mg Per G Tube BID    divalproex  250 mg Oral BID    insulin glargine U-100  25 Units Subcutaneous BID    losartan  100 mg Per G Tube Daily    melatonin  3 mg Per G Tube Nightly    risperiDONE  1 mg Per G Tube BID    sucralfate  1 g Per G Tube Q6H    traZODone  100 mg Per G Tube QHS      Continuous Infusions:     PRN Meds:  Current Facility-Administered Medications:     acetaminophen, 650 mg, Per G Tube, Q6H PRN    bisacodyL, 10 mg, Rectal, Daily PRN    butalbital-acetaminophen-caffeine -40 mg, 1 tablet, Per G Tube, Q4H PRN    dextromethorphan-guaiFENesin  mg/5 ml, 5 mL, Per G Tube, Q4H PRN    dextrose 50%, 12.5 g, Intravenous, PRN    glucagon (human recombinant), 1 mg, Intramuscular, PRN    haloperidol lactate, 2 mg, Intravenous, Q6H PRN    hydrOXYzine, 25 mg, Intramuscular, Q6H PRN    insulin aspart U-100, 0-10 Units, Subcutaneous, QID (AC + HS) PRN    labetalol, 10 mg, Intravenous, Q15 Min  PRN    levalbuterol, 0.63 mg, Nebulization, Q4H PRN    LIDOcaine HCl 2%, , Urethral, Once PRN    morphine, 3 mg, Intravenous, On Call Procedure    ondansetron, 4 mg, Intravenous, Q8H PRN    sodium chloride 0.9%, 10 mL, Intravenous, PRN     Radiology:  I have personally reviewed the following imaging and agree with the radiologist.     CV Ultrasound doppler venous arm right  Negative for deep and superficial vein thrombosis in the right upper   extremity      Rupesh Powers MD  Department of Hospital Medicine   [facility]   07/09/2025

## 2025-07-09 NOTE — PLAN OF CARE
Reviewed patient's therapy notes and patient's  wife sent referral list for [facility] electronically.

## 2025-07-10 LAB
POCT GLUCOSE: 114 MG/DL (ref 70–110)
POCT GLUCOSE: 114 MG/DL (ref 70–110)
POCT GLUCOSE: 140 MG/DL (ref 70–110)
POCT GLUCOSE: 199 MG/DL (ref 70–110)
POCT GLUCOSE: 207 MG/DL (ref 70–110)
POCT GLUCOSE: 209 MG/DL (ref 70–110)
VALPROATE SERPL-MCNC: <12.5 UG/ML (ref 50–100)

## 2025-07-10 PROCEDURE — 25000003 PHARM REV CODE 250: Performed by: INTERNAL MEDICINE

## 2025-07-10 PROCEDURE — 63600175 PHARM REV CODE 636 W HCPCS: Performed by: INTERNAL MEDICINE

## 2025-07-10 PROCEDURE — 51702 INSERT TEMP BLADDER CATH: CPT

## 2025-07-10 PROCEDURE — 25000003 PHARM REV CODE 250: Performed by: LICENSED PRACTICAL NURSE

## 2025-07-10 PROCEDURE — 97535 SELF CARE MNGMENT TRAINING: CPT

## 2025-07-10 PROCEDURE — 36415 COLL VENOUS BLD VENIPUNCTURE: CPT | Performed by: LICENSED PRACTICAL NURSE

## 2025-07-10 PROCEDURE — 80164 ASSAY DIPROPYLACETIC ACD TOT: CPT | Performed by: LICENSED PRACTICAL NURSE

## 2025-07-10 PROCEDURE — 97530 THERAPEUTIC ACTIVITIES: CPT | Mod: CQ

## 2025-07-10 PROCEDURE — 21400001 HC TELEMETRY ROOM

## 2025-07-10 PROCEDURE — 25000003 PHARM REV CODE 250

## 2025-07-10 PROCEDURE — 97530 THERAPEUTIC ACTIVITIES: CPT

## 2025-07-10 RX ORDER — RISPERIDONE 1 MG/1
1 TABLET ORAL NIGHTLY
Status: DISCONTINUED | OUTPATIENT
Start: 2025-07-11 | End: 2025-07-16 | Stop reason: HOSPADM

## 2025-07-10 RX ORDER — VALPROIC ACID 250 MG/5ML
250 SOLUTION ORAL NIGHTLY
Status: DISCONTINUED | OUTPATIENT
Start: 2025-07-11 | End: 2025-07-16 | Stop reason: HOSPADM

## 2025-07-10 RX ORDER — RISPERIDONE 0.25 MG/1
0.5 TABLET ORAL DAILY
Status: DISCONTINUED | OUTPATIENT
Start: 2025-07-11 | End: 2025-07-16 | Stop reason: HOSPADM

## 2025-07-10 RX ADMIN — INSULIN GLARGINE 25 UNITS: 100 INJECTION, SOLUTION SUBCUTANEOUS at 10:07

## 2025-07-10 RX ADMIN — VALPROIC ACID 250 MG: 250 SOLUTION ORAL at 10:07

## 2025-07-10 RX ADMIN — ALUMINUM HYDROXIDE, MAGNESIUM HYDROXIDE, AND DIMETHICONE 30 ML: 200; 20; 200 SUSPENSION ORAL at 10:07

## 2025-07-10 RX ADMIN — Medication 3 MG: at 10:07

## 2025-07-10 RX ADMIN — TRAZODONE HYDROCHLORIDE 100 MG: 100 TABLET ORAL at 10:07

## 2025-07-10 RX ADMIN — SUCRALFATE 1 G: 1 TABLET ORAL at 12:07

## 2025-07-10 RX ADMIN — SUCRALFATE 1 G: 1 TABLET ORAL at 05:07

## 2025-07-10 RX ADMIN — ALUMINUM HYDROXIDE, MAGNESIUM HYDROXIDE, AND DIMETHICONE 30 ML: 200; 20; 200 SUSPENSION ORAL at 05:07

## 2025-07-10 RX ADMIN — RISPERIDONE 1 MG: 1 TABLET, FILM COATED ORAL at 10:07

## 2025-07-10 RX ADMIN — LOSARTAN POTASSIUM 100 MG: 50 TABLET, FILM COATED ORAL at 10:07

## 2025-07-10 RX ADMIN — SUCRALFATE 1 G: 1 TABLET ORAL at 10:07

## 2025-07-10 RX ADMIN — ALUMINUM HYDROXIDE, MAGNESIUM HYDROXIDE, AND DIMETHICONE 30 ML: 200; 20; 200 SUSPENSION ORAL at 06:07

## 2025-07-10 RX ADMIN — SUCRALFATE 1 G: 1 TABLET ORAL at 11:07

## 2025-07-10 RX ADMIN — CARVEDILOL 12.5 MG: 12.5 TABLET, FILM COATED ORAL at 10:07

## 2025-07-10 RX ADMIN — SUCRALFATE 1 G: 1 TABLET ORAL at 06:07

## 2025-07-10 NOTE — PLAN OF CARE
Pt currently has a LOCET on file.     1425- spoke to Jordana @ [facility], who states they are unable to meet pt's needs. [facility] declined in epic.     Spoke to Karen @ [facility], who states they are reviewing.

## 2025-07-10 NOTE — PROGRESS NOTES
"7/10/2025  Federcio Villarreal   1960   [id number]        Psychiatry Progress Note     Chief Complaint: agitation    SUBJECTIVE:   Federico Villarreal is a 64 y.o. male with a past medical history that includes CAD, T1DM, HTN, HLD, and history of ischemic stroke with left-sided weakness who presented to [facility] ED on 06/09/25 with confusion and slurred speech noted that morning. His wife had reported he had woken up with headache at 0115 that morning and then woke up with confusion around 0545. CT scan showed left occipital lobe parenchymal hemorrhage. Being seen by psychiatry to day due to confusion and agitation.      7/8/25: Pt seen today for psychiatric follow up in assigned room laying in bed resting, calm no acute distress noted. PT states mood is "okay." Pt oriented to person only minimal responses to questions. Staff reports agitation intermittently and when he receives assistance with changing and cleaning. Staff reports no prns given for acute agitation recently will review medications and adjust if applicable.     7/9/25: Pt seen today for psychiatric follow up in assigned room calm in no apparent distress. Pt state mood is okay, pt oriented to person only. Depakote 250mg po BID was added yesterday with improvement in mood and agitation. One to one monitoring has been discontinued and pt is doing well thus far and awaiting placement. Will reevaluate tomorrow and adjust depakote accordingly.    7/10/25: Pt seen today for psychiatric follow up in assigned room calm in no apparent distress. Pt state mood is okay, pt oriented to person only. Per staff report pt has been drowsy today, will adjust psychotropic medication could possibly be contributing, no new behaviors or increased agitation noted. Staff reports no sleep concerns.     Current Medications:   Scheduled Meds:    aluminum-magnesium hydroxide-simethicone  30 mL Per G Tube QID (AC & HS)    carvediloL  12.5 mg Per G Tube BID    insulin glargine U-100  25 " Units Subcutaneous BID    losartan  100 mg Per G Tube Daily    melatonin  3 mg Per G Tube Nightly    risperiDONE  1 mg Per G Tube BID    sucralfate  1 g Per G Tube Q6H    traZODone  100 mg Per G Tube QHS    valproic acid (as sodium salt)  250 mg Per G Tube Q12H      PRN Meds:   Current Facility-Administered Medications:     bisacodyL, 10 mg, Rectal, Daily PRN    butalbital-acetaminophen-caffeine -40 mg, 1 tablet, Per G Tube, Q4H PRN    dextromethorphan-guaiFENesin  mg/5 ml, 5 mL, Per G Tube, Q4H PRN    dextrose 50%, 12.5 g, Intravenous, PRN    glucagon (human recombinant), 1 mg, Intramuscular, PRN    haloperidol lactate, 2 mg, Intravenous, Q6H PRN    hydrOXYzine, 25 mg, Intramuscular, Q6H PRN    insulin aspart U-100, 0-10 Units, Subcutaneous, QID (AC + HS) PRN    labetalol, 10 mg, Intravenous, Q15 Min PRN    levalbuterol, 0.63 mg, Nebulization, Q4H PRN    LIDOcaine HCl 2%, , Urethral, Once PRN    morphine, 3 mg, Intravenous, On Call Procedure    ondansetron, 4 mg, Intravenous, Q8H PRN    sodium chloride 0.9%, 10 mL, Intravenous, PRN   Psychotherapeutics (From admission, onward)      Start     Stop Route Frequency Ordered    07/02/25 2100  risperiDONE tablet 1 mg         -- PER G TUBE 2 times daily 07/02/25 0930    06/27/25 1614  haloperidol lactate injection 2 mg         -- IV Every 6 hours PRN 06/27/25 1514    06/20/25 2100  traZODone tablet 100 mg         -- PER G TUBE Nightly 06/20/25 1200            Allergies:   Review of patient's allergies indicates:  No Known Allergies     OBJECTIVE:   Vitals   Vitals:    07/10/25 1513   BP: 101/61   Pulse: 80   Resp:    Temp: 97.7 °F (36.5 °C)        Labs/Imaging/Studies:   Recent Results (from the past 36 hours)   POCT glucose    Collection Time: 07/09/25  6:44 AM   Result Value Ref Range    POCT Glucose 128 (H) 70 - 110 mg/dL   POCT glucose    Collection Time: 07/09/25 11:42 AM   Result Value Ref Range    POCT Glucose 107 70 - 110 mg/dL   POCT glucose     Collection Time: 07/09/25  4:39 PM   Result Value Ref Range    POCT Glucose 182 (H) 70 - 110 mg/dL   POCT glucose    Collection Time: 07/09/25  8:51 PM   Result Value Ref Range    POCT Glucose 199 (H) 70 - 110 mg/dL   POCT glucose    Collection Time: 07/10/25  4:58 AM   Result Value Ref Range    POCT Glucose 140 (H) 70 - 110 mg/dL   POCT glucose    Collection Time: 07/10/25 11:23 AM   Result Value Ref Range    POCT Glucose 114 (H) 70 - 110 mg/dL          Medical Review Of Systems:  defer      Psychiatric Mental Status Exam:  General Appearance: dressed in hospital garb, lying in bed, in no acute distress, appears older than stated age  Arousal: drowsy  Behavior: cooperative, minimal responses, poor eye contact  Movements and Motor Activity: no tics, no tremors, no akathisia, no dystonia, no evidence of tardive dyskinesia  Orientation: oriented to person only  Speech: coherent  Mood: Euthymic  Affect: constricted  Thought Process: disorganized  Associations: no loosening of associations  Thought Content and Perceptions: no suicidal ideation, no homicidal ideation  Recent and Remote Memory: impaired; per interview/observation with patient  Attention and Concentration: impaired; per interview/observation with patient  Fund of Knowledge: vocabulary appropriate; based on history, vocabulary, fund of knowledge, syntax, grammar, and content  Insight: impaired; based on understanding of severity of illness and HPI  Judgment: impaired; based on patient's behavior and HPI    ASSESSMENT/PLAN:   Problems Addressed/Diagnoses:  Delirium, acute, hyperactive due to multiple etiologies  F05.9  G31.84 Mild cognitive impairment of uncertain etiology    Past Medical History:   Diagnosis Date    Acne     Cataract     Coronary artery disease     Diabetes mellitus     Hearing loss     Hypertension     Seasonal allergies     Stroke         Plan:  Medication Management  Decrease Risperidone 0.5mg daily for psychosis  Risperidone 1mg po  qhs  change depakote to 250mg po qhs - for mood/agitation  Continue trazodone 100mg QHS  Haloperidol 2mg IV Q6hr PRN agitation  Hydroxyzine 25mg IM Q6hr PRN agitation  Melatonin to 3mg po q hs  Delirium precautions  Monitor for tolerability of medication an oversedation/mood changes            Amish De Souza

## 2025-07-10 NOTE — PLAN OF CARE
Referral sent via [facility] to [facility]. Left Vm message for Yady at [facility] waiting for call back.

## 2025-07-10 NOTE — PT/OT/SLP PROGRESS
Occupational Therapy   Treatment    Name: Federico Villarreal  MRN: [id number]    Recommendations:     Recommended therapy intensity at discharge: Moderate Intensity Therapy   Discharge Equipment Recommendations:  to be determined by next level of care  Barriers to discharge:  Other (Comment) (ongoing medical needs; placement)    Assessment:     Federico Villarreal is a 64 y.o. male with a medical diagnosis of  L occipital hemorrhage CVA, dysphagia s/p PEG placement, delirium. Hx of L eye blindness.  He presents with Fair tolerance for today's session, he is limited by significant lethargy, delayed processing speed, limited attention to task, and impaired activity tolerance. Performance deficits affecting function are weakness, impaired endurance, impaired self care skills, impaired functional mobility, visual deficits, impaired cognition, decreased upper extremity function, decreased lower extremity function, decreased safety awareness. Patient demo's improvement from previous session as evidenced by tolerating 4 trials of sit <> stand without AD req max A x2 with B anterior knee block, B HHA, and maximal facilitation of neutral trunk/pelvis positioning. Patient to continue to benefit from post-acute occupational therapy to address aforementioned deficits to improve safety, reduce risk of falls, and improve independence in daily meaningful activities of daily living.     Rehab Prognosis:  Fair; patient would benefit from acute skilled OT services to address these deficits and reach maximum level of function.       Plan:     Patient to be seen 3 x/week to address the above listed problems via self-care/home management, therapeutic activities, therapeutic exercises, neuromuscular re-education, sensory integration  Plan of Care Expires: 08/05/25  Plan of Care Reviewed with: patient    Subjective     Pain/Comfort:  Pain Rating 1: other (see comments) (Unable to determine)    Objective:     Communicated with: RN prior to  session.  Patient found supine with blood pressure cuff, nascimento catheter, PEG Tube, peripheral IV, pulse ox (continuous), telemetry, SCD, pressure relief boots upon OT entry to room.    General Precautions: Standard, fall, NPO    Orthopedic Precautions:N/A  Braces: N/A  Respiratory Status: Room air  Vital Signs: Blood Pressure: 94/56 sitting in recliner, 92/57 short sitting following static standing, 93/59 supported long sitting in recliner following extended rest break, 100/59 supported long sitting in recliner at end of session      Occupational Performance:     Functional Mobility/Transfers:  Bed mobility:    Supine to Sit: moderate assistance  Transfers: Sit to Stand: maximal assistance and of 2 persons with hand-held assist  Bed to Chair: maximal assistance and of 2 persons with hand-held assist using Squat Pivot    Activities of Daily Living:  Grooming: moderate assistance Patient complete face washing in supported long sitting in recliner chair req mod A to initiate facilitation of lateral grasp on towel and hand > face mobility. Patient demo ability to complete good excursion to face; yet with limited attention to task spontaneously stopping task.   Toileting: total assistance Patient incontinent of bowel upon entry req total assist for posterior perineal hygiene in supported static standing and for clothing management.     Balance:   Static Sitting Balance: Bilateral UE support: Fair: Patient able to maintain balance with handhold support; may require occasional minimal assistance.    Therapeutic Activities:  Patient encouraged to attempt 2 trials of static standing at window-level in hospital hallway with B forearm propped at window-seal level; demo ability to tolerate static standing 2x~30-45 seconds with noted B knee buckling, poor pelvis/trunk extension, and cervical flexion.      Therapeutic Positioning    OT interventions performed during the course of today's session in an effort to prevent and/or  reduce acquired pressure injuries:   Positioning recommendations were communicated to care team     [facility] 6 Click ADL: 8    Co-Treatment: Yes, due to High complexity requires 2 sets of skilled hands    Patient Education:  Patient provided with verbal education education regarding OT role/goals/POC.  Additional teaching is warranted.      Patient left up in chair with all lines intact, call button in reach, chair alarm on, augusto pad in place, and RN notified.    GOALS:   Multidisciplinary Problems       Occupational Therapy Goals          Problem: Occupational Therapy    Goal Priority Disciplines Outcome Interventions   Occupational Therapy Goal     OT, PT/OT Progressing    Description: Goals to be met by: 7/11/25     Patient will increase functional independence with ADLs by performing:    LTG: Pt will perform basic ADLs and ADL transfers with SPV using LRAD by discharge.    STG: to be met by 7/11/25  Pt will follow commands at least 75% consistently throughout session  Pt will complete grooming seated, progressing to standing as appropriate with LRAD with min A.  Pt will complete UB dressing with min A.  Pt will complete LB dressing with min A using LRAD.  Pt will complete toileting with min A using LRAD.  Pt will complete functional mobility to/from toilet and toilet transfer with min A using LRAD.   Pt will demo visual attention to R side >80% of time with min verbal cues.  Pt will demo 4/5 strength in  BUE  for increased functional use during ADL tasks.                          Time Tracking:     OT Date of Treatment: 07/10/25  OT Start Time: 1304  OT Stop Time: 1340  OT Total Time (min): 36 min    Billable Minutes:Self Care/Home Management 16  Therapeutic Activity 20    OT/THOMAS: OT     Number of THOMAS visits since last OT visit: 0    7/10/2025

## 2025-07-10 NOTE — PROGRESS NOTES
[facility] Hospital Medicine Progress Note        Chief Complaint: Inpatient Follow-up       HPI per admitting team:     64-year-old male with type 1 diabetes mellitus, CAD s/p PCI/stent, hypertension, hyperlipidemia, prior CVA  presented on 06/09/2025 for evaluation of sudden onset headache, confusion, slurred speech.    CT head -left occipital lobe parenchymal hemorrhage with trace adjacent subarachnoid hemorrhage.  CTA head/neck was also completed in ED, which revealed no large vessel occlusion, flow-limiting stenosis, aneurysm or evidence of a vascular malformation   MRI brain reported Left occipital lobe hematoma with adjacent subarachnoid hemorrhage. Innumerable chronic microhemorrhages with a subcortical location, may indicate underlying cerebral amyloid angiopathy. Mild chronic microvascular ischemic changes. Neurology team evaluated the patient, initiated on stroke protocol, admitted to ICU services.  Neurosurgery team evaluated the patient, MRI likely suggestive of amyloid angiopathy, repeat CT head 6/10/25 unchanged, suggested holding antiplatelets for at least 2 weeks.  Patient is started on Keppra.  Neurosurgical team signed off. Patient required Chao placement for retention, seen by Urology.  Patient cleared to downgrade to floors 06/12/2025 PM by ICU team. Palliative care team consulted while patient in ICU, code status DNR at the time of downgrade. Pt had fever, tmax 102.8, tachycardic,wbc count increased 17K, decided to obtain pan scan, CT head stable, but notes to have dense consolidation left >right LL, constipation. Respiratory cultures were obtained grew many strep group B Haemophilus influenzae.  Received antibiotics  Patient remains unsafe for p.o. intake with speech evaluations, informed decision with the patient's spouse was made, GI team consulted for PEG placement. Psych was consulted as well to help with his delirium.  PTOT recommended moderate intensity  therapy,case management was consulted for placement. Case management was having hard time placing due to delirium. Psych was reconsulted to help with delirium management. Had BM on 07/09.    Interval Hx:   No new complaints. Awaiting placement.    Objective/physical exam:  General: In no acute distress, afebrile  Chest: Clear to auscultation bilaterally  Heart:  +S1, S2, no appreciable murmur  Abdomen: Soft, nontender, BS +, PEG tube with abdominal binder  :  Chao present with clear urine  MSK: Warm, hands in mittens  Neurologic:  Awake, unable to assess orientation given patient does not respond to questions much    VITAL SIGNS: 24 HRS MIN & MAX LAST   Temp  Min: 97.7 °F (36.5 °C)  Max: 99.1 °F (37.3 °C) 97.7 °F (36.5 °C)   BP  Min: 103/59  Max: 149/60 (!) 103/59   Pulse  Min: 81  Max: 91  81   Resp  Min: 18  Max: 18 18   SpO2  Min: 94 %  Max: 98 % 95 %     I have reviewed the following labs:  Recent Labs   Lab 07/06/25  0733 07/08/25  0355   WBC 12.58* 10.18   RBC 3.82* 3.74*   HGB 11.7* 11.6*   HCT 36.0* 36.5*   MCV 94.2* 97.6*   MCH 30.6 31.0   MCHC 32.5* 31.8*   RDW 12.1 12.1    186   MPV 10.4 9.8       Recent Labs   Lab 07/06/25  0733 07/08/25  0355    141   K 4.4 4.4   CL 98 99   CO2 32* 34*   BUN 34.1* 31.4*   CREATININE 1.05 0.80   * 159*   CALCIUM 8.6* 8.3*   MG 2.90*  --    ALBUMIN  --  2.2*   PROT  --  6.0   ALKPHOS  --  156*   ALT  --  23   AST  --  18   BILITOT  --  0.4     Microbiology Results (last 7 days)       ** No results found for the last 168 hours. **             See below for Radiology    Assessment/Plan:  Traumatic hematuria- patient has pulled Chao out- resolving  Urinary retention- continue with Chao  Hemorrhagic CVA-  Left occipital lobe hematoma, adjacent subarachnoid hemorrhage  Dense left lobe consolidation- Haemophilus influenza pneumonia - treated  Oropharyngeal Dysphagia s/p peg tube  Delirium  Known T1 DM  CAD s/p PCI, HTN, HLD  History of CVA  Tobacco  use  DNR status     No new complaints  7/7-patient pulled his Chao out-->  gross bleeding which has improved with replaced Chao  Appreciate Urology, Chao replaced, recommended voiding trial if and when his mental status improved   Neurology, Neurosurgery signed off  Keppra discontinued on 06/23/2025  Neurosurgery recommended to stay off of antiplatelets for at least 2 weeks but Neurology recommended to avoid antiplatelet or anticoagulation at this time   Completed antibiotics, afebrile  Continue tube feedings, keep head of bed at 35° at all times   Psych re-evaluated the patient; recommendations noted  On risperidone 0.5 mg BID for psychosis, started Depakote 250 mg b.i.d. for mood and agitation.  Continue trazodone 100mg hs, decreased melatonin to 3 mg PO HS.  P.r.n. IM Haldol (last given 7/4) and IM hydroxyzine (last given 7/3) PRN agitation  Discontinued PRN olanzapine  Continue delirium precautions  On lantus 25 units BID and continue ISS   Continue PT OT- recommending moderate intensity therapy  CM on board- pending acceptance    VTE prophylaxis: scds    Anticipated discharge and Disposition:  TBD, SNF once accepted    All diagnosis and differential diagnosis have been reviewed; assessment and plan has been documented; I have personally reviewed the labs and test results that are presently available; I have reviewed the patients medication list; I have reviewed the consulting providers response and recommendations. I have reviewed or attempted to review medical records based upon their availability    All of the patient's questions have been  addressed and answered. Patient's is agreeable to the above stated plan. I will continue to monitor closely and make adjustments to medical management as needed.    Portions of this note dictated using EMR integrated voice recognition software, and may be subject to voice recognition errors not corrected at proofreading. Please contact writer for clarification if  needed.   _____________________________________________________________________    Malnutrition Status:  Nutrition consulted. Most recent weight and BMI monitored-     Measurements:  Wt Readings from Last 1 Encounters:   06/11/25 65 kg (143 lb 4.8 oz)   Body mass index is 26.21 kg/m².    Patient has been screened and assessed by RD.    Malnutrition Type:  Context:    Level: other (see comments) (Does not meet criteria)    Malnutrition Characteristic Summary:  Weight Loss (Malnutrition): other (see comments) (Unable to assess)  Energy Intake (Malnutrition): other (see comments) (Unable to assess)  Muscle Mass (Malnutrition): mild depletion    Interventions/Recommendations (treatment strategy):        Scheduled Med:   aluminum-magnesium hydroxide-simethicone  30 mL Per G Tube QID (AC & HS)    carvediloL  12.5 mg Per G Tube BID    insulin glargine U-100  25 Units Subcutaneous BID    losartan  100 mg Per G Tube Daily    melatonin  3 mg Per G Tube Nightly    risperiDONE  1 mg Per G Tube BID    sucralfate  1 g Per G Tube Q6H    traZODone  100 mg Per G Tube QHS    valproic acid (as sodium salt)  250 mg Per G Tube Q12H      Continuous Infusions:     PRN Meds:  Current Facility-Administered Medications:     bisacodyL, 10 mg, Rectal, Daily PRN    butalbital-acetaminophen-caffeine -40 mg, 1 tablet, Per G Tube, Q4H PRN    dextromethorphan-guaiFENesin  mg/5 ml, 5 mL, Per G Tube, Q4H PRN    dextrose 50%, 12.5 g, Intravenous, PRN    glucagon (human recombinant), 1 mg, Intramuscular, PRN    haloperidol lactate, 2 mg, Intravenous, Q6H PRN    hydrOXYzine, 25 mg, Intramuscular, Q6H PRN    insulin aspart U-100, 0-10 Units, Subcutaneous, QID (AC + HS) PRN    labetalol, 10 mg, Intravenous, Q15 Min PRN    levalbuterol, 0.63 mg, Nebulization, Q4H PRN    LIDOcaine HCl 2%, , Urethral, Once PRN    morphine, 3 mg, Intravenous, On Call Procedure    ondansetron, 4 mg, Intravenous, Q8H PRN    sodium chloride 0.9%, 10 mL, Intravenous,  PRN     Radiology:  I have personally reviewed the following imaging and agree with the radiologist.     CV Ultrasound doppler venous arm right  Negative for deep and superficial vein thrombosis in the right upper   extremity      Franki Martin MD  Department of Hospital Medicine   [facility]   07/10/2025

## 2025-07-10 NOTE — PT/OT/SLP PROGRESS
"Physical Therapy Treatment    Patient Name:  Federico Villarreal   MRN:  [id number]    Recommendations:     Discharge therapy intensity: Moderate Intensity Therapy   Discharge Equipment Recommendations: to be determined by next level of care  Barriers to discharge: Impaired mobility and Ongoing medical needs    Assessment:     Federico Villarreal is a 64 y.o. male admitted with a medical diagnosis of L occipital hemorrhage CVA, dysphagia s/p PEG placement, delirium. Hx of L eye blindness.   He presents with the following impairments/functional limitations: weakness, gait instability, impaired endurance, impaired balance, impaired functional mobility, impaired self care skills, impaired cognition, decreased safety awareness.     Pt significantly slow to respond and frequent redirection and repetition, flat affect, appears [place]. Frequently has a blank stare, BP monitored. Pt requiring max assist to stand this date, however unable to stand fully erect or initiate steps. BP slightly soft.     Rehab Prognosis: Good and Fair; patient would benefit from acute skilled PT services to address these deficits and reach maximum level of function.    Recent Surgery: Procedure(s) (LRB):  PEG (N/A) 24 Days Post-Op    Plan:     During this hospitalization, patient would benefit from acute PT services 3 x/week to address the identified rehab impairments via gait training, therapeutic activities, therapeutic exercises and progress toward the following goals:    Plan of Care Expires:  07/13/25    Subjective     Chief Complaint: "I don't have my teeth"  Patient/Family Comments/goals: unable to state  Pain/Comfort:  Pain Rating 1: 0/10      Objective:     Communicated with nurse prior to session.  Patient found HOB elevated with blood pressure cuff, nascimento catheter, telemetry, PEG Tube (abdominal binder) upon PT entry to room.     General Precautions: Standard, fall  Orthopedic Precautions: N/A  Braces: N/A  Blood Pressure: 92/57, 93/59 HR 81 " while in recliner  Respiratory Status: Room air      Functional Mobility:  Bed Mobility:     Supine to sit: Mod assist  Transfers:     Sit to Stand:  Max assist x 2  X 2 trials  Unable to stand fully erect   Max cues at pelvis to improve extension  Squat pivot: Max assist bed to chair       Therapeutic Activities/Exercises:      Co-Treatment: Yes, due to previous pt agitation and pt confusion. However, no longer agitated but did require 2 sets of skilled hands.       Education:  Patient provided with verbal education education regarding PT role/goals/POC.  Additional teaching is warranted.     Patient left up in chair with all lines intact, call button in reach, chair alarm on, augusto pad in place, and nurse notified      GOALS:   Multidisciplinary Problems       Physical Therapy Goals          Problem: Physical Therapy    Goal Priority Disciplines Outcome Interventions   Physical Therapy Goal     PT, PT/OT Progressing    Description: Goals to be met by: 25     Patient will increase functional independence with mobility by performin. Supine to sit with Minimal Assistance  2. Sit to stand transfer with Minimal Assistance  3. Bed to chair transfer with Minimal Assistance using Rolling Walker  4. Pt will follow 5/5 motor commands for BLE.  5. Pt will ambulate 150ft with CGA and RW.                          Time Tracking:     PT Received On: 07/10/25  PT Start Time: 1304     PT Stop Time: 1339  PT Total Time (min): 35 min     Billable Minutes: Therapeutic Activity 2 units    Treatment Type: Treatment  PT/PTA: PTA     Number of PTA visits since last PT visit: 1     07/10/2025

## 2025-07-11 LAB
POCT GLUCOSE: 143 MG/DL (ref 70–110)
POCT GLUCOSE: 179 MG/DL (ref 70–110)
POCT GLUCOSE: 179 MG/DL (ref 70–110)
POCT GLUCOSE: 235 MG/DL (ref 70–110)
POCT GLUCOSE: 357 MG/DL (ref 70–110)
POCT GLUCOSE: 51 MG/DL (ref 70–110)

## 2025-07-11 PROCEDURE — 25000003 PHARM REV CODE 250: Performed by: LICENSED PRACTICAL NURSE

## 2025-07-11 PROCEDURE — 21400001 HC TELEMETRY ROOM

## 2025-07-11 PROCEDURE — 25000003 PHARM REV CODE 250

## 2025-07-11 PROCEDURE — 63600175 PHARM REV CODE 636 W HCPCS: Performed by: INTERNAL MEDICINE

## 2025-07-11 PROCEDURE — 25000003 PHARM REV CODE 250: Performed by: INTERNAL MEDICINE

## 2025-07-11 RX ADMIN — Medication 3 MG: at 08:07

## 2025-07-11 RX ADMIN — INSULIN ASPART 1 UNITS: 100 INJECTION, SOLUTION INTRAVENOUS; SUBCUTANEOUS at 09:07

## 2025-07-11 RX ADMIN — ALUMINUM HYDROXIDE, MAGNESIUM HYDROXIDE, AND DIMETHICONE 30 ML: 200; 20; 200 SUSPENSION ORAL at 06:07

## 2025-07-11 RX ADMIN — SUCRALFATE 1 G: 1 TABLET ORAL at 11:07

## 2025-07-11 RX ADMIN — INSULIN GLARGINE 25 UNITS: 100 INJECTION, SOLUTION SUBCUTANEOUS at 08:07

## 2025-07-11 RX ADMIN — INSULIN ASPART 4 UNITS: 100 INJECTION, SOLUTION INTRAVENOUS; SUBCUTANEOUS at 04:07

## 2025-07-11 RX ADMIN — ALUMINUM HYDROXIDE, MAGNESIUM HYDROXIDE, AND DIMETHICONE 30 ML: 200; 20; 200 SUSPENSION ORAL at 04:07

## 2025-07-11 RX ADMIN — CARVEDILOL 12.5 MG: 12.5 TABLET, FILM COATED ORAL at 08:07

## 2025-07-11 RX ADMIN — LOSARTAN POTASSIUM 100 MG: 50 TABLET, FILM COATED ORAL at 09:07

## 2025-07-11 RX ADMIN — ALUMINUM HYDROXIDE, MAGNESIUM HYDROXIDE, AND DIMETHICONE 30 ML: 200; 20; 200 SUSPENSION ORAL at 10:07

## 2025-07-11 RX ADMIN — INSULIN ASPART 2 UNITS: 100 INJECTION, SOLUTION INTRAVENOUS; SUBCUTANEOUS at 12:07

## 2025-07-11 RX ADMIN — INSULIN ASPART 10 UNITS: 100 INJECTION, SOLUTION INTRAVENOUS; SUBCUTANEOUS at 11:07

## 2025-07-11 RX ADMIN — RISPERIDONE 1 MG: 1 TABLET, FILM COATED ORAL at 08:07

## 2025-07-11 RX ADMIN — ALUMINUM HYDROXIDE, MAGNESIUM HYDROXIDE, AND DIMETHICONE 30 ML: 200; 20; 200 SUSPENSION ORAL at 08:07

## 2025-07-11 RX ADMIN — RISPERIDONE 0.5 MG: 0.25 TABLET, FILM COATED ORAL at 09:07

## 2025-07-11 RX ADMIN — CARVEDILOL 12.5 MG: 12.5 TABLET, FILM COATED ORAL at 09:07

## 2025-07-11 RX ADMIN — INSULIN GLARGINE 25 UNITS: 100 INJECTION, SOLUTION SUBCUTANEOUS at 09:07

## 2025-07-11 RX ADMIN — TRAZODONE HYDROCHLORIDE 100 MG: 100 TABLET ORAL at 08:07

## 2025-07-11 RX ADMIN — VALPROIC ACID 250 MG: 250 SOLUTION ORAL at 08:07

## 2025-07-11 RX ADMIN — SUCRALFATE 1 G: 1 TABLET ORAL at 06:07

## 2025-07-11 NOTE — PLAN OF CARE
Problem: Adult Inpatient Plan of Care  Goal: Plan of Care Review  Outcome: Progressing  Goal: Patient-Specific Goal (Individualized)  Outcome: Progressing  Goal: Absence of Hospital-Acquired Illness or Injury  Outcome: Progressing  Goal: Optimal Comfort and Wellbeing  Outcome: Progressing  Goal: Readiness for Transition of Care  Outcome: Progressing     Problem: Diabetes Comorbidity  Goal: Blood Glucose Level Within Targeted Range  Outcome: Progressing     Problem: Stroke, Intracerebral Hemorrhage  Goal: Optimal Coping  Outcome: Progressing  Goal: Effective Bowel Elimination  Outcome: Progressing  Goal: Optimal Cerebral Tissue Perfusion  Outcome: Progressing  Goal: Optimal Cognitive Function  Outcome: Progressing  Goal: Effective Communication Skills  Outcome: Progressing  Goal: Optimal Functional Ability  Outcome: Progressing  Goal: Optimal Nutrition Intake  Outcome: Progressing  Goal: Optimal Pain Control and Function  Outcome: Progressing  Goal: Effective Oxygenation and Ventilation  Outcome: Progressing  Goal: Improved Sensorimotor Function  Outcome: Progressing  Goal: Safe and Effective Swallow  Outcome: Progressing  Goal: Effective Urinary Elimination  Outcome: Progressing     Problem: Fall Injury Risk  Goal: Absence of Fall and Fall-Related Injury  Outcome: Progressing     Problem: Skin Injury Risk Increased  Goal: Skin Health and Integrity  Outcome: Progressing     Problem: Infection  Goal: Absence of Infection Signs and Symptoms  Outcome: Progressing     Problem: Coping Ineffective  Goal: Effective Coping  Outcome: Progressing     Problem: Restraint, Nonviolent  Goal: Absence of Harm or Injury  Outcome: Progressing

## 2025-07-11 NOTE — PLAN OF CARE
Notified patient's wife that [facility] is able to assist with medicaid application and she verbalized understanding

## 2025-07-11 NOTE — PROGRESS NOTES
"7/11/2025  Federico Villarreal   1960   [id number]        Psychiatry Progress Note     Chief Complaint: agitation    SUBJECTIVE:   Federico Villarreal is a 64 y.o. male with a past medical history that includes CAD, T1DM, HTN, HLD, and history of ischemic stroke with left-sided weakness who presented to [facility] ED on 06/09/25 with confusion and slurred speech noted that morning. His wife had reported he had woken up with headache at 0115 that morning and then woke up with confusion around 0545. CT scan showed left occipital lobe parenchymal hemorrhage. Being seen by psychiatry to day due to confusion and agitation.      7/8/25: Pt seen today for psychiatric follow up in assigned room laying in bed resting, calm no acute distress noted. PT states mood is "okay." Pt oriented to person only minimal responses to questions. Staff reports agitation intermittently and when he receives assistance with changing and cleaning. Staff reports no prns given for acute agitation recently will review medications and adjust if applicable.     7/9/25: Pt seen today for psychiatric follow up in assigned room calm in no apparent distress. Pt state mood is okay, pt oriented to person only. Depakote 250mg po BID was added yesterday with improvement in mood and agitation. One to one monitoring has been discontinued and pt is doing well thus far and awaiting placement. Will reevaluate tomorrow and adjust depakote accordingly.     7/10/25: Pt seen today for psychiatric follow up in assigned room calm in no apparent distress. Pt state mood is okay, pt oriented to person only. Per staff report pt has been drowsy today, will adjust psychotropic medication could possibly be contributing, no new behaviors or increased agitation noted. Staff reports no sleep concerns.    7/11/25: Pt seen today for psychiatric follow up in assigned room calm sitting up in chair awake in no apparent distress. Pt state mood is fine, pt oriented to person only. Staff no " new concerns of agitation or worsening behaviors. Pt tolerating medications denies side effects.        Current Medications:   Scheduled Meds:    aluminum-magnesium hydroxide-simethicone  30 mL Per G Tube QID (AC & HS)    carvediloL  12.5 mg Per G Tube BID    insulin glargine U-100  25 Units Subcutaneous BID    losartan  100 mg Per G Tube Daily    melatonin  3 mg Per G Tube Nightly    risperiDONE  0.5 mg Oral Daily    risperiDONE  1 mg Per G Tube QHS    sucralfate  1 g Per G Tube Q6H    traZODone  100 mg Per G Tube QHS    valproic acid (as sodium salt)  250 mg Per G Tube QHS      PRN Meds:   Current Facility-Administered Medications:     bisacodyL, 10 mg, Rectal, Daily PRN    butalbital-acetaminophen-caffeine -40 mg, 1 tablet, Per G Tube, Q4H PRN    dextromethorphan-guaiFENesin  mg/5 ml, 5 mL, Per G Tube, Q4H PRN    dextrose 50%, 12.5 g, Intravenous, PRN    glucagon (human recombinant), 1 mg, Intramuscular, PRN    haloperidol lactate, 2 mg, Intravenous, Q6H PRN    hydrOXYzine, 25 mg, Intramuscular, Q6H PRN    insulin aspart U-100, 0-10 Units, Subcutaneous, QID (AC + HS) PRN    labetalol, 10 mg, Intravenous, Q15 Min PRN    levalbuterol, 0.63 mg, Nebulization, Q4H PRN    LIDOcaine HCl 2%, , Urethral, Once PRN    morphine, 3 mg, Intravenous, On Call Procedure    ondansetron, 4 mg, Intravenous, Q8H PRN    sodium chloride 0.9%, 10 mL, Intravenous, PRN   Psychotherapeutics (From admission, onward)      Start     Stop Route Frequency Ordered    07/11/25 2100  risperiDONE tablet 1 mg         -- PER G TUBE Nightly 07/10/25 1542    07/11/25 0900  risperiDONE tablet 0.5 mg         -- Oral Daily 07/10/25 1542    06/27/25 1614  haloperidol lactate injection 2 mg         -- IV Every 6 hours PRN 06/27/25 1514    06/20/25 2100  traZODone tablet 100 mg         -- PER G TUBE Nightly 06/20/25 1200            Allergies:   Review of patient's allergies indicates:  No Known Allergies     OBJECTIVE:   Vitals   Vitals:     07/11/25 1124   BP: 106/67   Pulse: 96   Resp: 18   Temp: 97.4 °F (36.3 °C)        Labs/Imaging/Studies:   Recent Results (from the past 36 hours)   POCT glucose    Collection Time: 07/10/25  4:58 AM   Result Value Ref Range    POCT Glucose 140 (H) 70 - 110 mg/dL   POCT glucose    Collection Time: 07/10/25 11:23 AM   Result Value Ref Range    POCT Glucose 114 (H) 70 - 110 mg/dL   Valproic Acid    Collection Time: 07/10/25  3:36 PM   Result Value Ref Range    Valproic Acid <12.5 (L) 50.0 - 100.0 ug/ml   POCT glucose    Collection Time: 07/10/25  4:24 PM   Result Value Ref Range    POCT Glucose 114 (H) 70 - 110 mg/dL   POCT glucose    Collection Time: 07/10/25  8:01 PM   Result Value Ref Range    POCT Glucose 209 (H) 70 - 110 mg/dL   POCT glucose    Collection Time: 07/10/25 10:48 PM   Result Value Ref Range    POCT Glucose 207 (H) 70 - 110 mg/dL   POCT glucose    Collection Time: 07/11/25  6:20 AM   Result Value Ref Range    POCT Glucose 51 (L) 70 - 110 mg/dL   POCT glucose    Collection Time: 07/11/25  7:31 AM   Result Value Ref Range    POCT Glucose 143 (H) 70 - 110 mg/dL   POCT glucose    Collection Time: 07/11/25 11:26 AM   Result Value Ref Range    POCT Glucose 357 (H) 70 - 110 mg/dL          Medical Review Of Systems:  defer      Psychiatric Mental Status Exam:  General Appearance: dressed in hospital garb, lying in bed, in no acute distress, appears older than stated age  Arousal: drowsy  Behavior: cooperative, minimal responses, poor eye contact  Movements and Motor Activity: no tics, no tremors, no akathisia, no dystonia, no evidence of tardive dyskinesia  Orientation: oriented to person only  Speech: coherent  Mood: Euthymic  Affect: constricted  Thought Process: disorganized  Associations: no loosening of associations  Thought Content and Perceptions: no suicidal ideation, no homicidal ideation  Recent and Remote Memory: impaired; per interview/observation with patient  Attention and Concentration:  impaired; per interview/observation with patient  Fund of Knowledge: vocabulary appropriate; based on history, vocabulary, fund of knowledge, syntax, grammar, and content  Insight: impaired; based on understanding of severity of illness and HPI  Judgment: impaired; based on patient's behavior and HPI    ASSESSMENT/PLAN:   Problems Addressed/Diagnoses:  Delirium, acute, hyperactive due to multiple etiologies  F05.9  G31.84 Mild cognitive impairment of uncertain etiology    Past Medical History:   Diagnosis Date    Acne     Cataract     Coronary artery disease     Diabetes mellitus     Hearing loss     Hypertension     Seasonal allergies     Stroke         Plan:  Medication Management  Continue current regimen pt tolerating medication  Risperidone 0.5mg daily for psychosis  Risperidone 1mg po qhs  depakote to 250mg po qhs - for mood/agitation  Continue trazodone 100mg QHS  Haloperidol 2mg IV Q6hr PRN agitation  Hydroxyzine 25mg IM Q6hr PRN agitation  Melatonin to 3mg po q hs  Delirium precautions  Monitor for tolerability of medication an oversedation/mood changes            Amish De Souza

## 2025-07-11 NOTE — PROGRESS NOTES
Inpatient Nutrition Assessment    Admit Date: 6/9/2025   Total duration of encounter: 32 days   Patient Age: 64 y.o.    Nutrition Recommendation/Prescription     Bolus 250 ml Diabetisource AC and 150 ml water 6 times daily  1800 kcal 100% needs  90 g protein 100% needs  162 g CHO 92% needs  1680 ml water 100% needs    Communication of Recommendations: reviewed with nurse    Nutrition Assessment     Malnutrition Assessment/Nutrition-Focused Physical Exam       Malnutrition Level: other (see comments) (Does not meet criteria) (06/11/25 [facility]2)  Energy Intake (Malnutrition): other (see comments) (Unable to assess) (06/11/25 1352)  Weight Loss (Malnutrition): other (see comments) (Unable to assess) (06/11/25 1352)              Muscle Mass (Malnutrition): mild depletion (06/11/25 1352)  [place] (Muscle Loss): mild depletion                                A minimum of two characteristics is recommended for diagnosis of either severe or non-severe malnutrition.    Chart Review    Reason Seen: follow-up    Malnutrition Screening Tool Results   Have you recently lost weight without trying?: No  Have you been eating poorly because of a decreased appetite?: No   MST Score: 0   Diagnosis: left occipital hemorrhagic stroke     Relevant Medical History: T1DM, coronary artery disease status post stenting, hypertension, hyperlipidemia, history of ischemic stroke with previous left-sided weakness    Scheduled Medications:  aluminum-magnesium hydroxide-simethicone, 30 mL, QID (AC & HS)  carvediloL, 12.5 mg, BID  insulin glargine U-100, 25 Units, BID  losartan, 100 mg, Daily  melatonin, 3 mg, Nightly  risperiDONE, 0.5 mg, Daily  risperiDONE, 1 mg, QHS  sucralfate, 1 g, Q6H  traZODone, 100 mg, QHS  valproic acid (as sodium salt), 250 mg, QHS    Continuous Infusions: none       PRN Medications:   bisacodyL, 10 mg, Daily PRN  butalbital-acetaminophen-caffeine -40 mg, 1 tablet, Q4H PRN  dextromethorphan-guaiFENesin  mg/5  ml, 5 mL, Q4H PRN  dextrose 50%, 12.5 g, PRN  glucagon (human recombinant), 1 mg, PRN  haloperidol lactate, 2 mg, Q6H PRN  hydrOXYzine, 25 mg, Q6H PRN  insulin aspart U-100, 0-10 Units, QID (AC + HS) PRN  labetalol, 10 mg, Q15 Min PRN  levalbuterol, 0.63 mg, Q4H PRN  LIDOcaine HCl 2%, , Once PRN  morphine, 3 mg, On Call Procedure  ondansetron, 4 mg, Q8H PRN  sodium chloride 0.9%, 10 mL, PRN    Calorie Containing IV Medications: no significant kcals from medications at this time    Recent Labs   Lab 07/06/25  0733 07/08/25  0355    141   K 4.4 4.4   CALCIUM 8.6* 8.3*   MG 2.90*  --    CL 98 99   CO2 32* 34*   BUN 34.1* 31.4*   CREATININE 1.05 0.80   EGFRNORACEVR >60 >60   * 159*   BILITOT  --  0.4   ALKPHOS  --  156*   ALT  --  23   AST  --  18   ALBUMIN  --  2.2*   WBC 12.58* 10.18   HGB 11.7* 11.6*   HCT 36.0* 36.5*     Nutrition Orders:  Diet NPO  Tube Feedings/Formulas 250; Diabetisource AC; Gastrostomy (6 times daily); 150; Every 4 hours    Appetite/Oral Intake: NPO/not applicable  Factors Affecting Nutritional Intake: impaired cognitive status/motor control and NPO  Social Needs Impacting Access to Food: none identified  Food/Baptism/Cultural Preferences: unable to obtain  Food Allergies: none reported  Last Bowel Movement: 07/09/25  Wound(s): no pressure injuries documented at this time     Comments    6/11/25 Patient confused with episodes of agitation, sleeping during rounds. Nurse reports plans to start nasogastric tube feeding, orders provided.    6/13/25 PO intake remains unsafe per SLP. GI consulted for PEG placement possibly on Monday. RN reports patient tolerating tube feeding at 15 mL/hr. Last BM noted on 6/8/25, consider bowel regimen.      6//16/25: Pt's TF being held for PEG placement today. Pt remains NPO per SLP.    6/19/25 Tube feeding progressing toward goal rate, currently at 60 ml/hr, goal 75 ml/hr.    6/23/25 Tube feeding at goal, will transition to bolus feeding.    6/27/25  "Tolerating bolus feedings.    7/1/25 Nurse reports patient continues to tolerate bolus feeding regimen.    7/3/25 Tolerating bolus feedings.    7/11/25 Patient continues to tolerate tube feeding.    Anthropometrics    Height: 5' 2" (157.5 cm), Height Method: Stated  Last Weight: 65 kg (143 lb 4.8 oz) (06/11/25 1348), Weight Method: Bed Scale  BMI (Calculated): 26.2  BMI Classification: overweight (BMI 25-29.9)        Ideal Body Weight (IBW), Male: 118 lb     % Ideal Body Weight, Male (lb): 114.41 %                          Usual Weight Provided By: unable to obtain usual weight    Wt Readings from Last 5 Encounters:   06/11/25 65 kg (143 lb 4.8 oz)   01/27/25 63 kg (139 lb)   12/03/24 64.7 kg (142 lb 9.6 oz)   11/05/24 62 kg (136 lb 11 oz)   10/02/24 62 kg (136 lb 9.6 oz)     Weight Change(s) Since Admission:   6/11/25 initial weight 61.2 kg stated, bed weight 65 kg taken during rounds  Wt Readings from Last 1 Encounters:   06/11/25 1348 65 kg (143 lb 4.8 oz)   06/09/25 0805 61.2 kg (135 lb)   Admit Weight: 61.2 kg (135 lb) (06/09/25 0805), Weight Method: Stated    Estimated Needs    Weight Used For Calorie Calculations: 65 kg (143 lb 4.8 oz)  Energy Calorie Requirements (kcal): 7635-7860, 1.2-1.4 stress factor  Energy Need Method: [facility]  Weight Used For Protein Calculations: 65 kg (143 lb 4.8 oz)  Protein Requirements: 78-91 g, 1.2-1.4 g/kg  Fluid Requirements (mL): 9219-0852, 1 ml/kcal  CHO Requirement: 177-207 g, 45% of kcal     Enteral Nutrition    Formula: Diabetisource AC  Rate/Volume: 250 ml 6 times daily  Water Flushes: 150 ml 6 times daily  Additives/Modulars: none at this time  Route: gastrostomy tube  Method: continuous  Total Nutrition Provided by Tube Feeding, Additives, and Flushes:  Calories Provided  1800 kcal/d, 100% needs   Protein Provided  90 g/d, 100% needs   Fluid Provided  1680 ml/d, 100% needs   Continuous feeding calculations based on estimated 20 hr/d run time unless otherwise " stated.    Parenteral Nutrition Patient not receiving parenteral nutrition support at this time.    Evaluation of Received Nutrient Intake    Calories: meeting estimated needs  Protein: meeting estimated needs    Patient Education Not applicable.    Nutrition Diagnosis     PES: Inadequate energy intake related to inability to consume sufficient nutrients as evidenced by less than 80% needs met. (resolved)  PES: N/A             Nutrition Interventions     Interventions: modified composition of enteral nutrition and collaboration with other providers       Goal: Meet greater than 80% of nutritional needs by follow-up. (goal met)  Goal: Tolerate enteral feeding at goal rate by follow-up. (goal met)    Nutrition Goals & Monitoring     Dietitian will monitor: food and beverage intake, energy intake, enteral nutrition intake, weight, weight change, electrolyte/renal panel, beliefs/attitudes, glucose/endocrine profile, and gastrointestinal profile  Discharge planning: tube feeding (Diabetisource AC or equivalent substitute)  Nutrition Risk/Follow-Up: dietitian will follow-up one time per week   Please consult if re-assessment needed sooner.

## 2025-07-11 NOTE — PLAN OF CARE
Spoke to Yady at [facility] she is interested in patient she is on vacation and will be back in Monday she will reach out to family then. Patient will not need auth he is traditional medicare

## 2025-07-11 NOTE — PLAN OF CARE
[facility] Swing bed has accepted pt and can admit today. Spoke with pt's daughter Juliana at bedside with granddaughter Gabriela on the phone. They state first choice is [facility]. Explained to them that [facility] admission coordinator is out on vacation and they would need to appeal DC because we have an accepting bed and pt is medically stable. Reached out to Stephenie () @ [facility], who states to email referral and she will pass it on to the  and DON for medical review. Explained to Juliana that we can not guarantee bed availability for [facility] if [facility] denies on Monday but I would try to get them to determine medical acceptance. Juliana understands and states if [facility] says no then they will go with [facility].

## 2025-07-12 LAB
POCT GLUCOSE: 149 MG/DL (ref 70–110)
POCT GLUCOSE: 163 MG/DL (ref 70–110)
POCT GLUCOSE: 167 MG/DL (ref 70–110)

## 2025-07-12 PROCEDURE — 25000003 PHARM REV CODE 250: Performed by: LICENSED PRACTICAL NURSE

## 2025-07-12 PROCEDURE — 63600175 PHARM REV CODE 636 W HCPCS: Performed by: INTERNAL MEDICINE

## 2025-07-12 PROCEDURE — 25000003 PHARM REV CODE 250: Performed by: INTERNAL MEDICINE

## 2025-07-12 PROCEDURE — 25000003 PHARM REV CODE 250

## 2025-07-12 PROCEDURE — 11000001 HC ACUTE MED/SURG PRIVATE ROOM

## 2025-07-12 RX ADMIN — RISPERIDONE 0.5 MG: 0.25 TABLET, FILM COATED ORAL at 08:07

## 2025-07-12 RX ADMIN — INSULIN GLARGINE 25 UNITS: 100 INJECTION, SOLUTION SUBCUTANEOUS at 08:07

## 2025-07-12 RX ADMIN — SUCRALFATE 1 G: 1 TABLET ORAL at 06:07

## 2025-07-12 RX ADMIN — INSULIN ASPART 2 UNITS: 100 INJECTION, SOLUTION INTRAVENOUS; SUBCUTANEOUS at 11:07

## 2025-07-12 RX ADMIN — CARVEDILOL 12.5 MG: 12.5 TABLET, FILM COATED ORAL at 09:07

## 2025-07-12 RX ADMIN — SUCRALFATE 1 G: 1 TABLET ORAL at 10:07

## 2025-07-12 RX ADMIN — ALUMINUM HYDROXIDE, MAGNESIUM HYDROXIDE, AND DIMETHICONE 30 ML: 200; 20; 200 SUSPENSION ORAL at 09:07

## 2025-07-12 RX ADMIN — ALUMINUM HYDROXIDE, MAGNESIUM HYDROXIDE, AND DIMETHICONE 30 ML: 200; 20; 200 SUSPENSION ORAL at 05:07

## 2025-07-12 RX ADMIN — Medication 3 MG: at 09:07

## 2025-07-12 RX ADMIN — TRAZODONE HYDROCHLORIDE 100 MG: 100 TABLET ORAL at 09:07

## 2025-07-12 RX ADMIN — ALUMINUM HYDROXIDE, MAGNESIUM HYDROXIDE, AND DIMETHICONE 30 ML: 200; 20; 200 SUSPENSION ORAL at 10:07

## 2025-07-12 RX ADMIN — VALPROIC ACID 250 MG: 250 SOLUTION ORAL at 09:07

## 2025-07-12 RX ADMIN — CARVEDILOL 12.5 MG: 12.5 TABLET, FILM COATED ORAL at 08:07

## 2025-07-12 RX ADMIN — INSULIN ASPART 4 UNITS: 100 INJECTION, SOLUTION INTRAVENOUS; SUBCUTANEOUS at 06:07

## 2025-07-12 RX ADMIN — SUCRALFATE 1 G: 1 TABLET ORAL at 05:07

## 2025-07-12 RX ADMIN — RISPERIDONE 1 MG: 1 TABLET, FILM COATED ORAL at 09:07

## 2025-07-12 RX ADMIN — ALUMINUM HYDROXIDE, MAGNESIUM HYDROXIDE, AND DIMETHICONE 30 ML: 200; 20; 200 SUSPENSION ORAL at 06:07

## 2025-07-12 RX ADMIN — LOSARTAN POTASSIUM 100 MG: 50 TABLET, FILM COATED ORAL at 08:07

## 2025-07-12 NOTE — PROGRESS NOTES
[facility] Hospital Medicine Progress Note        Chief Complaint: Inpatient Follow-up       HPI per admitting team:     64-year-old male with type 1 diabetes mellitus, CAD s/p PCI/stent, hypertension, hyperlipidemia, prior CVA  presented on 06/09/2025 for evaluation of sudden onset headache, confusion, slurred speech.    CT head -left occipital lobe parenchymal hemorrhage with trace adjacent subarachnoid hemorrhage.  CTA head/neck was also completed in ED, which revealed no large vessel occlusion, flow-limiting stenosis, aneurysm or evidence of a vascular malformation   MRI brain reported Left occipital lobe hematoma with adjacent subarachnoid hemorrhage. Innumerable chronic microhemorrhages with a subcortical location, may indicate underlying cerebral amyloid angiopathy. Mild chronic microvascular ischemic changes. Neurology team evaluated the patient, initiated on stroke protocol, admitted to ICU services.  Neurosurgery team evaluated the patient, MRI likely suggestive of amyloid angiopathy, repeat CT head 6/10/25 unchanged, suggested holding antiplatelets for at least 2 weeks.  Patient is started on Keppra.  Neurosurgical team signed off. Patient required Chao placement for retention, seen by Urology.  Patient cleared to downgrade to floors 06/12/2025 PM by ICU team. Palliative care team consulted while patient in ICU, code status DNR at the time of downgrade. Pt had fever, tmax 102.8, tachycardic,wbc count increased 17K, decided to obtain pan scan, CT head stable, but notes to have dense consolidation left >right LL, constipation. Respiratory cultures were obtained grew many strep group B Haemophilus influenzae.  Received antibiotics  Patient remains unsafe for p.o. intake with speech evaluations, informed decision with the patient's spouse was made, GI team consulted for PEG placement. On 7/7, patient pulled his Chao out, noted to have gross bleeding which has improved with  replaced Chao. Psych was consulted as well to help with his delirium.  PTOT recommended moderate intensity therapy,case management was consulted for placement. Case management was having hard time placing due to delirium. Psych was reconsulted to help with delirium management. Had BM on 07/09.    Interval Hx:   No new complaints. Awaiting response from facility of patient's family's choice. Plan for DC once accepted to SNF.    Objective/physical exam:  General: alert male lying comfortably in bed, in no acute distress  HENT: oral and oropharyngeal mucosa moist, pink, with no erythema or exudates, no ear pain or discharge  Neck: normal neck movement, no lymph nodes or swellings, no JVD or Carotid bruit  Respiratory: clear breathing sounds bilaterally, no crackles, rales, ronchi or wheezes  Cardiovascular: clear S1 and S2, no murmurs, rubs or gallops  Peripheral Vascular: no lesions, ulcers or erosions, normal peripheral pulses and no pedal edema  Gastrointestinal: PEG in place; soft, non-tender, non-distended abdomen, no guarding, rigidity or rebound tenderness, normal bowel sounds  Integumentary: normal skin color, no rashes or lesions  Neuro: AAO x 0; motor strength 4/5 in B/L UEs & LEs; sensation intact to gross and fine touch B/L; CN II-XII grossly intact    VITAL SIGNS: 24 HRS MIN & MAX LAST   Temp  Min: 96.7 °F (35.9 °C)  Max: 98.6 °F (37 °C) 98 °F (36.7 °C)   BP  Min: 101/64  Max: 158/75 101/64   Pulse  Min: 78  Max: 97  87   Resp  Min: 16  Max: 18 18   SpO2  Min: 93 %  Max: 96 % (!) 93 %     I have reviewed the following labs:  Recent Labs   Lab 07/06/25  0733 07/08/25  0355   WBC 12.58* 10.18   RBC 3.82* 3.74*   HGB 11.7* 11.6*   HCT 36.0* 36.5*   MCV 94.2* 97.6*   MCH 30.6 31.0   MCHC 32.5* 31.8*   RDW 12.1 12.1    186   MPV 10.4 9.8       Recent Labs   Lab 07/06/25 0733 07/08/25  0355    141   K 4.4 4.4   CL 98 99   CO2 32* 34*   BUN 34.1* 31.4*   CREATININE 1.05 0.80   * 159*    CALCIUM 8.6* 8.3*   MG 2.90*  --    ALBUMIN  --  2.2*   PROT  --  6.0   ALKPHOS  --  156*   ALT  --  23   AST  --  18   BILITOT  --  0.4     Assessment/Plan:  Traumatic hematuria- patient has pulled Chao out- resolving  Urinary retention- continue with Chao  Hemorrhagic CVA-  Left occipital lobe hematoma, adjacent subarachnoid hemorrhage  Dense left lobe consolidation- Haemophilus influenza pneumonia - treated  Oropharyngeal Dysphagia s/p peg tube  Delirium  Known T1 DM  CAD s/p PCI, HTN, HLD  History of CVA  Tobacco use    No new complaints  Appreciate Urology, Chao replaced, recommended voiding trial if and when his mental status improved   Neurology, Neurosurgery signed off  Keppra discontinued on 06/23/2025  Neurosurgery recommended to stay off of antiplatelets for at least 2 weeks but Neurology recommended to avoid antiplatelet or anticoagulation at this time  Continue tube feedings, keep head of bed at 35° at all times   Psych re-evaluated the patient; recommendations noted  On risperidone 0.5 mg BID for psychosis, started Depakote 250 mg b.i.d. for mood and agitation  Continue trazodone 100mg hs, decreased melatonin to 3 mg PO HS.  P.r.n. IM Haldol (last given 7/4) and IM hydroxyzine (last given 7/3) PRN agitation  Continue delirium precautions  On lantus 25 units BID and continue ISS   PT OT recommending moderate intensity therapy  CM on board- pending acceptance    VTE prophylaxis: scds    Anticipated discharge and Disposition:  TBD, SNF once accepted    All diagnosis and differential diagnosis have been reviewed; assessment and plan has been documented; I have personally reviewed the labs and test results that are presently available; I have reviewed the patients medication list; I have reviewed the consulting providers response and recommendations. I have reviewed or attempted to review medical records based upon their availability    All of the patient's questions have been  addressed and answered.  Patient's is agreeable to the above stated plan. I will continue to monitor closely and make adjustments to medical management as needed.    Portions of this note dictated using EMR integrated voice recognition software, and may be subject to voice recognition errors not corrected at proofreading. Please contact writer for clarification if needed.     Radiology:  I have personally reviewed the following imaging and agree with the radiologist.     CV Ultrasound doppler venous arm right  Negative for deep and superficial vein thrombosis in the right upper   extremity      Franki Martin MD  Department of Hospital Medicine   [facility]   07/12/2025

## 2025-07-12 NOTE — PROGRESS NOTES
"7/12/2025  Federico Villarreal   1960   [id number]        Psychiatry Progress Note     Chief Complaint: Agitation    SUBJECTIVE:   Federico Villarreal is a 64 y.o. male with a past medical history that includes CAD, T1DM, HTN, HLD, and history of ischemic stroke with left-sided weakness who presented to [facility] ED on 06/09/25 with confusion and slurred speech noted that morning. His wife had reported he had woken up with headache at 0115 that morning and then woke up with confusion around 0545. CT scan showed left occipital lobe parenchymal hemorrhage. Psych was consulted for increased confusion and agitation.     Patient is seen today at the bedside without family present, lying in bed with bilateral mittens in place and in no apparent distress. Nursing staff denies any overt behavioral concerns or agitation. Pt reports that his mood is "good". He is tolerating current psychotropic medication regiment and denies adverse effects. Currently awaiting SNF placement.        Current Medications:   Scheduled Meds:    aluminum-magnesium hydroxide-simethicone  30 mL Per G Tube QID (AC & HS)    carvediloL  12.5 mg Per G Tube BID    insulin glargine U-100  25 Units Subcutaneous BID    losartan  100 mg Per G Tube Daily    melatonin  3 mg Per G Tube Nightly    risperiDONE  0.5 mg Oral Daily    risperiDONE  1 mg Per G Tube QHS    sucralfate  1 g Per G Tube Q6H    traZODone  100 mg Per G Tube QHS    valproic acid (as sodium salt)  250 mg Per G Tube QHS      PRN Meds:   Current Facility-Administered Medications:     bisacodyL, 10 mg, Rectal, Daily PRN    butalbital-acetaminophen-caffeine -40 mg, 1 tablet, Per G Tube, Q4H PRN    dextromethorphan-guaiFENesin  mg/5 ml, 5 mL, Per G Tube, Q4H PRN    dextrose 50%, 12.5 g, Intravenous, PRN    glucagon (human recombinant), 1 mg, Intramuscular, PRN    haloperidol lactate, 2 mg, Intravenous, Q6H PRN    hydrOXYzine, 25 mg, Intramuscular, Q6H PRN    insulin aspart U-100, 0-10 Units, " Subcutaneous, QID (AC + HS) PRN    labetalol, 10 mg, Intravenous, Q15 Min PRN    levalbuterol, 0.63 mg, Nebulization, Q4H PRN    LIDOcaine HCl 2%, , Urethral, Once PRN    morphine, 3 mg, Intravenous, On Call Procedure    ondansetron, 4 mg, Intravenous, Q8H PRN    sodium chloride 0.9%, 10 mL, Intravenous, PRN   Psychotherapeutics (From admission, onward)      Start     Stop Route Frequency Ordered    07/11/25 2100  risperiDONE tablet 1 mg         -- PER G TUBE Nightly 07/10/25 1542    07/11/25 0900  risperiDONE tablet 0.5 mg         -- Oral Daily 07/10/25 1542    06/27/25 1614  haloperidol lactate injection 2 mg         -- IV Every 6 hours PRN 06/27/25 1514    06/20/25 2100  traZODone tablet 100 mg         -- PER G TUBE Nightly 06/20/25 1200            Allergies:   Review of patient's allergies indicates:  No Known Allergies     OBJECTIVE:   Vitals   Vitals:    07/12/25 1516   BP: 101/64   Pulse: 87   Resp:    Temp: 98 °F (36.7 °C)        Labs/Imaging/Studies:   Recent Results (from the past 36 hours)   POCT glucose    Collection Time: 07/11/25  7:31 AM   Result Value Ref Range    POCT Glucose 143 (H) 70 - 110 mg/dL   POCT glucose    Collection Time: 07/11/25 11:26 AM   Result Value Ref Range    POCT Glucose 357 (H) 70 - 110 mg/dL   POCT glucose    Collection Time: 07/11/25  4:18 PM   Result Value Ref Range    POCT Glucose 235 (H) 70 - 110 mg/dL   POCT glucose    Collection Time: 07/11/25  8:47 PM   Result Value Ref Range    POCT Glucose 179 (H) 70 - 110 mg/dL   POCT glucose    Collection Time: 07/11/25 11:39 PM   Result Value Ref Range    POCT Glucose 179 (H) 70 - 110 mg/dL   POCT glucose    Collection Time: 07/12/25  6:22 AM   Result Value Ref Range    POCT Glucose 163 (H) 70 - 110 mg/dL   POCT glucose    Collection Time: 07/12/25 10:47 AM   Result Value Ref Range    POCT Glucose 167 (H) 70 - 110 mg/dL          Medical Review Of Systems:  Pertinent items are noted in HPI.      Psychiatric Mental Status  Exam:  General Appearance: dressed in hospital garb, lying in bed, in no acute distress, appears older than stated age  Arousal: drowsy  Behavior: cooperative, minimal responses, poor eye contact  Movements and Motor Activity: no tics, no tremors, no akathisia, no dystonia, no evidence of tardive dyskinesia  Orientation: oriented to person only  Speech: coherent  Mood: Euthymic  Affect: constricted  Thought Process: disorganized  Associations: no loosening of associations  Thought Content and Perceptions: no suicidal ideation, no homicidal ideation  Recent and Remote Memory: impaired; per interview/observation with patient  Attention and Concentration: impaired; per interview/observation with patient  Fund of Knowledge: vocabulary appropriate; based on history, vocabulary, fund of knowledge, syntax, grammar, and content  Insight: impaired; based on understanding of severity of illness and HPI  Judgment: impaired; based on patient's behavior and HPI    ASSESSMENT/PLAN:   Problems Addressed/Diagnoses:  Delirium, acute, hyperactive due to multiple etiologies  (F05.9)  Mild cognitive impairment of uncertain etiology (G31.84)      Past Medical History:   Diagnosis Date    Acne     Cataract     Coronary artery disease     Diabetes mellitus     Hearing loss     Hypertension     Seasonal allergies     Stroke         Plan:    Medication management   Risperdal 0.5mg Daily and 1mg QHS  Depakote 250mg QHS  Trazodone 100mg QHS  Melatonin 3mg QHS  Haldol 2mg IV Q6H PRN for agitation  Hydroxyzine 25mg IM Q6H PRN for agitation     Continue delirium precautions.     Psychiatry will sign off, please re-consult if needed       LATOSHA Crawford-BC

## 2025-07-13 LAB
POCT GLUCOSE: 231 MG/DL (ref 70–110)
POCT GLUCOSE: 260 MG/DL (ref 70–110)
POCT GLUCOSE: 46 MG/DL (ref 70–110)
POCT GLUCOSE: 71 MG/DL (ref 70–110)
POCT GLUCOSE: 74 MG/DL (ref 70–110)
POCT GLUCOSE: 79 MG/DL (ref 70–110)

## 2025-07-13 PROCEDURE — 25000003 PHARM REV CODE 250: Performed by: STUDENT IN AN ORGANIZED HEALTH CARE EDUCATION/TRAINING PROGRAM

## 2025-07-13 PROCEDURE — 11000001 HC ACUTE MED/SURG PRIVATE ROOM

## 2025-07-13 PROCEDURE — 63600175 PHARM REV CODE 636 W HCPCS: Performed by: INTERNAL MEDICINE

## 2025-07-13 PROCEDURE — 25000003 PHARM REV CODE 250

## 2025-07-13 PROCEDURE — 63600175 PHARM REV CODE 636 W HCPCS

## 2025-07-13 PROCEDURE — 25000003 PHARM REV CODE 250: Performed by: INTERNAL MEDICINE

## 2025-07-13 PROCEDURE — 25000003 PHARM REV CODE 250: Performed by: LICENSED PRACTICAL NURSE

## 2025-07-13 RX ORDER — INSULIN GLARGINE 100 [IU]/ML
12 INJECTION, SOLUTION SUBCUTANEOUS 2 TIMES DAILY
Status: DISCONTINUED | OUTPATIENT
Start: 2025-07-13 | End: 2025-07-13

## 2025-07-13 RX ORDER — DEXTROSE MONOHYDRATE AND SODIUM CHLORIDE 5; .45 G/100ML; G/100ML
INJECTION, SOLUTION INTRAVENOUS CONTINUOUS
Status: DISCONTINUED | OUTPATIENT
Start: 2025-07-13 | End: 2025-07-14

## 2025-07-13 RX ADMIN — TRAZODONE HYDROCHLORIDE 100 MG: 100 TABLET ORAL at 09:07

## 2025-07-13 RX ADMIN — VALPROIC ACID 250 MG: 250 SOLUTION ORAL at 09:07

## 2025-07-13 RX ADMIN — ALUMINUM HYDROXIDE, MAGNESIUM HYDROXIDE, AND DIMETHICONE 30 ML: 200; 20; 200 SUSPENSION ORAL at 03:07

## 2025-07-13 RX ADMIN — INSULIN ASPART 3 UNITS: 100 INJECTION, SOLUTION INTRAVENOUS; SUBCUTANEOUS at 10:07

## 2025-07-13 RX ADMIN — SUCRALFATE 1 G: 1 TABLET ORAL at 05:07

## 2025-07-13 RX ADMIN — ALUMINUM HYDROXIDE, MAGNESIUM HYDROXIDE, AND DIMETHICONE 30 ML: 200; 20; 200 SUSPENSION ORAL at 11:07

## 2025-07-13 RX ADMIN — SUCRALFATE 1 G: 1 TABLET ORAL at 12:07

## 2025-07-13 RX ADMIN — DEXTROSE MONOHYDRATE 12.5 G: 25 INJECTION, SOLUTION INTRAVENOUS at 04:07

## 2025-07-13 RX ADMIN — SODIUM CHLORIDE 500 ML: 9 INJECTION, SOLUTION INTRAVENOUS at 04:07

## 2025-07-13 RX ADMIN — Medication 3 MG: at 09:07

## 2025-07-13 RX ADMIN — LOSARTAN POTASSIUM 100 MG: 50 TABLET, FILM COATED ORAL at 08:07

## 2025-07-13 RX ADMIN — ALUMINUM HYDROXIDE, MAGNESIUM HYDROXIDE, AND DIMETHICONE 30 ML: 200; 20; 200 SUSPENSION ORAL at 05:07

## 2025-07-13 RX ADMIN — RISPERIDONE 1 MG: 1 TABLET, FILM COATED ORAL at 09:07

## 2025-07-13 RX ADMIN — CARVEDILOL 12.5 MG: 12.5 TABLET, FILM COATED ORAL at 08:07

## 2025-07-13 RX ADMIN — HYDROXYZINE HYDROCHLORIDE 25 MG: 50 INJECTION, SOLUTION INTRAMUSCULAR at 11:07

## 2025-07-13 RX ADMIN — ALUMINUM HYDROXIDE, MAGNESIUM HYDROXIDE, AND DIMETHICONE 30 ML: 200; 20; 200 SUSPENSION ORAL at 09:07

## 2025-07-13 RX ADMIN — RISPERIDONE 0.5 MG: 0.25 TABLET, FILM COATED ORAL at 08:07

## 2025-07-13 NOTE — PROGRESS NOTES
[facility] Hospital Medicine Progress Note        Chief Complaint: Inpatient Follow-up       HPI per admitting team:     64-year-old male with type 1 diabetes mellitus, CAD s/p PCI/stent, hypertension, hyperlipidemia, prior CVA  presented on 06/09/2025 for evaluation of sudden onset headache, confusion, slurred speech.    CT head -left occipital lobe parenchymal hemorrhage with trace adjacent subarachnoid hemorrhage.  CTA head/neck was also completed in ED, which revealed no large vessel occlusion, flow-limiting stenosis, aneurysm or evidence of a vascular malformation   MRI brain reported Left occipital lobe hematoma with adjacent subarachnoid hemorrhage. Innumerable chronic microhemorrhages with a subcortical location, may indicate underlying cerebral amyloid angiopathy. Mild chronic microvascular ischemic changes. Neurology team evaluated the patient, initiated on stroke protocol, admitted to ICU services.  Neurosurgery team evaluated the patient, MRI likely suggestive of amyloid angiopathy, repeat CT head 6/10/25 unchanged, suggested holding antiplatelets for at least 2 weeks.  Patient is started on Keppra.  Neurosurgical team signed off. Patient required Chao placement for retention, seen by Urology.  Patient cleared to downgrade to floors 06/12/2025 PM by ICU team. Palliative care team consulted while patient in ICU, code status DNR at the time of downgrade. Pt had fever, tmax 102.8, tachycardic,wbc count increased 17K, decided to obtain pan scan, CT head stable, but notes to have dense consolidation left >right LL, constipation. Respiratory cultures were obtained grew many strep group B Haemophilus influenzae.  Received antibiotics  Patient remains unsafe for p.o. intake with speech evaluations, informed decision with the patient's spouse was made, GI team consulted for PEG placement. On 7/7, patient pulled his Hcao out, noted to have gross bleeding which has improved with  replaced Chao. Psych was consulted as well to help with his delirium.  PTOT recommended moderate intensity therapy,case management was consulted for placement. Case management was having hard time placing due to delirium. Psych was reconsulted to help with delirium management. Had BM on 07/09. Awaiting response from facility of patient's family's choice.  Accepted by a different facility already. Plan for DC once accepted to SNF.    Interval Hx:   No new complaints.  Tolerating TF.  Hypoglycemia noted; will down titrate insulin to 12 units b.i.d..    Objective/physical exam:  General: alert male lying comfortably in bed, in no acute distress  HENT: oral and oropharyngeal mucosa moist, pink, with no erythema or exudates, no ear pain or discharge  Neck: normal neck movement, no lymph nodes or swellings, no JVD or Carotid bruit  Respiratory: clear breathing sounds bilaterally, no crackles, rales, ronchi or wheezes  Cardiovascular: clear S1 and S2, no murmurs, rubs or gallops  Peripheral Vascular: no lesions, ulcers or erosions, normal peripheral pulses and no pedal edema  Gastrointestinal: PEG in place; soft, non-tender, non-distended abdomen, no guarding, rigidity or rebound tenderness, normal bowel sounds  Integumentary: normal skin color, no rashes or lesions  Neuro: AAO x 0; motor strength 4/5 in B/L UEs & LEs; sensation intact to gross and fine touch B/L; CN II-XII grossly intact    VITAL SIGNS: 24 HRS MIN & MAX LAST   Temp  Min: 97.7 °F (36.5 °C)  Max: 98 °F (36.7 °C) 97.7 °F (36.5 °C)   BP  Min: 101/64  Max: 129/66 129/66   Pulse  Min: 70  Max: 89  82   Resp  Min: 17  Max: 17 17   SpO2  Min: 93 %  Max: 97 % 97 %     I have reviewed the following labs:  Recent Labs   Lab 07/08/25  0355   WBC 10.18   RBC 3.74*   HGB 11.6*   HCT 36.5*   MCV 97.6*   MCH 31.0   MCHC 31.8*   RDW 12.1      MPV 9.8       Recent Labs   Lab 07/08/25  0355      K 4.4   CL 99   CO2 34*   BUN 31.4*   CREATININE 0.80   *    CALCIUM 8.3*   ALBUMIN 2.2*   PROT 6.0   ALKPHOS 156*   ALT 23   AST 18   BILITOT 0.4     Assessment/Plan:  Traumatic hematuria- patient has pulled Chao out- resolving  Urinary retention- continue with Chao  Hemorrhagic CVA-  Left occipital lobe hematoma, adjacent subarachnoid hemorrhage  Dense left lobe consolidation- Haemophilus influenza pneumonia - treated  Oropharyngeal Dysphagia s/p peg tube  T1 DM  CAD s/p PCI  HTN  HLD  History of CVA  Tobacco use    No new complaints  Appreciate Urology, Chao replaced, recommended voiding trial if and when his mental status improved   Neurology, Neurosurgery signed off  Keppra discontinued on 06/23/2025  Neurosurgery recommended to stay off of antiplatelets for at least 2 weeks but Neurology recommended to avoid antiplatelet or anticoagulation at this time  Continue tube feedings, keep head of bed at 35° at all times   Psych re-evaluated the patient; recommendations noted  On risperidone 0.5 mg BID for psychosis, started Depakote 250 mg b.i.d. for mood and agitation  Continue trazodone 100mg hs, decreased melatonin to 3 mg PO HS.  P.r.n. IM Haldol (last given 7/4) and IM hydroxyzine (last given 7/3) PRN agitation  Continue delirium precautions  On lantus 12 units BID and continue ISS ; continue monitoring BG  PT OT recommending moderate intensity therapy  CM on board- pending acceptance    VTE prophylaxis: scds    Anticipated discharge and Disposition:  TBD, SNF once accepted    All diagnosis and differential diagnosis have been reviewed; assessment and plan has been documented; I have personally reviewed the labs and test results that are presently available; I have reviewed the patients medication list; I have reviewed the consulting providers response and recommendations. I have reviewed or attempted to review medical records based upon their availability    All of the patient's questions have been  addressed and answered. Patient's is agreeable to the above stated  plan. I will continue to monitor closely and make adjustments to medical management as needed.    Portions of this note dictated using EMR integrated voice recognition software, and may be subject to voice recognition errors not corrected at proofreading. Please contact writer for clarification if needed.     Radiology:  I have personally reviewed the following imaging and agree with the radiologist.     CV Ultrasound doppler venous arm right  Negative for deep and superficial vein thrombosis in the right upper   extremity      Franki Martin MD  Department of Hospital Medicine   [facility]   07/13/2025

## 2025-07-14 LAB
POCT GLUCOSE: 191 MG/DL (ref 70–110)
POCT GLUCOSE: 222 MG/DL (ref 70–110)
POCT GLUCOSE: 323 MG/DL (ref 70–110)

## 2025-07-14 PROCEDURE — 25000003 PHARM REV CODE 250

## 2025-07-14 PROCEDURE — 63600175 PHARM REV CODE 636 W HCPCS: Performed by: STUDENT IN AN ORGANIZED HEALTH CARE EDUCATION/TRAINING PROGRAM

## 2025-07-14 PROCEDURE — 25000003 PHARM REV CODE 250: Performed by: INTERNAL MEDICINE

## 2025-07-14 PROCEDURE — 97535 SELF CARE MNGMENT TRAINING: CPT

## 2025-07-14 PROCEDURE — 63600175 PHARM REV CODE 636 W HCPCS: Performed by: INTERNAL MEDICINE

## 2025-07-14 PROCEDURE — 97530 THERAPEUTIC ACTIVITIES: CPT | Mod: CQ

## 2025-07-14 PROCEDURE — 11000001 HC ACUTE MED/SURG PRIVATE ROOM

## 2025-07-14 PROCEDURE — 25000003 PHARM REV CODE 250: Performed by: LICENSED PRACTICAL NURSE

## 2025-07-14 RX ORDER — INSULIN GLARGINE 100 [IU]/ML
12 INJECTION, SOLUTION SUBCUTANEOUS NIGHTLY
Status: DISCONTINUED | OUTPATIENT
Start: 2025-07-14 | End: 2025-07-16 | Stop reason: HOSPADM

## 2025-07-14 RX ADMIN — INSULIN ASPART 4 UNITS: 100 INJECTION, SOLUTION INTRAVENOUS; SUBCUTANEOUS at 09:07

## 2025-07-14 RX ADMIN — SUCRALFATE 1 G: 1 TABLET ORAL at 12:07

## 2025-07-14 RX ADMIN — SUCRALFATE 1 G: 1 TABLET ORAL at 06:07

## 2025-07-14 RX ADMIN — ALUMINUM HYDROXIDE, MAGNESIUM HYDROXIDE, AND DIMETHICONE 30 ML: 200; 20; 200 SUSPENSION ORAL at 09:07

## 2025-07-14 RX ADMIN — VALPROIC ACID 250 MG: 250 SOLUTION ORAL at 09:07

## 2025-07-14 RX ADMIN — SUCRALFATE 1 G: 1 TABLET ORAL at 05:07

## 2025-07-14 RX ADMIN — INSULIN GLARGINE 12 UNITS: 100 INJECTION, SOLUTION SUBCUTANEOUS at 08:07

## 2025-07-14 RX ADMIN — RISPERIDONE 0.5 MG: 0.25 TABLET, FILM COATED ORAL at 09:07

## 2025-07-14 RX ADMIN — TRAZODONE HYDROCHLORIDE 100 MG: 100 TABLET ORAL at 09:07

## 2025-07-14 RX ADMIN — ALUMINUM HYDROXIDE, MAGNESIUM HYDROXIDE, AND DIMETHICONE 30 ML: 200; 20; 200 SUSPENSION ORAL at 04:07

## 2025-07-14 RX ADMIN — RISPERIDONE 1 MG: 1 TABLET, FILM COATED ORAL at 09:07

## 2025-07-14 RX ADMIN — Medication 3 MG: at 09:07

## 2025-07-14 RX ADMIN — ALUMINUM HYDROXIDE, MAGNESIUM HYDROXIDE, AND DIMETHICONE 30 ML: 200; 20; 200 SUSPENSION ORAL at 05:07

## 2025-07-14 RX ADMIN — ALUMINUM HYDROXIDE, MAGNESIUM HYDROXIDE, AND DIMETHICONE 30 ML: 200; 20; 200 SUSPENSION ORAL at 11:07

## 2025-07-14 RX ADMIN — ALUMINUM HYDROXIDE, MAGNESIUM HYDROXIDE, AND DIMETHICONE 30 ML: 200; 20; 200 SUSPENSION ORAL at 06:07

## 2025-07-14 NOTE — PLAN OF CARE
Problem: Adult Inpatient Plan of Care  Goal: Plan of Care Review  7/14/2025 0407 by Margo Kwan LPN  Outcome: Progressing  7/13/2025 2340 by Margo Kwan LPN  Outcome: Progressing  Goal: Patient-Specific Goal (Individualized)  7/14/2025 0407 by Margo Kwan LPN  Outcome: Progressing  7/13/2025 2340 by Margo Kwan LPN  Outcome: Progressing  Goal: Absence of Hospital-Acquired Illness or Injury  7/14/2025 0407 by Margo Kwan LPN  Outcome: Progressing  7/13/2025 2340 by Margo Kwan LPN  Outcome: Progressing  Goal: Optimal Comfort and Wellbeing  7/14/2025 0407 by Margo Kwan LPN  Outcome: Progressing  7/13/2025 2340 by Margo Kwan LPN  Outcome: Progressing  Goal: Readiness for Transition of Care  7/14/2025 0407 by Margo Kwan LPN  Outcome: Progressing  7/13/2025 2340 by Margo Kwan LPN  Outcome: Progressing     Problem: Diabetes Comorbidity  Goal: Blood Glucose Level Within Targeted Range  7/14/2025 0407 by Margo Kwan LPN  Outcome: Progressing  7/13/2025 2340 by Margo Kwan LPN  Outcome: Progressing

## 2025-07-14 NOTE — PLAN OF CARE
Problem: Adult Inpatient Plan of Care  Goal: Plan of Care Review  Outcome: Progressing  Goal: Patient-Specific Goal (Individualized)  Outcome: Progressing  Goal: Absence of Hospital-Acquired Illness or Injury  Outcome: Progressing  Goal: Optimal Comfort and Wellbeing  Outcome: Progressing  Goal: Readiness for Transition of Care  Outcome: Progressing     Problem: Diabetes Comorbidity  Goal: Blood Glucose Level Within Targeted Range  Outcome: Progressing     Problem: Stroke, Intracerebral Hemorrhage  Goal: Optimal Coping  Outcome: Progressing  Goal: Effective Bowel Elimination  Outcome: Progressing  Goal: Optimal Cerebral Tissue Perfusion  Outcome: Progressing  Goal: Optimal Cognitive Function  Outcome: Progressing  Goal: Effective Communication Skills  Outcome: Progressing  Goal: Optimal Functional Ability  Outcome: Progressing  Goal: Optimal Nutrition Intake  Outcome: Progressing  Goal: Optimal Pain Control and Function  Outcome: Progressing  Goal: Effective Oxygenation and Ventilation  Outcome: Progressing  Goal: Improved Sensorimotor Function  Outcome: Progressing  Goal: Safe and Effective Swallow  Outcome: Progressing  Goal: Effective Urinary Elimination  Outcome: Progressing     Problem: Coping Ineffective  Goal: Effective Coping  Outcome: Progressing     Problem: Infection  Goal: Absence of Infection Signs and Symptoms  Outcome: Progressing

## 2025-07-14 NOTE — PROGRESS NOTES
[facility] Hospital Medicine Progress Note        Chief Complaint: Inpatient Follow-up       HPI per admitting team:     64-year-old male with type 1 diabetes mellitus, CAD s/p PCI/stent, hypertension, hyperlipidemia, prior CVA  presented on 06/09/2025 for evaluation of sudden onset headache, confusion, slurred speech.    CT head -left occipital lobe parenchymal hemorrhage with trace adjacent subarachnoid hemorrhage.  CTA head/neck was also completed in ED, which revealed no large vessel occlusion, flow-limiting stenosis, aneurysm or evidence of a vascular malformation   MRI brain reported Left occipital lobe hematoma with adjacent subarachnoid hemorrhage. Innumerable chronic microhemorrhages with a subcortical location, may indicate underlying cerebral amyloid angiopathy. Mild chronic microvascular ischemic changes. Neurology team evaluated the patient, initiated on stroke protocol, admitted to ICU services.  Neurosurgery team evaluated the patient, MRI likely suggestive of amyloid angiopathy, repeat CT head 6/10/25 unchanged, suggested holding antiplatelets for at least 2 weeks.  Patient is started on Keppra.  Neurosurgical team signed off. Patient required Chao placement for retention, seen by Urology.  Patient cleared to downgrade to floors 06/12/2025 PM by ICU team. Palliative care team consulted while patient in ICU, code status DNR at the time of downgrade. Pt had fever, tmax 102.8, tachycardic,wbc count increased 17K, decided to obtain pan scan, CT head stable, but notes to have dense consolidation left >right LL, constipation. Respiratory cultures were obtained grew many strep group B Haemophilus influenzae.  Received antibiotics  Patient remains unsafe for p.o. intake with speech evaluations, informed decision with the patient's spouse was made, GI team consulted for PEG placement. On 7/7, patient pulled his Chao out, noted to have gross bleeding which has improved with  replaced Chao. Psych was consulted as well to help with his delirium.  PTOT recommended moderate intensity therapy,case management was consulted for placement. Case management was having hard time placing due to delirium. Psych was reconsulted to help with delirium management. Had BM on 07/09. Awaiting response from facility of patient's family's choice.  Accepted by a different facility already. Plan for DC once accepted to SNF. Noted to have several hypoglycemic readings due to bolus feeds not going through on 07/13, placed on IV D5/O.45% NS infusion. Also given NS bolus for hypotension.    Interval Hx:   No new complaints.  BG improved, will stop IV D5/0.45% NS. Continue monitoring BG.    Objective/physical exam:  General: alert male lying comfortably in bed, in no acute distress  HENT: oral and oropharyngeal mucosa moist, pink, with no erythema or exudates, no ear pain or discharge  Neck: normal neck movement, no lymph nodes or swellings, no JVD or Carotid bruit  Respiratory: clear breathing sounds bilaterally, no crackles, rales, ronchi or wheezes  Cardiovascular: clear S1 and S2, no murmurs, rubs or gallops  Peripheral Vascular: no lesions, ulcers or erosions, normal peripheral pulses and no pedal edema  Gastrointestinal: PEG in place; soft, non-tender, non-distended abdomen, no guarding, rigidity or rebound tenderness, normal bowel sounds  Integumentary: normal skin color, no rashes or lesions  Neuro: AAO x 0; motor strength 4/5 in B/L UEs & LEs; sensation intact to gross and fine touch B/L; CN II-XII grossly intact    VITAL SIGNS: 24 HRS MIN & MAX LAST   Temp  Min: 97.4 °F (36.3 °C)  Max: 99.6 °F (37.6 °C) 97.8 °F (36.6 °C)   BP  Min: 98/65  Max: 149/67 114/62   Pulse  Min: 91  Max: 119  96   Resp  Min: 16  Max: 17 17   SpO2  Min: 82 %  Max: 99 % (!) 94 %     I have reviewed the following labs:  Recent Labs   Lab 07/08/25  0355   WBC 10.18   RBC 3.74*   HGB 11.6*   HCT 36.5*   MCV 97.6*   MCH 31.0   MCHC  31.8*   RDW 12.1      MPV 9.8       Recent Labs   Lab 07/08/25  0355      K 4.4   CL 99   CO2 34*   BUN 31.4*   CREATININE 0.80   *   CALCIUM 8.3*   ALBUMIN 2.2*   PROT 6.0   ALKPHOS 156*   ALT 23   AST 18   BILITOT 0.4     Assessment/Plan:  Traumatic hematuria- patient has pulled Chao out- resolving  Urinary retention- continue with Chao  Hemorrhagic CVA-  Left occipital lobe hematoma, adjacent subarachnoid hemorrhage  Dense left lobe consolidation- Haemophilus influenza pneumonia - treated  Oropharyngeal Dysphagia s/p peg tube  T1 DM  CAD s/p PCI  HTN  HLD  History of CVA  Tobacco use    No new complaints  Discontinued insulin d/t hypoglycemic readings; can resume if BG improves  Appreciate Urology, Chao replaced, recommended voiding trial if and when his mental status improved   Neurology, Neurosurgery signed off  Keppra discontinued on 06/23/2025  Neurosurgery recommended to stay off of antiplatelets for at least 2 weeks but Neurology recommended to avoid antiplatelet or anticoagulation at this time  Continue tube feedings, keep head of bed at 35° at all times   Psych re-evaluated the patient; recommendations noted  On risperidone 0.5 mg BID for psychosis, started Depakote 250 mg b.i.d. for mood and agitation  Continue trazodone 100mg hs, decreased melatonin to 3 mg PO HS.    P.r.n. IM Haldol (last given 7/4) and IM hydroxyzine (last given 7/3) PRN agitation  PT OT recommending moderate intensity therapy  CM on board- pending acceptance    VTE prophylaxis: scds    Anticipated discharge and Disposition:  TBD, SNF once accepted    All diagnosis and differential diagnosis have been reviewed; assessment and plan has been documented; I have personally reviewed the labs and test results that are presently available; I have reviewed the patients medication list; I have reviewed the consulting providers response and recommendations. I have reviewed or attempted to review medical records based upon  their availability    All of the patient's questions have been  addressed and answered. Patient's is agreeable to the above stated plan. I will continue to monitor closely and make adjustments to medical management as needed.    Portions of this note dictated using EMR integrated voice recognition software, and may be subject to voice recognition errors not corrected at proofreading. Please contact writer for clarification if needed.     Radiology:  I have personally reviewed the following imaging and agree with the radiologist.     CV Ultrasound doppler venous arm right  Negative for deep and superficial vein thrombosis in the right upper   extremity      Franki Martin MD  Department of Hospital Medicine   [facility]   07/14/2025

## 2025-07-14 NOTE — PT/OT/SLP PROGRESS
"Physical Therapy Treatment    Patient Name:  Federico Villarreal   MRN:  [id number]    Recommendations:     Discharge therapy intensity: Moderate Intensity Therapy   Discharge Equipment Recommendations: to be determined by next level of care  Barriers to discharge: Impaired mobility and Ongoing medical needs    Assessment:     Federico Villarreal is a 64 y.o. male admitted with a medical diagnosis of L occipital hemorrhage CVA, dysphagia s/p PEG placement, delirium. Hx of L eye blindness.  He presents with the following impairments/functional limitations: weakness, impaired endurance, impaired self care skills, impaired functional mobility, visual deficits, impaired cognition, decreased coordination, decreased upper extremity function, decreased lower extremity function, decreased safety awareness .    DME Justification:  No DME recommended requiring DME justifications    Rehab Prognosis: Good and Fair; patient would benefit from acute skilled PT services to address these deficits and reach maximum level of function.    Recent Surgery: Procedure(s) (LRB):  PEG (N/A) 28 Days Post-Op    Plan:     During this hospitalization, patient would benefit from acute PT services 3 x/week to address the identified rehab impairments via gait training, therapeutic activities, therapeutic exercises and progress toward the following goals:    Plan of Care Expires:  07/13/25    Subjective     Chief Complaint:   Patient/Family Comments/goals: "Where's Kristopher"?  Pain/Comfort:  Pain Rating 1: 0/10      Objective:     Communicated with pts nurse prior to session.  Patient found HOB elevated with blood pressure cuff, nascimento catheter, PEG Tube, peripheral IV, pulse ox (continuous), SCD, telemetry upon PT entry to room.     General Precautions: Standard, fall, NPO  Orthopedic Precautions: N/A  Braces: N/A  Respiratory Status: Room air  Blood Pressure: 142/66 HOB elevated; seated 132/57; 129/62 after acts  Skin Integrity: Visible skin " intact      Functional Mobility:  Bed Mobility:     Supine to Sit: moderate assistance and of 2 persons  Transfers:     Sit to Stand:  moderate assistance and of 2 persons with hand-held assist and rolling walker  Bed to Chair: maximal assistance and of 2 persons with  rolling walker  using  Step Transfer and w/ repeated cues to keep on task and hands on RW.    Balance: Min A to SBA for sit balance, tendency to lean back Mod A x 2 for stand balance w/HHA and w/ RW, more w/ RW    Therapeutic Activities/Exercises:  Attempted gait and seated tasks, but pt unable to follow commands, very talkative but not relevant to tasks and situation.  Easily distracted and n poor following of commands    Co-Treatment: No    Education:  Patient provided with verbal education and demonstrations education regarding fall prevention, safety awareness, and transfers/balance.  Additional teaching is warranted.     Patient left up in chair with all lines intact, call button in reach, geomat cushion, augusto pad in place, nurse notified, and posey vest donned.      GOALS:   Multidisciplinary Problems       Physical Therapy Goals          Problem: Physical Therapy    Goal Priority Disciplines Outcome Interventions   Physical Therapy Goal     PT, PT/OT Progressing    Description: Goals to be met by: 25     Patient will increase functional independence with mobility by performin. Supine to sit with Minimal Assistance  2. Sit to stand transfer with Minimal Assistance  3. Bed to chair transfer with Minimal Assistance using Rolling Walker  4. Pt will follow 5/5 motor commands for BLE.  5. Pt will ambulate 150ft with CGA and RW.                          Time Tracking:     PT Received On: 25  PT Start Time: 1023     PT Stop Time: 1049  PT Total Time (min): 26 min     Billable Minutes: Therapeutic Activity 26 min    Treatment Type: Treatment  PT/PTA: PTA     Number of PTA visits since last PT visit: 2     2025

## 2025-07-14 NOTE — PT/OT/SLP PROGRESS
Occupational Therapy   Treatment    Name: Federico Villarreal  MRN: [id number]    Recommendations:     Recommended therapy intensity at discharge: Moderate Intensity Therapy   Discharge Equipment Recommendations:  to be determined by next level of care  Barriers to discharge:  Other (Comment) (placement)    Assessment:     Federico Villarreal is a 64 y.o. male with a medical diagnosis of L occipital hemorrhage CVA, dysphagia s/p PEG placement, delirium. Hx of L eye blindness. He presents with the following performance deficits affecting function: weakness, impaired endurance, impaired self care skills, impaired functional mobility, impaired balance, decreased coordination, impaired cognition, visual deficits, decreased safety awareness, gait instability.     Rehab Prognosis:  Fair; patient would benefit from acute skilled OT services to address these deficits and reach maximum level of function.       Plan:     Patient to be seen 3 x/week to address the above listed problems via self-care/home management, therapeutic exercises, therapeutic activities, neuromuscular re-education  Plan of Care Expires: 08/05/25  Plan of Care Reviewed with: patient    Subjective     Pain/Comfort:  Pain Rating 1: 0/10    Objective:     Communicated with: RN prior to session.  Patient found up in chair with nascimento catheter, PEG Tube, peripheral IV, Other (comments) (bilateral mittens) upon OT entry to room.    General Precautions: Standard, fall, NPO    Orthopedic Precautions:N/A  Braces: N/A  Respiratory Status: Room air  Vital Signs: Blood Pressure: 114/62  HR: 97     Occupational Performance:     Functional Mobility/Transfers:  Bed mobility:    Rolling Left:  maximal assistance  Rolling Right: maximal assistance  Sit to Supine: maximal assistance x2  Transfers: Sit to Stand: maximal assistance with no AD from EOB. Mod A x2 for sit>stand from chair  Chair to Bed: maximal assistance x2 with hand-held assist using Stand Pivot    Activities of  Daily Living:  Grooming: maximal assistance for hand hygiene washing hands seated EOB with bath basin. Requires frequent tactile and verbal cues to remain on task.  Lower Body Dressing: total assistance for doffing brief in standing at EOB  Toileting: total assistance for posterior hygiene 2/2 BM in standing and at bed level    Balance:   Static Sitting Balance: No UE support: Poor: Patient requires handhold support and moderate to maximal assistance to maintain position. Posterior lean present.     Therapeutic Positioning    OT interventions performed during the course of today's session in an effort to prevent and/or reduce acquired pressure injuries:   Education was provided on benefits of and recommendations for therapeutic positioning  Therapeutic positioning was provided at the conclusion of session to offload all bony prominences for the prevention and/or reduction of pressure injuries    [facility] 6 Click ADL: 7    Co-Treatment: No    Patient Education:  Patient provided with verbal education education regarding OT role/goals/POC, fall prevention, and safety awareness.  Additional teaching is warranted.    Patient left right sidelying with all lines intact, call button in reach, wedge under L side, pressure relief boots, bed alarm on, and RN notified.    GOALS:   Multidisciplinary Problems       Occupational Therapy Goals          Problem: Occupational Therapy    Goal Priority Disciplines Outcome Interventions   Occupational Therapy Goal     OT, PT/OT Progressing    Description: Goals to be met by: 7/11/25     Patient will increase functional independence with ADLs by performing:    LTG: Pt will perform basic ADLs and ADL transfers with SPV using LRAD by discharge.    STG: to be met by 7/11/25  Pt will follow commands at least 75% consistently throughout session  Pt will complete grooming seated, progressing to standing as appropriate with LRAD with min A.  Pt will complete UB dressing with min A.  Pt will  complete LB dressing with min A using LRAD.  Pt will complete toileting with min A using LRAD.  Pt will complete functional mobility to/from toilet and toilet transfer with min A using LRAD.   Pt will demo visual attention to R side >80% of time with min verbal cues.  Pt will demo 4/5 strength in  BUE  for increased functional use during ADL tasks.                          Time Tracking:     OT Date of Treatment: 07/14/25  OT Start Time: 1426  OT Stop Time: 1505  OT Total Time (min): 39 min    Billable Minutes:Self Care/Home Management 39 mins  OT/THOMAS: OT     Number of THOMAS visits since last OT visit: 1    7/14/2025

## 2025-07-14 NOTE — PLAN OF CARE
Sent clinical updates and MAR to [facility]. Spoke to Yady who will be reviewing this patient's referral and will get back to me regarding a decision as soon as possible.     Fax to [facility] failed. Called Yady back to get alternate way to send her information and let her know we need a decision as soon as possible on this patient. Left message. Awaiting call back a this time.

## 2025-07-15 ENCOUNTER — PATIENT MESSAGE (OUTPATIENT)
Facility: CLINIC | Age: 65
End: 2025-07-15
Payer: MEDICARE

## 2025-07-15 LAB
POCT GLUCOSE: 171 MG/DL (ref 70–110)
POCT GLUCOSE: 253 MG/DL (ref 70–110)
POCT GLUCOSE: 323 MG/DL (ref 70–110)

## 2025-07-15 PROCEDURE — 92523 SPEECH SOUND LANG COMPREHEN: CPT

## 2025-07-15 PROCEDURE — 63600175 PHARM REV CODE 636 W HCPCS: Performed by: STUDENT IN AN ORGANIZED HEALTH CARE EDUCATION/TRAINING PROGRAM

## 2025-07-15 PROCEDURE — 11000001 HC ACUTE MED/SURG PRIVATE ROOM

## 2025-07-15 PROCEDURE — 25000003 PHARM REV CODE 250: Performed by: INTERNAL MEDICINE

## 2025-07-15 PROCEDURE — 97530 THERAPEUTIC ACTIVITIES: CPT | Mod: CQ

## 2025-07-15 PROCEDURE — 63600175 PHARM REV CODE 636 W HCPCS: Performed by: INTERNAL MEDICINE

## 2025-07-15 PROCEDURE — 25000003 PHARM REV CODE 250: Performed by: LICENSED PRACTICAL NURSE

## 2025-07-15 RX ADMIN — SUCRALFATE 1 G: 1 TABLET ORAL at 05:07

## 2025-07-15 RX ADMIN — RISPERIDONE 0.5 MG: 0.25 TABLET, FILM COATED ORAL at 09:07

## 2025-07-15 RX ADMIN — ALUMINUM HYDROXIDE, MAGNESIUM HYDROXIDE, AND DIMETHICONE 30 ML: 200; 20; 200 SUSPENSION ORAL at 05:07

## 2025-07-15 RX ADMIN — SUCRALFATE 1 G: 1 TABLET ORAL at 11:07

## 2025-07-15 RX ADMIN — ALUMINUM HYDROXIDE, MAGNESIUM HYDROXIDE, AND DIMETHICONE 30 ML: 200; 20; 200 SUSPENSION ORAL at 09:07

## 2025-07-15 RX ADMIN — TRAZODONE HYDROCHLORIDE 75 MG: 50 TABLET ORAL at 09:07

## 2025-07-15 RX ADMIN — VALPROIC ACID 250 MG: 250 SOLUTION ORAL at 09:07

## 2025-07-15 RX ADMIN — SUCRALFATE 1 G: 1 TABLET ORAL at 12:07

## 2025-07-15 RX ADMIN — Medication 3 MG: at 09:07

## 2025-07-15 RX ADMIN — ALUMINUM HYDROXIDE, MAGNESIUM HYDROXIDE, AND DIMETHICONE 30 ML: 200; 20; 200 SUSPENSION ORAL at 11:07

## 2025-07-15 RX ADMIN — INSULIN GLARGINE 12 UNITS: 100 INJECTION, SOLUTION SUBCUTANEOUS at 08:07

## 2025-07-15 RX ADMIN — INSULIN ASPART 6 UNITS: 100 INJECTION, SOLUTION INTRAVENOUS; SUBCUTANEOUS at 06:07

## 2025-07-15 RX ADMIN — RISPERIDONE 1 MG: 1 TABLET, FILM COATED ORAL at 09:07

## 2025-07-15 NOTE — PLAN OF CARE
Requested speech consult sent to [facility]. Pending review at this time. [facility] expected to accept this patient today.     1142: [facility] has officially accepted this patient and requested discharge orders. Messaged hospitalist Dr VERNON for discharge orders.     1205: Per hospitalist's request, reached out to DON at [facility] to find out if they were comfortable accepting this patient today after hospitalist Dr VERNON made changes to his med due to low blood pressure. LUIS is currently in a meeting. Awaiting response at this time.    1301: LUIS Conteh asked it would be possible to keep the patient here one more nigth  to make sure it is steady overnight. Made Dr VERNON aware of response.

## 2025-07-15 NOTE — PROGRESS NOTES
[facility] Hospital Medicine Progress Note        Chief Complaint: Inpatient Follow-up       HPI per admitting team:     64-year-old male with type 1 diabetes mellitus, CAD s/p PCI/stent, hypertension, hyperlipidemia, prior CVA  presented on 06/09/2025 for evaluation of sudden onset headache, confusion, slurred speech.    CT head -left occipital lobe parenchymal hemorrhage with trace adjacent subarachnoid hemorrhage.  CTA head/neck was also completed in ED, which revealed no large vessel occlusion, flow-limiting stenosis, aneurysm or evidence of a vascular malformation   MRI brain reported Left occipital lobe hematoma with adjacent subarachnoid hemorrhage. Innumerable chronic microhemorrhages with a subcortical location, may indicate underlying cerebral amyloid angiopathy. Mild chronic microvascular ischemic changes. Neurology team evaluated the patient, initiated on stroke protocol, admitted to ICU services.  Neurosurgery team evaluated the patient, MRI likely suggestive of amyloid angiopathy, repeat CT head 6/10/25 unchanged, suggested holding antiplatelets for at least 2 weeks.  Patient is started on Keppra.  Neurosurgical team signed off. Patient required Chao placement for retention, seen by Urology.  Patient cleared to downgrade to floors 06/12/2025 PM by ICU team. Palliative care team consulted while patient in ICU, code status DNR at the time of downgrade. Pt had fever, tmax 102.8, tachycardic,wbc count increased 17K, decided to obtain pan scan, CT head stable, but notes to have dense consolidation left >right LL, constipation. Respiratory cultures were obtained grew many strep group B Haemophilus influenzae.  Received antibiotics  Patient remains unsafe for p.o. intake with speech evaluations, informed decision with the patient's spouse was made, GI team consulted for PEG placement. On 7/7, patient pulled his Chao out, noted to have gross bleeding which has improved with  "replaced Chao. Psych was consulted as well to help with his delirium.  PTOT recommended moderate intensity therapy,case management was consulted for placement. Case management was having hard time placing due to delirium. Psych was reconsulted to help with delirium management. Had BM on 07/09. Awaiting response from facility of patient's family's choice.  Accepted by a different facility already. Plan for DC once accepted to SNF. Noted to have several hypoglycemic readings due to bolus feeds not going through on 07/13, placed on IV D5/O.45% NS infusion. Also given NS bolus for hypotension.    Interval Hx:   Fluctuating blood pressures observed.  Map above 65.  Sugars looking good.  Comfortably resting.  No family members at bedside.        Objective/physical exam:  General: alert male lying comfortably in bed, in no acute distress  HENT: oral and oropharyngeal mucosa moist, pink, with no erythema or exudates, no ear pain or discharge  Neck: normal neck movement, no lymph nodes or swellings, no JVD or Carotid bruit  Respiratory: clear breathing sounds bilaterally, no crackles, rales, ronchi or wheezes  Cardiovascular: clear S1 and S2, no murmurs, rubs or gallops  Peripheral Vascular: no lesions, ulcers or erosions, normal peripheral pulses and no pedal edema  Gastrointestinal: PEG in place; soft, non-tender, non-distended abdomen, no guarding, rigidity or rebound tenderness, normal bowel sounds  Integumentary: normal skin color, no rashes or lesions  Neuro: AAO x 0; motor strength 4/5 in B/L UEs & LEs; sensation intact to gross and fine touch B/L; CN II-XII grossly intact    VITAL SIGNS: 24 HRS MIN & MAX LAST   Temp  Min: 97.4 °F (36.3 °C)  Max: 99.1 °F (37.3 °C) 97.9 °F (36.6 °C)   BP  Min: 97/52  Max: 134/68 102/65   Pulse  Min: 81  Max: 101  93   Resp  Min: 16  Max: 17 16   SpO2  Min: 92 %  Max: 95 % 95 %     I have reviewed the following labs:  No results for input(s): "WBC", "RBC", "HGB", "HCT", "MCV", "MCH", " ""MCHC", "RDW", "PLT", "MPV", "GRAN", "LYMPH", "MONO", "BASO", "NRBC" in the last 168 hours.      No results for input(s): "NA", "K", "CL", "CO2", "ANIONGAP", "BUN", "CREATININE", "GLU", "CALCIUM", "PH", "MG", "ALBUMIN", "PROT", "ALKPHOS", "ALT", "AST", "BILITOT" in the last 168 hours.    Assessment/Plan:  Traumatic hematuria- patient has pulled Chao out- resolving  Urinary retention- continue with Chao  Hemorrhagic CVA-  Left occipital lobe hematoma, adjacent subarachnoid hemorrhage  Dense left lobe consolidation- Haemophilus influenza pneumonia - treated  Oropharyngeal Dysphagia s/p peg tube  T1 DM  CAD s/p PCI  HTN  HLD  History of CVA  Tobacco use    Plan:   -continue to monitor CBGS.  Insulin regimen discontinued.  Encourage p.o. intake    Appreciate Urology, Chao replaced, recommended voiding trial if and when his mental status improved   Neurology, Neurosurgery signed off  Keppra discontinued on 06/23/2025  Neurosurgery recommended to stay off of antiplatelets for at least 2 weeks but Neurology recommended to avoid antiplatelet or anticoagulation at this time  Continue tube feedings, keep head of bed at 35° at all times   Psych re-evaluated the patient; recommendations noted  On risperidone 0.5 mg BID for psychosis, started Depakote 250 mg b.i.d. for mood and agitation    Reduce trazodone to 75 qhs. Watch bp  P.r.n. IM Haldol (last given 7/4) and IM hydroxyzine (last given 7/3) PRN agitation  PT OT recommending moderate intensity therapy  CM on board- pending acceptance    VTE prophylaxis: scds    Anticipated discharge and Disposition:  TBD, SNF once accepted    All diagnosis and differential diagnosis have been reviewed; assessment and plan has been documented; I have personally reviewed the labs and test results that are presently available; I have reviewed the patients medication list; I have reviewed the consulting providers response and recommendations. I have reviewed or attempted to review medical " records based upon their availability    All of the patient's questions have been  addressed and answered. Patient's is agreeable to the above stated plan. I will continue to monitor closely and make adjustments to medical management as needed.    Portions of this note dictated using EMR integrated voice recognition software, and may be subject to voice recognition errors not corrected at proofreading. Please contact writer for clarification if needed.     Radiology:  I have personally reviewed the following imaging and agree with the radiologist.     X-Ray Chest 1 View  EXAMINATION  XR CHEST 1 VIEW    CLINICAL HISTORY  pending placement;    TECHNIQUE  A total of 1 frontal image(s) submitted of the chest.    COMPARISON  13 June 2025    FINDINGS  Lines/tubes/devices: None visualized.    The cardiac silhouette and central vascular structures are unchanged.  The trachea is midline. No new or worsening consolidation is developed in the interval.  Left perihilar linear opacity likely represents atelectasis.  There is no large pleural effusion or convincing pneumothorax.    Regional osseous structures and extrathoracic soft tissues are similar.    IMPRESSION  No acute process or other adverse interval change.    Electronically signed by: Shelton Hernandez  Date:    07/14/2025  Time:    18:00      Cristian Syed MD  Department of Hospital Medicine   [facility]   07/15/2025

## 2025-07-15 NOTE — PT/OT/SLP PROGRESS
Physical Therapy Treatment    Patient Name:  Federico Villarreal   MRN:  [id number]    Recommendations:     Discharge therapy intensity: Moderate Intensity Therapy   Discharge Equipment Recommendations: to be determined by next level of care  Barriers to discharge: Impaired mobility and Ongoing medical needs    Assessment:     Federico Villarreal is a 64 y.o. male admitted with a medical diagnosis of L occipital hemorrhage CVA, dysphagia s/p PEG placement, delirium. Hx of L eye blindness.  He presents with the following impairments/functional limitations: weakness, impaired endurance, impaired self care skills, impaired functional mobility, visual deficits, impaired cognition, decreased coordination, decreased upper extremity function, decreased lower extremity function, decreased safety awareness .    DME Justification:  No DME recommended requiring DME justifications    Rehab Prognosis: Fair; patient would benefit from acute skilled PT services to address these deficits and reach maximum level of function.    Recent Surgery: Procedure(s) (LRB):  PEG (N/A) 29 Days Post-Op    Plan:     During this hospitalization, patient would benefit from acute PT services 3 x/week to address the identified rehab impairments via gait training, therapeutic activities, therapeutic exercises and progress toward the following goals:    Plan of Care Expires:  07/13/25    Subjective     Chief Complaint: none stated  Patient/Family Comments/goals:   Pain/Comfort:  Pain Rating 1: 0/10      Objective:     Communicated with pts nurse prior to session.  Patient found HOB elevated with blood pressure cuff, nascimento catheter, PEG Tube, peripheral IV, pulse ox (continuous), SCD, telemetry upon PT entry to room.     General Precautions: Standard, fall, NPO  Orthopedic Precautions: N/A  Braces: N/A  Respiratory Status: Room air  Blood Pressure: 102/65 sit EOB, after activity, 98/68  Skin Integrity: Visible skin intact      Functional Mobility:  Bed  Mobility:     Rolling Right: moderate assistance  Supine to Sit: moderate assistance and of 2 persons  Transfers:     Sit to Stand:  moderate assistance and of 2 persons with hand-held assist and rolling walker  Bed to Chair: maximal assistance and of 2 persons with  hand-held assist  using  Stand Pivot  Balance: Pt sat EOB x 10 mins to improve balance and activity tolerance, performing low level tasks, to improve sit posture and balance, requiring CG to Min A    Co-Treatment: No    Education:  Patient provided with verbal education and demonstrations education regarding PT role/goals/POC, fall prevention, and safety awareness.  Additional teaching is warranted.     Patient left up in chair with all lines intact, call button in reach, nurse notified, and posey vest and mitts donned      GOALS:   Multidisciplinary Problems       Physical Therapy Goals          Problem: Physical Therapy    Goal Priority Disciplines Outcome Interventions   Physical Therapy Goal     PT, PT/OT Progressing    Description: Goals to be met by: 25     Patient will increase functional independence with mobility by performin. Supine to sit with Minimal Assistance  2. Sit to stand transfer with Minimal Assistance  3. Bed to chair transfer with Minimal Assistance using Rolling Walker  4. Pt will follow 5/5 motor commands for BLE.  5. Pt will ambulate 150ft with CGA and RW.                          Time Tracking:     PT Received On: 07/15/25  PT Start Time: 1048     PT Stop Time: 1117  PT Total Time (min): 29 min     Billable Minutes: Therapeutic Activity 29 min    Treatment Type: Treatment  PT/PTA: PTA     Number of PTA visits since last PT visit: 3     07/15/2025     79

## 2025-07-15 NOTE — PT/OT/SLP EVAL
[facility]  Speech Language Pathology Department  Initial Evaluation    Patient Name:  Federico Villarreal   MRN:  [id number]    Recommendations     General recommendations:  Follow up with SLP upon admission to NH  Medications: via PEG tube  Communication strategies:  yes/no questions only, provide increased time to answer, and go to room if call light pushed    History     Federico Villarreal is a/n 64 y.o. male admitted with a left occipital intraparenchymal hemorrhage after presenting with a headache.  Upon admission, SLP attempted to evaluate speech, language, cognition, and swallow function, however pt was unable to participate due to poor levels of alertness.  PEG tube was placed on 6/16.    Past Medical History:   Diagnosis Date    Acne     Cataract     Coronary artery disease     Diabetes mellitus     Hearing loss     Hypertension     Seasonal allergies     Stroke      Past Surgical History:   Procedure Laterality Date    ADENOIDECTOMY      ADENOIDECTOMY  1972    As a child    CORONARY STENT PLACEMENT  08/20/2012    ESOPHAGOGASTRODUODENOSCOPY W/ PEG N/A 6/16/2025    Procedure: PEG;  Surgeon: Mike Navarro MD;  [facility] ENDOSCOPY;  Service: Gastroenterology;  Laterality: N/A;    EYE SURGERY  2011    Multiple    INNER EAR SURGERY      REPAIR OF RETINAL DETACHMENT WITH VITRECTOMY      TONSILLECTOMY      VITRECTOMY       Subjective     Patient awake, alert, and seated EOB with PT.  Patient goals: unable to state   Spiritual/Cultural/Nondenominational Beliefs/Practices that affect care: no    Pain/Comfort: Pain Rating 1: 0/10    Respiratory Status:  room air    Restraints/positioning devices: none    Objective     ORIENTATION:  Person: yes  Place: no  Time: no  Situation: no    ORAL MUSCULATURE  Dentition: edentulous  Secretion Management: problems swallowing saliva  Mucosal Quality: good  Facial Movement: general weakness  Vocal Quality: adequate    SPEECH PRODUCTION  Phoneme  Production: adequate  Voice Production: adequate  Speech Rate: short rushes of speech  Loudness: acceptable  Respiration: WFL for speech  Resonance: adequate  Prosody: adequate  Speech Intelligibility  Known Context: Greater that 90%  Unknown Context: 75%-90%    AUDITORY COMPREHENSION  Following Directions:  1-Step: 100%  2-Step: 25%  Yes/No Questions:  Biographical: 90%  Environmental: 40%    VERBAL EXPRESSION  Automatic Speech:  Functional needs: Impaired  Days of the week: Within Functional Limits  Counting: Within Functional Limits (with cues to initiate)  Phrase Completion: 40%  Confrontation Naming  Objects: 0% (visual deficit suspected)    PO TRIALS  Held due to poor secretion management    Assessment     Moderate-severe cognitive-communication impairments negatively impacting safe, independent living.    Patient Education     No learner present/available.     Time Tracking     SLP Treatment Date:   07/15/25  Speech Start Time:  1055  Speech Stop Time:  1105     Speech Total Time (min):  10 min    Billable minutes:  Evaluation of Speech Sound Production with Comprehension and Expression, 10 minutes   Non-billable patient care time:  10 minutes    07/15/2025

## 2025-07-16 VITALS
WEIGHT: 143.31 LBS | OXYGEN SATURATION: 95 % | DIASTOLIC BLOOD PRESSURE: 69 MMHG | BODY MASS INDEX: 26.37 KG/M2 | HEIGHT: 62 IN | TEMPERATURE: 98 F | SYSTOLIC BLOOD PRESSURE: 132 MMHG | RESPIRATION RATE: 17 BRPM | HEART RATE: 104 BPM

## 2025-07-16 LAB
POCT GLUCOSE: 125 MG/DL (ref 70–110)
POCT GLUCOSE: 299 MG/DL (ref 70–110)

## 2025-07-16 PROCEDURE — 63600175 PHARM REV CODE 636 W HCPCS

## 2025-07-16 PROCEDURE — 25000003 PHARM REV CODE 250: Performed by: LICENSED PRACTICAL NURSE

## 2025-07-16 PROCEDURE — 93010 ELECTROCARDIOGRAM REPORT: CPT | Mod: ,,, | Performed by: INTERNAL MEDICINE

## 2025-07-16 PROCEDURE — 63600175 PHARM REV CODE 636 W HCPCS: Performed by: INTERNAL MEDICINE

## 2025-07-16 PROCEDURE — 93005 ELECTROCARDIOGRAM TRACING: CPT

## 2025-07-16 PROCEDURE — 25000003 PHARM REV CODE 250: Performed by: INTERNAL MEDICINE

## 2025-07-16 PROCEDURE — 97530 THERAPEUTIC ACTIVITIES: CPT | Mod: CQ

## 2025-07-16 RX ORDER — SUCRALFATE 1 G/1
1 TABLET ORAL EVERY 6 HOURS
Qty: 56 TABLET | Refills: 0 | Status: SHIPPED | OUTPATIENT
Start: 2025-07-16 | End: 2025-07-30

## 2025-07-16 RX ORDER — INSULIN GLARGINE 100 [IU]/ML
12 INJECTION, SOLUTION SUBCUTANEOUS NIGHTLY
Start: 2025-07-16 | End: 2026-07-16

## 2025-07-16 RX ORDER — LEVALBUTEROL INHALATION SOLUTION 0.63 MG/3ML
0.63 SOLUTION RESPIRATORY (INHALATION) EVERY 4 HOURS PRN
Start: 2025-07-16 | End: 2026-07-16

## 2025-07-16 RX ORDER — BUTALBITAL, ACETAMINOPHEN AND CAFFEINE 50; 325; 40 MG/1; MG/1; MG/1
1 TABLET ORAL EVERY 4 HOURS PRN
Start: 2025-07-16 | End: 2025-07-16 | Stop reason: HOSPADM

## 2025-07-16 RX ORDER — RISPERIDONE 0.5 MG/1
0.5 TABLET ORAL DAILY
Qty: 30 TABLET | Refills: 0 | Status: SHIPPED | OUTPATIENT
Start: 2025-07-17 | End: 2025-08-16

## 2025-07-16 RX ORDER — TALC
3 POWDER (GRAM) TOPICAL NIGHTLY
Qty: 30 TABLET | Refills: 0 | Status: SHIPPED | OUTPATIENT
Start: 2025-07-16 | End: 2025-08-15

## 2025-07-16 RX ORDER — TRAZODONE HYDROCHLORIDE 150 MG/1
75 TABLET ORAL NIGHTLY
Qty: 15 TABLET | Refills: 0 | Status: SHIPPED | OUTPATIENT
Start: 2025-07-16 | End: 2025-08-15

## 2025-07-16 RX ORDER — INSULIN ASPART 100 [IU]/ML
0-10 INJECTION, SOLUTION INTRAVENOUS; SUBCUTANEOUS
Start: 2025-07-16 | End: 2026-07-16

## 2025-07-16 RX ORDER — BISACODYL 10 MG/1
10 SUPPOSITORY RECTAL DAILY PRN
Start: 2025-07-16

## 2025-07-16 RX ORDER — VALPROIC ACID 250 MG/5ML
250 SOLUTION ORAL NIGHTLY
Qty: 150 ML | Refills: 0 | Status: SHIPPED | OUTPATIENT
Start: 2025-07-16 | End: 2025-08-15

## 2025-07-16 RX ORDER — RISPERIDONE 1 MG/1
1 TABLET ORAL NIGHTLY
Qty: 30 TABLET | Refills: 0 | Status: SHIPPED | OUTPATIENT
Start: 2025-07-16 | End: 2025-08-15

## 2025-07-16 RX ORDER — ACETAMINOPHEN 500 MG
500 TABLET ORAL EVERY 6 HOURS PRN
Qty: 30 TABLET | Refills: 0 | Status: SHIPPED | OUTPATIENT
Start: 2025-07-16 | End: 2025-08-15

## 2025-07-16 RX ADMIN — INSULIN ASPART 4 UNITS: 100 INJECTION, SOLUTION INTRAVENOUS; SUBCUTANEOUS at 12:07

## 2025-07-16 RX ADMIN — ALUMINUM HYDROXIDE, MAGNESIUM HYDROXIDE, AND DIMETHICONE 30 ML: 200; 20; 200 SUSPENSION ORAL at 11:07

## 2025-07-16 RX ADMIN — ALUMINUM HYDROXIDE, MAGNESIUM HYDROXIDE, AND DIMETHICONE 30 ML: 200; 20; 200 SUSPENSION ORAL at 06:07

## 2025-07-16 RX ADMIN — SUCRALFATE 1 G: 1 TABLET ORAL at 06:07

## 2025-07-16 RX ADMIN — LABETALOL HYDROCHLORIDE 10 MG: 5 INJECTION, SOLUTION INTRAVENOUS at 12:07

## 2025-07-16 RX ADMIN — INSULIN ASPART 6 UNITS: 100 INJECTION, SOLUTION INTRAVENOUS; SUBCUTANEOUS at 06:07

## 2025-07-16 RX ADMIN — SUCRALFATE 1 G: 1 TABLET ORAL at 11:07

## 2025-07-16 RX ADMIN — RISPERIDONE 0.5 MG: 0.25 TABLET, FILM COATED ORAL at 09:07

## 2025-07-16 NOTE — DISCHARGE SUMMARY
[facility] Medicine Discharge Summary    Admit Date: 6/9/2025  Discharge Date and Time: 7/16/202511:03 AM  Admitting Physician:  Team  Discharging Physician: Cristian Syed MD.  Primary Care Physician: Nicole Blanchard, XAVIERP  Consults: Neurology and Urology      64-year-old male with type 1 diabetes mellitus, CAD s/p PCI/stent, hypertension, hyperlipidemia, prior CVA  presented on 06/09/2025 for evaluation of sudden onset headache, confusion, slurred speech.    CT head -left occipital lobe parenchymal hemorrhage with trace adjacent subarachnoid hemorrhage.  CTA head/neck was also completed in ED, which revealed no large vessel occlusion, flow-limiting stenosis, aneurysm or evidence of a vascular malformation   MRI brain reported Left occipital lobe hematoma with adjacent subarachnoid hemorrhage. Innumerable chronic microhemorrhages with a subcortical location, may indicate underlying cerebral amyloid angiopathy. Mild chronic microvascular ischemic changes. Neurology team evaluated the patient, initiated on stroke protocol, admitted to ICU services.  Neurosurgery team evaluated the patient, MRI likely suggestive of amyloid angiopathy, repeat CT head 6/10/25 unchanged, suggested holding antiplatelets for at least 2 weeks.  Patient is started on Keppra.  Neurosurgical team signed off. Patient required Chao placement for retention, seen by Urology.  Patient cleared to downgrade to floors 06/12/2025 PM by ICU team. Palliative care team consulted while patient in ICU, code status DNR at the time of downgrade. Pt had fever, tmax 102.8, tachycardic,wbc count increased 17K, decided to obtain pan scan, CT head stable, but notes to have dense consolidation left >right LL, constipation. Respiratory cultures were obtained grew many strep group B Haemophilus influenzae.  Received antibiotics  Patient remains unsafe for p.o. intake with speech evaluations, informed decision with the  "patient's spouse was made, GI team consulted for PEG placement. On 7/7, patient pulled his Chao out, noted to have gross bleeding which has improved with replaced Chao. Psych was consulted as well to help with his delirium.  PTOT recommended moderate intensity therapy,case management was consulted for placement. Case management was having hard time placing due to delirium. Psych was reconsulted to help with delirium management. Had BM on 07/09. Awaiting response from facility of patient's family's choice.  Accepted by a different facility already. Plan for DC once accepted to SNF. Noted to have several hypoglycemic readings due to bolus feeds not going through on 07/13, placed on IV D5/O.45% NS infusion. Also given NS bolus for hypotension.  Trazodone dose was reduced.  He remained stable and we will be discharged to SNF today.          Traumatic hematuria- patient has pulled Chao out- resolving  Hemorrhagic CVA-  Left occipital lobe hematoma, adjacent subarachnoid hemorrhage  Dense left lobe consolidation- Haemophilus influenza pneumonia - treated  Oropharyngeal Dysphagia s/p peg tube  T1 DM  CAD s/p PCI  HTN  HLD  History of CVA  Tobacco use        Pt was seen and examined on the day of discharge  Vitals:  VITAL SIGNS: 24 HRS MIN & MAX LAST   Temp  Min: 97.5 °F (36.4 °C)  Max: 99.2 °F (37.3 °C) 97.9 °F (36.6 °C)   BP  Min: 102/65  Max: 175/85 (!) 171/77   Pulse  Min: 89  Max: 110  98   Resp  Min: 17  Max: 17 17   SpO2  Min: 92 %  Max: 96 % 96 %     General: Comfortable, not in distress  Respiratory: Clear to auscultation bilaterally, nonlabored breathing  Cardiovascular: RRR, S1, S2  Abdominal: Soft, nontender, nondistended  Neurological:  Alert, cooperative          Procedures Performed: No admission procedures for hospital encounter.     Significant Diagnostic Studies: See Full reports for all details    No results for input(s): "WBC", "RBC", "HGB", "HCT", "MCV", "MCH", "MCHC", "RDW", "PLT", "MPV", "GRAN", " ""LYMPH", "MONO", "BASO", "NRBC" in the last 168 hours.    No results for input(s): "NA", "K", "CL", "CO2", "ANIONGAP", "BUN", "CREATININE", "GLU", "CALCIUM", "PH", "MG", "ALBUMIN", "PROT", "ALKPHOS", "ALT", "AST", "BILITOT" in the last 168 hours.     Microbiology Results (last 7 days)       ** No results found for the last 168 hours. **             X-Ray Chest 1 View  EXAMINATION  XR CHEST 1 VIEW    CLINICAL HISTORY  pending placement;    TECHNIQUE  A total of 1 frontal image(s) submitted of the chest.    COMPARISON  13 June 2025    FINDINGS  Lines/tubes/devices: None visualized.    The cardiac silhouette and central vascular structures are unchanged.  The trachea is midline. No new or worsening consolidation is developed in the interval.  Left perihilar linear opacity likely represents atelectasis.  There is no large pleural effusion or convincing pneumothorax.    Regional osseous structures and extrathoracic soft tissues are similar.    IMPRESSION  No acute process or other adverse interval change.    Electronically signed by: Shelton Hernandze  Date:    07/14/2025  Time:    18:00         Medication List        START taking these medications      bisacodyL 10 mg Supp  Commonly known as: DULCOLAX  Place 1 suppository (10 mg total) rectally daily as needed (Until bowel movement if patient has no bowel movement for 2 days).     butalbital-acetaminophen-caffeine -40 mg -40 mg per tablet  Commonly known as: FIORICET, ESGIC  1 tablet by Per G Tube route every 4 (four) hours as needed for Headaches.     insulin aspart U-100 100 unit/mL injection  Commonly known as: NovoLOG  Inject 0-10 Units into the skin before meals and at bedtime as needed for High Blood Sugar.  Replaces: insulin aspart U-100 100 unit/mL (3 mL) Inpn pen     insulin glargine U-100 (Lantus) 100 unit/mL injection  Inject 12 Units into the skin every evening.  Replaces: LANTUS SOLOSTAR U-100 INSULIN 100 unit/mL (3 mL) Inpn pen     levalbuterol 0.63 " mg/3 mL nebulizer solution  Commonly known as: XOPENEX  Take 3 mLs (0.63 mg total) by nebulization every 4 (four) hours as needed for Wheezing. Rescue     melatonin 3 mg tablet  Commonly known as: MELATIN  1 tablet (3 mg total) by Per G Tube route nightly.     * risperiDONE 1 MG tablet  Commonly known as: RISPERDAL  1 tablet (1 mg total) by Per G Tube route every evening.     * risperiDONE 0.5 MG Tab  Commonly known as: RISPERDAL  1 tablet (0.5 mg total) by Per G Tube route once daily.  Start taking on: July 17, 2025     sucralfate 1 gram tablet  Commonly known as: CARAFATE  1 tablet (1 g total) by Per G Tube route every 6 (six) hours. for 14 days     traZODone 150 MG tablet  Commonly known as: DESYREL  0.5 tablets (75 mg total) by Per G Tube route every evening.     valproic acid (as sodium salt) 250 mg/5 mL (5 mL) Soln  Commonly known as: DEPAKENE  5 mLs (250 mg total) by Per G Tube route every evening.           * This list has 2 medication(s) that are the same as other medications prescribed for you. Read the directions carefully, and ask your doctor or other care provider to review them with you.                CONTINUE taking these medications      alpha lipoic acid 600 mg Cap            STOP taking these medications      aspirin 325 MG EC tablet  Commonly known as: ECOTRIN     carbamide peroxide 6.5 % otic solution  Commonly known as: DEBROX     carvediloL 6.25 MG tablet  Commonly known as: COREG     CENTRUM SILVER MEN ORAL     coQ10 (ubiquinol) 100 mg Cap     gabapentin 100 MG capsule  Commonly known as: NEURONTIN     insulin aspart U-100 100 unit/mL (3 mL) Inpn pen  Commonly known as: NovoLOG  Replaced by: insulin aspart U-100 100 unit/mL injection     LANTUS SOLOSTAR U-100 INSULIN 100 unit/mL (3 mL) Inpn pen  Generic drug: insulin glargine U-100 (Lantus)  Replaced by: insulin glargine U-100 (Lantus) 100 unit/mL injection     LIQUID B-12 1,000 mcg/15 mL Liqd  Generic drug: cyanocobalamin (vitamin B-12)    "  losartan 25 MG tablet  Commonly known as: COZAAR     nitroGLYCERIN 0.4 MG SL tablet  Commonly known as: NITROSTAT     pen needle, diabetic 32 gauge x 5/32" Ndle     potassium gluconate 600 mg (99 mg) Tab     PROBIOTIC 20 billion cell Cpsp  Generic drug: L.acidoph,rhamn-B.breve,longum     rosuvastatin 40 MG Tab  Commonly known as: CRESTOR     vitamin D 1000 units Tab  Commonly known as: VITAMIN D3               Where to Get Your Medications        These medications were sent to [facility]  [address] [address]      Phone: [phone]   melatonin 3 mg tablet  risperiDONE 0.5 MG Tab  risperiDONE 1 MG tablet  sucralfate 1 gram tablet  traZODone 150 MG tablet  valproic acid (as sodium salt) 250 mg/5 mL (5 mL) Soln       Information about where to get these medications is not yet available    Ask your nurse or doctor about these medications  bisacodyL 10 mg Supp  butalbital-acetaminophen-caffeine -40 mg -40 mg per tablet  insulin aspart U-100 100 unit/mL injection  insulin glargine U-100 (Lantus) 100 unit/mL injection  levalbuterol 0.63 mg/3 mL nebulizer solution          Explained in detail to the patient about the discharge plan, medications, and follow-up visits. Pt understands and agrees with the treatment plan  Discharge Disposition: SNF  Discharged Condition: stable  Diet-   Dietary Orders (From admission, onward)       Start     Ordered    06/23/25 1600  Tube Feedings/Formulas 250; Diabetisource AC; Gastrostomy (6 times daily); 150; Every 4 hours  Every 4 hours      Question Answer Comment   Feeding Volume (mL): 250    Select Formula: Diabetisource AC    Route: Gastrostomy 6 times daily   Free water flush volume (mL): 150    Free water flush frequency: Every 4 hours        06/23/25 1452    06/16/25 1605  Diet NPO  Diet effective now         06/16/25 1608                   Medications Per DC med rec  Activities as tolerated   Follow-up " Information       Anthony Puentes MD Follow up in 1 month(s).    Specialty: Urology  Why: for void trial  Contact information:  [address] [place] [address]  [phone]                           For further questions contact hospitalist office    Discharge time 33 minutes    For worsening symptoms, chest pain, shortness of breath, increased abdominal pain, high grade fever, stroke or stroke like symptoms, immediately go to the nearest Emergency Room or call 911 as soon as possible.      Cristian Calderon M.D, on 7/16/2025. at 11:03 AM.

## 2025-07-16 NOTE — PLAN OF CARE
07/16/25 1145   Final Note   Assessment Type Final Discharge Note   Anticipated Discharge Disposition SNF   Post-Acute Status   Post-Acute Authorization Placement   Post-Acute Placement Status Set-up Complete/Auth obtained   Discharge Delays None known at this time     Patient is discharged to [facility].  KAYDEN Schmitt, is coordinating transfer. No further discharge planning needs identified at this time.

## 2025-07-16 NOTE — PT/OT/SLP PROGRESS
Pt not appropriate for OT this AM d/t BP out of parameters. /79 on RUE and 150/76 on LUE. RN notified. Will f/u as schedule permits.

## 2025-07-16 NOTE — PT/OT/SLP PROGRESS
Physical Therapy Treatment    Patient Name:  Federico Villarreal   MRN:  [id number]    Recommendations:     Discharge therapy intensity: Moderate Intensity Therapy   Discharge Equipment Recommendations: to be determined by next level of care  Barriers to discharge: Impaired mobility    Assessment:     Federico Villarreal is a 64 y.o. male admitted with a medical diagnosis of L occipital hemorrhage CVA, dysphagia s/p PEG placement, delirium. Hx of L eye blindness.  He presents with the following impairments/functional limitations: weakness, impaired endurance, impaired self care skills, impaired functional mobility, visual deficits, impaired cognition, decreased coordination, decreased upper extremity function, decreased lower extremity function, decreased safety awareness .    DME Justification:  No DME recommended requiring DME justifications    Rehab Prognosis: Good; patient would benefit from acute skilled PT services to address these deficits and reach maximum level of function.    Recent Surgery: Procedure(s) (LRB):  PEG (N/A) 30 Days Post-Op    Plan:     During this hospitalization, patient would benefit from acute PT services 3 x/week to address the identified rehab impairments via gait training, therapeutic activities, therapeutic exercises and progress toward the following goals:    Plan of Care Expires:  07/13/25    Subjective     Chief Complaint: none  Patient/Family Comments/goals: pt leaving today  Pain/Comfort:  Pain Rating 1: 0/10      Objective:     Communicated with pts nurse prior to session.  Patient found HOB elevated with blood pressure cuff, nascimento catheter, PEG Tube, peripheral IV, pulse ox (continuous), SCD, telemetry upon PT entry to room.     General Precautions: Standard, fall, NPO  Orthopedic Precautions: N/A  Braces: N/A  Respiratory Status: Room air  Blood Pressure: 135/65 at rest, after activity 120/70  Skin Integrity: Visible skin intact      Functional Mobility:  Bed Mobility:     Rolling  Left:  minimum assistance  Supine to Sit: moderate assistance and loses balance coming to sit  Transfers:     Sit to Stand:  minimum assistance and moderate assistance with rolling walker and performed x 6 reps  Bed to Chair: minimum assistance with  no AD  using  Step Transfer and attempted t/f' w/ RW but confused what to do w/ walker, much better / safer w/ HHA  Balance:  fair sit balance, requiring CG to Min/Mod a for dynamic and static balance.  Poor stand balance w/ RW, fair- w/ HHA    Therapeutic Activities/Exercises:  Pt sat EOB and assisted w/ dressing donning shirt, pants, brief and shoes, standing to pull up briefs and pants.  Pt required Mod to Max to be dressed.    Co-Treatment: No    Education:  Patient and spouse were provided with verbal education and demonstrations education regarding PT role/goals/POC, fall prevention, and safety awareness.  Understanding was verbalized, however additional teaching warranted.     Patient left up in chair with all lines intact, call button in reach, nurse notified, and wife present      GOALS:   Multidisciplinary Problems       Physical Therapy Goals          Problem: Physical Therapy    Goal Priority Disciplines Outcome Interventions   Physical Therapy Goal     PT, PT/OT Progressing    Description: Goals to be met by: 25     Patient will increase functional independence with mobility by performin. Supine to sit with Minimal Assistance  2. Sit to stand transfer with Minimal Assistance  3. Bed to chair transfer with Minimal Assistance using Rolling Walker  4. Pt will follow 5/5 motor commands for BLE.  5. Pt will ambulate 150ft with CGA and RW.                          Time Tracking:     PT Received On: 25  PT Start Time: 1424     PT Stop Time: 1451  PT Total Time (min): 27 min     Billable Minutes: Therapeutic Activity 27 min    Treatment Type: Treatment  PT/PTA: PTA     Number of PTA visits since last PT visit: 4     2025

## 2025-07-17 ENCOUNTER — LAB REQUISITION (OUTPATIENT)
Dept: LAB | Facility: HOSPITAL | Age: 65
End: 2025-07-17
Payer: MEDICARE

## 2025-07-17 ENCOUNTER — PATIENT OUTREACH (OUTPATIENT)
Facility: HOSPITAL | Age: 65
End: 2025-07-17
Payer: MEDICARE

## 2025-07-17 DIAGNOSIS — I67.82 CEREBRAL ISCHEMIA: ICD-10-CM

## 2025-07-17 DIAGNOSIS — I25.10 ATHEROSCLEROTIC HEART DISEASE OF NATIVE CORONARY ARTERY WITHOUT ANGINA PECTORIS: ICD-10-CM

## 2025-07-17 DIAGNOSIS — I10 ESSENTIAL (PRIMARY) HYPERTENSION: ICD-10-CM

## 2025-07-17 DIAGNOSIS — E78.5 HYPERLIPIDEMIA, UNSPECIFIED: ICD-10-CM

## 2025-07-17 DIAGNOSIS — E10.9 TYPE 1 DIABETES MELLITUS WITHOUT COMPLICATIONS: ICD-10-CM

## 2025-07-17 LAB
25(OH)D3+25(OH)D2 SERPL-MCNC: 32 NG/ML (ref 30–80)
ALBUMIN SERPL-MCNC: 2.6 G/DL (ref 3.4–4.8)
ALBUMIN/GLOB SERPL: 0.7 RATIO (ref 1.1–2)
ALP SERPL-CCNC: 204 UNIT/L (ref 40–150)
ALT SERPL-CCNC: 18 UNIT/L (ref 0–55)
ANION GAP SERPL CALC-SCNC: 13 MEQ/L
AST SERPL-CCNC: 14 UNIT/L (ref 11–45)
BASOPHILS # BLD AUTO: 0.07 X10(3)/MCL
BASOPHILS NFR BLD AUTO: 0.6 %
BILIRUB SERPL-MCNC: 0.6 MG/DL
BUN SERPL-MCNC: 25.7 MG/DL (ref 8.4–25.7)
CALCIUM SERPL-MCNC: 8.7 MG/DL (ref 8.8–10)
CHLORIDE SERPL-SCNC: 99 MMOL/L (ref 98–107)
CHOLEST SERPL-MCNC: 175 MG/DL
CHOLEST/HDLC SERPL: 5 {RATIO} (ref 0–5)
CO2 SERPL-SCNC: 25 MMOL/L (ref 23–31)
CREAT SERPL-MCNC: 0.95 MG/DL (ref 0.72–1.25)
CREAT/UREA NIT SERPL: 27
EOSINOPHIL # BLD AUTO: 0.05 X10(3)/MCL (ref 0–0.9)
EOSINOPHIL NFR BLD AUTO: 0.4 %
ERYTHROCYTE [DISTWIDTH] IN BLOOD BY AUTOMATED COUNT: 12.4 % (ref 11.5–17)
EST. AVERAGE GLUCOSE BLD GHB EST-MCNC: 177.2 MG/DL
GFR SERPLBLD CREATININE-BSD FMLA CKD-EPI: >60 ML/MIN/1.73/M2
GLOBULIN SER-MCNC: 4 GM/DL (ref 2.4–3.5)
GLUCOSE SERPL-MCNC: 493 MG/DL (ref 82–115)
HBA1C MFR BLD: 7.8 %
HCT VFR BLD AUTO: 39 % (ref 42–52)
HDLC SERPL-MCNC: 38 MG/DL (ref 35–60)
HGB BLD-MCNC: 12.7 G/DL (ref 14–18)
IMM GRANULOCYTES # BLD AUTO: 0.06 X10(3)/MCL (ref 0–0.04)
IMM GRANULOCYTES NFR BLD AUTO: 0.5 %
LDLC SERPL CALC-MCNC: 95 MG/DL (ref 50–140)
LYMPHOCYTES # BLD AUTO: 1.14 X10(3)/MCL (ref 0.6–4.6)
LYMPHOCYTES NFR BLD AUTO: 9.2 %
MCH RBC QN AUTO: 30.4 PG (ref 27–31)
MCHC RBC AUTO-ENTMCNC: 32.6 G/DL (ref 33–36)
MCV RBC AUTO: 93.3 FL (ref 80–94)
MONOCYTES # BLD AUTO: 1.14 X10(3)/MCL (ref 0.1–1.3)
MONOCYTES NFR BLD AUTO: 9.2 %
NEUTROPHILS # BLD AUTO: 9.94 X10(3)/MCL (ref 2.1–9.2)
NEUTROPHILS NFR BLD AUTO: 80.1 %
NRBC BLD AUTO-RTO: 0 %
OHS QRS DURATION: 116 MS
OHS QTC CALCULATION: 470 MS
PLATELET # BLD AUTO: 219 X10(3)/MCL (ref 130–400)
PMV BLD AUTO: 10 FL (ref 7.4–10.4)
POTASSIUM SERPL-SCNC: 4.8 MMOL/L (ref 3.5–5.1)
PREALB SERPL-MCNC: 21.1 MG/DL (ref 16–42)
PROT SERPL-MCNC: 6.6 GM/DL (ref 5.8–7.6)
RBC # BLD AUTO: 4.18 X10(6)/MCL (ref 4.7–6.1)
SODIUM SERPL-SCNC: 137 MMOL/L (ref 136–145)
TRIGL SERPL-MCNC: 212 MG/DL (ref 34–140)
TSH SERPL-ACNC: 1.65 UIU/ML (ref 0.35–4.94)
VALPROATE SERPL-MCNC: <12.5 UG/ML (ref 50–100)
VLDLC SERPL CALC-MCNC: 42 MG/DL
WBC # BLD AUTO: 12.4 X10(3)/MCL (ref 4.5–11.5)

## 2025-07-17 PROCEDURE — 80164 ASSAY DIPROPYLACETIC ACD TOT: CPT | Performed by: INTERNAL MEDICINE

## 2025-07-17 PROCEDURE — 80061 LIPID PANEL: CPT | Performed by: INTERNAL MEDICINE

## 2025-07-17 PROCEDURE — 84134 ASSAY OF PREALBUMIN: CPT | Performed by: INTERNAL MEDICINE

## 2025-07-17 PROCEDURE — 80053 COMPREHEN METABOLIC PANEL: CPT | Performed by: INTERNAL MEDICINE

## 2025-07-17 PROCEDURE — 82306 VITAMIN D 25 HYDROXY: CPT | Performed by: INTERNAL MEDICINE

## 2025-07-17 PROCEDURE — 84443 ASSAY THYROID STIM HORMONE: CPT | Performed by: INTERNAL MEDICINE

## 2025-07-17 PROCEDURE — 83036 HEMOGLOBIN GLYCOSYLATED A1C: CPT | Performed by: INTERNAL MEDICINE

## 2025-07-17 PROCEDURE — 85025 COMPLETE CBC W/AUTO DIFF WBC: CPT | Performed by: INTERNAL MEDICINE

## 2025-07-20 ENCOUNTER — HOSPITAL ENCOUNTER (EMERGENCY)
Facility: HOSPITAL | Age: 65
Discharge: NURSING, REHAB OR LONG-TERM CARE | End: 2025-07-20
Attending: STUDENT IN AN ORGANIZED HEALTH CARE EDUCATION/TRAINING PROGRAM
Payer: MEDICARE

## 2025-07-20 VITALS
BODY MASS INDEX: 26.5 KG/M2 | HEART RATE: 80 BPM | OXYGEN SATURATION: 94 % | TEMPERATURE: 98 F | DIASTOLIC BLOOD PRESSURE: 78 MMHG | SYSTOLIC BLOOD PRESSURE: 157 MMHG | HEIGHT: 62 IN | WEIGHT: 144 LBS | RESPIRATION RATE: 15 BRPM

## 2025-07-20 DIAGNOSIS — R33.8 ACUTE URINARY RETENTION: Primary | ICD-10-CM

## 2025-07-20 PROCEDURE — 25000003 PHARM REV CODE 250: Performed by: STUDENT IN AN ORGANIZED HEALTH CARE EDUCATION/TRAINING PROGRAM

## 2025-07-20 PROCEDURE — 51702 INSERT TEMP BLADDER CATH: CPT

## 2025-07-20 PROCEDURE — 99283 EMERGENCY DEPT VISIT LOW MDM: CPT | Mod: 25

## 2025-07-20 RX ORDER — LIDOCAINE HYDROCHLORIDE 20 MG/ML
JELLY TOPICAL
Status: COMPLETED | OUTPATIENT
Start: 2025-07-20 | End: 2025-07-20

## 2025-07-20 RX ADMIN — LIDOCAINE HYDROCHLORIDE: 20 JELLY TOPICAL at 11:07

## 2025-07-20 NOTE — DISCHARGE INSTRUCTIONS

## 2025-07-20 NOTE — ED PROVIDER NOTES
Encounter Date: 7/20/2025    SCRIBE #1 NOTE: I, Ekta Garcia, am scribing for, and in the presence of,  Hugo Ortega MD. I have scribed the following portions of the note - Other sections scribed: HPI, ROS, PE.       History     Chief Complaint   Patient presents with    Urinary Retention     Presents via [facility] from [facility] for urinary retention after pulling out catheter today. Seen recently for CVA 4 days ago.     Federico Villarreal is a 64 year old male with a history of CAD, DM, HTN, and stroke presenting to the ED via EMS from [facility] for urinary retention after pulling out catheter today. The patient was seen on 7/16/2025 for a CVA.     The history is provided by the EMS personnel and the nursing home. No  was used.     Review of patient's allergies indicates:  No Known Allergies  Past Medical History:   Diagnosis Date    Acne     Cataract     Coronary artery disease     Diabetes mellitus     Hearing loss     Hypertension     Seasonal allergies     Stroke      Past Surgical History:   Procedure Laterality Date    ADENOIDECTOMY      ADENOIDECTOMY  1972    As a child    CORONARY STENT PLACEMENT  08/20/2012    ESOPHAGOGASTRODUODENOSCOPY W/ PEG N/A 6/16/2025    Procedure: PEG;  Surgeon: Mike Navarro MD;  [facility] ENDOSCOPY;  Service: Gastroenterology;  Laterality: N/A;    EYE SURGERY  2011    Multiple    INNER EAR SURGERY      REPAIR OF RETINAL DETACHMENT WITH VITRECTOMY      TONSILLECTOMY      VITRECTOMY       Family History   Problem Relation Name Age of Onset    Diabetes Mother Mamta Villarreal     Diabetes Father Mayito Villarreal     Dementia Father Mayito Villarreal      Social History[1]  Review of Systems   Unable to perform ROS: Mental status change       Physical Exam     Initial Vitals [07/20/25 1021]   BP Pulse Resp Temp SpO2   113/77 91 15 98.2 °F (36.8 °C) 95 %      MAP       --         Physical Exam    Nursing note and vitals reviewed.  Constitutional: He appears  well-developed.   Awake.   Appears confused.    Eyes: EOM are normal. Pupils are equal, round, and reactive to light.   Cardiovascular:  Normal rate and regular rhythm.           No murmur heard.  Pulmonary/Chest: Breath sounds normal. No respiratory distress. He has no wheezes. He has no rales.   Abdominal: Abdomen is soft. He exhibits distension. There is abdominal tenderness in the suprapubic area.   PEG tube in place.    Genitourinary:    Genitourinary Comments: Dry blood to the penile meatus.        Neurological: He is alert.   Skin: Skin is warm. Capillary refill takes less than 2 seconds. No rash noted.         ED Course   Procedures  Labs Reviewed - No data to display       Imaging Results    None          Medications   LIDOcaine HCl 2% urojet ( Mucous Membrane Given 7/20/25 1130)     Medical Decision Making  Problems Addressed:  Acute urinary retention: acute illness or injury    Amount and/or Complexity of Data Reviewed  Independent Historian: caregiver and EMS     Details: Federico Villarreal is a 64 year old male with a history of CAD, DM, HTN, and stroke presenting to the ED via EMS from [facility] for urinary retention after pulling out catheter today. The patient was seen on 7/16/2025 for a CVA.     Risk  Prescription drug management.    Differential diagnosis (includes but is not limited to):   Acute urinary retention, urine tract infection,  trauma    MDM Narrative  64-year-old male presents for evaluation of urinary retention after removal of his catheter earlier today.  Catheter placed successfully, urinary drainage noted.  Patient remains hemodynamically stable, stable at his baseline neurological status.  Need for urology follow up discussed.  Patient is stable for discharge back to his nursing facility.  Return precautions discussed and understood.    Dispo: Discharge    My independent radiology interpretation: as above  Point of care US (independently performed and interpreted):   Decision  rules/clinical scoring:     Sepsis Perfusion Assessment:     Amount and/or Complexity of Data Reviewed  Independent historian: EMS   Summary of history: report received  External data reviewed: notes from previous ED visits and notes from clinic visits  Summary of data reviewed: Prior records reviewed  Risk and benefits of testing: discussed   Discussion of management or test interpretation with external provider(s): none   Summary of discussion:     Risk  OTC medications  Prescription drug management   Shared decision making     Critical Care  none    Data Reviewed/Counseling: I have personally reviewed the patient's vital signs, nursing notes, and other relevant tests, information, and imaging. I had a detailed discussion regarding the historical points, exam findings, and any diagnostic results supporting the discharge diagnosis. I personally performed the history, PE, MDM and procedures as documented above and agree with the scribe's documentation.    Portions of this note were dictated using voice recognition software. Although it was reviewed for accuracy, some inherent voice recognition errors may have occurred and may be present in this document.         Scribe Attestation:   Scribe #1: I performed the above scribed service and the documentation accurately describes the services I performed. I attest to the accuracy of the note.    Attending Attestation:           Physician Attestation for Scribe:  Physician Attestation Statement for Scribe #1: I, Hugo Ortega MD, reviewed documentation, as scribed by Ekta Garcia in my presence, and it is both accurate and complete.             ED Course as of 08/07/25 1717   Sun Jul 20, 2025   1204 Chao catheter replaced by nursing staff. [MC]      ED Course User Index  [MC] Hugo Ortega MD                               Clinical Impression:  Final diagnoses:  [R33.8] Acute urinary retention (Primary)          ED Disposition Condition    Discharge Stable           ED Prescriptions    None       Follow-up Information       Follow up With Specialties Details Why Contact Info    Nicole Blanchard, FNP Family Medicine Schedule an appointment as soon as possible for a visit in 2 days  [address] [facility] [address]  [phone]      Your urologist  Schedule an appointment as soon as possible for a visit in 2 days      [facility] - Emergency Dept Emergency Medicine  If symptoms worsen [address] [facility] [id number] [phone]                   [1]   Social History  Tobacco Use    Smoking status: Former     Current packs/day: 0.00     Average packs/day: 0.3 packs/day for 17.3 years (4.5 ttl pk-yrs)     Types: Cigarettes, Cigars     Start date: 2008     Quit date:      Years since quittin.6    Smokeless tobacco: Never   Substance Use Topics    Alcohol use: Not Currently    Drug use: Not Currently        Hugo Ortega MD  25 [phone]

## 2025-07-25 ENCOUNTER — PATIENT OUTREACH (OUTPATIENT)
Facility: HOSPITAL | Age: 65
End: 2025-07-25
Payer: MEDICARE

## 2025-07-31 ENCOUNTER — PATIENT OUTREACH (OUTPATIENT)
Facility: HOSPITAL | Age: 65
End: 2025-07-31
Payer: MEDICARE

## 2025-08-04 ENCOUNTER — PATIENT OUTREACH (OUTPATIENT)
Facility: HOSPITAL | Age: 65
End: 2025-08-04
Payer: MEDICARE

## 2025-08-11 ENCOUNTER — PATIENT OUTREACH (OUTPATIENT)
Facility: HOSPITAL | Age: 65
End: 2025-08-11
Payer: MEDICARE

## 2025-08-18 ENCOUNTER — PATIENT OUTREACH (OUTPATIENT)
Facility: HOSPITAL | Age: 65
End: 2025-08-18
Payer: MEDICARE

## 2025-08-19 ENCOUNTER — LAB REQUISITION (OUTPATIENT)
Dept: LAB | Facility: HOSPITAL | Age: 65
End: 2025-08-19
Payer: MEDICARE

## 2025-08-19 DIAGNOSIS — N39.0 URINARY TRACT INFECTION, SITE NOT SPECIFIED: ICD-10-CM

## 2025-08-19 LAB
BACTERIA #/AREA URNS AUTO: ABNORMAL /HPF
BILIRUB UR QL STRIP.AUTO: NEGATIVE
CLARITY UR: ABNORMAL
COLOR UR AUTO: ABNORMAL
GLUCOSE UR QL STRIP: NEGATIVE
HGB UR QL STRIP: ABNORMAL
KETONES UR QL STRIP: NEGATIVE
LEUKOCYTE ESTERASE UR QL STRIP: ABNORMAL
NITRITE UR QL STRIP: NEGATIVE
PH UR STRIP: 7 [PH]
PROT UR QL STRIP: NEGATIVE
RBC #/AREA URNS AUTO: ABNORMAL /HPF
SP GR UR STRIP.AUTO: <=1.005 (ref 1–1.03)
SQUAMOUS #/AREA URNS AUTO: ABNORMAL /HPF
UROBILINOGEN UR STRIP-ACNC: 0.2
WBC #/AREA URNS AUTO: ABNORMAL /HPF

## 2025-08-19 PROCEDURE — 81003 URINALYSIS AUTO W/O SCOPE: CPT | Performed by: INTERNAL MEDICINE

## 2025-08-19 PROCEDURE — 87186 SC STD MICRODIL/AGAR DIL: CPT | Performed by: INTERNAL MEDICINE

## 2025-08-22 LAB — BACTERIA UR CULT: ABNORMAL

## 2025-08-23 ENCOUNTER — PATIENT MESSAGE (OUTPATIENT)
Dept: RESEARCH | Facility: HOSPITAL | Age: 65
End: 2025-08-23
Payer: MEDICARE

## 2025-08-25 ENCOUNTER — PATIENT OUTREACH (OUTPATIENT)
Facility: HOSPITAL | Age: 65
End: 2025-08-25
Payer: MEDICARE

## 2025-09-04 ENCOUNTER — PATIENT OUTREACH (OUTPATIENT)
Facility: HOSPITAL | Age: 65
End: 2025-09-04
Payer: MEDICARE

## (undated) DEVICE — BINDER ABDOMINAL UNIV XLN 10IN

## (undated) DEVICE — TIP SUCTION YANKAUER

## (undated) DEVICE — KIT PEG PULL SAFETY 20FR

## (undated) DEVICE — KIT CANIST SUCTION 1200CC

## (undated) DEVICE — SOL IRRI STRL WATER 1000ML

## (undated) DEVICE — KIT SURGICAL COLON .25 1.1OZ

## (undated) DEVICE — TUBING O2 FEMALE CONN 13FT

## (undated) DEVICE — COLLECTION SPECIMEN NEPTUNE